# Patient Record
Sex: MALE | Race: WHITE | NOT HISPANIC OR LATINO | Employment: OTHER | URBAN - METROPOLITAN AREA
[De-identification: names, ages, dates, MRNs, and addresses within clinical notes are randomized per-mention and may not be internally consistent; named-entity substitution may affect disease eponyms.]

---

## 2013-10-24 LAB — CRC RECOMMENDATION EXT: NORMAL

## 2017-03-14 ENCOUNTER — LAB VISIT (OUTPATIENT)
Dept: LAB | Facility: HOSPITAL | Age: 67
End: 2017-03-14
Attending: INTERNAL MEDICINE
Payer: MEDICARE

## 2017-03-14 ENCOUNTER — OFFICE VISIT (OUTPATIENT)
Dept: FAMILY MEDICINE | Facility: CLINIC | Age: 67
End: 2017-03-14
Payer: MEDICARE

## 2017-03-14 VITALS
HEART RATE: 78 BPM | DIASTOLIC BLOOD PRESSURE: 68 MMHG | HEIGHT: 70 IN | WEIGHT: 241.5 LBS | BODY MASS INDEX: 34.57 KG/M2 | OXYGEN SATURATION: 97 % | SYSTOLIC BLOOD PRESSURE: 108 MMHG | TEMPERATURE: 98 F

## 2017-03-14 DIAGNOSIS — E03.9 HYPOTHYROIDISM, UNSPECIFIED TYPE: Chronic | ICD-10-CM

## 2017-03-14 DIAGNOSIS — R73.01 IMPAIRED FASTING GLUCOSE: ICD-10-CM

## 2017-03-14 DIAGNOSIS — E78.5 HYPERLIPIDEMIA, UNSPECIFIED HYPERLIPIDEMIA TYPE: ICD-10-CM

## 2017-03-14 DIAGNOSIS — Z12.5 SCREENING FOR PROSTATE CANCER: ICD-10-CM

## 2017-03-14 DIAGNOSIS — I25.118 CORONARY ARTERY DISEASE OF NATIVE ARTERY OF NATIVE HEART WITH STABLE ANGINA PECTORIS: Primary | ICD-10-CM

## 2017-03-14 DIAGNOSIS — F41.9 ANXIETY AND DEPRESSION: Chronic | ICD-10-CM

## 2017-03-14 DIAGNOSIS — R60.0 PERIPHERAL EDEMA: Chronic | ICD-10-CM

## 2017-03-14 DIAGNOSIS — I10 ESSENTIAL HYPERTENSION: ICD-10-CM

## 2017-03-14 DIAGNOSIS — N40.1 BENIGN NON-NODULAR PROSTATIC HYPERPLASIA WITH LOWER URINARY TRACT SYMPTOMS: ICD-10-CM

## 2017-03-14 DIAGNOSIS — E66.9 OBESITY (BMI 30.0-34.9): Chronic | ICD-10-CM

## 2017-03-14 DIAGNOSIS — R73.03 PREDIABETES: Chronic | ICD-10-CM

## 2017-03-14 DIAGNOSIS — I25.2 MYOCARDIAL INFARCT, OLD: ICD-10-CM

## 2017-03-14 DIAGNOSIS — F32.A ANXIETY AND DEPRESSION: Chronic | ICD-10-CM

## 2017-03-14 LAB
ALBUMIN SERPL BCP-MCNC: 4.1 G/DL
ALP SERPL-CCNC: 53 U/L
ALT SERPL W/O P-5'-P-CCNC: 21 U/L
ANION GAP SERPL CALC-SCNC: 9 MMOL/L
AST SERPL-CCNC: 25 U/L
BILIRUB SERPL-MCNC: 0.8 MG/DL
BUN SERPL-MCNC: 12 MG/DL
CALCIUM SERPL-MCNC: 9.8 MG/DL
CHLORIDE SERPL-SCNC: 104 MMOL/L
CHOLEST/HDLC SERPL: 4.5 {RATIO}
CO2 SERPL-SCNC: 29 MMOL/L
COMPLEXED PSA SERPL-MCNC: 0.82 NG/ML
CREAT SERPL-MCNC: 1.1 MG/DL
EST. GFR  (AFRICAN AMERICAN): >60 ML/MIN/1.73 M^2
EST. GFR  (NON AFRICAN AMERICAN): >60 ML/MIN/1.73 M^2
GLUCOSE SERPL-MCNC: 120 MG/DL
HDL/CHOLESTEROL RATIO: 22.4 %
HDLC SERPL-MCNC: 170 MG/DL
HDLC SERPL-MCNC: 38 MG/DL
LDLC SERPL CALC-MCNC: 80 MG/DL
NONHDLC SERPL-MCNC: 132 MG/DL
POTASSIUM SERPL-SCNC: 5.3 MMOL/L
PROT SERPL-MCNC: 7.6 G/DL
SODIUM SERPL-SCNC: 142 MMOL/L
TRIGL SERPL-MCNC: 260 MG/DL
TSH SERPL DL<=0.005 MIU/L-ACNC: 2.76 UIU/ML

## 2017-03-14 PROCEDURE — 83036 HEMOGLOBIN GLYCOSYLATED A1C: CPT

## 2017-03-14 PROCEDURE — 36415 COLL VENOUS BLD VENIPUNCTURE: CPT | Mod: PO

## 2017-03-14 PROCEDURE — 99214 OFFICE O/P EST MOD 30 MIN: CPT | Mod: S$GLB,,, | Performed by: INTERNAL MEDICINE

## 2017-03-14 PROCEDURE — 84443 ASSAY THYROID STIM HORMONE: CPT

## 2017-03-14 PROCEDURE — 99499 UNLISTED E&M SERVICE: CPT | Mod: S$GLB,,, | Performed by: INTERNAL MEDICINE

## 2017-03-14 PROCEDURE — 1160F RVW MEDS BY RX/DR IN RCRD: CPT | Mod: S$GLB,,, | Performed by: INTERNAL MEDICINE

## 2017-03-14 PROCEDURE — 3078F DIAST BP <80 MM HG: CPT | Mod: S$GLB,,, | Performed by: INTERNAL MEDICINE

## 2017-03-14 PROCEDURE — 80061 LIPID PANEL: CPT

## 2017-03-14 PROCEDURE — 84153 ASSAY OF PSA TOTAL: CPT

## 2017-03-14 PROCEDURE — 99499 UNLISTED E&M SERVICE: CPT | Mod: ,,, | Performed by: INTERNAL MEDICINE

## 2017-03-14 PROCEDURE — 80053 COMPREHEN METABOLIC PANEL: CPT

## 2017-03-14 PROCEDURE — 3074F SYST BP LT 130 MM HG: CPT | Mod: S$GLB,,, | Performed by: INTERNAL MEDICINE

## 2017-03-14 PROCEDURE — 99999 PR PBB SHADOW E&M-EST. PATIENT-LVL III: CPT | Mod: PBBFAC,,, | Performed by: INTERNAL MEDICINE

## 2017-03-14 RX ORDER — NITROGLYCERIN 0.4 MG/1
0.4 TABLET SUBLINGUAL EVERY 5 MIN PRN
Qty: 90 TABLET | Refills: 3 | Status: SHIPPED | OUTPATIENT
Start: 2017-03-14 | End: 2020-05-05 | Stop reason: SDUPTHER

## 2017-03-14 RX ORDER — TAMSULOSIN HYDROCHLORIDE 0.4 MG/1
1 CAPSULE ORAL DAILY
Qty: 90 CAPSULE | Refills: 1 | Status: SHIPPED | OUTPATIENT
Start: 2017-03-14 | End: 2017-03-14 | Stop reason: SDUPTHER

## 2017-03-14 RX ORDER — TAMSULOSIN HYDROCHLORIDE 0.4 MG/1
0.8 CAPSULE ORAL DAILY
Qty: 90 CAPSULE | Refills: 3 | Status: SHIPPED | OUTPATIENT
Start: 2017-03-14 | End: 2017-05-12 | Stop reason: SDUPTHER

## 2017-03-14 RX ORDER — LEVOTHYROXINE SODIUM 50 UG/1
50 TABLET ORAL
Qty: 90 TABLET | Refills: 4 | Status: SHIPPED | OUTPATIENT
Start: 2017-03-14 | End: 2018-04-09 | Stop reason: SDUPTHER

## 2017-03-14 RX ORDER — LOSARTAN POTASSIUM 50 MG/1
50 TABLET ORAL DAILY
Qty: 90 TABLET | Refills: 3 | Status: SHIPPED | OUTPATIENT
Start: 2017-03-14 | End: 2018-04-09 | Stop reason: SDUPTHER

## 2017-03-14 RX ORDER — FUROSEMIDE 20 MG/1
20 TABLET ORAL
Qty: 90 TABLET | Refills: 1 | Status: SHIPPED | OUTPATIENT
Start: 2017-03-14 | End: 2017-10-12 | Stop reason: SDUPTHER

## 2017-03-14 RX ORDER — AMITRIPTYLINE HYDROCHLORIDE 25 MG/1
25 TABLET, FILM COATED ORAL NIGHTLY PRN
Qty: 90 TABLET | Refills: 3 | Status: SHIPPED | OUTPATIENT
Start: 2017-03-14 | End: 2018-04-09 | Stop reason: SDUPTHER

## 2017-03-14 RX ORDER — PRAVASTATIN SODIUM 40 MG/1
40 TABLET ORAL DAILY
Qty: 90 TABLET | Refills: 1 | Status: SHIPPED | OUTPATIENT
Start: 2017-03-14 | End: 2017-08-03

## 2017-03-14 RX ORDER — METOPROLOL TARTRATE 25 MG/1
25 TABLET, FILM COATED ORAL 2 TIMES DAILY
Qty: 180 TABLET | Refills: 3 | Status: SHIPPED | OUTPATIENT
Start: 2017-03-14 | End: 2017-08-03 | Stop reason: SDUPTHER

## 2017-03-14 RX ORDER — ISOSORBIDE MONONITRATE 60 MG/1
60 TABLET, EXTENDED RELEASE ORAL DAILY
Qty: 90 TABLET | Refills: 3 | Status: SHIPPED | OUTPATIENT
Start: 2017-03-14 | End: 2018-04-09 | Stop reason: SDUPTHER

## 2017-03-14 NOTE — PROGRESS NOTES
Assessment & Plan  Clinton was seen today for establish care.    Diagnoses and all orders for this visit:    Coronary artery disease of native artery of native heart with stable angina pectoris - stable.  Refilled rx's.    -     isosorbide mononitrate (IMDUR) 60 MG 24 hr tablet; Take 1 tablet (60 mg total) by mouth once daily.  -     losartan (COZAAR) 50 MG tablet; Take 1 tablet (50 mg total) by mouth once daily.  -     metoprolol tartrate (LOPRESSOR) 25 MG tablet; Take 1 tablet (25 mg total) by mouth 2 (two) times daily.  -     pravastatin (PRAVACHOL) 40 MG tablet; Take 1 tablet (40 mg total) by mouth once daily.  -     Comprehensive metabolic panel; Future  -     Lipid panel; Future  -     nitroGLYCERIN (NITROSTAT) 0.4 MG SL tablet; Place 1 tablet (0.4 mg total) under the tongue every 5 (five) minutes as needed for Chest pain.    Myocardial infarct, old    Essential hypertension - good readings, no change, rx's refilled.  -     losartan (COZAAR) 50 MG tablet; Take 1 tablet (50 mg total) by mouth once daily.  -     metoprolol tartrate (LOPRESSOR) 25 MG tablet; Take 1 tablet (25 mg total) by mouth 2 (two) times daily.    Hyperlipidemia, unspecified hyperlipidemia type - checking labs.  On mod dose pravastatin.  Had coffee with cream this AM.  -     pravastatin (PRAVACHOL) 40 MG tablet; Take 1 tablet (40 mg total) by mouth once daily.  -     Comprehensive metabolic panel; Future  -     Lipid panel; Future    Benign non-nodular prostatic hyperplasia with lower urinary tract symptoms - still symptomatic.  Trial of increased dose flomax - take 2 daily.  Stay on amitriptyline.  If still symptomatic - refer back to urology.  -     amitriptyline (ELAVIL) 25 MG tablet; Take 1 tablet (25 mg total) by mouth nightly as needed for Insomnia.  -     Discontinue: tamsulosin (FLOMAX) 0.4 mg Cp24; Take 1 capsule (0.4 mg total) by mouth once daily.  -     tamsulosin (FLOMAX) 0.4 mg Cp24; Take 2 capsules (0.8 mg total) by mouth once  daily.    Anxiety and depression - amitriptyline for this but mainly for nocturia.    Obesity (BMI 30.0-34.9)    Hypothyroidism, unspecified type - check TSH; refilled levothyroxine.  -     levothyroxine (SYNTHROID) 50 MCG tablet; Take 1 tablet (50 mcg total) by mouth before breakfast.  -     TSH; Future    Screening for prostate cancer  -     PSA, Screening; Future    Impaired fasting glucose - not on meds. Last check a few years ago was good; repeat today.  -     Hemoglobin A1c; Future    Peripheral edema - takes furosemide PRN.  -     furosemide (LASIX) 20 MG tablet; Take 1 tablet (20 mg total) by mouth as needed.        There are no discontinued medications.    Follow-up: No Follow-up on file. plan for f/u 6 months fasting      =================================================================      Chief Complaint   Patient presents with    [facility]       [facility]  Brad is a 66 y.o. male, last appointment with this clinic was 10/31/2016.    former pt of Dr. Roe  CAD, hx of MI;   9/27/2013 [facility] prox LAD 30% stenosis; RCA 20% stenosis. patent LAD stent.  ARB, beta blocker, statin, ASA; 3/9/2015 nu med stress test negative.   3/9/2015 TTE normal LVEF 55-60; concentric remodeling.  He notes chronic stable angina with exertion.  Takes NTG PRN.  HTN; losartan; diltizam; furosemide; metoprolol  Peripheral edema; takes lasix PRN.  hyperlipidemia; pravastatin; last level 10/2015  anxiety and depression with possible effects of memory loss.  seen by neuro 2015, plan was neuropsych testing.  MRI brain and carotid US all normal/negative.  12/3/2014 MRI brain: 1. No evidence for acute infarct 2. unremarkable MRI of the brain  12/3/2014 carotid US normal  Pt notes he was having a difficult time since the passing of his brother in 2010.  Feels like mood is OK now.  On amitriptyline for mood but also I think for nocturia.  pre-diabetes; a1c 10/2015 WNL  hypothyroid on 25; TSH 10/2016 WNL  JAYLEN  BPH; flomax; with LUTS.   Notes nocturia.  Hesitancy.  Used to see urology but it's been some time.  Plan at the last visit with urology - stay on flomax, continue routine PSA testing.  Finds the nocturia quite bothersome still.  hx of colon polyps, unclear type; reportedly last colonoscopy 2013 with Abdiaziz  Notes perirectal itching.  Never tried vaseline. Dilt cream not that helpful.   Knee pain with up and down. Not debilitating.  Declines zostavax      Patient Care Team:  Michael Gonzalez MD as PCP - General (Internal Medicine)  WVicki To MD as Consulting Physician (Urology)  Andrea Hurley MD (Neurology)  Desmond Lee MD as Consulting Physician (Colon and Rectal Surgery)    Patient Active Problem List    Diagnosis Date Noted    Hypothyroidism 06/01/2016    Obesity (BMI 30.0-34.9) 06/01/2016    Anxiety and depression 11/24/2014    Other malaise and fatigue 11/24/2014    Retrocalcaneal bursitis 11/24/2014    Prediabetes 11/24/2014    CAD (coronary artery disease) 11/19/2014     Stent LAD 2012 9/27/2013 [facility] prox LAD 30% stenosis; RCA 20% stenosis. patent LAD stent.    3/9/2015 nu med stress test negative. 3/9/2015 TTE normal LVEF 55-60; concentric remodeling.      Hyperlipidemia 07/09/2014    Hypertension 07/09/2014    Acid reflux 07/09/2014    BPH (benign prostatic hyperplasia) 07/09/2014    Vitamin D deficiency 07/09/2014    Hernia of abdominal wall 07/09/2014       PAST MEDICAL HISTORY:  Past Medical History:   Diagnosis Date    Allergy     Angina pectoris     Anxiety     Coronary artery disease     Depression     GERD (gastroesophageal reflux disease)     Hyperlipidemia     Hypertension     Hypothyroidism     Memory loss     12/3/2014 MRI brain: 1. No evidence for acute infarct 2. unremarkable MRI of the brain; 12/3/2014 carotid US normal    Myocardial infarction     Obesity     Sleep apnea        PAST SURGICAL HISTORY:  Past Surgical History:   Procedure Laterality Date    APPENDECTOMY   1986    bone spur Right     COLONOSCOPY      hand trauma Right     heart stent         SOCIAL HISTORY:  Social History     Social History    Marital status:      Spouse name: N/A    Number of children: N/A    Years of education: N/A     Occupational History    Not on file.     Social History Main Topics    Smoking status: Never Smoker    Smokeless tobacco: Never Used    Alcohol use 0.0 oz/week     0 Standard drinks or equivalent per week      Comment: rarely    Drug use: No    Sexual activity: Yes     Other Topics Concern    Not on file     Social History Narrative    No narrative on file       ALLERGIES AND MEDICATIONS: updated and reviewed.  Review of patient's allergies indicates:   Allergen Reactions    Iodine containing multivitamin Anaphylaxis    Naproxen Other (See Comments)     hallucinations     Current Outpatient Prescriptions   Medication Sig Dispense Refill    amitriptyline (ELAVIL) 25 MG tablet Take 1 tablet (25 mg total) by mouth nightly as needed for Insomnia. 90 tablet 3    aspirin (ECOTRIN) 81 MG EC tablet Take 1 tablet (81 mg total) by mouth once daily. 90 tablet 3    clotrimazole-betamethasone 1-0.05% (LOTRISONE) cream Apply to external area TID 45 g 0    DILTIAZEM HCL (DILTIAZEM 2% CREAM) Apply topically 3 (three) times daily. Apply topically to anal area three times daily. 50 g 0    furosemide (LASIX) 20 MG tablet Take 1 tablet (20 mg total) by mouth as needed. 90 tablet 1    isosorbide mononitrate (IMDUR) 60 MG 24 hr tablet Take 1 tablet (60 mg total) by mouth once daily. 90 tablet 3    levothyroxine (SYNTHROID) 50 MCG tablet Take 1 tablet (50 mcg total) by mouth before breakfast. 90 tablet 4    losartan (COZAAR) 50 MG tablet Take 1 tablet (50 mg total) by mouth once daily. 90 tablet 3    meclizine (ANTIVERT) 25 mg tablet 1 TID, prn dizziness 30 tablet 3    metoprolol tartrate (LOPRESSOR) 25 MG tablet Take 1 tablet (25 mg total) by mouth 2 (two) times daily.  "180 tablet 3    nitroGLYCERIN (NITROSTAT) 0.4 MG SL tablet Place 1 tablet (0.4 mg total) under the tongue every 5 (five) minutes as needed for Chest pain. 90 tablet 3    omega-3 acid ethyl esters (LOVAZA) 1 gram capsule Take 2 g by mouth 2 (two) times daily.      omeprazole (PRILOSEC) 20 MG capsule Take 2 a ay in  capsule 3    pravastatin (PRAVACHOL) 40 MG tablet Take 1 tablet (40 mg total) by mouth once daily. 90 tablet 1    tamsulosin (FLOMAX) 0.4 mg Cp24 Take 1 capsule (0.4 mg total) by mouth once daily. 90 capsule 1    vitamin D 1000 units Tab Take 1 tablet (1,000 Units total) by mouth once daily. 90 tablet 3     No current facility-administered medications for this visit.        Review of Systems   Constitutional: Negative for chills, fever and weight loss.   Cardiovascular: Positive for chest pain and leg swelling.   Genitourinary: Positive for frequency.   Musculoskeletal: Positive for joint pain.   Psychiatric/Behavioral: Negative for depression.       Physical Exam   Vitals:    03/14/17 0941   BP: 108/68   Pulse: 78   Temp: 98 °F (36.7 °C)   TempSrc: Oral   SpO2: 97%   Weight: 109.5 kg (241 lb 8.2 oz)   Height: 5' 10" (1.778 m)    There is no height or weight on file to calculate BMI.            Physical Exam   Constitutional: He is oriented to person, place, and time. He appears well-developed and well-nourished. No distress.   Eyes: EOM are normal.   Cardiovascular: Normal rate, regular rhythm and normal heart sounds.    No murmur heard.  Pulmonary/Chest: Effort normal and breath sounds normal.   Musculoskeletal: Normal range of motion. He exhibits edema.   1+ edema to mid tibiae bilaterally   Neurological: He is alert and oriented to person, place, and time. Coordination normal.   Skin: Skin is warm and dry.   Psychiatric: He has a normal mood and affect. His behavior is normal. Thought content normal.     "

## 2017-03-15 LAB
ESTIMATED AVG GLUCOSE: 117 MG/DL
HBA1C MFR BLD HPLC: 5.7 %

## 2017-03-17 NOTE — PROGRESS NOTES
a1c in pre-DM range, seen previously.  FLP OK on prava 40 - TG a little high, nonHDL OK; no change.  PSA WNL  TSH, CMP WNL  Results to pt.  No changes.  OV 6 months.

## 2017-04-27 ENCOUNTER — OFFICE VISIT (OUTPATIENT)
Dept: FAMILY MEDICINE | Facility: CLINIC | Age: 67
End: 2017-04-27
Payer: MEDICARE

## 2017-04-27 VITALS
DIASTOLIC BLOOD PRESSURE: 76 MMHG | BODY MASS INDEX: 34.49 KG/M2 | HEIGHT: 70 IN | OXYGEN SATURATION: 96 % | WEIGHT: 240.94 LBS | HEART RATE: 65 BPM | TEMPERATURE: 98 F | SYSTOLIC BLOOD PRESSURE: 130 MMHG

## 2017-04-27 DIAGNOSIS — G47.33 OSA (OBSTRUCTIVE SLEEP APNEA): ICD-10-CM

## 2017-04-27 DIAGNOSIS — E78.5 HYPERLIPIDEMIA, UNSPECIFIED HYPERLIPIDEMIA TYPE: Chronic | ICD-10-CM

## 2017-04-27 DIAGNOSIS — Z00.00 ENCOUNTER FOR PREVENTIVE HEALTH EXAMINATION: Primary | ICD-10-CM

## 2017-04-27 DIAGNOSIS — F32.A ANXIETY AND DEPRESSION: Chronic | ICD-10-CM

## 2017-04-27 DIAGNOSIS — F41.9 ANXIETY AND DEPRESSION: Chronic | ICD-10-CM

## 2017-04-27 DIAGNOSIS — N40.1 BENIGN NON-NODULAR PROSTATIC HYPERPLASIA WITH LOWER URINARY TRACT SYMPTOMS: ICD-10-CM

## 2017-04-27 DIAGNOSIS — I25.118 CORONARY ARTERY DISEASE OF NATIVE ARTERY OF NATIVE HEART WITH STABLE ANGINA PECTORIS: ICD-10-CM

## 2017-04-27 DIAGNOSIS — I10 ESSENTIAL HYPERTENSION: Chronic | ICD-10-CM

## 2017-04-27 DIAGNOSIS — E03.9 HYPOTHYROIDISM, UNSPECIFIED TYPE: Chronic | ICD-10-CM

## 2017-04-27 DIAGNOSIS — E66.9 OBESITY (BMI 30.0-34.9): Chronic | ICD-10-CM

## 2017-04-27 DIAGNOSIS — I77.819 AORTIC ECTASIA: ICD-10-CM

## 2017-04-27 PROCEDURE — 3078F DIAST BP <80 MM HG: CPT | Mod: S$GLB,,, | Performed by: NURSE PRACTITIONER

## 2017-04-27 PROCEDURE — 3075F SYST BP GE 130 - 139MM HG: CPT | Mod: S$GLB,,, | Performed by: NURSE PRACTITIONER

## 2017-04-27 PROCEDURE — G0439 PPPS, SUBSEQ VISIT: HCPCS | Mod: S$GLB,,, | Performed by: NURSE PRACTITIONER

## 2017-04-27 PROCEDURE — 99999 PR PBB SHADOW E&M-EST. PATIENT-LVL V: CPT | Mod: PBBFAC,,, | Performed by: NURSE PRACTITIONER

## 2017-04-27 PROCEDURE — 99499 UNLISTED E&M SERVICE: CPT | Mod: S$GLB,,, | Performed by: NURSE PRACTITIONER

## 2017-04-28 PROBLEM — G47.33 OSA (OBSTRUCTIVE SLEEP APNEA): Status: ACTIVE | Noted: 2017-04-28

## 2017-04-28 PROBLEM — I77.819 AORTIC ECTASIA: Status: ACTIVE | Noted: 2017-04-28

## 2017-04-28 NOTE — PROGRESS NOTES
"Clinton Rosenberg presented for a  Medicare AWV and comprehensive Health Risk Assessment today. The following components were reviewed and updated:    · Medical history  · Family History  · Social history  · Allergies and Current Medications  · Health Risk Assessment  · Health Maintenance  · Care Team     ** See Completed Assessments for Annual Wellness Visit within the encounter summary.**       The following assessments were completed:  · Living Situation  · CAGE  · Depression Screening  · Timed Get Up and Go  · Whisper Test  · Cognitive Function Screening  · Nutrition Screening  · ADL Screening  · PAQ Screening    Vitals:    04/27/17 1002   BP: 130/76   BP Location: Left arm   Patient Position: Sitting   BP Method: Manual   Pulse: 65   Temp: 98.1 °F (36.7 °C)   TempSrc: Oral   SpO2: 96%   Weight: 109.3 kg (240 lb 15.4 oz)   Height: 5' 10" (1.778 m)     Body mass index is 34.57 kg/(m^2).  Physical Exam   Constitutional: He is oriented to person, place, and time.   Cardiovascular: Normal rate.    Pulmonary/Chest: Effort normal.   Musculoskeletal: Normal range of motion.   Neurological: He is alert and oriented to person, place, and time.   Skin: Skin is warm.   Psychiatric: He has a normal mood and affect. His behavior is normal. Thought content normal.   Vitals reviewed.        Diagnoses and health risks identified today and associated recommendations/orders:    1. Encounter for preventive health examination  Education provided about preventive health examinations and procedures; addressed and discussed patient's health concerns. Additionally, reviewed medical record for risk factors and documented the results during this encounter.    2. Coronary artery disease of native artery of native heart with stable angina pectoris  Denies recent angina, he has nitroglycerin 0.4 SLT for PRN usage.   We initiate another referral for cardiology evaluation.   - Ambulatory referral to Cardiology    3. Aortic ectasia  Noted on chest " "xray 5- "The aorta is elongated."   Stable, asymptomatic, referral to cardiology ordered.   - Ambulatory referral to Cardiology    4. Hyperlipidemia, unspecified hyperlipidemia type  Stable, asymptomatic. Lipid panel (March 2017 reflects elevated triglycerides).  He's taking pravastatin, supplemental krill oil. Reminded patient of [employer] benefits with access to fitness centers and classes.   We discussed diet and exercise for weight loss.Educated about diet, activities, and ways to avoid sedentary lifestyle.   - Ambulatory referral to Cardiology    5. Essential hypertension  Presently at goal. The current medical regimen is effective;  continue present plan and medications.  - Ambulatory referral to Cardiology    6. Obesity (BMI 30.0-34.9)  We discussed diet and exercise for weight loss.  Educated about diet, activities, and ways to avoid sedentary lifestyle.   Reminded patient of [employer] benefits with access to fitness centers and classes.     7. Anxiety and depression  Symptoms controlled. Continuing current management.  He denies homicidal or suicidal ideation. Discussed family and social dynamics.     8. Hypothyroidism, unspecified type  TSH 2.759. Clinically euthyroid. Continue as advised.   - Ambulatory referral to Cardiology    9. Benign non-nodular prostatic hyperplasia with lower urinary tract symptoms  Evaluated by [facility] urology. Continue present plan and medications as advised.    10. JAYLEN (obstructive sleep apnea)  Reviewed results with patient, discussed CPAP usage. Daytime somnolence. Patient stated he hasn't heard about anything regarding the results of his CPAP.  Will research and follow up with patient.  Will consult case management for assistance.     No Follow-up on file.    Trevor Arango Jr, NP  "

## 2017-04-28 NOTE — PATIENT INSTRUCTIONS
Counseling and Referral of Other Preventative  (Italic type indicates deductible and co-insurance are waived)    Patient Name: Clinton Rosenberg  Today's Date: 4/28/2017      SERVICE LIMITATIONS RECOMMENDATION    Vaccines    · Pneumococcal (once after 65)    · Influenza (annually)    · Hepatitis B (if medium/high risk)    · Prevnar 13      Hepatitis B medium/high risk factors:       - End-stage renal disease       - Hemophiliacs who received Factor VII or         IX concentrates       - Clients of institutions for the mentally             retarded       - Persons who live in the same house as          a HepB carrier       - Homosexual men       - Illicit injectable drug abusers     Pneumococcal: N/A     Influenza: Done, repeat in one year     Hepatitis B: N/A     Prevnar 13: N/A    Prostate cancer screening (annually to age 75)     Prostate specific antigen (PSA) Shared decision making with Provider. Sometimes a co-pay may be required if the patient decides to have this test. The USPSTF no longer recommends prostate cancer screening routinely in medicine: every 1 year    Colorectal cancer screening (to age 75)    · Fecal occult blood test (annual)  · Flexible sigmoidoscopy (5y)  · Screening colonoscopy (10y)  · Barium enema   Last done 2/2009, recommend to repeat every 10  years    Diabetes self-management training (no USPSTF recommendations)  Requires referral by treating physician for patient with diabetes or renal disease. 10 hours of initial DSMT sessions of no less than 30 minutes each in a continuous 12-month period. 2 hours of follow-up DSMT in subsequent years.  N/A    Glaucoma screening (no USPSTF recommendation)  Diabetes mellitus, family history   , age 50 or over    American, age 65 or over  Recommended to patient    Medical nutrition therapy for diabetes or renal disease (no recommended schedule)  Requires referral by treating physician for patient with diabetes or renal disease or  kidney transplant within the past 3 years.  Can be provided in same year as diabetes self-management training (DSMT), and CMS recommends medical nutrition therapy take place after DSMT. Up to 3 hours for initial year and 2 hours in subsequent years.  N/A    Cardiovascular screening blood tests (every 5 years)  · Fasting lipid panel  Order as a panel if possible  Done this year, repeat every year    Diabetes screening tests (at least every 3 years, Medicare covers annually or at 6-month intervals for prediabetic patients)  · Fasting blood sugar (FBS) or glucose tolerance test (GTT)  Patient must be diagnosed with one of the following:       - Hypertension       - Dyslipidemia       - Obesity (BMI 30kg/m2)       - Previous elevated impaired FBS or GTT       ... or any two of the following:       - Overweight (BMI 25 but <30)       - Family history of diabetes       - Age 65 or older       - History of gestational diabetes or birth of baby weighing more than 9 pounds  Done this year, repeat every year    Abdominal aortic aneurysm screening (once)  · Sonogram   Limited to patients who meet one of the following criteria:       - Men who are 65-75 years old and have smoked more than 100 cigarette in their lifetime       - Anyone with a family history of abdominal aortic aneurysm       - Anyone recommended for screening by the USPSTF  no clinical risk factors     HIV screening (annually for increased risk patients)  · HIV-1 and HIV-2 by EIA, or SELENE, rapid antibody test or oral mucosa transudate  Patients must be at increased risk for HIV infection per USPSTF guidelines or pregnant. Tests covered annually for patient at increased risk or as requested by the patient. Pregnant patients may receive up to 3 tests during pregnancy.  No clinical risk factors     Smoking cessation counseling (up to 8 sessions per year)  Patients must be asymptomatic of tobacco-related conditions to receive as a preventative service.  Non-smoker     Subsequent annual wellness visit  At least 12 months since last AWV  Return in one year     The following information is provided to all patients.  This information is to help you find resources for any of the problems found today that may be affecting your health:                Living healthy guide: www.[facility]      Understanding Diabetes: www.diabetes.org      Eating healthy: www.cdc.gov/healthyweight      CDC home safety checklist: www.cdc.gov/steadi/patient.html      Agency on Aging: www.goea.[place].[facility]      Alcoholics anonymous (AA): www.aa.org      Physical Activity: www.melquiades.nih.gov/op7cfak      Tobacco use: www.quitwithusla.org

## 2017-05-01 ENCOUNTER — OUTPATIENT CASE MANAGEMENT (OUTPATIENT)
Dept: ADMINISTRATIVE | Facility: OTHER | Age: 67
End: 2017-05-01

## 2017-05-01 NOTE — PROGRESS NOTES
Received message from Trevor Arango NP regarding referral to [facility]. He will place order in EPIC. Pt needs assistance with obtaining CPAP.

## 2017-05-01 NOTE — PROGRESS NOTES
For your Information:    Please note the following patient has been assigned to MOISES Ballesteros with [facility] for screening.    Reason: Low Risk  JAYLEN (obstructive sleep apnea)    Please contact [facility] at ext [phone] with questions.    Thank you,  Edda De La Torre, SSC

## 2017-05-02 ENCOUNTER — OUTPATIENT CASE MANAGEMENT (OUTPATIENT)
Dept: ADMINISTRATIVE | Facility: OTHER | Age: 67
End: 2017-05-02

## 2017-05-02 ENCOUNTER — TELEPHONE (OUTPATIENT)
Dept: FAMILY MEDICINE | Facility: CLINIC | Age: 67
End: 2017-05-02

## 2017-05-02 NOTE — PROGRESS NOTES
Attempt #:  1  This CSW attempted to reach patient/caregiver to provide resource and left a message requesting a return call.

## 2017-05-03 ENCOUNTER — OUTPATIENT CASE MANAGEMENT (OUTPATIENT)
Dept: ADMINISTRATIVE | Facility: OTHER | Age: 67
End: 2017-05-03

## 2017-05-03 NOTE — PROGRESS NOTES
This CSW received a referral on the above patient.   Reason for referral: Low risk, JAYLEN (obstructive sleep apnea)  Name of the community resource that was provided: Advance directive,   [facility], Senior Resource Guide  Resource given to: Patient via US mail.     5/3/17-This CSW spoke with the patient and his wife to complete assessment. Patient reports   Completing sleep studying in 2016, however never receiving CPAP machine or appointment  for equipment set up. CSW contacted patient's PCP office and spoke with office staff regarding CPAP machine.  Office staff sent message to patient's PCP, in order to determine if patient would need  to complete new sleep study.     5/4/17-This CSW spoke with Erika with pt's PCP office and she reported patient doctor  put in referral for CPAP machine this morning. CSWnotified patient CPAP referral was sent  to [facility], and they will contact patient to schedule appointment.

## 2017-05-04 ENCOUNTER — TELEPHONE (OUTPATIENT)
Dept: FAMILY MEDICINE | Facility: CLINIC | Age: 67
End: 2017-05-04

## 2017-05-04 DIAGNOSIS — G47.33 OSA (OBSTRUCTIVE SLEEP APNEA): Primary | ICD-10-CM

## 2017-05-04 NOTE — TELEPHONE ENCOUNTER
----- Message from Hawk Marquez sent at 5/3/2017 10:05 AM CDT -----  Contact: Christelle/[facility] /[phone]  Christelle would like to know if the patient need to schedule another Sleep Study. She states that the patient never received any equipment from the last Sleep Study in 2016. Thank you.

## 2017-05-04 NOTE — TELEPHONE ENCOUNTER
He had sleep study and orders put in under Dr Roe but the orders need to be resent.. Can you do that..

## 2017-05-10 ENCOUNTER — TELEPHONE (OUTPATIENT)
Dept: FAMILY MEDICINE | Facility: CLINIC | Age: 67
End: 2017-05-10

## 2017-05-12 ENCOUNTER — INITIAL CONSULT (OUTPATIENT)
Dept: CARDIOLOGY | Facility: CLINIC | Age: 67
End: 2017-05-12
Payer: MEDICARE

## 2017-05-12 VITALS
SYSTOLIC BLOOD PRESSURE: 122 MMHG | WEIGHT: 240 LBS | DIASTOLIC BLOOD PRESSURE: 78 MMHG | BODY MASS INDEX: 34.36 KG/M2 | HEIGHT: 70 IN | HEART RATE: 59 BPM | RESPIRATION RATE: 12 BRPM

## 2017-05-12 DIAGNOSIS — E78.5 HYPERLIPIDEMIA, UNSPECIFIED HYPERLIPIDEMIA TYPE: Chronic | ICD-10-CM

## 2017-05-12 DIAGNOSIS — I73.9 PAD (PERIPHERAL ARTERY DISEASE): ICD-10-CM

## 2017-05-12 DIAGNOSIS — I10 HYPERTENSION: ICD-10-CM

## 2017-05-12 DIAGNOSIS — I25.118 CORONARY ARTERY DISEASE OF NATIVE ARTERY OF NATIVE HEART WITH STABLE ANGINA PECTORIS: Primary | ICD-10-CM

## 2017-05-12 DIAGNOSIS — N40.1 BENIGN NON-NODULAR PROSTATIC HYPERPLASIA WITH LOWER URINARY TRACT SYMPTOMS: ICD-10-CM

## 2017-05-12 DIAGNOSIS — I10 ESSENTIAL HYPERTENSION: ICD-10-CM

## 2017-05-12 DIAGNOSIS — E66.9 OBESITY (BMI 30.0-34.9): Chronic | ICD-10-CM

## 2017-05-12 DIAGNOSIS — G47.33 OSA (OBSTRUCTIVE SLEEP APNEA): ICD-10-CM

## 2017-05-12 DIAGNOSIS — R00.2 PALPITATIONS: ICD-10-CM

## 2017-05-12 PROCEDURE — 93000 ELECTROCARDIOGRAM COMPLETE: CPT | Mod: S$GLB,,, | Performed by: INTERNAL MEDICINE

## 2017-05-12 PROCEDURE — 99215 OFFICE O/P EST HI 40 MIN: CPT | Mod: S$GLB,,, | Performed by: INTERNAL MEDICINE

## 2017-05-12 PROCEDURE — 3074F SYST BP LT 130 MM HG: CPT | Mod: S$GLB,,, | Performed by: INTERNAL MEDICINE

## 2017-05-12 PROCEDURE — 99499 UNLISTED E&M SERVICE: CPT | Mod: S$GLB,,, | Performed by: INTERNAL MEDICINE

## 2017-05-12 PROCEDURE — 99999 PR PBB SHADOW E&M-EST. PATIENT-LVL III: CPT | Mod: PBBFAC,,, | Performed by: INTERNAL MEDICINE

## 2017-05-12 PROCEDURE — 1160F RVW MEDS BY RX/DR IN RCRD: CPT | Mod: S$GLB,,, | Performed by: INTERNAL MEDICINE

## 2017-05-12 PROCEDURE — 3078F DIAST BP <80 MM HG: CPT | Mod: S$GLB,,, | Performed by: INTERNAL MEDICINE

## 2017-05-12 RX ORDER — TAMSULOSIN HYDROCHLORIDE 0.4 MG/1
0.8 CAPSULE ORAL DAILY
Qty: 90 CAPSULE | Refills: 3 | Status: SHIPPED | OUTPATIENT
Start: 2017-05-12 | End: 2017-10-11 | Stop reason: SDUPTHER

## 2017-05-12 NOTE — MR AVS SNAPSHOT
[facility]  [address] [facility]  [place] [address]  Phone: [phone]                  Clinton Rosenberg   2017 1:00 PM   Initial consult    Description:  Male : 1950   Provider:  Brad Bahena MD   Department:  [facility]           Reason for Visit     Dizziness     Shortness of Breath                To Do List           Goals (5 Years of Data)     None      [facility]     [facility] On Call Nurse Care Line -  Assistance  Unless otherwise directed by your provider, please contact [facility], our nurse care line that is available for  assistance.     Registered nurses in the [facility] provide: appointment scheduling, clinical advisement, health education, and other advisory services.  Call: [phone] (toll free)               Medications           Message regarding Medications     Verify the changes and/or additions to your medication regime listed below are the same as discussed with your clinician today.  If any of these changes or additions are incorrect, please notify your healthcare provider.             Verify that the below list of medications is an accurate representation of the medications you are currently taking.  If none reported, the list may be blank. If incorrect, please contact your healthcare provider. Carry this list with you in case of emergency.           Current Medications     amitriptyline (ELAVIL) 25 MG tablet Take 1 tablet (25 mg total) by mouth nightly as needed for Insomnia.    aspirin (ECOTRIN) 81 MG EC tablet Take 1 tablet (81 mg total) by mouth once daily.    clotrimazole-betamethasone 1-0.05% (LOTRISONE) cream Apply to external area TID    DILTIAZEM HCL (DILTIAZEM 2% CREAM) Apply topically 3 (three) times daily. Apply topically to anal area three times daily.    furosemide (LASIX) 20 MG tablet Take 1 tablet (20 mg total) by mouth as needed.    isosorbide mononitrate (IMDUR) 60 MG 24 hr tablet Take 1 tablet  "(60 mg total) by mouth once daily.    KRILL/OM3/DHA/EPA/OM6/LIP/ASTX (KRILL OIL, OMEGA 3 AND 6, ORAL) Take by mouth.    levothyroxine (SYNTHROID) 50 MCG tablet Take 1 tablet (50 mcg total) by mouth before breakfast.    losartan (COZAAR) 50 MG tablet Take 1 tablet (50 mg total) by mouth once daily.    meclizine (ANTIVERT) 25 mg tablet 1 TID, prn dizziness    metoprolol tartrate (LOPRESSOR) 25 MG tablet Take 1 tablet (25 mg total) by mouth 2 (two) times daily.    nitroGLYCERIN (NITROSTAT) 0.4 MG SL tablet Place 1 tablet (0.4 mg total) under the tongue every 5 (five) minutes as needed for Chest pain.    pravastatin (PRAVACHOL) 40 MG tablet Take 1 tablet (40 mg total) by mouth once daily.    tamsulosin (FLOMAX) 0.4 mg Cp24 Take 2 capsules (0.8 mg total) by mouth once daily.    vitamin D 1000 units Tab Take 1 tablet (1,000 Units total) by mouth once daily.           Clinical Reference Information           Your Vitals Were     BP Pulse Resp Height Weight BMI    122/78 59 12 5' 10" (1.778 m) 108.9 kg (240 lb) 34.44 kg/m2      Blood Pressure          Most Recent Value    BP  122/78      Allergies as of 5/12/2017     Iodine Containing Multivitamin    Naproxen    Hydrocodone-acetaminophen    Oxycodone-acetaminophen      Immunizations Administered on Date of Encounter - 5/12/2017     None      Language Assistance Services     ATTENTION: Language assistance services are available, free of charge. Please call [phone].      ATENCIÓN: Si habla deb, tiene a thakur disposición servicios gratuitos de asistencia lingüística. Llame al [phone].     [facility] Ý: N?u b?n nói Ti?ng Vi?t, có các d?ch v? h? tr? ngôn ng? mi?n phí dành cho b?n. G?i s? [phone].         [facility] complies with applicable Federal civil rights laws and does not discriminate on the basis of race, color, national origin, age, disability, or sex.        "

## 2017-05-12 NOTE — PROGRESS NOTES
CARDIOVASCULAR PROGRESS NOTE    REASON FOR CONSULT:   Clinton Rosenberg is a 66 y.o. male who presents for follow up of CAD, aortic ectasia on CXR.    PCP: Dair  HISTORY OF PRESENT ILLNESS:   Last seen by Dr. Rivera 9/2015    The patient is a very pleasant 66-year-old  man referred at the request of his primary care team for evaluation of aortic ectasia noted on a chest x-ray in 2014.  The report actually says aortic elongation.  On my personal review of this chest x-ray and of a more recent chest x-ray dated February 11, 2015, the aorta does appear somewhat curved, but not enlarged.   the patient's main concern today is chest discomfort.  He describes stable anginal symptoms.  He has no symptoms at rest.  He also describes generalized malaise and fatigue, as well as bilateral calf claudication.  He is also describing palpitations without william syncope, although he says he gets close sometimes.  There's been no PND, orthopnea, or lower extremity edema.  He denies melena, or hematuria, but does describe occasional hemorrhoidal bleeding.  The patient does apparently have a history of sleep apnea and was supposed to get a CPAP apparatus, however he appears to be unable to obtain the apparatus.  I suggested he follow-up with Dr. Mead for further evaluation.    CARDIOVASCULAR HISTORY:   CAD/MI/PCI  JAYLEN, unable to afford CPAP    PAST MEDICAL HISTORY:     Past Medical History:   Diagnosis Date    Allergy     Angina pectoris     Anxiety     Coronary artery disease     Depression     GERD (gastroesophageal reflux disease)     Hyperlipidemia     Hypertension     Hypothyroidism     Memory loss     12/3/2014 MRI brain: 1. No evidence for acute infarct 2. unremarkable MRI of the brain; 12/3/2014 carotid US normal    Myocardial infarction     Obesity     Retrocalcaneal bursitis 11/24/2014    Sleep apnea        PAST SURGICAL HISTORY:     Past Surgical History:   Procedure Laterality Date    APPENDECTOMY   1986    bone spur Right     COLONOSCOPY      hand trauma Right     pencil     heart stent         ALLERGIES AND MEDICATION:     Review of patient's allergies indicates:   Allergen Reactions    Iodine containing multivitamin Anaphylaxis    Naproxen Other (See Comments)     hallucinations    Hydrocodone-acetaminophen      Rash (skin)^    Oxycodone-acetaminophen      Rash (skin)^     Previous Medications    AMITRIPTYLINE (ELAVIL) 25 MG TABLET    Take 1 tablet (25 mg total) by mouth nightly as needed for Insomnia.    ASPIRIN (ECOTRIN) 81 MG EC TABLET    Take 1 tablet (81 mg total) by mouth once daily.    CLOTRIMAZOLE-BETAMETHASONE 1-0.05% (LOTRISONE) CREAM    Apply to external area TID    DILTIAZEM HCL (DILTIAZEM 2% CREAM)    Apply topically 3 (three) times daily. Apply topically to anal area three times daily.    FUROSEMIDE (LASIX) 20 MG TABLET    Take 1 tablet (20 mg total) by mouth as needed.    ISOSORBIDE MONONITRATE (IMDUR) 60 MG 24 HR TABLET    Take 1 tablet (60 mg total) by mouth once daily.    KRILL/OM3/DHA/EPA/OM6/LIP/ASTX (KRILL OIL, OMEGA 3 AND 6, ORAL)    Take by mouth.    LEVOTHYROXINE (SYNTHROID) 50 MCG TABLET    Take 1 tablet (50 mcg total) by mouth before breakfast.    LOSARTAN (COZAAR) 50 MG TABLET    Take 1 tablet (50 mg total) by mouth once daily.    MECLIZINE (ANTIVERT) 25 MG TABLET    1 TID, prn dizziness    METOPROLOL TARTRATE (LOPRESSOR) 25 MG TABLET    Take 1 tablet (25 mg total) by mouth 2 (two) times daily.    NITROGLYCERIN (NITROSTAT) 0.4 MG SL TABLET    Place 1 tablet (0.4 mg total) under the tongue every 5 (five) minutes as needed for Chest pain.    PRAVASTATIN (PRAVACHOL) 40 MG TABLET    Take 1 tablet (40 mg total) by mouth once daily.    TAMSULOSIN (FLOMAX) 0.4 MG CP24    Take 2 capsules (0.8 mg total) by mouth once daily.    VITAMIN D 1000 UNITS TAB    Take 1 tablet (1,000 Units total) by mouth once daily.       SOCIAL HISTORY:     Social History     Social History    Marital  status:      Spouse name: N/A    Number of children: N/A    Years of education: N/A     Occupational History    Not on file.     Social History Main Topics    Smoking status: Never Smoker    Smokeless tobacco: Never Used    Alcohol use 0.0 oz/week     0 Standard drinks or equivalent per week      Comment: rarely    Drug use: No    Sexual activity: Yes     Other Topics Concern    Not on file     Social History Narrative       FAMILY HISTORY:     Family History   Problem Relation Age of Onset    Arthritis Mother     Early death Mother     Heart disease Mother     Hypertension Mother     Migraines Mother     Seizures Mother     Tremor Mother     Diabetes Mother     Cancer Mother     Early death Father     Heart disease Father     Hypertension Father     Stroke Father     Diabetes Father     Alcohol abuse Father     Arthritis Brother     Cancer Brother     Diabetes Brother     Early death Brother     Heart disease Brother     Hypertension Brother     Heart disease Sister     Hypertension Sister     Arthritis Sister     Depression Sister     Heart disease Brother     Arthritis Brother     Heart disease Brother     Pneumonia Brother     Heart disease Brother     Hypertension Brother     Diabetes Brother     Heart disease Brother        REVIEW OF SYSTEMS:   Review of Systems   Constitutional: Negative for chills, diaphoresis and fever.   HENT: Negative for nosebleeds.    Eyes: Negative for blurred vision, double vision and photophobia.   Respiratory: Positive for shortness of breath. Negative for hemoptysis and wheezing.    Cardiovascular: Positive for chest pain and palpitations. Negative for orthopnea, claudication, leg swelling and PND.   Gastrointestinal: Negative for abdominal pain, blood in stool, heartburn, melena, nausea and vomiting.   Genitourinary: Negative for flank pain and hematuria.   Musculoskeletal: Negative for falls, myalgias and neck pain.   Skin:  "Negative for rash.   Neurological: Negative for dizziness, seizures, loss of consciousness, weakness and headaches.   Endo/Heme/Allergies: Negative for polydipsia. Does not bruise/bleed easily.   Psychiatric/Behavioral: Negative for depression and memory loss. The patient is not nervous/anxious.        PHYSICAL EXAM:     Vitals:    05/12/17 1307   BP: 122/78   Pulse: (!) 59   Resp: 12    Body mass index is 34.44 kg/(m^2).  Weight: 108.9 kg (240 lb)   Height: 5' 10" (177.8 cm)     Physical Exam   Constitutional: He is oriented to person, place, and time. He appears well-developed and well-nourished. He is cooperative.  Non-toxic appearance. No distress.   HENT:   Head: Normocephalic and atraumatic.   Eyes: Conjunctivae and EOM are normal. Pupils are equal, round, and reactive to light. No scleral icterus.   Neck: Trachea normal and normal range of motion. Neck supple. Normal carotid pulses and no JVD present. Carotid bruit is not present. No rigidity. No edema present. No thyromegaly present.   Cardiovascular: Normal rate, regular rhythm, S1 normal and S2 normal.  PMI is not displaced.  Exam reveals no gallop and no friction rub.    No murmur heard.  Pulses:       Carotid pulses are 2+ on the right side, and 2+ on the left side.  Pulmonary/Chest: Effort normal and breath sounds normal. No respiratory distress. He has no wheezes. He has no rales. He exhibits no tenderness.   Abdominal: Soft. Bowel sounds are normal. He exhibits no distension and no mass. There is no hepatosplenomegaly. There is no tenderness.   Obese   Musculoskeletal: He exhibits no edema or tenderness.   Feet:   Right Foot:   Skin Integrity: Negative for ulcer.   Left Foot:   Skin Integrity: Negative for ulcer.   Neurological: He is alert and oriented to person, place, and time. No cranial nerve deficit.   Skin: Skin is warm and dry. No rash noted. No erythema.   Psychiatric: He has a normal mood and affect. His speech is normal and behavior is " normal.   Vitals reviewed.      DATA:   EKG: (personally reviewed tracing)  5/12/17 SR 58    Laboratory:  CBC:    Recent Labs  Lab 10/22/15  0808 06/01/16  0947 10/31/16  1535   WHITE BLOOD CELL COUNT 6.13 6.90 8.84   HEMOGLOBIN 16.0 16.3 16.2   HEMATOCRIT 45.5 46.9 45.8   PLATELETS 162 155 176       CHEMISTRIES:    Recent Labs  Lab 10/22/15  0808 10/31/16  1535 03/14/17  1046   GLUCOSE 138 H 103 120 H   SODIUM 141 141 142   POTASSIUM 5.1 4.7 5.3 H   BUN BLD 13 10 12   CREATININE 1.2 1.1 1.1   EGFR IF AFRICAN AMERICAN >60.0 >60.0 >60.0   EGFR IF NON- >60.0 >60.0 >60.0   CALCIUM 9.6 9.2 9.8       CARDIAC BIOMARKERS:        COAGS:        LIPIDS/LFTS:    Recent Labs  Lab 12/02/14  0902  10/22/15  0808 10/31/16  1535 03/14/17  1046   CHOLESTEROL 167  --  159  --  170   TRIGLYCERIDES 290 H  --  288 H  --  260 H   HDL 36 L  --  39 L  --  38 L   LDL CHOLESTEROL 73.0  --  62.4 L  --  80.0   NON-HDL CHOLESTEROL 131  --  120  --  132   AST 18  < > 21 20 25   ALT 19  < > 24 24 21   < > = values in this interval not displayed.    Cardiovascular Testing:  L MPI 3/9/15  Nuclear Quantitative Functional Analysis:   LVEF: >= 70 %  Impression: NORMAL MYOCARDIAL PERFUSION  1. The perfusion scan is free of evidence for myocardial ischemia or injury.   2. Resting wall motion is physiologic.   3. Resting LV function is normal.   4. The ventricular volumes are normal at rest and stress.   5. The extracardiac distribution of radioactivity is normal.     Echo 3/9/15    1 - Normal left ventricular systolic function (EF 55-60%).     2 - Concentric remodeling.     3 - Trivial aortic regurgitation.     4 - Trivial mitral regurgitation.     5 - Trivial tricuspid regurgitation.     6 - Trivial pulmonic regurgitation.     Carotid US 12/3/14  1. Less than 50% stenosis of the right internal carotid artery.  2. Less than 50% stenosis of the left internal carotid artery.    Cath 9/27/13 (Per Dr. Rivera note 11/19/14)  FINDINGS:   Left  main, no luminal irregularities.   LAD: The proximal LAD has a 30% stenosis and widely patent. The remainder of the LAD has luminal  irregularities. First diagonal, luminal irregularities. Second diagonal, luminal irregularities.   Left circumflex artery, luminal irregularities. OM-1, luminal irregularities. OM-2, luminal irregularities.   Right coronary artery has a 20% stenosis and the distal right coronary artery has a 1% stenosis. The  remainder of the artery has luminal irregularities. Posterior lateral branch  has luminal irregularities. Posterior descending artery has luminal  irregularities.  SUMMARY: LVEDP = 10 mmHg. LVEF = 65%. Patent LAD stent.    ASSESSMENT:   # CP on background of known CAD  # HTN, controlled  # HLP, LDL acceptable  # dizziness  # claudication, bilat  # JAYLEN, pt unable to obtain CPAP apparatus  # BMI 34    PLAN:   Cont med rx  Echo  Ashley MPI (pt previously unable to exercise)  Holter  LE art US/TANA  Check CBC  Consult Dr. Mead for assistance with obtaining CPAP  Diet/exercise/weight loss  RTC 1 month      Brad Bahena MD, FACC

## 2017-05-12 NOTE — LETTER
May 12, 2017      Trevor Arango Jr., NP  [address] [place] [address]           [facility]  [address] [facility]  [place] [address]  Phone: [phone]          Patient: Clinton Rosenberg   MR Number: [id number]   YOB: 1950   Date of Visit: 5/12/2017       Dear Trevor Arango Jr.:    Thank you for referring Clinton Rosenberg to me for evaluation. Attached you will find relevant portions of my assessment and plan of care.    If you have questions, please do not hesitate to call me. I look forward to following Clinton Rosenberg along with you.    Sincerely,    Brad Bahena MD    Enclosure  CC:  Michael Gonzalez MD    If you would like to receive this communication electronically, please contact [email or web address] or [phone] to request more information on [email or web address] Link access.    For providers and/or their staff who would like to refer a patient to [facility], please contact us through our one-stop-shop provider referral line, [facility], at [phone].    If you feel you have received this communication in error or would no longer like to receive these types of communications, please e-mail [email or web address]

## 2017-05-22 NOTE — ADDENDUM NOTE
Encounter addended by: Barbra Guillen MA on: 5/22/2017  1:46 PM<BR>    Actions taken:  activity accessed

## 2017-05-23 NOTE — ADDENDUM NOTE
Encounter addended by: Barbra Guillen MA on: 5/23/2017 10:19 AM<BR>    Actions taken:  activity accessed

## 2017-06-08 ENCOUNTER — HOSPITAL ENCOUNTER (OUTPATIENT)
Dept: CARDIOLOGY | Facility: HOSPITAL | Age: 67
Discharge: HOME | End: 2017-06-08
Attending: INTERNAL MEDICINE
Payer: MEDICARE

## 2017-06-08 ENCOUNTER — HOSPITAL ENCOUNTER (OUTPATIENT)
Dept: RADIOLOGY | Facility: HOSPITAL | Age: 67
Discharge: HOME | End: 2017-06-08
Attending: INTERNAL MEDICINE
Payer: MEDICARE

## 2017-06-08 DIAGNOSIS — I73.9 PAD (PERIPHERAL ARTERY DISEASE): ICD-10-CM

## 2017-06-08 DIAGNOSIS — I25.118 CORONARY ARTERY DISEASE OF NATIVE ARTERY OF NATIVE HEART WITH STABLE ANGINA PECTORIS: ICD-10-CM

## 2017-06-08 LAB
DIASTOLIC DYSFUNCTION: NO
DIASTOLIC DYSFUNCTION: NO
ESTIMATED PA SYSTOLIC PRESSURE: 25.85
MITRAL VALVE REGURGITATION: NORMAL
RETIRED EF AND QEF - SEE NOTES: 55 (ref 55–65)
TRICUSPID VALVE REGURGITATION: NORMAL

## 2017-06-08 PROCEDURE — 63600175 PHARM REV CODE 636 W HCPCS

## 2017-06-08 PROCEDURE — 93018 CV STRESS TEST I&R ONLY: CPT | Mod: ,,, | Performed by: INTERNAL MEDICINE

## 2017-06-08 PROCEDURE — 93016 CV STRESS TEST SUPVJ ONLY: CPT | Mod: ,,, | Performed by: INTERNAL MEDICINE

## 2017-06-08 PROCEDURE — 93017 CV STRESS TEST TRACING ONLY: CPT

## 2017-06-08 PROCEDURE — 93306 TTE W/DOPPLER COMPLETE: CPT

## 2017-06-08 PROCEDURE — 78452 HT MUSCLE IMAGE SPECT MULT: CPT | Mod: TC

## 2017-06-08 PROCEDURE — 93925 LOWER EXTREMITY STUDY: CPT | Mod: TC

## 2017-06-08 PROCEDURE — 78452 HT MUSCLE IMAGE SPECT MULT: CPT | Mod: 26,,, | Performed by: INTERNAL MEDICINE

## 2017-06-08 PROCEDURE — 93227 XTRNL ECG REC<48 HR R&I: CPT | Mod: ,,, | Performed by: INTERNAL MEDICINE

## 2017-06-08 PROCEDURE — 93306 TTE W/DOPPLER COMPLETE: CPT | Mod: 26,,, | Performed by: INTERNAL MEDICINE

## 2017-06-08 PROCEDURE — 93925 LOWER EXTREMITY STUDY: CPT | Mod: 26,,, | Performed by: RADIOLOGY

## 2017-06-08 PROCEDURE — 93922 UPR/L XTREMITY ART 2 LEVELS: CPT | Mod: 26,,, | Performed by: RADIOLOGY

## 2017-06-08 PROCEDURE — 93226 XTRNL ECG REC<48 HR SCAN A/R: CPT

## 2017-06-08 RX ORDER — REGADENOSON 0.08 MG/ML
INJECTION, SOLUTION INTRAVENOUS
Status: DISPENSED
Start: 2017-06-08 | End: 2017-06-08

## 2017-06-19 ENCOUNTER — LAB VISIT (OUTPATIENT)
Dept: LAB | Facility: HOSPITAL | Age: 67
End: 2017-06-19
Attending: INTERNAL MEDICINE
Payer: MEDICARE

## 2017-06-19 ENCOUNTER — OFFICE VISIT (OUTPATIENT)
Dept: FAMILY MEDICINE | Facility: CLINIC | Age: 67
End: 2017-06-19
Payer: MEDICARE

## 2017-06-19 VITALS
WEIGHT: 240.31 LBS | BODY MASS INDEX: 34.4 KG/M2 | HEIGHT: 70 IN | TEMPERATURE: 98 F | HEART RATE: 62 BPM | SYSTOLIC BLOOD PRESSURE: 108 MMHG | DIASTOLIC BLOOD PRESSURE: 68 MMHG | OXYGEN SATURATION: 98 %

## 2017-06-19 DIAGNOSIS — S39.012A LOW BACK STRAIN, INITIAL ENCOUNTER: Primary | ICD-10-CM

## 2017-06-19 DIAGNOSIS — R10.9 ACUTE LEFT FLANK PAIN: ICD-10-CM

## 2017-06-19 LAB
BILIRUB UR QL STRIP: NEGATIVE
CLARITY UR REFRACT.AUTO: ABNORMAL
COLOR UR AUTO: YELLOW
GLUCOSE UR QL STRIP: NEGATIVE
HGB UR QL STRIP: NEGATIVE
KETONES UR QL STRIP: NEGATIVE
LEUKOCYTE ESTERASE UR QL STRIP: NEGATIVE
NITRITE UR QL STRIP: NEGATIVE
PH UR STRIP: 5 [PH] (ref 5–8)
PROT UR QL STRIP: NEGATIVE
SP GR UR STRIP: 1.02 (ref 1–1.03)
URN SPEC COLLECT METH UR: ABNORMAL
UROBILINOGEN UR STRIP-ACNC: NEGATIVE EU/DL

## 2017-06-19 PROCEDURE — 99499 UNLISTED E&M SERVICE: CPT | Mod: S$GLB,,, | Performed by: INTERNAL MEDICINE

## 2017-06-19 PROCEDURE — 81003 URINALYSIS AUTO W/O SCOPE: CPT

## 2017-06-19 PROCEDURE — 99999 PR PBB SHADOW E&M-EST. PATIENT-LVL III: CPT | Mod: PBBFAC,,, | Performed by: INTERNAL MEDICINE

## 2017-06-19 PROCEDURE — 99214 OFFICE O/P EST MOD 30 MIN: CPT | Mod: S$GLB,,, | Performed by: INTERNAL MEDICINE

## 2017-06-19 PROCEDURE — 99499 UNLISTED E&M SERVICE: CPT | Mod: ,,, | Performed by: INTERNAL MEDICINE

## 2017-06-19 RX ORDER — METHYLPREDNISOLONE 4 MG/1
TABLET ORAL
Qty: 1 PACKAGE | Refills: 0 | Status: SHIPPED | OUTPATIENT
Start: 2017-06-19 | End: 2018-01-31

## 2017-06-19 RX ORDER — TIZANIDINE 4 MG/1
4 TABLET ORAL EVERY 6 HOURS PRN
Qty: 30 TABLET | Refills: 1 | Status: SHIPPED | OUTPATIENT
Start: 2017-06-19 | End: 2017-06-29

## 2017-06-19 NOTE — PROGRESS NOTES
Assessment & Plan  Low back strain, initial encounter - favor MSK vs. Kidney source.  Medrol dose leonela and tizanidine; if insufficient control would change to flexeril for nighttime use. Counseled to avoid heavy machinery in general until he knows how he reacts to the tizanidine.  Seems to radiate to the front of the abd like dermatomal, no rash present, but if one appears - call; could be early shingles?  -     methylPREDNISolone (MEDROL DOSEPACK) 4 mg tablet; use as directed  Dispense: 1 Package; Refill: 0  -     tizanidine (ZANAFLEX) 4 MG tablet; Take 1 tablet (4 mg total) by mouth every 6 (six) hours as needed. PRN back pain  Dispense: 30 tablet; Refill: 1    Acute left flank pain - check UA for blood, though I suspect this is more MSK than renal.  -     Urinalysis; Future; Expected date: 06/19/2017        There are no discontinued medications.    Follow-up: No Follow-up on file.      =================================================================      Chief Complaint   Patient presents with    severe pain left side       HPI  Brad is a 66 y.o. male, last appointment with this clinic was 4/27/2017.    I previously saw him in March.  CAD, stable angina; hx of MI;   9/27/2013 [facility] prox LAD 30% stenosis; RCA 20% stenosis. patent LAD stent.  ARB, beta blocker, statin, ASA; 3/9/2015 nu med stress test negative.   3/9/2015 TTE normal LVEF 55-60; concentric remodeling.  HTN; losartan; diltizam; furosemide; metoprolol  Peripheral edema; takes lasix PRN.  hyperlipidemia; pravastatin; last level 3/2017 OK.  anxiety and depression with possible effects of memory loss.  seen by neuro 2015, MRI brain and carotid US all normal/negative.  12/3/2014 MRI brain: 1. No evidence for acute infarct 2. unremarkable MRI of the brain  12/3/2014 carotid US normal  Pt notes he was having a difficult time since the passing of his brother in 2010.  Feels like mood is OK now.  On amitriptyline for mood but also I think for  "nocturia.  pre-diabetes; a1c 3/2017 still pre-DM range.  hypothyroid on 25; TSH 3/2017WNL  JAYLEN  BPH; flomax; with LUTS.  Notes nocturia. On amitryptiline  hx of colon polyps, unclear type; reportedly last colonoscopy 2013 with Abdiaziz    2 days of sx - left side back pain.  Wonders if it's back pain or kidney stone.  No fever no chills, no gross blood in the urine.  Never hx of kidney stone.  Has been doing brickwork.  The pain is sharp and if he moves it radiates around to the front.  No radiation into legs.  No pain with BM/urination.  It's been intensifying in the hip area today.  No obvious rash.  OTC meds for this - Advil and ASA with temporary modest relief.  But SE of GI upset.     Planning to leave for vacation to [place] this weekend - plans to drive.  Will go through [place] to see his brothers first.    Patient Care Team:  Michael Gonzalez MD as PCP - General (Internal Medicine)  ADAN To MD as Consulting Physician (Urology)  Andrea Hurley MD (Neurology)  Desmond Lee MD as Consulting Physician (Colon and Rectal Surgery)    Patient Active Problem List    Diagnosis Date Noted    Aortic ectasia 04/28/2017     "The aorta is elongated" - Xray Chest 5-      JAYLEN (obstructive sleep apnea) 04/28/2017    Hypothyroidism 06/01/2016    Obesity (BMI 30.0-34.9) 06/01/2016    Anxiety and depression 11/24/2014    Prediabetes 11/24/2014    Coronary artery disease of native artery of native heart with stable angina pectoris 11/19/2014     Stent LAD 2012 9/27/2013 [facility] prox LAD 30% stenosis; RCA 20% stenosis. patent LAD stent.    3/9/2015 nu med stress test negative. 3/9/2015 TTE normal LVEF 55-60; concentric remodeling.      Hyperlipidemia 07/09/2014    Essential hypertension 07/09/2014    Acid reflux 07/09/2014    Benign non-nodular prostatic hyperplasia with lower urinary tract symptoms 07/09/2014    Vitamin D deficiency 07/09/2014    Hernia of abdominal wall 07/09/2014       PAST MEDICAL " HISTORY:  Past Medical History:   Diagnosis Date    Allergy     Angina pectoris     Anxiety     Coronary artery disease     Depression     GERD (gastroesophageal reflux disease)     Hyperlipidemia     Hypertension     Hypothyroidism     Memory loss     12/3/2014 MRI brain: 1. No evidence for acute infarct 2. unremarkable MRI of the brain; 12/3/2014 carotid US normal    Myocardial infarction     Obesity     Retrocalcaneal bursitis 11/24/2014    Sleep apnea        PAST SURGICAL HISTORY:  Past Surgical History:   Procedure Laterality Date    APPENDECTOMY  1986    bone spur Right     COLONOSCOPY      hand trauma Right     pencil     heart stent         SOCIAL HISTORY:  Social History     Social History    Marital status:      Spouse name: N/A    Number of children: N/A    Years of education: N/A     Occupational History    Not on file.     Social History Main Topics    Smoking status: Never Smoker    Smokeless tobacco: Never Used    Alcohol use 0.0 oz/week      Comment: rarely    Drug use: No    Sexual activity: Yes     Other Topics Concern    Not on file     Social History Narrative    No narrative on file       ALLERGIES AND MEDICATIONS: updated and reviewed.  Review of patient's allergies indicates:   Allergen Reactions    Iodine containing multivitamin Anaphylaxis    Naproxen Other (See Comments)     hallucinations    Hydrocodone-acetaminophen      Rash (skin)^    Oxycodone-acetaminophen      Rash (skin)^     Current Outpatient Prescriptions   Medication Sig Dispense Refill    amitriptyline (ELAVIL) 25 MG tablet Take 1 tablet (25 mg total) by mouth nightly as needed for Insomnia. 90 tablet 3    aspirin (ECOTRIN) 81 MG EC tablet Take 1 tablet (81 mg total) by mouth once daily. 90 tablet 3    clotrimazole-betamethasone 1-0.05% (LOTRISONE) cream Apply to external area TID 45 g 0    DILTIAZEM HCL (DILTIAZEM 2% CREAM) Apply topically 3 (three) times daily. Apply topically to  "anal area three times daily. 50 g 0    furosemide (LASIX) 20 MG tablet Take 1 tablet (20 mg total) by mouth as needed. 90 tablet 1    isosorbide mononitrate (IMDUR) 60 MG 24 hr tablet Take 1 tablet (60 mg total) by mouth once daily. 90 tablet 3    KRILL/OM3/DHA/EPA/OM6/LIP/ASTX (KRILL OIL, OMEGA 3 AND 6, ORAL) Take by mouth.      levothyroxine (SYNTHROID) 50 MCG tablet Take 1 tablet (50 mcg total) by mouth before breakfast. 90 tablet 4    losartan (COZAAR) 50 MG tablet Take 1 tablet (50 mg total) by mouth once daily. 90 tablet 3    meclizine (ANTIVERT) 25 mg tablet 1 TID, prn dizziness 30 tablet 3    metoprolol tartrate (LOPRESSOR) 25 MG tablet Take 1 tablet (25 mg total) by mouth 2 (two) times daily. 180 tablet 3    nitroGLYCERIN (NITROSTAT) 0.4 MG SL tablet Place 1 tablet (0.4 mg total) under the tongue every 5 (five) minutes as needed for Chest pain. 90 tablet 3    pravastatin (PRAVACHOL) 40 MG tablet Take 1 tablet (40 mg total) by mouth once daily. 90 tablet 1    tamsulosin (FLOMAX) 0.4 mg Cp24 Take 2 capsules (0.8 mg total) by mouth once daily. 90 capsule 3    vitamin D 1000 units Tab Take 1 tablet (1,000 Units total) by mouth once daily. 90 tablet 3     No current facility-administered medications for this visit.        Review of Systems   Constitutional: Negative for chills and fever.   Genitourinary: Negative for dysuria and hematuria.   Musculoskeletal: Positive for back pain.   Skin: Negative for rash.       Physical Exam   Vitals:    06/19/17 1001   BP: 108/68   Pulse: 62   Temp: 97.9 °F (36.6 °C)   SpO2: 98%   Weight: 109 kg (240 lb 4.8 oz)   Height: 5' 10" (1.778 m)    Body mass index is 34.48 kg/m².  Weight: 109 kg (240 lb 4.8 oz)   Height: 5' 10" (177.8 cm)     Physical Exam   Constitutional: He appears well-developed and well-nourished.   uncomfortable   Cardiovascular: Normal rate and regular rhythm.    Pulmonary/Chest: Effort normal and breath sounds normal.   Abdominal: Soft.   Some TTP " LLQ area, without mass, guarding, rebound.  The upper flank area is nontender   Musculoskeletal:   Very tender of the left lateral lower lumbar area, superior to the iliac crest, without swelling or asymmetry.  Pain with straight leg raise in that area  Gingerly transferring from chair to table and from table to standing   Neurological: He has normal reflexes.   Patella DTR 2+ and symmetric

## 2017-06-20 ENCOUNTER — OFFICE VISIT (OUTPATIENT)
Dept: CARDIOLOGY | Facility: CLINIC | Age: 67
End: 2017-06-20
Payer: MEDICARE

## 2017-06-20 VITALS
BODY MASS INDEX: 34.07 KG/M2 | WEIGHT: 238 LBS | DIASTOLIC BLOOD PRESSURE: 70 MMHG | HEART RATE: 62 BPM | SYSTOLIC BLOOD PRESSURE: 133 MMHG | OXYGEN SATURATION: 98 % | HEIGHT: 70 IN | RESPIRATION RATE: 15 BRPM

## 2017-06-20 DIAGNOSIS — I77.819 AORTIC ECTASIA: ICD-10-CM

## 2017-06-20 DIAGNOSIS — E66.9 OBESITY (BMI 30.0-34.9): Chronic | ICD-10-CM

## 2017-06-20 DIAGNOSIS — G47.33 OSA (OBSTRUCTIVE SLEEP APNEA): ICD-10-CM

## 2017-06-20 DIAGNOSIS — E78.5 HYPERLIPIDEMIA, UNSPECIFIED HYPERLIPIDEMIA TYPE: Chronic | ICD-10-CM

## 2017-06-20 DIAGNOSIS — I25.118 CORONARY ARTERY DISEASE OF NATIVE ARTERY OF NATIVE HEART WITH STABLE ANGINA PECTORIS: Primary | ICD-10-CM

## 2017-06-20 DIAGNOSIS — I10 ESSENTIAL HYPERTENSION: ICD-10-CM

## 2017-06-20 PROBLEM — I73.9 PERIPHERAL VASCULAR DISEASE: Status: ACTIVE | Noted: 2017-06-20

## 2017-06-20 PROCEDURE — 99215 OFFICE O/P EST HI 40 MIN: CPT | Mod: S$GLB,,, | Performed by: INTERNAL MEDICINE

## 2017-06-20 PROCEDURE — 99499 UNLISTED E&M SERVICE: CPT | Mod: S$GLB,,, | Performed by: INTERNAL MEDICINE

## 2017-06-20 PROCEDURE — 99999 PR PBB SHADOW E&M-EST. PATIENT-LVL III: CPT | Mod: PBBFAC,,, | Performed by: INTERNAL MEDICINE

## 2017-06-20 NOTE — PROGRESS NOTES
CARDIOVASCULAR PROGRESS NOTE    REASON FOR CONSULT:   Clinton Rosenberg is a 66 y.o. male who presents for follow up of CAD.    PCP: Dair  HISTORY OF PRESENT ILLNESS:   The patient returns for follow-up of his testing.  He continues to complain of typical sounding chest discomfort which will occur with exertion and relieved with rest or nitroglycerin.  He does not describe any resting chest pain.  He's had no dyspnea, palpitations, lightheadedness, dizziness, or syncope.  He denies PND, orthopnea, or lower extremity edema.  There's been no melena, or hematuria.    I reviewed the results of his testing noting no evidence of hemodynamically significant PAD, with a normal pharmacologic nuclear stress test, and normal echocardiogram save for mild aortic root dilatation.  His Holter was also negative for arrhythmia.  The patient is currently on 2 antianginals, and his heart rate and blood pressure are well controlled.  At this point, given his apical symptoms, on background of prior known CAD, I suggested catheterization.  The patient wishes to defer this for the time being.  Note is also made of a iodine ALLERGY.    CARDIOVASCULAR HISTORY:   CAD/MI/PCI  JAYLEN, unable to afford CPAP  Aortic root dil, 3.8 cm at Sinuses of Valsalva (echo 6/2017)    PAST MEDICAL HISTORY:     Past Medical History:   Diagnosis Date    Allergy     Angina pectoris     Anxiety     Coronary artery disease     Depression     GERD (gastroesophageal reflux disease)     Hyperlipidemia     Hypertension     Hypothyroidism     Memory loss     12/3/2014 MRI brain: 1. No evidence for acute infarct 2. unremarkable MRI of the brain; 12/3/2014 carotid US normal    Myocardial infarction     Obesity     Retrocalcaneal bursitis 11/24/2014    Sleep apnea        PAST SURGICAL HISTORY:     Past Surgical History:   Procedure Laterality Date    APPENDECTOMY  1986    bone spur Right     COLONOSCOPY      hand trauma Right     pencil     heart stent          ALLERGIES AND MEDICATION:     Review of patient's allergies indicates:   Allergen Reactions    Iodine containing multivitamin Anaphylaxis    Naproxen Other (See Comments)     hallucinations    Hydrocodone-acetaminophen      Rash (skin)^    Oxycodone-acetaminophen      Rash (skin)^     Previous Medications    AMITRIPTYLINE (ELAVIL) 25 MG TABLET    Take 1 tablet (25 mg total) by mouth nightly as needed for Insomnia.    ASPIRIN (ECOTRIN) 81 MG EC TABLET    Take 1 tablet (81 mg total) by mouth once daily.    CLOTRIMAZOLE-BETAMETHASONE 1-0.05% (LOTRISONE) CREAM    Apply to external area TID    DILTIAZEM HCL (DILTIAZEM 2% CREAM)    Apply topically 3 (three) times daily. Apply topically to anal area three times daily.    FUROSEMIDE (LASIX) 20 MG TABLET    Take 1 tablet (20 mg total) by mouth as needed.    ISOSORBIDE MONONITRATE (IMDUR) 60 MG 24 HR TABLET    Take 1 tablet (60 mg total) by mouth once daily.    KRILL/OM3/DHA/EPA/OM6/LIP/ASTX (KRILL OIL, OMEGA 3 AND 6, ORAL)    Take by mouth.    LEVOTHYROXINE (SYNTHROID) 50 MCG TABLET    Take 1 tablet (50 mcg total) by mouth before breakfast.    LOSARTAN (COZAAR) 50 MG TABLET    Take 1 tablet (50 mg total) by mouth once daily.    MECLIZINE (ANTIVERT) 25 MG TABLET    1 TID, prn dizziness    METHYLPREDNISOLONE (MEDROL DOSEPACK) 4 MG TABLET    use as directed    METOPROLOL TARTRATE (LOPRESSOR) 25 MG TABLET    Take 1 tablet (25 mg total) by mouth 2 (two) times daily.    NITROGLYCERIN (NITROSTAT) 0.4 MG SL TABLET    Place 1 tablet (0.4 mg total) under the tongue every 5 (five) minutes as needed for Chest pain.    PRAVASTATIN (PRAVACHOL) 40 MG TABLET    Take 1 tablet (40 mg total) by mouth once daily.    TAMSULOSIN (FLOMAX) 0.4 MG CP24    Take 2 capsules (0.8 mg total) by mouth once daily.    TIZANIDINE (ZANAFLEX) 4 MG TABLET    Take 1 tablet (4 mg total) by mouth every 6 (six) hours as needed. PRN back pain    VITAMIN D 1000 UNITS TAB    Take 1 tablet (1,000 Units total)  by mouth once daily.       SOCIAL HISTORY:     Social History     Social History    Marital status:      Spouse name: N/A    Number of children: N/A    Years of education: N/A     Occupational History    Not on file.     Social History Main Topics    Smoking status: Never Smoker    Smokeless tobacco: Never Used    Alcohol use 0.0 oz/week      Comment: rarely    Drug use: No    Sexual activity: Yes     Other Topics Concern    Not on file     Social History Narrative    No narrative on file       FAMILY HISTORY:     Family History   Problem Relation Age of Onset    Arthritis Mother     Early death Mother     Heart disease Mother     Hypertension Mother     Migraines Mother     Seizures Mother     Tremor Mother     Diabetes Mother     Cancer Mother     Early death Father     Heart disease Father     Hypertension Father     Stroke Father     Diabetes Father     Alcohol abuse Father     Arthritis Brother     Cancer Brother     Diabetes Brother     Early death Brother     Heart disease Brother     Hypertension Brother     Heart disease Sister     Hypertension Sister     Arthritis Sister     Depression Sister     Heart disease Brother     Arthritis Brother     Heart disease Brother     Pneumonia Brother     Heart disease Brother     Hypertension Brother     Diabetes Brother     Heart disease Brother        REVIEW OF SYSTEMS:   Review of Systems   Constitutional: Negative for chills, diaphoresis and fever.   HENT: Negative for nosebleeds.    Eyes: Negative for blurred vision, double vision and photophobia.   Respiratory: Positive for shortness of breath. Negative for hemoptysis and wheezing.    Cardiovascular: Positive for chest pain and palpitations. Negative for orthopnea, claudication, leg swelling and PND.   Gastrointestinal: Negative for abdominal pain, blood in stool, heartburn, melena, nausea and vomiting.   Genitourinary: Negative for flank pain and hematuria.  "  Musculoskeletal: Negative for falls, myalgias and neck pain.   Skin: Negative for rash.   Neurological: Negative for dizziness, seizures, loss of consciousness, weakness and headaches.   Endo/Heme/Allergies: Negative for polydipsia. Does not bruise/bleed easily.   Psychiatric/Behavioral: Negative for depression and memory loss. The patient is not nervous/anxious.        PHYSICAL EXAM:     Vitals:    06/20/17 1255   BP: 133/70   Pulse: 62   Resp: 15    Body mass index is 34.15 kg/m².  Weight: 108 kg (238 lb)   Height: 5' 10" (177.8 cm)     Physical Exam   Constitutional: He is oriented to person, place, and time. He appears well-developed and well-nourished. He is cooperative.  Non-toxic appearance. No distress.   HENT:   Head: Normocephalic and atraumatic.   Eyes: Conjunctivae and EOM are normal. Pupils are equal, round, and reactive to light. No scleral icterus.   Neck: Trachea normal and normal range of motion. Neck supple. Normal carotid pulses and no JVD present. Carotid bruit is not present. No no neck rigidity. No edema present. No thyromegaly present.   Cardiovascular: Normal rate, regular rhythm, S1 normal and S2 normal.  PMI is not displaced.  Exam reveals no gallop and no friction rub.    No murmur heard.  Pulses:       Carotid pulses are 2+ on the right side, and 2+ on the left side.  Pulmonary/Chest: Effort normal and breath sounds normal. No respiratory distress. He has no wheezes. He has no rales. He exhibits no tenderness.   Abdominal: Soft. Bowel sounds are normal. He exhibits no distension and no mass. There is no hepatosplenomegaly. There is no tenderness.   Obese   Musculoskeletal: He exhibits no edema or tenderness.   Feet:   Right Foot:   Skin Integrity: Negative for ulcer.   Left Foot:   Skin Integrity: Negative for ulcer.   Neurological: He is alert and oriented to person, place, and time. No cranial nerve deficit.   Skin: Skin is warm and dry. No rash noted. No erythema.   Psychiatric: He " has a normal mood and affect. His speech is normal and behavior is normal.   Vitals reviewed.      DATA:   EKG: (personally reviewed tracing)  5/12/17 SR 58    Laboratory:  CBC:    Recent Labs  Lab 10/22/15  0808 06/01/16  0947 10/31/16  1535   WHITE BLOOD CELL COUNT 6.13 6.90 8.84   HEMOGLOBIN 16.0 16.3 16.2   HEMATOCRIT 45.5 46.9 45.8   PLATELETS 162 155 176       CHEMISTRIES:    Recent Labs  Lab 10/22/15  0808 10/31/16  1535 03/14/17  1046   GLUCOSE 138 H 103 120 H   SODIUM 141 141 142   POTASSIUM 5.1 4.7 5.3 H   BUN BLD 13 10 12   CREATININE 1.2 1.1 1.1   EGFR IF AFRICAN AMERICAN >60.0 >60.0 >60.0   EGFR IF NON- >60.0 >60.0 >60.0   CALCIUM 9.6 9.2 9.8       CARDIAC BIOMARKERS:        COAGS:        LIPIDS/LFTS:    Recent Labs  Lab 12/02/14  0902  10/22/15  0808 10/31/16  1535 03/14/17  1046   CHOLESTEROL 167  --  159  --  170   TRIGLYCERIDES 290 H  --  288 H  --  260 H   HDL 36 L  --  39 L  --  38 L   LDL CHOLESTEROL 73.0  --  62.4 L  --  80.0   NON-HDL CHOLESTEROL 131  --  120  --  132   AST 18  < > 21 20 25   ALT 19  < > 24 24 21   < > = values in this interval not displayed.    Cardiovascular Testing:  L MPI 6/8/17 (images pers rev)  Nuclear Quantitative Functional Analysis:   LVEF: 68 %  Impression: NORMAL MYOCARDIAL PERFUSION  1. The perfusion scan is free of evidence for myocardial ischemia or injury.   2. There is a mild intensity fixed defect in the inferior wall of the left ventricle, secondary to diaphragm attenuation.   3. Resting wall motion is physiologic.   4. Resting LV function is normal.   5. The ventricular volumes are normal at rest and stress.   6. The extracardiac distribution of radioactivity is normal.   7. When compared to the previous study from 03/09/2015, no change    Echo 6/8/17    1 - Normal left ventricular systolic function (EF 55-60%).     2 - Concentric remodeling.     3 - Trivial mitral regurgitation.     4 - Trivial tricuspid regurgitation.     5 - mild aortic  root enlargement, 3.8 cm at Sinuses of Valsalva    Holter 6/8/17  PREDOMINANT RHYTHM  1. Sinus rhythm with heart rates varying between 43 and 94 bpm with an average of 62 bpm.   VENTRICULAR ARRHYTHMIAS  1. There were very rare PVCs recorded totalling 5 and averaging less than 1 per hour.   2. There were no episodes of ventricular tachycardia.  SUPRA VENTRICULAR ARRHYTHMIAS  1. There were very rare PACs recorded totalling 3 and averaging less than 1 per hour.   2. There were no episodes of sustained supraventricular tachycardia.  SINUS NODE FUNCTION  1. There was no evidence of high grade SA deanne block.   AV CONDUCTION  1. There was no evidence of high grade AV block.   DIARY  1. The diary was not returned  MISCELLANEOUS  1. There were rare hookup related artifacts. Overall, the study was of good quality.   2. This was a tape of adequate length (24 hrs).    LE art US/TANA 6/8/17  1.  Mild/atherosclerotic plaquing.  2.  No stenosis about 30 percent femoral popliteal arteries.  3.  Normal bilateral ABIs.    Carotid US 12/3/14  1. Less than 50% stenosis of the right internal carotid artery.  2. Less than 50% stenosis of the left internal carotid artery.    Cath 9/27/13 (Per Dr. Rivera note 11/19/14)  FINDINGS:   Left main, no luminal irregularities.   LAD: The proximal LAD has a 30% stenosis and widely patent. The remainder of the LAD has luminal  irregularities. First diagonal, luminal irregularities. Second diagonal, luminal irregularities.   Left circumflex artery, luminal irregularities. OM-1, luminal irregularities. OM-2, luminal irregularities.   Right coronary artery has a 20% stenosis and the distal right coronary artery has a 1% stenosis. The  remainder of the artery has luminal irregularities. Posterior lateral branch  has luminal irregularities. Posterior descending artery has luminal  irregularities.  SUMMARY: LVEDP = 10 mmHg. LVEF = 65%. Patent LAD stent.    ASSESSMENT:   # typ CP on background of known  CAD.  Echo/MPI 6/2017 normal.  # HTN, controlled  # HLP, LDL acceptable on prava 40mg  # Ao root dil, 3.8 cm (echo 6/2017)  # dizziness, Holter 6/2017 normal  # claudication, bilat  # JAYLEN, pt unable to obtain CPAP apparatus  # BMI 34, stable vs last OV  # Iodine allergy    PLAN:   Cont med rx  Pt declined cath (Iodine allergy noted as well)  Pt advised to come to ER for unrelenting CP.  His HR and BP seem well controlled at present on current rx (Imdur and metoprolol).  Check CBC, reordered  Diet/exercise/weight loss  Consider more potent statin  RTC 1 month    Brad Bahena MD, FACC

## 2017-07-26 ENCOUNTER — LAB VISIT (OUTPATIENT)
Dept: LAB | Facility: HOSPITAL | Age: 67
End: 2017-07-26
Attending: INTERNAL MEDICINE
Payer: MEDICARE

## 2017-07-26 DIAGNOSIS — I25.118 CORONARY ARTERY DISEASE OF NATIVE ARTERY OF NATIVE HEART WITH STABLE ANGINA PECTORIS: ICD-10-CM

## 2017-07-26 LAB
BASOPHILS # BLD AUTO: 0.06 K/UL
BASOPHILS NFR BLD: 0.8 %
DIFFERENTIAL METHOD: ABNORMAL
EOSINOPHIL # BLD AUTO: 0.2 K/UL
EOSINOPHIL NFR BLD: 2.1 %
ERYTHROCYTE [DISTWIDTH] IN BLOOD BY AUTOMATED COUNT: 13.8 %
HCT VFR BLD AUTO: 43.7 %
HGB BLD-MCNC: 15.2 G/DL
LYMPHOCYTES # BLD AUTO: 3.2 K/UL
LYMPHOCYTES NFR BLD: 40.2 %
MCH RBC QN AUTO: 31.3 PG
MCHC RBC AUTO-ENTMCNC: 34.8 G/DL
MCV RBC AUTO: 90 FL
MONOCYTES # BLD AUTO: 0.6 K/UL
MONOCYTES NFR BLD: 7.8 %
NEUTROPHILS # BLD AUTO: 3.9 K/UL
NEUTROPHILS NFR BLD: 48.5 %
PLATELET # BLD AUTO: 167 K/UL
PMV BLD AUTO: 9.8 FL
RBC # BLD AUTO: 4.85 M/UL
WBC # BLD AUTO: 7.98 K/UL

## 2017-07-26 PROCEDURE — 36415 COLL VENOUS BLD VENIPUNCTURE: CPT | Mod: PO

## 2017-07-26 PROCEDURE — 85025 COMPLETE CBC W/AUTO DIFF WBC: CPT

## 2017-08-03 ENCOUNTER — OFFICE VISIT (OUTPATIENT)
Dept: CARDIOLOGY | Facility: CLINIC | Age: 67
End: 2017-08-03
Payer: MEDICARE

## 2017-08-03 VITALS
OXYGEN SATURATION: 98 % | SYSTOLIC BLOOD PRESSURE: 135 MMHG | BODY MASS INDEX: 34.36 KG/M2 | RESPIRATION RATE: 15 BRPM | WEIGHT: 240 LBS | DIASTOLIC BLOOD PRESSURE: 77 MMHG | HEART RATE: 77 BPM | HEIGHT: 70 IN

## 2017-08-03 DIAGNOSIS — E66.9 OBESITY (BMI 30.0-34.9): Chronic | ICD-10-CM

## 2017-08-03 DIAGNOSIS — E78.5 HYPERLIPIDEMIA, UNSPECIFIED HYPERLIPIDEMIA TYPE: Chronic | ICD-10-CM

## 2017-08-03 DIAGNOSIS — G47.33 OSA (OBSTRUCTIVE SLEEP APNEA): ICD-10-CM

## 2017-08-03 DIAGNOSIS — I10 ESSENTIAL HYPERTENSION: ICD-10-CM

## 2017-08-03 DIAGNOSIS — I25.118 CORONARY ARTERY DISEASE OF NATIVE ARTERY OF NATIVE HEART WITH STABLE ANGINA PECTORIS: Primary | ICD-10-CM

## 2017-08-03 PROCEDURE — 99499 UNLISTED E&M SERVICE: CPT | Mod: S$GLB,,, | Performed by: INTERNAL MEDICINE

## 2017-08-03 PROCEDURE — 3008F BODY MASS INDEX DOCD: CPT | Mod: S$GLB,,, | Performed by: INTERNAL MEDICINE

## 2017-08-03 PROCEDURE — 99999 PR PBB SHADOW E&M-EST. PATIENT-LVL III: CPT | Mod: PBBFAC,,, | Performed by: INTERNAL MEDICINE

## 2017-08-03 PROCEDURE — 99215 OFFICE O/P EST HI 40 MIN: CPT | Mod: S$GLB,,, | Performed by: INTERNAL MEDICINE

## 2017-08-03 RX ORDER — ATORVASTATIN CALCIUM 80 MG/1
80 TABLET, FILM COATED ORAL NIGHTLY
Qty: 90 TABLET | Refills: 3 | Status: SHIPPED | OUTPATIENT
Start: 2017-08-03 | End: 2018-03-24 | Stop reason: SDUPTHER

## 2017-08-03 RX ORDER — METOPROLOL TARTRATE 25 MG/1
25 TABLET, FILM COATED ORAL 2 TIMES DAILY
Qty: 180 TABLET | Refills: 3 | Status: SHIPPED | OUTPATIENT
Start: 2017-08-03 | End: 2018-04-09 | Stop reason: SDUPTHER

## 2017-08-03 NOTE — PROGRESS NOTES
CARDIOVASCULAR PROGRESS NOTE    REASON FOR CONSULT:   Clinton Rosenberg is a 66 y.o. male who presents for follow up of CAD.    PCP: Dair  HISTORY OF PRESENT ILLNESS:   The patient returns for follow-up of his testing.  He continues to complain of typical sounding chest discomfort which will occur with exertion and relieved with rest or nitroglycerin.  He does not describe any resting chest pain.  He's had no dyspnea, palpitations, lightheadedness, dizziness, or syncope.  He denies PND, orthopnea, or lower extremity edema.  There's been no melena, or hematuria.    He again declines cath.    CARDIOVASCULAR HISTORY:   CAD/MI/PCI  JAYLEN, unable to afford CPAP  Aortic root dil, 3.8 cm at Sinuses of Valsalva (echo 6/2017)    PAST MEDICAL HISTORY:     Past Medical History:   Diagnosis Date    Allergy     Angina pectoris     Anxiety     Coronary artery disease     Depression     GERD (gastroesophageal reflux disease)     Hyperlipidemia     Hypertension     Hypothyroidism     Memory loss     12/3/2014 MRI brain: 1. No evidence for acute infarct 2. unremarkable MRI of the brain; 12/3/2014 carotid US normal    Myocardial infarction     Obesity     Peripheral vascular disease 6/20/2017    Retrocalcaneal bursitis 11/24/2014    Sleep apnea        PAST SURGICAL HISTORY:     Past Surgical History:   Procedure Laterality Date    APPENDECTOMY  1986    bone spur Right     COLONOSCOPY      hand trauma Right     pencil     heart stent         ALLERGIES AND MEDICATION:     Review of patient's allergies indicates:   Allergen Reactions    Iodine containing multivitamin Anaphylaxis    Naproxen Other (See Comments)     hallucinations    Hydrocodone-acetaminophen      Rash (skin)^    Oxycodone-acetaminophen      Rash (skin)^     Previous Medications    AMITRIPTYLINE (ELAVIL) 25 MG TABLET    Take 1 tablet (25 mg total) by mouth nightly as needed for Insomnia.    ASPIRIN (ECOTRIN) 81 MG EC TABLET    Take 1 tablet (81 mg  total) by mouth once daily.    CLOTRIMAZOLE-BETAMETHASONE 1-0.05% (LOTRISONE) CREAM    Apply to external area TID    DILTIAZEM HCL (DILTIAZEM 2% CREAM)    Apply topically 3 (three) times daily. Apply topically to anal area three times daily.    FUROSEMIDE (LASIX) 20 MG TABLET    Take 1 tablet (20 mg total) by mouth as needed.    ISOSORBIDE MONONITRATE (IMDUR) 60 MG 24 HR TABLET    Take 1 tablet (60 mg total) by mouth once daily.    KRILL/OM3/DHA/EPA/OM6/LIP/ASTX (KRILL OIL, OMEGA 3 AND 6, ORAL)    Take by mouth.    LEVOTHYROXINE (SYNTHROID) 50 MCG TABLET    Take 1 tablet (50 mcg total) by mouth before breakfast.    LOSARTAN (COZAAR) 50 MG TABLET    Take 1 tablet (50 mg total) by mouth once daily.    MECLIZINE (ANTIVERT) 25 MG TABLET    1 TID, prn dizziness    METHYLPREDNISOLONE (MEDROL DOSEPACK) 4 MG TABLET    use as directed    METOPROLOL TARTRATE (LOPRESSOR) 25 MG TABLET    Take 1 tablet (25 mg total) by mouth 2 (two) times daily.    NITROGLYCERIN (NITROSTAT) 0.4 MG SL TABLET    Place 1 tablet (0.4 mg total) under the tongue every 5 (five) minutes as needed for Chest pain.    PRAVASTATIN (PRAVACHOL) 40 MG TABLET    Take 1 tablet (40 mg total) by mouth once daily.    TAMSULOSIN (FLOMAX) 0.4 MG CP24    Take 2 capsules (0.8 mg total) by mouth once daily.    VITAMIN D 1000 UNITS TAB    Take 1 tablet (1,000 Units total) by mouth once daily.       SOCIAL HISTORY:     Social History     Social History    Marital status:      Spouse name: N/A    Number of children: N/A    Years of education: N/A     Occupational History    Not on file.     Social History Main Topics    Smoking status: Never Smoker    Smokeless tobacco: Never Used    Alcohol use 0.0 oz/week      Comment: rarely    Drug use: No    Sexual activity: Yes     Other Topics Concern    Not on file     Social History Narrative    No narrative on file       FAMILY HISTORY:     Family History   Problem Relation Age of Onset    Arthritis Mother      "Early death Mother     Heart disease Mother     Hypertension Mother     Migraines Mother     Seizures Mother     Tremor Mother     Diabetes Mother     Cancer Mother     Early death Father     Heart disease Father     Hypertension Father     Stroke Father     Diabetes Father     Alcohol abuse Father     Arthritis Brother     Cancer Brother     Diabetes Brother     Early death Brother     Heart disease Brother     Hypertension Brother     Heart disease Sister     Hypertension Sister     Arthritis Sister     Depression Sister     Heart disease Brother     Arthritis Brother     Heart disease Brother     Pneumonia Brother     Heart disease Brother     Hypertension Brother     Diabetes Brother     Heart disease Brother        REVIEW OF SYSTEMS:   Review of Systems   Constitutional: Negative for chills, diaphoresis and fever.   HENT: Negative for nosebleeds.    Eyes: Negative for blurred vision, double vision and photophobia.   Respiratory: Negative for hemoptysis, shortness of breath and wheezing.    Cardiovascular: Positive for chest pain. Negative for palpitations, orthopnea, claudication, leg swelling and PND.   Gastrointestinal: Negative for abdominal pain, blood in stool, heartburn, melena, nausea and vomiting.   Genitourinary: Negative for flank pain and hematuria.   Musculoskeletal: Negative for falls, myalgias and neck pain.   Skin: Negative for rash.   Neurological: Negative for dizziness, seizures, loss of consciousness, weakness and headaches.   Endo/Heme/Allergies: Negative for polydipsia. Does not bruise/bleed easily.   Psychiatric/Behavioral: Negative for depression and memory loss. The patient is not nervous/anxious.        PHYSICAL EXAM:     Vitals:    08/03/17 1302   BP: 135/77   Pulse: 77   Resp: 15    Body mass index is 34.44 kg/m².  Weight: 108.9 kg (240 lb)   Height: 5' 10" (177.8 cm)     Physical Exam   Constitutional: He is oriented to person, place, and time. He " appears well-developed and well-nourished. He is cooperative.  Non-toxic appearance. No distress.   HENT:   Head: Normocephalic and atraumatic.   Eyes: Conjunctivae and EOM are normal. Pupils are equal, round, and reactive to light. No scleral icterus.   Neck: Trachea normal and normal range of motion. Neck supple. Normal carotid pulses and no JVD present. Carotid bruit is not present. No neck rigidity. No edema present. No thyromegaly present.   Cardiovascular: Normal rate, regular rhythm, S1 normal and S2 normal.  PMI is not displaced.  Exam reveals no gallop and no friction rub.    No murmur heard.  Pulses:       Carotid pulses are 2+ on the right side, and 2+ on the left side.  Pulmonary/Chest: Effort normal and breath sounds normal. No respiratory distress. He has no wheezes. He has no rales. He exhibits no tenderness.   Abdominal: Soft. Bowel sounds are normal. He exhibits no distension and no mass. There is no hepatosplenomegaly. There is no tenderness.   Obese   Musculoskeletal: He exhibits no edema or tenderness.   Feet:   Right Foot:   Skin Integrity: Negative for ulcer.   Left Foot:   Skin Integrity: Negative for ulcer.   Neurological: He is alert and oriented to person, place, and time. No cranial nerve deficit.   Skin: Skin is warm and dry. No rash noted. No erythema.   Psychiatric: He has a normal mood and affect. His speech is normal and behavior is normal.   Vitals reviewed.      DATA:   EKG: (personally reviewed tracing)  5/12/17 SR 58    Laboratory:  CBC:    Recent Labs  Lab 06/01/16  0947 10/31/16  1535 07/26/17  0720   WHITE BLOOD CELL COUNT 6.90 8.84 7.98   HEMOGLOBIN 16.3 16.2 15.2   HEMATOCRIT 46.9 45.8 43.7   PLATELETS 155 176 167       CHEMISTRIES:    Recent Labs  Lab 10/22/15  0808 10/31/16  1535 03/14/17  1046   GLUCOSE 138 H 103 120 H   SODIUM 141 141 142   POTASSIUM 5.1 4.7 5.3 H   BUN BLD 13 10 12   CREATININE 1.2 1.1 1.1   EGFR IF AFRICAN AMERICAN >60.0 >60.0 >60.0   EGFR IF  NON- >60.0 >60.0 >60.0   CALCIUM 9.6 9.2 9.8       CARDIAC BIOMARKERS:        COAGS:        LIPIDS/LFTS:    Recent Labs  Lab 12/02/14  0902  10/22/15  0808 10/31/16  1535 03/14/17  1046   CHOLESTEROL 167  --  159  --  170   TRIGLYCERIDES 290 H  --  288 H  --  260 H   HDL 36 L  --  39 L  --  38 L   LDL CHOLESTEROL 73.0  --  62.4 L  --  80.0   NON-HDL CHOLESTEROL 131  --  120  --  132   AST 18  < > 21 20 25   ALT 19  < > 24 24 21   < > = values in this interval not displayed.    Cardiovascular Testing:  L MPI 6/8/17  Nuclear Quantitative Functional Analysis:   LVEF: 68 %  Impression: NORMAL MYOCARDIAL PERFUSION  1. The perfusion scan is free of evidence for myocardial ischemia or injury.   2. There is a mild intensity fixed defect in the inferior wall of the left ventricle, secondary to diaphragm attenuation.   3. Resting wall motion is physiologic.   4. Resting LV function is normal.   5. The ventricular volumes are normal at rest and stress.   6. The extracardiac distribution of radioactivity is normal.   7. When compared to the previous study from 03/09/2015, no change    Echo 6/8/17    1 - Normal left ventricular systolic function (EF 55-60%).     2 - Concentric remodeling.     3 - Trivial mitral regurgitation.     4 - Trivial tricuspid regurgitation.     5 - mild aortic root enlargement, 3.8 cm at Sinuses of Valsalva    Holter 6/8/17  PREDOMINANT RHYTHM  1. Sinus rhythm with heart rates varying between 43 and 94 bpm with an average of 62 bpm.   VENTRICULAR ARRHYTHMIAS  1. There were very rare PVCs recorded totalling 5 and averaging less than 1 per hour.   2. There were no episodes of ventricular tachycardia.  SUPRA VENTRICULAR ARRHYTHMIAS  1. There were very rare PACs recorded totalling 3 and averaging less than 1 per hour.   2. There were no episodes of sustained supraventricular tachycardia.  SINUS NODE FUNCTION  1. There was no evidence of high grade SA deanne block.   AV CONDUCTION  1. There  was no evidence of high grade AV block.   DIARY  1. The diary was not returned  MISCELLANEOUS  1. There were rare hookup related artifacts. Overall, the study was of good quality.   2. This was a tape of adequate length (24 hrs).    LE art US/TANA 6/8/17  1.  Mild/atherosclerotic plaquing.  2.  No stenosis about 30 percent femoral popliteal arteries.  3.  Normal bilateral ABIs.    Carotid US 12/3/14  1. Less than 50% stenosis of the right internal carotid artery.  2. Less than 50% stenosis of the left internal carotid artery.    Cath 9/27/13 (Per Dr. Rivera note 11/19/14)  FINDINGS:   Left main, no luminal irregularities.   LAD: The proximal LAD has a 30% stenosis and widely patent. The remainder of the LAD has luminal  irregularities. First diagonal, luminal irregularities. Second diagonal, luminal irregularities.   Left circumflex artery, luminal irregularities. OM-1, luminal irregularities. OM-2, luminal irregularities.   Right coronary artery has a 20% stenosis and the distal right coronary artery has a 1% stenosis. The  remainder of the artery has luminal irregularities. Posterior lateral branch  has luminal irregularities. Posterior descending artery has luminal  irregularities.  SUMMARY: LVEDP = 10 mmHg. LVEF = 65%. Patent LAD stent.    ASSESSMENT:   # Stable angina/typ CP on background of known CAD.  Echo/MPI 6/2017 normal.  # HTN, controlled  # HLP, LDL acceptable on prava 40mg  # Ao root dil, 3.8 cm (echo 6/2017)  # dizziness, Holter 6/2017 normal  # claudication, bilat  # JAYLEN, pt unable to obtain CPAP apparatus  # BMI 34, stable vs last OV  # Iodine allergy    PLAN:   Cont med rx  Change prava to atorva 80mg qhs  Inc metoprolol to 50mg bid  Check Lipids/LFT 3 months (early Nov 2017)  Pt again declined cath (Iodine allergy noted as well)  Pt advised to come to ER for unrelenting/unstable CP.  Diet/exercise/weight loss  RTC 3 months  Repeat echo Dec 2017 (ao root dil)    Brad Bahena MD, FACC

## 2017-10-11 DIAGNOSIS — R60.0 PERIPHERAL EDEMA: Chronic | ICD-10-CM

## 2017-10-11 DIAGNOSIS — N40.1 BENIGN NON-NODULAR PROSTATIC HYPERPLASIA WITH LOWER URINARY TRACT SYMPTOMS: ICD-10-CM

## 2017-10-12 RX ORDER — FUROSEMIDE 20 MG/1
20 TABLET ORAL
Qty: 90 TABLET | Refills: 1 | Status: SHIPPED | OUTPATIENT
Start: 2017-10-12 | End: 2018-03-24 | Stop reason: SDUPTHER

## 2017-10-12 RX ORDER — TAMSULOSIN HYDROCHLORIDE 0.4 MG/1
CAPSULE ORAL
Qty: 180 CAPSULE | Refills: 3 | Status: SHIPPED | OUTPATIENT
Start: 2017-10-12 | End: 2018-04-09 | Stop reason: SDUPTHER

## 2017-10-19 ENCOUNTER — TELEPHONE (OUTPATIENT)
Dept: FAMILY MEDICINE | Facility: CLINIC | Age: 67
End: 2017-10-19

## 2017-10-19 NOTE — TELEPHONE ENCOUNTER
----- Message from Hawk Marquez sent at 10/18/2017  3:15 PM CDT -----  Contact: Alessandro/[facility]/[phone] ref# [id number]  Alessandro called stating that he needs clarification on patient's prescription:  losartan (COZAAR) 50 MG tablet. Thank you.

## 2017-10-31 ENCOUNTER — LAB VISIT (OUTPATIENT)
Dept: LAB | Facility: HOSPITAL | Age: 67
End: 2017-10-31
Attending: INTERNAL MEDICINE
Payer: MEDICARE

## 2017-10-31 DIAGNOSIS — I25.118 CORONARY ARTERY DISEASE OF NATIVE ARTERY OF NATIVE HEART WITH STABLE ANGINA PECTORIS: ICD-10-CM

## 2017-10-31 DIAGNOSIS — E78.5 HYPERLIPIDEMIA, UNSPECIFIED HYPERLIPIDEMIA TYPE: Chronic | ICD-10-CM

## 2017-10-31 LAB
ALBUMIN SERPL BCP-MCNC: 3.7 G/DL
ALP SERPL-CCNC: 59 U/L
ALT SERPL W/O P-5'-P-CCNC: 19 U/L
AST SERPL-CCNC: 18 U/L
BILIRUB DIRECT SERPL-MCNC: 0.3 MG/DL
BILIRUB SERPL-MCNC: 0.9 MG/DL
CHOLEST SERPL-MCNC: 101 MG/DL
CHOLEST/HDLC SERPL: 3.5 {RATIO}
HDLC SERPL-MCNC: 29 MG/DL
HDLC SERPL: 28.7 %
LDLC SERPL CALC-MCNC: 23 MG/DL
NONHDLC SERPL-MCNC: 72 MG/DL
PROT SERPL-MCNC: 6.7 G/DL
TRIGL SERPL-MCNC: 245 MG/DL

## 2017-10-31 PROCEDURE — 36415 COLL VENOUS BLD VENIPUNCTURE: CPT | Mod: PO

## 2017-10-31 PROCEDURE — 80076 HEPATIC FUNCTION PANEL: CPT

## 2017-10-31 PROCEDURE — 80061 LIPID PANEL: CPT

## 2017-11-14 ENCOUNTER — OFFICE VISIT (OUTPATIENT)
Dept: CARDIOLOGY | Facility: CLINIC | Age: 67
End: 2017-11-14
Payer: MEDICARE

## 2017-11-14 VITALS
BODY MASS INDEX: 34.36 KG/M2 | RESPIRATION RATE: 15 BRPM | OXYGEN SATURATION: 95 % | HEART RATE: 60 BPM | SYSTOLIC BLOOD PRESSURE: 120 MMHG | WEIGHT: 240 LBS | DIASTOLIC BLOOD PRESSURE: 72 MMHG | HEIGHT: 70 IN

## 2017-11-14 DIAGNOSIS — I73.9 PERIPHERAL VASCULAR DISEASE: ICD-10-CM

## 2017-11-14 DIAGNOSIS — E66.9 OBESITY (BMI 30.0-34.9): Chronic | ICD-10-CM

## 2017-11-14 DIAGNOSIS — I77.819 AORTIC ECTASIA: ICD-10-CM

## 2017-11-14 DIAGNOSIS — I25.118 CORONARY ARTERY DISEASE OF NATIVE ARTERY OF NATIVE HEART WITH STABLE ANGINA PECTORIS: Primary | ICD-10-CM

## 2017-11-14 DIAGNOSIS — I10 ESSENTIAL HYPERTENSION: ICD-10-CM

## 2017-11-14 DIAGNOSIS — G47.33 OSA (OBSTRUCTIVE SLEEP APNEA): ICD-10-CM

## 2017-11-14 DIAGNOSIS — I77.810 AORTIC ROOT DILATATION: ICD-10-CM

## 2017-11-14 DIAGNOSIS — E78.5 HYPERLIPIDEMIA, UNSPECIFIED HYPERLIPIDEMIA TYPE: Chronic | ICD-10-CM

## 2017-11-14 PROCEDURE — 99499 UNLISTED E&M SERVICE: CPT | Mod: S$GLB,,, | Performed by: INTERNAL MEDICINE

## 2017-11-14 PROCEDURE — 99214 OFFICE O/P EST MOD 30 MIN: CPT | Mod: S$GLB,,, | Performed by: INTERNAL MEDICINE

## 2017-11-14 PROCEDURE — 93000 ELECTROCARDIOGRAM COMPLETE: CPT | Mod: S$GLB,,, | Performed by: INTERNAL MEDICINE

## 2017-11-14 PROCEDURE — 99999 PR PBB SHADOW E&M-EST. PATIENT-LVL III: CPT | Mod: PBBFAC,,, | Performed by: INTERNAL MEDICINE

## 2017-11-14 NOTE — PROGRESS NOTES
CARDIOVASCULAR PROGRESS NOTE    REASON FOR CONSULT:   Clinton Rosenberg is a 67 y.o. male who presents for follow up of CAD.    PCP: Dair  HISTORY OF PRESENT ILLNESS:   The patient returns for follow-up of his testing.  He continues to complain of typical sounding chest discomfort which will occur with exertion and relieved with rest or nitroglycerin.  He reported an episode of severe right-sided chest pain radiating to the interscapular area.  This apparently lasted for a day.  He's had no dyspnea, palpitations, lightheadedness, dizziness, or syncope.  He denies PND, orthopnea, or lower extremity edema.  There's been no melena, or hematuria.    He again declines cath.    CARDIOVASCULAR HISTORY:   CAD/MI/PCI  JAYLEN, unable to afford CPAP  Aortic root dil, 3.8 cm at Sinuses of Valsalva (echo 6/2017)    PAST MEDICAL HISTORY:     Past Medical History:   Diagnosis Date    Allergy     Angina pectoris     Anxiety     Coronary artery disease     Depression     GERD (gastroesophageal reflux disease)     Hyperlipidemia     Hypertension     Hypothyroidism     Memory loss     12/3/2014 MRI brain: 1. No evidence for acute infarct 2. unremarkable MRI of the brain; 12/3/2014 carotid US normal    Myocardial infarction     Obesity     Peripheral vascular disease 6/20/2017    Retrocalcaneal bursitis 11/24/2014    Sleep apnea        PAST SURGICAL HISTORY:     Past Surgical History:   Procedure Laterality Date    APPENDECTOMY  1986    bone spur Right     COLONOSCOPY      hand trauma Right     pencil     heart stent         ALLERGIES AND MEDICATION:     Review of patient's allergies indicates:   Allergen Reactions    Iodine containing multivitamin Anaphylaxis    Naproxen Other (See Comments)     hallucinations    Hydrocodone-acetaminophen      Rash (skin)^    Oxycodone-acetaminophen      Rash (skin)^     Previous Medications    AMITRIPTYLINE (ELAVIL) 25 MG TABLET    Take 1 tablet (25 mg total) by mouth nightly as  needed for Insomnia.    ASPIRIN (ECOTRIN) 81 MG EC TABLET    Take 1 tablet (81 mg total) by mouth once daily.    ATORVASTATIN (LIPITOR) 80 MG TABLET    Take 1 tablet (80 mg total) by mouth every evening.    FUROSEMIDE (LASIX) 20 MG TABLET    Take 1 tablet (20 mg total) by mouth as needed.    ISOSORBIDE MONONITRATE (IMDUR) 60 MG 24 HR TABLET    Take 1 tablet (60 mg total) by mouth once daily.    KRILL/OM3/DHA/EPA/OM6/LIP/ASTX (KRILL OIL, OMEGA 3 AND 6, ORAL)    Take by mouth.    LEVOTHYROXINE (SYNTHROID) 50 MCG TABLET    Take 1 tablet (50 mcg total) by mouth before breakfast.    LOSARTAN (COZAAR) 50 MG TABLET    Take 1 tablet (50 mg total) by mouth once daily.    MECLIZINE (ANTIVERT) 25 MG TABLET    1 TID, prn dizziness    METHYLPREDNISOLONE (MEDROL DOSEPACK) 4 MG TABLET    use as directed    METOPROLOL TARTRATE (LOPRESSOR) 25 MG TABLET    Take 1 tablet (25 mg total) by mouth 2 (two) times daily.    NITROGLYCERIN (NITROSTAT) 0.4 MG SL TABLET    Place 1 tablet (0.4 mg total) under the tongue every 5 (five) minutes as needed for Chest pain.    TAMSULOSIN (FLOMAX) 0.4 MG CP24    TAKE 2 CAPSULES ONE TIME DAILY    VITAMIN D 1000 UNITS TAB    Take 1 tablet (1,000 Units total) by mouth once daily.       SOCIAL HISTORY:     Social History     Social History    Marital status:      Spouse name: N/A    Number of children: N/A    Years of education: N/A     Occupational History    Not on file.     Social History Main Topics    Smoking status: Never Smoker    Smokeless tobacco: Never Used    Alcohol use 0.0 oz/week      Comment: rarely    Drug use: No    Sexual activity: Yes     Other Topics Concern    Not on file     Social History Narrative    No narrative on file       FAMILY HISTORY:     Family History   Problem Relation Age of Onset    Arthritis Mother     Early death Mother     Heart disease Mother     Hypertension Mother     Migraines Mother     Seizures Mother     Tremor Mother     Diabetes  "Mother     Cancer Mother     Early death Father     Heart disease Father     Hypertension Father     Stroke Father     Diabetes Father     Alcohol abuse Father     Arthritis Brother     Cancer Brother     Diabetes Brother     Early death Brother     Heart disease Brother     Hypertension Brother     Heart disease Sister     Hypertension Sister     Arthritis Sister     Depression Sister     Heart disease Brother     Arthritis Brother     Heart disease Brother     Pneumonia Brother     Heart disease Brother     Hypertension Brother     Diabetes Brother     Heart disease Brother        REVIEW OF SYSTEMS:   Review of Systems   Constitutional: Negative for chills, diaphoresis and fever.   HENT: Negative for nosebleeds.    Eyes: Negative for blurred vision, double vision and photophobia.   Respiratory: Negative for hemoptysis, shortness of breath and wheezing.    Cardiovascular: Positive for chest pain. Negative for palpitations, orthopnea, claudication, leg swelling and PND.   Gastrointestinal: Negative for abdominal pain, blood in stool, heartburn, melena, nausea and vomiting.   Genitourinary: Negative for flank pain and hematuria.   Musculoskeletal: Negative for falls, myalgias and neck pain.   Skin: Negative for rash.   Neurological: Negative for dizziness, seizures, loss of consciousness, weakness and headaches.   Endo/Heme/Allergies: Negative for polydipsia. Does not bruise/bleed easily.   Psychiatric/Behavioral: Negative for depression and memory loss. The patient is not nervous/anxious.        PHYSICAL EXAM:     Vitals:    11/14/17 1246   BP: 120/72   Pulse: 60   Resp: 15    Body mass index is 34.44 kg/m².  Weight: 108.9 kg (240 lb)   Height: 5' 10" (177.8 cm)     Physical Exam   Constitutional: He is oriented to person, place, and time. He appears well-developed and well-nourished. He is cooperative.  Non-toxic appearance. No distress.   HENT:   Head: Normocephalic and atraumatic.   Eyes: " Conjunctivae and EOM are normal. Pupils are equal, round, and reactive to light. No scleral icterus.   Neck: Trachea normal and normal range of motion. Neck supple. Normal carotid pulses and no JVD present. Carotid bruit is not present. No neck rigidity. No edema present. No thyromegaly present.   Cardiovascular: Normal rate, regular rhythm, S1 normal and S2 normal.  PMI is not displaced.  Exam reveals no gallop and no friction rub.    No murmur heard.  Pulses:       Carotid pulses are 2+ on the right side, and 2+ on the left side.  Pulmonary/Chest: Effort normal and breath sounds normal. No respiratory distress. He has no wheezes. He has no rales. He exhibits no tenderness.   Abdominal: Soft. Bowel sounds are normal. He exhibits no distension and no mass. There is no hepatosplenomegaly. There is no tenderness.   Obese   Musculoskeletal: He exhibits no edema or tenderness.   Feet:   Right Foot:   Skin Integrity: Negative for ulcer.   Left Foot:   Skin Integrity: Negative for ulcer.   Neurological: He is alert and oriented to person, place, and time. No cranial nerve deficit.   Skin: Skin is warm and dry. No rash noted. No erythema.   Psychiatric: He has a normal mood and affect. His speech is normal and behavior is normal.   Vitals reviewed.      DATA:   EKG: (personally reviewed tracing)  11/14/17 SR 58, LAD/PRWP    Laboratory:  CBC:    Recent Labs  Lab 06/01/16  0947 10/31/16  1535 07/26/17  0720   WHITE BLOOD CELL COUNT 6.90 8.84 7.98   HEMOGLOBIN 16.3 16.2 15.2   HEMATOCRIT 46.9 45.8 43.7   PLATELETS 155 176 167       CHEMISTRIES:    Recent Labs  Lab 10/22/15  0808 10/31/16  1535 03/14/17  1046   GLUCOSE 138 H 103 120 H   SODIUM 141 141 142   POTASSIUM 5.1 4.7 5.3 H   BUN BLD 13 10 12   CREATININE 1.2 1.1 1.1   EGFR IF AFRICAN AMERICAN >60.0 >60.0 >60.0   EGFR IF NON- >60.0 >60.0 >60.0   CALCIUM 9.6 9.2 9.8       CARDIAC BIOMARKERS:        COAGS:        LIPIDS/LFTS:    Recent Labs  Lab  10/22/15  0808 10/31/16  1535 03/14/17  1046 10/31/17  0837   CHOLESTEROL 159  --  170 101 L   TRIGLYCERIDES 288 H  --  260 H 245 H   HDL 39 L  --  38 L 29 L   LDL CHOLESTEROL 62.4 L  --  80.0 23.0 L   NON-HDL CHOLESTEROL 120  --  132 72   AST 21 20 25 18   ALT 24 24 21 19       Cardiovascular Testing:  L MPI 6/8/17  Nuclear Quantitative Functional Analysis:   LVEF: 68 %  Impression: NORMAL MYOCARDIAL PERFUSION  1. The perfusion scan is free of evidence for myocardial ischemia or injury.   2. There is a mild intensity fixed defect in the inferior wall of the left ventricle, secondary to diaphragm attenuation.   3. Resting wall motion is physiologic.   4. Resting LV function is normal.   5. The ventricular volumes are normal at rest and stress.   6. The extracardiac distribution of radioactivity is normal.   7. When compared to the previous study from 03/09/2015, no change    Echo 6/8/17    1 - Normal left ventricular systolic function (EF 55-60%).     2 - Concentric remodeling.     3 - Trivial mitral regurgitation.     4 - Trivial tricuspid regurgitation.     5 - mild aortic root enlargement, 3.8 cm at Sinuses of Valsalva    Holter 6/8/17  PREDOMINANT RHYTHM  1. Sinus rhythm with heart rates varying between 43 and 94 bpm with an average of 62 bpm.   VENTRICULAR ARRHYTHMIAS  1. There were very rare PVCs recorded totalling 5 and averaging less than 1 per hour.   2. There were no episodes of ventricular tachycardia.  SUPRA VENTRICULAR ARRHYTHMIAS  1. There were very rare PACs recorded totalling 3 and averaging less than 1 per hour.   2. There were no episodes of sustained supraventricular tachycardia.  SINUS NODE FUNCTION  1. There was no evidence of high grade SA deanne block.   AV CONDUCTION  1. There was no evidence of high grade AV block.   DIARY  1. The diary was not returned  MISCELLANEOUS  1. There were rare hookup related artifacts. Overall, the study was of good quality.   2. This was a tape of adequate length  (24 hrs).    LE art US/TANA 6/8/17  1.  Mild/atherosclerotic plaquing.  2.  No stenosis about 30 percent femoral popliteal arteries.  3.  Normal bilateral ABIs.    Carotid US 12/3/14  1. Less than 50% stenosis of the right internal carotid artery.  2. Less than 50% stenosis of the left internal carotid artery.    Cath 9/27/13 (Per Dr. Rivera note 11/19/14)  FINDINGS:   Left main, no luminal irregularities.   LAD: The proximal LAD has a 30% stenosis and widely patent. The remainder of the LAD has luminal  irregularities. First diagonal, luminal irregularities. Second diagonal, luminal irregularities.   Left circumflex artery, luminal irregularities. OM-1, luminal irregularities. OM-2, luminal irregularities.   Right coronary artery has a 20% stenosis and the distal right coronary artery has a 1% stenosis. The  remainder of the artery has luminal irregularities. Posterior lateral branch  has luminal irregularities. Posterior descending artery has luminal  irregularities.  SUMMARY: LVEDP = 10 mmHg. LVEF = 65%. Patent LAD stent.    ASSESSMENT:   # Stable angina/typ CP on background of known CAD.  Echo/MPI 6/2017 normal.  Pt again declines cath.  # HTN, controlled  # HLP, on atorva 80mg  # Ao root dil, 3.8 cm (echo 6/2017)  # PAD/claudication, bilat  # JAYLEN, pt unable to obtain CPAP apparatus  # BMI 34, stable vs last OV  # Iodine allergy    PLAN:   Cont med rx  Pt again declined cath (Iodine allergy noted as well)  Pt advised to come to ER for unrelenting/unstable CP.  Diet/exercise/weight loss  RTC 3 months  Repeat echo prior to next OV (ao root dil)    Brad Bahena MD, FACC

## 2018-01-31 ENCOUNTER — OFFICE VISIT (OUTPATIENT)
Dept: FAMILY MEDICINE | Facility: CLINIC | Age: 68
End: 2018-01-31
Payer: MEDICARE

## 2018-01-31 ENCOUNTER — HOSPITAL ENCOUNTER (OUTPATIENT)
Dept: RADIOLOGY | Facility: HOSPITAL | Age: 68
Discharge: HOME | End: 2018-01-31
Attending: INTERNAL MEDICINE
Payer: MEDICARE

## 2018-01-31 VITALS
HEIGHT: 70 IN | SYSTOLIC BLOOD PRESSURE: 114 MMHG | HEART RATE: 60 BPM | BODY MASS INDEX: 34.82 KG/M2 | TEMPERATURE: 98 F | WEIGHT: 243.19 LBS | DIASTOLIC BLOOD PRESSURE: 74 MMHG | OXYGEN SATURATION: 95 %

## 2018-01-31 DIAGNOSIS — I25.118 CORONARY ARTERY DISEASE OF NATIVE ARTERY OF NATIVE HEART WITH STABLE ANGINA PECTORIS: ICD-10-CM

## 2018-01-31 DIAGNOSIS — G44.311 INTRACTABLE ACUTE POST-TRAUMATIC HEADACHE: ICD-10-CM

## 2018-01-31 DIAGNOSIS — M79.674 GREAT TOE PAIN, RIGHT: ICD-10-CM

## 2018-01-31 DIAGNOSIS — I77.810 AORTIC ROOT DILATATION: ICD-10-CM

## 2018-01-31 DIAGNOSIS — I77.819 AORTIC ECTASIA: ICD-10-CM

## 2018-01-31 DIAGNOSIS — E78.5 HYPERLIPIDEMIA, UNSPECIFIED HYPERLIPIDEMIA TYPE: Chronic | ICD-10-CM
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
## 2018-01-31 DIAGNOSIS — S92.911A UNSPECIFIED FRACTURE OF RIGHT TOE(S), INITIAL ENCOUNTER FOR CLOSED FRACTURE: Primary | ICD-10-CM

## 2018-01-31 DIAGNOSIS — S80.02XA CONTUSION OF LEFT KNEE, INITIAL ENCOUNTER: ICD-10-CM

## 2018-01-31 DIAGNOSIS — G44.311 INTRACTABLE ACUTE POST-TRAUMATIC HEADACHE: Primary | ICD-10-CM

## 2018-01-31 PROBLEM — S06.0X1A CONCUSSION WITH LOSS OF CONSCIOUSNESS OF 30 MINUTES OR LESS: Status: ACTIVE | Noted: 2018-01-31

## 2018-01-31 PROCEDURE — 73660 X-RAY EXAM OF TOE(S): CPT | Mod: TC,PO,FY

## 2018-01-31 PROCEDURE — 99499 UNLISTED E&M SERVICE: CPT | Mod: S$GLB,,, | Performed by: INTERNAL MEDICINE

## 2018-01-31 PROCEDURE — 70450 CT HEAD/BRAIN W/O DYE: CPT | Mod: 26,,, | Performed by: RADIOLOGY

## 2018-01-31 PROCEDURE — 73660 X-RAY EXAM OF TOE(S): CPT | Mod: 26,RT,, | Performed by: RADIOLOGY

## 2018-01-31 PROCEDURE — 99214 OFFICE O/P EST MOD 30 MIN: CPT | Mod: S$GLB,,, | Performed by: INTERNAL MEDICINE

## 2018-01-31 PROCEDURE — 3008F BODY MASS INDEX DOCD: CPT | Mod: S$GLB,,, | Performed by: INTERNAL MEDICINE

## 2018-01-31 PROCEDURE — 99999 PR PBB SHADOW E&M-EST. PATIENT-LVL III: CPT | Mod: PBBFAC,,, | Performed by: INTERNAL MEDICINE

## 2018-01-31 PROCEDURE — 70450 CT HEAD/BRAIN W/O DYE: CPT | Mod: TC

## 2018-02-05 ENCOUNTER — TELEPHONE (OUTPATIENT)
Dept: FAMILY MEDICINE | Facility: CLINIC | Age: 68
End: 2018-02-05

## 2018-02-05 NOTE — TELEPHONE ENCOUNTER
I spoke with the patient regarding a referral to Orthopedic, patient refuse to schedule sating that his toe is doing better.

## 2018-03-15 ENCOUNTER — OFFICE VISIT (OUTPATIENT)
Dept: CARDIOLOGY | Facility: CLINIC | Age: 68
End: 2018-03-15
Payer: MEDICARE

## 2018-03-15 ENCOUNTER — HOSPITAL ENCOUNTER (OUTPATIENT)
Dept: CARDIOLOGY | Facility: HOSPITAL | Age: 68
Discharge: HOME | End: 2018-03-15
Attending: INTERNAL MEDICINE
Payer: MEDICARE

## 2018-03-15 VITALS
HEIGHT: 70 IN | WEIGHT: 240.5 LBS | HEART RATE: 63 BPM | SYSTOLIC BLOOD PRESSURE: 121 MMHG | DIASTOLIC BLOOD PRESSURE: 79 MMHG | BODY MASS INDEX: 34.43 KG/M2 | RESPIRATION RATE: 15 BRPM | OXYGEN SATURATION: 98 %

## 2018-03-15 DIAGNOSIS — I87.2 VENOUS INSUFFICIENCY: Primary | ICD-10-CM

## 2018-03-15 DIAGNOSIS — I25.118 CORONARY ARTERY DISEASE OF NATIVE ARTERY OF NATIVE HEART WITH STABLE ANGINA PECTORIS: ICD-10-CM

## 2018-03-15 DIAGNOSIS — E78.5 HYPERLIPIDEMIA, UNSPECIFIED HYPERLIPIDEMIA TYPE: Chronic | ICD-10-CM

## 2018-03-15 DIAGNOSIS — I25.118 CORONARY ARTERY DISEASE OF NATIVE ARTERY OF NATIVE HEART WITH STABLE ANGINA PECTORIS: Primary | ICD-10-CM

## 2018-03-15 DIAGNOSIS — I77.810 AORTIC ROOT DILATATION: ICD-10-CM

## 2018-03-15 DIAGNOSIS — I10 ESSENTIAL HYPERTENSION: ICD-10-CM

## 2018-03-15 DIAGNOSIS — G47.33 OSA (OBSTRUCTIVE SLEEP APNEA): ICD-10-CM

## 2018-03-15 DIAGNOSIS — I87.2 VENOUS INSUFFICIENCY: ICD-10-CM

## 2018-03-15 LAB
DIASTOLIC DYSFUNCTION: NO
ESTIMATED PA SYSTOLIC PRESSURE: 10.76
GLOBAL PERICARDIAL EFFUSION: NORMAL
MITRAL VALVE MOBILITY: NORMAL
RETIRED EF AND QEF - SEE NOTES: 65 (ref 55–65)

## 2018-03-15 PROCEDURE — 93970 EXTREMITY STUDY: CPT | Mod: 26,,, | Performed by: INTERNAL MEDICINE

## 2018-03-15 PROCEDURE — 93306 TTE W/DOPPLER COMPLETE: CPT | Mod: 26,,, | Performed by: INTERNAL MEDICINE

## 2018-03-15 PROCEDURE — 99499 UNLISTED E&M SERVICE: CPT | Mod: S$GLB,,, | Performed by: INTERNAL MEDICINE

## 2018-03-15 PROCEDURE — 3078F DIAST BP <80 MM HG: CPT | Mod: CPTII,S$GLB,, | Performed by: INTERNAL MEDICINE

## 2018-03-15 PROCEDURE — 99214 OFFICE O/P EST MOD 30 MIN: CPT | Mod: S$GLB,,, | Performed by: INTERNAL MEDICINE

## 2018-03-15 PROCEDURE — 93000 ELECTROCARDIOGRAM COMPLETE: CPT | Mod: S$GLB,,, | Performed by: INTERNAL MEDICINE

## 2018-03-15 PROCEDURE — 93306 TTE W/DOPPLER COMPLETE: CPT

## 2018-03-15 PROCEDURE — 93970 EXTREMITY STUDY: CPT

## 2018-03-15 PROCEDURE — 99999 PR PBB SHADOW E&M-EST. PATIENT-LVL III: CPT | Mod: PBBFAC,,, | Performed by: INTERNAL MEDICINE

## 2018-03-15 PROCEDURE — 3074F SYST BP LT 130 MM HG: CPT | Mod: CPTII,S$GLB,, | Performed by: INTERNAL MEDICINE

## 2018-03-15 NOTE — PROGRESS NOTES
CARDIOVASCULAR PROGRESS NOTE    REASON FOR CONSULT:   Clinton Rosenberg is a 67 y.o. male who presents for follow up of CAD.    PCP: Dair  HISTORY OF PRESENT ILLNESS:   The patient returns for follow-up of his testing.  He continues to complain of typical sounding chest discomfort which will occur with exertion and relieved with rest or nitroglycerin.  He reported an episode of severe right-sided chest pain radiating to the interscapular area.  This apparently lasted for a day.  He's had no dyspnea, palpitations, lightheadedness, dizziness, or syncope.  He denies PND, or orthopnea. There's been no melena, or hematuria.  He does complain of progressive bilateral lower extremity edema and tightness of the calves related to the swelling.    He again declines cath.    CARDIOVASCULAR HISTORY:   CAD/MI/PCI  JAYLEN, unable to afford CPAP  Aortic root dil, 3.8 cm at Sinuses of Valsalva (echo 6/2017)    PAST MEDICAL HISTORY:     Past Medical History:   Diagnosis Date    Allergy     Angina pectoris     Anxiety     Coronary artery disease     Depression     GERD (gastroesophageal reflux disease)     Hyperlipidemia     Hypertension     Hypothyroidism     Memory loss     12/3/2014 MRI brain: 1. No evidence for acute infarct 2. unremarkable MRI of the brain; 12/3/2014 carotid US normal    Myocardial infarction     Obesity     Peripheral vascular disease 6/20/2017    Retrocalcaneal bursitis 11/24/2014    Sleep apnea        PAST SURGICAL HISTORY:     Past Surgical History:   Procedure Laterality Date    APPENDECTOMY  1986    bone spur Right     COLONOSCOPY      hand trauma Right     pencil     heart stent         ALLERGIES AND MEDICATION:     Review of patient's allergies indicates:   Allergen Reactions    Iodine containing multivitamin Anaphylaxis    Naproxen Other (See Comments)     hallucinations    Hydrocodone-acetaminophen      Rash (skin)^    Oxycodone-acetaminophen      Rash (skin)^     Previous  Medications    AMITRIPTYLINE (ELAVIL) 25 MG TABLET    Take 1 tablet (25 mg total) by mouth nightly as needed for Insomnia.    ASPIRIN (ECOTRIN) 81 MG EC TABLET    Take 1 tablet (81 mg total) by mouth once daily.    ATORVASTATIN (LIPITOR) 80 MG TABLET    Take 1 tablet (80 mg total) by mouth every evening.    FUROSEMIDE (LASIX) 20 MG TABLET    Take 1 tablet (20 mg total) by mouth as needed.    ISOSORBIDE MONONITRATE (IMDUR) 60 MG 24 HR TABLET    Take 1 tablet (60 mg total) by mouth once daily.    KRILL/OM3/DHA/EPA/OM6/LIP/ASTX (KRILL OIL, OMEGA 3 AND 6, ORAL)    Take by mouth.    LEVOTHYROXINE (SYNTHROID) 50 MCG TABLET    Take 1 tablet (50 mcg total) by mouth before breakfast.    LOSARTAN (COZAAR) 50 MG TABLET    Take 1 tablet (50 mg total) by mouth once daily.    METOPROLOL TARTRATE (LOPRESSOR) 25 MG TABLET    Take 1 tablet (25 mg total) by mouth 2 (two) times daily.    NITROGLYCERIN (NITROSTAT) 0.4 MG SL TABLET    Place 1 tablet (0.4 mg total) under the tongue every 5 (five) minutes as needed for Chest pain.    TAMSULOSIN (FLOMAX) 0.4 MG CP24    TAKE 2 CAPSULES ONE TIME DAILY    VITAMIN D 1000 UNITS TAB    Take 1 tablet (1,000 Units total) by mouth once daily.       SOCIAL HISTORY:     Social History     Social History    Marital status:      Spouse name: N/A    Number of children: N/A    Years of education: N/A     Occupational History    Not on file.     Social History Main Topics    Smoking status: Never Smoker    Smokeless tobacco: Never Used    Alcohol use 0.0 oz/week      Comment: rarely    Drug use: No    Sexual activity: Yes     Other Topics Concern    Not on file     Social History Narrative    No narrative on file       FAMILY HISTORY:     Family History   Problem Relation Age of Onset    Arthritis Mother     Early death Mother     Heart disease Mother     Hypertension Mother     Migraines Mother     Seizures Mother     Tremor Mother     Diabetes Mother     Cancer Mother      "Early death Father     Heart disease Father     Hypertension Father     Stroke Father     Diabetes Father     Alcohol abuse Father     Arthritis Brother     Cancer Brother     Diabetes Brother     Early death Brother     Heart disease Brother     Hypertension Brother     Heart disease Sister     Hypertension Sister     Arthritis Sister     Depression Sister     Heart disease Brother     Arthritis Brother     Heart disease Brother     Pneumonia Brother     Heart disease Brother     Hypertension Brother     Diabetes Brother     Heart disease Brother        REVIEW OF SYSTEMS:   Review of Systems   Constitutional: Negative for chills, diaphoresis and fever.   HENT: Negative for nosebleeds.    Eyes: Negative for blurred vision, double vision and photophobia.   Respiratory: Negative for hemoptysis, shortness of breath and wheezing.    Cardiovascular: Positive for chest pain and leg swelling. Negative for palpitations, orthopnea, claudication and PND.   Gastrointestinal: Negative for abdominal pain, blood in stool, heartburn, melena, nausea and vomiting.   Genitourinary: Negative for flank pain and hematuria.   Musculoskeletal: Negative for falls, myalgias and neck pain.   Skin: Negative for rash.   Neurological: Negative for dizziness, seizures, loss of consciousness, weakness and headaches.   Endo/Heme/Allergies: Negative for polydipsia. Does not bruise/bleed easily.   Psychiatric/Behavioral: Negative for depression and memory loss. The patient is not nervous/anxious.        PHYSICAL EXAM:     Vitals:    03/15/18 1314   BP: 121/79   Pulse: 63   Resp: 15    Body mass index is 34.51 kg/m².  Weight: 109.1 kg (240 lb 8.4 oz)   Height: 5' 10" (177.8 cm)     Physical Exam   Constitutional: He is oriented to person, place, and time. He appears well-developed and well-nourished. He is cooperative.  Non-toxic appearance. No distress.   HENT:   Head: Normocephalic and atraumatic.   Eyes: Conjunctivae and EOM " are normal. Pupils are equal, round, and reactive to light. No scleral icterus.   Neck: Trachea normal and normal range of motion. Neck supple. Normal carotid pulses and no JVD present. Carotid bruit is not present. No neck rigidity. No edema present. No thyromegaly present.   Cardiovascular: Normal rate, regular rhythm, S1 normal and S2 normal.  PMI is not displaced.  Exam reveals no gallop and no friction rub.    No murmur heard.  Pulses:       Carotid pulses are 2+ on the right side, and 2+ on the left side.  Pulmonary/Chest: Effort normal and breath sounds normal. No respiratory distress. He has no wheezes. He has no rales. He exhibits no tenderness.   Abdominal: Soft. Bowel sounds are normal. He exhibits no distension. There is no hepatosplenomegaly.   Obese   Musculoskeletal: He exhibits edema. He exhibits no tenderness.   Feet:   Right Foot:   Skin Integrity: Negative for ulcer.   Left Foot:   Skin Integrity: Negative for ulcer.   Neurological: He is alert and oriented to person, place, and time. No cranial nerve deficit.   Skin: Skin is warm and dry. No rash noted. No erythema.   Psychiatric: He has a normal mood and affect. His speech is normal and behavior is normal.   Vitals reviewed.      DATA:   EKG: (personally reviewed tracing)  3/15/18 SR 63, PRWP, c/r/o IMI-age indet    Laboratory:  CBC:    Recent Labs  Lab 06/01/16  0947 10/31/16  1535 07/26/17  0720   WHITE BLOOD CELL COUNT 6.90 8.84 7.98   HEMOGLOBIN 16.3 16.2 15.2   HEMATOCRIT 46.9 45.8 43.7   PLATELETS 155 176 167       CHEMISTRIES:    Recent Labs  Lab 10/22/15  0808 10/31/16  1535 03/14/17  1046   GLUCOSE 138 H 103 120 H   SODIUM 141 141 142   POTASSIUM 5.1 4.7 5.3 H   BUN BLD 13 10 12   CREATININE 1.2 1.1 1.1   EGFR IF AFRICAN AMERICAN >60.0 >60.0 >60.0   EGFR IF NON- >60.0 >60.0 >60.0   CALCIUM 9.6 9.2 9.8       CARDIAC BIOMARKERS:        COAGS:        LIPIDS/LFTS:    Recent Labs  Lab 10/22/15  0808 10/31/16  1535  03/14/17  1046 10/31/17  0837   CHOLESTEROL 159  --  170 101 L   TRIGLYCERIDES 288 H  --  260 H 245 H   HDL 39 L  --  38 L 29 L   LDL CHOLESTEROL 62.4 L  --  80.0 23.0 L   NON-HDL CHOLESTEROL 120  --  132 72   AST 21 20 25 18   ALT 24 24 21 19       Cardiovascular Testing:  L MPI 6/8/17  Nuclear Quantitative Functional Analysis:   LVEF: 68 %  Impression: NORMAL MYOCARDIAL PERFUSION  1. The perfusion scan is free of evidence for myocardial ischemia or injury.   2. There is a mild intensity fixed defect in the inferior wall of the left ventricle, secondary to diaphragm attenuation.   3. Resting wall motion is physiologic.   4. Resting LV function is normal.   5. The ventricular volumes are normal at rest and stress.   6. The extracardiac distribution of radioactivity is normal.   7. When compared to the previous study from 03/09/2015, no change    Echo 6/8/17    1 - Normal left ventricular systolic function (EF 55-60%).     2 - Concentric remodeling.     3 - Trivial mitral regurgitation.     4 - Trivial tricuspid regurgitation.     5 - mild aortic root enlargement, 3.8 cm at Sinuses of Valsalva    Holter 6/8/17  PREDOMINANT RHYTHM  1. Sinus rhythm with heart rates varying between 43 and 94 bpm with an average of 62 bpm.   VENTRICULAR ARRHYTHMIAS  1. There were very rare PVCs recorded totalling 5 and averaging less than 1 per hour.   2. There were no episodes of ventricular tachycardia.  SUPRA VENTRICULAR ARRHYTHMIAS  1. There were very rare PACs recorded totalling 3 and averaging less than 1 per hour.   2. There were no episodes of sustained supraventricular tachycardia.  SINUS NODE FUNCTION  1. There was no evidence of high grade SA deanne block.   AV CONDUCTION  1. There was no evidence of high grade AV block.   DIARY  1. The diary was not returned  MISCELLANEOUS  1. There were rare hookup related artifacts. Overall, the study was of good quality.   2. This was a tape of adequate length (24 hrs).    LE art US/TANA  6/8/17  1.  Mild/atherosclerotic plaquing.  2.  No stenosis about 30 percent femoral popliteal arteries.  3.  Normal bilateral ABIs.    Carotid US 12/3/14  1. Less than 50% stenosis of the right internal carotid artery.  2. Less than 50% stenosis of the left internal carotid artery.    Cath 9/27/13 (Per Dr. Rivera note 11/19/14)  FINDINGS:   Left main, no luminal irregularities.   LAD: The proximal LAD has a 30% stenosis and widely patent. The remainder of the LAD has luminal  irregularities. First diagonal, luminal irregularities. Second diagonal, luminal irregularities.   Left circumflex artery, luminal irregularities. OM-1, luminal irregularities. OM-2, luminal irregularities.   Right coronary artery has a 20% stenosis and the distal right coronary artery has a 1% stenosis. The  remainder of the artery has luminal irregularities. Posterior lateral branch  has luminal irregularities. Posterior descending artery has luminal  irregularities.  SUMMARY: LVEDP = 10 mmHg. LVEF = 65%. Patent LAD stent.    ASSESSMENT:   # Stable angina/typ CP on background of known CAD.  Echo/MPI 6/2017 normal.  Pt again declines cath. HR/BP too low for additional meds.  # HTN, controlled  # HLP, on atorva 80mg  # Ao root dil, 3.8 cm (echo 6/2017)  # PAD/claudication, bilat.  ?CVI with also complaints of edema.  # JAYLEN, pt unable to obtain CPAP apparatus  # BMI 34, stable vs last OV  # Iodine allergy    PLAN:   Cont med rx  Pt again declined cath (Iodine allergy noted as well)  Pt advised to come to ER for unrelenting/unstable CP.  Diet/exercise/weight loss  Check echo  Check Venous US/reflux  Venous compression stockings rx given to pt  RTC 1 month    Brad Bahena MD, FACC

## 2018-03-24 DIAGNOSIS — R60.0 PERIPHERAL EDEMA: Chronic | ICD-10-CM

## 2018-03-25 RX ORDER — FUROSEMIDE 20 MG/1
TABLET ORAL
Qty: 90 TABLET | Refills: 1 | Status: SHIPPED | OUTPATIENT
Start: 2018-03-25 | End: 2018-04-09 | Stop reason: SDUPTHER

## 2018-03-26 RX ORDER — ATORVASTATIN CALCIUM 80 MG/1
TABLET, FILM COATED ORAL
Qty: 90 TABLET | Refills: 3 | Status: SHIPPED | OUTPATIENT
Start: 2018-03-26 | End: 2019-02-21 | Stop reason: SDUPTHER

## 2018-03-28 DIAGNOSIS — I25.118 CORONARY ARTERY DISEASE OF NATIVE ARTERY OF NATIVE HEART WITH STABLE ANGINA PECTORIS: ICD-10-CM

## 2018-03-28 RX ORDER — ISOSORBIDE MONONITRATE 60 MG/1
60 TABLET, EXTENDED RELEASE ORAL DAILY
Qty: 90 TABLET | Refills: 3 | Status: CANCELLED | OUTPATIENT
Start: 2018-03-28

## 2018-04-09 DIAGNOSIS — I10 ESSENTIAL HYPERTENSION: ICD-10-CM

## 2018-04-09 DIAGNOSIS — R60.0 PERIPHERAL EDEMA: Chronic | ICD-10-CM

## 2018-04-09 DIAGNOSIS — I25.118 CORONARY ARTERY DISEASE OF NATIVE ARTERY OF NATIVE HEART WITH STABLE ANGINA PECTORIS: ICD-10-CM

## 2018-04-09 DIAGNOSIS — N40.1 BENIGN NON-NODULAR PROSTATIC HYPERPLASIA WITH LOWER URINARY TRACT SYMPTOMS: ICD-10-CM

## 2018-04-09 DIAGNOSIS — E03.9 HYPOTHYROIDISM, UNSPECIFIED TYPE: Chronic | ICD-10-CM

## 2018-04-09 RX ORDER — LEVOTHYROXINE SODIUM 50 UG/1
50 TABLET ORAL
Qty: 90 TABLET | Refills: 3 | Status: SHIPPED | OUTPATIENT
Start: 2018-04-09 | End: 2019-02-21 | Stop reason: SDUPTHER

## 2018-04-09 RX ORDER — AMITRIPTYLINE HYDROCHLORIDE 25 MG/1
25 TABLET, FILM COATED ORAL NIGHTLY PRN
Qty: 90 TABLET | Refills: 3 | Status: SHIPPED | OUTPATIENT
Start: 2018-04-09 | End: 2019-02-21

## 2018-04-09 RX ORDER — LOSARTAN POTASSIUM 50 MG/1
50 TABLET ORAL DAILY
Qty: 90 TABLET | Refills: 3 | Status: SHIPPED | OUTPATIENT
Start: 2018-04-09 | End: 2019-02-21 | Stop reason: SDUPTHER

## 2018-04-09 RX ORDER — TAMSULOSIN HYDROCHLORIDE 0.4 MG/1
2 CAPSULE ORAL DAILY
Qty: 180 CAPSULE | Refills: 3 | Status: SHIPPED | OUTPATIENT
Start: 2018-04-09 | End: 2019-02-21 | Stop reason: SDUPTHER

## 2018-04-09 RX ORDER — ISOSORBIDE MONONITRATE 60 MG/1
60 TABLET, EXTENDED RELEASE ORAL DAILY
Qty: 90 TABLET | Refills: 3 | Status: SHIPPED | OUTPATIENT
Start: 2018-04-09 | End: 2019-02-21 | Stop reason: SDUPTHER

## 2018-04-09 RX ORDER — FUROSEMIDE 20 MG/1
20 TABLET ORAL DAILY
Qty: 90 TABLET | Refills: 3 | Status: SHIPPED | OUTPATIENT
Start: 2018-04-09 | End: 2019-02-21 | Stop reason: SDUPTHER

## 2018-04-09 RX ORDER — METOPROLOL TARTRATE 25 MG/1
25 TABLET, FILM COATED ORAL 2 TIMES DAILY
Qty: 180 TABLET | Refills: 3 | Status: SHIPPED | OUTPATIENT
Start: 2018-04-09 | End: 2018-10-11 | Stop reason: SDUPTHER

## 2018-05-10 ENCOUNTER — OFFICE VISIT (OUTPATIENT)
Dept: FAMILY MEDICINE | Facility: CLINIC | Age: 68
End: 2018-05-10
Payer: MEDICARE

## 2018-05-10 ENCOUNTER — OFFICE VISIT (OUTPATIENT)
Dept: CARDIOLOGY | Facility: CLINIC | Age: 68
End: 2018-05-10
Payer: MEDICARE

## 2018-05-10 VITALS
TEMPERATURE: 98 F | DIASTOLIC BLOOD PRESSURE: 66 MMHG | SYSTOLIC BLOOD PRESSURE: 102 MMHG | HEART RATE: 52 BPM | HEIGHT: 70 IN | WEIGHT: 243.63 LBS | OXYGEN SATURATION: 96 % | BODY MASS INDEX: 34.88 KG/M2

## 2018-05-10 VITALS
HEIGHT: 70 IN | OXYGEN SATURATION: 95 % | RESPIRATION RATE: 15 BRPM | DIASTOLIC BLOOD PRESSURE: 80 MMHG | WEIGHT: 242 LBS | HEART RATE: 65 BPM | SYSTOLIC BLOOD PRESSURE: 130 MMHG | BODY MASS INDEX: 34.65 KG/M2

## 2018-05-10 DIAGNOSIS — I25.118 CORONARY ARTERY DISEASE OF NATIVE ARTERY OF NATIVE HEART WITH STABLE ANGINA PECTORIS: Primary | ICD-10-CM

## 2018-05-10 DIAGNOSIS — Z00.00 ENCOUNTER FOR PREVENTIVE HEALTH EXAMINATION: Primary | ICD-10-CM

## 2018-05-10 DIAGNOSIS — I25.118 CORONARY ARTERY DISEASE OF NATIVE ARTERY OF NATIVE HEART WITH STABLE ANGINA PECTORIS: ICD-10-CM

## 2018-05-10 DIAGNOSIS — I73.9 PERIPHERAL VASCULAR DISEASE: ICD-10-CM

## 2018-05-10 DIAGNOSIS — F41.9 ANXIETY AND DEPRESSION: Chronic | ICD-10-CM

## 2018-05-10 DIAGNOSIS — E66.9 OBESITY (BMI 30.0-34.9): Chronic | ICD-10-CM

## 2018-05-10 DIAGNOSIS — R73.03 PREDIABETES: Chronic | ICD-10-CM

## 2018-05-10 DIAGNOSIS — E03.9 HYPOTHYROIDISM, UNSPECIFIED TYPE: Chronic | ICD-10-CM

## 2018-05-10 DIAGNOSIS — Z23 NEED FOR VACCINATION AGAINST STREPTOCOCCUS PNEUMONIAE: ICD-10-CM

## 2018-05-10 DIAGNOSIS — I77.819 AORTIC ECTASIA: ICD-10-CM

## 2018-05-10 DIAGNOSIS — I10 ESSENTIAL HYPERTENSION: ICD-10-CM

## 2018-05-10 DIAGNOSIS — I87.2 VENOUS INSUFFICIENCY: ICD-10-CM

## 2018-05-10 DIAGNOSIS — I77.810 AORTIC ROOT DILATATION: ICD-10-CM

## 2018-05-10 DIAGNOSIS — G47.33 OSA (OBSTRUCTIVE SLEEP APNEA): ICD-10-CM

## 2018-05-10 DIAGNOSIS — F32.A ANXIETY AND DEPRESSION: Chronic | ICD-10-CM

## 2018-05-10 DIAGNOSIS — E78.5 HYPERLIPIDEMIA, UNSPECIFIED HYPERLIPIDEMIA TYPE: Chronic | ICD-10-CM

## 2018-05-10 PROCEDURE — 3078F DIAST BP <80 MM HG: CPT | Mod: CPTII,S$GLB,, | Performed by: NURSE PRACTITIONER

## 2018-05-10 PROCEDURE — 99499 UNLISTED E&M SERVICE: CPT | Mod: S$GLB,,, | Performed by: INTERNAL MEDICINE

## 2018-05-10 PROCEDURE — 99214 OFFICE O/P EST MOD 30 MIN: CPT | Mod: S$GLB,,, | Performed by: INTERNAL MEDICINE

## 2018-05-10 PROCEDURE — 3079F DIAST BP 80-89 MM HG: CPT | Mod: CPTII,S$GLB,, | Performed by: INTERNAL MEDICINE

## 2018-05-10 PROCEDURE — G0402 INITIAL PREVENTIVE EXAM: HCPCS | Mod: S$GLB,,, | Performed by: NURSE PRACTITIONER

## 2018-05-10 PROCEDURE — 99999 PR PBB SHADOW E&M-EST. PATIENT-LVL V: CPT | Mod: PBBFAC,,, | Performed by: NURSE PRACTITIONER

## 2018-05-10 PROCEDURE — 99999 PR PBB SHADOW E&M-EST. PATIENT-LVL III: CPT | Mod: PBBFAC,,, | Performed by: INTERNAL MEDICINE

## 2018-05-10 PROCEDURE — 3008F BODY MASS INDEX DOCD: CPT | Mod: CPTII,S$GLB,, | Performed by: INTERNAL MEDICINE

## 2018-05-10 PROCEDURE — 3074F SYST BP LT 130 MM HG: CPT | Mod: CPTII,S$GLB,, | Performed by: NURSE PRACTITIONER

## 2018-05-10 PROCEDURE — 3075F SYST BP GE 130 - 139MM HG: CPT | Mod: CPTII,S$GLB,, | Performed by: INTERNAL MEDICINE

## 2018-05-10 NOTE — PROGRESS NOTES
"Clinton Rosenberg presented for an initial Medicare AWV today. The following components were reviewed and updated:    · Medical history  · Family History  · Social history  · Allergies and Current Medications  · Health Risk Assessment  · Health Maintenance  · Care Team    **See Completed Assessments for Annual Wellness visit with in the encounter summary    The following assessments were completed:  · Depression Screening  · Cognitive function Screening  · Timed Get Up Test  · Whisper Test    Vitals:    05/10/18 1009   BP: 102/66   BP Location: Left arm   Patient Position: Sitting   BP Method: Large (Manual)   Pulse: (!) 52   Temp: 98 °F (36.7 °C)   TempSrc: Oral   SpO2: 96%   Weight: 110.5 kg (243 lb 9.7 oz)   Height: 5' 10" (1.778 m)     Body mass index is 34.95 kg/m².   ]            Diagnoses and health risks identified today and associated recommendations/orders:  1. Encounter for preventive health examination  Education provided about preventive health examinations and procedures; discussed patient's health concerns, holistically addressed patient's health plan.      3. Peripheral vascular disease  The current medical regimen is effective;  continue present plan and medications.      4. Aortic ectasia  The current medical regimen is effective;  continue present plan and medications.      5. Aortic root dilatation  The current medical regimen is effective;  continue present plan and medications.      6. Coronary artery disease of native artery of native heart with stable angina pectoris  The current medical regimen is effective;  continue present plan and medications.        7. Essential hypertension  Discussed sodium restriction, maintaining ideal body weight and regular exercise program as physiologic means to achieve blood pressure control. The patient will strive towards this. The current medical regimen is effective; continue present plan and medications. Recommended patient to check home readings to monitor and " see me for followup as scheduled or sooner as needed. Patient was educated that both decongestant and anti-inflammatory medication may raise blood pressure      8. Hyperlipidemia, unspecified hyperlipidemia type  We discussed low fat diet and regular exercise.The current medical regimen is effective; continue present plan and medications.     9. Hypothyroidism, unspecified type  The current medical regimen is effective;  continue present plan and medications.      10. Prediabetes  The current medical regimen is effective;  continue present plan and medications.      11. Anxiety and depression  The current medical regimen is effective;  continue present plan and medications.      12. Obesity (BMI 30.0-34.9)  Discussed diet, continued participation in tolerable fitness activities, health risks associated with obesity.         Provided Clinton with a 5-10 year written screening schedule and personal prevention plan. Recommendations were developed using the USPSTF age appropriate recommendations. Education, counseling, and referrals were provided as needed.  After Visit Summary printed and given to patient which includes a list of additional screenings\tests needed.    No Follow-up on file.      Jenny Fountain NP

## 2018-05-10 NOTE — PATIENT INSTRUCTIONS
Counseling and Referral of Other Preventative  (Italic type indicates deductible and co-insurance are waived)    Patient Name: Clinton Rosenberg  Today's Date: 5/10/2018    Health Maintenance       Date Due Completion Date    Zoster Vaccine 09/30/2012 ---    Pneumococcal (65+) (1 of 2 - PCV13) 09/30/2017 ---    Influenza Vaccine 08/01/2018 11/7/2016 (Done)    Override on 11/7/2016: Done    Colonoscopy 02/03/2019 2/3/2009    High Dose Statin 03/26/2019 3/26/2018    Lipid Panel 10/31/2022 10/31/2017    TETANUS VACCINE 06/01/2026 6/1/2016        Orders Placed This Encounter   Procedures    PNEUMOCOCCAL CONJUGATE VACCINE 13-VALENT LESS THAN 4YO & GREATER THAN 51YO IM     The following information is provided to all patients.  This information is to help you find resources for any of the problems found today that may be affecting your health:                Living healthy guide: www.[email or web address].[place].[facility]      Understanding Diabetes: www.diabetes.org      Eating healthy: www.cdc.gov/healthyweight      CDC home safety checklist: www.cdc.gov/steadi/patient.html      Agency on Aging: www.goea.[place].[facility]      Alcoholics anonymous (AA): www.aa.org      Physical Activity: www.melquiades.nih.gov/vo4uxjx      Tobacco use: www.quitwithusla.org

## 2018-05-10 NOTE — PROGRESS NOTES
CARDIOVASCULAR PROGRESS NOTE    REASON FOR CONSULT:   Clinton Rosenberg is a 67 y.o. male who presents for follow up of CAD.    PCP: Dair  HISTORY OF PRESENT ILLNESS:   The patient returns for follow-up.  He returns today with complaints of stable exertional angina.  The pattern is stable, does not appear to be accelerating.  He denies shortness of breath, palpitations, lightheadedness, dizziness, or syncope.  There's been no PND, or orthopnea.  He does have episodic lower extremity edema.  He denies melena, hematuria, or claudicant symptoms.    I reviewed the patient's echocardiogram noting mild basal inferior ischemia with stable aortic root dilatation.  Overall ejection fraction is preserved.    I again discussed my concerns regarding the possibility of significant CAD.  The patient again has concerns regarding his prior experience of catheterization.  Based on his description, it sounds like he might of had an iodine reaction to the Betadine as opposed to the intravenous contrast.  The patient also tells me that he was dropped off of his gurney during his procedure.  I described my cardiac catheterization procedure, and the patient seems to be somewhat more willing to proceed, but still wishes to contemplate this option.    Lastly, the patient continues to describe episodic lower extremity edema.  His venous reflux study was positive for bilateral GSV and right LSV reflux without DVT.  He has not picked up the venous compression stockings, and apparently does not plan to.    CARDIOVASCULAR HISTORY:   CAD/MI/PCI  JAYLEN, unable to afford CPAP  Aortic root dil, 3.7 cm (echo 3/2018)  CVI (bilat GSV, R LSV, US 3/2018)    PAST MEDICAL HISTORY:     Past Medical History:   Diagnosis Date    Allergy     Angina pectoris     Anxiety     Coronary artery disease     Depression     GERD (gastroesophageal reflux disease)     Hyperlipidemia     Hypertension     Hypothyroidism     Memory loss     12/3/2014 MRI brain: 1.  No evidence for acute infarct 2. unremarkable MRI of the brain; 12/3/2014 carotid US normal    Myocardial infarction     Obesity     Peripheral vascular disease 6/20/2017    Retrocalcaneal bursitis 11/24/2014    Sleep apnea        PAST SURGICAL HISTORY:     Past Surgical History:   Procedure Laterality Date    APPENDECTOMY  1986    bone spur Right     COLONOSCOPY      hand trauma Right     pencil     heart stent         ALLERGIES AND MEDICATION:     Review of patient's allergies indicates:   Allergen Reactions    Iodine containing multivitamin Anaphylaxis    Naproxen Other (See Comments)     hallucinations    Hydrocodone-acetaminophen      Rash (skin)^    Oxycodone-acetaminophen      Rash (skin)^     Previous Medications    AMITRIPTYLINE (ELAVIL) 25 MG TABLET    Take 1 tablet (25 mg total) by mouth nightly as needed for Insomnia.    ASPIRIN (ECOTRIN) 81 MG EC TABLET    Take 1 tablet (81 mg total) by mouth once daily.    ATORVASTATIN (LIPITOR) 80 MG TABLET    TAKE 1 TABLET EVERY EVENING    FUROSEMIDE (LASIX) 20 MG TABLET    Take 1 tablet (20 mg total) by mouth once daily.    ISOSORBIDE MONONITRATE (IMDUR) 60 MG 24 HR TABLET    Take 1 tablet (60 mg total) by mouth once daily.    KRILL/OM3/DHA/EPA/OM6/LIP/ASTX (KRILL OIL, OMEGA 3 AND 6, ORAL)    Take by mouth.    LEVOTHYROXINE (SYNTHROID) 50 MCG TABLET    Take 1 tablet (50 mcg total) by mouth before breakfast.    LOSARTAN (COZAAR) 50 MG TABLET    Take 1 tablet (50 mg total) by mouth once daily.    METOPROLOL TARTRATE (LOPRESSOR) 25 MG TABLET    Take 1 tablet (25 mg total) by mouth 2 (two) times daily.    NITROGLYCERIN (NITROSTAT) 0.4 MG SL TABLET    Place 1 tablet (0.4 mg total) under the tongue every 5 (five) minutes as needed for Chest pain.    TAMSULOSIN (FLOMAX) 0.4 MG CP24    Take 2 capsules (0.8 mg total) by mouth once daily.    VITAMIN D 1000 UNITS TAB    Take 1 tablet (1,000 Units total) by mouth once daily.       SOCIAL HISTORY:     Social  History     Social History    Marital status:      Spouse name: N/A    Number of children: N/A    Years of education: N/A     Occupational History    Not on file.     Social History Main Topics    Smoking status: Never Smoker    Smokeless tobacco: Never Used    Alcohol use 0.0 oz/week      Comment: rarely    Drug use: No    Sexual activity: Yes     Other Topics Concern    Not on file     Social History Narrative    No narrative on file       FAMILY HISTORY:     Family History   Problem Relation Age of Onset    Arthritis Mother     Early death Mother     Heart disease Mother     Hypertension Mother     Migraines Mother     Seizures Mother     Tremor Mother     Diabetes Mother     Cancer Mother     Early death Father     Heart disease Father     Hypertension Father     Stroke Father     Diabetes Father     Alcohol abuse Father     Arthritis Brother     Cancer Brother     Diabetes Brother     Early death Brother     Heart disease Brother     Hypertension Brother     Heart disease Sister     Hypertension Sister     Arthritis Sister     Depression Sister     Heart disease Brother     Arthritis Brother     Heart disease Brother     Pneumonia Brother     Heart disease Brother     Hypertension Brother     Diabetes Brother     Heart disease Brother        REVIEW OF SYSTEMS:   Review of Systems   Constitutional: Negative for chills, diaphoresis and fever.   HENT: Negative for nosebleeds.    Eyes: Negative for blurred vision, double vision and photophobia.   Respiratory: Negative for hemoptysis, shortness of breath and wheezing.    Cardiovascular: Positive for chest pain and leg swelling. Negative for palpitations, orthopnea, claudication and PND.   Gastrointestinal: Negative for abdominal pain, blood in stool, heartburn, melena, nausea and vomiting.   Genitourinary: Negative for flank pain and hematuria.   Musculoskeletal: Negative for falls, myalgias and neck pain.   Skin:  "Negative for rash.   Neurological: Negative for dizziness, seizures, loss of consciousness, weakness and headaches.   Endo/Heme/Allergies: Negative for polydipsia. Does not bruise/bleed easily.   Psychiatric/Behavioral: Negative for depression and memory loss. The patient is not nervous/anxious.        PHYSICAL EXAM:     Vitals:    05/10/18 1401   BP: 130/80   Pulse: 65   Resp: 15    Body mass index is 34.72 kg/m².  Weight: 109.8 kg (242 lb)   Height: 5' 10" (177.8 cm)     Physical Exam   Constitutional: He is oriented to person, place, and time. He appears well-developed and well-nourished. He is cooperative.  Non-toxic appearance. No distress.   HENT:   Head: Normocephalic and atraumatic.   Eyes: Conjunctivae and EOM are normal. Pupils are equal, round, and reactive to light. No scleral icterus.   Neck: Trachea normal and normal range of motion. Neck supple. Normal carotid pulses and no JVD present. Carotid bruit is not present. No neck rigidity. No edema present. No thyromegaly present.   Cardiovascular: Normal rate, regular rhythm, S1 normal and S2 normal.  PMI is not displaced.  Exam reveals no gallop and no friction rub.    No murmur heard.  Pulses:       Carotid pulses are 2+ on the right side, and 2+ on the left side.  Pulmonary/Chest: Effort normal and breath sounds normal. No respiratory distress. He has no wheezes. He has no rales. He exhibits no tenderness.   Abdominal: Soft. Bowel sounds are normal. He exhibits no distension. There is no hepatosplenomegaly.   Obese   Musculoskeletal: He exhibits no edema or tenderness.   Feet:   Right Foot:   Skin Integrity: Negative for ulcer.   Left Foot:   Skin Integrity: Negative for ulcer.   Neurological: He is alert and oriented to person, place, and time. No cranial nerve deficit.   Skin: Skin is warm and dry. No rash noted. No erythema.   Psychiatric: He has a normal mood and affect. His speech is normal and behavior is normal.   Vitals reviewed.      DATA: "   EKG: (personally reviewed tracing)  3/15/18 SR 63, PRWP, c/r/o IMI-age indet    Laboratory:  CBC:    Recent Labs  Lab 06/01/16  0947 10/31/16  1535 07/26/17  0720   WHITE BLOOD CELL COUNT 6.90 8.84 7.98   HEMOGLOBIN 16.3 16.2 15.2   HEMATOCRIT 46.9 45.8 43.7   PLATELETS 155 176 167       CHEMISTRIES:    Recent Labs  Lab 10/22/15  0808 10/31/16  1535 03/14/17  1046   GLUCOSE 138 H 103 120 H   SODIUM 141 141 142   POTASSIUM 5.1 4.7 5.3 H   BUN BLD 13 10 12   CREATININE 1.2 1.1 1.1   EGFR IF AFRICAN AMERICAN >60.0 >60.0 >60.0   EGFR IF NON- >60.0 >60.0 >60.0   CALCIUM 9.6 9.2 9.8       CARDIAC BIOMARKERS:        COAGS:        LIPIDS/LFTS:    Recent Labs  Lab 10/22/15  0808 10/31/16  1535 03/14/17  1046 10/31/17  0837   CHOLESTEROL 159  --  170 101 L   TRIGLYCERIDES 288 H  --  260 H 245 H   HDL 39 L  --  38 L 29 L   LDL CHOLESTEROL 62.4 L  --  80.0 23.0 L   NON-HDL CHOLESTEROL 120  --  132 72   AST 21 20 25 18   ALT 24 24 21 19       Cardiovascular Testing:  Echo 3/15/18    1 - TDS.     2 - Normal left ventricular systolic function (EF 60-65%).  Cannot exclude basal inferior hypokinesis.     3 - Upper limit of normal aortic root, 3.7cm.     Venous US/reflux 3/15/18  RIGHT:  No evidence of Right lower extremity DVT.    There is reflux in the Greater Saphenous and Lesser Saphenous Veins.    R GSV dist thigh 2186 msec  R GSV in calf 2339 msec  R LSV 4006 msec   LEFT:  No evidence of Left lower extremity DVT.    There is reflux in the Greater Saphenous Vein.  R GSV mid thigh 2422 msec  R GSV dist thigh 1667 msec  R GSV calf 5647 msec      L MPI 6/8/17  Nuclear Quantitative Functional Analysis:   LVEF: 68 %  Impression: NORMAL MYOCARDIAL PERFUSION  1. The perfusion scan is free of evidence for myocardial ischemia or injury.   2. There is a mild intensity fixed defect in the inferior wall of the left ventricle, secondary to diaphragm attenuation.   3. Resting wall motion is physiologic.   4. Resting LV  function is normal.   5. The ventricular volumes are normal at rest and stress.   6. The extracardiac distribution of radioactivity is normal.   7. When compared to the previous study from 03/09/2015, no change    Holter 6/8/17  PREDOMINANT RHYTHM  1. Sinus rhythm with heart rates varying between 43 and 94 bpm with an average of 62 bpm.   VENTRICULAR ARRHYTHMIAS  1. There were very rare PVCs recorded totalling 5 and averaging less than 1 per hour.   2. There were no episodes of ventricular tachycardia.  SUPRA VENTRICULAR ARRHYTHMIAS  1. There were very rare PACs recorded totalling 3 and averaging less than 1 per hour.   2. There were no episodes of sustained supraventricular tachycardia.  SINUS NODE FUNCTION  1. There was no evidence of high grade SA deanne block.   AV CONDUCTION  1. There was no evidence of high grade AV block.   DIARY  1. The diary was not returned  MISCELLANEOUS  1. There were rare hookup related artifacts. Overall, the study was of good quality.   2. This was a tape of adequate length (24 hrs).    LE art US/TANA 6/8/17  1.  Mild/atherosclerotic plaquing.  2.  No stenosis about 30 percent femoral popliteal arteries.  3.  Normal bilateral ABIs.    Carotid US 12/3/14  1. Less than 50% stenosis of the right internal carotid artery.  2. Less than 50% stenosis of the left internal carotid artery.    Cath 9/27/13 (Per Dr. Rivera note 11/19/14)  FINDINGS:   Left main, no luminal irregularities.   LAD: The proximal LAD has a 30% stenosis and widely patent. The remainder of the LAD has luminal  irregularities. First diagonal, luminal irregularities. Second diagonal, luminal irregularities.   Left circumflex artery, luminal irregularities. OM-1, luminal irregularities. OM-2, luminal irregularities.   Right coronary artery has a 20% stenosis and the distal right coronary artery has a 1% stenosis. The  remainder of the artery has luminal irregularities. Posterior lateral branch  has luminal irregularities.  Posterior descending artery has luminal  irregularities.  SUMMARY: LVEDP = 10 mmHg. LVEF = 65%. Patent LAD stent.    ASSESSMENT:   # Stable angina/typ CP on background of known CAD.  MPI 6/2017 normal.  Pt again declines cath. HR/BP too low for additional meds.  # HTN, controlled  # HLP, on atorva 80mg  # Ao root dil, 3.7 cm (echo 3/2018)  # PAD/claudication, bilat.    # CVI (bilat GSV, R LSV) on US 3/2018.  Pt not interested in compression rx.  # JAYLEN, pt unable to obtain CPAP apparatus  # BMI 34, stable vs last OV  # Iodine allergy    PLAN:   Cont med rx  Pt again declined cath (Iodine allergy noted as well)  Diet/exercise/weight loss  Venous compression stockings agai suggested  RTC 2 weeks to allow pt to contemplate cath    Brad Bahena MD, FACC

## 2018-05-16 ENCOUNTER — HOSPITAL ENCOUNTER (EMERGENCY)
Facility: HOSPITAL | Age: 68
Discharge: HOME | End: 2018-05-16
Attending: INTERNAL MEDICINE
Payer: MEDICARE

## 2018-05-16 VITALS
RESPIRATION RATE: 16 BRPM | OXYGEN SATURATION: 97 % | SYSTOLIC BLOOD PRESSURE: 101 MMHG | DIASTOLIC BLOOD PRESSURE: 54 MMHG | HEART RATE: 58 BPM | TEMPERATURE: 99 F

## 2018-05-16 DIAGNOSIS — K21.9 GASTROESOPHAGEAL REFLUX DISEASE, ESOPHAGITIS PRESENCE NOT SPECIFIED: ICD-10-CM

## 2018-05-16 DIAGNOSIS — R07.9 CHEST PAIN: Primary | ICD-10-CM

## 2018-05-16 PROBLEM — R09.1 PLEURISY: Status: ACTIVE | Noted: 2018-05-16

## 2018-05-16 LAB
ALBUMIN SERPL-MCNC: 3.9 G/DL (ref 3.3–5.5)
ALP SERPL-CCNC: 64 U/L (ref 42–141)
BILIRUB SERPL-MCNC: 0.9 MG/DL (ref 0.2–1.6)
BUN SERPL-MCNC: 13 MG/DL (ref 7–22)
CALCIUM SERPL-MCNC: 9.5 MG/DL (ref 8–10.3)
CHLORIDE SERPL-SCNC: 102 MMOL/L (ref 98–108)
CREAT SERPL-MCNC: 1.1 MG/DL (ref 0.6–1.2)
GLUCOSE SERPL-MCNC: 161 MG/DL (ref 73–118)
POC ALT (SGPT): 29 U/L (ref 10–47)
POC AST (SGOT): 32 U/L (ref 11–38)
POC CARDIAC TROPONIN I: 0 NG/ML
POC TCO2: 29 MMOL/L (ref 18–33)
POTASSIUM BLD-SCNC: 4.1 MMOL/L (ref 3.6–5.1)
PROTEIN, POC: 7 G/DL (ref 6.4–8.1)
SAMPLE: NORMAL
SODIUM BLD-SCNC: 142 MMOL/L (ref 128–145)

## 2018-05-16 PROCEDURE — 80053 COMPREHEN METABOLIC PANEL: CPT

## 2018-05-16 PROCEDURE — 25000003 PHARM REV CODE 250: Performed by: INTERNAL MEDICINE

## 2018-05-16 PROCEDURE — 93005 ELECTROCARDIOGRAM TRACING: CPT

## 2018-05-16 PROCEDURE — 84484 ASSAY OF TROPONIN QUANT: CPT

## 2018-05-16 PROCEDURE — 93010 ELECTROCARDIOGRAM REPORT: CPT | Mod: ,,, | Performed by: INTERNAL MEDICINE

## 2018-05-16 PROCEDURE — 99284 EMERGENCY DEPT VISIT MOD MDM: CPT

## 2018-05-16 PROCEDURE — 85025 COMPLETE CBC W/AUTO DIFF WBC: CPT

## 2018-05-16 RX ORDER — IBUPROFEN 600 MG/1
600 TABLET ORAL 3 TIMES DAILY
Qty: 15 TABLET | Refills: 0 | Status: SHIPPED | OUTPATIENT
Start: 2018-05-16 | End: 2019-04-17

## 2018-05-16 RX ORDER — PANTOPRAZOLE SODIUM 40 MG/1
40 TABLET, DELAYED RELEASE ORAL
Status: COMPLETED | OUTPATIENT
Start: 2018-05-16 | End: 2018-05-16

## 2018-05-16 RX ORDER — KETOROLAC TROMETHAMINE 30 MG/ML
INJECTION, SOLUTION INTRAMUSCULAR; INTRAVENOUS
Status: DISCONTINUED
Start: 2018-05-16 | End: 2018-05-16

## 2018-05-16 RX ORDER — MORPHINE SULFATE 8 MG/ML
4 INJECTION INTRAMUSCULAR; INTRAVENOUS; SUBCUTANEOUS
Status: DISCONTINUED | OUTPATIENT
Start: 2018-05-16 | End: 2018-05-16

## 2018-05-16 RX ORDER — KETOROLAC TROMETHAMINE 30 MG/ML
15 INJECTION, SOLUTION INTRAMUSCULAR; INTRAVENOUS
Status: DISCONTINUED | OUTPATIENT
Start: 2018-05-16 | End: 2018-05-16

## 2018-05-16 RX ORDER — IBUPROFEN 400 MG/1
800 TABLET ORAL
Status: COMPLETED | OUTPATIENT
Start: 2018-05-16 | End: 2018-05-16

## 2018-05-16 RX ORDER — PANTOPRAZOLE SODIUM 40 MG/1
40 TABLET, DELAYED RELEASE ORAL DAILY
Qty: 30 TABLET | Refills: 11 | Status: SHIPPED | OUTPATIENT
Start: 2018-05-16 | End: 2019-02-21 | Stop reason: SDUPTHER

## 2018-05-16 RX ADMIN — LIDOCAINE HYDROCHLORIDE: 20 SOLUTION ORAL; TOPICAL at 05:05

## 2018-05-16 RX ADMIN — PANTOPRAZOLE SODIUM 40 MG: 40 TABLET, DELAYED RELEASE ORAL at 06:05

## 2018-05-16 RX ADMIN — IBUPROFEN 800 MG: 400 TABLET ORAL at 04:05

## 2018-05-16 NOTE — ED NOTES
Pt reports chest pain starting yesterday taking two nitro tabs this am pta, 1st nitro 1230 midnight with little relieve 2nd 0230am with no relieve

## 2018-05-16 NOTE — ED PROVIDER NOTES
Encounter Date: 5/16/2018       History     Chief Complaint   Patient presents with    Chest Pain     67-year-old male presents to the emergency department complaining of stabbing chest pain to the left side of his chest ×19 hours.  Patient states he has a history of 5 myocardial infarction, stent placement and peripheral vascular disease.  He states pain is different from previous heart attacks.  He denies shortness of breath, nausea/vomiting, fevers/chills.      The history is provided by the patient. No  was used.   Chest Pain   The current episode started yesterday. Duration of episode(s) is 19 hours. Chest pain occurs constantly. The chest pain is unchanged. At its most intense, the chest pain is at 6/10. The chest pain is currently at 6/10. The quality of the pain is described as sharp and pleuritic. The pain does not radiate. Chest pain is worsened by deep breathing. Pertinent negatives for primary symptoms include no fever, no syncope, no wheezing, no palpitations, no abdominal pain, no nausea, no vomiting, no dizziness and no altered mental status.   Pertinent negatives for associated symptoms include no lower extremity edema and no near-syncope. He tried nitroglycerin for the symptoms. Risk factors include male gender and obesity.   His past medical history is significant for CAD, MI and PVD.     Review of patient's allergies indicates:   Allergen Reactions    Iodine containing multivitamin Anaphylaxis    Naproxen Other (See Comments)     hallucinations    Hydrocodone-acetaminophen      Rash (skin)^    Oxycodone-acetaminophen      Rash (skin)^     Past Medical History:   Diagnosis Date    Allergy     Angina pectoris     Anxiety     Coronary artery disease     Depression     GERD (gastroesophageal reflux disease)     Hyperlipidemia     Hypertension     Hypothyroidism     Memory loss     12/3/2014 MRI brain: 1. No evidence for acute infarct 2. unremarkable MRI of the brain;  12/3/2014 carotid US normal    Myocardial infarction     Obesity     Peripheral vascular disease 6/20/2017    Retrocalcaneal bursitis 11/24/2014    Sleep apnea      Past Surgical History:   Procedure Laterality Date    APPENDECTOMY  1986    bone spur Right     COLONOSCOPY      hand trauma Right     pencil     heart stent       Family History   Problem Relation Age of Onset    Arthritis Mother     Early death Mother     Heart disease Mother     Hypertension Mother     Migraines Mother     Seizures Mother     Tremor Mother     Diabetes Mother     Cancer Mother     Early death Father     Heart disease Father     Hypertension Father     Stroke Father     Diabetes Father     Alcohol abuse Father     Arthritis Brother     Cancer Brother     Diabetes Brother     Early death Brother     Heart disease Brother     Hypertension Brother     Heart disease Sister     Hypertension Sister     Arthritis Sister     Depression Sister     Heart disease Brother     Arthritis Brother     Heart disease Brother     Pneumonia Brother     Heart disease Brother     Hypertension Brother     Diabetes Brother     Heart disease Brother      Social History   Substance Use Topics    Smoking status: Never Smoker    Smokeless tobacco: Never Used    Alcohol use No      Comment: rarely     Review of Systems   Constitutional: Negative for fever.   Respiratory: Negative for wheezing.    Cardiovascular: Positive for chest pain. Negative for palpitations, syncope and near-syncope.   Gastrointestinal: Negative for abdominal pain, nausea and vomiting.   Neurological: Negative for dizziness.   All other systems reviewed and are negative.      Physical Exam     Initial Vitals   BP Pulse Resp Temp SpO2   -- -- -- -- --      MAP       --         Physical Exam    Nursing note and vitals reviewed.  Constitutional: He appears well-developed and well-nourished. No distress.   HENT:   Head: Normocephalic and atraumatic.    Right Ear: External ear normal.   Left Ear: External ear normal.   Mouth/Throat: Oropharynx is clear and moist.   Eyes: Conjunctivae and EOM are normal. Pupils are equal, round, and reactive to light.   Neck: Normal range of motion. Neck supple.   Cardiovascular: Normal rate and regular rhythm.   Pulmonary/Chest: Breath sounds normal. No respiratory distress. He has no wheezes. He has no rhonchi. He has no rales. He exhibits no tenderness.   Abdominal: Soft. Bowel sounds are normal.   Musculoskeletal: Normal range of motion. He exhibits no edema or tenderness.   Neurological: He is alert and oriented to person, place, and time. He has normal strength.   Skin: Skin is warm and dry.   Psychiatric: He has a normal mood and affect.         ED Course   Procedures  Labs Reviewed   POCT CBC   POCT CMP   POCT TROPONIN     EKG Readings: (Independently Interpreted)   Initial Reading: No STEMI. Rhythm: Normal Sinus Rhythm. Heart Rate: 67. ST Segments: Normal ST Segments.          Medical Decision Making:   Initial Assessment:   67-year-old male presents to the emergency department complaining of stabbing chest pain to the left side of his chest ×19 hours.  Patient states he has a history of myocardial infarction, stent placement and peripheral vascular disease.  He denies shortness of breath, nausea/vomiting, fevers/chills.    ED Management:  Cardiac workup was performed.  CBC is within normal limits, CMP was grossly normal, EKG revealed no acute changes, troponin was 0 and chest x-ray revealed no acute cardiopulmonary disease.  Patient's pain was made worse by deep breaths and he states his pain began greater than 12 hours ago.  Patient was given a dose of Protonix, GI cocktail (he states pain is resolved after GI cocktail) and ibuprofen (he states he can tolerate ibuprofen although he has an ALLERGY to naproxen) in the emergency department, and advised to follow-up with his primary care physician tomorrow for  reevaluation.                      Clinical Impression:   The primary encounter diagnosis was Chest pain. A diagnosis of Gastroesophageal reflux disease, esophagitis presence not specified was also pertinent to this visit.    Disposition:   Disposition: Discharged  Condition: Stable                        Mak Santana MD  05/16/18 0445       Mak Santana MD  05/16/18 0555

## 2018-05-19 ENCOUNTER — HOSPITAL ENCOUNTER (OUTPATIENT)
Facility: HOSPITAL | Age: 68
Discharge: HOME | End: 2018-05-20
Attending: EMERGENCY MEDICINE | Admitting: HOSPITALIST
Payer: MEDICARE

## 2018-05-19 DIAGNOSIS — B02.23 ACUTE HERPES ZOSTER NEUROPATHY: Primary | ICD-10-CM

## 2018-05-19 DIAGNOSIS — E55.9 VITAMIN D DEFICIENCY: Chronic | ICD-10-CM

## 2018-05-19 DIAGNOSIS — E78.2 MIXED HYPERLIPIDEMIA: Chronic | ICD-10-CM

## 2018-05-19 DIAGNOSIS — I10 ESSENTIAL HYPERTENSION: Chronic | ICD-10-CM

## 2018-05-19 DIAGNOSIS — R07.9 ACUTE CHEST PAIN: ICD-10-CM

## 2018-05-19 DIAGNOSIS — K21.9 GASTROESOPHAGEAL REFLUX DISEASE, ESOPHAGITIS PRESENCE NOT SPECIFIED: Chronic | ICD-10-CM

## 2018-05-19 DIAGNOSIS — R07.9 CHEST PAIN: ICD-10-CM

## 2018-05-19 PROBLEM — G47.33 OSA (OBSTRUCTIVE SLEEP APNEA): Chronic | Status: ACTIVE | Noted: 2017-04-28

## 2018-05-19 PROBLEM — R09.1 PLEURISY: Status: RESOLVED | Noted: 2018-05-16 | Resolved: 2018-05-19

## 2018-05-19 LAB
ALBUMIN SERPL BCP-MCNC: 4.2 G/DL
ALP SERPL-CCNC: 57 U/L
ALT SERPL W/O P-5'-P-CCNC: 22 U/L
ANION GAP SERPL CALC-SCNC: 11 MMOL/L
AST SERPL-CCNC: 18 U/L
BASOPHILS # BLD AUTO: 0.09 K/UL
BASOPHILS NFR BLD: 1.3 %
BILIRUB SERPL-MCNC: 1.1 MG/DL
BILIRUB UR QL STRIP: NEGATIVE
BNP SERPL-MCNC: 60 PG/ML
BUN SERPL-MCNC: 10 MG/DL
CALCIUM SERPL-MCNC: 9.2 MG/DL
CHLORIDE SERPL-SCNC: 104 MMOL/L
CHOLEST SERPL-MCNC: 117 MG/DL
CHOLEST/HDLC SERPL: 4 {RATIO}
CLARITY UR: CLEAR
CO2 SERPL-SCNC: 25 MMOL/L
COLOR UR: YELLOW
CREAT SERPL-MCNC: 1.2 MG/DL
DIFFERENTIAL METHOD: ABNORMAL
EOSINOPHIL # BLD AUTO: 0.2 K/UL
EOSINOPHIL NFR BLD: 3.5 %
ERYTHROCYTE [DISTWIDTH] IN BLOOD BY AUTOMATED COUNT: 14 %
EST. GFR  (AFRICAN AMERICAN): >60 ML/MIN/1.73 M^2
EST. GFR  (NON AFRICAN AMERICAN): >60 ML/MIN/1.73 M^2
GLUCOSE SERPL-MCNC: 180 MG/DL
GLUCOSE UR QL STRIP: NEGATIVE
HCT VFR BLD AUTO: 43.4 %
HDLC SERPL-MCNC: 29 MG/DL
HDLC SERPL: 24.8 %
HGB BLD-MCNC: 15.8 G/DL
HGB UR QL STRIP: NEGATIVE
KETONES UR QL STRIP: NEGATIVE
LDLC SERPL CALC-MCNC: 14.4 MG/DL
LEUKOCYTE ESTERASE UR QL STRIP: NEGATIVE
LIPASE SERPL-CCNC: 46 U/L
LYMPHOCYTES # BLD AUTO: 2.4 K/UL
LYMPHOCYTES NFR BLD: 34.5 %
MCH RBC QN AUTO: 31.9 PG
MCHC RBC AUTO-ENTMCNC: 36.4 G/DL
MCV RBC AUTO: 88 FL
MONOCYTES # BLD AUTO: 0.8 K/UL
MONOCYTES NFR BLD: 11.1 %
NEUTROPHILS # BLD AUTO: 3.4 K/UL
NEUTROPHILS NFR BLD: 49.3 %
NITRITE UR QL STRIP: NEGATIVE
NONHDLC SERPL-MCNC: 88 MG/DL
PH UR STRIP: 5 [PH] (ref 5–8)
PLATELET # BLD AUTO: 178 K/UL
PMV BLD AUTO: 9.5 FL
POTASSIUM SERPL-SCNC: 4.4 MMOL/L
PROT SERPL-MCNC: 7.1 G/DL
PROT UR QL STRIP: NEGATIVE
RBC # BLD AUTO: 4.95 M/UL
SODIUM SERPL-SCNC: 140 MMOL/L
SP GR UR STRIP: 1.01 (ref 1–1.03)
TRIGL SERPL-MCNC: 368 MG/DL
TROPONIN I SERPL DL<=0.01 NG/ML-MCNC: 0.01 NG/ML
TROPONIN I SERPL DL<=0.01 NG/ML-MCNC: 0.01 NG/ML
URN SPEC COLLECT METH UR: NORMAL
UROBILINOGEN UR STRIP-ACNC: NEGATIVE EU/DL
WBC # BLD AUTO: 6.95 K/UL

## 2018-05-19 PROCEDURE — 25000003 PHARM REV CODE 250: Performed by: EMERGENCY MEDICINE

## 2018-05-19 PROCEDURE — 36415 COLL VENOUS BLD VENIPUNCTURE: CPT

## 2018-05-19 PROCEDURE — 99285 EMERGENCY DEPT VISIT HI MDM: CPT | Mod: 25

## 2018-05-19 PROCEDURE — 93010 ELECTROCARDIOGRAM REPORT: CPT | Mod: ,,, | Performed by: INTERNAL MEDICINE

## 2018-05-19 PROCEDURE — 83690 ASSAY OF LIPASE: CPT

## 2018-05-19 PROCEDURE — 93010 ELECTROCARDIOGRAM REPORT: CPT | Mod: 76,,, | Performed by: INTERNAL MEDICINE

## 2018-05-19 PROCEDURE — 63600175 PHARM REV CODE 636 W HCPCS: Performed by: EMERGENCY MEDICINE

## 2018-05-19 PROCEDURE — 63600175 PHARM REV CODE 636 W HCPCS: Performed by: INTERNAL MEDICINE

## 2018-05-19 PROCEDURE — 80053 COMPREHEN METABOLIC PANEL: CPT

## 2018-05-19 PROCEDURE — 96375 TX/PRO/DX INJ NEW DRUG ADDON: CPT

## 2018-05-19 PROCEDURE — 25000003 PHARM REV CODE 250: Performed by: INTERNAL MEDICINE

## 2018-05-19 PROCEDURE — 84484 ASSAY OF TROPONIN QUANT: CPT

## 2018-05-19 PROCEDURE — G0378 HOSPITAL OBSERVATION PER HR: HCPCS

## 2018-05-19 PROCEDURE — 80061 LIPID PANEL: CPT

## 2018-05-19 PROCEDURE — 81003 URINALYSIS AUTO W/O SCOPE: CPT

## 2018-05-19 PROCEDURE — 85025 COMPLETE CBC W/AUTO DIFF WBC: CPT

## 2018-05-19 PROCEDURE — 96374 THER/PROPH/DIAG INJ IV PUSH: CPT

## 2018-05-19 PROCEDURE — 83880 ASSAY OF NATRIURETIC PEPTIDE: CPT

## 2018-05-19 PROCEDURE — 84484 ASSAY OF TROPONIN QUANT: CPT | Mod: 91

## 2018-05-19 PROCEDURE — 93005 ELECTROCARDIOGRAM TRACING: CPT

## 2018-05-19 RX ORDER — METOPROLOL TARTRATE 25 MG/1
25 TABLET, FILM COATED ORAL 2 TIMES DAILY
Status: DISCONTINUED | OUTPATIENT
Start: 2018-05-19 | End: 2018-05-20 | Stop reason: HOSPADM

## 2018-05-19 RX ORDER — ONDANSETRON 8 MG/1
8 TABLET, ORALLY DISINTEGRATING ORAL EVERY 8 HOURS PRN
Status: DISCONTINUED | OUTPATIENT
Start: 2018-05-19 | End: 2018-05-20 | Stop reason: HOSPADM

## 2018-05-19 RX ORDER — LOSARTAN POTASSIUM 25 MG/1
50 TABLET ORAL DAILY
Status: DISCONTINUED | OUTPATIENT
Start: 2018-05-20 | End: 2018-05-20 | Stop reason: HOSPADM

## 2018-05-19 RX ORDER — NITROGLYCERIN 0.4 MG/1
0.4 TABLET SUBLINGUAL EVERY 5 MIN PRN
Status: DISCONTINUED | OUTPATIENT
Start: 2018-05-19 | End: 2018-05-20 | Stop reason: HOSPADM

## 2018-05-19 RX ORDER — CLONIDINE HYDROCHLORIDE 0.1 MG/1
0.1 TABLET ORAL 3 TIMES DAILY PRN
Status: DISCONTINUED | OUTPATIENT
Start: 2018-05-19 | End: 2018-05-20

## 2018-05-19 RX ORDER — ACETAMINOPHEN 500 MG
500 TABLET ORAL EVERY 6 HOURS PRN
Status: DISCONTINUED | OUTPATIENT
Start: 2018-05-19 | End: 2018-05-20 | Stop reason: HOSPADM

## 2018-05-19 RX ORDER — LEVOTHYROXINE SODIUM 50 UG/1
50 TABLET ORAL
Status: DISCONTINUED | OUTPATIENT
Start: 2018-05-20 | End: 2018-05-20 | Stop reason: HOSPADM

## 2018-05-19 RX ORDER — PANTOPRAZOLE SODIUM 40 MG/1
40 TABLET, DELAYED RELEASE ORAL DAILY
Status: DISCONTINUED | OUTPATIENT
Start: 2018-05-20 | End: 2018-05-20 | Stop reason: HOSPADM

## 2018-05-19 RX ORDER — ATORVASTATIN CALCIUM 40 MG/1
80 TABLET, FILM COATED ORAL NIGHTLY
Status: DISCONTINUED | OUTPATIENT
Start: 2018-05-19 | End: 2018-05-20 | Stop reason: HOSPADM

## 2018-05-19 RX ORDER — ACETAMINOPHEN 325 MG/1
650 TABLET ORAL EVERY 8 HOURS PRN
Status: DISCONTINUED | OUTPATIENT
Start: 2018-05-19 | End: 2018-05-19

## 2018-05-19 RX ORDER — TAMSULOSIN HYDROCHLORIDE 0.4 MG/1
2 CAPSULE ORAL DAILY
Status: DISCONTINUED | OUTPATIENT
Start: 2018-05-20 | End: 2018-05-20 | Stop reason: HOSPADM

## 2018-05-19 RX ORDER — MORPHINE SULFATE 10 MG/ML
2 INJECTION INTRAMUSCULAR; INTRAVENOUS; SUBCUTANEOUS EVERY 4 HOURS PRN
Status: DISCONTINUED | OUTPATIENT
Start: 2018-05-19 | End: 2018-05-20 | Stop reason: HOSPADM

## 2018-05-19 RX ORDER — MORPHINE SULFATE 10 MG/ML
4 INJECTION INTRAMUSCULAR; INTRAVENOUS; SUBCUTANEOUS
Status: COMPLETED | OUTPATIENT
Start: 2018-05-19 | End: 2018-05-19

## 2018-05-19 RX ORDER — FUROSEMIDE 20 MG/1
20 TABLET ORAL DAILY
Status: DISCONTINUED | OUTPATIENT
Start: 2018-05-20 | End: 2018-05-20 | Stop reason: HOSPADM

## 2018-05-19 RX ORDER — ASPIRIN 81 MG/1
81 TABLET ORAL DAILY
Status: DISCONTINUED | OUTPATIENT
Start: 2018-05-20 | End: 2018-05-20 | Stop reason: HOSPADM

## 2018-05-19 RX ORDER — AMOXICILLIN 250 MG
1 CAPSULE ORAL 2 TIMES DAILY PRN
Status: DISCONTINUED | OUTPATIENT
Start: 2018-05-19 | End: 2018-05-20 | Stop reason: HOSPADM

## 2018-05-19 RX ORDER — AMITRIPTYLINE HYDROCHLORIDE 25 MG/1
25 TABLET, FILM COATED ORAL NIGHTLY PRN
Status: DISCONTINUED | OUTPATIENT
Start: 2018-05-19 | End: 2018-05-20 | Stop reason: HOSPADM

## 2018-05-19 RX ORDER — RAMELTEON 8 MG/1
8 TABLET ORAL NIGHTLY PRN
Status: DISCONTINUED | OUTPATIENT
Start: 2018-05-19 | End: 2018-05-20 | Stop reason: HOSPADM

## 2018-05-19 RX ORDER — ENOXAPARIN SODIUM 100 MG/ML
40 INJECTION SUBCUTANEOUS EVERY 24 HOURS
Status: DISCONTINUED | OUTPATIENT
Start: 2018-05-19 | End: 2018-05-20 | Stop reason: HOSPADM

## 2018-05-19 RX ORDER — ISOSORBIDE MONONITRATE 30 MG/1
60 TABLET, EXTENDED RELEASE ORAL DAILY
Status: DISCONTINUED | OUTPATIENT
Start: 2018-05-20 | End: 2018-05-20 | Stop reason: HOSPADM

## 2018-05-19 RX ORDER — MORPHINE SULFATE 10 MG/ML
4 INJECTION INTRAMUSCULAR; INTRAVENOUS; SUBCUTANEOUS EVERY 4 HOURS PRN
Status: DISCONTINUED | OUTPATIENT
Start: 2018-05-19 | End: 2018-05-19

## 2018-05-19 RX ORDER — CHOLECALCIFEROL (VITAMIN D3) 25 MCG
1000 TABLET ORAL DAILY
Status: DISCONTINUED | OUTPATIENT
Start: 2018-05-20 | End: 2018-05-19

## 2018-05-19 RX ADMIN — METOPROLOL TARTRATE 25 MG: 25 TABLET ORAL at 08:05

## 2018-05-19 RX ADMIN — ATORVASTATIN CALCIUM 80 MG: 40 TABLET, FILM COATED ORAL at 08:05

## 2018-05-19 RX ADMIN — ENOXAPARIN SODIUM 40 MG: 100 INJECTION SUBCUTANEOUS at 08:05

## 2018-05-19 RX ADMIN — AMITRIPTYLINE HYDROCHLORIDE 25 MG: 25 TABLET, FILM COATED ORAL at 11:05

## 2018-05-19 RX ADMIN — ACETAMINOPHEN 500 MG: 500 TABLET, FILM COATED ORAL at 08:05

## 2018-05-19 RX ADMIN — MORPHINE SULFATE 2 MG: 10 INJECTION INTRAVENOUS at 09:05

## 2018-05-19 RX ADMIN — NITROGLYCERIN 1 INCH: 20 OINTMENT TOPICAL at 05:05

## 2018-05-19 RX ADMIN — MORPHINE SULFATE 4 MG: 10 INJECTION INTRAVENOUS at 02:05

## 2018-05-19 RX ADMIN — LIDOCAINE HYDROCHLORIDE: 20 SOLUTION ORAL; TOPICAL at 08:05

## 2018-05-19 NOTE — ED NOTES
PT TO ROOM 333 VIA STRETCHER WITH ED TECH ON CARDIAC MONITOR. AWAKE, ALERT, ORIENTED X4. SKIN PINK WARM AND DRY. RESP EVEN AND UNLABORED. SINUS ALONSO ON MONITOR.

## 2018-05-19 NOTE — NURSING
Received patient from ER to room via stretcher. Patient accompanied by transport and family. Transferred patient to bed. Evaluated general patient appearance and condition. Admit assessment initiated. Tele monitoring initiated. Saline lock at [facility] Intact. No apparent distress noted at this time.

## 2018-05-19 NOTE — ED PROVIDER NOTES
Encounter Date: 5/19/2018  SORT: This is a 68 y/o male with h/o of angina, MI, CAD, PVD, depression, GERD, HLD, HTN, and hypothyroidism that comes to the ER c/o chest pain, epigastric pain, and SOB.  Symptoms have been ongoing since early May; evaluated at [facility], but Sx are progressively worsening.   SCRIBE #1 NOTE: I, Nita Mi, am scribing for, and in the presence of,  Bernard Saunders MD. I have scribed the following portions of the note - Other sections scribed: HPI, ROS, and PEx.       History     Chief Complaint   Patient presents with    Chest Pain     mid/epigastric/left side chest pain since middle of May with sob; took sublingual nitro 40 min pta with no relief; seen at [facility] on 15th for same complaint     CC: Chest Pain    HPI: This 67 y.o. Male with PMHx of allergy; Angina pectoris; Anxiety; Coronary artery disease; Depression; GERD ; Hyperlipidemia; Hypertension; Hypothyroidism; Memory loss; 5 x Myocardial infarction; Obesity; Peripheral vascular disease; Retrocalcaneal bursitis; and Sleep apnea presents to the ED c/o 2 day hx of progressively worsening midsternal and left chest pain that radiates to the left arm with associated shortness of breath. Pt had stabbing back pain before it moved to the chest. Pt went to [facility] ED 2 days ago for evaluation, and he drank the GI cocktail with improvement, then he was discharged home. Pain then came back after he got home. Pt is followed by his cardiologist, . Pt denies fever, leg swelling, N/V, and any other associated symptoms.         The history is provided by the patient. No  was used.     Review of patient's allergies indicates:   Allergen Reactions    Iodine containing multivitamin Anaphylaxis    Naproxen Other (See Comments)     hallucinations    Hydrocodone-acetaminophen      Rash (skin)^    Oxycodone-acetaminophen      Rash (skin)^     Past Medical History:   Diagnosis Date     Allergy     Angina pectoris     Anxiety     Coronary artery disease     Depression     GERD (gastroesophageal reflux disease)     Hyperlipidemia     Hypertension     Hypothyroidism     Memory loss     12/3/2014 MRI brain: 1. No evidence for acute infarct 2. unremarkable MRI of the brain; 12/3/2014 carotid US normal    Myocardial infarction     Obesity     Peripheral vascular disease 6/20/2017    Retrocalcaneal bursitis 11/24/2014    Sleep apnea      Past Surgical History:   Procedure Laterality Date    APPENDECTOMY  1986    bone spur Right     COLONOSCOPY      hand trauma Right     pencil     heart stent       Family History   Problem Relation Age of Onset    Arthritis Mother     Early death Mother     Heart disease Mother     Hypertension Mother     Migraines Mother     Seizures Mother     Tremor Mother     Diabetes Mother     Cancer Mother     Early death Father     Heart disease Father     Hypertension Father     Stroke Father     Diabetes Father     Alcohol abuse Father     Arthritis Brother     Cancer Brother     Diabetes Brother     Early death Brother     Heart disease Brother     Hypertension Brother     Heart disease Sister     Hypertension Sister     Arthritis Sister     Depression Sister     Heart disease Brother     Arthritis Brother     Heart disease Brother     Pneumonia Brother     Heart disease Brother     Hypertension Brother     Diabetes Brother     Heart disease Brother      Social History   Substance Use Topics    Smoking status: Never Smoker    Smokeless tobacco: Never Used    Alcohol use No      Comment: rarely     Review of Systems   Constitutional: Negative for chills, diaphoresis and fever.   HENT: Negative for ear pain.    Eyes: Negative for pain.   Respiratory: Positive for shortness of breath.    Cardiovascular: Positive for chest pain ( progressively worsening midsternal and left chest pain that radiates to the left arm ). Negative  for leg swelling.   Gastrointestinal: Negative for abdominal pain, diarrhea, nausea and vomiting.   Genitourinary: Negative for dysuria.   Musculoskeletal: Positive for back pain (stabbing, 2 days ago).   Skin: Negative for rash.   Neurological: Negative for headaches.       Physical Exam     Initial Vitals [05/19/18 1351]   BP Pulse Resp Temp SpO2   (!) 143/78 65 20 98.1 °F (36.7 °C) 98 %      MAP       99.67         Physical Exam    Nursing note and vitals reviewed.  Constitutional: He is not diaphoretic. No distress.   HENT:   Mouth/Throat: Oropharynx is clear and moist.   Eyes: EOM are normal. Pupils are equal, round, and reactive to light.   Cardiovascular: Normal rate, regular rhythm, normal heart sounds and intact distal pulses.   See documented HR and BP   Pulmonary/Chest: Breath sounds normal. No respiratory distress. He has no wheezes. He has no rhonchi. He has no rales.   Abdominal: Soft. There is no tenderness. There is no rebound and no guarding.   Musculoskeletal: He exhibits no edema.   (+) Tenderness to left rhomboid region.     Neurological: He is alert.   No obvious focal deficits   Skin: Skin is warm.         ED Course   Procedures  Labs Reviewed   CBC W/ AUTO DIFFERENTIAL - Abnormal; Notable for the following:        Result Value    MCH 31.9 (*)     MCHC 36.4 (*)     All other components within normal limits   COMPREHENSIVE METABOLIC PANEL - Abnormal; Notable for the following:     Glucose 180 (*)     Total Bilirubin 1.1 (*)     All other components within normal limits   TROPONIN I   B-TYPE NATRIURETIC PEPTIDE   URINALYSIS   LIPASE     EKG Readings: (Independently Interpreted)   Initial Reading: No STEMI. Rhythm: Normal Sinus Rhythm. Heart Rate: 63. Ectopy: No Ectopy. Conduction: LAFP. ST Segments: Normal ST Segments. T Waves: Normal. Axis: Left Axis Deviation. Clinical Impression: Normal Sinus Rhythm          Medical Decision Making:   ED Management:  Recent visit to [facility] and repeat pains.  Was told he would get an angiogram in near future. Will obs with recurrent pains for presumed angina, though hx is atypical. Will consult cardiology.             Scribe Attestation:   Scribe #1: I performed the above scribed service and the documentation accurately describes the services I performed. I attest to the accuracy of the note.    Attending Attestation:           Physician Attestation for Scribe:  Physician Attestation Statement for Scribe #1: I, Bernard Saunders MD, reviewed documentation, as scribed by Nita Mi in my presence, and it is both accurate and complete.                    Clinical Impression:   The encounter diagnosis was Chest pain.                           Bernard Saunders MD  05/19/18 0349

## 2018-05-19 NOTE — ED NOTES
"PT REPORTS "SHARP SHOOTING" INTERMITTENT  MID-STERNAL CHEST PAIN THAT RADIATES TO LEFT CHEST X1 WK. REPORTS INCREASED PAIN TODAY. STATES BEING SEEN 2 NIGHTS AGO FOR SAME S/S AND WAS GIVEN GI COCKTAIL WITH SOME RELIEF.  DENIES NAUSEA, SHORTNESS OF BREATH. REPORTS HX OF MI, ONE STENT.   "

## 2018-05-20 VITALS
HEIGHT: 70 IN | WEIGHT: 241.19 LBS | OXYGEN SATURATION: 97 % | RESPIRATION RATE: 18 BRPM | SYSTOLIC BLOOD PRESSURE: 150 MMHG | BODY MASS INDEX: 34.53 KG/M2 | TEMPERATURE: 98 F | DIASTOLIC BLOOD PRESSURE: 85 MMHG | HEART RATE: 57 BPM

## 2018-05-20 PROBLEM — R07.9 CHEST PAIN: Status: RESOLVED | Noted: 2018-05-16 | Resolved: 2018-05-20

## 2018-05-20 PROBLEM — B02.23 ACUTE HERPES ZOSTER NEUROPATHY: Status: ACTIVE | Noted: 2018-05-20

## 2018-05-20 LAB
ALBUMIN SERPL BCP-MCNC: 4 G/DL
ALP SERPL-CCNC: 55 U/L
ALT SERPL W/O P-5'-P-CCNC: 25 U/L
ANION GAP SERPL CALC-SCNC: 9 MMOL/L
AST SERPL-CCNC: 19 U/L
BASOPHILS # BLD AUTO: 0.05 K/UL
BASOPHILS NFR BLD: 0.6 %
BILIRUB SERPL-MCNC: 0.9 MG/DL
BUN SERPL-MCNC: 12 MG/DL
CALCIUM SERPL-MCNC: 9.4 MG/DL
CHLORIDE SERPL-SCNC: 106 MMOL/L
CO2 SERPL-SCNC: 28 MMOL/L
CREAT SERPL-MCNC: 1.3 MG/DL
DIFFERENTIAL METHOD: ABNORMAL
EOSINOPHIL # BLD AUTO: 0.2 K/UL
EOSINOPHIL NFR BLD: 2.5 %
ERYTHROCYTE [DISTWIDTH] IN BLOOD BY AUTOMATED COUNT: 14 %
EST. GFR  (AFRICAN AMERICAN): >60 ML/MIN/1.73 M^2
EST. GFR  (NON AFRICAN AMERICAN): 57 ML/MIN/1.73 M^2
GLUCOSE SERPL-MCNC: 130 MG/DL
HCT VFR BLD AUTO: 43.2 %
HGB BLD-MCNC: 15.5 G/DL
LIPASE SERPL-CCNC: 44 U/L
LYMPHOCYTES # BLD AUTO: 2.8 K/UL
LYMPHOCYTES NFR BLD: 33.4 %
MAGNESIUM SERPL-MCNC: 2.4 MG/DL
MCH RBC QN AUTO: 32.1 PG
MCHC RBC AUTO-ENTMCNC: 35.9 G/DL
MCV RBC AUTO: 89 FL
MONOCYTES # BLD AUTO: 0.9 K/UL
MONOCYTES NFR BLD: 10.5 %
NEUTROPHILS # BLD AUTO: 4.5 K/UL
NEUTROPHILS NFR BLD: 52.8 %
PHOSPHATE SERPL-MCNC: 4.1 MG/DL
PLATELET # BLD AUTO: 164 K/UL
PMV BLD AUTO: 9.7 FL
POTASSIUM SERPL-SCNC: 4.7 MMOL/L
PROT SERPL-MCNC: 6.8 G/DL
RBC # BLD AUTO: 4.83 M/UL
SODIUM SERPL-SCNC: 143 MMOL/L
TROPONIN I SERPL DL<=0.01 NG/ML-MCNC: <0.006 NG/ML
WBC # BLD AUTO: 8.47 K/UL

## 2018-05-20 PROCEDURE — 80053 COMPREHEN METABOLIC PANEL: CPT

## 2018-05-20 PROCEDURE — 83735 ASSAY OF MAGNESIUM: CPT

## 2018-05-20 PROCEDURE — G0378 HOSPITAL OBSERVATION PER HR: HCPCS

## 2018-05-20 PROCEDURE — 84100 ASSAY OF PHOSPHORUS: CPT

## 2018-05-20 PROCEDURE — 63600175 PHARM REV CODE 636 W HCPCS: Performed by: INTERNAL MEDICINE

## 2018-05-20 PROCEDURE — 93005 ELECTROCARDIOGRAM TRACING: CPT

## 2018-05-20 PROCEDURE — 36415 COLL VENOUS BLD VENIPUNCTURE: CPT

## 2018-05-20 PROCEDURE — 25000003 PHARM REV CODE 250: Performed by: NURSE PRACTITIONER

## 2018-05-20 PROCEDURE — 99220 PR INITIAL OBSERVATION CARE,LEVL III: CPT | Mod: ,,, | Performed by: INTERNAL MEDICINE

## 2018-05-20 PROCEDURE — 84484 ASSAY OF TROPONIN QUANT: CPT

## 2018-05-20 PROCEDURE — 93010 ELECTROCARDIOGRAM REPORT: CPT | Mod: ,,, | Performed by: INTERNAL MEDICINE

## 2018-05-20 PROCEDURE — 25000003 PHARM REV CODE 250: Performed by: EMERGENCY MEDICINE

## 2018-05-20 PROCEDURE — 96376 TX/PRO/DX INJ SAME DRUG ADON: CPT

## 2018-05-20 PROCEDURE — 83690 ASSAY OF LIPASE: CPT

## 2018-05-20 PROCEDURE — 86787 VARICELLA-ZOSTER ANTIBODY: CPT | Mod: 91

## 2018-05-20 PROCEDURE — 86787 VARICELLA-ZOSTER ANTIBODY: CPT

## 2018-05-20 PROCEDURE — 85025 COMPLETE CBC W/AUTO DIFF WBC: CPT

## 2018-05-20 RX ORDER — GABAPENTIN 100 MG/1
100 CAPSULE ORAL 2 TIMES DAILY
Status: DISCONTINUED | OUTPATIENT
Start: 2018-05-20 | End: 2018-05-20

## 2018-05-20 RX ORDER — GABAPENTIN 100 MG/1
100 CAPSULE ORAL 3 TIMES DAILY
Status: DISCONTINUED | OUTPATIENT
Start: 2018-05-20 | End: 2018-05-20 | Stop reason: HOSPADM

## 2018-05-20 RX ORDER — VALACYCLOVIR HYDROCHLORIDE 1 G/1
1000 TABLET, FILM COATED ORAL 3 TIMES DAILY
Qty: 30 TABLET | Refills: 0 | Status: SHIPPED | OUTPATIENT
Start: 2018-05-20 | End: 2018-11-19

## 2018-05-20 RX ORDER — GABAPENTIN 100 MG/1
100 CAPSULE ORAL 2 TIMES DAILY
Qty: 17 CAPSULE | Refills: 0 | Status: SHIPPED | OUTPATIENT
Start: 2018-05-20 | End: 2018-10-12

## 2018-05-20 RX ORDER — VALACYCLOVIR HYDROCHLORIDE 500 MG/1
1000 TABLET, FILM COATED ORAL 3 TIMES DAILY
Status: DISCONTINUED | OUTPATIENT
Start: 2018-05-20 | End: 2018-05-20 | Stop reason: HOSPADM

## 2018-05-20 RX ORDER — VALACYCLOVIR HYDROCHLORIDE 1 G/1
1000 TABLET, FILM COATED ORAL 3 TIMES DAILY
Qty: 90 TABLET | Refills: 6 | Status: SHIPPED | OUTPATIENT
Start: 2018-05-20 | End: 2018-05-20

## 2018-05-20 RX ADMIN — LOSARTAN POTASSIUM 50 MG: 25 TABLET, FILM COATED ORAL at 08:05

## 2018-05-20 RX ADMIN — TAMSULOSIN HYDROCHLORIDE 0.8 MG: 0.4 CAPSULE ORAL at 08:05

## 2018-05-20 RX ADMIN — LEVOTHYROXINE SODIUM 50 MCG: 50 TABLET ORAL at 06:05

## 2018-05-20 RX ADMIN — ASPIRIN 81 MG: 81 TABLET, COATED ORAL at 08:05

## 2018-05-20 RX ADMIN — VALACYCLOVIR HYDROCHLORIDE 1000 MG: 500 TABLET, FILM COATED ORAL at 12:05

## 2018-05-20 RX ADMIN — GABAPENTIN 100 MG: 100 CAPSULE ORAL at 12:05

## 2018-05-20 RX ADMIN — ISOSORBIDE MONONITRATE 60 MG: 30 TABLET, EXTENDED RELEASE ORAL at 08:05

## 2018-05-20 RX ADMIN — MORPHINE SULFATE 2 MG: 10 INJECTION INTRAVENOUS at 01:05

## 2018-05-20 RX ADMIN — MORPHINE SULFATE 2 MG: 10 INJECTION INTRAVENOUS at 08:05

## 2018-05-20 RX ADMIN — PANTOPRAZOLE SODIUM 40 MG: 40 TABLET, DELAYED RELEASE ORAL at 08:05

## 2018-05-20 RX ADMIN — FUROSEMIDE 20 MG: 20 TABLET ORAL at 08:05

## 2018-05-20 NOTE — NURSING
"Patient with complaints of left side chest pain at breast bone and epigastric pain rating it a 8/10. Patient with nitropaste patch to right chest wall. Patient offered morphine and oxygen to help relieve pain in which he refused the morphine stating " That morphine makes me feel dizzy and crazy. I can't take any pain medicines it makes me feel funny".  EKG obtained with no change noted and troponin drawn which is was still negative. Dr. Weber notified and new order placed. GI cocktail to be administered and will monitor for effectiveness. Patient is stable at this time with family at the bedside.   "

## 2018-05-20 NOTE — PLAN OF CARE
05/20/18 0829   Discharge Assessment   Assessment Type Discharge Planning Assessment   Confirmed/corrected address and phone number on facesheet? Yes   Assessment information obtained from? Medical Record     Patient is off unit for testing; SW will follow-up for assessment

## 2018-05-20 NOTE — NURSING
Report received from CORNELIA Brannon. Patient care assumed. Patient observed lying in bed with telemetry monitoring in place. Vital signs stable and found in flow sheets with complete patient assessment. No complaints of pain and no signs of respiratory distress noted. Patient stable and will continue to be monitored.

## 2018-05-20 NOTE — DISCHARGE SUMMARY
[facility] Hospital Medicine  Discharge Summary      Patient Name: Clinton Rosenberg  MRN: [id number]  Admission Date: 5/19/2018  Hospital Length of Stay: 0 days  Discharge Date and Time:  05/20/2018 2:47 PM  Attending Physician: Eva Chairez MD   Discharging Provider: CHAGO Manuel  Primary Care Provider: Michael Gonzalez MD      HPI:   Mr. Clinton Rosenberg is a 67 y.o. male with essential hypertension, hyperlipidemia (LDL 23.0 Oct 2017), CAD s/p PCI, hypothyroidism (TSH 2.759 Mar 2017), GERD, JAYLEN, and BPH who presents to [facility] ED with complaints of chest pain for four days.  The pain was present when he awoken from sleep, is mid-sternal to left-sided with radiation to his left-epigastrium, is sharp in quality, and is 9/10 in severity at its worst.  The pain is worse on palpation, deep inspiration, exertion, and on lying flat, and improved with sitting up.  He went to the ED two days ago for the same pain and said that a GI cocktail improved the pain at that time, but says the pain wasn't as bad as it is now.  He presented today to the ED again when his diaphoresis and shortness of breath did not improve.  The sublingual nitroglycerin he took at home and the nitropaste he received in the ED did not help with the pain.  He denies any fevers, chills, nausea, vomiting, palpitations, hemoptysis, nor any lower extremity pain or swelling.  He denies any orthopnea or PND.  He follows with his cardiologist, Dr. Brad Bahena, and is scheduled for an angiogram on Thursday.      * No surgery found *      Hospital Course:   Ruling out ACS with serial negative troponin 1 levels, normal BNP. Patient continued to have pain that was debilitating, limiting movement, refractory to NTG and morphine. Noted shingles rash along dermatone 3 anteriorly and one raised patch only the upward edge of dermatone 3 posteriorly suspicious for shingles. Started on valtrex and gabapentin for the pain. He was able  to take a nap and eat shortly after the medication introduced. Cardiology cleared the patient was discharge from a cardiac standpoint. He will be discharged to home, has been counseled on handling rash. Stable, follow up in Primary care clinic.     Consults:   Consults         Status Ordering Provider     Inpatient consult to Cardiology  Once     Provider:  Brad Bahena MD    Completed SANTIAGO COOPER          No new Assessment & Plan notes have been filed under this hospital service since the last note was generated.  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] 05/20/2018 Yes    Chest pain [R07.9] 05/16/2018 Yes    JAYLEN (obstructive sleep apnea) [G47.33] 04/28/2017 Yes     Chronic    Hypothyroidism [E03.9] 06/01/2016 Yes     Chronic    Coronary artery disease of native artery of native heart with stable angina pectoris [I25.118] 11/19/2014 Yes     Chronic    Essential hypertension [I10] 07/09/2014 Yes     Chronic    Mixed hyperlipidemia [E78.2] 07/09/2014 Yes     Chronic    Gastroesophageal reflux disease [K21.9] 07/09/2014 Yes     Chronic    Benign non-nodular prostatic hyperplasia with lower urinary tract symptoms [N40.1] 07/09/2014 Yes     Chronic      Problems Resolved During this Admission:    Diagnosis Date Noted Date Resolved POA       Discharged Condition: stable    Disposition: Home or Self Care    Follow Up:  Follow-up Information     Michael Gonzalez MD.    Specialty:  Internal Medicine  Contact information:  [address] [facility]  [place] [id number] [phone]                 Patient Instructions:     Diet Cardiac     Activity as tolerated         Significant Diagnostic Studies: Labs:   CMP   Recent Labs  Lab 05/19/18  1400 05/20/18  0151    143   K 4.4 4.7    106   CO2 25 28   * 130*   BUN 10 12   CREATININE 1.2 1.3   CALCIUM 9.2 9.4   PROT 7.1 6.8   ALBUMIN 4.2 4.0    BILITOT 1.1* 0.9   ALKPHOS 57 55   AST 18 19   ALT 22 25   ANIONGAP 11 9   ESTGFRAFRICA >60 >60   EGFRNONAA >60 57*   , CBC   Recent Labs  Lab 05/19/18  1400 05/20/18  0151   WBC 6.95 8.47   HGB 15.8 15.5   HCT 43.4 43.2    164   , INR No results found for: INR, PROTIME, Lipid Panel   Lab Results   Component Value Date    CHOL 117 (L) 05/19/2018    HDL 29 (L) 05/19/2018    LDLCALC 14.4 (L) 05/19/2018    TRIG 368 (H) 05/19/2018    CHOLHDL 24.8 05/19/2018   , Troponin   Recent Labs  Lab 05/20/18  0151   TROPONINI <0.006    and A1C: No results for input(s): HGBA1C in the last 4320 hours.    Pending Diagnostic Studies:     Procedure Component Value Units Date/Time    EKG 12-LEAD [[id number]]     Order Status:  Sent Lab Status:  No result     EKG 12-LEAD [[id number]]     Order Status:  Sent Lab Status:  No result     EKG 12-LEAD [[id number]]     Order Status:  Sent Lab Status:  No result     Varicella zoster antibody, IgG [[id number]] Collected:  05/20/18 1206    Order Status:  Sent Lab Status:  In process Updated:  05/20/18 1207    Specimen:  Blood from Blood     Varicella zoster antibody, IgM [[id number]] Collected:  05/20/18 1206    Order Status:  Sent Lab Status:  In process Updated:  05/20/18 1207    Specimen:  Blood from Blood          Medications:  Reconciled Home Medications:      Medication List      START taking these medications    gabapentin 100 MG capsule  Commonly known as:  NEURONTIN  Take 1 capsule (100 mg total) by mouth 2 (two) times daily.     valACYclovir 1000 MG tablet  Commonly known as:  VALTREX  Take 1 tablet (1,000 mg total) by mouth 3 (three) times daily.        CHANGE how you take these medications    metoprolol tartrate 25 MG tablet  Commonly known as:  LOPRESSOR  Take 1 tablet (25 mg total) by mouth 2 (two) times daily.  What changed:  · when to take this  · additional instructions        CONTINUE taking these medications    amitriptyline 25 MG tablet  Commonly known as:  ELAVIL  Take 1  tablet (25 mg total) by mouth nightly as needed for Insomnia.     aspirin 81 MG EC tablet  Commonly known as:  ECOTRIN  Take 1 tablet (81 mg total) by mouth once daily.     atorvastatin 80 MG tablet  Commonly known as:  LIPITOR  TAKE 1 TABLET EVERY EVENING     furosemide 20 MG tablet  Commonly known as:  LASIX  Take 1 tablet (20 mg total) by mouth once daily.     ibuprofen 600 MG tablet  Commonly known as:  ADVIL,MOTRIN  Take 1 tablet (600 mg total) by mouth 3 (three) times daily.     isosorbide mononitrate 60 MG 24 hr tablet  Commonly known as:  IMDUR  Take 1 tablet (60 mg total) by mouth once daily.     KRILL OIL (OMEGA 3 AND 6) ORAL  Take by mouth.     levothyroxine 50 MCG tablet  Commonly known as:  SYNTHROID  Take 1 tablet (50 mcg total) by mouth before breakfast.     losartan 50 MG tablet  Commonly known as:  COZAAR  Take 1 tablet (50 mg total) by mouth once daily.     nitroGLYCERIN 0.4 MG SL tablet  Commonly known as:  NITROSTAT  Place 1 tablet (0.4 mg total) under the tongue every 5 (five) minutes as needed for Chest pain.     pantoprazole 40 MG tablet  Commonly known as:  PROTONIX  Take 1 tablet (40 mg total) by mouth once daily.     tamsulosin 0.4 mg Cp24  Commonly known as:  FLOMAX  Take 2 capsules (0.8 mg total) by mouth once daily.     vitamin D 1000 units Tab  Take 1 tablet (1,000 Units total) by mouth once daily.            Indwelling Lines/Drains at time of discharge:   Lines/Drains/Airways          No matching active lines, drains, or airways          Time spent on the discharge of patient: 40 minutes  Patient was seen and examined on the date of discharge and determined to be suitable for discharge.       RONI Munguia, FNP-C  Hospitalist - Department of Hospital Medicine  [address], [place] [address]  Office [phone]; Pager [phone]

## 2018-05-20 NOTE — PLAN OF CARE
Problem: Cardiac: ACS (Acute Coronary Syndrome) (Adult)  Intervention: Manage Acute Chest Pain   05/19/18 1725   Chest Pain Assessment   Chest Pain Location midsternal   Character stabbing   Intervention cardiac monitor placed;activity minimized;other (see comments)  (nitro patch applied)     Intervention: Monitor ECG for Changes/Signs of Ischemia   05/19/18 1657 05/19/18 1717   Vitals   Pulse --  (!) 53   ECG   Lead Monitored Lead II --    Rhythm sinus bradycardia --      Intervention: Optimize Myocardial Oxygenation/Perfusion   05/19/18 1800   Activity   Activity Type activity adjusted per tolerance     Intervention: Prevent/Manage Thrombi/Emboli   05/19/18 1725   OTHER   VTE Required Core Measure Pharmacological prophylaxis initiated/maintained     Intervention: Support Psychosocial Response to Life-changing Event/Hospitalization   05/19/18 1811   Coping/Psychosocial Interventions   Supportive Measures active listening utilized       Goal: Signs and Symptoms of Listed Potential Problems Will be Absent, Minimized or Managed (Cardiac: ACS)  Signs and symptoms of listed potential problems will be absent, minimized or managed by discharge/transition of care (reference Cardiac: ACS (Acute Coronary Syndrome) (Adult) CPG).  Outcome: Ongoing (interventions implemented as appropriate)   05/19/18 1911   Cardiac: ACS (Acute Coronary Syndrome)   Problems Assessed (Acute Coronary Syndrome) all   Problems Present (Acute Coronary Syndrome) chest pain (angina)

## 2018-05-20 NOTE — HPI
Mr. Clinton Rosenberg is a 67 y.o. male with essential hypertension, hyperlipidemia (LDL 23.0 Oct 2017), CAD s/p PCI, hypothyroidism (TSH 2.759 Mar 2017), GERD, JAYLEN, and BPH who presents to [facility] ED with complaints of chest pain for four days.  The pain was present when he awoken from sleep, is mid-sternal to left-sided with radiation to his left-epigastrium, is sharp in quality, and is 9/10 in severity at its worst.  The pain is worse on palpation, deep inspiration, exertion, and on lying flat, and improved with sitting up.  He went to the ED two days ago for the same pain and said that a GI cocktail improved the pain at that time, but says the pain wasn't as bad as it is now.  He presented today to the ED again when his diaphoresis and shortness of breath did not improve.  The sublingual nitroglycerin he took at home and the nitropaste he received in the ED did not help with the pain.  He denies any fevers, chills, nausea, vomiting, palpitations, hemoptysis, nor any lower extremity pain or swelling.  He denies any orthopnea or PND.  He follows with his cardiologist, Dr. Brad Bahena, and is scheduled for an angiogram on Thursday.

## 2018-05-20 NOTE — PLAN OF CARE
Problem: Patient Care Overview  Goal: Plan of Care Review   05/20/18 0355   Coping/Psychosocial   Plan Of Care Reviewed With patient       Problem: Pain, Chronic (Adult)  Intervention: Manage Persistent Pain Analgesia   05/19/18 1811 05/20/18 0355   Manage Acute Burn Pain   Bowel Intervention adequate fluid intake promoted --    Safety Interventions   Medication Review/Management --  medications reviewed     Intervention: Mutually Develop/Implement Persistent Pain Management Plan   05/20/18 0355   Pain/Comfort/Sleep Interventions   Pain Management Interventions pain management plan reviewed with patient/caregiver   Cognitive Interventions   Sensory Stimulation Regulation care clustered;lighting decreased;music/television provided for relaxation       Goal: Identify Related Risk Factors and Signs and Symptoms  Related risk factors and signs and symptoms are identified upon initiation of Human Response Clinical Practice Guideline (CPG)    05/20/18 0355   Pain, Chronic   Signs and Symptoms (Chronic Pain) verbalization of pain/discomfort for a prolonged time period     Goal: Acceptable Pain Control/Comfort Level  Patient will demonstrate the desired outcomes by discharge/transition of care.    05/20/18 0355   Pain, Chronic (Adult)   Acceptable Pain Control/Comfort Level making progress toward outcome       Problem: Cardiac: ACS (Acute Coronary Syndrome) (Adult)  Intervention: Optimize Myocardial Oxygenation/Perfusion   05/20/18 0200   Activity   Activity Type activity adjusted per tolerance;activity clustered for rest period     Intervention: Prevent/Manage Thrombi/Emboli   05/19/18 1945 05/20/18 0355   Safety Interventions   Bleeding Precautions --  blood pressure closely monitored   OTHER   VTE Required Core Measure Pharmacological prophylaxis initiated/maintained --      Intervention: Support Psychosocial Response to Life-changing Event/Hospitalization   05/20/18 0355   Psychosocial Support   Family/Support System  Care involvement promoted;self-care encouraged       Goal: Signs and Symptoms of Listed Potential Problems Will be Absent, Minimized or Managed (Cardiac: ACS)  Signs and symptoms of listed potential problems will be absent, minimized or managed by discharge/transition of care (reference Cardiac: ACS (Acute Coronary Syndrome) (Adult) CPG).    05/20/18 0355   Cardiac: ACS (Acute Coronary Syndrome)   Problems Assessed (Acute Coronary Syndrome) all   Problems Present (Acute Coronary Syndrome) chest pain (angina)       Comments: Patient remained free of injury throughout the shift. Complaints of intermittent pain treated but no signs of respiratory distress noted. Vital signs WNL. Cardiology consulted. Plan to trend troponin's and manage pain. Call light and urinal in reach and patient instructed to inform the nurse if anything is needed. Patient stable and will continue to be monitored.

## 2018-05-20 NOTE — HPI
67 y.o. Male with PMHx of allergy; Angina pectoris; Anxiety; Coronary artery disease; Depression; GERD ; Hyperlipidemia; Hypertension; Hypothyroidism; Memory loss; 5 x Myocardial infarction; Obesity; Peripheral vascular disease; Retrocalcaneal bursitis; and Sleep apnea presents to the ED c/o 2 day hx of progressively worsening midsternal and left chest pain that radiates to the left arm with associated shortness of breath. Pt had stabbing back pain before it moved to the chest. Pt went to [facility] ED 2 days ago for evaluation, and he drank the GI cocktail with improvement, then he was discharged home.     Previously seen by Dr. Rivera in 2015 but now follows with Dr. Bahena in clinic.  He was considering cardiac catheterization despite negative stress test with continued ongoing chest pain symptoms and history of LAD stent.  Patient had eruption of zoster there is previous chest pain symptoms were.  He still is mostly having significant pain associated with this.  He denies any other associated issues.  He's expressing no PND, orthopnea or lower edema.  He denies any dizziness, presyncope or syncope.

## 2018-05-20 NOTE — PLAN OF CARE
05/20/18 0947   Discharge Assessment   Assessment Type Discharge Planning Assessment   Confirmed/corrected address and phone number on facesheet? Yes   Assessment information obtained from? Patient   Communicated expected length of stay with patient/caregiver no   Prior to hospitilization cognitive status: Alert/Oriented   Prior to hospitalization functional status: Independent   Current cognitive status: Alert/Oriented   Current Functional Status: Independent   Facility Arrived From: Home   Lives With spouse;child(zhane), adult  (Lives with spouse; adult children are involved and helpful)   Able to Return to Prior Arrangements yes   Is patient able to care for self after discharge? Yes   Who are your caregiver(s) and their phone number(s)? ; adult children are near and involved   Patient's perception of discharge disposition home or selfcare   Readmission Within The Last 30 Days no previous admission in last 30 days   Patient currently being followed by outpatient case management? No   Patient currently receives any other outside agency services? No   Equipment Currently Used at Home none   Do you have any problems affording any of your prescribed medications? No   Is the patient taking medications as prescribed? yes   Does the patient have transportation home? Yes   Transportation Available car;family or friend will provide   Does the patient receive services at the [facility]? No   Discharge Plan A Home with family   Discharge Plan B Other  (TBD)   Patient/Family In Agreement With Plan yes   Does the patient have transportation to healthcare appointments? Yes     [facility] [place], [place]    Independent at home with spouse; spouse assists as needed; adult children are near and assist as needed; no assist usually needed; no current services; no current DME; no needs anticipated at this time.    1)  Warning signs/symptoms information reviewed with and provided to patient in blue folder  2)  Discharge planning begins  upon admission   3)  Discussed and reinforced patient responsibility for managing health care at home including:        a) Acquiring prescribed medications; b) following-through with scheduled follow-up appointments or scheduling those that could not be set; and c) monitoring health at home.

## 2018-05-20 NOTE — DISCHARGE INSTRUCTIONS
Transmission  Language:   Shingles cannot be passed from one person to another. However, the virus that causes shingles, the varicella zoster virus, can spread from a person with active shingles to cause chickenpox in someone who had never had chickenpox or received chickenpox vaccine.    The virus is spread through direct contact with fluid from the rash blisters caused by shingles.  A person with active shingles can spread the virus when the rash is in the blister-phase. A person is not infectious before the blisters appear. Once the rash has developed crusts, the person is no longer infectious.  Shingles is less contagious than chickenpox and the risk of a person with shingles spreading the virus is low if the rash is covered.  If you have shingles, you should:  Cover the rash.   Avoid touching or scratching the rash.   Wash your hands often to prevent the spread of varicella zoster virus.   Avoid contact with the people below until your rash has developed crusts   pregnant women who have never had chickenpox or the chickenpox vaccine;   premature or low birth weight infants; and   people with weakened immune systems, such as people receiving immunosuppressive medications or undergoing chemotherapy, organ transplant recipients, and people with human immunodeficiency virus (HIV) infection.

## 2018-05-20 NOTE — CONSULTS
[facility]  Cardiology  Consult Note    Patient Name: Clinton Rosenberg  MRN: [id number]  Admission Date: 5/19/2018  Hospital Length of Stay: 0 days  Code Status: Full Code   Attending Provider: Eva Chairez MD   Consulting Provider: Hernandez Burks MD  Primary Care Physician: Michael Gonzalez MD  Principal Problem:Acute herpes zoster neuropathy    Patient information was obtained from patient, past medical records and ER records.     Inpatient consult to Cardiology  Consult performed by: HERNANDEZ BURKS  Consult ordered by: JATIN AARON  Reason for consult: Chest pain, history of CAD        Subjective:     Chief Complaint:  Chest pain     HPI:   67 y.o. Male with PMHx of allergy; Angina pectoris; Anxiety; Coronary artery disease; Depression; GERD ; Hyperlipidemia; Hypertension; Hypothyroidism; Memory loss; 5 x Myocardial infarction; Obesity; Peripheral vascular disease; Retrocalcaneal bursitis; and Sleep apnea presents to the ED c/o 2 day hx of progressively worsening midsternal and left chest pain that radiates to the left arm with associated shortness of breath. Pt had stabbing back pain before it moved to the chest. Pt went to [facility] ED 2 days ago for evaluation, and he drank the GI cocktail with improvement, then he was discharged home.     Previously seen by Dr. Rivera in 2015 but now follows with Dr. Bahena in clinic.  He was considering cardiac catheterization despite negative stress test with continued ongoing chest pain symptoms and history of LAD stent.  Patient had eruption of zoster there is previous chest pain symptoms were.  He still is mostly having significant pain associated with this.  He denies any other associated issues.  He's expressing no PND, orthopnea or lower edema.  He denies any dizziness, presyncope or syncope.          Past Medical History:   Diagnosis Date    Allergy     Angina pectoris     Anxiety     Coronary artery disease     Depression      GERD (gastroesophageal reflux disease)     Hyperlipidemia     Hypertension     Hypothyroidism     Memory loss     12/3/2014 MRI brain: 1. No evidence for acute infarct 2. unremarkable MRI of the brain; 12/3/2014 carotid US normal    Myocardial infarction     Obesity     Peripheral vascular disease 6/20/2017    Retrocalcaneal bursitis 11/24/2014    Sleep apnea        Past Surgical History:   Procedure Laterality Date    APPENDECTOMY  1986    bone spur Right     COLONOSCOPY      hand trauma Right     pencil     heart stent         Review of patient's allergies indicates:   Allergen Reactions    Iodine containing multivitamin Anaphylaxis    Naproxen Other (See Comments)     hallucinations    Hydrocodone-acetaminophen      Rash (skin)^    Oxycodone-acetaminophen      Rash (skin)^       No current facility-administered medications on file prior to encounter.      Current Outpatient Prescriptions on File Prior to Encounter   Medication Sig    amitriptyline (ELAVIL) 25 MG tablet Take 1 tablet (25 mg total) by mouth nightly as needed for Insomnia.    aspirin (ECOTRIN) 81 MG EC tablet Take 1 tablet (81 mg total) by mouth once daily.    atorvastatin (LIPITOR) 80 MG tablet TAKE 1 TABLET EVERY EVENING    furosemide (LASIX) 20 MG tablet Take 1 tablet (20 mg total) by mouth once daily.    ibuprofen (ADVIL,MOTRIN) 600 MG tablet Take 1 tablet (600 mg total) by mouth 3 (three) times daily.    isosorbide mononitrate (IMDUR) 60 MG 24 hr tablet Take 1 tablet (60 mg total) by mouth once daily.    KRILL/OM3/DHA/EPA/OM6/LIP/ASTX (KRILL OIL, OMEGA 3 AND 6, ORAL) Take by mouth.    levothyroxine (SYNTHROID) 50 MCG tablet Take 1 tablet (50 mcg total) by mouth before breakfast.    losartan (COZAAR) 50 MG tablet Take 1 tablet (50 mg total) by mouth once daily.    metoprolol tartrate (LOPRESSOR) 25 MG tablet Take 1 tablet (25 mg total) by mouth 2 (two) times daily. (Patient taking differently: Take 25 mg by mouth.  Take 2 tablets twice daily.)    nitroGLYCERIN (NITROSTAT) 0.4 MG SL tablet Place 1 tablet (0.4 mg total) under the tongue every 5 (five) minutes as needed for Chest pain.    pantoprazole (PROTONIX) 40 MG tablet Take 1 tablet (40 mg total) by mouth once daily.    tamsulosin (FLOMAX) 0.4 mg Cp24 Take 2 capsules (0.8 mg total) by mouth once daily.    vitamin D 1000 units Tab Take 1 tablet (1,000 Units total) by mouth once daily.     Family History     Problem Relation (Age of Onset)    Alcohol abuse Father    Arthritis Mother, Brother, Sister, Brother    Cancer Mother, Brother    Depression Sister    Diabetes Mother, Father, Brother, Brother    Early death Mother, Father, Brother    Heart disease Mother, Father, Brother, Sister, Brother, Brother, Brother, Brother    Hypertension Mother, Father, Brother, Sister, Brother    Migraines Mother    Pneumonia Brother    Seizures Mother    Stroke Father    Tremor Mother        Social History Main Topics    Smoking status: Never Smoker    Smokeless tobacco: Never Used    Alcohol use No      Comment: rarely    Drug use: No    Sexual activity: Yes     Review of Systems   Constitution: Negative.   HENT: Negative.    Eyes: Negative.    Cardiovascular: Positive for chest pain. Negative for dyspnea on exertion, irregular heartbeat, leg swelling, near-syncope, orthopnea, palpitations, paroxysmal nocturnal dyspnea and syncope.   Respiratory: Negative for shortness of breath.    Skin: Positive for rash.   Musculoskeletal: Negative.    Gastrointestinal: Negative for abdominal pain, constipation and diarrhea.   Genitourinary: Negative for dysuria.   Neurological: Negative for dizziness.   Psychiatric/Behavioral: Negative.      Objective:     Vital Signs (Most Recent):  Temp: 97.9 °F (36.6 °C) (05/20/18 0737)  Pulse: (!) 57 (05/20/18 1046)  Resp: 18 (05/20/18 0737)  BP: (!) 150/85 (05/20/18 1046)  SpO2: 97 % (05/20/18 1046) Vital Signs (24h Range):  Temp:  [97 °F (36.1 °C)-98.3 °F  (36.8 °C)] 97.9 °F (36.6 °C)  Pulse:  [47-63] 57  Resp:  [11-18] 18  SpO2:  [95 %-100 %] 97 %  BP: (109-155)/(61-85) 150/85     Weight: 109.4 kg (241 lb 2.9 oz)  Body mass index is 34.61 kg/m².    SpO2: 97 %  O2 Device (Oxygen Therapy): nasal cannula      Intake/Output Summary (Last 24 hours) at 05/20/18 1358  Last data filed at 05/20/18 0000   Gross per 24 hour   Intake              440 ml   Output                0 ml   Net              440 ml       Lines/Drains/Airways     Peripheral Intravenous Line                 Peripheral IV - Single Lumen 05/19/18 1400 Right Antecubital less than 1 day                Physical Exam   Constitutional: He is oriented to person, place, and time. He appears well-developed and well-nourished. No distress.   HENT:   Head: Normocephalic and atraumatic.   Eyes: Conjunctivae and EOM are normal. Pupils are equal, round, and reactive to light.   Neck: Normal range of motion. Neck supple. No thyromegaly present.   Cardiovascular: Normal rate, regular rhythm and normal heart sounds.    No murmur heard.  Pulmonary/Chest: Effort normal and breath sounds normal. No respiratory distress. He has no wheezes. He has no rales. He exhibits no tenderness.   Abdominal: Soft. Bowel sounds are normal.   Musculoskeletal: He exhibits no edema.   Neurological: He is alert and oriented to person, place, and time.   Skin: Skin is warm and dry.   Psychiatric: He has a normal mood and affect. His behavior is normal.       Significant Labs:   CMP   Recent Labs  Lab 05/19/18  1400 05/20/18  0151    143   K 4.4 4.7    106   CO2 25 28   * 130*   BUN 10 12   CREATININE 1.2 1.3   CALCIUM 9.2 9.4   PROT 7.1 6.8   ALBUMIN 4.2 4.0   BILITOT 1.1* 0.9   ALKPHOS 57 55   AST 18 19   ALT 22 25   ANIONGAP 11 9   ESTGFRAFRICA >60 >60   EGFRNONAA >60 57*   , CBC   Recent Labs  Lab 05/19/18  1400 05/20/18  0151   WBC 6.95 8.47   HGB 15.8 15.5   HCT 43.4 43.2    164   , INR No results for input(s):  INR, PROTIME in the last 48 hours., Lipid Panel   Recent Labs  Lab 05/19/18 1941   CHOL 117*   HDL 29*   LDLCALC 14.4*   TRIG 368*   CHOLHDL 24.8    and Troponin   Recent Labs  Lab 05/19/18  1400 05/19/18  1941 05/20/18  0151   TROPONINI 0.010 0.010 <0.006       Significant Imaging: Echocardiogram:   2D echo with color flow doppler:   Results for orders placed or performed during the hospital encounter of 03/15/18   2D Echo w/ Color Flow Doppler   Result Value Ref Range    EF 65 55 - 65    Diastolic Dysfunction No     Est. PA Systolic Pressure 10.76     Pericardial Effusion NONE     Mitral Valve Mobility NORMAL      NST:   6-17  Impression: NORMAL MYOCARDIAL PERFUSION  1. The perfusion scan is free of evidence for myocardial ischemia or injury.   2. There is a mild intensity fixed defect in the inferior wall of the left ventricle, secondary to diaphragm attenuation.   3. Resting wall motion is physiologic.   4. Resting LV function is normal.   5. The ventricular volumes are normal at rest and stress.   6. The extracardiac distribution of radioactivity is normal.   7. When compared to the previous study from 03/09/2015, no change    Assessment and Plan:     * Acute herpes zoster neuropathy    Treatment per primary        Chest pain    Likely secondary to below        Coronary artery disease of native artery of native heart with stable angina pectoris    No signs of ACS  Continue medicines for secondary prevention  No need for further intervention at this point        Essential hypertension             Mixed hyperlipidemia    On statin            VTE Risk Mitigation         Ordered     enoxaparin injection 40 mg  Daily      05/19/18 1951          Thank you for your consult. I will follow-up with patient. Please contact us if you have any additional questions.    Hernandez Woods MD  Cardiology   [facility]

## 2018-05-20 NOTE — PLAN OF CARE
Problem: Pain, Chronic (Adult)  Intervention: Manage Persistent Pain Analgesia   05/19/18 1811 05/20/18 0355   Manage Acute Burn Pain   Bowel Intervention adequate fluid intake promoted --    Safety Interventions   Medication Review/Management --  medications reviewed     Intervention: Support Psychosocial Response to Persistent Pain   05/20/18 0810   Coping/Psychosocial Interventions   Supportive Measures active listening utilized     Intervention: Mutually Develop/Implement Persistent Pain Management Plan   05/20/18 0355 05/20/18 0810   Pain/Comfort/Sleep Interventions   Pain Management Interventions pain management plan reviewed with patient/caregiver --    Cognitive Interventions   Sensory Stimulation Regulation --  care clustered       Goal: Identify Related Risk Factors and Signs and Symptoms  Related risk factors and signs and symptoms are identified upon initiation of Human Response Clinical Practice Guideline (CPG)   Outcome: Ongoing (interventions implemented as appropriate)   05/20/18 0355   Pain, Chronic   Signs and Symptoms (Chronic Pain) verbalization of pain/discomfort for a prolonged time period     Goal: Acceptable Pain Control/Comfort Level  Patient will demonstrate the desired outcomes by discharge/transition of care.   Outcome: Ongoing (interventions implemented as appropriate)   05/20/18 0355   Pain, Chronic (Adult)   Acceptable Pain Control/Comfort Level making progress toward outcome       Problem: Cardiac: ACS (Acute Coronary Syndrome) (Adult)  Intervention: Manage Acute Chest Pain   05/19/18 1945 05/20/18 0810   Chest Pain Assessment   Chest Pain Location --  midsternal   Rating (0-10) 8 --    Associated Signs/Symptoms anxiety --    Intervention cardiac enzymes drawn;cardiac monitoring continued;12-lead ECG obtained;morphine given;nitroglycerin SL given --      Intervention: Monitor ECG for Changes/Signs of Ischemia   05/19/18 1945 05/20/18 0610 05/20/18 0737   Vitals   Pulse --  --  (!) 54    ECG   Lead Monitored --  Lead II --    Rhythm --  sinus bradycardia;conduction defect --    Conduction Defect --  first degree AV block --    [place] Interval (Sec) 0.23 --  --    QRS Interval (Sec) 0.07 --  --      Intervention: Optimize Myocardial Oxygenation/Perfusion   05/20/18 0800   Activity   Activity Type activity adjusted per tolerance     Intervention: Prevent/Manage Thrombi/Emboli   05/20/18 0355 05/20/18 0810   Safety Interventions   Bleeding Precautions blood pressure closely monitored --    OTHER   VTE Required Core Measure --  Pharmacological prophylaxis initiated/maintained     Intervention: Support Psychosocial Response to Life-changing Event/Hospitalization   05/20/18 0810   Coping/Psychosocial Interventions   Supportive Measures active listening utilized   Psychosocial Support   Family/Support System Care involvement promoted       Goal: Signs and Symptoms of Listed Potential Problems Will be Absent, Minimized or Managed (Cardiac: ACS)  Signs and symptoms of listed potential problems will be absent, minimized or managed by discharge/transition of care (reference Cardiac: ACS (Acute Coronary Syndrome) (Adult) CPG).   Outcome: Ongoing (interventions implemented as appropriate)   05/20/18 0355   Cardiac: ACS (Acute Coronary Syndrome)   Problems Assessed (Acute Coronary Syndrome) all   Problems Present (Acute Coronary Syndrome) chest pain (angina)

## 2018-05-20 NOTE — ASSESSMENT & PLAN NOTE
HEART score of 4.  Ruling out ACS with serial negative troponin - thus far have been negative.  As noted by Dr. Weber in admit assessment, EKG and it reveals normal sinus rhythm without evidence of acute ischemia.  Chest X-ray is without obvious chest pain mimickers.    -Cardiology consulted and following    -continue cardiac monitoring  -ASA daily, losartan, lopressor, imdur  -abdominal US  -CTa

## 2018-05-20 NOTE — ASSESSMENT & PLAN NOTE
Patient's blood pressure is well-controlled; will continue home regimen of furosemide, isosorbide, losartan, metoprolol, and tamsulosin, and provide as-needed clonidine.

## 2018-05-20 NOTE — H&P
[facility] Medicine  History & Physical    Patient Name: Clinton Rosenberg  MRN: [id number]  Admission Date: 5/19/2018  Attending Physician: Eva Chairez MD   Primary Care Provider: Michael Gonzalez MD         Patient information was obtained from patient.     Subjective:     Principal Problem:Acute chest pain    Chief Complaint: Chest pain for four days.    HPI: Mr. Clinton Rosenberg is a 67 y.o. male with essential hypertension, hyperlipidemia (LDL 23.0 Oct 2017), CAD s/p PCI, hypothyroidism (TSH 2.759 Mar 2017), GERD, JAYLEN, and BPH who presents to [facility] ED with complaints of chest pain for four days.  The pain was present when he awoken from sleep, is mid-sternal to left-sided with radiation to his left-epigastrium, is sharp in quality, and is 9/10 in severity at its worst.  The pain is worse on palpation, deep inspiration, exertion, and on lying flat, and improved with sitting up.  He went to the ED two days ago for the same pain and said that a GI cocktail improved the pain at that time, but says the pain wasn't as bad as it is now.  He presented today to the ED again when his diaphoresis and shortness of breath did not improve.  The sublingual nitroglycerin he took at home and the nitropaste he received in the ED did not help with the pain.  He denies any fevers, chills, nausea, vomiting, palpitations, hemoptysis, nor any lower extremity pain or swelling.  He denies any orthopnea or PND.  He follows with his cardiologist, Dr. Brad Bahena, and is scheduled for an angiogram on Thursday.      Chart Review:  Patient has not had any recent hospitalizations within the system.    Outpatient Follow-Up  Date of Visit Physician Service   May 2018 Brad Bahena MD Cardiology    May 2018 Jenny Fountain NP Urgent Care   Jan 2018 Michael Gonzalez MD Primary Care   Dec 2016 Desmond Lee MD Colorectal Surgery    Dec 2015 Carlos To MD Urology    Jan 2015 Andrea Hurley MD  Neurology    Nov 2014 Darrian Paul MD General Surgery      Past Medical History:   Diagnosis Date    Allergy     Angina pectoris     Anxiety     Coronary artery disease     Depression     GERD (gastroesophageal reflux disease)     Hyperlipidemia     Hypertension     Hypothyroidism     Memory loss     12/3/2014 MRI brain: 1. No evidence for acute infarct 2. unremarkable MRI of the brain; 12/3/2014 carotid US normal    Myocardial infarction     Obesity     Peripheral vascular disease 6/20/2017    Retrocalcaneal bursitis 11/24/2014    Sleep apnea        Past Surgical History:   Procedure Laterality Date    APPENDECTOMY  1986    bone spur Right     COLONOSCOPY      hand trauma Right     pencil     heart stent         Review of patient's allergies indicates:   Allergen Reactions    Iodine containing multivitamin Anaphylaxis    Naproxen Other (See Comments)     hallucinations    Hydrocodone-acetaminophen      Rash (skin)^    Oxycodone-acetaminophen      Rash (skin)^       No current facility-administered medications on file prior to encounter.      Current Outpatient Prescriptions on File Prior to Encounter   Medication Sig    amitriptyline (ELAVIL) 25 MG tablet Take 1 tablet (25 mg total) by mouth nightly as needed for Insomnia.    aspirin (ECOTRIN) 81 MG EC tablet Take 1 tablet (81 mg total) by mouth once daily.    atorvastatin (LIPITOR) 80 MG tablet TAKE 1 TABLET EVERY EVENING    furosemide (LASIX) 20 MG tablet Take 1 tablet (20 mg total) by mouth once daily.    ibuprofen (ADVIL,MOTRIN) 600 MG tablet Take 1 tablet (600 mg total) by mouth 3 (three) times daily.    isosorbide mononitrate (IMDUR) 60 MG 24 hr tablet Take 1 tablet (60 mg total) by mouth once daily.    KRILL/OM3/DHA/EPA/OM6/LIP/ASTX (KRILL OIL, OMEGA 3 AND 6, ORAL) Take by mouth.    levothyroxine (SYNTHROID) 50 MCG tablet Take 1 tablet (50 mcg total) by mouth before breakfast.    losartan (COZAAR) 50 MG tablet Take 1  tablet (50 mg total) by mouth once daily.    metoprolol tartrate (LOPRESSOR) 25 MG tablet Take 1 tablet (25 mg total) by mouth 2 (two) times daily. (Patient taking differently: Take 25 mg by mouth. Take 2 tablets twice daily.)    nitroGLYCERIN (NITROSTAT) 0.4 MG SL tablet Place 1 tablet (0.4 mg total) under the tongue every 5 (five) minutes as needed for Chest pain.    pantoprazole (PROTONIX) 40 MG tablet Take 1 tablet (40 mg total) by mouth once daily.    tamsulosin (FLOMAX) 0.4 mg Cp24 Take 2 capsules (0.8 mg total) by mouth once daily.    vitamin D 1000 units Tab Take 1 tablet (1,000 Units total) by mouth once daily.     Family History     Problem Relation (Age of Onset)    Alcohol abuse Father    Arthritis Mother, Brother, Sister, Brother    Cancer Mother, Brother    Depression Sister    Diabetes Mother, Father, Brother, Brother    Early death Mother, Father, Brother    Heart disease Mother, Father, Brother, Sister, Brother, Brother, Brother, Brother    Hypertension Mother, Father, Brother, Sister, Brother    Migraines Mother    Pneumonia Brother    Seizures Mother    Stroke Father    Tremor Mother        Social History Main Topics    Smoking status: Never Smoker    Smokeless tobacco: Never Used    Alcohol use No      Comment: rarely    Drug use: No    Sexual activity: Yes     Review of Systems   Constitutional: Positive for diaphoresis. Negative for activity change, appetite change, chills, fatigue, fever and unexpected weight change.   HENT: Negative.    Eyes: Negative.    Respiratory: Positive for shortness of breath. Negative for cough, chest tightness and wheezing.    Cardiovascular: Positive for chest pain. Negative for palpitations and leg swelling.   Gastrointestinal: Negative for abdominal distention, abdominal pain, blood in stool, constipation, diarrhea, nausea and vomiting.   Genitourinary: Negative for dysuria and hematuria.   Musculoskeletal: Negative.    Skin: Negative.    Neurological:  Negative for dizziness, seizures, syncope, weakness and light-headedness.   Psychiatric/Behavioral: Negative.      Objective:     Vital Signs (Most Recent):  Temp: 98 °F (36.7 °C) (05/19/18 1915)  Pulse: 63 (05/19/18 2025)  Resp: 18 (05/19/18 1915)  BP: 133/82 (05/19/18 2025)  SpO2: 97 % (05/19/18 1915) Vital Signs (24h Range):  Temp:  [97 °F (36.1 °C)-98.3 °F (36.8 °C)] 98 °F (36.7 °C)  Pulse:  [50-65] 63  Resp:  [11-20] 18  SpO2:  [95 %-98 %] 97 %  BP: (109-155)/(61-83) 133/82     Weight: 110 kg (242 lb 8.1 oz)  Body mass index is 34.8 kg/m².    Physical Exam   Constitutional: He is oriented to person, place, and time. He appears well-developed and well-nourished. No distress.   HENT:   Head: Normocephalic and atraumatic.   Right Ear: External ear normal.   Left Ear: External ear normal.   Nose: Nose normal.   Eyes: Right eye exhibits no discharge. Left eye exhibits no discharge.   Neck: Normal range of motion.   Cardiovascular: Normal rate, regular rhythm, normal heart sounds and intact distal pulses.  Exam reveals no gallop and no friction rub.    No murmur heard.  Pulmonary/Chest: Effort normal and breath sounds normal. No respiratory distress. He has no wheezes. He has no rales. He exhibits no tenderness.   Abdominal: Soft. Bowel sounds are normal. He exhibits no distension. There is no tenderness. There is no rebound and no guarding.   Musculoskeletal: Normal range of motion. He exhibits no edema.   Neurological: He is alert and oriented to person, place, and time.   Skin: Skin is warm and dry. He is not diaphoretic. No erythema.   Psychiatric: He has a normal mood and affect. His behavior is normal. Judgment and thought content normal.   Nursing note and vitals reviewed.          Significant Labs: All pertinent labs within the past 24 hours have been reviewed.    Significant Imaging: I have reviewed and interpreted all pertinent imaging results/findings within the past 24 hours.    Assessment/Plan:     *  Acute chest pain    Patient currently with no chest pain.  HEART score of 4.  Will plan to obtain serial cardiac markers, which thus far have been negative.  Place in observation on telemetry.  I have reviewed the EKG and it reveals normal sinus rhythm without evidence of acute ischemia.  Chest X-ray is without obvious chest pain mimickers.  Will also administer supplemental oxygen and aspirin.  Will have as-needed nitroglycerin and morphine available.  Will consult Cardiology for further recommendations.  It almost sounds pericardial.        Essential hypertension    Patient's blood pressure is well-controlled; will continue home regimen of furosemide, isosorbide, losartan, metoprolol, and tamsulosin, and provide as-needed clonidine.        Mixed hyperlipidemia    Well-controlled; will continue patient's home regimen of atorvastatin.        Coronary artery disease of native artery of native heart with stable angina pectoris    Stable; will continue his home regimen of aspirin, atorvastatin, losartan, and metoprolol.        Hypothyroidism    Fairly-controlled; will continue patient's home regimen of levothyroxine.        Gastroesophageal reflux disease    As addressed above; will continue his home regimen of pantoprazole.        JAYLEN (obstructive sleep apnea)    Stable; there are no acute issues.        Benign non-nodular prostatic hyperplasia with lower urinary tract symptoms    Stable; will continue his home regimen of tamsulosin.          VTE Risk Mitigation         Ordered     enoxaparin injection 40 mg  Daily      05/19/18 1951     IP VTE HIGH RISK PATIENT  Once      05/19/18 1951           Murray Weber M.D.  Staff Nocturnist  Department of Hospital Medicine  [facility]  Pager: [phone]

## 2018-05-20 NOTE — PROGRESS NOTES
Discharge instructions given to patient and spouse at bedside Pt instructed to wear N 95 mask out of hospital to prevent exposure to other patients. Patient verbalized understanding of instructions. Patient states willingness to comply. Saline lock removed. Tele monitoring removed.

## 2018-05-20 NOTE — H&P
[facility] Medicine  History & Physical    Patient Name: Clinton Rosenberg  MRN: [id number]  Admission Date: 5/19/2018  Attending Physician: Eva Chairez MD   Primary Care Provider: Michael Gonzalez MD         Patient information was obtained from patient and ER records.     Subjective:     Principal Problem:Chest pain    Chief Complaint:   Chief Complaint   Patient presents with    Chest Pain     mid/epigastric/left side chest pain since middle of May with sob; took sublingual nitro 40 min pta with no relief; seen at [place] [facility] on 15th for same complaint        HPI: Mr. Clinton Rosenberg is a 67 y.o. male with essential hypertension, hyperlipidemia (LDL 23.0 Oct 2017), CAD s/p PCI, hypothyroidism (TSH 2.759 Mar 2017), GERD, AJYLEN, and BPH who presents to [facility] ED with complaints of chest pain for four days.  The pain was present when he awoken from sleep, is mid-sternal to left-sided with radiation to his left-epigastrium, is sharp in quality, and is 9/10 in severity at its worst.  The pain is worse on palpation, deep inspiration, exertion, and on lying flat, and improved with sitting up.  He went to the ED two days ago for the same pain and said that a GI cocktail improved the pain at that time, but says the pain wasn't as bad as it is now.  He presented today to the ED again when his diaphoresis and shortness of breath did not improve.  The sublingual nitroglycerin he took at home and the nitropaste he received in the ED did not help with the pain.  He denies any fevers, chills, nausea, vomiting, palpitations, hemoptysis, nor any lower extremity pain or swelling.  He denies any orthopnea or PND.  He follows with his cardiologist, Dr. Brad Bahena, and is scheduled for an angiogram on Thursday.      Interval History: Pain control - cardiology following - isolation precautions    Review of Systems   Constitutional: Negative for appetite change, chills, diaphoresis and fever.    HENT: Negative for congestion, hearing loss, sore throat, tinnitus and trouble swallowing.    Eyes: Negative for photophobia, discharge, itching and visual disturbance.   Respiratory: Negative for apnea, cough, wheezing and stridor.    Cardiovascular: Positive for chest pain. Negative for palpitations and leg swelling.   Gastrointestinal: Negative for abdominal distention, abdominal pain, blood in stool, constipation, diarrhea and nausea.   Endocrine: Negative for polydipsia, polyphagia and polyuria.   Genitourinary: Negative for difficulty urinating, dysuria, flank pain and frequency.   Musculoskeletal: Positive for arthralgias and back pain. Negative for joint swelling and neck stiffness.   Skin: Positive for rash. Negative for color change and wound.   Neurological: Negative for dizziness, tremors, seizures, light-headedness, numbness and headaches.   Hematological: Negative for adenopathy.   Psychiatric/Behavioral: Negative for hallucinations and self-injury.     Objective:     Vital Signs (Most Recent):  Temp: 97.9 °F (36.6 °C) (05/20/18 0737)  Pulse: (!) 57 (05/20/18 1046)  Resp: 18 (05/20/18 0737)  BP: (!) 150/85 (05/20/18 1046)  SpO2: 97 % (05/20/18 1046) Vital Signs (24h Range):  Temp:  [97 °F (36.1 °C)-98.3 °F (36.8 °C)] 97.9 °F (36.6 °C)  Pulse:  [47-65] 57  Resp:  [11-20] 18  SpO2:  [95 %-100 %] 97 %  BP: (109-155)/(61-85) 150/85     Weight: 109.4 kg (241 lb 2.9 oz)  Body mass index is 34.61 kg/m².    Intake/Output Summary (Last 24 hours) at 05/20/18 1119  Last data filed at 05/20/18 0000   Gross per 24 hour   Intake              440 ml   Output                0 ml   Net              440 ml      Physical Exam   Constitutional: He is oriented to person, place, and time. He appears well-developed and well-nourished. He is cooperative.   HENT:   Head: Normocephalic and atraumatic.   Eyes: Conjunctivae, EOM and lids are normal. Pupils are equal, round, and reactive to light.   Neck: Normal range of motion  and full passive range of motion without pain. Neck supple. No JVD present. No edema present. No thyroid mass present.   Cardiovascular: Normal rate, S1 normal, S2 normal and intact distal pulses.    No murmur heard.  Pulmonary/Chest: Effort normal.   Abdominal: Soft. Bowel sounds are normal. He exhibits no distension and no abdominal bruit. There is no splenomegaly or hepatomegaly. There is no tenderness. There is no CVA tenderness.   Lymphadenopathy:     He has no cervical adenopathy.     He has no axillary adenopathy.   Neurological: He is alert and oriented to person, place, and time. He has normal reflexes. He displays no tremor. He displays no seizure activity.   Skin: Skin is warm, dry and intact. There is erythema.        Psychiatric: He has a normal mood and affect. His speech is normal. Thought content normal. Cognition and memory are normal.       Significant Labs:   CBC:   Recent Labs  Lab 05/19/18  1400 05/20/18  0151   WBC 6.95 8.47   HGB 15.8 15.5   HCT 43.4 43.2    164     CMP:   Recent Labs  Lab 05/19/18  1400 05/20/18  0151    143   K 4.4 4.7    106   CO2 25 28   * 130*   BUN 10 12   CREATININE 1.2 1.3   CALCIUM 9.2 9.4   PROT 7.1 6.8   ALBUMIN 4.2 4.0   BILITOT 1.1* 0.9   ALKPHOS 57 55   AST 18 19   ALT 22 25   ANIONGAP 11 9   EGFRNONAA >60 57*     Cardiac Markers:   Recent Labs  Lab 05/19/18  1400   BNP 60     Lipid Panel:   Recent Labs  Lab 05/19/18  1941   CHOL 117*   HDL 29*   LDLCALC 14.4*   TRIG 368*   CHOLHDL 24.8     Troponin:   Recent Labs  Lab 05/19/18  1400 05/19/18  1941 05/20/18  0151   TROPONINI 0.010 0.010 <0.006      Recent Labs  Lab 05/19/18  1455   COLORU Yellow   APPEARANCEUA Clear   PHUR 5.0   SPECGRAV 1.010   PROTEINUA Negative   GLUCUA Negative   KETONESU Negative   BILIRUBINUA Negative   OCCULTUA Negative   NITRITE Negative   UROBILINOGEN Negative   LEUKOCYTESUR Negative       Significant Imaging:   Imaging Results          X-Ray Chest PA And  Lateral (Final result)  Result time 05/19/18 15:09:30    Final result by Helder Clements MD (05/19/18 15:09:30)                 Impression:      1. Coarse interstitial attenuation, similar to the previous exam, no large focal consolidation.      Electronically signed by: Helder Clements MD  Date:    05/19/2018  Time:    15:09             Narrative:    EXAMINATION:  XR CHEST PA AND LATERAL    CLINICAL HISTORY:  Chest Pain;    TECHNIQUE:  PA and lateral views of the chest were performed.    COMPARISON:  05/16/2018    FINDINGS:  The cardiomediastinal silhouette is not enlarged noting some tortuosity of the aorta and calcification of the arch..  There is no pleural effusion.  The trachea is midline.  The lungs are symmetrically expanded bilaterally with coarse interstitial attenuation.  No large focal consolidation seen.  There is no pneumothorax.  The osseous structures are remarkable for degenerative changes..                                    Assessment/Plan:     * Chest pain    HEART score of 4.  Ruling out ACS with serial negative troponin - thus far have been negative.  As noted by Dr. Weber in admit assessment, EKG and it reveals normal sinus rhythm without evidence of acute ischemia.  Chest X-ray is without obvious chest pain mimickers.    -Cardiology consulted for further recommendations.  It almost sounds pericardial.  -continue cardiac monitoring  -ASA daily, losartan, lopressor, imdur        Acute herpes zoster neuropathy    Patient does have Hx of CAD therefore, angina remains DDx however, his symptoms likely largely related to raised erythematous vestibular rash on L chest and upper mid-L posterior back. Pain started and worsened in early morning prior to admit - however, wife has been putting medication on the rash which has altered the appearance somewhat. It appears early - and is causing patient a substantial amount of discomfort and pain refractory to NTG and morphine.  -start valtrex  3Xdaiy  -gabapentin 100 mg TID  -supportive care  -VZV IGg,IGm        JAYLEN (obstructive sleep apnea)    Stable; there are no acute issues.        Hypothyroidism    Fairly-controlled; will continue patient's home regimen of levothyroxine.        Coronary artery disease of native artery of native heart with stable angina pectoris    Stable; will continue his home regimen of aspirin, atorvastatin, losartan, and metoprolol.        Benign non-nodular prostatic hyperplasia with lower urinary tract symptoms    Stable; will continue his home regimen of tamsulosin.        Gastroesophageal reflux disease    As addressed above; will continue his home regimen of pantoprazole.        Essential hypertension    Patient's blood pressure is well-controlled; will continue home regimen of furosemide, isosorbide, losartan, metoprolol, and tamsulosin, and provide as-needed clonidine.        Mixed hyperlipidemia    Well-controlled; will continue patient's home regimen of atorvastatin.          VTE Risk Mitigation         Ordered     enoxaparin injection 40 mg  Daily      05/19/18 1951             RONI Munguia, FNP-C  Hospitalist - Department of Hospital Medicine  [address] [place] [address]  Office [phone]; Pager [phone]

## 2018-05-20 NOTE — SUBJECTIVE & OBJECTIVE
Interval History: Pain control - cardiology following - isolation precautions    Review of Systems   Constitutional: Negative for appetite change, chills, diaphoresis and fever.   HENT: Negative for congestion, hearing loss, sore throat, tinnitus and trouble swallowing.    Eyes: Negative for photophobia, discharge, itching and visual disturbance.   Respiratory: Negative for apnea, cough, wheezing and stridor.    Cardiovascular: Positive for chest pain. Negative for palpitations and leg swelling.   Gastrointestinal: Negative for abdominal distention, abdominal pain, blood in stool, constipation, diarrhea and nausea.   Endocrine: Negative for polydipsia, polyphagia and polyuria.   Genitourinary: Negative for difficulty urinating, dysuria, flank pain and frequency.   Musculoskeletal: Positive for arthralgias and back pain. Negative for joint swelling and neck stiffness.   Skin: Positive for rash. Negative for color change and wound.   Neurological: Negative for dizziness, tremors, seizures, light-headedness, numbness and headaches.   Hematological: Negative for adenopathy.   Psychiatric/Behavioral: Negative for hallucinations and self-injury.     Objective:     Vital Signs (Most Recent):  Temp: 97.9 °F (36.6 °C) (05/20/18 0737)  Pulse: (!) 57 (05/20/18 1046)  Resp: 18 (05/20/18 0737)  BP: (!) 150/85 (05/20/18 1046)  SpO2: 97 % (05/20/18 1046) Vital Signs (24h Range):  Temp:  [97 °F (36.1 °C)-98.3 °F (36.8 °C)] 97.9 °F (36.6 °C)  Pulse:  [47-65] 57  Resp:  [11-20] 18  SpO2:  [95 %-100 %] 97 %  BP: (109-155)/(61-85) 150/85     Weight: 109.4 kg (241 lb 2.9 oz)  Body mass index is 34.61 kg/m².    Intake/Output Summary (Last 24 hours) at 05/20/18 1119  Last data filed at 05/20/18 0000   Gross per 24 hour   Intake              440 ml   Output                0 ml   Net              440 ml      Physical Exam   Constitutional: He is oriented to person, place, and time. He appears well-developed and well-nourished. He is  cooperative.   HENT:   Head: Normocephalic and atraumatic.   Eyes: Conjunctivae, EOM and lids are normal. Pupils are equal, round, and reactive to light.   Neck: Normal range of motion and full passive range of motion without pain. Neck supple. No JVD present. No edema present. No thyroid mass present.   Cardiovascular: Normal rate, S1 normal, S2 normal and intact distal pulses.    No murmur heard.  Pulmonary/Chest: Effort normal.   Abdominal: Soft. Bowel sounds are normal. He exhibits no distension and no abdominal bruit. There is no splenomegaly or hepatomegaly. There is no tenderness. There is no CVA tenderness.   Lymphadenopathy:     He has no cervical adenopathy.     He has no axillary adenopathy.   Neurological: He is alert and oriented to person, place, and time. He has normal reflexes. He displays no tremor. He displays no seizure activity.   Skin: Skin is warm, dry and intact. There is erythema.        Psychiatric: He has a normal mood and affect. His speech is normal. Thought content normal. Cognition and memory are normal.       Significant Labs:   CBC:   Recent Labs  Lab 05/19/18  1400 05/20/18  0151   WBC 6.95 8.47   HGB 15.8 15.5   HCT 43.4 43.2    164     CMP:   Recent Labs  Lab 05/19/18  1400 05/20/18  0151    143   K 4.4 4.7    106   CO2 25 28   * 130*   BUN 10 12   CREATININE 1.2 1.3   CALCIUM 9.2 9.4   PROT 7.1 6.8   ALBUMIN 4.2 4.0   BILITOT 1.1* 0.9   ALKPHOS 57 55   AST 18 19   ALT 22 25   ANIONGAP 11 9   EGFRNONAA >60 57*     Cardiac Markers:   Recent Labs  Lab 05/19/18  1400   BNP 60     Lipid Panel:   Recent Labs  Lab 05/19/18 1941   CHOL 117*   HDL 29*   LDLCALC 14.4*   TRIG 368*   CHOLHDL 24.8     Troponin:   Recent Labs  Lab 05/19/18  1400 05/19/18 1941 05/20/18  0151   TROPONINI 0.010 0.010 <0.006      Recent Labs  Lab 05/19/18  1455   COLORU Yellow   APPEARANCEUA Clear   PHUR 5.0   SPECGRAV 1.010   PROTEINUA Negative   GLUCUA Negative   KETONESU Negative    BILIRUBINUA Negative   OCCULTUA Negative   NITRITE Negative   UROBILINOGEN Negative   LEUKOCYTESUR Negative       Significant Imaging:   Imaging Results          X-Ray Chest PA And Lateral (Final result)  Result time 05/19/18 15:09:30    Final result by Helder Clements MD (05/19/18 15:09:30)                 Impression:      1. Coarse interstitial attenuation, similar to the previous exam, no large focal consolidation.      Electronically signed by: Helder Clements MD  Date:    05/19/2018  Time:    15:09             Narrative:    EXAMINATION:  XR CHEST PA AND LATERAL    CLINICAL HISTORY:  Chest Pain;    TECHNIQUE:  PA and lateral views of the chest were performed.    COMPARISON:  05/16/2018    FINDINGS:  The cardiomediastinal silhouette is not enlarged noting some tortuosity of the aorta and calcification of the arch..  There is no pleural effusion.  The trachea is midline.  The lungs are symmetrically expanded bilaterally with coarse interstitial attenuation.  No large focal consolidation seen.  There is no pneumothorax.  The osseous structures are remarkable for degenerative changes..

## 2018-05-20 NOTE — SUBJECTIVE & OBJECTIVE
Past Medical History:   Diagnosis Date    Allergy     Angina pectoris     Anxiety     Coronary artery disease     Depression     GERD (gastroesophageal reflux disease)     Hyperlipidemia     Hypertension     Hypothyroidism     Memory loss     12/3/2014 MRI brain: 1. No evidence for acute infarct 2. unremarkable MRI of the brain; 12/3/2014 carotid US normal    Myocardial infarction     Obesity     Peripheral vascular disease 6/20/2017    Retrocalcaneal bursitis 11/24/2014    Sleep apnea        Past Surgical History:   Procedure Laterality Date    APPENDECTOMY  1986    bone spur Right     COLONOSCOPY      hand trauma Right     pencil     heart stent         Review of patient's allergies indicates:   Allergen Reactions    Iodine containing multivitamin Anaphylaxis    Naproxen Other (See Comments)     hallucinations    Hydrocodone-acetaminophen      Rash (skin)^    Oxycodone-acetaminophen      Rash (skin)^       No current facility-administered medications on file prior to encounter.      Current Outpatient Prescriptions on File Prior to Encounter   Medication Sig    amitriptyline (ELAVIL) 25 MG tablet Take 1 tablet (25 mg total) by mouth nightly as needed for Insomnia.    aspirin (ECOTRIN) 81 MG EC tablet Take 1 tablet (81 mg total) by mouth once daily.    atorvastatin (LIPITOR) 80 MG tablet TAKE 1 TABLET EVERY EVENING    furosemide (LASIX) 20 MG tablet Take 1 tablet (20 mg total) by mouth once daily.    ibuprofen (ADVIL,MOTRIN) 600 MG tablet Take 1 tablet (600 mg total) by mouth 3 (three) times daily.    isosorbide mononitrate (IMDUR) 60 MG 24 hr tablet Take 1 tablet (60 mg total) by mouth once daily.    KRILL/OM3/DHA/EPA/OM6/LIP/ASTX (KRILL OIL, OMEGA 3 AND 6, ORAL) Take by mouth.    levothyroxine (SYNTHROID) 50 MCG tablet Take 1 tablet (50 mcg total) by mouth before breakfast.    losartan (COZAAR) 50 MG tablet Take 1 tablet (50 mg total) by mouth once daily.    metoprolol tartrate  (LOPRESSOR) 25 MG tablet Take 1 tablet (25 mg total) by mouth 2 (two) times daily. (Patient taking differently: Take 25 mg by mouth. Take 2 tablets twice daily.)    nitroGLYCERIN (NITROSTAT) 0.4 MG SL tablet Place 1 tablet (0.4 mg total) under the tongue every 5 (five) minutes as needed for Chest pain.    pantoprazole (PROTONIX) 40 MG tablet Take 1 tablet (40 mg total) by mouth once daily.    tamsulosin (FLOMAX) 0.4 mg Cp24 Take 2 capsules (0.8 mg total) by mouth once daily.    vitamin D 1000 units Tab Take 1 tablet (1,000 Units total) by mouth once daily.     Family History     Problem Relation (Age of Onset)    Alcohol abuse Father    Arthritis Mother, Brother, Sister, Brother    Cancer Mother, Brother    Depression Sister    Diabetes Mother, Father, Brother, Brother    Early death Mother, Father, Brother    Heart disease Mother, Father, Brother, Sister, Brother, Brother, Brother, Brother    Hypertension Mother, Father, Brother, Sister, Brother    Migraines Mother    Pneumonia Brother    Seizures Mother    Stroke Father    Tremor Mother        Social History Main Topics    Smoking status: Never Smoker    Smokeless tobacco: Never Used    Alcohol use No      Comment: rarely    Drug use: No    Sexual activity: Yes     Review of Systems   Constitutional: Positive for diaphoresis. Negative for activity change, appetite change, chills, fatigue, fever and unexpected weight change.   HENT: Negative.    Eyes: Negative.    Respiratory: Positive for shortness of breath. Negative for cough, chest tightness and wheezing.    Cardiovascular: Positive for chest pain. Negative for palpitations and leg swelling.   Gastrointestinal: Negative for abdominal distention, abdominal pain, blood in stool, constipation, diarrhea, nausea and vomiting.   Genitourinary: Negative for dysuria and hematuria.   Musculoskeletal: Negative.    Skin: Negative.    Neurological: Negative for dizziness, seizures, syncope, weakness and  light-headedness.   Psychiatric/Behavioral: Negative.      Objective:     Vital Signs (Most Recent):  Temp: 98 °F (36.7 °C) (05/19/18 1915)  Pulse: 63 (05/19/18 2025)  Resp: 18 (05/19/18 1915)  BP: 133/82 (05/19/18 2025)  SpO2: 97 % (05/19/18 1915) Vital Signs (24h Range):  Temp:  [97 °F (36.1 °C)-98.3 °F (36.8 °C)] 98 °F (36.7 °C)  Pulse:  [50-65] 63  Resp:  [11-20] 18  SpO2:  [95 %-98 %] 97 %  BP: (109-155)/(61-83) 133/82     Weight: 110 kg (242 lb 8.1 oz)  Body mass index is 34.8 kg/m².    Physical Exam   Constitutional: He is oriented to person, place, and time. He appears well-developed and well-nourished. No distress.   HENT:   Head: Normocephalic and atraumatic.   Right Ear: External ear normal.   Left Ear: External ear normal.   Nose: Nose normal.   Eyes: Right eye exhibits no discharge. Left eye exhibits no discharge.   Neck: Normal range of motion.   Cardiovascular: Normal rate, regular rhythm, normal heart sounds and intact distal pulses.  Exam reveals no gallop and no friction rub.    No murmur heard.  Pulmonary/Chest: Effort normal and breath sounds normal. No respiratory distress. He has no wheezes. He has no rales. He exhibits no tenderness.   Abdominal: Soft. Bowel sounds are normal. He exhibits no distension. There is no tenderness. There is no rebound and no guarding.   Musculoskeletal: Normal range of motion. He exhibits no edema.   Neurological: He is alert and oriented to person, place, and time.   Skin: Skin is warm and dry. He is not diaphoretic. No erythema.   Psychiatric: He has a normal mood and affect. His behavior is normal. Judgment and thought content normal.   Nursing note and vitals reviewed.          Significant Labs: All pertinent labs within the past 24 hours have been reviewed.    Significant Imaging: I have reviewed and interpreted all pertinent imaging results/findings within the past 24 hours.

## 2018-05-20 NOTE — ASSESSMENT & PLAN NOTE
Patient does have Hx of CAD therefore, angina remains DDx however, his symptoms largely related to raised erythematous vestibular rash on L chest and upper mid-L posterior back. Pain started and worsened in early morning prior to admit - however, wife has been putting medication on the rash which has altered the appearance somewhat - likely early presentation - and is causing patient a substantial amount of discomfort and pain refractory to NTG and morphine.  -start valtrex [id number]  -gabapentin 100 mg TID  -supportive care  -VZV IGg,IGm

## 2018-05-20 NOTE — HOSPITAL COURSE
Ruling out ACS with serial negative troponin 1 levels, normal BNP. Patient continued to have pain that was debilitating, limiting movement, refractory to NTG and morphine. Noted shingles rash along dermatone 3 anteriorly and one raised patch only the upward edge of dermatone 3 posteriorly suspicious for shingles. Started on valtrex and gabapentin for the pain. He was able to take a nap and eat shortly after the medication introduced. Cardiology cleared the patient was discharge from a cardiac standpoint. He will be discharged to home, has been counseled on handling rash. Stable, follow up in Primary care clinic.

## 2018-05-20 NOTE — ASSESSMENT & PLAN NOTE
Patient currently with no chest pain.  HEART score of 4.  Will plan to obtain serial cardiac markers, which thus far have been negative.  Place in observation on telemetry.  I have reviewed the EKG and it reveals normal sinus rhythm without evidence of acute ischemia.  Chest X-ray is without obvious chest pain mimickers.  Will also administer supplemental oxygen and aspirin.  Will have as-needed nitroglycerin and morphine available.  Will consult Cardiology for further recommendations.  It almost sounds pericardial.

## 2018-05-20 NOTE — SUBJECTIVE & OBJECTIVE
Past Medical History:   Diagnosis Date    Allergy     Angina pectoris     Anxiety     Coronary artery disease     Depression     GERD (gastroesophageal reflux disease)     Hyperlipidemia     Hypertension     Hypothyroidism     Memory loss     12/3/2014 MRI brain: 1. No evidence for acute infarct 2. unremarkable MRI of the brain; 12/3/2014 carotid US normal    Myocardial infarction     Obesity     Peripheral vascular disease 6/20/2017    Retrocalcaneal bursitis 11/24/2014    Sleep apnea        Past Surgical History:   Procedure Laterality Date    APPENDECTOMY  1986    bone spur Right     COLONOSCOPY      hand trauma Right     pencil     heart stent         Review of patient's allergies indicates:   Allergen Reactions    Iodine containing multivitamin Anaphylaxis    Naproxen Other (See Comments)     hallucinations    Hydrocodone-acetaminophen      Rash (skin)^    Oxycodone-acetaminophen      Rash (skin)^       No current facility-administered medications on file prior to encounter.      Current Outpatient Prescriptions on File Prior to Encounter   Medication Sig    amitriptyline (ELAVIL) 25 MG tablet Take 1 tablet (25 mg total) by mouth nightly as needed for Insomnia.    aspirin (ECOTRIN) 81 MG EC tablet Take 1 tablet (81 mg total) by mouth once daily.    atorvastatin (LIPITOR) 80 MG tablet TAKE 1 TABLET EVERY EVENING    furosemide (LASIX) 20 MG tablet Take 1 tablet (20 mg total) by mouth once daily.    ibuprofen (ADVIL,MOTRIN) 600 MG tablet Take 1 tablet (600 mg total) by mouth 3 (three) times daily.    isosorbide mononitrate (IMDUR) 60 MG 24 hr tablet Take 1 tablet (60 mg total) by mouth once daily.    KRILL/OM3/DHA/EPA/OM6/LIP/ASTX (KRILL OIL, OMEGA 3 AND 6, ORAL) Take by mouth.    levothyroxine (SYNTHROID) 50 MCG tablet Take 1 tablet (50 mcg total) by mouth before breakfast.    losartan (COZAAR) 50 MG tablet Take 1 tablet (50 mg total) by mouth once daily.    metoprolol tartrate  (LOPRESSOR) 25 MG tablet Take 1 tablet (25 mg total) by mouth 2 (two) times daily. (Patient taking differently: Take 25 mg by mouth. Take 2 tablets twice daily.)    nitroGLYCERIN (NITROSTAT) 0.4 MG SL tablet Place 1 tablet (0.4 mg total) under the tongue every 5 (five) minutes as needed for Chest pain.    pantoprazole (PROTONIX) 40 MG tablet Take 1 tablet (40 mg total) by mouth once daily.    tamsulosin (FLOMAX) 0.4 mg Cp24 Take 2 capsules (0.8 mg total) by mouth once daily.    vitamin D 1000 units Tab Take 1 tablet (1,000 Units total) by mouth once daily.     Family History     Problem Relation (Age of Onset)    Alcohol abuse Father    Arthritis Mother, Brother, Sister, Brother    Cancer Mother, Brother    Depression Sister    Diabetes Mother, Father, Brother, Brother    Early death Mother, Father, Brother    Heart disease Mother, Father, Brother, Sister, Brother, Brother, Brother, Brother    Hypertension Mother, Father, Brother, Sister, Brother    Migraines Mother    Pneumonia Brother    Seizures Mother    Stroke Father    Tremor Mother        Social History Main Topics    Smoking status: Never Smoker    Smokeless tobacco: Never Used    Alcohol use No      Comment: rarely    Drug use: No    Sexual activity: Yes     Review of Systems   Constitution: Negative.   HENT: Negative.    Eyes: Negative.    Cardiovascular: Positive for chest pain. Negative for dyspnea on exertion, irregular heartbeat, leg swelling, near-syncope, orthopnea, palpitations, paroxysmal nocturnal dyspnea and syncope.   Respiratory: Negative for shortness of breath.    Skin: Positive for rash.   Musculoskeletal: Negative.    Gastrointestinal: Negative for abdominal pain, constipation and diarrhea.   Genitourinary: Negative for dysuria.   Neurological: Negative for dizziness.   Psychiatric/Behavioral: Negative.      Objective:     Vital Signs (Most Recent):  Temp: 97.9 °F (36.6 °C) (05/20/18 0737)  Pulse: (!) 57 (05/20/18 1046)  Resp: 18  (05/20/18 0737)  BP: (!) 150/85 (05/20/18 1046)  SpO2: 97 % (05/20/18 1046) Vital Signs (24h Range):  Temp:  [97 °F (36.1 °C)-98.3 °F (36.8 °C)] 97.9 °F (36.6 °C)  Pulse:  [47-63] 57  Resp:  [11-18] 18  SpO2:  [95 %-100 %] 97 %  BP: (109-155)/(61-85) 150/85     Weight: 109.4 kg (241 lb 2.9 oz)  Body mass index is 34.61 kg/m².    SpO2: 97 %  O2 Device (Oxygen Therapy): nasal cannula      Intake/Output Summary (Last 24 hours) at 05/20/18 1358  Last data filed at 05/20/18 0000   Gross per 24 hour   Intake              440 ml   Output                0 ml   Net              440 ml       Lines/Drains/Airways     Peripheral Intravenous Line                 Peripheral IV - Single Lumen 05/19/18 1400 Right Antecubital less than 1 day                Physical Exam   Constitutional: He is oriented to person, place, and time. He appears well-developed and well-nourished. No distress.   HENT:   Head: Normocephalic and atraumatic.   Eyes: Conjunctivae and EOM are normal. Pupils are equal, round, and reactive to light.   Neck: Normal range of motion. Neck supple. No thyromegaly present.   Cardiovascular: Normal rate, regular rhythm and normal heart sounds.    No murmur heard.  Pulmonary/Chest: Effort normal and breath sounds normal. No respiratory distress. He has no wheezes. He has no rales. He exhibits no tenderness.   Abdominal: Soft. Bowel sounds are normal.   Musculoskeletal: He exhibits no edema.   Neurological: He is alert and oriented to person, place, and time.   Skin: Skin is warm and dry.   Psychiatric: He has a normal mood and affect. His behavior is normal.       Significant Labs:   CMP   Recent Labs  Lab 05/19/18  1400 05/20/18  0151    143   K 4.4 4.7    106   CO2 25 28   * 130*   BUN 10 12   CREATININE 1.2 1.3   CALCIUM 9.2 9.4   PROT 7.1 6.8   ALBUMIN 4.2 4.0   BILITOT 1.1* 0.9   ALKPHOS 57 55   AST 18 19   ALT 22 25   ANIONGAP 11 9   ESTGFRAFRICA >60 >60   EGFRNONAA >60 57*   , CBC   Recent  Labs  Lab 05/19/18  1400 05/20/18  0151   WBC 6.95 8.47   HGB 15.8 15.5   HCT 43.4 43.2    164   , INR No results for input(s): INR, PROTIME in the last 48 hours., Lipid Panel   Recent Labs  Lab 05/19/18 1941   CHOL 117*   HDL 29*   LDLCALC 14.4*   TRIG 368*   CHOLHDL 24.8    and Troponin   Recent Labs  Lab 05/19/18  1400 05/19/18 1941 05/20/18  0151   TROPONINI 0.010 0.010 <0.006       Significant Imaging: Echocardiogram:   2D echo with color flow doppler:   Results for orders placed or performed during the hospital encounter of 03/15/18   2D Echo w/ Color Flow Doppler   Result Value Ref Range    EF 65 55 - 65    Diastolic Dysfunction No     Est. PA Systolic Pressure 10.76     Pericardial Effusion NONE     Mitral Valve Mobility NORMAL      NST:   6-17  Impression: NORMAL MYOCARDIAL PERFUSION  1. The perfusion scan is free of evidence for myocardial ischemia or injury.   2. There is a mild intensity fixed defect in the inferior wall of the left ventricle, secondary to diaphragm attenuation.   3. Resting wall motion is physiologic.   4. Resting LV function is normal.   5. The ventricular volumes are normal at rest and stress.   6. The extracardiac distribution of radioactivity is normal.   7. When compared to the previous study from 03/09/2015, no change

## 2018-05-23 LAB
VARICELLA INTERPRETATION: POSITIVE
VARICELLA ZOSTER IGG: 3.08 ISR

## 2018-05-24 LAB — VZV IGG SER IA-ACNC: <0.91 INDEX

## 2018-10-11 DIAGNOSIS — I10 ESSENTIAL HYPERTENSION: ICD-10-CM

## 2018-10-11 DIAGNOSIS — I25.118 CORONARY ARTERY DISEASE OF NATIVE ARTERY OF NATIVE HEART WITH STABLE ANGINA PECTORIS: ICD-10-CM

## 2018-10-11 PROBLEM — Z87.820 HISTORY OF CONCUSSION: Status: ACTIVE | Noted: 2018-01-31

## 2018-10-11 PROBLEM — D12.6 TUBULAR ADENOMA OF COLON: Status: ACTIVE | Noted: 2018-10-11

## 2018-10-11 NOTE — PROGRESS NOTES
This note was created by combination of typed  and Dragon dictation.  Transcription errors may be present.  If there are any questions, please contact me.    Assessment & Plan:   Hypothyroidism, unspecified type  -will try to add on TSH to labs.    Prediabetes  -check a1c. Diet controlled.  -     Hemoglobin A1c; Future; Expected date: 10/12/2018    Coronary artery disease of native artery of native heart with stable angina pectoris s/p stenting 2012  Mixed hyperlipidemia  -check labs on lipitor 80  -     Lipid panel; Future; Expected date: 10/12/2018    Essential hypertension  -stable on losartan and metoprolol  -     Comprehensive metabolic panel; Future; Expected date: 10/12/2018    Anxiety and depression    Tubular adenoma of colon 2/2009  -asked wife to get copy of colonoscopy from [facility].    Screening for prostate cancer  -     PSA, Screening; Future; Expected date: 10/12/2018    Other orders  -     Cancel: Hemoglobin A1c; Future; Expected date: 10/12/2018        Medications Discontinued During This Encounter   Medication Reason    gabapentin (NEURONTIN) 100 MG capsule          Current Outpatient Medications:     amitriptyline (ELAVIL) 25 MG tablet, Take 1 tablet (25 mg total) by mouth nightly as needed for Insomnia., Disp: 90 tablet, Rfl: 3    aspirin (ECOTRIN) 81 MG EC tablet, Take 1 tablet (81 mg total) by mouth once daily., Disp: 90 tablet, Rfl: 3    atorvastatin (LIPITOR) 80 MG tablet, TAKE 1 TABLET EVERY EVENING, Disp: 90 tablet, Rfl: 3    furosemide (LASIX) 20 MG tablet, Take 1 tablet (20 mg total) by mouth once daily., Disp: 90 tablet, Rfl: 3    ibuprofen (ADVIL,MOTRIN) 600 MG tablet, Take 1 tablet (600 mg total) by mouth 3 (three) times daily., Disp: 15 tablet, Rfl: 0    isosorbide mononitrate (IMDUR) 60 MG 24 hr tablet, Take 1 tablet (60 mg total) by mouth once daily., Disp: 90 tablet, Rfl: 3    KRILL/OM3/DHA/EPA/OM6/LIP/ASTX (KRILL OIL, OMEGA 3 AND 6, ORAL), Take by mouth., Disp: ,  Rfl:     levothyroxine (SYNTHROID) 50 MCG tablet, Take 1 tablet (50 mcg total) by mouth before breakfast., Disp: 90 tablet, Rfl: 3    losartan (COZAAR) 50 MG tablet, Take 1 tablet (50 mg total) by mouth once daily., Disp: 90 tablet, Rfl: 3    metoprolol tartrate (LOPRESSOR) 25 MG tablet, Take 1 tablet (25 mg total) by mouth 2 (two) times daily., Disp: 180 tablet, Rfl: 3    nitroGLYCERIN (NITROSTAT) 0.4 MG SL tablet, Place 1 tablet (0.4 mg total) under the tongue every 5 (five) minutes as needed for Chest pain., Disp: 90 tablet, Rfl: 3    pantoprazole (PROTONIX) 40 MG tablet, Take 1 tablet (40 mg total) by mouth once daily., Disp: 30 tablet, Rfl: 11    tamsulosin (FLOMAX) 0.4 mg Cp24, Take 2 capsules (0.8 mg total) by mouth once daily., Disp: 180 capsule, Rfl: 3    valACYclovir (VALTREX) 1000 MG tablet, Take 1 tablet (1,000 mg total) by mouth 3 (three) times daily., Disp: 30 tablet, Rfl: 0    vitamin D 1000 units Tab, Take 1 tablet (1,000 Units total) by mouth once daily., Disp: 90 tablet, Rfl: 3    Follow Up: No Follow-up on file.    Subjective:     Chief Complaint   Patient presents with    Hypertension    Hyperlipidemia       RODO Alonzo is a 68 y.o. male, last appointment with this clinic was 5/10/2018.    CAD, stable angina; hx of MI;   9/27/2013 [facility] prox LAD 30% stenosis; RCA 20% stenosis. patent LAD stent.  ARB, beta blocker, statin, ASA; 3/9/2015 nu med stress test negative.   3/9/2015 TTE normal LVEF 55-60; concentric remodeling.  Lower arterial ABIs mild atherosclerotic plaquing, no stenosis about 30% of femoral up until arteries.  6/8/2017 TTE LVEF 55-60%.  Concentric remodeling.  Mild aortic root enlargement 3.8 cm  6/8/2017 nuclear medicine stress test normal myocardial perfusion.  HTN; losartan; furosemide; metoprolol; hx of diltiazem  Peripheral edema; takes lasix PRN.  hyperlipidemia; pravastatin changed 8/2017 to atorvastatin  anxiety and depression with possible effects of memory loss.  seen  by neuro 2015, MRI brain and carotid US all normal/negative.  12/3/2014 MRI brain: 1. No evidence for acute infarct 2. unremarkable MRI of the brain  12/3/2014 carotid US normal  Pt notes he was having a difficult time since the passing of his brother in 2010.  Feels like mood is OK now.  On amitriptyline for mood but also I think for nocturia.  pre-diabetes; a1c 3/2017 still pre-DM range.  hypothyroid on 25; TSH 3/2017 WNL  JAYLEN  BPH; flomax; with LUTS.  Notes nocturia. On amitryptiline  2/3/2009 colonoscopy tubular adenoma  Concussion after fall 1/2018    Patient recalls having had colonoscopy 2013.  Wife had previously shown the report to his previous primary but it is not scanned in the system.  He reports that it was normal and plan was to repeat in 10 years.  She thinks she may have the report at home and I have asked her to take a photograph and send it through the my [facility].    No complaints otherwise.  Fasting today.  Requesting labs.    Lab Results   Component Value Date    HGBA1C 5.7 03/14/2017     Answers for HPI/ROS submitted by the patient on 10/11/2018   activity change: No  unexpected weight change: No  rhinorrhea: No  trouble swallowing: No  visual disturbance: No  chest tightness: No  polyuria: No  difficulty urinating: No  joint swelling: Yes  arthralgias: Yes  confusion: No  dysphoric mood: No      Patient Care Team:  Michael Gonzalez MD as PCP - General (Internal Medicine)  Alexandria Leos MD as PCP - [facility]. 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  Mild/atherosclerotic plaquing. 2.  No stenosis about 30 percent femoral popliteal arteries. 3.  Normal bilateral ABIs.  Carotid US 12/3/14 1. Less than 50% stenosis of the right internal carotid artery. 2. Less than 50% stenosis of the left internal carotid artery.      Aortic ectasia 04/28/2017     "The aorta is elongated" - Xray Chest 5-      JAYLEN (obstructive sleep apnea) 04/28/2017    Hypothyroidism 06/01/2016    Obesity (BMI 30.0-34.9) 06/01/2016    Anxiety and depression 11/24/2014    Prediabetes 11/24/2014    Coronary artery disease of native artery of native heart with stable angina pectoris s/p stenting 2012 11/19/2014     Stent LAD 2012 9/27/2013 [facility] prox LAD 30% stenosis; RCA 20% stenosis. patent LAD stent.    3/9/2015 nu med stress test negative. 3/9/2015 TTE normal LVEF 55-60; concentric remodeling.  L MPI 6/8/17 (images pers rev) Nuclear Quantitative Functional Analysis:  LVEF: 68 % Impression: NORMAL MYOCARDIAL PERFUSION      Mixed hyperlipidemia 07/09/2014    Essential hypertension 07/09/2014    Gastroesophageal reflux disease 07/09/2014    Benign non-nodular prostatic hyperplasia with lower urinary tract symptoms 07/09/2014    Vitamin D deficiency 07/09/2014    Hernia of abdominal wall 07/09/2014       PAST MEDICAL HISTORY:  Past Medical History:   Diagnosis Date    Allergy     Angina pectoris     Anxiety     Coronary artery disease     Depression     GERD (gastroesophageal reflux disease)     Hyperlipidemia     Hypertension     Hypothyroidism     Memory loss     12/3/2014 MRI brain: 1. No evidence for acute infarct 2. unremarkable MRI of the brain; 12/3/2014 carotid US normal    Myocardial infarction     Obesity     Peripheral vascular disease 6/20/2017    Retrocalcaneal bursitis 11/24/2014    Sleep apnea        PAST SURGICAL HISTORY:  Past Surgical History:   Procedure Laterality Date    " APPENDECTOMY  1986    bone spur Right     COLONOSCOPY      hand trauma Right     pencil     heart stent         SOCIAL HISTORY:  Social History     Socioeconomic History    Marital status:      Spouse name: Not on file    Number of children: Not on file    Years of education: Not on file    Highest education level: Not on file   Social Needs    Financial resource strain: Not on file    Food insecurity - worry: Not on file    Food insecurity - inability: Not on file    Transportation needs - medical: Not on file    Transportation needs - non-medical: Not on file   Occupational History    Not on file   Tobacco Use    Smoking status: Never Smoker    Smokeless tobacco: Never Used   Substance and Sexual Activity    Alcohol use: No     Alcohol/week: 0.0 oz     Comment: rarely    Drug use: No    Sexual activity: Yes   Other Topics Concern    Not on file   Social History Narrative    Not on file       ALLERGIES AND MEDICATIONS: updated and reviewed.  Review of patient's allergies indicates:   Allergen Reactions    Iodine containing multivitamin Anaphylaxis    Naproxen Other (See Comments)     hallucinations    Hydrocodone-acetaminophen      Rash (skin)^    Oxycodone-acetaminophen      Rash (skin)^     Current Outpatient Medications   Medication Sig Dispense Refill    amitriptyline (ELAVIL) 25 MG tablet Take 1 tablet (25 mg total) by mouth nightly as needed for Insomnia. 90 tablet 3    aspirin (ECOTRIN) 81 MG EC tablet Take 1 tablet (81 mg total) by mouth once daily. 90 tablet 3    atorvastatin (LIPITOR) 80 MG tablet TAKE 1 TABLET EVERY EVENING 90 tablet 3    furosemide (LASIX) 20 MG tablet Take 1 tablet (20 mg total) by mouth once daily. 90 tablet 3    gabapentin (NEURONTIN) 100 MG capsule Take 1 capsule (100 mg total) by mouth 2 (two) times daily. 17 capsule 0    ibuprofen (ADVIL,MOTRIN) 600 MG tablet Take 1 tablet (600 mg total) by mouth 3 (three) times daily. 15 tablet 0     "isosorbide mononitrate (IMDUR) 60 MG 24 hr tablet Take 1 tablet (60 mg total) by mouth once daily. 90 tablet 3    KRILL/OM3/DHA/EPA/OM6/LIP/ASTX (KRILL OIL, OMEGA 3 AND 6, ORAL) Take by mouth.      levothyroxine (SYNTHROID) 50 MCG tablet Take 1 tablet (50 mcg total) by mouth before breakfast. 90 tablet 3    losartan (COZAAR) 50 MG tablet Take 1 tablet (50 mg total) by mouth once daily. 90 tablet 3    metoprolol tartrate (LOPRESSOR) 25 MG tablet Take 1 tablet (25 mg total) by mouth 2 (two) times daily. 180 tablet 3    nitroGLYCERIN (NITROSTAT) 0.4 MG SL tablet Place 1 tablet (0.4 mg total) under the tongue every 5 (five) minutes as needed for Chest pain. 90 tablet 3    pantoprazole (PROTONIX) 40 MG tablet Take 1 tablet (40 mg total) by mouth once daily. 30 tablet 11    tamsulosin (FLOMAX) 0.4 mg Cp24 Take 2 capsules (0.8 mg total) by mouth once daily. 180 capsule 3    valACYclovir (VALTREX) 1000 MG tablet Take 1 tablet (1,000 mg total) by mouth 3 (three) times daily. 30 tablet 0    vitamin D 1000 units Tab Take 1 tablet (1,000 Units total) by mouth once daily. 90 tablet 3     No current facility-administered medications for this visit.        Review of Systems   Constitutional: Negative for chills and fever.   HENT: Negative for hearing loss.    Eyes: Negative for discharge.   Respiratory: Negative for wheezing.    Cardiovascular: Negative for chest pain and palpitations.   Gastrointestinal: Negative for blood in stool, constipation, diarrhea and vomiting.   Genitourinary: Negative for hematuria and urgency.   Musculoskeletal: Negative for neck pain.   Neurological: Negative for weakness and headaches.   Endo/Heme/Allergies: Negative for polydipsia.       Objective:   Physical Exam   Vitals:    10/12/18 0858   BP: 110/70   Pulse: 72   Temp: 98.3 °F (36.8 °C)   SpO2: 98%   Weight: 110.2 kg (242 lb 15.2 oz)   Height: 5' 10" (1.778 m)    Body mass index is 34.86 kg/m².  Weight: 110.2 kg (242 lb 15.2 oz) " "  Height: 5' 10" (177.8 cm)     Physical Exam   Constitutional: He is oriented to person, place, and time. He appears well-developed and well-nourished. No distress.   Eyes: EOM are normal.   Cardiovascular: Normal rate, regular rhythm and normal heart sounds.   No murmur heard.  Pulmonary/Chest: Effort normal and breath sounds normal.   Musculoskeletal: Normal range of motion. He exhibits no edema.   Neurological: He is alert and oriented to person, place, and time. Coordination normal.   Skin: Skin is warm and dry.   Psychiatric: He has a normal mood and affect. His behavior is normal. Thought content normal.     "

## 2018-10-12 ENCOUNTER — OFFICE VISIT (OUTPATIENT)
Dept: FAMILY MEDICINE | Facility: CLINIC | Age: 68
End: 2018-10-12
Payer: MEDICARE

## 2018-10-12 ENCOUNTER — LAB VISIT (OUTPATIENT)
Dept: LAB | Facility: HOSPITAL | Age: 68
End: 2018-10-12
Attending: INTERNAL MEDICINE
Payer: MEDICARE

## 2018-10-12 VITALS
TEMPERATURE: 98 F | OXYGEN SATURATION: 98 % | DIASTOLIC BLOOD PRESSURE: 70 MMHG | WEIGHT: 242.94 LBS | HEART RATE: 72 BPM | SYSTOLIC BLOOD PRESSURE: 110 MMHG | BODY MASS INDEX: 34.78 KG/M2 | HEIGHT: 70 IN

## 2018-10-12 DIAGNOSIS — F32.A ANXIETY AND DEPRESSION: Chronic | ICD-10-CM

## 2018-10-12 DIAGNOSIS — E03.9 HYPOTHYROIDISM, UNSPECIFIED TYPE: Primary | Chronic | ICD-10-CM

## 2018-10-12 DIAGNOSIS — I25.118 CORONARY ARTERY DISEASE OF NATIVE ARTERY OF NATIVE HEART WITH STABLE ANGINA PECTORIS: Chronic | ICD-10-CM

## 2018-10-12 DIAGNOSIS — Z12.5 SCREENING FOR PROSTATE CANCER: ICD-10-CM

## 2018-10-12 DIAGNOSIS — E78.2 MIXED HYPERLIPIDEMIA: Chronic | ICD-10-CM

## 2018-10-12 DIAGNOSIS — I10 ESSENTIAL HYPERTENSION: Chronic | ICD-10-CM

## 2018-10-12 DIAGNOSIS — D12.6 TUBULAR ADENOMA OF COLON: ICD-10-CM

## 2018-10-12 DIAGNOSIS — R73.03 PREDIABETES: Chronic | ICD-10-CM

## 2018-10-12 DIAGNOSIS — F41.9 ANXIETY AND DEPRESSION: Chronic | ICD-10-CM

## 2018-10-12 LAB
ALBUMIN SERPL BCP-MCNC: 4 G/DL
ALP SERPL-CCNC: 61 U/L
ALT SERPL W/O P-5'-P-CCNC: 21 U/L
ANION GAP SERPL CALC-SCNC: 11 MMOL/L
AST SERPL-CCNC: 28 U/L
BILIRUB SERPL-MCNC: 0.9 MG/DL
BUN SERPL-MCNC: 12 MG/DL
CALCIUM SERPL-MCNC: 9.3 MG/DL
CHLORIDE SERPL-SCNC: 103 MMOL/L
CHOLEST SERPL-MCNC: 132 MG/DL
CHOLEST/HDLC SERPL: 3.7 {RATIO}
CO2 SERPL-SCNC: 26 MMOL/L
COMPLEXED PSA SERPL-MCNC: 0.76 NG/ML
CREAT SERPL-MCNC: 1.2 MG/DL
EST. GFR  (AFRICAN AMERICAN): >60 ML/MIN/1.73 M^2
EST. GFR  (NON AFRICAN AMERICAN): >60 ML/MIN/1.73 M^2
ESTIMATED AVG GLUCOSE: 140 MG/DL
GLUCOSE SERPL-MCNC: 184 MG/DL
HBA1C MFR BLD HPLC: 6.5 %
HDLC SERPL-MCNC: 36 MG/DL
HDLC SERPL: 27.3 %
LDLC SERPL CALC-MCNC: 42.4 MG/DL
NONHDLC SERPL-MCNC: 96 MG/DL
POTASSIUM SERPL-SCNC: 4.3 MMOL/L
PROT SERPL-MCNC: 6.9 G/DL
SODIUM SERPL-SCNC: 140 MMOL/L
TRIGL SERPL-MCNC: 268 MG/DL

## 2018-10-12 PROCEDURE — 36415 COLL VENOUS BLD VENIPUNCTURE: CPT | Mod: PO

## 2018-10-12 PROCEDURE — 3078F DIAST BP <80 MM HG: CPT | Mod: CPTII,,, | Performed by: INTERNAL MEDICINE

## 2018-10-12 PROCEDURE — 1101F PT FALLS ASSESS-DOCD LE1/YR: CPT | Mod: CPTII,,, | Performed by: INTERNAL MEDICINE

## 2018-10-12 PROCEDURE — 80053 COMPREHEN METABOLIC PANEL: CPT

## 2018-10-12 PROCEDURE — 84153 ASSAY OF PSA TOTAL: CPT

## 2018-10-12 PROCEDURE — 99999 PR PBB SHADOW E&M-EST. PATIENT-LVL III: CPT | Mod: PBBFAC,,, | Performed by: INTERNAL MEDICINE

## 2018-10-12 PROCEDURE — 99214 OFFICE O/P EST MOD 30 MIN: CPT | Mod: S$PBB,,, | Performed by: INTERNAL MEDICINE

## 2018-10-12 PROCEDURE — 3074F SYST BP LT 130 MM HG: CPT | Mod: CPTII,,, | Performed by: INTERNAL MEDICINE

## 2018-10-12 PROCEDURE — 80061 LIPID PANEL: CPT

## 2018-10-12 PROCEDURE — 99213 OFFICE O/P EST LOW 20 MIN: CPT | Mod: PBBFAC,PO | Performed by: INTERNAL MEDICINE

## 2018-10-12 PROCEDURE — 83036 HEMOGLOBIN GLYCOSYLATED A1C: CPT

## 2018-10-12 RX ORDER — METOPROLOL TARTRATE 25 MG/1
25 TABLET, FILM COATED ORAL 2 TIMES DAILY
Qty: 180 TABLET | Refills: 3 | Status: SHIPPED | OUTPATIENT
Start: 2018-10-12 | End: 2019-02-21 | Stop reason: SDUPTHER

## 2018-10-15 NOTE — PROGRESS NOTES
Hx of pre-DM a1c high at 6.5. Work on TLC  Lipid not ideal on max dose statin but nonHDL < 100 TG high  No changes  PSA WNL  CMP WNL  TSH WNL on dose  Results to pt. F/u 6 months.

## 2018-11-12 ENCOUNTER — PATIENT MESSAGE (OUTPATIENT)
Dept: FAMILY MEDICINE | Facility: CLINIC | Age: 68
End: 2018-11-12

## 2018-11-19 ENCOUNTER — OFFICE VISIT (OUTPATIENT)
Dept: CARDIOLOGY | Facility: CLINIC | Age: 68
End: 2018-11-19
Payer: MEDICARE

## 2018-11-19 VITALS
SYSTOLIC BLOOD PRESSURE: 132 MMHG | RESPIRATION RATE: 15 BRPM | OXYGEN SATURATION: 97 % | BODY MASS INDEX: 34.49 KG/M2 | WEIGHT: 240.94 LBS | DIASTOLIC BLOOD PRESSURE: 80 MMHG | HEART RATE: 76 BPM | HEIGHT: 70 IN

## 2018-11-19 DIAGNOSIS — I77.810 AORTIC ROOT DILATATION: ICD-10-CM

## 2018-11-19 DIAGNOSIS — I25.10 CORONARY ARTERY DISEASE INVOLVING NATIVE CORONARY ARTERY OF NATIVE HEART WITHOUT ANGINA PECTORIS: Primary | ICD-10-CM

## 2018-11-19 DIAGNOSIS — E66.9 OBESITY (BMI 30.0-34.9): Chronic | ICD-10-CM

## 2018-11-19 DIAGNOSIS — E78.2 MIXED HYPERLIPIDEMIA: Chronic | ICD-10-CM

## 2018-11-19 DIAGNOSIS — I10 ESSENTIAL HYPERTENSION: Chronic | ICD-10-CM

## 2018-11-19 DIAGNOSIS — G47.33 OSA (OBSTRUCTIVE SLEEP APNEA): Chronic | ICD-10-CM

## 2018-11-19 DIAGNOSIS — I87.2 VENOUS INSUFFICIENCY: ICD-10-CM

## 2018-11-19 PROCEDURE — 3079F DIAST BP 80-89 MM HG: CPT | Mod: CPTII,S$GLB,, | Performed by: INTERNAL MEDICINE

## 2018-11-19 PROCEDURE — 99214 OFFICE O/P EST MOD 30 MIN: CPT | Mod: S$GLB,,, | Performed by: INTERNAL MEDICINE

## 2018-11-19 PROCEDURE — 99999 PR PBB SHADOW E&M-EST. PATIENT-LVL III: CPT | Mod: PBBFAC,,, | Performed by: INTERNAL MEDICINE

## 2018-11-19 PROCEDURE — 3075F SYST BP GE 130 - 139MM HG: CPT | Mod: CPTII,S$GLB,, | Performed by: INTERNAL MEDICINE

## 2018-11-19 PROCEDURE — 1101F PT FALLS ASSESS-DOCD LE1/YR: CPT | Mod: CPTII,S$GLB,, | Performed by: INTERNAL MEDICINE

## 2018-11-19 NOTE — PROGRESS NOTES
CARDIOVASCULAR PROGRESS NOTE    REASON FOR CONSULT:   Clinton Rosenberg is a 68 y.o. male who presents for follow up of CAD.    PCP: Dair  HISTORY OF PRESENT ILLNESS:   The patient returns for follow-up.  After his last office visit, the patient was hospitalized with recurrent chest discomfort which was thought to be related to zoster.  Is since resolved.  He never got a zoster associated rash.  He does continue to complain of episodic stabled dyspnea on exertion.  He has had no further anginal type symptoms.  He otherwise denies palpitations, lightheadedness, dizziness, or syncope.  There has been no PND, orthopnea, or lower extremity edema.  He denies melena, hematuria, or claudicant symptoms.  He tells me he has not been wearing compression stockings, but has had no further lower extremity swelling.    CARDIOVASCULAR HISTORY:   CAD/MI/PCI, MPI 6/2017 normal.  JAYLEN, unable to afford CPAP  Aortic root dil, 3.7 cm (echo 3/2018)  CVI (bilat GSV, R LSV, US 3/2018)    PAST MEDICAL HISTORY:     Past Medical History:   Diagnosis Date    Allergy     Angina pectoris     Anxiety     Coronary artery disease     Depression     GERD (gastroesophageal reflux disease)     Hyperlipidemia     Hypertension     Hypothyroidism     Memory loss     12/3/2014 MRI brain: 1. No evidence for acute infarct 2. unremarkable MRI of the brain; 12/3/2014 carotid US normal    Myocardial infarction     Obesity     Peripheral vascular disease 6/20/2017    Retrocalcaneal bursitis 11/24/2014    Sleep apnea        PAST SURGICAL HISTORY:     Past Surgical History:   Procedure Laterality Date    APPENDECTOMY  1986    bone spur Right     COLONOSCOPY      hand trauma Right     pencil     heart stent         ALLERGIES AND MEDICATION:     Review of patient's allergies indicates:   Allergen Reactions    Iodine containing multivitamin Anaphylaxis    Naproxen Other (See Comments)     hallucinations    Hydrocodone-acetaminophen      Rash  (skin)^    Oxycodone-acetaminophen      Rash (skin)^        Medication List           Accurate as of 11/19/18  2:37 PM. If you have any questions, ask your nurse or doctor.               CONTINUE taking these medications    amitriptyline 25 MG tablet  Commonly known as:  ELAVIL  Take 1 tablet (25 mg total) by mouth nightly as needed for Insomnia.     aspirin 81 MG EC tablet  Commonly known as:  ECOTRIN  Take 1 tablet (81 mg total) by mouth once daily.     atorvastatin 80 MG tablet  Commonly known as:  LIPITOR  TAKE 1 TABLET EVERY EVENING     furosemide 20 MG tablet  Commonly known as:  LASIX  Take 1 tablet (20 mg total) by mouth once daily.     ibuprofen 600 MG tablet  Commonly known as:  ADVIL,MOTRIN  Take 1 tablet (600 mg total) by mouth 3 (three) times daily.     isosorbide mononitrate 60 MG 24 hr tablet  Commonly known as:  IMDUR  Take 1 tablet (60 mg total) by mouth once daily.     KRILL OIL (OMEGA 3 AND 6) ORAL     levothyroxine 50 MCG tablet  Commonly known as:  SYNTHROID  Take 1 tablet (50 mcg total) by mouth before breakfast.     losartan 50 MG tablet  Commonly known as:  COZAAR  Take 1 tablet (50 mg total) by mouth once daily.     metoprolol tartrate 25 MG tablet  Commonly known as:  LOPRESSOR  Take 1 tablet (25 mg total) by mouth 2 (two) times daily.     nitroGLYCERIN 0.4 MG SL tablet  Commonly known as:  NITROSTAT  Place 1 tablet (0.4 mg total) under the tongue every 5 (five) minutes as needed for Chest pain.     pantoprazole 40 MG tablet  Commonly known as:  PROTONIX  Take 1 tablet (40 mg total) by mouth once daily.     tamsulosin 0.4 mg Cap  Commonly known as:  FLOMAX  Take 2 capsules (0.8 mg total) by mouth once daily.     vitamin D 1000 units Tab  Commonly known as:  VITAMIN D3  Take 1 tablet (1,000 Units total) by mouth once daily.        STOP taking these medications    valACYclovir 1000 MG tablet  Commonly known as:  VALTREX  Stopped by:  Brad Bahena MD              SOCIAL HISTORY:      Social History     Socioeconomic History    Marital status:      Spouse name: Not on file    Number of children: Not on file    Years of education: Not on file    Highest education level: Not on file   Social Needs    Financial resource strain: Not on file    Food insecurity - worry: Not on file    Food insecurity - inability: Not on file    Transportation needs - medical: Not on file    Transportation needs - non-medical: Not on file   Occupational History    Not on file   Tobacco Use    Smoking status: Never Smoker    Smokeless tobacco: Never Used   Substance and Sexual Activity    Alcohol use: No     Alcohol/week: 0.0 oz     Comment: rarely    Drug use: No    Sexual activity: Yes   Other Topics Concern    Not on file   Social History Narrative    Not on file       FAMILY HISTORY:     Family History   Problem Relation Age of Onset    Arthritis Mother     Early death Mother     Heart disease Mother     Hypertension Mother     Migraines Mother     Seizures Mother     Tremor Mother     Diabetes Mother     Cancer Mother     Early death Father     Heart disease Father     Hypertension Father     Stroke Father     Diabetes Father     Alcohol abuse Father     Arthritis Brother     Cancer Brother     Diabetes Brother     Early death Brother     Heart disease Brother     Hypertension Brother     Heart disease Sister     Hypertension Sister     Arthritis Sister     Depression Sister     Heart disease Brother     Arthritis Brother     Heart disease Brother     Pneumonia Brother     Heart disease Brother     Hypertension Brother     Diabetes Brother     Heart disease Brother        REVIEW OF SYSTEMS:   Review of Systems   Constitutional: Negative for chills, diaphoresis and fever.   HENT: Negative for nosebleeds.    Eyes: Negative for blurred vision, double vision and photophobia.   Respiratory: Negative for hemoptysis, shortness of breath and wheezing.   "  Cardiovascular: Negative for chest pain, palpitations, orthopnea, claudication, leg swelling and PND.   Gastrointestinal: Negative for abdominal pain, blood in stool, heartburn, melena, nausea and vomiting.   Genitourinary: Negative for flank pain and hematuria.   Musculoskeletal: Negative for falls, myalgias and neck pain.   Skin: Negative for rash.   Neurological: Negative for dizziness, seizures, loss of consciousness, weakness and headaches.   Endo/Heme/Allergies: Negative for polydipsia. Does not bruise/bleed easily.   Psychiatric/Behavioral: Negative for depression and memory loss. The patient is not nervous/anxious.        PHYSICAL EXAM:     Vitals:    11/19/18 1427   BP: 132/80   Pulse: 76   Resp: 15    Body mass index is 34.57 kg/m².  Weight: 109.3 kg (240 lb 15.4 oz)   Height: 5' 10" (177.8 cm)     Physical Exam   Constitutional: He is oriented to person, place, and time. He appears well-developed and well-nourished. He is cooperative.  Non-toxic appearance. No distress.   HENT:   Head: Normocephalic and atraumatic.   Eyes: Conjunctivae and EOM are normal. Pupils are equal, round, and reactive to light. No scleral icterus.   Neck: Trachea normal and normal range of motion. Neck supple. Normal carotid pulses and no JVD present. Carotid bruit is not present. No neck rigidity. No tracheal deviation and no edema present. No thyromegaly present.   Cardiovascular: Normal rate, regular rhythm, S1 normal and S2 normal. PMI is not displaced. Exam reveals no gallop and no friction rub.   No murmur heard.  Pulses:       Carotid pulses are 2+ on the right side, and 2+ on the left side.  Pulmonary/Chest: Effort normal and breath sounds normal. No stridor. No respiratory distress. He has no wheezes. He has no rales. He exhibits no tenderness.   Abdominal: Soft. He exhibits no distension. There is no hepatosplenomegaly.   Obese   Musculoskeletal: Normal range of motion. He exhibits no edema or tenderness.   Feet: "   Right Foot:   Skin Integrity: Negative for ulcer.   Left Foot:   Skin Integrity: Negative for ulcer.   Neurological: He is alert and oriented to person, place, and time. No cranial nerve deficit.   Skin: Skin is warm and dry. No rash noted. No erythema.   Psychiatric: He has a normal mood and affect. His speech is normal and behavior is normal.   Vitals reviewed.      DATA:   EKG: (personally reviewed tracing)  3/15/18 SR 63, PRWP, c/r/o IMI-age indet    Laboratory:  CBC:  Recent Labs   Lab 07/26/17  0720 05/19/18  1400 05/20/18  0151   WHITE BLOOD CELL COUNT 7.98 6.95 8.47   HEMOGLOBIN 15.2 15.8 15.5   HEMATOCRIT 43.7 43.4 43.2   PLATELETS 167 178 164       CHEMISTRIES:  Recent Labs   Lab 05/19/18  1400 05/20/18  0151 10/12/18  0947   GLUCOSE 180 H 130 H 184 H   SODIUM 140 143 140   POTASSIUM 4.4 4.7 4.3   BUN BLD 10 12 12   CREATININE 1.2 1.3 1.2   EGFR IF AFRICAN AMERICAN >60 >60 >60.0   EGFR IF NON- >60 57 A >60.0   CALCIUM 9.2 9.4 9.3   MAGNESIUM  --  2.4  --        CARDIAC BIOMARKERS:  Recent Labs   Lab 05/19/18  1400 05/19/18  1941 05/20/18  0151   TROPONIN I 0.010 0.010 <0.006       COAGS:        LIPIDS/LFTS:  Recent Labs   Lab 10/31/17  0837 05/19/18  1400 05/19/18  1941 05/20/18  0151 10/12/18  0947   CHOLESTEROL 101 L  --  117 L  --  132   TRIGLYCERIDES 245 H  --  368 H  --  268 H   HDL 29 L  --  29 L  --  36 L   LDL CHOLESTEROL 23.0 L  --  14.4 L  --  42.4 L   NON-HDL CHOLESTEROL 72  --  88  --  96   AST 18 18  --  19 28   ALT 19 22  --  25 21       Cardiovascular Testing:  Echo 3/15/18    1 - TDS.     2 - Normal left ventricular systolic function (EF 60-65%).  Cannot exclude basal inferior hypokinesis.     3 - Upper limit of normal aortic root, 3.7cm.     Venous US/reflux 3/15/18  RIGHT:  No evidence of Right lower extremity DVT.    There is reflux in the Greater Saphenous and Lesser Saphenous Veins.    R GSV dist thigh 2186 msec  R GSV in calf 2339 msec  R LSV 4006 msec   LEFT:  No  evidence of Left lower extremity DVT.    There is reflux in the Greater Saphenous Vein.  R GSV mid thigh 2422 msec  R GSV dist thigh 1667 msec  R GSV calf 5647 msec      L MPI 6/8/17  Nuclear Quantitative Functional Analysis:   LVEF: 68 %  Impression: NORMAL MYOCARDIAL PERFUSION  1. The perfusion scan is free of evidence for myocardial ischemia or injury.   2. There is a mild intensity fixed defect in the inferior wall of the left ventricle, secondary to diaphragm attenuation.   3. Resting wall motion is physiologic.   4. Resting LV function is normal.   5. The ventricular volumes are normal at rest and stress.   6. The extracardiac distribution of radioactivity is normal.   7. When compared to the previous study from 03/09/2015, no change    Holter 6/8/17  PREDOMINANT RHYTHM  1. Sinus rhythm with heart rates varying between 43 and 94 bpm with an average of 62 bpm.   VENTRICULAR ARRHYTHMIAS  1. There were very rare PVCs recorded totalling 5 and averaging less than 1 per hour.   2. There were no episodes of ventricular tachycardia.  SUPRA VENTRICULAR ARRHYTHMIAS  1. There were very rare PACs recorded totalling 3 and averaging less than 1 per hour.   2. There were no episodes of sustained supraventricular tachycardia.  SINUS NODE FUNCTION  1. There was no evidence of high grade SA deanne block.   AV CONDUCTION  1. There was no evidence of high grade AV block.   DIARY  1. The diary was not returned  MISCELLANEOUS  1. There were rare hookup related artifacts. Overall, the study was of good quality.   2. This was a tape of adequate length (24 hrs).    LE art US/TANA 6/8/17  1.  Mild/atherosclerotic plaquing.  2.  No stenosis about 30 percent femoral popliteal arteries.  3.  Normal bilateral ABIs.    Carotid US 12/3/14  1. Less than 50% stenosis of the right internal carotid artery.  2. Less than 50% stenosis of the left internal carotid artery.    Cath 9/27/13 (Per Dr. Rivera note 11/19/14)  FINDINGS:   Left main, no  luminal irregularities.   LAD: The proximal LAD has a 30% stenosis and widely patent. The remainder of the LAD has luminal  irregularities. First diagonal, luminal irregularities. Second diagonal, luminal irregularities.   Left circumflex artery, luminal irregularities. OM-1, luminal irregularities. OM-2, luminal irregularities.   Right coronary artery has a 20% stenosis and the distal right coronary artery has a 1% stenosis. The  remainder of the artery has luminal irregularities. Posterior lateral branch  has luminal irregularities. Posterior descending artery has luminal  irregularities.  SUMMARY: LVEDP = 10 mmHg. LVEF = 65%. Patent LAD stent.    ASSESSMENT:   # CAD s/p PCI.  MPI 6/2017 normal.   # HTN, controlled  # HLP, on atorva 80mg  # Ao root dil, 3.7 cm (echo 3/2018)  # CVI (bilat GSV, R LSV) on US 3/2018.  Pt not interested in compression rx.  No edema at present.  # JAYLEN, pt unable to obtain CPAP apparatus  # BMI 35, up 1 unit vs last OV  # Iodine allergy (?Betadine)    PLAN:   Cont med rx  Diet/exercise/weight loss  RTC 6 months with echo and MPI (May 2019)      Brad Bahena MD, FACC

## 2018-11-19 NOTE — TELEPHONE ENCOUNTER
Can we call pt/wife - I sent them my [facility] message but does not appear they read it.  Was she able to get old records re: colonoscopy done at [facility]?  Can she take a photo and send to me via [facility]?

## 2018-11-20 PROBLEM — R07.9 CHEST PAIN: Status: RESOLVED | Noted: 2018-05-16 | Resolved: 2018-11-20

## 2019-01-24 ENCOUNTER — PES CALL (OUTPATIENT)
Dept: ADMINISTRATIVE | Facility: CLINIC | Age: 69
End: 2019-01-24

## 2019-02-21 ENCOUNTER — OFFICE VISIT (OUTPATIENT)
Dept: FAMILY MEDICINE | Facility: CLINIC | Age: 69
End: 2019-02-21
Payer: MEDICARE

## 2019-02-21 VITALS
DIASTOLIC BLOOD PRESSURE: 72 MMHG | HEART RATE: 68 BPM | TEMPERATURE: 98 F | HEIGHT: 70 IN | BODY MASS INDEX: 34.36 KG/M2 | SYSTOLIC BLOOD PRESSURE: 102 MMHG | OXYGEN SATURATION: 95 % | WEIGHT: 240 LBS

## 2019-02-21 DIAGNOSIS — N40.1 BENIGN NON-NODULAR PROSTATIC HYPERPLASIA WITH LOWER URINARY TRACT SYMPTOMS: Chronic | ICD-10-CM

## 2019-02-21 DIAGNOSIS — K43.9 HERNIA OF ABDOMINAL WALL: ICD-10-CM

## 2019-02-21 DIAGNOSIS — E78.2 MIXED HYPERLIPIDEMIA: Chronic | ICD-10-CM

## 2019-02-21 DIAGNOSIS — I77.819 AORTIC ECTASIA: ICD-10-CM

## 2019-02-21 DIAGNOSIS — I10 ESSENTIAL HYPERTENSION: Chronic | ICD-10-CM

## 2019-02-21 DIAGNOSIS — E66.9 OBESITY (BMI 30.0-34.9): Chronic | ICD-10-CM

## 2019-02-21 DIAGNOSIS — E55.9 VITAMIN D DEFICIENCY: Chronic | ICD-10-CM

## 2019-02-21 DIAGNOSIS — I87.2 VENOUS INSUFFICIENCY: ICD-10-CM

## 2019-02-21 DIAGNOSIS — R60.0 PERIPHERAL EDEMA: Chronic | ICD-10-CM

## 2019-02-21 DIAGNOSIS — R73.03 PREDIABETES: Chronic | ICD-10-CM

## 2019-02-21 DIAGNOSIS — I25.118 CORONARY ARTERY DISEASE OF NATIVE ARTERY OF NATIVE HEART WITH STABLE ANGINA PECTORIS: ICD-10-CM

## 2019-02-21 DIAGNOSIS — F41.9 ANXIETY AND DEPRESSION: Chronic | ICD-10-CM

## 2019-02-21 DIAGNOSIS — G47.33 OSA (OBSTRUCTIVE SLEEP APNEA): Chronic | ICD-10-CM

## 2019-02-21 DIAGNOSIS — K21.9 GASTROESOPHAGEAL REFLUX DISEASE, ESOPHAGITIS PRESENCE NOT SPECIFIED: Chronic | ICD-10-CM

## 2019-02-21 DIAGNOSIS — D12.6 TUBULAR ADENOMA OF COLON: ICD-10-CM

## 2019-02-21 DIAGNOSIS — I73.9 PERIPHERAL VASCULAR DISEASE: ICD-10-CM

## 2019-02-21 DIAGNOSIS — I25.10 CORONARY ARTERY DISEASE INVOLVING NATIVE CORONARY ARTERY OF NATIVE HEART WITHOUT ANGINA PECTORIS: ICD-10-CM

## 2019-02-21 DIAGNOSIS — E03.9 HYPOTHYROIDISM, UNSPECIFIED TYPE: Chronic | ICD-10-CM

## 2019-02-21 DIAGNOSIS — I77.810 AORTIC ROOT DILATATION: ICD-10-CM

## 2019-02-21 DIAGNOSIS — F32.A ANXIETY AND DEPRESSION: Chronic | ICD-10-CM

## 2019-02-21 DIAGNOSIS — Z00.00 ENCOUNTER FOR PREVENTIVE HEALTH EXAMINATION: Primary | ICD-10-CM

## 2019-02-21 PROCEDURE — 99499 UNLISTED E&M SERVICE: CPT | Mod: HCNC,S$GLB,, | Performed by: NURSE PRACTITIONER

## 2019-02-21 PROCEDURE — 99499 RISK ADDL DX/OHS AUDIT: ICD-10-PCS | Mod: HCNC,S$GLB,, | Performed by: NURSE PRACTITIONER

## 2019-02-21 PROCEDURE — 99999 PR PBB SHADOW E&M-EST. PATIENT-LVL V: ICD-10-PCS | Mod: PBBFAC,HCNC,, | Performed by: NURSE PRACTITIONER

## 2019-02-21 PROCEDURE — 3078F DIAST BP <80 MM HG: CPT | Mod: HCNC,CPTII,S$GLB, | Performed by: NURSE PRACTITIONER

## 2019-02-21 PROCEDURE — 99999 PR PBB SHADOW E&M-EST. PATIENT-LVL V: CPT | Mod: PBBFAC,HCNC,, | Performed by: NURSE PRACTITIONER

## 2019-02-21 PROCEDURE — 3074F SYST BP LT 130 MM HG: CPT | Mod: HCNC,CPTII,S$GLB, | Performed by: NURSE PRACTITIONER

## 2019-02-21 PROCEDURE — G0439 PR MEDICARE ANNUAL WELLNESS SUBSEQUENT VISIT: ICD-10-PCS | Mod: HCNC,S$GLB,, | Performed by: NURSE PRACTITIONER

## 2019-02-21 PROCEDURE — 3074F PR MOST RECENT SYSTOLIC BLOOD PRESSURE < 130 MM HG: ICD-10-PCS | Mod: HCNC,CPTII,S$GLB, | Performed by: NURSE PRACTITIONER

## 2019-02-21 PROCEDURE — G0439 PPPS, SUBSEQ VISIT: HCPCS | Mod: HCNC,S$GLB,, | Performed by: NURSE PRACTITIONER

## 2019-02-21 PROCEDURE — 3078F PR MOST RECENT DIASTOLIC BLOOD PRESSURE < 80 MM HG: ICD-10-PCS | Mod: HCNC,CPTII,S$GLB, | Performed by: NURSE PRACTITIONER

## 2019-02-21 RX ORDER — TAMSULOSIN HYDROCHLORIDE 0.4 MG/1
2 CAPSULE ORAL DAILY
Qty: 90 CAPSULE | Refills: 0 | Status: SHIPPED | OUTPATIENT
Start: 2019-02-21 | End: 2019-04-17 | Stop reason: SDUPTHER

## 2019-02-21 RX ORDER — LOSARTAN POTASSIUM 50 MG/1
50 TABLET ORAL DAILY
Qty: 90 TABLET | Refills: 0 | Status: SHIPPED | OUTPATIENT
Start: 2019-02-21 | End: 2019-04-17 | Stop reason: SDUPTHER

## 2019-02-21 RX ORDER — PANTOPRAZOLE SODIUM 40 MG/1
40 TABLET, DELAYED RELEASE ORAL DAILY
Qty: 90 TABLET | Refills: 0 | Status: SHIPPED | OUTPATIENT
Start: 2019-02-21 | End: 2019-08-26 | Stop reason: SDUPTHER

## 2019-02-21 RX ORDER — METOPROLOL TARTRATE 25 MG/1
25 TABLET, FILM COATED ORAL 2 TIMES DAILY
Qty: 180 TABLET | Refills: 0 | Status: SHIPPED | OUTPATIENT
Start: 2019-02-21 | End: 2019-04-17 | Stop reason: SDUPTHER

## 2019-02-21 RX ORDER — ISOSORBIDE MONONITRATE 60 MG/1
60 TABLET, EXTENDED RELEASE ORAL DAILY
Qty: 90 TABLET | Refills: 0 | Status: SHIPPED | OUTPATIENT
Start: 2019-02-21 | End: 2019-04-06 | Stop reason: SDUPTHER

## 2019-02-21 RX ORDER — LEVOTHYROXINE SODIUM 50 UG/1
50 TABLET ORAL
Qty: 90 TABLET | Refills: 0 | Status: SHIPPED | OUTPATIENT
Start: 2019-02-21 | End: 2019-04-17 | Stop reason: SDUPTHER

## 2019-02-21 RX ORDER — FUROSEMIDE 20 MG/1
20 TABLET ORAL DAILY
Qty: 90 TABLET | Refills: 0 | Status: SHIPPED | OUTPATIENT
Start: 2019-02-21 | End: 2019-04-17 | Stop reason: SDUPTHER

## 2019-02-21 RX ORDER — ATORVASTATIN CALCIUM 80 MG/1
80 TABLET, FILM COATED ORAL NIGHTLY
Qty: 90 TABLET | Refills: 0 | Status: SHIPPED | OUTPATIENT
Start: 2019-02-21 | End: 2019-04-06 | Stop reason: SDUPTHER

## 2019-02-21 NOTE — PROGRESS NOTES
"Clinton Rosenberg presented for a  Medicare AWV and comprehensive Health Risk Assessment today. The following components were reviewed and updated:    · Medical history  · Family History  · Social history  · Allergies and Current Medications  · Health Risk Assessment  · Health Maintenance  · Care Team     ** See Completed Assessments for Annual Wellness Visit within the encounter summary.**       The following assessments were completed:  · Living Situation  · CAGE  · Depression Screening  · Timed Get Up and Go  · Whisper Test  · Cognitive Function Screening  · Nutrition Screening  · ADL Screening  · PAQ Screening    Vitals:    02/21/19 1201   BP: 102/72   BP Location: Left arm   Patient Position: Sitting   BP Method: Medium (Manual)   Pulse: 68   Temp: 98.3 °F (36.8 °C)   TempSrc: Oral   SpO2: 95%   Weight: 108.9 kg (240 lb)   Height: 5' 10" (1.778 m)     Body mass index is 34.44 kg/m².  Physical Exam   Constitutional: He is oriented to person, place, and time. He appears well-developed and well-nourished.   Pulmonary/Chest: Effort normal.   Neurological: He is alert and oriented to person, place, and time.   Skin: Skin is warm.   Psychiatric: He has a normal mood and affect.             Diagnoses and health risks identified today and associated recommendations/orders:    1. Encounter for preventive health examination  Patient was seen today for AWV.  Healthcare maintenance and screening recommendations were discussed and updated as indicated.  Return in one year for AWV.    2. Coronary artery disease of native artery of native heart with stable angina pectoris  The current medical regimen is effective;  continue present plan and medications.  - isosorbide mononitrate (IMDUR) 60 MG 24 hr tablet; Take 1 tablet (60 mg total) by mouth once daily.  Dispense: 90 tablet; Refill: 0  - losartan (COZAAR) 50 MG tablet; Take 1 tablet (50 mg total) by mouth once daily.  Dispense: 90 tablet; Refill: 0  - metoprolol tartrate " (LOPRESSOR) 25 MG tablet; Take 1 tablet (25 mg total) by mouth 2 (two) times daily.  Dispense: 180 tablet; Refill: 0    3. Aortic root dilatation  The current medical regimen is effective;  continue present plan and medications.    4. Aortic ectasia  The current medical regimen is effective;  continue present plan and medications.    5. Peripheral vascular disease  The current medical regimen is effective;  continue present plan and medications.    6. Anxiety and depression  The current medical regimen is effective;  continue present plan and medications.    7. Mixed hyperlipidemia  The current medical regimen is effective;  continue present plan and medications.    8. Essential hypertension  The current medical regimen is effective;  continue present plan and medications.  - losartan (COZAAR) 50 MG tablet; Take 1 tablet (50 mg total) by mouth once daily.  Dispense: 90 tablet; Refill: 0  - metoprolol tartrate (LOPRESSOR) 25 MG tablet; Take 1 tablet (25 mg total) by mouth 2 (two) times daily.  Dispense: 180 tablet; Refill: 0    9. Coronary artery disease involving native coronary artery of native heart without angina pectoris s/p stent LAD 2012; 2013 [facility] with CAD and patent stent  The current medical regimen is effective;  continue present plan and medications.    10. Venous insufficiency  The current medical regimen is effective;  continue present plan and medications.    11. Benign non-nodular prostatic hyperplasia with lower urinary tract symptoms  The current medical regimen is effective;  continue present plan and medications.  - tamsulosin (FLOMAX) 0.4 mg Cap; Take 2 capsules (0.8 mg total) by mouth once daily.  Dispense: 90 capsule; Refill: 0    12. Vitamin D deficiency  The current medical regimen is effective;  continue present plan and medications.    13. Prediabetes  The current medical regimen is effective;  continue present plan and medications.    14. Hypothyroidism, unspecified type  The current  medical regimen is effective;  continue present plan and medications.  - levothyroxine (SYNTHROID) 50 MCG tablet; Take 1 tablet (50 mcg total) by mouth before breakfast.  Dispense: 90 tablet; Refill: 0    15. Obesity (BMI 30.0-34.9)  The patient is asked to make an attempt to improve diet and exercise patterns to aid in medical management of this problem.    16. Gastroesophageal reflux disease, esophagitis presence not specified  The current medical regimen is effective;  continue present plan and medications.    17. Hernia of abdominal wall  The current medical regimen is effective;  continue present plan and medications.    18. Tubular adenoma of colon 2/2009  The current medical regimen is effective;  continue present plan and medications.    19. JAYLEN (obstructive sleep apnea)  The current medical regimen is effective;  continue present plan and medications.    20. Peripheral edema  The current medical regimen is effective;  continue present plan and medications.  - furosemide (LASIX) 20 MG tablet; Take 1 tablet (20 mg total) by mouth once daily.  Dispense: 90 tablet; Refill: 0      Provided Clinton with a 5-10 year written screening schedule and personal prevention plan. Recommendations were developed using the USPSTF age appropriate recommendations. Education, counseling, and referrals were provided as needed. After Visit Summary printed and given to patient which includes a list of additional screenings\tests needed.    Follow-up in about 1 year (around 2/21/2020) for annual wellness visit.    Gabrielle Sawant NP

## 2019-02-21 NOTE — PATIENT INSTRUCTIONS
Counseling and Referral of Other Preventative  (Italic type indicates deductible and co-insurance are waived)    Patient Name: Clinton Rosenberg  Today's Date: 2/21/2019    Health Maintenance       Date Due Completion Date    Zoster Vaccine 09/30/2012 ---    Pneumococcal Vaccine (65+ Low/Medium Risk) (1 of 2 - PCV13) 09/30/2017 ---    Colonoscopy 02/03/2019 2/3/2009    High Dose Statin 11/19/2019 11/19/2018    Lipid Panel 10/12/2023 10/12/2018    TETANUS VACCINE 06/01/2026 6/1/2016        No orders of the defined types were placed in this encounter.    The following information is provided to all patients.  This information is to help you find resources for any of the problems found today that may be affecting your health:                Living healthy guide: www.[facility].[place].gov      Understanding Diabetes: www.diabetes.org      Eating healthy: www.cdc.gov/healthyweight      [facility] home safety checklist: www.cdc.gov/steadi/patient.html      Agency on Aging: www.goea.[place].gov      Alcoholics anonymous (AA): www.aa.org      Physical Activity: www.melquiades.nih.gov/uk1wdpp      Tobacco use: www.quitwithusla.org

## 2019-04-06 DIAGNOSIS — I25.118 CORONARY ARTERY DISEASE OF NATIVE ARTERY OF NATIVE HEART WITH STABLE ANGINA PECTORIS: ICD-10-CM

## 2019-04-08 RX ORDER — ISOSORBIDE MONONITRATE 60 MG/1
TABLET, EXTENDED RELEASE ORAL
Qty: 90 TABLET | Refills: 0 | Status: SHIPPED | OUTPATIENT
Start: 2019-04-08 | End: 2019-04-17 | Stop reason: SDUPTHER

## 2019-04-08 RX ORDER — ATORVASTATIN CALCIUM 80 MG/1
TABLET, FILM COATED ORAL
Qty: 90 TABLET | Refills: 0 | Status: SHIPPED | OUTPATIENT
Start: 2019-04-08 | End: 2019-04-17 | Stop reason: SDUPTHER

## 2019-04-16 PROBLEM — I25.118 CORONARY ARTERY DISEASE OF NATIVE ARTERY OF NATIVE HEART WITH STABLE ANGINA PECTORIS: Status: RESOLVED | Noted: 2019-02-21 | Resolved: 2019-04-16

## 2019-04-16 NOTE — PROGRESS NOTES
This note was created by combination of typed  and Dragon dictation.  Transcription errors may be present.  If there are any questions, please contact me.    Assessment & Plan:   Fall from ladder, initial encounter  Upper back pain  Neck pain  Acute midline low back pain with right-sided sciatica  Left arm pain  -fall a few weeks ago with continued pain.  Check XR of C/T/L spines and left shoulder; medrol dose leonela; tizanidine.  Declines formal PT.  If abn XR - to ortho.  Sciatica type symptoms on the right, he notes hx of LBP before with right sided sciatica in the past.  -     X-Ray Thoracic Spine AP Lateral; Future; Expected date: 04/17/2019  -     X-Ray Lumbar Spine Ap And Lateral; Future; Expected date: 04/17/2019  -     X-Ray Shoulder 2 or More Views Left; Future; Expected date: 04/17/2019  -     X-Ray Cervical Spine AP And Lateral; Future; Expected date: 04/17/2019  -     tiZANidine (ZANAFLEX) 2 MG tablet; Take 2 tablets (4 mg total) by mouth every 6 (six) hours as needed.  Dispense: 30 tablet; Refill: 0  -     methylPREDNISolone (MEDROL DOSEPACK) 4 mg tablet; use as directed  Dispense: 1 Package; Refill: 0    Peripheral edema  -refilled lasix  -     furosemide (LASIX) 20 MG tablet; Take 1 tablet (20 mg total) by mouth once daily.  Dispense: 90 tablet; Refill: 3    Coronary artery disease of native artery of native heart with stable angina pectoris  -seen by cards plan is routine TTE and stress test.  Refilled lipitor, losartan, imdur, metoprolol  -     atorvastatin (LIPITOR) 80 MG tablet; Take 1 tablet (80 mg total) by mouth every evening.  Dispense: 90 tablet; Refill: 3  -     isosorbide mononitrate (IMDUR) 60 MG 24 hr tablet; Take 1 tablet (60 mg total) by mouth once daily.  Dispense: 90 tablet; Refill: 3  -     losartan (COZAAR) 50 MG tablet; Take 1 tablet (50 mg total) by mouth once daily.  Dispense: 90 tablet; Refill: 3  -     metoprolol tartrate (LOPRESSOR) 25 MG tablet; Take 1 tablet (25 mg  total) by mouth 2 (two) times daily.  Dispense: 180 tablet; Refill: 3    Essential hypertension  -stable, refilled losartan and metoprolol  -     losartan (COZAAR) 50 MG tablet; Take 1 tablet (50 mg total) by mouth once daily.  Dispense: 90 tablet; Refill: 3  -     metoprolol tartrate (LOPRESSOR) 25 MG tablet; Take 1 tablet (25 mg total) by mouth 2 (two) times daily.  Dispense: 180 tablet; Refill: 3    Hypothyroidism, unspecified type  -refilled levothyroxine last check 10/2018 normal  -     levothyroxine (SYNTHROID) 50 MCG tablet; Take 1 tablet (50 mcg total) by mouth before breakfast.  Dispense: 90 tablet; Refill: 3    Benign non-nodular prostatic hyperplasia with lower urinary tract symptoms  -refilled flomax  -     tamsulosin (FLOMAX) 0.4 mg Cap; Take 2 capsules (0.8 mg total) by mouth once daily.  Dispense: 90 capsule; Refill: 3    Mixed hyperlipidemia  -refilled lipitor last lipid 10/2018 normal  -     atorvastatin (LIPITOR) 80 MG tablet; Take 1 tablet (80 mg total) by mouth every evening.  Dispense: 90 tablet; Refill: 3    Prediabetes  -check a1c if still climbing may need metformin.  Really needs to work on diet primarily.  -     Hemoglobin A1c; Future; Expected date: 04/17/2019      Medications Discontinued During This Encounter   Medication Reason    ibuprofen (ADVIL,MOTRIN) 600 MG tablet Patient no longer taking       meds sent this encounter:       Follow Up: No follow-ups on file.    Subjective:     Chief Complaint   Patient presents with    Back Pain    Arm Pain     having numbness    Fall     3 weeks ago. taking advil       RODO Alonzo is a 68 y.o. male, last appointment with this clinic was 2/21/2019.    I previously saw him back in October.  Labs: Hx of pre-DM a1c high at 6.5. Work on TLC  Lipid not ideal on max dose statin but nonHDL < 100 TG high  No changes  PSA WNL  CMP WNL  TSH WNL on dose  Results to pt. F/u 6 months.    Colonoscopy 2009 with tubular adenoma but he thinks he had a colonoscopy  since then.    Saw cardiology, plan was TTE and stress test.  Needs repeat A1c.    Need to get old records regarding colonoscopy    A few weeks ago - fell off a ladder about 4 feet up - hit the upper back and now having shooting pain in the left arm, hard to get comfortable, hard to move the arm. He fell onto a bunch of plastic containers, a welding machine too. Hit head on the side of the building. Main complaint is the back and the arm. No headache; neck pain midline posterior; hard to axially rotate to look over the left shoulder; midline thoracic back between shoulder blades hurt; lower back pain too; having numbness on the right leg down to the toes. Hx of sciatica in the past affecting the right leg.  No incontinence.  advil 600-800 mg at a time, TID. Some GI upset with this.     Needs meds refilled to mail order.    a1c abn- has not modified diet- sweets; food portions are still an issue.    Answers for HPI/ROS submitted by the patient on 4/16/2019   Back pain  Chronicity: new  Onset: 1 to 4 weeks ago  Frequency: constantly  Progression since onset: gradually worsening  Pain location: thoracic spine  Pain quality: shooting  Radiates to: right foot, right thigh  Pain - numeric: 6/10  Pain is: the same all the time  Aggravated by: bending, coughing, position, lying down, sitting, standing, twisting  Stiffness is present: all day  bladder incontinence: No  bowel incontinence: Yes  leg pain: Yes  numbness: Yes  paresis: No  paresthesias: Yes  pelvic pain: Yes  perianal numbness: No  genital pain: Yes  Risk factors: history of cancer, obesity, recent trauma  Pain severity: moderate  Treatments tried: NSAIDs, bed rest, heat, home exercises, ice  Improvement on treatment: mild      Patient Care Team:  Michael Gonzalez MD as PCP - General (Internal Medicine)  ADAN To MD as Consulting Physician (Urology)  Andrea Hurley MD (Neurology)  Desmond Lee MD as Consulting Physician (Colon and Rectal  "Surgery)    Patient Active Problem List    Diagnosis Date Noted    Tubular adenoma of colon 2/2009 10/11/2018    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  Mild/atherosclerotic plaquing. 2.  No stenosis about 30 percent femoral popliteal arteries. 3.  Normal bilateral ABIs.  Carotid US 12/3/14 1. Less than 50% stenosis of the right internal carotid artery. 2. Less than 50% stenosis of the left internal carotid artery.      Aortic ectasia 04/28/2017     "The aorta is elongated" - Xray Chest 5-      JAYLEN (obstructive sleep apnea) 04/28/2017    Hypothyroidism 06/01/2016    Obesity (BMI 30.0-34.9) 06/01/2016    Anxiety and depression with assoc memory loss; seen by neuro 2015, negative workup 11/24/2014     12/3/2014 MRI brain: 1. No evidence for acute infarct 2. unremarkable MRI of the brain  12/3/2014 carotid US normal      Prediabetes 11/24/2014    Coronary artery disease involving native coronary artery of native heart without angina pectoris s/p stent LAD 2012; 2013 [facility] with CAD and patent stent 11/19/2014     Stent LAD 2012 9/27/2013 [facility] prox LAD 30% stenosis; RCA 20% stenosis. patent LAD stent.    3/9/2015 nu med stress test negative. 3/9/2015 TTE normal LVEF 55-60; concentric remodeling.  L MPI 6/8/17 (images pers rev) Nuclear Quantitative Functional Analysis:  LVEF: 68 % Impression: NORMAL MYOCARDIAL PERFUSION  Echo 3/15/18    1 - TDS.     2 - Normal left ventricular systolic function (EF 60-65%).  Cannot exclude basal inferior hypokinesis.     3 - Upper limit of normal aortic root, 3.7cm.       Mixed hyperlipidemia 07/09/2014    Essential hypertension 07/09/2014    Gastroesophageal reflux disease 07/09/2014    Benign non-nodular prostatic " hyperplasia with lower urinary tract symptoms 07/09/2014    Vitamin D deficiency 07/09/2014    Hernia of abdominal wall 07/09/2014       PAST MEDICAL HISTORY:  Past Medical History:   Diagnosis Date    Allergy     Angina pectoris     Anxiety     Coronary artery disease     Depression     GERD (gastroesophageal reflux disease)     Hyperlipidemia     Hypertension     Hypothyroidism     Memory loss     12/3/2014 MRI brain: 1. No evidence for acute infarct 2. unremarkable MRI of the brain; 12/3/2014 carotid US normal    Myocardial infarction     Obesity     Peripheral vascular disease 6/20/2017    Retrocalcaneal bursitis 11/24/2014    Sleep apnea        PAST SURGICAL HISTORY:  Past Surgical History:   Procedure Laterality Date    APPENDECTOMY  1986    bone spur Right     COLONOSCOPY      hand trauma Right     pencil     heart stent         SOCIAL HISTORY:  Social History     Socioeconomic History    Marital status:      Spouse name: Not on file    Number of children: Not on file    Years of education: Not on file    Highest education level: Not on file   Occupational History    Not on file   Social Needs    Financial resource strain: Not very hard    Food insecurity:     Worry: Never true     Inability: Never true    Transportation needs:     Medical: No     Non-medical: No   Tobacco Use    Smoking status: Never Smoker    Smokeless tobacco: Never Used   Substance and Sexual Activity    Alcohol use: No     Alcohol/week: 0.0 oz     Frequency: Monthly or less     Drinks per session: 1 or 2     Binge frequency: Never     Comment: rarely    Drug use: No    Sexual activity: Yes   Lifestyle    Physical activity:     Days per week: 3 days     Minutes per session: 30 min    Stress: To some extent   Relationships    Social connections:     Talks on phone: More than three times a week     Gets together: More than three times a week     Attends [employer] service: Not on file     Active  member of club or organization: No     Attends meetings of clubs or organizations: Never     Relationship status:    Other Topics Concern    Not on file   Social History Narrative    Not on file       ALLERGIES AND MEDICATIONS: updated and reviewed.  Review of patient's allergies indicates:   Allergen Reactions    Iodine containing multivitamin Anaphylaxis    Naproxen Other (See Comments)     hallucinations  Hallucinations^  Hallucinations^    Hydrocodone-acetaminophen      Rash (skin)^    Iodine      Anaphylaxis^    Oxycodone-acetaminophen      Rash (skin)^     Current Outpatient Medications   Medication Sig Dispense Refill    aspirin (ECOTRIN) 81 MG EC tablet Take 1 tablet (81 mg total) by mouth once daily. 90 tablet 3    atorvastatin (LIPITOR) 80 MG tablet TAKE 1 TABLET EVERY EVENING 90 tablet 0    furosemide (LASIX) 20 MG tablet Take 1 tablet (20 mg total) by mouth once daily. 90 tablet 0    isosorbide mononitrate (IMDUR) 60 MG 24 hr tablet TAKE 1 TABLET EVERY DAY 90 tablet 0    KRILL/OM3/DHA/EPA/OM6/LIP/ASTX (KRILL OIL, OMEGA 3 AND 6, ORAL) Take by mouth.      levothyroxine (SYNTHROID) 50 MCG tablet Take 1 tablet (50 mcg total) by mouth before breakfast. 90 tablet 0    losartan (COZAAR) 50 MG tablet Take 1 tablet (50 mg total) by mouth once daily. 90 tablet 0    metoprolol tartrate (LOPRESSOR) 25 MG tablet Take 1 tablet (25 mg total) by mouth 2 (two) times daily. 180 tablet 0    nitroGLYCERIN (NITROSTAT) 0.4 MG SL tablet Place 1 tablet (0.4 mg total) under the tongue every 5 (five) minutes as needed for Chest pain. 90 tablet 3    pantoprazole (PROTONIX) 40 MG tablet Take 1 tablet (40 mg total) by mouth once daily. 90 tablet 0    tamsulosin (FLOMAX) 0.4 mg Cap Take 2 capsules (0.8 mg total) by mouth once daily. 90 capsule 0    vitamin D 1000 units Tab Take 1 tablet (1,000 Units total) by mouth once daily. 90 tablet 3     No current facility-administered medications for this visit.   "      Review of Systems   Constitutional: Negative for fever and weight loss.   Cardiovascular: Negative for chest pain.   Gastrointestinal: Negative for abdominal pain.   Genitourinary: Negative for dysuria and hematuria.   Musculoskeletal: Positive for back pain.   Neurological: Positive for tingling and weakness. Negative for headaches.       Objective:   Physical Exam   Vitals:    04/17/19 1055   BP: 130/86   BP Location: Right arm   Patient Position: Sitting   BP Method: Medium (Manual)   Pulse: 66   Temp: 98.3 °F (36.8 °C)   TempSrc: Oral   SpO2: 96%   Weight: 108.9 kg (240 lb)   Height: 5' 10" (1.778 m)    Body mass index is 34.44 kg/m².            Physical Exam   Constitutional: He is oriented to person, place, and time. He appears well-developed and well-nourished. No distress.   Eyes: EOM are normal.   Cardiovascular: Normal rate, regular rhythm and normal heart sounds.   No murmur heard.  Pulmonary/Chest: Effort normal and breath sounds normal.   Musculoskeletal:    4/5 bilaterally due to effort/pain; hands no deformity, MCP/PIP/DIP without swellign or deformity; elbows no effusion full passive ROM; external rotation of left shoulder without pain but TTP joint space.   Neck no TTP midline or paraspinal muscles.  Thoracic back TTP paraspinal muscles and T spine without deformity  L spine TTP without deformity  Knees unremarkable no effusion  Able to ambulate on heels; unable to ambulate on toes due to toe pain from previous broken toe  Straight leg raise with low back pain bilaterally.   Neurological: He is alert and oriented to person, place, and time. Coordination normal.   Skin: Skin is warm and dry.   Psychiatric: He has a normal mood and affect. His behavior is normal. Thought content normal.     "

## 2019-04-17 ENCOUNTER — HOSPITAL ENCOUNTER (OUTPATIENT)
Dept: RADIOLOGY | Facility: HOSPITAL | Age: 69
Discharge: HOME | End: 2019-04-17
Attending: INTERNAL MEDICINE
Payer: MEDICARE

## 2019-04-17 ENCOUNTER — OFFICE VISIT (OUTPATIENT)
Dept: FAMILY MEDICINE | Facility: CLINIC | Age: 69
End: 2019-04-17
Payer: MEDICARE

## 2019-04-17 VITALS
OXYGEN SATURATION: 96 % | WEIGHT: 240 LBS | HEIGHT: 70 IN | DIASTOLIC BLOOD PRESSURE: 86 MMHG | TEMPERATURE: 98 F | BODY MASS INDEX: 34.36 KG/M2 | SYSTOLIC BLOOD PRESSURE: 130 MMHG | HEART RATE: 66 BPM

## 2019-04-17 DIAGNOSIS — M54.9 UPPER BACK PAIN: ICD-10-CM

## 2019-04-17 DIAGNOSIS — N40.1 BENIGN NON-NODULAR PROSTATIC HYPERPLASIA WITH LOWER URINARY TRACT SYMPTOMS: Chronic | ICD-10-CM

## 2019-04-17 DIAGNOSIS — I25.118 CORONARY ARTERY DISEASE OF NATIVE ARTERY OF NATIVE HEART WITH STABLE ANGINA PECTORIS: ICD-10-CM
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
## 2019-04-17 DIAGNOSIS — R73.03 PREDIABETES: Chronic | ICD-10-CM

## 2019-04-17 DIAGNOSIS — I10 ESSENTIAL HYPERTENSION: Chronic | ICD-10-CM

## 2019-04-17 DIAGNOSIS — M54.2 NECK PAIN: ICD-10-CM
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
## 2019-04-17 DIAGNOSIS — E03.9 HYPOTHYROIDISM, UNSPECIFIED TYPE: Chronic | ICD-10-CM

## 2019-04-17 DIAGNOSIS — R60.0 PERIPHERAL EDEMA: Chronic | ICD-10-CM

## 2019-04-17 DIAGNOSIS — M79.602 LEFT ARM PAIN: ICD-10-CM

## 2019-04-17 DIAGNOSIS — E78.2 MIXED HYPERLIPIDEMIA: Chronic | ICD-10-CM

## 2019-04-17 DIAGNOSIS — M54.41 ACUTE MIDLINE LOW BACK PAIN WITH RIGHT-SIDED SCIATICA: ICD-10-CM

## 2019-04-17 PROCEDURE — 3075F PR MOST RECENT SYSTOLIC BLOOD PRESS GE 130-139MM HG: ICD-10-PCS | Mod: HCNC,CPTII,S$GLB, | Performed by: INTERNAL MEDICINE

## 2019-04-17 PROCEDURE — 1101F PT FALLS ASSESS-DOCD LE1/YR: CPT | Mod: HCNC,CPTII,S$GLB, | Performed by: INTERNAL MEDICINE

## 2019-04-17 PROCEDURE — 73030 X-RAY EXAM OF SHOULDER: CPT | Mod: 26,HCNC,LT, | Performed by: RADIOLOGY

## 2019-04-17 PROCEDURE — 99999 PR PBB SHADOW E&M-EST. PATIENT-LVL IV: CPT | Mod: PBBFAC,HCNC,, | Performed by: INTERNAL MEDICINE

## 2019-04-17 PROCEDURE — 72070 X-RAY EXAM THORAC SPINE 2VWS: CPT | Mod: 26,HCNC,, | Performed by: RADIOLOGY

## 2019-04-17 PROCEDURE — 72040 X-RAY EXAM NECK SPINE 2-3 VW: CPT | Mod: TC,HCNC,FY,PO

## 2019-04-17 PROCEDURE — 3079F PR MOST RECENT DIASTOLIC BLOOD PRESSURE 80-89 MM HG: ICD-10-PCS | Mod: HCNC,CPTII,S$GLB, | Performed by: INTERNAL MEDICINE

## 2019-04-17 PROCEDURE — 73030 XR SHOULDER COMPLETE 2 OR MORE VIEWS LEFT: ICD-10-PCS | Mod: 26,HCNC,LT, | Performed by: RADIOLOGY

## 2019-04-17 PROCEDURE — 72040 X-RAY EXAM NECK SPINE 2-3 VW: CPT | Mod: 26,HCNC,, | Performed by: RADIOLOGY

## 2019-04-17 PROCEDURE — 72070 XR THORACIC SPINE AP LATERAL: ICD-10-PCS | Mod: 26,HCNC,, | Performed by: RADIOLOGY

## 2019-04-17 PROCEDURE — 3075F SYST BP GE 130 - 139MM HG: CPT | Mod: HCNC,CPTII,S$GLB, | Performed by: INTERNAL MEDICINE

## 2019-04-17 PROCEDURE — 99214 PR OFFICE/OUTPT VISIT, EST, LEVL IV, 30-39 MIN: ICD-10-PCS | Mod: HCNC,S$GLB,, | Performed by: INTERNAL MEDICINE

## 2019-04-17 PROCEDURE — 99999 PR PBB SHADOW E&M-EST. PATIENT-LVL IV: ICD-10-PCS | Mod: PBBFAC,HCNC,, | Performed by: INTERNAL MEDICINE

## 2019-04-17 PROCEDURE — 72100 X-RAY EXAM L-S SPINE 2/3 VWS: CPT | Mod: TC,HCNC,FY,PO

## 2019-04-17 PROCEDURE — 72100 X-RAY EXAM L-S SPINE 2/3 VWS: CPT | Mod: 26,HCNC,, | Performed by: RADIOLOGY

## 2019-04-17 PROCEDURE — 72100 XR LUMBAR SPINE AP AND LATERAL: ICD-10-PCS | Mod: 26,HCNC,, | Performed by: RADIOLOGY

## 2019-04-17 PROCEDURE — 3079F DIAST BP 80-89 MM HG: CPT | Mod: HCNC,CPTII,S$GLB, | Performed by: INTERNAL MEDICINE

## 2019-04-17 PROCEDURE — 72070 X-RAY EXAM THORAC SPINE 2VWS: CPT | Mod: TC,HCNC,FY,PO

## 2019-04-17 PROCEDURE — 72040 XR CERVICAL SPINE AP LATERAL: ICD-10-PCS | Mod: 26,HCNC,, | Performed by: RADIOLOGY

## 2019-04-17 PROCEDURE — 1101F PR PT FALLS ASSESS DOC 0-1 FALLS W/OUT INJ PAST YR: ICD-10-PCS | Mod: HCNC,CPTII,S$GLB, | Performed by: INTERNAL MEDICINE

## 2019-04-17 PROCEDURE — 73030 X-RAY EXAM OF SHOULDER: CPT | Mod: TC,HCNC,FY,PO,LT

## 2019-04-17 PROCEDURE — 99214 OFFICE O/P EST MOD 30 MIN: CPT | Mod: HCNC,S$GLB,, | Performed by: INTERNAL MEDICINE

## 2019-04-17 RX ORDER — TIZANIDINE 2 MG/1
4 TABLET ORAL EVERY 6 HOURS PRN
Qty: 30 TABLET | Refills: 0 | Status: SHIPPED | OUTPATIENT
Start: 2019-04-17 | End: 2019-04-27

## 2019-04-17 RX ORDER — ATORVASTATIN CALCIUM 80 MG/1
80 TABLET, FILM COATED ORAL NIGHTLY
Qty: 90 TABLET | Refills: 3 | Status: SHIPPED | OUTPATIENT
Start: 2019-04-17 | End: 2020-04-27

## 2019-04-17 RX ORDER — LEVOTHYROXINE SODIUM 50 UG/1
50 TABLET ORAL
Qty: 90 TABLET | Refills: 3 | Status: SHIPPED | OUTPATIENT
Start: 2019-04-17 | End: 2020-04-27

## 2019-04-17 RX ORDER — ISOSORBIDE MONONITRATE 60 MG/1
60 TABLET, EXTENDED RELEASE ORAL DAILY
Qty: 90 TABLET | Refills: 3 | Status: SHIPPED | OUTPATIENT
Start: 2019-04-17 | End: 2020-04-27

## 2019-04-17 RX ORDER — METHYLPREDNISOLONE 4 MG/1
TABLET ORAL
Qty: 1 PACKAGE | Refills: 0 | Status: SHIPPED | OUTPATIENT
Start: 2019-04-17 | End: 2019-08-27

## 2019-04-17 RX ORDER — FUROSEMIDE 20 MG/1
20 TABLET ORAL DAILY
Qty: 90 TABLET | Refills: 3 | Status: SHIPPED | OUTPATIENT
Start: 2019-04-17 | End: 2020-04-27

## 2019-04-17 RX ORDER — METOPROLOL TARTRATE 25 MG/1
25 TABLET, FILM COATED ORAL 2 TIMES DAILY
Qty: 180 TABLET | Refills: 3 | Status: SHIPPED | OUTPATIENT
Start: 2019-04-17 | End: 2020-04-27

## 2019-04-17 RX ORDER — TAMSULOSIN HYDROCHLORIDE 0.4 MG/1
2 CAPSULE ORAL DAILY
Qty: 90 CAPSULE | Refills: 3 | Status: SHIPPED | OUTPATIENT
Start: 2019-04-17 | End: 2020-10-02

## 2019-04-17 RX ORDER — LOSARTAN POTASSIUM 50 MG/1
50 TABLET ORAL DAILY
Qty: 90 TABLET | Refills: 3 | Status: SHIPPED | OUTPATIENT
Start: 2019-04-17 | End: 2020-04-27

## 2019-04-17 NOTE — PROGRESS NOTES
Shoulder XR Mild degenerative changes of the acromioclavicular joint and greater tuberosity of the left humerus.  XR L spine No acute bony abnormality.  Multilevel degenerative change of the lumbar spine.  XR T spine negative  XR C spine negative    Results to pt via my [facility]. No changes. Call if no improvement - formal PT.

## 2019-05-14 ENCOUNTER — TELEPHONE (OUTPATIENT)
Dept: CARDIOLOGY | Facility: CLINIC | Age: 69
End: 2019-05-14

## 2019-07-27 ENCOUNTER — PATIENT OUTREACH (OUTPATIENT)
Dept: ADMINISTRATIVE | Facility: OTHER | Age: 69
End: 2019-07-27

## 2019-07-27 DIAGNOSIS — Z12.11 SCREEN FOR COLON CANCER: Primary | ICD-10-CM

## 2019-07-30 ENCOUNTER — OFFICE VISIT (OUTPATIENT)
Dept: CARDIOLOGY | Facility: CLINIC | Age: 69
End: 2019-07-30
Payer: MEDICARE

## 2019-07-30 VITALS
DIASTOLIC BLOOD PRESSURE: 78 MMHG | SYSTOLIC BLOOD PRESSURE: 126 MMHG | WEIGHT: 240 LBS | BODY MASS INDEX: 34.36 KG/M2 | HEIGHT: 70 IN | RESPIRATION RATE: 15 BRPM | OXYGEN SATURATION: 95 % | HEART RATE: 57 BPM

## 2019-07-30 DIAGNOSIS — I87.2 VENOUS INSUFFICIENCY: ICD-10-CM

## 2019-07-30 DIAGNOSIS — I10 ESSENTIAL HYPERTENSION: Chronic | ICD-10-CM

## 2019-07-30 DIAGNOSIS — G47.33 OSA (OBSTRUCTIVE SLEEP APNEA): Chronic | ICD-10-CM

## 2019-07-30 DIAGNOSIS — R94.31 ABNORMAL EKG: ICD-10-CM

## 2019-07-30 DIAGNOSIS — I25.118 CORONARY ARTERY DISEASE OF NATIVE ARTERY OF NATIVE HEART WITH STABLE ANGINA PECTORIS: Primary | ICD-10-CM

## 2019-07-30 DIAGNOSIS — E78.2 MIXED HYPERLIPIDEMIA: Chronic | ICD-10-CM

## 2019-07-30 DIAGNOSIS — I77.810 AORTIC ROOT DILATATION: ICD-10-CM

## 2019-07-30 PROCEDURE — 99999 PR PBB SHADOW E&M-EST. PATIENT-LVL III: CPT | Mod: PBBFAC,HCNC,, | Performed by: INTERNAL MEDICINE

## 2019-07-30 PROCEDURE — 3074F PR MOST RECENT SYSTOLIC BLOOD PRESSURE < 130 MM HG: ICD-10-PCS | Mod: HCNC,CPTII,S$GLB, | Performed by: INTERNAL MEDICINE

## 2019-07-30 PROCEDURE — 93000 ELECTROCARDIOGRAM COMPLETE: CPT | Mod: HCNC,S$GLB,, | Performed by: INTERNAL MEDICINE

## 2019-07-30 PROCEDURE — 3078F PR MOST RECENT DIASTOLIC BLOOD PRESSURE < 80 MM HG: ICD-10-PCS | Mod: HCNC,CPTII,S$GLB, | Performed by: INTERNAL MEDICINE

## 2019-07-30 PROCEDURE — 3074F SYST BP LT 130 MM HG: CPT | Mod: HCNC,CPTII,S$GLB, | Performed by: INTERNAL MEDICINE

## 2019-07-30 PROCEDURE — 99999 PR PBB SHADOW E&M-EST. PATIENT-LVL III: ICD-10-PCS | Mod: PBBFAC,HCNC,, | Performed by: INTERNAL MEDICINE

## 2019-07-30 PROCEDURE — 1101F PR PT FALLS ASSESS DOC 0-1 FALLS W/OUT INJ PAST YR: ICD-10-PCS | Mod: HCNC,CPTII,S$GLB, | Performed by: INTERNAL MEDICINE

## 2019-07-30 PROCEDURE — 1101F PT FALLS ASSESS-DOCD LE1/YR: CPT | Mod: HCNC,CPTII,S$GLB, | Performed by: INTERNAL MEDICINE

## 2019-07-30 PROCEDURE — 93000 EKG 12-LEAD: ICD-10-PCS | Mod: HCNC,S$GLB,, | Performed by: INTERNAL MEDICINE

## 2019-07-30 PROCEDURE — 3078F DIAST BP <80 MM HG: CPT | Mod: HCNC,CPTII,S$GLB, | Performed by: INTERNAL MEDICINE

## 2019-07-30 PROCEDURE — 99214 OFFICE O/P EST MOD 30 MIN: CPT | Mod: HCNC,S$GLB,, | Performed by: INTERNAL MEDICINE

## 2019-07-30 PROCEDURE — 99214 PR OFFICE/OUTPT VISIT, EST, LEVL IV, 30-39 MIN: ICD-10-PCS | Mod: HCNC,S$GLB,, | Performed by: INTERNAL MEDICINE

## 2019-07-30 NOTE — PROGRESS NOTES
CARDIOVASCULAR PROGRESS NOTE    REASON FOR CONSULT:   Clinton Rosenberg is a 68 y.o. male who presents for follow up of CAD.    PCP: Dair  HISTORY OF PRESENT ILLNESS:   Last seen in November 2018.    The patient returns for follow-up, accompanied by his wife.  He has not had his stress test or echocardiogram as previously ordered.  He is complaining of some left inframammary pain which he describes as a punching sensation.  This is exertionally related and associated with dyspnea.  He denies any resting pain.  He has had no palpitations, lightheadedness, dizziness, or syncope.  There has been no PND, orthopnea, melena, hematuria.  He does describe episodic lower extremity edema and is wearing compression stockings intermittently.  I did offer him follow up with vascular surgery to consider venous ablation, but the patient declines this referral at this time.  In regards to his chest pain above, he does describe it as similar to his prior anginal symptoms, but not as bad as far as the degree of pain.    CARDIOVASCULAR HISTORY:   CAD/MI s/p LAD PCI 2013, MPI 6/2017 normal  JAYLEN, unable to afford CPAP  Aortic root dil, 3.7 cm (echo 3/2018)  CVI (bilat GSV, R LSV, US 3/2018)    PAST MEDICAL HISTORY:     Past Medical History:   Diagnosis Date    Allergy     Angina pectoris     Anxiety     Coronary artery disease     Depression     GERD (gastroesophageal reflux disease)     Hyperlipidemia     Hypertension     Hypothyroidism     Memory loss     12/3/2014 MRI brain: 1. No evidence for acute infarct 2. unremarkable MRI of the brain; 12/3/2014 carotid US normal    Myocardial infarction     Obesity     Peripheral vascular disease 6/20/2017    Retrocalcaneal bursitis 11/24/2014    Sleep apnea        PAST SURGICAL HISTORY:     Past Surgical History:   Procedure Laterality Date    APPENDECTOMY  1986    bone spur Right     COLONOSCOPY      hand trauma Right     pencil     heart stent         ALLERGIES AND  MEDICATION:     Review of patient's allergies indicates:   Allergen Reactions    Iodine containing multivitamin Anaphylaxis    Naproxen Other (See Comments)     hallucinations    Hydrocodone-acetaminophen      Rash (skin)^    Oxycodone-acetaminophen      Rash (skin)^        Medication List           Accurate as of 7/30/19  8:42 AM. If you have any questions, ask your nurse or doctor.               CONTINUE taking these medications    aspirin 81 MG EC tablet  Commonly known as:  ECOTRIN  Take 1 tablet (81 mg total) by mouth once daily.     atorvastatin 80 MG tablet  Commonly known as:  LIPITOR  Take 1 tablet (80 mg total) by mouth every evening.     furosemide 20 MG tablet  Commonly known as:  LASIX  Take 1 tablet (20 mg total) by mouth once daily.     isosorbide mononitrate 60 MG 24 hr tablet  Commonly known as:  IMDUR  Take 1 tablet (60 mg total) by mouth once daily.     KRILL OIL (OMEGA 3 AND 6) ORAL     levothyroxine 50 MCG tablet  Commonly known as:  SYNTHROID  Take 1 tablet (50 mcg total) by mouth before breakfast.     losartan 50 MG tablet  Commonly known as:  COZAAR  Take 1 tablet (50 mg total) by mouth once daily.     methylPREDNISolone 4 mg tablet  Commonly known as:  MEDROL DOSEPACK  use as directed     metoprolol tartrate 25 MG tablet  Commonly known as:  LOPRESSOR  Take 1 tablet (25 mg total) by mouth 2 (two) times daily.     nitroGLYCERIN 0.4 MG SL tablet  Commonly known as:  NITROSTAT  Place 1 tablet (0.4 mg total) under the tongue every 5 (five) minutes as needed for Chest pain.     pantoprazole 40 MG tablet  Commonly known as:  PROTONIX  Take 1 tablet (40 mg total) by mouth once daily.     tamsulosin 0.4 mg Cap  Commonly known as:  FLOMAX  Take 2 capsules (0.8 mg total) by mouth once daily.     vitamin D 1000 units Tab  Commonly known as:  VITAMIN D3  Take 1 tablet (1,000 Units total) by mouth once daily.              SOCIAL HISTORY:     Social History     Socioeconomic History    Marital  status:      Spouse name: Not on file    Number of children: Not on file    Years of education: Not on file    Highest education level: Not on file   Occupational History    Not on file   Social Needs    Financial resource strain: Not very hard    Food insecurity:     Worry: Never true     Inability: Never true    Transportation needs:     Medical: No     Non-medical: No   Tobacco Use    Smoking status: Never Smoker    Smokeless tobacco: Never Used   Substance and Sexual Activity    Alcohol use: No     Alcohol/week: 0.0 oz     Frequency: Monthly or less     Drinks per session: 1 or 2     Binge frequency: Never     Comment: rarely    Drug use: No    Sexual activity: Yes   Lifestyle    Physical activity:     Days per week: 3 days     Minutes per session: 30 min    Stress: To some extent   Relationships    Social connections:     Talks on phone: More than three times a week     Gets together: More than three times a week     Attends [employer] service: Not on file     Active member of club or organization: No     Attends meetings of clubs or organizations: Never     Relationship status:    Other Topics Concern    Not on file   Social History Narrative    Not on file       FAMILY HISTORY:     Family History   Problem Relation Age of Onset    Arthritis Mother     Early death Mother     Heart disease Mother     Hypertension Mother     Migraines Mother     Seizures Mother     Tremor Mother     Diabetes Mother     Cancer Mother     Early death Father     Heart disease Father     Hypertension Father     Stroke Father     Diabetes Father     Alcohol abuse Father     Arthritis Brother     Cancer Brother     Diabetes Brother     Early death Brother     Heart disease Brother     Hypertension Brother     Heart disease Sister     Hypertension Sister     Arthritis Sister     Depression Sister     Heart disease Brother     Arthritis Brother     Heart disease Brother      "Pneumonia Brother     Heart disease Brother     Hypertension Brother     Diabetes Brother     Heart disease Brother        REVIEW OF SYSTEMS:   Review of Systems   Constitutional: Negative for chills, diaphoresis and fever.   HENT: Negative for nosebleeds.    Eyes: Negative for blurred vision, double vision and photophobia.   Respiratory: Positive for shortness of breath. Negative for hemoptysis and wheezing.    Cardiovascular: Positive for chest pain and leg swelling. Negative for palpitations, orthopnea, claudication and PND.   Gastrointestinal: Negative for abdominal pain, blood in stool, heartburn, melena, nausea and vomiting.   Genitourinary: Negative for flank pain and hematuria.   Musculoskeletal: Negative for falls, myalgias and neck pain.   Skin: Negative for rash.   Neurological: Negative for dizziness, seizures, loss of consciousness, weakness and headaches.   Endo/Heme/Allergies: Negative for polydipsia. Does not bruise/bleed easily.   Psychiatric/Behavioral: Negative for depression and memory loss. The patient is not nervous/anxious.        PHYSICAL EXAM:     Vitals:    07/30/19 0832   BP: 126/78   Pulse: (!) 57   Resp: 15    Body mass index is 34.44 kg/m².  Weight: 108.9 kg (240 lb)   Height: 5' 10" (177.8 cm)     Physical Exam   Constitutional: He is oriented to person, place, and time. He appears well-developed and well-nourished. He is cooperative.  Non-toxic appearance. No distress.   HENT:   Head: Normocephalic and atraumatic.   Eyes: Pupils are equal, round, and reactive to light. Conjunctivae and EOM are normal. No scleral icterus.   Neck: Trachea normal and normal range of motion. Neck supple. Normal carotid pulses and no JVD present. Carotid bruit is not present. No neck rigidity. No tracheal deviation and no edema present. No thyromegaly present.   Cardiovascular: Regular rhythm, S1 normal and S2 normal. Bradycardia present. PMI is not displaced. Exam reveals no gallop and no friction " rub.   No murmur heard.  Pulses:       Carotid pulses are 2+ on the right side, and 2+ on the left side.  Pulmonary/Chest: Effort normal and breath sounds normal. No stridor. No respiratory distress. He has no wheezes. He has no rales. He exhibits no tenderness.   Abdominal: Soft. He exhibits no distension. There is no hepatosplenomegaly.   Obese   Musculoskeletal: Normal range of motion. He exhibits no edema or tenderness.   Feet:   Right Foot:   Skin Integrity: Negative for ulcer.   Left Foot:   Skin Integrity: Negative for ulcer.   Neurological: He is alert and oriented to person, place, and time. No cranial nerve deficit.   Skin: Skin is warm and dry. No rash noted. No erythema.   Psychiatric: He has a normal mood and affect. His speech is normal and behavior is normal.   Vitals reviewed.      DATA:   EKG: (personally reviewed tracing)  7/30/19 SR 57, PRWP similar to 5/20/18    Laboratory:  CBC:  Recent Labs   Lab 07/26/17  0720 05/19/18  1400 05/20/18  0151   WBC 7.98 6.95 8.47   Hemoglobin 15.2 15.8 15.5   Hematocrit 43.7 43.4 43.2   Platelets 167 178 164       CHEMISTRIES:  Recent Labs   Lab 05/19/18  1400 05/20/18  0151 10/12/18  0947   Glucose 180 H 130 H 184 H   Sodium 140 143 140   Potassium 4.4 4.7 4.3   BUN, Bld 10 12 12   Creatinine 1.2 1.3 1.2   eGFR if African American >60 >60 >60.0   eGFR if non  >60 57 A >60.0   Calcium 9.2 9.4 9.3   Magnesium  --  2.4  --        CARDIAC BIOMARKERS:  Recent Labs   Lab 05/19/18  1400 05/19/18  1941 05/20/18  0151   Troponin I 0.010 0.010 <0.006       COAGS:        LIPIDS/LFTS:  Recent Labs   Lab 10/31/17  0837 05/19/18  1400 05/19/18  1941 05/20/18  0151 10/12/18  0947   Cholesterol 101 L  --  117 L  --  132   Triglycerides 245 H  --  368 H  --  268 H   HDL 29 L  --  29 L  --  36 L   LDL Cholesterol 23.0 L  --  14.4 L  --  42.4 L   Non-HDL Cholesterol 72  --  88  --  96   AST 18 18  --  19 28   ALT 19 22  --  25 21       Cardiovascular Testing:  Echo  3/15/18    1 - TDS.     2 - Normal left ventricular systolic function (EF 60-65%).  Cannot exclude basal inferior hypokinesis.     3 - Upper limit of normal aortic root, 3.7cm.     Venous US/reflux 3/15/18  RIGHT:  No evidence of Right lower extremity DVT.    There is reflux in the Greater Saphenous and Lesser Saphenous Veins.    R GSV dist thigh 2186 msec  R GSV in calf 2339 msec  R LSV 4006 msec   LEFT:  No evidence of Left lower extremity DVT.    There is reflux in the Greater Saphenous Vein.  R GSV mid thigh 2422 msec  R GSV dist thigh 1667 msec  R GSV calf 5647 msec      L MPI 6/8/17  Nuclear Quantitative Functional Analysis:   LVEF: 68 %  Impression: NORMAL MYOCARDIAL PERFUSION  1. The perfusion scan is free of evidence for myocardial ischemia or injury.   2. There is a mild intensity fixed defect in the inferior wall of the left ventricle, secondary to diaphragm attenuation.   3. Resting wall motion is physiologic.   4. Resting LV function is normal.   5. The ventricular volumes are normal at rest and stress.   6. The extracardiac distribution of radioactivity is normal.   7. When compared to the previous study from 03/09/2015, no change    Holter 6/8/17  PREDOMINANT RHYTHM  1. Sinus rhythm with heart rates varying between 43 and 94 bpm with an average of 62 bpm.   VENTRICULAR ARRHYTHMIAS  1. There were very rare PVCs recorded totalling 5 and averaging less than 1 per hour.   2. There were no episodes of ventricular tachycardia.  SUPRA VENTRICULAR ARRHYTHMIAS  1. There were very rare PACs recorded totalling 3 and averaging less than 1 per hour.   2. There were no episodes of sustained supraventricular tachycardia.  SINUS NODE FUNCTION  1. There was no evidence of high grade SA deanne block.   AV CONDUCTION  1. There was no evidence of high grade AV block.   DIARY  1. The diary was not returned  MISCELLANEOUS  1. There were rare hookup related artifacts. Overall, the study was of good quality.   2. This was a  tape of adequate length (24 hrs).    LE art US/TANA 6/8/17  1.  Mild/atherosclerotic plaquing.  2.  No stenosis about 30 percent femoral popliteal arteries.  3.  Normal bilateral ABIs.    Carotid US 12/3/14  1. Less than 50% stenosis of the right internal carotid artery.  2. Less than 50% stenosis of the left internal carotid artery.    Cath 9/27/13 (Per Dr. Rivera note 11/19/14)  FINDINGS:   Left main, no luminal irregularities.   LAD: The proximal LAD has a 30% stenosis and widely patent. The remainder of the LAD has luminal  irregularities. First diagonal, luminal irregularities. Second diagonal, luminal irregularities.   Left circumflex artery, luminal irregularities. OM-1, luminal irregularities. OM-2, luminal irregularities.   Right coronary artery has a 20% stenosis and the distal right coronary artery has a 1% stenosis. The  remainder of the artery has luminal irregularities. Posterior lateral branch  has luminal irregularities. Posterior descending artery has luminal  irregularities.  SUMMARY: LVEDP = 10 mmHg. LVEF = 65%. Patent LAD stent.    ASSESSMENT:   # CAD s/p LAD PCI.  MPI 6/2017 normal.   Now complaining of exertional CP.  MPI and echo prev ordered by not completed.  # HTN, controlled  # HLP, on atorva 80mg  # Ao root dil, 3.7 cm (echo 3/2018)  # CVI (bilat GSV, R LSV) on US 3/2018.  No edema at present (but complains of intermittent swelling), declines referral for venous ablation.  # hx JAYLEN, pt unable to obtain CPAP apparatus  # BMI 34, down 1 unit(s) vs last OV  # Iodine allergy (?Betadine)    PLAN:   Cont med rx  Echo rescheduled  Ex MPI rescheduled  Diet/exercise/weight loss  RTC 1 month for review      Brad Bahena MD, FACC

## 2019-08-08 ENCOUNTER — HOSPITAL ENCOUNTER (OUTPATIENT)
Dept: RADIOLOGY | Facility: HOSPITAL | Age: 69
Discharge: HOME | End: 2019-08-08
Attending: INTERNAL MEDICINE
Payer: MEDICARE

## 2019-08-08 ENCOUNTER — HOSPITAL ENCOUNTER (OUTPATIENT)
Dept: CARDIOLOGY | Facility: HOSPITAL | Age: 69
Discharge: HOME | End: 2019-08-08
Attending: INTERNAL MEDICINE
Payer: MEDICARE

## 2019-08-08 VITALS
BODY MASS INDEX: 34.36 KG/M2 | SYSTOLIC BLOOD PRESSURE: 126 MMHG | DIASTOLIC BLOOD PRESSURE: 78 MMHG | HEIGHT: 70 IN | HEART RATE: 61 BPM | WEIGHT: 240 LBS

## 2019-08-08 DIAGNOSIS — I77.810 AORTIC ROOT DILATATION: ICD-10-CM

## 2019-08-08 DIAGNOSIS — I25.10 CORONARY ARTERY DISEASE INVOLVING NATIVE CORONARY ARTERY OF NATIVE HEART WITHOUT ANGINA PECTORIS: ICD-10-CM

## 2019-08-08 LAB
AORTIC ROOT ANNULUS: 4.04 CM
AORTIC VALVE CUSP SEPERATION: 2.35 CM
ASCENDING AORTA: 3.78 CM
AV INDEX (PROSTH): 0.66
AV MEAN GRADIENT: 4 MMHG
AV PEAK GRADIENT: 8 MMHG
AV VALVE AREA: 2.48 CM2
AV VELOCITY RATIO: 0.65
BSA FOR ECHO PROCEDURE: 2.32 M2
CV ECHO LV RWT: 0.73 CM
CV STRESS BASE HR: 68 BPM
DIASTOLIC BLOOD PRESSURE: 74 MMHG
DOP CALC AO PEAK VEL: 1.4 M/S
DOP CALC AO VTI: 30.63 CM
DOP CALC LVOT AREA: 3.8 CM2
DOP CALC LVOT DIAMETER: 2.19 CM
DOP CALC LVOT PEAK VEL: 0.91 M/S
DOP CALC LVOT STROKE VOLUME: 75.9 CM3
DOP CALCLVOT PEAK VEL VTI: 20.16 CM
E WAVE DECELERATION TIME: 286.52 MSEC
E/A RATIO: 0.64
ECHO LV POSTERIOR WALL: 1.24 CM (ref 0.6–1.1)
FRACTIONAL SHORTENING: 30 % (ref 28–44)
INTERVENTRICULAR SEPTUM: 1.24 CM (ref 0.6–1.1)
IVRT: 0.09 MSEC
LA MAJOR: 4.09 CM
LEFT ATRIUM SIZE: 4.18 CM
LEFT INTERNAL DIMENSION IN SYSTOLE: 2.37 CM (ref 2.1–4)
LEFT VENTRICLE DIASTOLIC VOLUME INDEX: 20.82 ML/M2
LEFT VENTRICLE DIASTOLIC VOLUME: 46.96 ML
LEFT VENTRICLE MASS INDEX: 61 G/M2
LEFT VENTRICLE SYSTOLIC VOLUME INDEX: 8.7 ML/M2
LEFT VENTRICLE SYSTOLIC VOLUME: 19.58 ML
LEFT VENTRICULAR INTERNAL DIMENSION IN DIASTOLE: 3.39 CM (ref 3.5–6)
LEFT VENTRICULAR MASS: 136.47 G
MV PEAK A VEL: 0.92 M/S
MV PEAK E VEL: 0.59 M/S
NUC STRESS DIASTOLIC VOLUME INDEX: 69
NUC STRESS EJECTION FRACTION: 66 %
NUC STRESS SYSTOLIC VOLUME INDEX: 24
OHS CV CPX 85 PERCENT MAX PREDICTED HEART RATE MALE: 131
OHS CV CPX MAX PREDICTED HEART RATE: 154
OHS CV CPX PATIENT IS FEMALE: 0
OHS CV CPX PATIENT IS MALE: 1
OHS CV CPX PEAK DIASTOLIC BLOOD PRESSURE: 64 MMHG
OHS CV CPX PEAK HEAR RATE: 93 BPM
OHS CV CPX PEAK RATE PRESSURE PRODUCT: NORMAL
OHS CV CPX PEAK SYSTOLIC BLOOD PRESSURE: 131 MMHG
OHS CV CPX PERCENT MAX PREDICTED HEART RATE ACHIEVED: 60
OHS CV CPX RATE PRESSURE PRODUCT PRESENTING: 7820
PISA TR MAX VEL: 1.79 M/S
PV PEAK VELOCITY: 0.9 CM/S
RA MAJOR: 5.29 CM
RA PRESSURE: 8 MMHG
RA WIDTH: 3.62 CM
RIGHT VENTRICULAR END-DIASTOLIC DIMENSION: 2.73 CM
RV TISSUE DOPPLER FREE WALL SYSTOLIC VELOCITY 1 (APICAL 4 CHAMBER VIEW): 8.75 CM/S
SINUS: 4 CM
STJ: 2.73 CM
STRESS ECHO TARGET HR: 130.9 BPM
SYSTOLIC BLOOD PRESSURE: 115 MMHG
TR MAX PG: 13 MMHG
TRICUSPID ANNULAR PLANE SYSTOLIC EXCURSION: 2.24 CM
TV REST PULMONARY ARTERY PRESSURE: 21 MMHG

## 2019-08-08 PROCEDURE — 93017 CV STRESS TEST TRACING ONLY: CPT | Mod: HCNC

## 2019-08-08 PROCEDURE — 93018 STRESS TEST WITH MYOCARDIAL PERFUSION (CUPID ONLY): ICD-10-PCS | Mod: HCNC,,, | Performed by: INTERNAL MEDICINE

## 2019-08-08 PROCEDURE — 93018 CV STRESS TEST I&R ONLY: CPT | Mod: HCNC,,, | Performed by: INTERNAL MEDICINE

## 2019-08-08 PROCEDURE — 93016 CV STRESS TEST SUPVJ ONLY: CPT | Mod: HCNC,,, | Performed by: INTERNAL MEDICINE

## 2019-08-08 PROCEDURE — 93306 TRANSTHORACIC ECHO (TTE) COMPLETE (CUPID ONLY): ICD-10-PCS | Mod: 26,HCNC,, | Performed by: INTERNAL MEDICINE

## 2019-08-08 PROCEDURE — A9502 TC99M TETROFOSMIN: HCPCS | Mod: HCNC

## 2019-08-08 PROCEDURE — 93306 TTE W/DOPPLER COMPLETE: CPT | Mod: 26,HCNC,, | Performed by: INTERNAL MEDICINE

## 2019-08-08 PROCEDURE — 78452 HT MUSCLE IMAGE SPECT MULT: CPT | Mod: 26,HCNC,, | Performed by: INTERNAL MEDICINE

## 2019-08-08 PROCEDURE — 78452 STRESS TEST WITH MYOCARDIAL PERFUSION (CUPID ONLY): ICD-10-PCS | Mod: 26,HCNC,, | Performed by: INTERNAL MEDICINE

## 2019-08-08 PROCEDURE — 63600175 PHARM REV CODE 636 W HCPCS: Mod: HCNC | Performed by: INTERNAL MEDICINE

## 2019-08-08 PROCEDURE — 93016 STRESS TEST WITH MYOCARDIAL PERFUSION (CUPID ONLY): ICD-10-PCS | Mod: HCNC,,, | Performed by: INTERNAL MEDICINE

## 2019-08-08 PROCEDURE — 93306 TTE W/DOPPLER COMPLETE: CPT | Mod: HCNC

## 2019-08-08 RX ORDER — REGADENOSON 0.08 MG/ML
0.4 INJECTION, SOLUTION INTRAVENOUS ONCE
Status: COMPLETED | OUTPATIENT
Start: 2019-08-08 | End: 2019-08-08

## 2019-08-08 RX ADMIN — REGADENOSON 0.4 MG: 0.08 INJECTION, SOLUTION INTRAVENOUS at 09:08

## 2019-08-23 ENCOUNTER — PATIENT OUTREACH (OUTPATIENT)
Dept: ADMINISTRATIVE | Facility: OTHER | Age: 69
End: 2019-08-23

## 2019-08-26 RX ORDER — PANTOPRAZOLE SODIUM 40 MG/1
40 TABLET, DELAYED RELEASE ORAL DAILY
Qty: 90 TABLET | Refills: 0 | Status: SHIPPED | OUTPATIENT
Start: 2019-08-26 | End: 2019-09-25 | Stop reason: SDUPTHER

## 2019-08-27 ENCOUNTER — OFFICE VISIT (OUTPATIENT)
Dept: CARDIOLOGY | Facility: CLINIC | Age: 69
End: 2019-08-27
Payer: MEDICARE

## 2019-08-27 VITALS
HEART RATE: 65 BPM | OXYGEN SATURATION: 95 % | HEIGHT: 70 IN | SYSTOLIC BLOOD PRESSURE: 126 MMHG | RESPIRATION RATE: 15 BRPM | WEIGHT: 240 LBS | BODY MASS INDEX: 34.36 KG/M2 | DIASTOLIC BLOOD PRESSURE: 64 MMHG

## 2019-08-27 DIAGNOSIS — I77.810 AORTIC ROOT DILATATION: ICD-10-CM

## 2019-08-27 DIAGNOSIS — E66.9 OBESITY (BMI 30.0-34.9): Chronic | ICD-10-CM

## 2019-08-27 DIAGNOSIS — I87.2 VENOUS INSUFFICIENCY: ICD-10-CM

## 2019-08-27 DIAGNOSIS — I10 ESSENTIAL HYPERTENSION: Chronic | ICD-10-CM

## 2019-08-27 DIAGNOSIS — Z88.8 ALLERGY TO IODINE: ICD-10-CM

## 2019-08-27 DIAGNOSIS — E78.2 MIXED HYPERLIPIDEMIA: Chronic | ICD-10-CM

## 2019-08-27 DIAGNOSIS — I25.118 CORONARY ARTERY DISEASE OF NATIVE ARTERY OF NATIVE HEART WITH STABLE ANGINA PECTORIS: Primary | ICD-10-CM

## 2019-08-27 DIAGNOSIS — G47.33 OSA (OBSTRUCTIVE SLEEP APNEA): Chronic | ICD-10-CM

## 2019-08-27 PROCEDURE — 99215 OFFICE O/P EST HI 40 MIN: CPT | Mod: HCNC,S$GLB,, | Performed by: INTERNAL MEDICINE

## 2019-08-27 PROCEDURE — 3078F PR MOST RECENT DIASTOLIC BLOOD PRESSURE < 80 MM HG: ICD-10-PCS | Mod: HCNC,CPTII,S$GLB, | Performed by: INTERNAL MEDICINE

## 2019-08-27 PROCEDURE — 99999 PR PBB SHADOW E&M-EST. PATIENT-LVL III: ICD-10-PCS | Mod: PBBFAC,HCNC,, | Performed by: INTERNAL MEDICINE

## 2019-08-27 PROCEDURE — 3074F SYST BP LT 130 MM HG: CPT | Mod: HCNC,CPTII,S$GLB, | Performed by: INTERNAL MEDICINE

## 2019-08-27 PROCEDURE — 1101F PR PT FALLS ASSESS DOC 0-1 FALLS W/OUT INJ PAST YR: ICD-10-PCS | Mod: HCNC,CPTII,S$GLB, | Performed by: INTERNAL MEDICINE

## 2019-08-27 PROCEDURE — 99215 PR OFFICE/OUTPT VISIT, EST, LEVL V, 40-54 MIN: ICD-10-PCS | Mod: HCNC,S$GLB,, | Performed by: INTERNAL MEDICINE

## 2019-08-27 PROCEDURE — 99999 PR PBB SHADOW E&M-EST. PATIENT-LVL III: CPT | Mod: PBBFAC,HCNC,, | Performed by: INTERNAL MEDICINE

## 2019-08-27 PROCEDURE — 1101F PT FALLS ASSESS-DOCD LE1/YR: CPT | Mod: HCNC,CPTII,S$GLB, | Performed by: INTERNAL MEDICINE

## 2019-08-27 PROCEDURE — 3078F DIAST BP <80 MM HG: CPT | Mod: HCNC,CPTII,S$GLB, | Performed by: INTERNAL MEDICINE

## 2019-08-27 PROCEDURE — 3074F PR MOST RECENT SYSTOLIC BLOOD PRESSURE < 130 MM HG: ICD-10-PCS | Mod: HCNC,CPTII,S$GLB, | Performed by: INTERNAL MEDICINE

## 2019-08-27 RX ORDER — DIPHENHYDRAMINE HCL 50 MG
50 CAPSULE ORAL 2 TIMES DAILY
Refills: 0 | COMMUNITY
Start: 2019-08-27 | End: 2019-08-28

## 2019-08-27 RX ORDER — PREDNISONE 50 MG/1
50 TABLET ORAL 2 TIMES DAILY
Qty: 3 TABLET | Refills: 0 | Status: SHIPPED | OUTPATIENT
Start: 2019-08-27 | End: 2019-09-13 | Stop reason: SDUPTHER

## 2019-08-27 RX ORDER — CLOPIDOGREL BISULFATE 75 MG/1
75 TABLET ORAL DAILY
Qty: 30 TABLET | Refills: 11 | Status: SHIPPED | OUTPATIENT
Start: 2019-08-27 | End: 2020-07-28

## 2019-08-27 RX ORDER — SODIUM CHLORIDE 9 MG/ML
3 INJECTION, SOLUTION INTRAVENOUS CONTINUOUS
Status: CANCELLED | OUTPATIENT
Start: 2019-09-20 | End: 2019-09-20

## 2019-08-27 NOTE — PROGRESS NOTES
CARDIOVASCULAR PROGRESS NOTE    REASON FOR CONSULT:   Clinton Rosenberg is a 68 y.o. male who presents for follow up of CAD.    PCP: Dair  HISTORY OF PRESENT ILLNESS:   Last seen in November 2018.    Patient returns for follow-up, accompanied by his daughter.  He continues to describe exertional chest discomfort.  He denies any symptoms at rest.  He has had no shortness of breath, palpitations, lightheadedness, dizziness, or syncope.  There has been no PND, orthopnea, lower extremity edema.  He denies melena, hematuria, or claudicant symptoms.    Reviewed the results of his nuclear stress test and echocardiogram.  The echo noted normal LV function with mild enlargement of the aortic root at 4.0 cm.  It was previously measured at 3.7 cm approximately 18 months ago.  His nuclear stress test revealed inferior attenuation artifact which was similar to prior examinations.  The patient tells me was unable to get his heart rate enough for treadmill exercise and underwent Lexiscan study.  Given the persistent symptoms and his known LAD disease, as well as our limitation with further titrating his anti anginal regimen, I suggest we proceed with heart catheterization and the patient is agreeable.  We plan to premedicate him with Benadryl and prednisone prior to catheterization.    CARDIOVASCULAR HISTORY:   CAD/MI s/p LAD PCI 2013  JAYLEN, unable to afford CPAP  Aortic root dil, 3.7->4.0 cm (echo 8/2019)  CVI (bilat GSV, R LSV, US 3/2018)    PAST MEDICAL HISTORY:     Past Medical History:   Diagnosis Date    Allergy     Angina pectoris     Anxiety     Coronary artery disease     Depression     GERD (gastroesophageal reflux disease)     Hyperlipidemia     Hypertension     Hypothyroidism     Memory loss     12/3/2014 MRI brain: 1. No evidence for acute infarct 2. unremarkable MRI of the brain; 12/3/2014 carotid US normal    Myocardial infarction     Obesity     Peripheral vascular disease 6/20/2017    Retrocalcaneal  bursitis 11/24/2014    Sleep apnea        PAST SURGICAL HISTORY:     Past Surgical History:   Procedure Laterality Date    APPENDECTOMY  1986    bone spur Right     COLONOSCOPY      hand trauma Right     pencil     heart stent         ALLERGIES AND MEDICATION:     Review of patient's allergies indicates:   Allergen Reactions    Iodine containing multivitamin Anaphylaxis    Naproxen Other (See Comments)     hallucinations    Hydrocodone-acetaminophen      Rash (skin)^    Oxycodone-acetaminophen      Rash (skin)^        Medication List           Accurate as of 8/27/19 10:19 AM. If you have any questions, ask your nurse or doctor.               CONTINUE taking these medications    aspirin 81 MG EC tablet  Commonly known as:  ECOTRIN  Take 1 tablet (81 mg total) by mouth once daily.     atorvastatin 80 MG tablet  Commonly known as:  LIPITOR  Take 1 tablet (80 mg total) by mouth every evening.     furosemide 20 MG tablet  Commonly known as:  LASIX  Take 1 tablet (20 mg total) by mouth once daily.     isosorbide mononitrate 60 MG 24 hr tablet  Commonly known as:  IMDUR  Take 1 tablet (60 mg total) by mouth once daily.     KRILL OIL (OMEGA 3 AND 6) ORAL     levothyroxine 50 MCG tablet  Commonly known as:  SYNTHROID  Take 1 tablet (50 mcg total) by mouth before breakfast.     losartan 50 MG tablet  Commonly known as:  COZAAR  Take 1 tablet (50 mg total) by mouth once daily.     metoprolol tartrate 25 MG tablet  Commonly known as:  LOPRESSOR  Take 1 tablet (25 mg total) by mouth 2 (two) times daily.     nitroGLYCERIN 0.4 MG SL tablet  Commonly known as:  NITROSTAT  Place 1 tablet (0.4 mg total) under the tongue every 5 (five) minutes as needed for Chest pain.     pantoprazole 40 MG tablet  Commonly known as:  PROTONIX  Take 1 tablet (40 mg total) by mouth once daily.     tamsulosin 0.4 mg Cap  Commonly known as:  FLOMAX  Take 2 capsules (0.8 mg total) by mouth once daily.     vitamin D 1000 units Tab  Commonly  known as:  VITAMIN D3  Take 1 tablet (1,000 Units total) by mouth once daily.        STOP taking these medications    methylPREDNISolone 4 mg tablet  Commonly known as:  MEDROL DOSEPACK  Stopped by:  Brad Bahena MD              SOCIAL HISTORY:     Social History     Socioeconomic History    Marital status:      Spouse name: Not on file    Number of children: Not on file    Years of education: Not on file    Highest education level: Not on file   Occupational History    Not on file   Social Needs    Financial resource strain: Not very hard    Food insecurity:     Worry: Never true     Inability: Never true    Transportation needs:     Medical: No     Non-medical: No   Tobacco Use    Smoking status: Never Smoker    Smokeless tobacco: Never Used   Substance and Sexual Activity    Alcohol use: No     Alcohol/week: 0.0 oz     Frequency: Monthly or less     Drinks per session: 1 or 2     Binge frequency: Never     Comment: rarely    Drug use: No    Sexual activity: Yes   Lifestyle    Physical activity:     Days per week: 3 days     Minutes per session: 30 min    Stress: To some extent   Relationships    Social connections:     Talks on phone: More than three times a week     Gets together: More than three times a week     Attends [employer]ism service: Not on file     Active member of club or organization: No     Attends meetings of clubs or organizations: Never     Relationship status:    Other Topics Concern    Not on file   Social History Narrative    Not on file       FAMILY HISTORY:     Family History   Problem Relation Age of Onset    Arthritis Mother     Early death Mother     Heart disease Mother     Hypertension Mother     Migraines Mother     Seizures Mother     Tremor Mother     Diabetes Mother     Cancer Mother     Early death Father     Heart disease Father     Hypertension Father     Stroke Father     Diabetes Father     Alcohol abuse Father     Arthritis  "Brother     Cancer Brother     Diabetes Brother     Early death Brother     Heart disease Brother     Hypertension Brother     Heart disease Sister     Hypertension Sister     Arthritis Sister     Depression Sister     Heart disease Brother     Arthritis Brother     Heart disease Brother     Pneumonia Brother     Heart disease Brother     Hypertension Brother     Diabetes Brother     Heart disease Brother        REVIEW OF SYSTEMS:   Review of Systems   Constitutional: Negative for chills, diaphoresis and fever.   HENT: Negative for nosebleeds.    Eyes: Negative for blurred vision, double vision and photophobia.   Respiratory: Negative for hemoptysis, shortness of breath and wheezing.    Cardiovascular: Positive for chest pain. Negative for palpitations, orthopnea, claudication, leg swelling and PND.   Gastrointestinal: Negative for abdominal pain, blood in stool, heartburn, melena, nausea and vomiting.   Genitourinary: Negative for flank pain and hematuria.   Musculoskeletal: Negative for falls, myalgias and neck pain.   Skin: Negative for rash.   Neurological: Negative for dizziness, seizures, loss of consciousness, weakness and headaches.   Endo/Heme/Allergies: Negative for polydipsia. Does not bruise/bleed easily.   Psychiatric/Behavioral: Negative for depression and memory loss. The patient is not nervous/anxious.        PHYSICAL EXAM:     Vitals:    08/27/19 0954   BP: 126/64   Pulse: 65   Resp: 15    Body mass index is 34.44 kg/m².  Weight: 108.9 kg (240 lb)   Height: 5' 10" (177.8 cm)     Physical Exam   Constitutional: He is oriented to person, place, and time. He appears well-developed and well-nourished. He is cooperative.  Non-toxic appearance. No distress.   HENT:   Head: Normocephalic and atraumatic.   Eyes: Pupils are equal, round, and reactive to light. Conjunctivae and EOM are normal. No scleral icterus.   Neck: Trachea normal and normal range of motion. Neck supple. Normal carotid " pulses and no JVD present. Carotid bruit is not present. No neck rigidity. No tracheal deviation and no edema present. No thyromegaly present.   Cardiovascular: Regular rhythm, S1 normal and S2 normal. Bradycardia present. PMI is not displaced. Exam reveals no gallop and no friction rub.   No murmur heard.  Pulses:       Carotid pulses are 2+ on the right side, and 2+ on the left side.       Radial pulses are 2+ on the right side.   Pulmonary/Chest: Effort normal and breath sounds normal. No stridor. No respiratory distress. He has no wheezes. He has no rales. He exhibits no tenderness.   Abdominal: Soft. He exhibits no distension. There is no hepatosplenomegaly.   Obese   Musculoskeletal: Normal range of motion. He exhibits no edema or tenderness.   Feet:   Right Foot:   Skin Integrity: Negative for ulcer.   Left Foot:   Skin Integrity: Negative for ulcer.   Neurological: He is alert and oriented to person, place, and time. No cranial nerve deficit.   Skin: Skin is warm and dry. No rash noted. No erythema.   Psychiatric: He has a normal mood and affect. His speech is normal and behavior is normal.   Vitals reviewed.      DATA:   EKG: (personally reviewed tracing)  7/30/19 SR 57, PRWP similar to 5/20/18    Laboratory:  CBC:  Recent Labs   Lab 07/26/17  0720 05/19/18  1400 05/20/18  0151   WBC 7.98 6.95 8.47   Hemoglobin 15.2 15.8 15.5   Hematocrit 43.7 43.4 43.2   Platelets 167 178 164       CHEMISTRIES:  Recent Labs   Lab 05/19/18  1400 05/20/18  0151 10/12/18  0947   Glucose 180 H 130 H 184 H   Sodium 140 143 140   Potassium 4.4 4.7 4.3   BUN, Bld 10 12 12   Creatinine 1.2 1.3 1.2   eGFR if African American >60 >60 >60.0   eGFR if non  >60 57 A >60.0   Calcium 9.2 9.4 9.3   Magnesium  --  2.4  --        CARDIAC BIOMARKERS:  Recent Labs   Lab 05/19/18  1400 05/19/18  1941 05/20/18  0151   Troponin I 0.010 0.010 <0.006       COAGS:        LIPIDS/LFTS:  Recent Labs   Lab 10/31/17  0837 05/19/18  1400  05/19/18  1941 05/20/18  0151 10/12/18  0947   Cholesterol 101 L  --  117 L  --  132   Triglycerides 245 H  --  368 H  --  268 H   HDL 29 L  --  29 L  --  36 L   LDL Cholesterol 23.0 L  --  14.4 L  --  42.4 L   Non-HDL Cholesterol 72  --  88  --  96   AST 18 18  --  19 28   ALT 19 22  --  25 21       Cardiovascular Testing:  L MPI 8/8/19 (similar diaphramatic artifact noted on MPI 6/2017)    Probably normal concepcion myocardial perfusion study.    The perfusion scan is free of evidence from myocardial ischemia or injury.    Post stress wall motion is physiologic.    There is a  mild intensity fixed defect in the inferior wall of the left ventricle secondary to diaphragm attenuation.    Gated perfusion images showed an ejection fraction of  66 % post stress.    There is  normal wall motion post stress.    Echo 8/8/19 (ao root diam mildly increased vs report 3/2018 (was 3.7cm))  · Normal left ventricular systolic function. The estimated ejection fraction is 65%  · Concentric left ventricular remodeling.  · No wall motion abnormalities.  · The sinuses of Valsalva is mildly dilated. 4.0cm.  · Normal right ventricular systolic function.    Venous US/reflux 3/15/18  RIGHT:  No evidence of Right lower extremity DVT.    There is reflux in the Greater Saphenous and Lesser Saphenous Veins.    R GSV dist thigh 2186 msec  R GSV in calf 2339 msec  R LSV 4006 msec   LEFT:  No evidence of Left lower extremity DVT.    There is reflux in the Greater Saphenous Vein.  R GSV mid thigh 2422 msec  R GSV dist thigh 1667 msec  R GSV calf 5647 msec      Holter 6/8/17  PREDOMINANT RHYTHM  1. Sinus rhythm with heart rates varying between 43 and 94 bpm with an average of 62 bpm.   VENTRICULAR ARRHYTHMIAS  1. There were very rare PVCs recorded totalling 5 and averaging less than 1 per hour.   2. There were no episodes of ventricular tachycardia.  SUPRA VENTRICULAR ARRHYTHMIAS  1. There were very rare PACs recorded totalling 3 and averaging  less than 1 per hour.   2. There were no episodes of sustained supraventricular tachycardia.  SINUS NODE FUNCTION  1. There was no evidence of high grade SA deanne block.   AV CONDUCTION  1. There was no evidence of high grade AV block.   DIARY  1. The diary was not returned  MISCELLANEOUS  1. There were rare hookup related artifacts. Overall, the study was of good quality.   2. This was a tape of adequate length (24 hrs).    LE art US/TANA 6/8/17  1.  Mild/atherosclerotic plaquing.  2.  No stenosis about 30 percent femoral popliteal arteries.  3.  Normal bilateral ABIs.    Carotid US 12/3/14  1. Less than 50% stenosis of the right internal carotid artery.  2. Less than 50% stenosis of the left internal carotid artery.    Cath 9/27/13 (Per Dr. Rivera note 11/19/14)  FINDINGS:   Left main, no luminal irregularities.   LAD: The proximal LAD has a 30% stenosis and widely patent. The remainder of the LAD has luminal  irregularities. First diagonal, luminal irregularities. Second diagonal, luminal irregularities.   Left circumflex artery, luminal irregularities. OM-1, luminal irregularities. OM-2, luminal irregularities.   Right coronary artery has a 20% stenosis and the distal right coronary artery has a 1% stenosis. The  remainder of the artery has luminal irregularities. Posterior lateral branch  has luminal irregularities. Posterior descending artery has luminal  irregularities.  SUMMARY: LVEDP = 10 mmHg. LVEF = 65%. Patent LAD stent.    ASSESSMENT:   # CAD s/p LAD PCI.  MPI 6/2017 normal.   Now complaining of exertional CP.  MPI and echo 8/2019 normal.  # HTN, controlled  # HLP, on atorva 80mg  # Ao root dil, 3.7->4.0 cm (echo 8/2019)  # CVI (bilat GSV, R LSV) on US 3/2018.  No edema at present (but complains of intermittent swelling), declines referral for venous ablation.  # hx JAYLEN, pt unable to obtain CPAP apparatus  # BMI 34, stable vs last OV  # Iodine allergy (?Betadine), unclear based on my interview today.   Will premedicate for cath.    PLAN:   Cont med rx  Cont metoprolol/Imdur, limited ability to titrate by HR and BP at present  Cont ASA 81mg qd  Start Plavix 75mg qd  Contrast allergy premeds (prednisone 50mg x3 and benadryl 50mg x2 prior to cath)  Cath 9/20/19 12pm, R rad access  Diet/exercise/weight loss  RTC post cath  Repeat echo 6 months (Feb 2020) for surveillance of aortic root dil    Risks, benefits and alternatives of the catheterization procedure were discussed with the patient which include but are not limited to: bleeding, infection, death, heart attack, arrhythmia, kidney failure, stroke, need for emergency surgery, etc.  Patient understands and and agrees to proceed.  Consent was placed on the chart.        Brad Bahena MD, FACC

## 2019-08-27 NOTE — H&P (VIEW-ONLY)
CARDIOVASCULAR PROGRESS NOTE    REASON FOR CONSULT:   Clinton Rosenberg is a 68 y.o. male who presents for follow up of CAD.    PCP: Dair  HISTORY OF PRESENT ILLNESS:   Last seen in November 2018.    Patient returns for follow-up, accompanied by his daughter.  He continues to describe exertional chest discomfort.  He denies any symptoms at rest.  He has had no shortness of breath, palpitations, lightheadedness, dizziness, or syncope.  There has been no PND, orthopnea, lower extremity edema.  He denies melena, hematuria, or claudicant symptoms.    Reviewed the results of his nuclear stress test and echocardiogram.  The echo noted normal LV function with mild enlargement of the aortic root at 4.0 cm.  It was previously measured at 3.7 cm approximately 18 months ago.  His nuclear stress test revealed inferior attenuation artifact which was similar to prior examinations.  The patient tells me was unable to get his heart rate enough for treadmill exercise and underwent Lexiscan study.  Given the persistent symptoms and his known LAD disease, as well as our limitation with further titrating his anti anginal regimen, I suggest we proceed with heart catheterization and the patient is agreeable.  We plan to premedicate him with Benadryl and prednisone prior to catheterization.    CARDIOVASCULAR HISTORY:   CAD/MI s/p LAD PCI 2013  JAYLEN, unable to afford CPAP  Aortic root dil, 3.7->4.0 cm (echo 8/2019)  CVI (bilat GSV, R LSV, US 3/2018)    PAST MEDICAL HISTORY:     Past Medical History:   Diagnosis Date    Allergy     Angina pectoris     Anxiety     Coronary artery disease     Depression     GERD (gastroesophageal reflux disease)     Hyperlipidemia     Hypertension     Hypothyroidism     Memory loss     12/3/2014 MRI brain: 1. No evidence for acute infarct 2. unremarkable MRI of the brain; 12/3/2014 carotid US normal    Myocardial infarction     Obesity     Peripheral vascular disease 6/20/2017    Retrocalcaneal  bursitis 11/24/2014    Sleep apnea        PAST SURGICAL HISTORY:     Past Surgical History:   Procedure Laterality Date    APPENDECTOMY  1986    bone spur Right     COLONOSCOPY      hand trauma Right     pencil     heart stent         ALLERGIES AND MEDICATION:     Review of patient's allergies indicates:   Allergen Reactions    Iodine containing multivitamin Anaphylaxis    Naproxen Other (See Comments)     hallucinations    Hydrocodone-acetaminophen      Rash (skin)^    Oxycodone-acetaminophen      Rash (skin)^        Medication List           Accurate as of 8/27/19 10:19 AM. If you have any questions, ask your nurse or doctor.               CONTINUE taking these medications    aspirin 81 MG EC tablet  Commonly known as:  ECOTRIN  Take 1 tablet (81 mg total) by mouth once daily.     atorvastatin 80 MG tablet  Commonly known as:  LIPITOR  Take 1 tablet (80 mg total) by mouth every evening.     furosemide 20 MG tablet  Commonly known as:  LASIX  Take 1 tablet (20 mg total) by mouth once daily.     isosorbide mononitrate 60 MG 24 hr tablet  Commonly known as:  IMDUR  Take 1 tablet (60 mg total) by mouth once daily.     KRILL OIL (OMEGA 3 AND 6) ORAL     levothyroxine 50 MCG tablet  Commonly known as:  SYNTHROID  Take 1 tablet (50 mcg total) by mouth before breakfast.     losartan 50 MG tablet  Commonly known as:  COZAAR  Take 1 tablet (50 mg total) by mouth once daily.     metoprolol tartrate 25 MG tablet  Commonly known as:  LOPRESSOR  Take 1 tablet (25 mg total) by mouth 2 (two) times daily.     nitroGLYCERIN 0.4 MG SL tablet  Commonly known as:  NITROSTAT  Place 1 tablet (0.4 mg total) under the tongue every 5 (five) minutes as needed for Chest pain.     pantoprazole 40 MG tablet  Commonly known as:  PROTONIX  Take 1 tablet (40 mg total) by mouth once daily.     tamsulosin 0.4 mg Cap  Commonly known as:  FLOMAX  Take 2 capsules (0.8 mg total) by mouth once daily.     vitamin D 1000 units Tab  Commonly  known as:  VITAMIN D3  Take 1 tablet (1,000 Units total) by mouth once daily.        STOP taking these medications    methylPREDNISolone 4 mg tablet  Commonly known as:  MEDROL DOSEPACK  Stopped by:  Brad Bahena MD              SOCIAL HISTORY:     Social History     Socioeconomic History    Marital status:      Spouse name: Not on file    Number of children: Not on file    Years of education: Not on file    Highest education level: Not on file   Occupational History    Not on file   Social Needs    Financial resource strain: Not very hard    Food insecurity:     Worry: Never true     Inability: Never true    Transportation needs:     Medical: No     Non-medical: No   Tobacco Use    Smoking status: Never Smoker    Smokeless tobacco: Never Used   Substance and Sexual Activity    Alcohol use: No     Alcohol/week: 0.0 oz     Frequency: Monthly or less     Drinks per session: 1 or 2     Binge frequency: Never     Comment: rarely    Drug use: No    Sexual activity: Yes   Lifestyle    Physical activity:     Days per week: 3 days     Minutes per session: 30 min    Stress: To some extent   Relationships    Social connections:     Talks on phone: More than three times a week     Gets together: More than three times a week     Attends Christianity service: Not on file     Active member of club or organization: No     Attends meetings of clubs or organizations: Never     Relationship status:    Other Topics Concern    Not on file   Social History Narrative    Not on file       FAMILY HISTORY:     Family History   Problem Relation Age of Onset    Arthritis Mother     Early death Mother     Heart disease Mother     Hypertension Mother     Migraines Mother     Seizures Mother     Tremor Mother     Diabetes Mother     Cancer Mother     Early death Father     Heart disease Father     Hypertension Father     Stroke Father     Diabetes Father     Alcohol abuse Father     Arthritis  "Brother     Cancer Brother     Diabetes Brother     Early death Brother     Heart disease Brother     Hypertension Brother     Heart disease Sister     Hypertension Sister     Arthritis Sister     Depression Sister     Heart disease Brother     Arthritis Brother     Heart disease Brother     Pneumonia Brother     Heart disease Brother     Hypertension Brother     Diabetes Brother     Heart disease Brother        REVIEW OF SYSTEMS:   Review of Systems   Constitutional: Negative for chills, diaphoresis and fever.   HENT: Negative for nosebleeds.    Eyes: Negative for blurred vision, double vision and photophobia.   Respiratory: Negative for hemoptysis, shortness of breath and wheezing.    Cardiovascular: Positive for chest pain. Negative for palpitations, orthopnea, claudication, leg swelling and PND.   Gastrointestinal: Negative for abdominal pain, blood in stool, heartburn, melena, nausea and vomiting.   Genitourinary: Negative for flank pain and hematuria.   Musculoskeletal: Negative for falls, myalgias and neck pain.   Skin: Negative for rash.   Neurological: Negative for dizziness, seizures, loss of consciousness, weakness and headaches.   Endo/Heme/Allergies: Negative for polydipsia. Does not bruise/bleed easily.   Psychiatric/Behavioral: Negative for depression and memory loss. The patient is not nervous/anxious.        PHYSICAL EXAM:     Vitals:    08/27/19 0954   BP: 126/64   Pulse: 65   Resp: 15    Body mass index is 34.44 kg/m².  Weight: 108.9 kg (240 lb)   Height: 5' 10" (177.8 cm)     Physical Exam   Constitutional: He is oriented to person, place, and time. He appears well-developed and well-nourished. He is cooperative.  Non-toxic appearance. No distress.   HENT:   Head: Normocephalic and atraumatic.   Eyes: Pupils are equal, round, and reactive to light. Conjunctivae and EOM are normal. No scleral icterus.   Neck: Trachea normal and normal range of motion. Neck supple. Normal carotid " pulses and no JVD present. Carotid bruit is not present. No neck rigidity. No tracheal deviation and no edema present. No thyromegaly present.   Cardiovascular: Regular rhythm, S1 normal and S2 normal. Bradycardia present. PMI is not displaced. Exam reveals no gallop and no friction rub.   No murmur heard.  Pulses:       Carotid pulses are 2+ on the right side, and 2+ on the left side.       Radial pulses are 2+ on the right side.   Pulmonary/Chest: Effort normal and breath sounds normal. No stridor. No respiratory distress. He has no wheezes. He has no rales. He exhibits no tenderness.   Abdominal: Soft. He exhibits no distension. There is no hepatosplenomegaly.   Obese   Musculoskeletal: Normal range of motion. He exhibits no edema or tenderness.   Feet:   Right Foot:   Skin Integrity: Negative for ulcer.   Left Foot:   Skin Integrity: Negative for ulcer.   Neurological: He is alert and oriented to person, place, and time. No cranial nerve deficit.   Skin: Skin is warm and dry. No rash noted. No erythema.   Psychiatric: He has a normal mood and affect. His speech is normal and behavior is normal.   Vitals reviewed.      DATA:   EKG: (personally reviewed tracing)  7/30/19 SR 57, PRWP similar to 5/20/18    Laboratory:  CBC:  Recent Labs   Lab 07/26/17  0720 05/19/18  1400 05/20/18  0151   WBC 7.98 6.95 8.47   Hemoglobin 15.2 15.8 15.5   Hematocrit 43.7 43.4 43.2   Platelets 167 178 164       CHEMISTRIES:  Recent Labs   Lab 05/19/18  1400 05/20/18  0151 10/12/18  0947   Glucose 180 H 130 H 184 H   Sodium 140 143 140   Potassium 4.4 4.7 4.3   BUN, Bld 10 12 12   Creatinine 1.2 1.3 1.2   eGFR if African American >60 >60 >60.0   eGFR if non  >60 57 A >60.0   Calcium 9.2 9.4 9.3   Magnesium  --  2.4  --        CARDIAC BIOMARKERS:  Recent Labs   Lab 05/19/18  1400 05/19/18  1941 05/20/18  0151   Troponin I 0.010 0.010 <0.006       COAGS:        LIPIDS/LFTS:  Recent Labs   Lab 10/31/17  0837 05/19/18  1400  05/19/18  1941 05/20/18  0151 10/12/18  0947   Cholesterol 101 L  --  117 L  --  132   Triglycerides 245 H  --  368 H  --  268 H   HDL 29 L  --  29 L  --  36 L   LDL Cholesterol 23.0 L  --  14.4 L  --  42.4 L   Non-HDL Cholesterol 72  --  88  --  96   AST 18 18  --  19 28   ALT 19 22  --  25 21       Cardiovascular Testing:  L MPI 8/8/19 (similar diaphramatic artifact noted on MPI 6/2017)    Probably normal concepcion myocardial perfusion study.    The perfusion scan is free of evidence from myocardial ischemia or injury.    Post stress wall motion is physiologic.    There is a  mild intensity fixed defect in the inferior wall of the left ventricle secondary to diaphragm attenuation.    Gated perfusion images showed an ejection fraction of  66 % post stress.    There is  normal wall motion post stress.    Echo 8/8/19 (ao root diam mildly increased vs report 3/2018 (was 3.7cm))  · Normal left ventricular systolic function. The estimated ejection fraction is 65%  · Concentric left ventricular remodeling.  · No wall motion abnormalities.  · The sinuses of Valsalva is mildly dilated. 4.0cm.  · Normal right ventricular systolic function.    Venous US/reflux 3/15/18  RIGHT:  No evidence of Right lower extremity DVT.    There is reflux in the Greater Saphenous and Lesser Saphenous Veins.    R GSV dist thigh 2186 msec  R GSV in calf 2339 msec  R LSV 4006 msec   LEFT:  No evidence of Left lower extremity DVT.    There is reflux in the Greater Saphenous Vein.  R GSV mid thigh 2422 msec  R GSV dist thigh 1667 msec  R GSV calf 5647 msec      Holter 6/8/17  PREDOMINANT RHYTHM  1. Sinus rhythm with heart rates varying between 43 and 94 bpm with an average of 62 bpm.   VENTRICULAR ARRHYTHMIAS  1. There were very rare PVCs recorded totalling 5 and averaging less than 1 per hour.   2. There were no episodes of ventricular tachycardia.  SUPRA VENTRICULAR ARRHYTHMIAS  1. There were very rare PACs recorded totalling 3 and averaging  less than 1 per hour.   2. There were no episodes of sustained supraventricular tachycardia.  SINUS NODE FUNCTION  1. There was no evidence of high grade SA deanne block.   AV CONDUCTION  1. There was no evidence of high grade AV block.   DIARY  1. The diary was not returned  MISCELLANEOUS  1. There were rare hookup related artifacts. Overall, the study was of good quality.   2. This was a tape of adequate length (24 hrs).    LE art US/TANA 6/8/17  1.  Mild/atherosclerotic plaquing.  2.  No stenosis about 30 percent femoral popliteal arteries.  3.  Normal bilateral ABIs.    Carotid US 12/3/14  1. Less than 50% stenosis of the right internal carotid artery.  2. Less than 50% stenosis of the left internal carotid artery.    Cath 9/27/13 (Per Dr. Rivera note 11/19/14)  FINDINGS:   Left main, no luminal irregularities.   LAD: The proximal LAD has a 30% stenosis and widely patent. The remainder of the LAD has luminal  irregularities. First diagonal, luminal irregularities. Second diagonal, luminal irregularities.   Left circumflex artery, luminal irregularities. OM-1, luminal irregularities. OM-2, luminal irregularities.   Right coronary artery has a 20% stenosis and the distal right coronary artery has a 1% stenosis. The  remainder of the artery has luminal irregularities. Posterior lateral branch  has luminal irregularities. Posterior descending artery has luminal  irregularities.  SUMMARY: LVEDP = 10 mmHg. LVEF = 65%. Patent LAD stent.    ASSESSMENT:   # CAD s/p LAD PCI.  MPI 6/2017 normal.   Now complaining of exertional CP.  MPI and echo 8/2019 normal.  # HTN, controlled  # HLP, on atorva 80mg  # Ao root dil, 3.7->4.0 cm (echo 8/2019)  # CVI (bilat GSV, R LSV) on US 3/2018.  No edema at present (but complains of intermittent swelling), declines referral for venous ablation.  # hx JAYLEN, pt unable to obtain CPAP apparatus  # BMI 34, stable vs last OV  # Iodine allergy (?Betadine), unclear based on my interview today.   Will premedicate for cath.    PLAN:   Cont med rx  Cont metoprolol/Imdur, limited ability to titrate by HR and BP at present  Cont ASA 81mg qd  Start Plavix 75mg qd  Contrast allergy premeds (prednisone 50mg x3 and benadryl 50mg x2 prior to cath)  Cath 9/20/19 12pm, R rad access  Diet/exercise/weight loss  RTC post cath  Repeat echo 6 months (Feb 2020) for surveillance of aortic root dil    Risks, benefits and alternatives of the catheterization procedure were discussed with the patient which include but are not limited to: bleeding, infection, death, heart attack, arrhythmia, kidney failure, stroke, need for emergency surgery, etc.  Patient understands and and agrees to proceed.  Consent was placed on the chart.        Brad Bahena MD, FACC

## 2019-09-06 ENCOUNTER — TELEPHONE (OUTPATIENT)
Dept: FAMILY MEDICINE | Facility: CLINIC | Age: 69
End: 2019-09-06

## 2019-09-06 NOTE — TELEPHONE ENCOUNTER
----- Message from Sienna Ferris sent at 9/6/2019 10:18 AM CDT -----  Contact: [facility]  Type: Patient Call Back    Who called:[facility]     What is the request in detail: Pharmacy Need clairfication on predniSONE (DELTASONE) 50 MG Tab    Can the clinic reply by [facility]?no    Would the patient rather a call back or a response via [facility]? Call back    Best call back number:3

## 2019-09-13 ENCOUNTER — TELEPHONE (OUTPATIENT)
Dept: CARDIOLOGY | Facility: CLINIC | Age: 69
End: 2019-09-13

## 2019-09-13 ENCOUNTER — HOSPITAL ENCOUNTER (OUTPATIENT)
Dept: PREADMISSION TESTING | Facility: HOSPITAL | Age: 69
Discharge: HOME | End: 2019-09-13
Attending: INTERNAL MEDICINE
Payer: MEDICARE

## 2019-09-13 VITALS
OXYGEN SATURATION: 98 % | SYSTOLIC BLOOD PRESSURE: 117 MMHG | TEMPERATURE: 97 F | BODY MASS INDEX: 34.84 KG/M2 | DIASTOLIC BLOOD PRESSURE: 69 MMHG | HEART RATE: 58 BPM | RESPIRATION RATE: 18 BRPM | HEIGHT: 70 IN | WEIGHT: 243.38 LBS

## 2019-09-13 DIAGNOSIS — I25.118 CORONARY ARTERY DISEASE OF NATIVE ARTERY OF NATIVE HEART WITH STABLE ANGINA PECTORIS: ICD-10-CM

## 2019-09-13 LAB
ANION GAP SERPL CALC-SCNC: 6 MMOL/L (ref 8–16)
BASOPHILS # BLD AUTO: 0.04 K/UL (ref 0–0.2)
BASOPHILS NFR BLD: 0.6 % (ref 0–1.9)
BUN SERPL-MCNC: 11 MG/DL (ref 8–23)
CALCIUM SERPL-MCNC: 9.4 MG/DL (ref 8.7–10.5)
CHLORIDE SERPL-SCNC: 103 MMOL/L (ref 95–110)
CO2 SERPL-SCNC: 31 MMOL/L (ref 23–29)
CREAT SERPL-MCNC: 1.2 MG/DL (ref 0.5–1.4)
DIFFERENTIAL METHOD: ABNORMAL
EOSINOPHIL # BLD AUTO: 0.2 K/UL (ref 0–0.5)
EOSINOPHIL NFR BLD: 2.4 % (ref 0–8)
ERYTHROCYTE [DISTWIDTH] IN BLOOD BY AUTOMATED COUNT: 13.5 % (ref 11.5–14.5)
EST. GFR  (AFRICAN AMERICAN): >60 ML/MIN/1.73 M^2
EST. GFR  (NON AFRICAN AMERICAN): >60 ML/MIN/1.73 M^2
GLUCOSE SERPL-MCNC: 232 MG/DL (ref 70–110)
HCT VFR BLD AUTO: 42.7 % (ref 40–54)
HGB BLD-MCNC: 14.9 G/DL (ref 14–18)
INR PPP: 1 (ref 0.8–1.2)
LYMPHOCYTES # BLD AUTO: 2.3 K/UL (ref 1–4.8)
LYMPHOCYTES NFR BLD: 32.7 % (ref 18–48)
MCH RBC QN AUTO: 31.6 PG (ref 27–31)
MCHC RBC AUTO-ENTMCNC: 34.9 G/DL (ref 32–36)
MCV RBC AUTO: 91 FL (ref 82–98)
MONOCYTES # BLD AUTO: 0.5 K/UL (ref 0.3–1)
MONOCYTES NFR BLD: 6.3 % (ref 4–15)
NEUTROPHILS # BLD AUTO: 4.1 K/UL (ref 1.8–7.7)
NEUTROPHILS NFR BLD: 58.3 % (ref 38–73)
PLATELET # BLD AUTO: 174 K/UL (ref 150–350)
PMV BLD AUTO: 9.8 FL (ref 9.2–12.9)
POTASSIUM SERPL-SCNC: 4.6 MMOL/L (ref 3.5–5.1)
PROTHROMBIN TIME: 10.5 SEC (ref 9–12.5)
RBC # BLD AUTO: 4.71 M/UL (ref 4.6–6.2)
SODIUM SERPL-SCNC: 140 MMOL/L (ref 136–145)
WBC # BLD AUTO: 7.1 K/UL (ref 3.9–12.7)

## 2019-09-13 PROCEDURE — 36415 COLL VENOUS BLD VENIPUNCTURE: CPT | Mod: HCNC

## 2019-09-13 PROCEDURE — 80048 BASIC METABOLIC PNL TOTAL CA: CPT | Mod: HCNC

## 2019-09-13 PROCEDURE — 85025 COMPLETE CBC W/AUTO DIFF WBC: CPT | Mod: HCNC

## 2019-09-13 PROCEDURE — 85610 PROTHROMBIN TIME: CPT | Mod: HCNC

## 2019-09-13 RX ORDER — PREDNISONE 50 MG/1
50 TABLET ORAL 2 TIMES DAILY
Qty: 3 TABLET | Refills: 0 | Status: SHIPPED | OUTPATIENT
Start: 2019-09-13 | End: 2019-09-15

## 2019-09-13 NOTE — DISCHARGE INSTRUCTIONS
"Your angiogram is scheduled for__9/20/2019_____________    Call [phone] between 2pm and 5pm on _9/19/2019___________to find out your arrival time for the day of your procedure.    Report to Same Day Surgery Unit at ____ AM on the 2nd floor of the hospital.  Use the front entrance of the hospital.  The front doors of the hospital open promptly at 5:30am.  If you need wheelchair assistance, call [phone] from your cell phone, or call "0" from the courtesy phone in the lobby.    IMPORTANT INSTRUCTIONS:    Make sure to follow any instructions from Dr Bahena for procedure premedications.    Do not eat or drink anything after midnight.  This includes water and coffee.  It is okay to brush your teeth.  Do not chew gum or have hard candy or mints.    Take your morning medications as instructed with small sips of water.     Shower the night before your procedure and the morning of your procedure with Hibiclens as directed.     You may wear deodorant, but do not wear body lotion, powder or perfume/cologne       Do not wear jewelry.     You will be asked to remove any dentures or partials for the procedure.     You do not need money or valuables.  You may bring your cell phone.     If you are going home the same day, you must arrange for someone to drive you home.     Wear comfortable, loose fitting clothes.      Do not take any diabetic medication.  Metformin or Synjardy should be held for 2 days before the angiogram.     Call your doctor if you have sickness or fever before your angiogram.     Our number in [facility] is [phone] if you need to contact us.     If you are going home the same day, you must arrange for a family member or a friend to drive you home.  Public transportation is prohibited.  "

## 2019-09-20 ENCOUNTER — HOSPITAL ENCOUNTER (INPATIENT)
Facility: HOSPITAL | Age: 69
LOS: 1 days | Discharge: HOME | DRG: 247 | End: 2019-09-21
Attending: INTERNAL MEDICINE | Admitting: INTERNAL MEDICINE
Payer: MEDICARE

## 2019-09-20 DIAGNOSIS — I87.2 VENOUS INSUFFICIENCY: ICD-10-CM

## 2019-09-20 DIAGNOSIS — E66.9 OBESITY (BMI 30.0-34.9): Chronic | ICD-10-CM

## 2019-09-20 DIAGNOSIS — Z87.820 HISTORY OF CONCUSSION: ICD-10-CM

## 2019-09-20 DIAGNOSIS — G47.33 OSA (OBSTRUCTIVE SLEEP APNEA): Chronic | ICD-10-CM

## 2019-09-20 DIAGNOSIS — K21.9 GASTROESOPHAGEAL REFLUX DISEASE, ESOPHAGITIS PRESENCE NOT SPECIFIED: Chronic | ICD-10-CM

## 2019-09-20 DIAGNOSIS — D12.6 TUBULAR ADENOMA OF COLON: ICD-10-CM

## 2019-09-20 DIAGNOSIS — I10 ESSENTIAL HYPERTENSION: Chronic | ICD-10-CM

## 2019-09-20 DIAGNOSIS — I25.10 CORONARY ARTERY DISEASE INVOLVING NATIVE CORONARY ARTERY OF NATIVE HEART WITHOUT ANGINA PECTORIS: ICD-10-CM

## 2019-09-20 DIAGNOSIS — F41.9 ANXIETY AND DEPRESSION: Chronic | ICD-10-CM

## 2019-09-20 DIAGNOSIS — Z88.8 ALLERGY TO IODINE: ICD-10-CM

## 2019-09-20 DIAGNOSIS — I25.118 CORONARY ARTERY DISEASE OF NATIVE ARTERY OF NATIVE HEART WITH STABLE ANGINA PECTORIS: ICD-10-CM

## 2019-09-20 DIAGNOSIS — E55.9 VITAMIN D DEFICIENCY: Chronic | ICD-10-CM

## 2019-09-20 DIAGNOSIS — N40.1 BENIGN NON-NODULAR PROSTATIC HYPERPLASIA WITH LOWER URINARY TRACT SYMPTOMS: Chronic | ICD-10-CM

## 2019-09-20 DIAGNOSIS — I77.810 AORTIC ROOT DILATATION: ICD-10-CM

## 2019-09-20 DIAGNOSIS — K43.9 HERNIA OF ABDOMINAL WALL: ICD-10-CM

## 2019-09-20 DIAGNOSIS — B02.23 ACUTE HERPES ZOSTER NEUROPATHY: ICD-10-CM

## 2019-09-20 DIAGNOSIS — I77.819 AORTIC ECTASIA: ICD-10-CM

## 2019-09-20 DIAGNOSIS — I73.9 PERIPHERAL VASCULAR DISEASE: ICD-10-CM

## 2019-09-20 DIAGNOSIS — I25.10 CAD (CORONARY ARTERY DISEASE): ICD-10-CM

## 2019-09-20 DIAGNOSIS — E03.9 HYPOTHYROIDISM, UNSPECIFIED TYPE: Chronic | ICD-10-CM

## 2019-09-20 DIAGNOSIS — E78.2 MIXED HYPERLIPIDEMIA: Primary | Chronic | ICD-10-CM

## 2019-09-20 DIAGNOSIS — R73.03 PREDIABETES: Chronic | ICD-10-CM

## 2019-09-20 DIAGNOSIS — F32.A ANXIETY AND DEPRESSION: Chronic | ICD-10-CM

## 2019-09-20 LAB
AORTIC ROOT ANNULUS: 3.47 CM
AORTIC VALVE CUSP SEPERATION: 2.21 CM
ASCENDING AORTA: 3.57 CM
AV INDEX (PROSTH): 0.95
AV MEAN GRADIENT: 4 MMHG
AV PEAK GRADIENT: 8 MMHG
AV VALVE AREA: 5.41 CM2
AV VELOCITY RATIO: 0.86
BSA FOR ECHO PROCEDURE: 2.33 M2
CV ECHO LV RWT: 0.44 CM
DOP CALC AO PEAK VEL: 1.41 M/S
DOP CALC AO VTI: 32.31 CM
DOP CALC LVOT AREA: 5.7 CM2
DOP CALC LVOT DIAMETER: 2.7 CM
DOP CALC LVOT PEAK VEL: 1.21 M/S
DOP CALC LVOT STROKE VOLUME: 174.83 CM3
DOP CALCLVOT PEAK VEL VTI: 30.55 CM
E WAVE DECELERATION TIME: 312.05 MSEC
E/A RATIO: 0.79
E/E' RATIO: 8.3 M/S
ECHO LV POSTERIOR WALL: 1.05 CM (ref 0.6–1.1)
FRACTIONAL SHORTENING: 51 % (ref 28–44)
INTERVENTRICULAR SEPTUM: 0.92 CM (ref 0.6–1.1)
IVRT: 0.11 MSEC
LA MAJOR: 4.95 CM
LA MINOR: 4.78 CM
LA WIDTH: 3.09 CM
LEFT ATRIUM SIZE: 4.13 CM
LEFT ATRIUM VOLUME INDEX: 23.3 ML/M2
LEFT ATRIUM VOLUME: 52.76 CM3
LEFT INTERNAL DIMENSION IN SYSTOLE: 2.37 CM (ref 2.1–4)
LEFT VENTRICLE DIASTOLIC VOLUME INDEX: 47.35 ML/M2
LEFT VENTRICLE DIASTOLIC VOLUME: 107.4 ML
LEFT VENTRICLE MASS INDEX: 74 G/M2
LEFT VENTRICLE SYSTOLIC VOLUME INDEX: 8.6 ML/M2
LEFT VENTRICLE SYSTOLIC VOLUME: 19.57 ML
LEFT VENTRICULAR INTERNAL DIMENSION IN DIASTOLE: 4.8 CM (ref 3.5–6)
LEFT VENTRICULAR MASS: 166.75 G
LV LATERAL E/E' RATIO: 6.38 M/S
LV SEPTAL E/E' RATIO: 11.86 M/S
MV PEAK A VEL: 1.05 M/S
MV PEAK E VEL: 0.83 M/S
PISA TR MAX VEL: 1.48 M/S
PV PEAK VELOCITY: 1.05 CM/S
RA MAJOR: 5.07 CM
RA PRESSURE: 3 MMHG
RA WIDTH: 3.6 CM
RIGHT VENTRICULAR END-DIASTOLIC DIMENSION: 3.09 CM
SINUS: 3.78 CM
STJ: 3.21 CM
TDI LATERAL: 0.13 M/S
TDI SEPTAL: 0.07 M/S
TDI: 0.1 M/S
TR MAX PG: 9 MMHG
TRICUSPID ANNULAR PLANE SYSTOLIC EXCURSION: 2.08 CM
TROPONIN I SERPL DL<=0.01 NG/ML-MCNC: <0.006 NG/ML (ref 0–0.03)
TV REST PULMONARY ARTERY PRESSURE: 12 MMHG

## 2019-09-20 PROCEDURE — 99153 MOD SED SAME PHYS/QHP EA: CPT | Mod: HCNC | Performed by: INTERNAL MEDICINE

## 2019-09-20 PROCEDURE — 25000003 PHARM REV CODE 250: Mod: HCNC | Performed by: INTERNAL MEDICINE

## 2019-09-20 PROCEDURE — C1729 CATH, DRAINAGE: HCPCS | Mod: HCNC | Performed by: INTERNAL MEDICINE

## 2019-09-20 PROCEDURE — C1725 CATH, TRANSLUMIN NON-LASER: HCPCS | Mod: HCNC | Performed by: INTERNAL MEDICINE

## 2019-09-20 PROCEDURE — 93458 L HRT ARTERY/VENTRICLE ANGIO: CPT | Mod: 59,HCNC | Performed by: INTERNAL MEDICINE

## 2019-09-20 PROCEDURE — 93010 EKG 12-LEAD: ICD-10-PCS | Mod: HCNC,,, | Performed by: INTERNAL MEDICINE

## 2019-09-20 PROCEDURE — 27201423 OPTIME MED/SURG SUP & DEVICES STERILE SUPPLY: Mod: HCNC | Performed by: INTERNAL MEDICINE

## 2019-09-20 PROCEDURE — C1894 INTRO/SHEATH, NON-LASER: HCPCS | Mod: HCNC | Performed by: INTERNAL MEDICINE

## 2019-09-20 PROCEDURE — 92928 PR STENT: ICD-10-PCS | Mod: RC,HCNC,, | Performed by: INTERNAL MEDICINE

## 2019-09-20 PROCEDURE — 92928 PRQ TCAT PLMT NTRAC ST 1 LES: CPT | Mod: RC,HCNC,, | Performed by: INTERNAL MEDICINE

## 2019-09-20 PROCEDURE — 93458 PR CATH PLACE/CORON ANGIO, IMG SUPER/INTERP,W LEFT HEART VENTRICULOGRAPHY: ICD-10-PCS | Mod: 26,59,51,HCNC | Performed by: INTERNAL MEDICINE

## 2019-09-20 PROCEDURE — C9600 PERC DRUG-EL COR STENT SING: HCPCS | Mod: HCNC | Performed by: INTERNAL MEDICINE

## 2019-09-20 PROCEDURE — C1769 GUIDE WIRE: HCPCS | Mod: HCNC | Performed by: INTERNAL MEDICINE

## 2019-09-20 PROCEDURE — 84484 ASSAY OF TROPONIN QUANT: CPT | Mod: HCNC

## 2019-09-20 PROCEDURE — 99152 MOD SED SAME PHYS/QHP 5/>YRS: CPT | Mod: HCNC | Performed by: INTERNAL MEDICINE

## 2019-09-20 PROCEDURE — 85347 COAGULATION TIME ACTIVATED: CPT | Mod: HCNC | Performed by: INTERNAL MEDICINE

## 2019-09-20 PROCEDURE — 20000000 HC ICU ROOM: Mod: HCNC

## 2019-09-20 PROCEDURE — 93458 L HRT ARTERY/VENTRICLE ANGIO: CPT | Mod: 26,59,51,HCNC | Performed by: INTERNAL MEDICINE

## 2019-09-20 PROCEDURE — 63600175 PHARM REV CODE 636 W HCPCS: Mod: HCNC | Performed by: RADIOLOGY

## 2019-09-20 PROCEDURE — 25000003 PHARM REV CODE 250: Mod: HCNC

## 2019-09-20 PROCEDURE — 99152 MOD SED SAME PHYS/QHP 5/>YRS: CPT | Mod: HCNC,,, | Performed by: INTERNAL MEDICINE

## 2019-09-20 PROCEDURE — 99152 PR MOD CONSCIOUS SEDATION, SAME PHYS, 5+ YRS, FIRST 15 MIN: ICD-10-PCS | Mod: HCNC,,, | Performed by: INTERNAL MEDICINE

## 2019-09-20 PROCEDURE — 93010 ELECTROCARDIOGRAM REPORT: CPT | Mod: HCNC,,, | Performed by: INTERNAL MEDICINE

## 2019-09-20 PROCEDURE — C1887 CATHETER, GUIDING: HCPCS | Mod: HCNC | Performed by: INTERNAL MEDICINE

## 2019-09-20 PROCEDURE — 25500020 PHARM REV CODE 255: Mod: HCNC | Performed by: INTERNAL MEDICINE

## 2019-09-20 PROCEDURE — 63600175 PHARM REV CODE 636 W HCPCS: Mod: HCNC | Performed by: INTERNAL MEDICINE

## 2019-09-20 PROCEDURE — C1874 STENT, COATED/COV W/DEL SYS: HCPCS | Mod: HCNC | Performed by: INTERNAL MEDICINE

## 2019-09-20 PROCEDURE — 93005 ELECTROCARDIOGRAM TRACING: CPT | Mod: HCNC

## 2019-09-20 DEVICE — STENT RONYX25018UX RESOLUTE ONYX 2.50X18
Type: IMPLANTABLE DEVICE | Site: CORONARY | Status: FUNCTIONAL
Brand: RESOLUTE ONYX™

## 2019-09-20 RX ORDER — ATROPINE SULFATE 0.1 MG/ML
0.5 INJECTION INTRAVENOUS
Status: DISCONTINUED | OUTPATIENT
Start: 2019-09-20 | End: 2019-09-21 | Stop reason: HOSPADM

## 2019-09-20 RX ORDER — MIDAZOLAM HYDROCHLORIDE 1 MG/ML
INJECTION, SOLUTION INTRAMUSCULAR; INTRAVENOUS
Status: DISCONTINUED | OUTPATIENT
Start: 2019-09-20 | End: 2019-09-20 | Stop reason: HOSPADM

## 2019-09-20 RX ORDER — ASPIRIN 81 MG/1
81 TABLET ORAL DAILY
Status: DISCONTINUED | OUTPATIENT
Start: 2019-09-20 | End: 2019-09-20

## 2019-09-20 RX ORDER — MORPHINE SULFATE 10 MG/ML
2 INJECTION INTRAMUSCULAR; INTRAVENOUS; SUBCUTANEOUS EVERY 10 MIN PRN
Status: DISCONTINUED | OUTPATIENT
Start: 2019-09-20 | End: 2019-09-21 | Stop reason: HOSPADM

## 2019-09-20 RX ORDER — FENTANYL CITRATE 50 UG/ML
INJECTION, SOLUTION INTRAMUSCULAR; INTRAVENOUS
Status: DISCONTINUED | OUTPATIENT
Start: 2019-09-20 | End: 2019-09-20 | Stop reason: HOSPADM

## 2019-09-20 RX ORDER — HEPARIN SODIUM 200 [USP'U]/100ML
INJECTION, SOLUTION INTRAVENOUS
Status: DISCONTINUED | OUTPATIENT
Start: 2019-09-20 | End: 2019-09-20

## 2019-09-20 RX ORDER — VERAPAMIL HYDROCHLORIDE 2.5 MG/ML
INJECTION, SOLUTION INTRAVENOUS
Status: DISCONTINUED | OUTPATIENT
Start: 2019-09-20 | End: 2019-09-20 | Stop reason: HOSPADM

## 2019-09-20 RX ORDER — ONDANSETRON 2 MG/ML
4 INJECTION INTRAMUSCULAR; INTRAVENOUS EVERY 12 HOURS PRN
Status: DISCONTINUED | OUTPATIENT
Start: 2019-09-20 | End: 2019-09-21 | Stop reason: HOSPADM

## 2019-09-20 RX ORDER — ATORVASTATIN CALCIUM 40 MG/1
80 TABLET, FILM COATED ORAL NIGHTLY
Status: DISCONTINUED | OUTPATIENT
Start: 2019-09-20 | End: 2019-09-21 | Stop reason: HOSPADM

## 2019-09-20 RX ORDER — SODIUM CHLORIDE 9 MG/ML
INJECTION, SOLUTION INTRAVENOUS
Status: DISCONTINUED | OUTPATIENT
Start: 2019-09-20 | End: 2019-09-20

## 2019-09-20 RX ORDER — ISOSORBIDE MONONITRATE 30 MG/1
60 TABLET, EXTENDED RELEASE ORAL NIGHTLY
Status: DISCONTINUED | OUTPATIENT
Start: 2019-09-20 | End: 2019-09-21 | Stop reason: HOSPADM

## 2019-09-20 RX ORDER — TAMSULOSIN HYDROCHLORIDE 0.4 MG/1
2 CAPSULE ORAL DAILY
Status: DISCONTINUED | OUTPATIENT
Start: 2019-09-20 | End: 2019-09-21 | Stop reason: HOSPADM

## 2019-09-20 RX ORDER — NITROGLYCERIN 20 MG/100ML
10 INJECTION INTRAVENOUS CONTINUOUS
Status: DISCONTINUED | OUTPATIENT
Start: 2019-09-20 | End: 2019-09-21 | Stop reason: HOSPADM

## 2019-09-20 RX ORDER — HEPARIN SODIUM 1000 [USP'U]/ML
INJECTION, SOLUTION INTRAVENOUS; SUBCUTANEOUS
Status: DISCONTINUED | OUTPATIENT
Start: 2019-09-20 | End: 2019-09-20 | Stop reason: HOSPADM

## 2019-09-20 RX ORDER — LEVOTHYROXINE SODIUM 50 UG/1
50 TABLET ORAL
Status: DISCONTINUED | OUTPATIENT
Start: 2019-09-21 | End: 2019-09-21 | Stop reason: HOSPADM

## 2019-09-20 RX ORDER — METOPROLOL TARTRATE 25 MG/1
25 TABLET, FILM COATED ORAL 2 TIMES DAILY
Status: DISCONTINUED | OUTPATIENT
Start: 2019-09-20 | End: 2019-09-21 | Stop reason: HOSPADM

## 2019-09-20 RX ORDER — PANTOPRAZOLE SODIUM 40 MG/1
40 TABLET, DELAYED RELEASE ORAL DAILY
Status: DISCONTINUED | OUTPATIENT
Start: 2019-09-20 | End: 2019-09-21 | Stop reason: HOSPADM

## 2019-09-20 RX ORDER — CLOPIDOGREL BISULFATE 75 MG/1
75 TABLET ORAL DAILY
Status: DISCONTINUED | OUTPATIENT
Start: 2019-09-20 | End: 2019-09-20

## 2019-09-20 RX ORDER — CLOPIDOGREL BISULFATE 75 MG/1
75 TABLET ORAL DAILY
Status: DISCONTINUED | OUTPATIENT
Start: 2019-09-21 | End: 2019-09-21 | Stop reason: HOSPADM

## 2019-09-20 RX ORDER — LOSARTAN POTASSIUM 25 MG/1
50 TABLET ORAL DAILY
Status: DISCONTINUED | OUTPATIENT
Start: 2019-09-20 | End: 2019-09-21 | Stop reason: HOSPADM

## 2019-09-20 RX ORDER — NITROGLYCERIN 5 MG/ML
INJECTION, SOLUTION INTRAVENOUS
Status: DISCONTINUED | OUTPATIENT
Start: 2019-09-20 | End: 2019-09-20 | Stop reason: HOSPADM

## 2019-09-20 RX ORDER — LIDOCAINE HYDROCHLORIDE 10 MG/ML
INJECTION, SOLUTION EPIDURAL; INFILTRATION; INTRACAUDAL; PERINEURAL
Status: DISCONTINUED | OUTPATIENT
Start: 2019-09-20 | End: 2019-09-20 | Stop reason: HOSPADM

## 2019-09-20 RX ORDER — NITROGLYCERIN 0.4 MG/1
0.4 TABLET SUBLINGUAL EVERY 5 MIN PRN
Status: DISCONTINUED | OUTPATIENT
Start: 2019-09-20 | End: 2019-09-21 | Stop reason: HOSPADM

## 2019-09-20 RX ORDER — ISOSORBIDE MONONITRATE 30 MG/1
60 TABLET, EXTENDED RELEASE ORAL DAILY
Status: DISCONTINUED | OUTPATIENT
Start: 2019-09-20 | End: 2019-09-20

## 2019-09-20 RX ORDER — HYDRALAZINE HYDROCHLORIDE 20 MG/ML
10 INJECTION INTRAMUSCULAR; INTRAVENOUS EVERY 6 HOURS PRN
Status: DISCONTINUED | OUTPATIENT
Start: 2019-09-20 | End: 2019-09-21 | Stop reason: HOSPADM

## 2019-09-20 RX ORDER — ASPIRIN 81 MG/1
81 TABLET ORAL DAILY
Status: DISCONTINUED | OUTPATIENT
Start: 2019-09-20 | End: 2019-09-21 | Stop reason: HOSPADM

## 2019-09-20 RX ORDER — SODIUM CHLORIDE 9 MG/ML
3 INJECTION, SOLUTION INTRAVENOUS CONTINUOUS
Status: DISCONTINUED | OUTPATIENT
Start: 2019-09-20 | End: 2019-09-20

## 2019-09-20 RX ORDER — DIPHENHYDRAMINE HYDROCHLORIDE 50 MG/ML
50 INJECTION INTRAMUSCULAR; INTRAVENOUS ONCE
Status: COMPLETED | OUTPATIENT
Start: 2019-09-20 | End: 2019-09-20

## 2019-09-20 RX ORDER — NITROGLYCERIN 20 MG/100ML
INJECTION INTRAVENOUS
Status: DISCONTINUED | OUTPATIENT
Start: 2019-09-20 | End: 2019-09-21 | Stop reason: HOSPADM

## 2019-09-20 RX ADMIN — ASPIRIN 81 MG: 81 TABLET, COATED ORAL at 03:09

## 2019-09-20 RX ADMIN — SODIUM CHLORIDE 3 ML/KG/HR: 0.9 INJECTION, SOLUTION INTRAVENOUS at 10:09

## 2019-09-20 RX ADMIN — SODIUM CHLORIDE 1000 ML: 0.9 INJECTION, SOLUTION INTRAVENOUS at 04:09

## 2019-09-20 RX ADMIN — IOHEXOL 75 ML: 350 INJECTION, SOLUTION INTRAVENOUS at 02:09

## 2019-09-20 RX ADMIN — PANTOPRAZOLE SODIUM 40 MG: 40 TABLET, DELAYED RELEASE ORAL at 03:09

## 2019-09-20 RX ADMIN — DIPHENHYDRAMINE HYDROCHLORIDE 50 MG: 50 INJECTION INTRAMUSCULAR; INTRAVENOUS at 02:09

## 2019-09-20 RX ADMIN — ISOSORBIDE MONONITRATE 60 MG: 30 TABLET, EXTENDED RELEASE ORAL at 09:09

## 2019-09-20 RX ADMIN — NITROGLYCERIN 10 MCG/MIN: 20 INJECTION INTRAVENOUS at 01:09

## 2019-09-20 RX ADMIN — SODIUM CHLORIDE 1000 ML: 0.9 INJECTION, SOLUTION INTRAVENOUS at 02:09

## 2019-09-20 RX ADMIN — METOPROLOL TARTRATE 25 MG: 25 TABLET ORAL at 09:09

## 2019-09-20 RX ADMIN — ATORVASTATIN CALCIUM 80 MG: 40 TABLET, FILM COATED ORAL at 09:09

## 2019-09-20 RX ADMIN — MORPHINE SULFATE 2 MG: 10 INJECTION INTRAMUSCULAR; INTRAVENOUS; SUBCUTANEOUS at 01:09

## 2019-09-20 NOTE — PROGRESS NOTES
Pt had 105 ml of contrast during cath procedure prior to CTA being performed.    Pt also received 50 mg of benadryl through his IV prior to scan.    This CT procedure was per Dr Freire's protocol.

## 2019-09-20 NOTE — INTERVAL H&P NOTE
The patient has been examined and the H&P has been reviewed:    I concur with the findings and no changes have occurred since H&P was written.    Anesthesia/Surgery risks, benefits and alternative options discussed and understood by patient/family.    Lab Results   Component Value Date    WBC 7.10 09/13/2019    HGB 14.9 09/13/2019    HCT 42.7 09/13/2019    MCV 91 09/13/2019     09/13/2019     Lab Results   Component Value Date    INR 1.0 09/13/2019     BMP  Lab Results   Component Value Date     09/13/2019    K 4.6 09/13/2019     09/13/2019    CO2 31 (H) 09/13/2019    BUN 11 09/13/2019    CREATININE 1.2 09/13/2019    CALCIUM 9.4 09/13/2019    ANIONGAP 6 (L) 09/13/2019    ESTGFRAFRICA >60 09/13/2019    EGFRNONAA >60 09/13/2019           Active Hospital Problems    Diagnosis  POA    Coronary artery disease of native artery of native heart with stable angina pectoris [I25.118]  Yes      Resolved Hospital Problems   No resolved problems to display.

## 2019-09-20 NOTE — Clinical Note
105 ml injected throughout the case. 40 mL total wasted during the case. 145 mL total used in the case.

## 2019-09-20 NOTE — BRIEF OP NOTE
[facility]  Brief Operative Note     SUMMARY     Surgery Date: 9/20/2019     Surgeon(s) and Role:     * Brad Bahena MD - Primary    Assisting Surgeon: None    Pre-op Diagnosis:  Coronary artery disease of native artery of native heart with stable angina pectoris [I25.118]    Post-op Diagnosis:  Post-Op Diagnosis Codes:     * Coronary artery disease of native artery of native heart with stable angina pectoris [I25.118]    Procedure(s) (LRB):  Left heart cath 12 pm start, R rad access (Left)  Percutaneous coronary intervention (N/A)    Anesthesia: RN IV Sedation    Description of the findings of the procedure: see below    Findings/Key Components:  LVEDP: 8mmHg  LVEF: 65% by echo  Wall Motion: normal by echo    Dominance: Right  LM: normal  LAD: mid 50% followed by patent stent, distal/transapical 80% (small caliber)  Ramus: prox 30%  LCx: prox 50%  RCA: prox 30%, dist 40%   RPDA ost 50%   RPLB mid 90%    PCI RPLB:  Preop ASA/Plavix, intraop heparin  Predil 2.5x12  Stent 2.5x18 [place] [place] SILVER 16 abi  Excellent result, T3 flow, 0% residual stenosis    Hemostasis:  R Radial band    Impression:  Progressive angina on 2 meds (metoprolol/imdur), unable to titrate further  2V CAD, normal LV fxn  Patent mid LAD stent, distal LAD stenosis in small caliber vessel  Successful PCI mid RPLB 2.5x18 [place] [place] SILVER  Pt with post-PCI CP, no EKG changes (significant ST deviation with balloon inflations noted)  R rad vasband for hemostasis    Plan:  Observe in ICU  NTG gtt  ASA 81mg qd indefinitely  Plavix 75mg qd for 6 months (thru 3/2020)  BBl/Imdur/Statin  Home in am assuming control of sxs off NTG gtt  Will check CTA to exclude aortic pathology given hx of aortic root dil    Estimated Blood Loss: <50cc         Specimens: None

## 2019-09-20 NOTE — OR NURSING
Took prednisone 50mg last night at midnight with benadryl 50mg.  Took repeat dose at 0700 09/20/19. (benadryl 25 at 0700 and 25mg at 1000)

## 2019-09-20 NOTE — PLAN OF CARE
Problem: Adult Inpatient Plan of Care  Goal: Plan of Care Review  Outcome: Ongoing (interventions implemented as appropriate)  Patient arrived from cath lab today.  Vasc band started deflating at 1700.  Chest pain free at this time.  Weaning of nitro gtt.  Peripheral IVs.  NSR to SB on monitor.  Plan of care initiated with patient and family and addressed questions and concerns.

## 2019-09-21 VITALS
WEIGHT: 243.19 LBS | HEIGHT: 70 IN | SYSTOLIC BLOOD PRESSURE: 120 MMHG | BODY MASS INDEX: 34.82 KG/M2 | HEART RATE: 65 BPM | RESPIRATION RATE: 12 BRPM | TEMPERATURE: 98 F | OXYGEN SATURATION: 96 % | DIASTOLIC BLOOD PRESSURE: 74 MMHG

## 2019-09-21 LAB
ALBUMIN SERPL BCP-MCNC: 3.8 G/DL (ref 3.5–5.2)
ALP SERPL-CCNC: 50 U/L (ref 55–135)
ALT SERPL W/O P-5'-P-CCNC: 18 U/L (ref 10–44)
ANION GAP SERPL CALC-SCNC: 8 MMOL/L (ref 8–16)
AST SERPL-CCNC: 14 U/L (ref 10–40)
BASOPHILS # BLD AUTO: 0.01 K/UL (ref 0–0.2)
BASOPHILS NFR BLD: 0 % (ref 0–1.9)
BILIRUB SERPL-MCNC: 0.7 MG/DL (ref 0.1–1)
BUN SERPL-MCNC: 15 MG/DL (ref 8–23)
CALCIUM SERPL-MCNC: 8.8 MG/DL (ref 8.7–10.5)
CHLORIDE SERPL-SCNC: 107 MMOL/L (ref 95–110)
CO2 SERPL-SCNC: 29 MMOL/L (ref 23–29)
CREAT SERPL-MCNC: 1.1 MG/DL (ref 0.5–1.4)
DIFFERENTIAL METHOD: ABNORMAL
EOSINOPHIL # BLD AUTO: 0 K/UL (ref 0–0.5)
EOSINOPHIL NFR BLD: 0 % (ref 0–8)
ERYTHROCYTE [DISTWIDTH] IN BLOOD BY AUTOMATED COUNT: 13.8 % (ref 11.5–14.5)
EST. GFR  (AFRICAN AMERICAN): >60 ML/MIN/1.73 M^2
EST. GFR  (NON AFRICAN AMERICAN): >60 ML/MIN/1.73 M^2
GLUCOSE SERPL-MCNC: 188 MG/DL (ref 70–110)
HCT VFR BLD AUTO: 40.9 % (ref 40–54)
HGB BLD-MCNC: 14 G/DL (ref 14–18)
LYMPHOCYTES # BLD AUTO: 1.9 K/UL (ref 1–4.8)
LYMPHOCYTES NFR BLD: 8.7 % (ref 18–48)
MCH RBC QN AUTO: 31 PG (ref 27–31)
MCHC RBC AUTO-ENTMCNC: 34.2 G/DL (ref 32–36)
MCV RBC AUTO: 91 FL (ref 82–98)
MONOCYTES # BLD AUTO: 1.6 K/UL (ref 0.3–1)
MONOCYTES NFR BLD: 7.2 % (ref 4–15)
NEUTROPHILS # BLD AUTO: 18.5 K/UL (ref 1.8–7.7)
NEUTROPHILS NFR BLD: 84.1 % (ref 38–73)
PLATELET # BLD AUTO: 200 K/UL (ref 150–350)
PMV BLD AUTO: 9.9 FL (ref 9.2–12.9)
POTASSIUM SERPL-SCNC: 4.2 MMOL/L (ref 3.5–5.1)
PROT SERPL-MCNC: 6.5 G/DL (ref 6–8.4)
RBC # BLD AUTO: 4.52 M/UL (ref 4.6–6.2)
SODIUM SERPL-SCNC: 144 MMOL/L (ref 136–145)
TROPONIN I SERPL DL<=0.01 NG/ML-MCNC: 0.01 NG/ML (ref 0–0.03)
WBC # BLD AUTO: 22.07 K/UL (ref 3.9–12.7)

## 2019-09-21 PROCEDURE — 99233 PR SUBSEQUENT HOSPITAL CARE,LEVL III: ICD-10-PCS | Mod: HCNC,,, | Performed by: INTERNAL MEDICINE

## 2019-09-21 PROCEDURE — 93010 EKG 12-LEAD: ICD-10-PCS | Mod: HCNC,,, | Performed by: INTERNAL MEDICINE

## 2019-09-21 PROCEDURE — 94761 N-INVAS EAR/PLS OXIMETRY MLT: CPT | Mod: HCNC

## 2019-09-21 PROCEDURE — 93005 ELECTROCARDIOGRAM TRACING: CPT | Mod: HCNC

## 2019-09-21 PROCEDURE — 99233 SBSQ HOSP IP/OBS HIGH 50: CPT | Mod: HCNC,,, | Performed by: INTERNAL MEDICINE

## 2019-09-21 PROCEDURE — 93010 ELECTROCARDIOGRAM REPORT: CPT | Mod: HCNC,,, | Performed by: INTERNAL MEDICINE

## 2019-09-21 PROCEDURE — 85025 COMPLETE CBC W/AUTO DIFF WBC: CPT | Mod: HCNC

## 2019-09-21 PROCEDURE — 80053 COMPREHEN METABOLIC PANEL: CPT | Mod: HCNC

## 2019-09-21 PROCEDURE — 25000003 PHARM REV CODE 250: Mod: HCNC | Performed by: INTERNAL MEDICINE

## 2019-09-21 PROCEDURE — 84484 ASSAY OF TROPONIN QUANT: CPT | Mod: HCNC

## 2019-09-21 RX ADMIN — LEVOTHYROXINE SODIUM 50 MCG: 50 TABLET ORAL at 08:09

## 2019-09-21 RX ADMIN — CLOPIDOGREL BISULFATE 75 MG: 75 TABLET ORAL at 08:09

## 2019-09-21 RX ADMIN — TAMSULOSIN HYDROCHLORIDE 0.8 MG: 0.4 CAPSULE ORAL at 08:09

## 2019-09-21 RX ADMIN — ASPIRIN 81 MG: 81 TABLET, COATED ORAL at 08:09

## 2019-09-21 RX ADMIN — METOPROLOL TARTRATE 25 MG: 25 TABLET ORAL at 08:09

## 2019-09-21 RX ADMIN — PANTOPRAZOLE SODIUM 40 MG: 40 TABLET, DELAYED RELEASE ORAL at 08:09

## 2019-09-21 RX ADMIN — LOSARTAN POTASSIUM 50 MG: 25 TABLET ORAL at 08:09

## 2019-09-21 NOTE — PLAN OF CARE
09/21/19 1416   Final Note   Assessment Type Final Discharge Note   Anticipated Discharge Disposition Home   Hospital Follow Up  Appt(s) scheduled? Yes   Discharge plans and expectations educations in teach back method with documentation complete? Yes       SW informed Nurse Silvia pt was cleared from case management.

## 2019-09-21 NOTE — DISCHARGE INSTRUCTIONS
Follow Up:  Patient Instructions:       Diet general      Call MD for:  severe uncontrolled pain      Call MD for:  persistent nausea and vomiting      Call MD for:  temperature >100.4      Call MD for:  difficulty breathing, headache or visual disturbances      Call MD for:  redness, tenderness, or signs of infection (pain, swelling, redness, odor or green/yellow discharge around incision site)      Call MD for:  hives      Call MD for:  persistent dizziness or light-headedness      Call MD for:  extreme fatigue

## 2019-09-21 NOTE — CARE UPDATE
[facility]  ICU Multidisciplinary Bedside Rounds   SUMMARY     Date: 9/21/2019    Prehospitalization: Home  Admit Date / LOS : 9/20/2019/ 1 days    Diagnosis: Coronary artery disease of native artery of native heart with stable angina pectoris    Consults:        Active: N/A       Needed: N/A     Code Status: Prior   Advanced Directive: <no information>    LDA: PIV       Central Lines/Site/Justification:Patient Does Not Have Central Line       Urinary Cath/Order/Justification:Patient Does Not Have Urinary Catheter    Vasopressors/Infusions:    nitroGLYCERIN 10 mcg/min (09/20/19 1253)    nitroGLYCERIN Stopped (09/20/19 1832)          GOALS: Volume/ Hemodynamic: chest pain free                     RASS: N/A    CAM ICU: N/A  Pain Management: none       Pain Controlled: yes     Rhythm: SB    Respiratory Device: Room Air                  Most Recent SBT/ SAT: N/A    VTE Prophylaxis: Pharm   Mobility: Bedrest  Stress Ulcer Prophylaxis: Yes    Dietary: PO  Tolerance: yes  /  Advancement: yes    Isolation: No active isolations    Restraints: No    Significant Dates:  Post Op Date: cardiac cath 9/20/19  Rescue Date: N/A  Imaging/ Diagnostics: N/A    Noteworthy Labs:  none    CBC/Anemia Labs: Coags:    Recent Labs   Lab 09/21/19  0419   WBC 22.07*   HGB 14.0   HCT 40.9      MCV 91   RDW 13.8    No results for input(s): PT, INR, APTT in the last 168 hours.     Chemistries:   Recent Labs   Lab 09/21/19  0419      K 4.2      CO2 29   BUN 15   CREATININE 1.1   CALCIUM 8.8   PROT 6.5   BILITOT 0.7   ALKPHOS 50*   ALT 18   AST 14        Cardiac Enzymes: Ejection Fractions:    Recent Labs     09/20/19  1425 09/21/19  0419   TROPONINI <0.006 0.006    Nuc Stress EF   Date Value Ref Range Status   08/08/2019 66 % Final        POCT Glucose: HbA1c:    No results for input(s): POCTGLUCOSE in the last 168 hours. Hemoglobin A1C   Date Value Ref Range Status   04/17/2019 6.3 (H) 4.0 - 5.6 % Final      Comment:     ADA Screening Guidelines:  5.7-6.4%  Consistent with prediabetes  >or=6.5%  Consistent with diabetes  High levels of fetal hemoglobin interfere with the HbA1C  assay. Heterozygous hemoglobin variants (HbS, HgC, etc)do  not significantly interfere with this assay.   However, presence of multiple variants may affect accuracy.     10/12/2018 6.5 (H) 4.0 - 5.6 % Final     Comment:     ADA Screening Guidelines:  5.7-6.4%  Consistent with prediabetes  >or=6.5%  Consistent with diabetes  High levels of fetal hemoglobin interfere with the HbA1C  assay. Heterozygous hemoglobin variants (HbS, HgC, etc)do  not significantly interfere with this assay.   However, presence of multiple variants may affect accuracy.     03/14/2017 5.7 4.5 - 6.2 % Final     Comment:     According to ADA guidelines, hemoglobin A1C <7.0% represents  optimal control in non-pregnant diabetic patients.  Different  metrics may apply to specific populations.   Standards of Medical Care in Diabetes - 2016.  For the purpose of screening for the presence of diabetes:  <5.7%     Consistent with the absence of diabetes  5.7-6.4%  Consistent with increasing risk for diabetes   (prediabetes)  >or=6.5%  Consistent with diabetes  Currently no consensus exists for use of hemoglobin A1C  for diagnosis of diabetes for children.          Needs from Care Team: none     ICU LOS 15h  Level of Care: Discharge

## 2019-09-21 NOTE — DISCHARGE SUMMARY
[facility]  Cardiology  Discharge Summary      Patient Name: Clinton Rosenberg  MRN: [id number]  Admission Date: 9/20/2019  Hospital Length of Stay: 1 days  Discharge Date and Time:  09/21/2019 11:12 AM  Attending Physician: Brad Bahena MD    Discharging Provider: Michelle Sheehan MD  Primary Care Physician: Michael Gonzalez MD    HPI:   Patient status post PCI yesterday.  Had chest pain post PCI.  CTA was done to rule out aortic dissection.  No aortic dissection was noted.  Echocardiogram was performed post PCI which showed no new wall motion abnormalities.  One set of troponins was negative.  Patient currently chest pain-free.  Had no chest pain since last evening.  No other complications from cardiac catheterization.    Procedure(s) (LRB):  Left heart cath 12 pm start, R rad access (Left)  Percutaneous coronary intervention (N/A)  Stent, Drug Eluting, Single Vessel, Coronary     Indwelling Lines/Drains at time of discharge:  Lines/Drains/Airways          None          Hospital Course:  As above    Consults:     Significant Diagnostic Studies: Labs:   CMP   Recent Labs   Lab 09/21/19 0419      K 4.2      CO2 29   *   BUN 15   CREATININE 1.1   CALCIUM 8.8   PROT 6.5   ALBUMIN 3.8   BILITOT 0.7   ALKPHOS 50*   AST 14   ALT 18   ANIONGAP 8   ESTGFRAFRICA >60   EGFRNONAA >60   , CBC   Recent Labs   Lab 09/21/19 0419   WBC 22.07*   HGB 14.0   HCT 40.9      , INR   Lab Results   Component Value Date    INR 1.0 09/13/2019   , Lipid Panel   Lab Results   Component Value Date    CHOL 132 10/12/2018    HDL 36 (L) 10/12/2018    LDLCALC 42.4 (L) 10/12/2018    TRIG 268 (H) 10/12/2018    CHOLHDL 27.3 10/12/2018   , Troponin   Recent Labs   Lab 09/21/19  0419   TROPONINI 0.006   , A1C:   Recent Labs   Lab 04/17/19  1152   HGBA1C 6.3*    and All labs within the past 24 hours have been reviewed    Pending Diagnostic Studies:     Procedure Component Value Units Date/Time    EKG  12-LEAD on arrival to floor [[id number]] Collected:  09/20/19 1300    Order Status:  Sent Lab Status:  In process Updated:  09/21/19 1056    Narrative:       Test Reason : I25.10,    Vent. Rate : 061 BPM     Atrial Rate : 061 BPM     P-R Int : 216 ms          QRS Dur : 090 ms      QT Int : 438 ms       P-R-T Axes : 032 -04 042 degrees     QTc Int : 440 ms    Sinus rhythm with 1st degree A-V block  Otherwise normal ECG  When compared with ECG of 08-AUG-2019 09:48,  Significant changes have occurred    Referred By: JORGE LUIS AMBROSE           Confirmed By:     EKG 12-LEAD starting tomorrow [[id number]] Collected:  09/21/19 0058    Order Status:  Sent Lab Status:  In process Updated:  09/21/19 1056    Narrative:       Test Reason : I25.10,    Vent. Rate : 061 BPM     Atrial Rate : 061 BPM     P-R Int : 192 ms          QRS Dur : 094 ms      QT Int : 446 ms       P-R-T Axes : 057 -38 056 degrees     QTc Int : 448 ms    Normal sinus rhythm  Left axis deviation  Abnormal ECG  When compared with ECG of 20-SEP-2019 13:00,  Significant changes have occurred    Referred By: JORGE LUIS AMBROSE           Confirmed By:           Final Active Diagnoses:    Diagnosis Date Noted POA    PRINCIPAL PROBLEM:  Coronary artery disease of native artery of native heart with stable angina pectoris [I25.118] 02/21/2019 Yes      Problems Resolved During this Admission:     No new Assessment & Plan notes have been filed under this hospital service since the last note was generated.  Service: Cardiology      Discharged Condition: good    Disposition: Home or Self Care    Follow Up:    Patient Instructions:      Diet general     Call MD for:  severe uncontrolled pain     Call MD for:  persistent nausea and vomiting     Call MD for:  temperature >100.4     Call MD for:  difficulty breathing, headache or visual disturbances     Call MD for:  redness, tenderness, or signs of infection (pain, swelling, redness, odor or green/yellow discharge around  incision site)     Call MD for:  hives     Call MD for:  persistent dizziness or light-headedness     Call MD for:  extreme fatigue     Medications:  Reconciled Home Medications:      Medication List      CONTINUE taking these medications    aspirin 81 MG EC tablet  Commonly known as:  ECOTRIN  Take 1 tablet (81 mg total) by mouth once daily.     atorvastatin 80 MG tablet  Commonly known as:  LIPITOR  Take 1 tablet (80 mg total) by mouth every evening.     clopidogrel 75 mg tablet  Commonly known as:  PLAVIX  Take 1 tablet (75 mg total) by mouth once daily.     furosemide 20 MG tablet  Commonly known as:  LASIX  Take 1 tablet (20 mg total) by mouth once daily.     isosorbide mononitrate 60 MG 24 hr tablet  Commonly known as:  IMDUR  Take 1 tablet (60 mg total) by mouth once daily.     KRILL OIL (OMEGA 3 AND 6) ORAL  Take by mouth.     levothyroxine 50 MCG tablet  Commonly known as:  SYNTHROID  Take 1 tablet (50 mcg total) by mouth before breakfast.     losartan 50 MG tablet  Commonly known as:  COZAAR  Take 1 tablet (50 mg total) by mouth once daily.     metoprolol tartrate 25 MG tablet  Commonly known as:  LOPRESSOR  Take 1 tablet (25 mg total) by mouth 2 (two) times daily.     nitroGLYCERIN 0.4 MG SL tablet  Commonly known as:  NITROSTAT  Place 1 tablet (0.4 mg total) under the tongue every 5 (five) minutes as needed for Chest pain.     pantoprazole 40 MG tablet  Commonly known as:  PROTONIX  Take 1 tablet (40 mg total) by mouth once daily.     tamsulosin 0.4 mg Cap  Commonly known as:  FLOMAX  Take 2 capsules (0.8 mg total) by mouth once daily.     vitamin D 1000 units Tab  Commonly known as:  VITAMIN D3  Take 1 tablet (1,000 Units total) by mouth once daily.            Time spent on the discharge of patient: 35 minutes    Michelle Sheehan MD  Cardiology  [facility]

## 2019-09-21 NOTE — PLAN OF CARE
09/21/19 1414   Discharge Assessment   Assessment Type Discharge Planning Assessment   Confirmed/corrected address and phone number on facesheet? Yes   Assessment information obtained from? Patient   Prior to hospitilization cognitive status: Alert/Oriented   Prior to hospitalization functional status: Independent   Current cognitive status: Alert/Oriented   Current Functional Status: Independent   Facility Arrived From: home    Lives With spouse   Able to Return to Prior Arrangements yes   Is patient able to care for self after discharge? Yes   Patient's perception of discharge disposition home or selfcare   Readmission Within the Last 30 Days no previous admission in last 30 days   Patient currently being followed by outpatient case management? No   Patient currently receives any other outside agency services? No   Equipment Currently Used at Home none   Do you have any problems affording any of your prescribed medications? No   Is the patient taking medications as prescribed? yes   Does the patient have transportation home? Yes   Transportation Anticipated car, drives self;family or friend will provide   Dialysis Name and Scheduled days N/A   Does the patient receive services at the [facility]? No   Discharge Plan A Home with family   DME Needed Upon Discharge  none   Patient/Family in Agreement with Plan yes

## 2019-09-21 NOTE — PROGRESS NOTES
[facility] CASE MANAGEMENT                  WRITTEN DISCHARGE INFORMATION      APPOINTMENTS AND RESOURCES TO HELP YOU MANAGE YOUR CARE AT HOME BASED ON YOUR PREFERENCES:  (If an appointment is not scheduled for you when you leave the hospital, call your doctor to schedule a follow up visit within a week)  Follow-up Information     Schedule an appointment as soon as possible for a visit with Michael Gonzalez MD.    Specialty:  Internal Medicine  Contact information:  [place] [id number]  [phone]             Brad Bahena MD On 9/26/2019.    Specialties:  Cardiology, INTERVENTIONAL CARDIOLOGY  Why:  Outpatient Services@10:20am   Contact information:  [place] [id number]  [phone]                     Healthy Living Instructions to HELP MANAGE YOUR CARE AT HOME:  Things You are responsible for:  1.    Getting your prescriptions filled   2.    Taking your medications as directed, DO NOT MISS ANY DOSES!  3.    Following the diet and exercise recommended by your doctor  4.    Going to your follow-up doctor appointment. This is important because it allows the doctor to monitor your progress and determine if any changes need to made to your treatment plan.  5. If you have any questions about MANAGING YOUR CARE AT HOME Call the Nurse Care Line for 24/7 Assistance [phone]       Please answer any calls you may receive from [facility]. We want to continue to support you as you manage your healthcare needs. [facility] is happy to have the opportunity to serve you.      Thank you for choosing [facility] for your healthcare needs!  Your [facility] Case Management Team,

## 2019-09-21 NOTE — NURSING
Patient discharged  Home per MD orders.  No new Rx or medication changes.  Patient able to verbalize understanding of discharge orders.  Wife at bedside.  Follow up reviewed with patient.  IVs removed with tip intact and dressing applied. Patient left unit with all belongings and in stable condition.

## 2019-09-21 NOTE — PLAN OF CARE
Problem: Adult Inpatient Plan of Care  Goal: Plan of Care Review  Outcome: Ongoing (interventions implemented as appropriate)  Admitted to ICU on yesterday post cardiac cath via right radial approach. Vas-band removed, no bleeding or hematoma noted. Remains chest pain free overnight. Remains free of falls and injuries.

## 2019-09-24 ENCOUNTER — PATIENT OUTREACH (OUTPATIENT)
Dept: ADMINISTRATIVE | Facility: CLINIC | Age: 69
End: 2019-09-24

## 2019-09-25 RX ORDER — PANTOPRAZOLE SODIUM 40 MG/1
40 TABLET, DELAYED RELEASE ORAL DAILY
Qty: 90 TABLET | Refills: 1 | Status: SHIPPED | OUTPATIENT
Start: 2019-09-25 | End: 2019-12-17 | Stop reason: SDUPTHER

## 2019-09-25 NOTE — TELEPHONE ENCOUNTER
Spoken to patient's wife. Patient was scheduled for an hospital f/u appt. Requesting protonix sent to patient's mail order. Never received the Rx that was sent back in august.

## 2019-09-26 ENCOUNTER — OFFICE VISIT (OUTPATIENT)
Dept: CARDIOLOGY | Facility: CLINIC | Age: 69
End: 2019-09-26
Payer: MEDICARE

## 2019-09-26 ENCOUNTER — OFFICE VISIT (OUTPATIENT)
Dept: FAMILY MEDICINE | Facility: CLINIC | Age: 69
End: 2019-09-26
Payer: MEDICARE

## 2019-09-26 VITALS
SYSTOLIC BLOOD PRESSURE: 118 MMHG | SYSTOLIC BLOOD PRESSURE: 102 MMHG | OXYGEN SATURATION: 96 % | HEIGHT: 70 IN | BODY MASS INDEX: 33.83 KG/M2 | BODY MASS INDEX: 33.36 KG/M2 | OXYGEN SATURATION: 95 % | HEART RATE: 70 BPM | DIASTOLIC BLOOD PRESSURE: 72 MMHG | TEMPERATURE: 99 F | RESPIRATION RATE: 15 BRPM | WEIGHT: 236.31 LBS | DIASTOLIC BLOOD PRESSURE: 68 MMHG | WEIGHT: 233 LBS | HEART RATE: 62 BPM | HEIGHT: 70 IN

## 2019-09-26 DIAGNOSIS — I77.810 AORTIC ROOT DILATATION: ICD-10-CM

## 2019-09-26 DIAGNOSIS — R73.03 PREDIABETES: Chronic | ICD-10-CM

## 2019-09-26 DIAGNOSIS — I77.819 AORTIC ECTASIA: ICD-10-CM

## 2019-09-26 DIAGNOSIS — I73.9 PERIPHERAL VASCULAR DISEASE: ICD-10-CM

## 2019-09-26 DIAGNOSIS — I87.2 VENOUS INSUFFICIENCY: ICD-10-CM

## 2019-09-26 DIAGNOSIS — E66.9 OBESITY (BMI 30.0-34.9): Chronic | ICD-10-CM

## 2019-09-26 DIAGNOSIS — G47.33 OSA (OBSTRUCTIVE SLEEP APNEA): Chronic | ICD-10-CM

## 2019-09-26 DIAGNOSIS — I25.10 CORONARY ARTERY DISEASE INVOLVING NATIVE CORONARY ARTERY OF NATIVE HEART WITHOUT ANGINA PECTORIS: Primary | ICD-10-CM

## 2019-09-26 DIAGNOSIS — I25.118 CORONARY ARTERY DISEASE OF NATIVE ARTERY OF NATIVE HEART WITH STABLE ANGINA PECTORIS: ICD-10-CM

## 2019-09-26 DIAGNOSIS — I10 ESSENTIAL HYPERTENSION: Chronic | ICD-10-CM

## 2019-09-26 DIAGNOSIS — E78.2 MIXED HYPERLIPIDEMIA: Chronic | ICD-10-CM

## 2019-09-26 DIAGNOSIS — N40.1 BENIGN NON-NODULAR PROSTATIC HYPERPLASIA WITH LOWER URINARY TRACT SYMPTOMS: Chronic | ICD-10-CM

## 2019-09-26 DIAGNOSIS — I25.10 CORONARY ARTERY DISEASE INVOLVING NATIVE CORONARY ARTERY OF NATIVE HEART WITHOUT ANGINA PECTORIS: ICD-10-CM

## 2019-09-26 DIAGNOSIS — E55.9 VITAMIN D DEFICIENCY: Chronic | ICD-10-CM

## 2019-09-26 DIAGNOSIS — Z09 HOSPITAL DISCHARGE FOLLOW-UP: Primary | ICD-10-CM

## 2019-09-26 DIAGNOSIS — E03.9 HYPOTHYROIDISM, UNSPECIFIED TYPE: Chronic | ICD-10-CM

## 2019-09-26 PROCEDURE — 99499 RISK ADDL DX/OHS AUDIT: ICD-10-PCS | Mod: HCNC,S$GLB,, | Performed by: INTERNAL MEDICINE

## 2019-09-26 PROCEDURE — 3078F DIAST BP <80 MM HG: CPT | Mod: HCNC,CPTII,S$GLB, | Performed by: INTERNAL MEDICINE

## 2019-09-26 PROCEDURE — 1101F PR PT FALLS ASSESS DOC 0-1 FALLS W/OUT INJ PAST YR: ICD-10-PCS | Mod: HCNC,CPTII,S$GLB, | Performed by: INTERNAL MEDICINE

## 2019-09-26 PROCEDURE — 99499 UNLISTED E&M SERVICE: CPT | Mod: HCNC,S$GLB,, | Performed by: INTERNAL MEDICINE

## 2019-09-26 PROCEDURE — 99999 PR PBB SHADOW E&M-EST. PATIENT-LVL IV: CPT | Mod: PBBFAC,HCNC,, | Performed by: NURSE PRACTITIONER

## 2019-09-26 PROCEDURE — 99214 PR OFFICE/OUTPT VISIT, EST, LEVL IV, 30-39 MIN: ICD-10-PCS | Mod: HCNC,S$GLB,, | Performed by: INTERNAL MEDICINE

## 2019-09-26 PROCEDURE — 3074F PR MOST RECENT SYSTOLIC BLOOD PRESSURE < 130 MM HG: ICD-10-PCS | Mod: HCNC,CPTII,S$GLB, | Performed by: INTERNAL MEDICINE

## 2019-09-26 PROCEDURE — 99495 TCM SERVICES (MODERATE COMPLEXITY): ICD-10-PCS | Mod: HCNC,S$GLB,, | Performed by: NURSE PRACTITIONER

## 2019-09-26 PROCEDURE — 1101F PT FALLS ASSESS-DOCD LE1/YR: CPT | Mod: HCNC,CPTII,S$GLB, | Performed by: INTERNAL MEDICINE

## 2019-09-26 PROCEDURE — 99999 PR PBB SHADOW E&M-EST. PATIENT-LVL IV: ICD-10-PCS | Mod: PBBFAC,HCNC,, | Performed by: NURSE PRACTITIONER

## 2019-09-26 PROCEDURE — 99214 OFFICE O/P EST MOD 30 MIN: CPT | Mod: HCNC,S$GLB,, | Performed by: INTERNAL MEDICINE

## 2019-09-26 PROCEDURE — 99999 PR PBB SHADOW E&M-EST. PATIENT-LVL III: CPT | Mod: PBBFAC,HCNC,, | Performed by: INTERNAL MEDICINE

## 2019-09-26 PROCEDURE — 99495 TRANSJ CARE MGMT MOD F2F 14D: CPT | Mod: HCNC,S$GLB,, | Performed by: NURSE PRACTITIONER

## 2019-09-26 PROCEDURE — 3074F SYST BP LT 130 MM HG: CPT | Mod: HCNC,CPTII,S$GLB, | Performed by: INTERNAL MEDICINE

## 2019-09-26 PROCEDURE — 99999 PR PBB SHADOW E&M-EST. PATIENT-LVL III: ICD-10-PCS | Mod: PBBFAC,HCNC,, | Performed by: INTERNAL MEDICINE

## 2019-09-26 PROCEDURE — 3078F PR MOST RECENT DIASTOLIC BLOOD PRESSURE < 80 MM HG: ICD-10-PCS | Mod: HCNC,CPTII,S$GLB, | Performed by: INTERNAL MEDICINE

## 2019-09-26 RX ORDER — TRAZODONE HYDROCHLORIDE 50 MG/1
50 TABLET ORAL NIGHTLY
Qty: 30 TABLET | Refills: 11 | Status: CANCELLED | OUTPATIENT
Start: 2019-09-26 | End: 2020-09-25

## 2019-09-26 NOTE — PROGRESS NOTES
CARDIOVASCULAR PROGRESS NOTE    REASON FOR CONSULT:   Clinton Rosenberg is a 68 y.o. male who presents for follow up of CAD, PCI.    PCP: Dair  HISTORY OF PRESENT ILLNESS:   The patient comes in for follow-up of his recent PCI, accompanied by his wife.  He denies intercurrent angina or dyspnea.  He does describe occasional queasiness, this is not appear to be exertionally related.  There been no complications related to the right radial access.  He otherwise had no palpitations, lightheadedness, dizziness, syncope.  Denies any PND, orthopnea, or lower extremity edema.  He has had no melena, hematuria, or claudicant symptoms.  Appears to be tolerating his dual antiplatelet therapy without complications.    CARDIOVASCULAR HISTORY:   CAD/MI  2013: LAD PCI    9/20/19: mid RPLB 2.5x18 Resolute [place] SILVER    JAYLEN, unable to afford CPAP  Aortic root dil, 3.7->4.0->3.8  cm (echo 9/2019)  CVI (bilat GSV, R LSV, US 3/2018)    PAST MEDICAL HISTORY:     Past Medical History:   Diagnosis Date    Allergy     Angina pectoris     Anxiety     Coronary artery disease     Depression     GERD (gastroesophageal reflux disease)     Hyperlipidemia     Hypertension     Hypothyroidism     Memory loss     12/3/2014 MRI brain: 1. No evidence for acute infarct 2. unremarkable MRI of the brain; 12/3/2014 carotid US normal    Myocardial infarction     Obesity     Peripheral vascular disease 6/20/2017    Retrocalcaneal bursitis 11/24/2014    Sleep apnea        PAST SURGICAL HISTORY:     Past Surgical History:   Procedure Laterality Date    APPENDECTOMY  1986    bone spur Right     COLONOSCOPY      hand trauma Right     pencil     heart stent      LEFT HEART CATHETERIZATION Left 9/20/2019    Procedure: Left heart cath 12 pm start, R rad access;  Surgeon: Brad Bahena MD;  Location: [facility] CATH LAB;  Service: Cardiology;  Laterality: Left;  RN PREOP 9/13/2019  NEEDS IODINE PREMED       ALLERGIES AND MEDICATION:     Review of  patient's allergies indicates:   Allergen Reactions    Iodine containing multivitamin Anaphylaxis    Naproxen Other (See Comments)     hallucinations    Hydrocodone-acetaminophen      Rash (skin)^    Oxycodone-acetaminophen      Rash (skin)^        Medication List           Accurate as of September 26, 2019 10:25 AM. If you have any questions, ask your nurse or doctor.               CONTINUE taking these medications    aspirin 81 MG EC tablet  Commonly known as:  ECOTRIN  Take 1 tablet (81 mg total) by mouth once daily.     atorvastatin 80 MG tablet  Commonly known as:  LIPITOR  Take 1 tablet (80 mg total) by mouth every evening.     clopidogrel 75 mg tablet  Commonly known as:  PLAVIX  Take 1 tablet (75 mg total) by mouth once daily.     furosemide 20 MG tablet  Commonly known as:  LASIX  Take 1 tablet (20 mg total) by mouth once daily.     isosorbide mononitrate 60 MG 24 hr tablet  Commonly known as:  IMDUR  Take 1 tablet (60 mg total) by mouth once daily.     KRILL OIL (OMEGA 3 AND 6) ORAL     levothyroxine 50 MCG tablet  Commonly known as:  SYNTHROID  Take 1 tablet (50 mcg total) by mouth before breakfast.     losartan 50 MG tablet  Commonly known as:  COZAAR  Take 1 tablet (50 mg total) by mouth once daily.     metoprolol tartrate 25 MG tablet  Commonly known as:  LOPRESSOR  Take 1 tablet (25 mg total) by mouth 2 (two) times daily.     nitroGLYCERIN 0.4 MG SL tablet  Commonly known as:  NITROSTAT  Place 1 tablet (0.4 mg total) under the tongue every 5 (five) minutes as needed for Chest pain.     pantoprazole 40 MG tablet  Commonly known as:  PROTONIX  Take 1 tablet (40 mg total) by mouth once daily.     tamsulosin 0.4 mg Cap  Commonly known as:  FLOMAX  Take 2 capsules (0.8 mg total) by mouth once daily.     vitamin D 1000 units Tab  Commonly known as:  VITAMIN D3  Take 1 tablet (1,000 Units total) by mouth once daily.              SOCIAL HISTORY:     Social History     Socioeconomic History    Marital  status:      Spouse name: Not on file    Number of children: Not on file    Years of education: Not on file    Highest education level: Not on file   Occupational History    Not on file   Social Needs    Financial resource strain: Not very hard    Food insecurity:     Worry: Never true     Inability: Never true    Transportation needs:     Medical: No     Non-medical: No   Tobacco Use    Smoking status: Never Smoker    Smokeless tobacco: Never Used   Substance and Sexual Activity    Alcohol use: No     Alcohol/week: 0.0 standard drinks     Frequency: Monthly or less     Drinks per session: 1 or 2     Binge frequency: Never     Comment: rarely    Drug use: No    Sexual activity: Yes   Lifestyle    Physical activity:     Days per week: 3 days     Minutes per session: 30 min    Stress: To some extent   Relationships    Social connections:     Talks on phone: More than three times a week     Gets together: More than three times a week     Attends Congregational service: Not on file     Active member of club or organization: No     Attends meetings of clubs or organizations: Never     Relationship status:    Other Topics Concern    Not on file   Social History Narrative    Not on file       FAMILY HISTORY:     Family History   Problem Relation Age of Onset    Arthritis Mother     Early death Mother     Heart disease Mother     Hypertension Mother     Migraines Mother     Seizures Mother     Tremor Mother     Diabetes Mother     Cancer Mother     Early death Father     Heart disease Father     Hypertension Father     Stroke Father     Diabetes Father     Alcohol abuse Father     Arthritis Brother     Cancer Brother     Diabetes Brother     Early death Brother     Heart disease Brother     Hypertension Brother     Heart disease Sister     Hypertension Sister     Arthritis Sister     Depression Sister     Heart disease Brother     Arthritis Brother     Heart disease  "Brother     Pneumonia Brother     Heart disease Brother     Hypertension Brother     Diabetes Brother     Heart disease Brother        REVIEW OF SYSTEMS:   Review of Systems   Constitutional: Negative for chills, diaphoresis and fever.   HENT: Negative for nosebleeds.    Eyes: Negative for blurred vision, double vision and photophobia.   Respiratory: Negative for hemoptysis, shortness of breath and wheezing.    Cardiovascular: Negative for chest pain, palpitations, orthopnea, claudication, leg swelling and PND.   Gastrointestinal: Negative for abdominal pain, blood in stool, heartburn, melena, nausea and vomiting.   Genitourinary: Negative for flank pain and hematuria.   Musculoskeletal: Negative for falls, myalgias and neck pain.   Skin: Negative for rash.   Neurological: Negative for dizziness, seizures, loss of consciousness, weakness and headaches.   Endo/Heme/Allergies: Negative for polydipsia. Does not bruise/bleed easily.   Psychiatric/Behavioral: Negative for depression and memory loss. The patient is not nervous/anxious.        PHYSICAL EXAM:     Vitals:    09/26/19 1005   BP: 118/68   Pulse: 70   Resp: 15    Body mass index is 33.43 kg/m².  Weight: 105.7 kg (233 lb)   Height: 5' 10" (177.8 cm)     Physical Exam   Constitutional: He is oriented to person, place, and time. He appears well-developed and well-nourished. He is cooperative.  Non-toxic appearance. No distress.   HENT:   Head: Normocephalic and atraumatic.   Eyes: Pupils are equal, round, and reactive to light. Conjunctivae and EOM are normal. No scleral icterus.   Neck: Trachea normal and normal range of motion. Neck supple. Normal carotid pulses and no JVD present. Carotid bruit is not present. No neck rigidity. No tracheal deviation and no edema present. No thyromegaly present.   Cardiovascular: Normal rate, regular rhythm, S1 normal and S2 normal. PMI is not displaced. Exam reveals no gallop and no friction rub.   No murmur " heard.  Pulses:       Carotid pulses are 2+ on the right side, and 2+ on the left side.  R rad access site c/d/i.   Pulmonary/Chest: Effort normal and breath sounds normal. No stridor. No respiratory distress. He has no wheezes. He has no rales. He exhibits no tenderness.   Abdominal: Soft. He exhibits no distension. There is no hepatosplenomegaly.   Obese   Musculoskeletal: Normal range of motion. He exhibits no edema or tenderness.   Feet:   Right Foot:   Skin Integrity: Negative for ulcer.   Left Foot:   Skin Integrity: Negative for ulcer.   Neurological: He is alert and oriented to person, place, and time. No cranial nerve deficit.   Skin: Skin is warm and dry. No rash noted. No erythema.   Psychiatric: He has a normal mood and affect. His speech is normal and behavior is normal.   Vitals reviewed.      DATA:   EKG: (personally reviewed tracing)  7/30/19 SR 57, PRWP similar to 5/20/18    Laboratory:  CBC:  Recent Labs   Lab 05/20/18  0151 09/13/19  1003 09/21/19  0419   WBC 8.47 7.10 22.07 H   Hemoglobin 15.5 14.9 14.0   Hematocrit 43.2 42.7 40.9   Platelets 164 174 200       CHEMISTRIES:  Recent Labs   Lab 05/20/18  0151 10/12/18  0947 09/13/19  1003 09/21/19  0419   Glucose 130 H 184 H 232 H 188 H   Sodium 143 140 140 144   Potassium 4.7 4.3 4.6 4.2   BUN, Bld 12 12 11 15   Creatinine 1.3 1.2 1.2 1.1   eGFR if African American >60 >60.0 >60 >60   eGFR if non African American 57 A >60.0 >60 >60   Calcium 9.4 9.3 9.4 8.8   Magnesium 2.4  --   --   --        CARDIAC BIOMARKERS:  Recent Labs   Lab 05/20/18  0151 09/20/19  1425 09/21/19  0419   Troponin I <0.006 <0.006 0.006       COAGS:  Recent Labs   Lab 09/13/19  1003   INR 1.0       LIPIDS/LFTS:  Recent Labs   Lab 10/31/17  0837  05/19/18  1941 05/20/18  0151 10/12/18  0947 09/21/19  0419   Cholesterol 101 L  --  117 L  --  132  --    Triglycerides 245 H  --  368 H  --  268 H  --    HDL 29 L  --  29 L  --  36 L  --    LDL Cholesterol 23.0 L  --  14.4 L  --   42.4 L  --    Non-HDL Cholesterol 72  --  88  --  96  --    AST 18   < >  --  19 28 14   ALT 19   < >  --  25 21 18    < > = values in this interval not displayed.       Cardiovascular Testing:  CTA Chest 9/20/19 (post-cath)  The main pulmonary artery is enlarged suggesting underlying pulmonary artery hypertension.  Single-vessel coronary disease.  Normal thoracic aorta and visualized abdominal aorta.    Echo 9/20/19 (post-cath; Ao root 3.8cm, was 4.0cm on echo 8/2019)  · Normal left ventricular systolic function. The estimated ejection fraction is 65%  · Normal LV diastolic function.  · Normal right ventricular systolic function.  · Mild left atrial enlargement.  · Mild aortic regurgitation.  · Mild mitral regurgitation.  · Mild tricuspid regurgitation.  · Normal central venous pressure (3 mm Hg).  · The estimated PA systolic pressure is 12 mm Hg    Cath 9/20/19  LVEDP: 8mmHg  LVEF: 65% by echo  Wall Motion: normal by echo  Dominance: Right  LM: normal  LAD: mid 50% followed by patent stent, distal/transapical 80% (small caliber)  Ramus: prox 30%  LCx: prox 50%  RCA: prox 30%, dist 40%              RPDA ost 50%              RPLB mid 90%  PCI RPLB:  Preop ASA/Plavix, intraop heparin  Predil 2.5x12  Stent 2.5x18 Resolute [place] SILVER 16 abi  Excellent result, T3 flow, 0% residual stenosis  Hemostasis:  R Radial band  Impression:  Progressive angina on 2 meds (metoprolol/imdur), unable to titrate further  2V CAD, normal LV fxn  Patent mid LAD stent, distal LAD stenosis in small caliber vessel  Successful PCI mid RPLB 2.5x18 Resolute [place] SILVER  Pt with post-PCI CP, no EKG changes (significant ST deviation with balloon inflations noted)  R rad vasband for hemostasis  Plan:  Observe in ICU  NTG gtt  ASA 81mg qd indefinitely  Plavix 75mg qd for 6 months (thru 3/2020)  BBl/Imdur/Statin  Home in am assuming control of sxs off NTG gtt  Will check CTA to exclude aortic pathology given hx of aortic root dil    L MPI 8/8/19 (similar  diaphramatic artifact noted on MPI 6/2017)    Probably normal concepcion myocardial perfusion study.    The perfusion scan is free of evidence from myocardial ischemia or injury.    Post stress wall motion is physiologic.    There is a  mild intensity fixed defect in the inferior wall of the left ventricle secondary to diaphragm attenuation.    Gated perfusion images showed an ejection fraction of  66 % post stress.    There is  normal wall motion post stress.    Venous US/reflux 3/15/18  RIGHT:  No evidence of Right lower extremity DVT.    There is reflux in the Greater Saphenous and Lesser Saphenous Veins.    R GSV dist thigh 2186 msec  R GSV in calf 2339 msec  R LSV 4006 msec   LEFT:  No evidence of Left lower extremity DVT.    There is reflux in the Greater Saphenous Vein.  R GSV mid thigh 2422 msec  R GSV dist thigh 1667 msec  R GSV calf 5647 msec      Holter 6/8/17  PREDOMINANT RHYTHM  1. Sinus rhythm with heart rates varying between 43 and 94 bpm with an average of 62 bpm.   VENTRICULAR ARRHYTHMIAS  1. There were very rare PVCs recorded totalling 5 and averaging less than 1 per hour.   2. There were no episodes of ventricular tachycardia.  SUPRA VENTRICULAR ARRHYTHMIAS  1. There were very rare PACs recorded totalling 3 and averaging less than 1 per hour.   2. There were no episodes of sustained supraventricular tachycardia.  SINUS NODE FUNCTION  1. There was no evidence of high grade SA deanne block.   AV CONDUCTION  1. There was no evidence of high grade AV block.   DIARY  1. The diary was not returned  MISCELLANEOUS  1. There were rare hookup related artifacts. Overall, the study was of good quality.   2. This was a tape of adequate length (24 hrs).    LE art US/TANA 6/8/17  1.  Mild/atherosclerotic plaquing.  2.  No stenosis about 30 percent femoral popliteal arteries.  3.  Normal bilateral ABIs.    Carotid US 12/3/14  1. Less than 50% stenosis of the right internal carotid artery.  2. Less than 50% stenosis  of the left internal carotid artery.      ASSESSMENT:   # CAD s/p LAD PCI 2013, now RPLB PCI 9/2019.  CP resolved.  # HTN, controlled  # HLP, on atorva 80mg  # Ao root dil, 3.7->4.0->3.8 cm (echo 9/2019), I will ask radiology to give me aortic root measurements from his recent cath (message sent to Dr. Andrade)  # CVI (bilat GSV, R LSV) on US 3/2018.  No edema at present (but complains of intermittent swelling), prev declined referral for venous ablation.  # hx JAYLEN, pt unable to obtain CPAP apparatus  # BMI 33, down 1 unit vs last OV  # Iodine allergy (?Betadine), successfully premedicated for cath 9/2019    PLAN:   Cont med rx  Cont ASA 81mg qd  Cont Plavix 75mg qd x 6 months (thru 3/2020)  Diet/exercise/weight loss  [facility] card rehab  RTC 3 months  Repeat echo 6 months (Mar 2020) for surveillance of aortic root dil (if confirmed on CTA chest)).  I will ask Dr. Andrade (rad) to make aortic root measurements from the pt's recent CTA Chest 9/2019.    Brad Bahena MD, FACC

## 2019-09-26 NOTE — MEDICAL/APP STUDENT
Subjective:       Patient ID: Clinton Rosenberg is a 68 y.o. male.    Chief Complaint: Hospital Follow Up    Patient presents today for a hospital follow up after discharge (9/21/2019) from Procedure(s) (LRB):  Left heart cath 12 pm start, R rad access (Left)  Percutaneous coronary intervention (N/A)  Stent, Drug Eluting, Single Vessel, Coronary     Pt denies headaches, chest pain, palpitations, sob, change in stool color/characteristics, blood in urine, stool, or dark tarry stools, muscle aches, vision/hearing change. No recent injuries/wounds. Pt reports decreased sleep at night and increased sleep during the day, pt sleeps with head elevated and no longer taking elavil for sleep. Denies need for prescribed sleep aids, wants to try melatonin. Pt had follow up with Dr. Bahena today and visit note reviewed and normal. Repeat echo due in 6 mos.  Vaccination for flu, pneumonia, and shingles reviewed and discussed with patient and he will get these at [facility].    Pt is otherwise healthy on today's visit.    Review of Systems   Constitutional: Positive for fatigue. Negative for activity change, chills, diaphoresis and fever.   HENT: Negative for congestion, ear pain, hearing loss and trouble swallowing.    Eyes: Negative for visual disturbance.   Respiratory: Negative for cough, chest tightness, shortness of breath and wheezing.    Cardiovascular: Negative for chest pain, palpitations and leg swelling.   Gastrointestinal: Negative for abdominal pain, anal bleeding, blood in stool, constipation, diarrhea, nausea and vomiting.   Genitourinary: Negative for difficulty urinating and hematuria.   Musculoskeletal: Negative for arthralgias.   Skin: Negative for color change, pallor, rash and wound.   Neurological: Negative for dizziness, weakness, light-headedness and numbness.   Hematological: Does not bruise/bleed easily.   Psychiatric/Behavioral: Positive for sleep disturbance. Negative for behavioral problems. The patient is  not nervous/anxious.        Objective:      Physical Exam   Constitutional: He is oriented to person, place, and time. He appears well-developed and well-nourished. No distress.   HENT:   Head: Normocephalic and atraumatic.   Right Ear: External ear normal.   Left Ear: External ear normal.   Nose: Nose normal.   Mouth/Throat: Oropharynx is clear and moist.   Eyes: Pupils are equal, round, and reactive to light. EOM are normal.   Neck: Normal range of motion. Neck supple. No JVD present. No tracheal deviation present. No thyromegaly present.   Cardiovascular: Normal rate, normal heart sounds and intact distal pulses.   No murmur heard.  Pulmonary/Chest: Effort normal and breath sounds normal. He has no wheezes.   Musculoskeletal: He exhibits no edema or tenderness.   Lymphadenopathy:     He has no cervical adenopathy.   Neurological: He is alert and oriented to person, place, and time.   Skin: Skin is warm and dry. Capillary refill takes less than 2 seconds. He is not diaphoretic. No erythema.   Psychiatric: He has a normal mood and affect. His behavior is normal. Judgment and thought content normal.       Assessment:       1. Hospital discharge follow-up    2. Coronary artery disease involving native coronary artery of native heart s/p stent LAD 2012; 2013 [facility] with CAD and patent stent; 9/2019 [facility] patent stent; new stent to RPLB    3. Coronary artery disease of native artery of native heart with stable angina pectoris    4. Aortic root dilatation on TTE 8/2019    5. Aortic ectasia    6. Essential hypertension    7. Peripheral vascular disease with LE US 6/2017 and carotid US     8. Venous insufficiency    9. Mixed hyperlipidemia    10. Prediabetes    11. Hypothyroidism, unspecified type    12. Benign non-nodular prostatic hyperplasia with lower urinary tract symptoms    13. Vitamin D deficiency    14. JAYLEN (obstructive sleep apnea)        Plan:       Hospital discharge follow-up  Patient is in good health on today's  visit. Keep all appointments with Dr. Bahena, fatigue most likely due to recovery from surgery, continue to rest, avoid overexertion, use melatonin 4 mg OTC prn for sleep. Pt denies prescription like trazodone or hydroxyzine at this time. The current medical regimen is effective;  continue present plan and medications.      Coronary artery disease involving native coronary artery of native heart s/p stent LAD 2012; 2013 [facility] with CAD and patent stent; 9/2019 [facility] patent stent; new stent to RPLB  The current medical regimen is effective;  continue present plan and medications.      Coronary artery disease of native artery of native heart with stable angina pectoris  The current medical regimen is effective;  continue present plan and medications.      Aortic root dilatation on TTE 8/2019  The current medical regimen is effective;  continue present plan and medications.      Aortic ectasia  The current medical regimen is effective;  continue present plan and medications.      Essential hypertension  -     Basic metabolic panel; Future; Expected date: 10/26/2019  Labs ordered. The current medical regimen is effective;  continue present plan and medications.      Peripheral vascular disease with LE US 6/2017 and carotid US   The current medical regimen is effective;  continue present plan and medications.      Venous insufficiency  The current medical regimen is effective;  continue present plan and medications.      Mixed hyperlipidemia  -     Lipid panel; Future; Expected date: 10/26/2019  The current medical regimen is effective;  continue present plan and medications.      Prediabetes  -     Hemoglobin A1c; Future; Expected date: 10/26/2019  -     Basic metabolic panel; Future; Expected date: 10/26/2019  The current medical regimen is effective;  continue present plan and medications.      Hypothyroidism, unspecified type  -     TSH; Future; Expected date: 10/26/2019  The current medical regimen is effective;   continue present plan and medications.      Benign non-nodular prostatic hyperplasia with lower urinary tract symptoms  The current medical regimen is effective;  continue present plan and medications.      Vitamin D deficiency  The current medical regimen is effective;  continue present plan and medications.      JAYLEN (obstructive sleep apnea)  The current medical regimen is effective;  continue present plan and medications.    Discussed health maintenence and preventative care (vaccines, colonoscopy last done in 2013 not due till 2023). And reviewed bleeding risks of blood thinners. Advised pt to keep all follow-up appointments, and repeat bmp, hA1C,TSH, lipid panel ordered for October, results pending. F/u in 1 month for labs.  POC discussed with pt and verbalized understanding.

## 2019-09-26 NOTE — PROGRESS NOTES
History of Present Illness   Clinton Rosenberg is a 68 y.o. man with medical history as listed below who presents today for hospital follow-up. He was admitted overnight for chest pain post PCI. Please see HPI and hospital course below.    HPI:   Patient status post PCI yesterday.  Had chest pain post PCI.  CTA was done to rule out aortic dissection.  No aortic dissection was noted.  Echocardiogram was performed post PCI which showed no new wall motion abnormalities.  One set of troponins was negative.  Patient currently chest pain-free.  Had no chest pain since last evening.  No other complications from cardiac catheterization.     Procedure(s) (LRB):  Left heart cath 12 pm start, R rad access (Left)  Percutaneous coronary intervention (N/A)  Stent, Drug Eluting, Single Vessel, Coronary     Post Discharge:  He reports that overall he is feeling better. He has no chest pain, palpitations, swelling to lower extremities, shortness of breath, or dizziness. He denies BRBPR or black tarry stool. He is tolerating dual platelet therapy well. He had visit with cardiology just prior to this visit. He has no additional complaints and is otherwise healthy on today's visit.    Transitional Care Note    Family and/or Caretaker present at visit?  Yes.  Diagnostic tests reviewed/disposition: No diagnosic tests pending after this hospitalization.  Disease/illness education: Discussed routine   Home health/community services discussion/referrals: Patient does not have home health established from hospital visit.  They do not need home health.  If needed, we will set up home health for the patient.   Establishment or re-establishment of referral orders for community resources: No other necessary community resources.   Discussion with other health care providers: No discussion with other health care providers necessary.       Past Medical History:   Diagnosis Date    Allergy     Angina pectoris     Anxiety     Coronary artery  disease     Depression     GERD (gastroesophageal reflux disease)     Hyperlipidemia     Hypertension     Hypothyroidism     Memory loss     12/3/2014 MRI brain: 1. No evidence for acute infarct 2. unremarkable MRI of the brain; 12/3/2014 carotid US normal    Myocardial infarction     Obesity     Peripheral vascular disease 6/20/2017    Retrocalcaneal bursitis 11/24/2014    Sleep apnea        Past Surgical History:   Procedure Laterality Date    APPENDECTOMY  1986    bone spur Right     COLONOSCOPY      hand trauma Right     pencil     heart stent      LEFT HEART CATHETERIZATION Left 9/20/2019    Procedure: Left heart cath 12 pm start, R rad access;  Surgeon: Brad Bahena MD;  Location: [facility] CATH LAB;  Service: Cardiology;  Laterality: Left;  RN PREOP 9/13/2019  NEEDS IODINE PREMED       Social History     Socioeconomic History    Marital status:      Spouse name: Not on file    Number of children: Not on file    Years of education: Not on file    Highest education level: Not on file   Occupational History    Not on file   Social Needs    Financial resource strain: Not very hard    Food insecurity:     Worry: Never true     Inability: Never true    Transportation needs:     Medical: No     Non-medical: No   Tobacco Use    Smoking status: Never Smoker    Smokeless tobacco: Never Used   Substance and Sexual Activity    Alcohol use: No     Alcohol/week: 0.0 standard drinks     Frequency: Monthly or less     Drinks per session: 1 or 2     Binge frequency: Never     Comment: rarely    Drug use: No    Sexual activity: Yes   Lifestyle    Physical activity:     Days per week: 3 days     Minutes per session: 30 min    Stress: To some extent   Relationships    Social connections:     Talks on phone: More than three times a week     Gets together: More than three times a week     Attends Baptism service: Not on file     Active member of club or organization: No     Attends  "meetings of clubs or organizations: Never     Relationship status:    Other Topics Concern    Not on file   Social History Narrative    Not on file       Family History   Problem Relation Age of Onset    Arthritis Mother     Early death Mother     Heart disease Mother     Hypertension Mother     Migraines Mother     Seizures Mother     Tremor Mother     Diabetes Mother     Cancer Mother     Early death Father     Heart disease Father     Hypertension Father     Stroke Father     Diabetes Father     Alcohol abuse Father     Arthritis Brother     Cancer Brother     Diabetes Brother     Early death Brother     Heart disease Brother     Hypertension Brother     Heart disease Sister     Hypertension Sister     Arthritis Sister     Depression Sister     Heart disease Brother     Arthritis Brother     Heart disease Brother     Pneumonia Brother     Heart disease Brother     Hypertension Brother     Diabetes Brother     Heart disease Brother        Review of Systems  Review of Systems   Constitutional: Positive for malaise/fatigue. Negative for chills and fever.   Eyes: Negative for blurred vision and double vision.   Respiratory: Negative for shortness of breath.    Cardiovascular: Negative for chest pain, palpitations, orthopnea and leg swelling.   Neurological: Negative for dizziness, loss of consciousness and headaches.   Endo/Heme/Allergies: Does not bruise/bleed easily.     A complete review of systems was otherwise negative.    Physical Exam  /72 (BP Location: Left arm, Patient Position: Sitting, BP Method: Large (Manual))   Pulse 62   Temp 98.5 °F (36.9 °C) (Oral)   Ht 5' 10" (1.778 m)   Wt 107.2 kg (236 lb 5.3 oz)   SpO2 95%   BMI 33.91 kg/m²   General appearance: alert, appears stated age, cooperative and no distress  Neck: no carotid bruit, no JVD, supple, symmetrical, trachea midline and thyroid not enlarged, symmetric, no tenderness/mass/nodules  Lungs: " clear to auscultation bilaterally  Heart: regular rate and rhythm, S1, S2 normal, no murmur, click, rub or gallop  Extremities: extremities normal, atraumatic, no cyanosis or edema  Pulses: 2+ and symmetric  Skin: Skin color, texture, turgor normal. No rashes or lesions  Neurologic: Grossly normal    Assessment/Plan  Hospital discharge follow-up  Overall, doing well.  Continue current medication regimen.  Keep follow-up with cardiology as scheduled.  Return in about one month for labs with visit to follow with PCP.    Coronary artery disease involving native coronary artery of native heart s/p stent LAD 2012; 2013 [facility] with CAD and patent stent; 9/2019 [facility] patent stent; new stent to RPLB  Stable on dual platelet therapy, BB, ARB, and statin therapy.  Followed by cardiology.    Coronary artery disease of native artery of native heart with stable angina pectoris  Stable on dual platelet therapy, BB, ARB, and statin therapy.  Followed by cardiology.    Aortic root dilatation on TTE 8/2019  Stable on dual platelet therapy, BB, ARB, and statin therapy.  Followed by cardiology.    Aortic ectasia  Stable on dual platelet therapy, BB, ARB, and statin therapy.  Followed by cardiology.    Essential hypertension  The current medical regimen is effective;  continue present plan and medications.  -     Basic metabolic panel; Future; Expected date: 10/26/2019    Peripheral vascular disease with LE US 6/2017 and carotid US   The current medical regimen is effective;  continue present plan and medications.    Venous insufficiency  The current medical regimen is effective;  continue present plan and medications.    Mixed hyperlipidemia  The current medical regimen is effective;  continue present plan and medications.  -     Lipid panel; Future; Expected date: 10/26/2019    Prediabetes  The current medical regimen is effective;  continue present plan and medications.  The patient is asked to make an attempt to improve diet and  exercise patterns to aid in medical management of this problem.  -     Hemoglobin A1c; Future; Expected date: 10/26/2019  -     Basic metabolic panel; Future; Expected date: 10/26/2019    Hypothyroidism, unspecified type  The current medical regimen is effective;  continue present plan and medications.  -     TSH; Future; Expected date: 10/26/2019    Benign non-nodular prostatic hyperplasia with lower urinary tract symptoms  The current medical regimen is effective;  continue present plan and medications.    Vitamin D deficiency  The current medical regimen is effective;  continue present plan and medications.    JAYLEN (obstructive sleep apnea)  The current medical regimen is effective;  continue present plan and medications.    Patient has verbalized understanding and is in agreement with plan of care.    Addressed HM- he will receive immunizations at his pharmacy.     Follow up in about 1 month (around 10/26/2019) for routine follow-up with labs prior.

## 2019-10-17 ENCOUNTER — LAB VISIT (OUTPATIENT)
Dept: LAB | Facility: HOSPITAL | Age: 69
End: 2019-10-17
Attending: NURSE PRACTITIONER
Payer: MEDICARE

## 2019-10-17 DIAGNOSIS — I10 ESSENTIAL HYPERTENSION: Chronic | ICD-10-CM

## 2019-10-17 DIAGNOSIS — E78.2 MIXED HYPERLIPIDEMIA: Chronic | ICD-10-CM

## 2019-10-17 DIAGNOSIS — R73.03 PREDIABETES: Chronic | ICD-10-CM

## 2019-10-17 DIAGNOSIS — E03.9 HYPOTHYROIDISM, UNSPECIFIED TYPE: Chronic | ICD-10-CM

## 2019-10-17 LAB
ANION GAP SERPL CALC-SCNC: 9 MMOL/L (ref 8–16)
BUN SERPL-MCNC: 12 MG/DL (ref 8–23)
CALCIUM SERPL-MCNC: 9.5 MG/DL (ref 8.7–10.5)
CHLORIDE SERPL-SCNC: 104 MMOL/L (ref 95–110)
CHOLEST SERPL-MCNC: 117 MG/DL (ref 120–199)
CHOLEST/HDLC SERPL: 3.4 {RATIO} (ref 2–5)
CO2 SERPL-SCNC: 28 MMOL/L (ref 23–29)
CREAT SERPL-MCNC: 1.2 MG/DL (ref 0.5–1.4)
EST. GFR  (AFRICAN AMERICAN): >60 ML/MIN/1.73 M^2
EST. GFR  (NON AFRICAN AMERICAN): >60 ML/MIN/1.73 M^2
ESTIMATED AVG GLUCOSE: 140 MG/DL (ref 68–131)
GLUCOSE SERPL-MCNC: 160 MG/DL (ref 70–110)
HBA1C MFR BLD HPLC: 6.5 % (ref 4–5.6)
HDLC SERPL-MCNC: 34 MG/DL (ref 40–75)
HDLC SERPL: 29.1 % (ref 20–50)
LDLC SERPL CALC-MCNC: 38 MG/DL (ref 63–159)
NONHDLC SERPL-MCNC: 83 MG/DL
POTASSIUM SERPL-SCNC: 4.3 MMOL/L (ref 3.5–5.1)
SODIUM SERPL-SCNC: 141 MMOL/L (ref 136–145)
TRIGL SERPL-MCNC: 225 MG/DL (ref 30–150)
TSH SERPL DL<=0.005 MIU/L-ACNC: 3.68 UIU/ML (ref 0.4–4)

## 2019-10-17 PROCEDURE — 83036 HEMOGLOBIN GLYCOSYLATED A1C: CPT | Mod: HCNC

## 2019-10-17 PROCEDURE — 36415 COLL VENOUS BLD VENIPUNCTURE: CPT | Mod: HCNC,PO

## 2019-10-17 PROCEDURE — 84443 ASSAY THYROID STIM HORMONE: CPT | Mod: HCNC

## 2019-10-17 PROCEDURE — 80048 BASIC METABOLIC PNL TOTAL CA: CPT | Mod: HCNC

## 2019-10-17 PROCEDURE — 80061 LIPID PANEL: CPT | Mod: HCNC

## 2019-12-04 LAB — FECAL GLOBIN BY IMMUNOCHEM. (MEDICARE): NEGATIVE

## 2019-12-17 ENCOUNTER — PATIENT OUTREACH (OUTPATIENT)
Dept: ADMINISTRATIVE | Facility: OTHER | Age: 69
End: 2019-12-17

## 2019-12-17 RX ORDER — PANTOPRAZOLE SODIUM 40 MG/1
TABLET, DELAYED RELEASE ORAL
Qty: 90 TABLET | Refills: 0 | Status: SHIPPED | OUTPATIENT
Start: 2019-12-17 | End: 2020-04-27

## 2019-12-17 RX ORDER — TAMSULOSIN HYDROCHLORIDE 0.4 MG/1
CAPSULE ORAL
Qty: 180 CAPSULE | Refills: 0 | Status: ON HOLD | OUTPATIENT
Start: 2019-12-17 | End: 2020-09-21 | Stop reason: HOSPADM

## 2019-12-18 ENCOUNTER — OFFICE VISIT (OUTPATIENT)
Dept: CARDIOLOGY | Facility: CLINIC | Age: 69
End: 2019-12-18
Payer: MEDICARE

## 2019-12-18 VITALS
RESPIRATION RATE: 15 BRPM | DIASTOLIC BLOOD PRESSURE: 70 MMHG | HEIGHT: 70 IN | OXYGEN SATURATION: 96 % | HEART RATE: 58 BPM | BODY MASS INDEX: 34.65 KG/M2 | WEIGHT: 242 LBS | SYSTOLIC BLOOD PRESSURE: 118 MMHG

## 2019-12-18 DIAGNOSIS — I87.2 VENOUS INSUFFICIENCY: ICD-10-CM

## 2019-12-18 DIAGNOSIS — I77.810 AORTIC ROOT DILATATION: ICD-10-CM

## 2019-12-18 DIAGNOSIS — R06.02 SOB (SHORTNESS OF BREATH): ICD-10-CM

## 2019-12-18 DIAGNOSIS — I77.819 AORTIC ECTASIA: ICD-10-CM

## 2019-12-18 DIAGNOSIS — I10 ESSENTIAL HYPERTENSION: Chronic | ICD-10-CM

## 2019-12-18 DIAGNOSIS — E66.9 OBESITY (BMI 30.0-34.9): Chronic | ICD-10-CM

## 2019-12-18 DIAGNOSIS — E78.2 MIXED HYPERLIPIDEMIA: Chronic | ICD-10-CM

## 2019-12-18 DIAGNOSIS — I25.10 CORONARY ARTERY DISEASE INVOLVING NATIVE CORONARY ARTERY OF NATIVE HEART WITHOUT ANGINA PECTORIS: Primary | ICD-10-CM

## 2019-12-18 DIAGNOSIS — I25.10 CORONARY ARTERY DISEASE INVOLVING NATIVE CORONARY ARTERY OF NATIVE HEART WITHOUT ANGINA PECTORIS: ICD-10-CM

## 2019-12-18 PROCEDURE — 1101F PT FALLS ASSESS-DOCD LE1/YR: CPT | Mod: HCNC,CPTII,S$GLB, | Performed by: INTERNAL MEDICINE

## 2019-12-18 PROCEDURE — 3078F DIAST BP <80 MM HG: CPT | Mod: HCNC,CPTII,S$GLB, | Performed by: INTERNAL MEDICINE

## 2019-12-18 PROCEDURE — 99999 PR PBB SHADOW E&M-EST. PATIENT-LVL III: CPT | Mod: PBBFAC,HCNC,, | Performed by: INTERNAL MEDICINE

## 2019-12-18 PROCEDURE — 99999 PR PBB SHADOW E&M-EST. PATIENT-LVL III: ICD-10-PCS | Mod: PBBFAC,HCNC,, | Performed by: INTERNAL MEDICINE

## 2019-12-18 PROCEDURE — 1159F PR MEDICATION LIST DOCUMENTED IN MEDICAL RECORD: ICD-10-PCS | Mod: HCNC,S$GLB,, | Performed by: INTERNAL MEDICINE

## 2019-12-18 PROCEDURE — 99214 OFFICE O/P EST MOD 30 MIN: CPT | Mod: HCNC,S$GLB,, | Performed by: INTERNAL MEDICINE

## 2019-12-18 PROCEDURE — 99499 UNLISTED E&M SERVICE: CPT | Mod: HCNC,S$GLB,, | Performed by: INTERNAL MEDICINE

## 2019-12-18 PROCEDURE — 1126F PR PAIN SEVERITY QUANTIFIED, NO PAIN PRESENT: ICD-10-PCS | Mod: HCNC,S$GLB,, | Performed by: INTERNAL MEDICINE

## 2019-12-18 PROCEDURE — 3078F PR MOST RECENT DIASTOLIC BLOOD PRESSURE < 80 MM HG: ICD-10-PCS | Mod: HCNC,CPTII,S$GLB, | Performed by: INTERNAL MEDICINE

## 2019-12-18 PROCEDURE — 93000 EKG 12-LEAD: ICD-10-PCS | Mod: HCNC,S$GLB,, | Performed by: INTERNAL MEDICINE

## 2019-12-18 PROCEDURE — 1126F AMNT PAIN NOTED NONE PRSNT: CPT | Mod: HCNC,S$GLB,, | Performed by: INTERNAL MEDICINE

## 2019-12-18 PROCEDURE — 3074F PR MOST RECENT SYSTOLIC BLOOD PRESSURE < 130 MM HG: ICD-10-PCS | Mod: HCNC,CPTII,S$GLB, | Performed by: INTERNAL MEDICINE

## 2019-12-18 PROCEDURE — 99214 PR OFFICE/OUTPT VISIT, EST, LEVL IV, 30-39 MIN: ICD-10-PCS | Mod: HCNC,S$GLB,, | Performed by: INTERNAL MEDICINE

## 2019-12-18 PROCEDURE — 93000 ELECTROCARDIOGRAM COMPLETE: CPT | Mod: HCNC,S$GLB,, | Performed by: INTERNAL MEDICINE

## 2019-12-18 PROCEDURE — 99499 RISK ADDL DX/OHS AUDIT: ICD-10-PCS | Mod: HCNC,S$GLB,, | Performed by: INTERNAL MEDICINE

## 2019-12-18 PROCEDURE — 3074F SYST BP LT 130 MM HG: CPT | Mod: HCNC,CPTII,S$GLB, | Performed by: INTERNAL MEDICINE

## 2019-12-18 PROCEDURE — 1159F MED LIST DOCD IN RCRD: CPT | Mod: HCNC,S$GLB,, | Performed by: INTERNAL MEDICINE

## 2019-12-18 PROCEDURE — 1101F PR PT FALLS ASSESS DOC 0-1 FALLS W/OUT INJ PAST YR: ICD-10-PCS | Mod: HCNC,CPTII,S$GLB, | Performed by: INTERNAL MEDICINE

## 2019-12-18 NOTE — PROGRESS NOTES
CARDIOVASCULAR PROGRESS NOTE    REASON FOR CONSULT:   Clinton Rosenberg is a 69 y.o. male who presents for follow up of CAD, ao root dil.    PCP: Dair  HISTORY OF PRESENT ILLNESS:   The patient comes in for follow-up.  He denies intercurrent angina or dyspnea.  He has had no palpitations or syncope.  He does continue to describe occasional symptoms of orthostasis.  He has had no PND, orthopnea, or lower extremity edema.  He denies any melena, hematuria, or claudicant symptoms.    I took the liberty of discussing the patient's prior CTA chest with Radiology today and had them perform measurements to give me and assessment of his thoracic aorta.  The maximum diameter was 3.8 cm at the ascending aorta.    CARDIOVASCULAR HISTORY:   CAD/MI  2013: LAD PCI    9/20/19: mid RPLB 2.5x18 Resolute [place] SILVER    JAYLEN, unable to afford CPAP  Aortic root dil, 3.7->4.0->3.8  cm (echo 9/2019), 3.8cm (asc Ao) on CTA 9/2019  CVI (bilat GSV, R LSV, US 3/2018)    PAST MEDICAL HISTORY:     Past Medical History:   Diagnosis Date    Allergy     Angina pectoris     Anxiety     Coronary artery disease     Depression     GERD (gastroesophageal reflux disease)     Hyperlipidemia     Hypertension     Hypothyroidism     Memory loss     12/3/2014 MRI brain: 1. No evidence for acute infarct 2. unremarkable MRI of the brain; 12/3/2014 carotid US normal    Myocardial infarction     Obesity     Peripheral vascular disease 6/20/2017    Retrocalcaneal bursitis 11/24/2014    Sleep apnea        PAST SURGICAL HISTORY:     Past Surgical History:   Procedure Laterality Date    APPENDECTOMY  1986    bone spur Right     COLONOSCOPY      hand trauma Right     pencil     heart stent      LEFT HEART CATHETERIZATION Left 9/20/2019    Procedure: Left heart cath 12 pm start, R rad access;  Surgeon: Brad Bahena MD;  Location: [facility] CATH LAB;  Service: Cardiology;  Laterality: Left;  RN PREOP 9/13/2019  NEEDS IODINE PREMED       ALLERGIES  AND MEDICATION:     Review of patient's allergies indicates:   Allergen Reactions    Iodine containing multivitamin Anaphylaxis    Naproxen Other (See Comments)     hallucinations    Hydrocodone-acetaminophen      Rash (skin)^    Oxycodone-acetaminophen      Rash (skin)^        Medication List           Accurate as of December 18, 2019 10:03 AM. If you have any questions, ask your nurse or doctor.               CONTINUE taking these medications    aspirin 81 MG EC tablet  Commonly known as:  ECOTRIN  Take 1 tablet (81 mg total) by mouth once daily.     atorvastatin 80 MG tablet  Commonly known as:  LIPITOR  Take 1 tablet (80 mg total) by mouth every evening.     clopidogrel 75 mg tablet  Commonly known as:  PLAVIX  Take 1 tablet (75 mg total) by mouth once daily.     furosemide 20 MG tablet  Commonly known as:  LASIX  Take 1 tablet (20 mg total) by mouth once daily.     isosorbide mononitrate 60 MG 24 hr tablet  Commonly known as:  IMDUR  Take 1 tablet (60 mg total) by mouth once daily.     KRILL OIL (OMEGA 3 AND 6) ORAL     levothyroxine 50 MCG tablet  Commonly known as:  SYNTHROID  Take 1 tablet (50 mcg total) by mouth before breakfast.     losartan 50 MG tablet  Commonly known as:  COZAAR  Take 1 tablet (50 mg total) by mouth once daily.     metoprolol tartrate 25 MG tablet  Commonly known as:  LOPRESSOR  Take 1 tablet (25 mg total) by mouth 2 (two) times daily.     nitroGLYCERIN 0.4 MG SL tablet  Commonly known as:  NITROSTAT  Place 1 tablet (0.4 mg total) under the tongue every 5 (five) minutes as needed for Chest pain.     pantoprazole 40 MG tablet  Commonly known as:  PROTONIX  TAKE 1 TABLET EVERY DAY     * tamsulosin 0.4 mg Cap  Commonly known as:  FLOMAX  Take 2 capsules (0.8 mg total) by mouth once daily.     * tamsulosin 0.4 mg Cap  Commonly known as:  FLOMAX  TAKE 2 CAPSULES ONE TIME DAILY     vitamin D 1000 units Tab  Commonly known as:  VITAMIN D3  Take 1 tablet (1,000 Units total) by mouth once  daily.         * This list has 2 medication(s) that are the same as other medications prescribed for you. Read the directions carefully, and ask your doctor or other care provider to review them with you.                  SOCIAL HISTORY:     Social History     Socioeconomic History    Marital status:      Spouse name: Not on file    Number of children: Not on file    Years of education: Not on file    Highest education level: Not on file   Occupational History    Not on file   Social Needs    Financial resource strain: Not very hard    Food insecurity:     Worry: Never true     Inability: Never true    Transportation needs:     Medical: No     Non-medical: No   Tobacco Use    Smoking status: Never Smoker    Smokeless tobacco: Never Used   Substance and Sexual Activity    Alcohol use: No     Alcohol/week: 0.0 standard drinks     Frequency: Monthly or less     Drinks per session: 1 or 2     Binge frequency: Never     Comment: rarely    Drug use: No    Sexual activity: Yes   Lifestyle    Physical activity:     Days per week: 3 days     Minutes per session: 30 min    Stress: To some extent   Relationships    Social connections:     Talks on phone: More than three times a week     Gets together: More than three times a week     Attends Jewish service: Not on file     Active member of club or organization: No     Attends meetings of clubs or organizations: Never     Relationship status:    Other Topics Concern    Not on file   Social History Narrative    Not on file       FAMILY HISTORY:     Family History   Problem Relation Age of Onset    Arthritis Mother     Early death Mother     Heart disease Mother     Hypertension Mother     Migraines Mother     Seizures Mother     Tremor Mother     Diabetes Mother     Cancer Mother     Early death Father     Heart disease Father     Hypertension Father     Stroke Father     Diabetes Father     Alcohol abuse Father     Arthritis  "Brother     Cancer Brother     Diabetes Brother     Early death Brother     Heart disease Brother     Hypertension Brother     Heart disease Sister     Hypertension Sister     Arthritis Sister     Depression Sister     Heart disease Brother     Arthritis Brother     Heart disease Brother     Pneumonia Brother     Heart disease Brother     Hypertension Brother     Diabetes Brother     Heart disease Brother        REVIEW OF SYSTEMS:   Review of Systems   Constitutional: Negative for chills, diaphoresis and fever.   HENT: Negative for nosebleeds.    Eyes: Negative for blurred vision, double vision and photophobia.   Respiratory: Negative for hemoptysis, shortness of breath and wheezing.    Cardiovascular: Negative for chest pain, palpitations, orthopnea, claudication, leg swelling and PND.   Gastrointestinal: Negative for abdominal pain, blood in stool, heartburn, melena, nausea and vomiting.   Genitourinary: Negative for flank pain and hematuria.   Musculoskeletal: Negative for falls, myalgias and neck pain.   Skin: Negative for rash.   Neurological: Negative for dizziness, seizures, loss of consciousness, weakness and headaches.   Endo/Heme/Allergies: Negative for polydipsia. Does not bruise/bleed easily.   Psychiatric/Behavioral: Negative for depression and memory loss. The patient is not nervous/anxious.        PHYSICAL EXAM:     Vitals:    12/18/19 0917   BP: 118/70   Pulse: (!) 58   Resp: 15    Body mass index is 34.72 kg/m².  Weight: 109.8 kg (242 lb)   Height: 5' 10" (177.8 cm)     Physical Exam   Constitutional: He is oriented to person, place, and time. He appears well-developed and well-nourished. He is cooperative.  Non-toxic appearance. No distress.   HENT:   Head: Normocephalic and atraumatic.   Eyes: Pupils are equal, round, and reactive to light. Conjunctivae and EOM are normal. No scleral icterus.   Neck: Trachea normal and normal range of motion. Neck supple. Normal carotid pulses and " no JVD present. Carotid bruit is not present. No neck rigidity. No tracheal deviation and no edema present. No thyromegaly present.   Cardiovascular: Regular rhythm, S1 normal and S2 normal. Bradycardia present. PMI is not displaced. Exam reveals no gallop and no friction rub.   No murmur heard.  Pulses:       Carotid pulses are 2+ on the right side, and 2+ on the left side.  Pulmonary/Chest: Effort normal and breath sounds normal. No stridor. No respiratory distress. He has no wheezes. He has no rales. He exhibits no tenderness.   Abdominal: Soft. He exhibits no distension. There is no hepatosplenomegaly.   Obese   Musculoskeletal: Normal range of motion. He exhibits no edema or tenderness.   Feet:   Right Foot:   Skin Integrity: Negative for ulcer.   Left Foot:   Skin Integrity: Negative for ulcer.   Neurological: He is alert and oriented to person, place, and time. No cranial nerve deficit.   Skin: Skin is warm and dry. No rash noted. No erythema.   Psychiatric: He has a normal mood and affect. His speech is normal and behavior is normal.   Vitals reviewed.      DATA:   EKG: (personally reviewed tracing(s))  12/18/19 SR 58, PRWP, similar to 9/21/19    Laboratory:  CBC:  Recent Labs   Lab 05/20/18  0151 09/13/19  1003 09/21/19  0419   WBC 8.47 7.10 22.07 H   Hemoglobin 15.5 14.9 14.0   Hematocrit 43.2 42.7 40.9   Platelets 164 174 200       CHEMISTRIES:  Recent Labs   Lab 05/20/18  0151  09/13/19  1003 09/21/19  0419 10/17/19  0732   Glucose 130 H   < > 232 H 188 H 160 H   Sodium 143   < > 140 144 141   Potassium 4.7   < > 4.6 4.2 4.3   BUN, Bld 12   < > 11 15 12   Creatinine 1.3   < > 1.2 1.1 1.2   eGFR if African American >60   < > >60 >60 >60.0   eGFR if non  57 A   < > >60 >60 >60.0   Calcium 9.4   < > 9.4 8.8 9.5   Magnesium 2.4  --   --   --   --     < > = values in this interval not displayed.       CARDIAC BIOMARKERS:  Recent Labs   Lab 05/20/18  0151 09/20/19  1425 09/21/19  0419    Troponin I <0.006 <0.006 0.006       COAGS:  Recent Labs   Lab 09/13/19  1003   INR 1.0       LIPIDS/LFTS:  Recent Labs   Lab 05/19/18  1941 05/20/18  0151 10/12/18  0947 09/21/19  0419 10/17/19  0732   Cholesterol 117 L  --  132  --  117 L   Triglycerides 368 H  --  268 H  --  225 H   HDL 29 L  --  36 L  --  34 L   LDL Cholesterol 14.4 L  --  42.4 L  --  38.0 L   Non-HDL Cholesterol 88  --  96  --  83   AST  --  19 28 14  --    ALT  --  25 21 18  --        Cardiovascular Testing:  CTA Chest 9/20/19 (post-cath)  The main pulmonary artery is enlarged suggesting underlying pulmonary artery hypertension.  Single-vessel coronary disease.  Normal thoracic aorta and visualized abdominal aorta.  Aortic measurements: STJ 3.4cm, Asc 3.8cm, Desc: 2.6.    Echo 9/20/19 (post-cath; Ao root 3.8cm, was 4.0cm on echo 8/2019)  · Normal left ventricular systolic function. The estimated ejection fraction is 65%  · Normal LV diastolic function.  · Normal right ventricular systolic function.  · Mild left atrial enlargement.  · Mild aortic regurgitation.  · Mild mitral regurgitation.  · Mild tricuspid regurgitation.  · Normal central venous pressure (3 mm Hg).  · The estimated PA systolic pressure is 12 mm Hg    Cath 9/20/19  LVEDP: 8mmHg  LVEF: 65% by echo  Wall Motion: normal by echo  Dominance: Right  LM: normal  LAD: mid 50% followed by patent stent, distal/transapical 80% (small caliber)  Ramus: prox 30%  LCx: prox 50%  RCA: prox 30%, dist 40%              RPDA ost 50%              RPLB mid 90%  PCI RPLB:  Preop ASA/Plavix, intraop heparin  Predil 2.5x12  Stent 2.5x18 Resolute [place] SILVER 16 abi  Excellent result, T3 flow, 0% residual stenosis  Hemostasis:  R Radial band  Impression:  Progressive angina on 2 meds (metoprolol/imdur), unable to titrate further  2V CAD, normal LV fxn  Patent mid LAD stent, distal LAD stenosis in small caliber vessel  Successful PCI mid RPLB 2.5x18 Resolute [place] SILVER  Pt with post-PCI CP, no EKG changes  (significant ST deviation with balloon inflations noted)  R rad vasband for hemostasis  Plan:  Observe in ICU  NTG gtt  ASA 81mg qd indefinitely  Plavix 75mg qd for 6 months (thru 3/2020)  BBl/Imdur/Statin  Home in am assuming control of sxs off NTG gtt  Will check CTA to exclude aortic pathology given hx of aortic root dil    L MPI 8/8/19 (similar diaphramatic artifact noted on MPI 6/2017)    Probably normal concepcion myocardial perfusion study.    The perfusion scan is free of evidence from myocardial ischemia or injury.    Post stress wall motion is physiologic.    There is a  mild intensity fixed defect in the inferior wall of the left ventricle secondary to diaphragm attenuation.    Gated perfusion images showed an ejection fraction of  66 % post stress.    There is  normal wall motion post stress.    Venous US/reflux 3/15/18  RIGHT:  No evidence of Right lower extremity DVT.    There is reflux in the Greater Saphenous and Lesser Saphenous Veins.    R GSV dist thigh 2186 msec  R GSV in calf 2339 msec  R LSV 4006 msec   LEFT:  No evidence of Left lower extremity DVT.    There is reflux in the Greater Saphenous Vein.  R GSV mid thigh 2422 msec  R GSV dist thigh 1667 msec  R GSV calf 5647 msec      Holter 6/8/17  PREDOMINANT RHYTHM  1. Sinus rhythm with heart rates varying between 43 and 94 bpm with an average of 62 bpm.   VENTRICULAR ARRHYTHMIAS  1. There were very rare PVCs recorded totalling 5 and averaging less than 1 per hour.   2. There were no episodes of ventricular tachycardia.  SUPRA VENTRICULAR ARRHYTHMIAS  1. There were very rare PACs recorded totalling 3 and averaging less than 1 per hour.   2. There were no episodes of sustained supraventricular tachycardia.  SINUS NODE FUNCTION  1. There was no evidence of high grade SA deanne block.   AV CONDUCTION  1. There was no evidence of high grade AV block.   DIARY  1. The diary was not returned  MISCELLANEOUS  1. There were rare hookup related artifacts.  Overall, the study was of good quality.   2. This was a tape of adequate length (24 hrs).    LE art US/TANA 6/8/17  1.  Mild/atherosclerotic plaquing.  2.  No stenosis about 30 percent femoral popliteal arteries.  3.  Normal bilateral ABIs.    Carotid US 12/3/14  1. Less than 50% stenosis of the right internal carotid artery.  2. Less than 50% stenosis of the left internal carotid artery.      ASSESSMENT:   # CAD s/p LAD PCI 2013, now RPLB PCI 9/2019.  CP resolved.  # HTN, controlled.  Occ dizziness noted.  # HLP, on atorva 80mg  # Ao root dil, 3.7->4.0->3.8 cm (echo 9/2019), 3.8cm (asc Ao CTA 9/2019)  # CVI (bilat GSV, R LSV) on US 3/2018.  No edema at present (but complains of intermittent swelling), prev declined referral for venous ablation.  # hx JAYLEN, pt unable to obtain CPAP apparatus  # BMI 35, up 2 unit(s) vs last OV  # Iodine allergy (?Betadine), successfully premedicated for cath 9/2019    PLAN:   Cont med rx  Dec lasix 10mg qd  Cont ASA 81mg qd indefinitely  Cont Plavix 75mg qd x 6 months (thru 3/2020)  Diet/exercise/weight loss  RTC 3 months with echo (Mar 2020) for surveillance of aortic root dil    Brad Bahena MD, FACC

## 2019-12-30 ENCOUNTER — PATIENT OUTREACH (OUTPATIENT)
Dept: ADMINISTRATIVE | Facility: HOSPITAL | Age: 69
End: 2019-12-30

## 2020-01-10 ENCOUNTER — OFFICE VISIT (OUTPATIENT)
Dept: FAMILY MEDICINE | Facility: CLINIC | Age: 70
End: 2020-01-10
Payer: MEDICARE

## 2020-01-10 VITALS
HEIGHT: 70 IN | SYSTOLIC BLOOD PRESSURE: 136 MMHG | OXYGEN SATURATION: 95 % | BODY MASS INDEX: 33.88 KG/M2 | TEMPERATURE: 98 F | HEART RATE: 71 BPM | WEIGHT: 236.69 LBS | DIASTOLIC BLOOD PRESSURE: 64 MMHG

## 2020-01-10 DIAGNOSIS — J32.9 SINUSITIS, UNSPECIFIED CHRONICITY, UNSPECIFIED LOCATION: Primary | ICD-10-CM

## 2020-01-10 PROCEDURE — 99214 PR OFFICE/OUTPT VISIT, EST, LEVL IV, 30-39 MIN: ICD-10-PCS | Mod: HCNC,S$GLB,, | Performed by: NURSE PRACTITIONER

## 2020-01-10 PROCEDURE — 1125F PR PAIN SEVERITY QUANTIFIED, PAIN PRESENT: ICD-10-PCS | Mod: HCNC,S$GLB,, | Performed by: NURSE PRACTITIONER

## 2020-01-10 PROCEDURE — 1159F PR MEDICATION LIST DOCUMENTED IN MEDICAL RECORD: ICD-10-PCS | Mod: HCNC,S$GLB,, | Performed by: NURSE PRACTITIONER

## 2020-01-10 PROCEDURE — 1159F MED LIST DOCD IN RCRD: CPT | Mod: HCNC,S$GLB,, | Performed by: NURSE PRACTITIONER

## 2020-01-10 PROCEDURE — 3078F DIAST BP <80 MM HG: CPT | Mod: HCNC,CPTII,S$GLB, | Performed by: NURSE PRACTITIONER

## 2020-01-10 PROCEDURE — 99999 PR PBB SHADOW E&M-EST. PATIENT-LVL V: CPT | Mod: PBBFAC,HCNC,, | Performed by: NURSE PRACTITIONER

## 2020-01-10 PROCEDURE — 1101F PT FALLS ASSESS-DOCD LE1/YR: CPT | Mod: HCNC,CPTII,S$GLB, | Performed by: NURSE PRACTITIONER

## 2020-01-10 PROCEDURE — 1101F PR PT FALLS ASSESS DOC 0-1 FALLS W/OUT INJ PAST YR: ICD-10-PCS | Mod: HCNC,CPTII,S$GLB, | Performed by: NURSE PRACTITIONER

## 2020-01-10 PROCEDURE — 99999 PR PBB SHADOW E&M-EST. PATIENT-LVL V: ICD-10-PCS | Mod: PBBFAC,HCNC,, | Performed by: NURSE PRACTITIONER

## 2020-01-10 PROCEDURE — 3075F SYST BP GE 130 - 139MM HG: CPT | Mod: HCNC,CPTII,S$GLB, | Performed by: NURSE PRACTITIONER

## 2020-01-10 PROCEDURE — 99214 OFFICE O/P EST MOD 30 MIN: CPT | Mod: HCNC,S$GLB,, | Performed by: NURSE PRACTITIONER

## 2020-01-10 PROCEDURE — 3078F PR MOST RECENT DIASTOLIC BLOOD PRESSURE < 80 MM HG: ICD-10-PCS | Mod: HCNC,CPTII,S$GLB, | Performed by: NURSE PRACTITIONER

## 2020-01-10 PROCEDURE — 1125F AMNT PAIN NOTED PAIN PRSNT: CPT | Mod: HCNC,S$GLB,, | Performed by: NURSE PRACTITIONER

## 2020-01-10 PROCEDURE — 3075F PR MOST RECENT SYSTOLIC BLOOD PRESS GE 130-139MM HG: ICD-10-PCS | Mod: HCNC,CPTII,S$GLB, | Performed by: NURSE PRACTITIONER

## 2020-01-10 RX ORDER — LEVOCETIRIZINE DIHYDROCHLORIDE 5 MG/1
5 TABLET, FILM COATED ORAL NIGHTLY
Qty: 30 TABLET | Refills: 0 | Status: SHIPPED | OUTPATIENT
Start: 2020-01-10 | End: 2020-02-03

## 2020-01-10 RX ORDER — AMOXICILLIN AND CLAVULANATE POTASSIUM 875; 125 MG/1; MG/1
1 TABLET, FILM COATED ORAL 2 TIMES DAILY
Qty: 14 TABLET | Refills: 0 | Status: SHIPPED | OUTPATIENT
Start: 2020-01-10 | End: 2020-01-17

## 2020-01-10 RX ORDER — PROMETHAZINE HYDROCHLORIDE AND DEXTROMETHORPHAN HYDROBROMIDE 6.25; 15 MG/5ML; MG/5ML
5 SYRUP ORAL EVERY 12 HOURS PRN
Qty: 180 ML | Refills: 0 | Status: SHIPPED | OUTPATIENT
Start: 2020-01-10 | End: 2020-01-20

## 2020-01-11 NOTE — PROGRESS NOTES
Patient Name: Clinton Rosenberg    : 1950  MRN: [id number]    Subjective:  Clinton is a 69 y.o. male who presents today for     1.  Two weeks of cough, nasal congestion. Cough not improving.  Coughing up green mucus, patient is wheezing and complains of chest tightness.  Taking Robitussin DM over-the-counter without improvement    Past Medical History  Past Medical History:   Diagnosis Date    Allergy     Angina pectoris     Anxiety     Coronary artery disease     Depression     GERD (gastroesophageal reflux disease)     Hyperlipidemia     Hypertension     Hypothyroidism     Memory loss     12/3/2014 MRI brain: 1. No evidence for acute infarct 2. unremarkable MRI of the brain; 12/3/2014 carotid US normal    Myocardial infarction     Obesity     Peripheral vascular disease 2017    Retrocalcaneal bursitis 2014    Sleep apnea        Past Surgical History  Past Surgical History:   Procedure Laterality Date    APPENDECTOMY      bone spur Right     COLONOSCOPY      hand trauma Right     pencil     heart stent      LEFT HEART CATHETERIZATION Left 2019    Procedure: Left heart cath 12 pm start, R rad access;  Surgeon: Brad Bahena MD;  Location: [facility] CATH LAB;  Service: Cardiology;  Laterality: Left;  RN PREOP 2019  NEEDS IODINE PREMED       Family History  Family History   Problem Relation Age of Onset    Arthritis Mother     Early death Mother     Heart disease Mother     Hypertension Mother     Migraines Mother     Seizures Mother     Tremor Mother     Diabetes Mother     Cancer Mother     Early death Father     Heart disease Father     Hypertension Father     Stroke Father     Diabetes Father     Alcohol abuse Father     Arthritis Brother     Cancer Brother     Diabetes Brother     Early death Brother     Heart disease Brother     Hypertension Brother     Heart disease Sister     Hypertension Sister     Arthritis Sister     Depression Sister      Heart disease Brother     Arthritis Brother     Heart disease Brother     Pneumonia Brother     Heart disease Brother     Hypertension Brother     Diabetes Brother     Heart disease Brother        Social History  Social History     Socioeconomic History    Marital status:      Spouse name: Not on file    Number of children: Not on file    Years of education: Not on file    Highest education level: Not on file   Occupational History    Not on file   Social Needs    Financial resource strain: Not very hard    Food insecurity:     Worry: Never true     Inability: Never true    Transportation needs:     Medical: No     Non-medical: No   Tobacco Use    Smoking status: Never Smoker    Smokeless tobacco: Never Used   Substance and Sexual Activity    Alcohol use: No     Alcohol/week: 0.0 standard drinks     Frequency: Monthly or less     Drinks per session: 1 or 2     Binge frequency: Never     Comment: rarely    Drug use: No    Sexual activity: Yes   Lifestyle    Physical activity:     Days per week: 3 days     Minutes per session: 30 min    Stress: To some extent   Relationships    Social connections:     Talks on phone: More than three times a week     Gets together: More than three times a week     Attends Jew service: Not on file     Active member of club or organization: No     Attends meetings of clubs or organizations: Never     Relationship status:    Other Topics Concern    Not on file   Social History Narrative    Not on file       Allergies  Review of patient's allergies indicates:   Allergen Reactions    Iodine containing multivitamin Anaphylaxis    Naproxen Other (See Comments)     hallucinations  Hallucinations^  Hallucinations^    Hydrocodone-acetaminophen      Rash (skin)^    Iodine      Anaphylaxis^    Oxycodone-acetaminophen      Rash (skin)^    -reviewed and updated      Medications  Reviewed and updated.   Current Outpatient Medications   Medication  Sig Dispense Refill    aspirin (ECOTRIN) 81 MG EC tablet Take 1 tablet (81 mg total) by mouth once daily. 90 tablet 3    atorvastatin (LIPITOR) 80 MG tablet Take 1 tablet (80 mg total) by mouth every evening. 90 tablet 3    clopidogrel (PLAVIX) 75 mg tablet Take 1 tablet (75 mg total) by mouth once daily. 30 tablet 11    FLUZONE HIGH-DOSE 2019-20, PF, 180 mcg/0.5 mL Syrg TO BE ADMINISTERED BY PHARMACIST FOR IMMUNIZATION  0    furosemide (LASIX) 20 MG tablet Take 1 tablet (20 mg total) by mouth once daily. 90 tablet 3    isosorbide mononitrate (IMDUR) 60 MG 24 hr tablet Take 1 tablet (60 mg total) by mouth once daily. 90 tablet 3    KRILL/OM3/DHA/EPA/OM6/LIP/ASTX (KRILL OIL, OMEGA 3 AND 6, ORAL) Take by mouth.      levothyroxine (SYNTHROID) 50 MCG tablet Take 1 tablet (50 mcg total) by mouth before breakfast. 90 tablet 3    losartan (COZAAR) 50 MG tablet Take 1 tablet (50 mg total) by mouth once daily. 90 tablet 3    metoprolol tartrate (LOPRESSOR) 25 MG tablet Take 1 tablet (25 mg total) by mouth 2 (two) times daily. 180 tablet 3    nitroGLYCERIN (NITROSTAT) 0.4 MG SL tablet Place 1 tablet (0.4 mg total) under the tongue every 5 (five) minutes as needed for Chest pain. 90 tablet 3    pantoprazole (PROTONIX) 40 MG tablet TAKE 1 TABLET EVERY DAY 90 tablet 0    tamsulosin (FLOMAX) 0.4 mg Cap Take 2 capsules (0.8 mg total) by mouth once daily. 90 capsule 3    tamsulosin (FLOMAX) 0.4 mg Cap TAKE 2 CAPSULES ONE TIME DAILY 180 capsule 0    vitamin D 1000 units Tab Take 1 tablet (1,000 Units total) by mouth once daily. 90 tablet 3    amoxicillin-clavulanate 875-125mg (AUGMENTIN) 875-125 mg per tablet Take 1 tablet by mouth 2 (two) times daily. for 7 days 14 tablet 0    levocetirizine (XYZAL) 5 MG tablet Take 1 tablet (5 mg total) by mouth every evening. 30 tablet 0    promethazine-dextromethorphan (PROMETHAZINE-DM) 6.25-15 mg/5 mL Syrp Take 5 mLs by mouth every 12 (twelve) hours as needed. 180 mL 0     No  "current facility-administered medications for this visit.          Review of Systems   Constitutional: Positive for chills and malaise/fatigue. Negative for fever.   HENT: Positive for sore throat. Negative for congestion.    Respiratory: Positive for cough (2 weeks), sputum production (green) and wheezing. Negative for shortness of breath.         Tightness, robitussin DM, decongestant otc   Cardiovascular: Negative for chest pain, palpitations and leg swelling.   Neurological: Positive for dizziness (after coughing) and headaches. Negative for weakness.         Physical Exam  /64   Pulse 71   Temp 98.4 °F (36.9 °C) (Oral)   Ht 5' 10" (1.778 m)   Wt 107.3 kg (236 lb 10.6 oz)   SpO2 95%   BMI 33.96 kg/m²   Physical Exam   Constitutional: He is oriented to person, place, and time. He appears well-developed. No distress.   HENT:   Head: Normocephalic.   Right Ear: Tympanic membrane and ear canal normal.   Left Ear: Tympanic membrane and ear canal normal.   Mouth/Throat: Oropharynx is clear and moist.   Nasal congestion   Eyes: Conjunctivae and EOM are normal.   Neck: Neck supple.   Cardiovascular: Normal rate, regular rhythm and normal heart sounds.   Pulmonary/Chest: Effort normal and breath sounds normal.   Abdominal: Soft. There is no tenderness.   Musculoskeletal: He exhibits no edema.   Neurological: He is alert and oriented to person, place, and time.   Ambulatory, normal gait   Skin: He is not diaphoretic.   Psychiatric: He has a normal mood and affect. His behavior is normal. Judgment and thought content normal.         Assessment/Plan:  Clinton Rosenberg is a 69 y.o. male who presents today for :    Sinusitis, unspecified chronicity, unspecified location  Advise hydration, rest, tylenol  prn fever/pain, symptom management, start antibiotic, good handwashing  -     amoxicillin-clavulanate 875-125mg (AUGMENTIN) 875-125 mg per tablet; Take 1 tablet by mouth 2 (two) times daily. for 7 days  Dispense: " 14 tablet; Refill: 0  -     levocetirizine (XYZAL) 5 MG tablet; Take 1 tablet (5 mg total) by mouth every evening.  Dispense: 30 tablet; Refill: 0  -     promethazine-dextromethorphan (PROMETHAZINE-DM) 6.25-15 mg/5 mL Syrp; Take 5 mLs by mouth every 12 (twelve) hours as needed.  Dispense: 180 mL; Refill: 0        Follow up if symptoms worsen or fail to improve in 3-5 days .

## 2020-01-11 NOTE — PATIENT INSTRUCTIONS
Sinusitis (Antibiotic Treatment)    The sinuses are air-filled spaces within the bones of the face. They connect to the inside of the nose. Sinusitis is an inflammation of the tissue lining the sinus cavity. Sinus inflammation can occur during a cold. It can also be due to allergies to pollens and other particles in the air. Sinusitis can cause symptoms of sinus congestion and fullness. A sinus infection causes fever, headache and facial pain. There is often green or yellow drainage from the nose or into the back of the throat (post-nasal drip). You have been given antibiotics to treat this condition.  Home care:  · Take the full course of antibiotics as instructed. Do not stop taking them, even if you feel better.  · Drink plenty of water, hot tea, and other liquids. This may help thin mucus. It also may promote sinus drainage.  · Heat may help soothe painful areas of the face. Use a towel soaked in hot water. Or,  the shower and direct the hot spray onto your face. Using a vaporizer along with a menthol rub at night may also help.   · An expectorant containing guaifenesin may help thin the mucus and promote drainage from the sinuses.  · Over-the-counter decongestants may be used unless a similar medicine was prescribed. Nasal sprays work the fastest. Use one that contains phenylephrine or oxymetazoline. First blow the nose gently. Then use the spray. Do not use these medicines more often than directed on the label or symptoms may get worse. You may also use tablets containing pseudoephedrine. Avoid products that combine ingredients, because side effects may be increased. Read labels. You can also ask the pharmacist for help. (NOTE: Persons with high blood pressure should not use decongestants. They can raise blood pressure.)  · Over-the-counter antihistamines may help if allergies contributed to your sinusitis.    · Do not use nasal rinses or irrigation during an acute sinus infection, unless told to by  your health care provider. Rinsing may spread the infection to other sinuses.  · Use acetaminophen or ibuprofen to control pain, unless another pain medicine was prescribed. (If you have chronic liver or kidney disease or ever had a stomach ulcer, talk with your doctor before using these medicines. Aspirin should never be used in anyone under 18 years of age who is ill with a fever. It may cause severe liver damage.)  · Don't smoke. This can worsen symptoms.  Follow-up care  Follow up with your healthcare provider or our staff if you are not improving within the next week.  When to seek medical advice  Call your healthcare provider if any of these occur:  · Facial pain or headache becoming more severe  · Stiff neck  · Unusual drowsiness or confusion  · Swelling of the forehead or eyelids  · Vision problems, including blurred or double vision  · Fever of 100.4ºF (38ºC) or higher, or as directed by your healthcare provider  · Seizure  · Breathing problems  · Symptoms not resolving within 10 days  Date Last Reviewed: 4/13/2015  © 8937-1674 [employer] [address] [place] [address], [place] [address]. All rights reserved. This information is not intended as a substitute for professional medical care. Always follow your healthcare professional's instructions.

## 2020-01-14 ENCOUNTER — TELEPHONE (OUTPATIENT)
Dept: CARDIOLOGY | Facility: CLINIC | Age: 70
End: 2020-01-14

## 2020-01-14 NOTE — TELEPHONE ENCOUNTER
Spoke with pts wife in regards to faxing over Cardiac Rehab physicians form, faxed form on 1/14/2020 to [phone]

## 2020-02-01 DIAGNOSIS — J32.9 SINUSITIS, UNSPECIFIED CHRONICITY, UNSPECIFIED LOCATION: ICD-10-CM

## 2020-02-03 RX ORDER — LEVOCETIRIZINE DIHYDROCHLORIDE 5 MG/1
5 TABLET, FILM COATED ORAL NIGHTLY
Qty: 30 TABLET | Refills: 0 | Status: SHIPPED | OUTPATIENT
Start: 2020-02-03 | End: 2020-03-01

## 2020-02-12 ENCOUNTER — PES CALL (OUTPATIENT)
Dept: ADMINISTRATIVE | Facility: CLINIC | Age: 70
End: 2020-02-12

## 2020-02-29 DIAGNOSIS — J32.9 SINUSITIS, UNSPECIFIED CHRONICITY, UNSPECIFIED LOCATION: ICD-10-CM

## 2020-03-01 RX ORDER — LEVOCETIRIZINE DIHYDROCHLORIDE 5 MG/1
5 TABLET, FILM COATED ORAL NIGHTLY
Qty: 30 TABLET | Refills: 0 | Status: SHIPPED | OUTPATIENT
Start: 2020-03-01 | End: 2020-09-14 | Stop reason: CLARIF

## 2020-03-06 RX ORDER — TAMSULOSIN HYDROCHLORIDE 0.4 MG/1
CAPSULE ORAL
Qty: 180 CAPSULE | Refills: 3 | Status: ON HOLD | OUTPATIENT
Start: 2020-03-06 | End: 2020-09-21 | Stop reason: HOSPADM

## 2020-03-24 ENCOUNTER — TELEPHONE (OUTPATIENT)
Dept: FAMILY MEDICINE | Facility: CLINIC | Age: 70
End: 2020-03-24

## 2020-04-27 DIAGNOSIS — I10 ESSENTIAL HYPERTENSION: Chronic | ICD-10-CM

## 2020-04-27 DIAGNOSIS — I25.118 CORONARY ARTERY DISEASE OF NATIVE ARTERY OF NATIVE HEART WITH STABLE ANGINA PECTORIS: ICD-10-CM

## 2020-04-27 DIAGNOSIS — R60.0 PERIPHERAL EDEMA: Chronic | ICD-10-CM

## 2020-04-27 DIAGNOSIS — E03.9 HYPOTHYROIDISM, UNSPECIFIED TYPE: Chronic | ICD-10-CM

## 2020-04-27 DIAGNOSIS — E78.2 MIXED HYPERLIPIDEMIA: Chronic | ICD-10-CM

## 2020-04-27 RX ORDER — PANTOPRAZOLE SODIUM 40 MG/1
TABLET, DELAYED RELEASE ORAL
Qty: 90 TABLET | Refills: 0 | Status: SHIPPED | OUTPATIENT
Start: 2020-04-27 | End: 2020-07-29

## 2020-04-27 RX ORDER — ATORVASTATIN CALCIUM 80 MG/1
TABLET, FILM COATED ORAL
Qty: 90 TABLET | Refills: 3 | Status: SHIPPED | OUTPATIENT
Start: 2020-04-27 | End: 2021-05-07

## 2020-04-27 RX ORDER — METOPROLOL TARTRATE 25 MG/1
TABLET, FILM COATED ORAL
Qty: 180 TABLET | Refills: 3 | Status: SHIPPED | OUTPATIENT
Start: 2020-04-27 | End: 2021-05-07

## 2020-04-27 RX ORDER — LEVOTHYROXINE SODIUM 50 UG/1
TABLET ORAL
Qty: 90 TABLET | Refills: 3 | Status: SHIPPED | OUTPATIENT
Start: 2020-04-27 | End: 2021-05-07

## 2020-04-27 RX ORDER — LOSARTAN POTASSIUM 50 MG/1
TABLET ORAL
Qty: 90 TABLET | Refills: 3 | Status: SHIPPED | OUTPATIENT
Start: 2020-04-27 | End: 2021-05-07

## 2020-04-27 RX ORDER — ISOSORBIDE MONONITRATE 60 MG/1
TABLET, EXTENDED RELEASE ORAL
Qty: 90 TABLET | Refills: 3 | Status: SHIPPED | OUTPATIENT
Start: 2020-04-27 | End: 2021-04-01 | Stop reason: SDUPTHER

## 2020-04-27 RX ORDER — FUROSEMIDE 20 MG/1
TABLET ORAL
Qty: 90 TABLET | Refills: 3 | Status: SHIPPED | OUTPATIENT
Start: 2020-04-27 | End: 2021-05-07

## 2020-05-05 DIAGNOSIS — I25.118 CORONARY ARTERY DISEASE OF NATIVE ARTERY OF NATIVE HEART WITH STABLE ANGINA PECTORIS: ICD-10-CM

## 2020-05-06 RX ORDER — NITROGLYCERIN 0.4 MG/1
0.4 TABLET SUBLINGUAL EVERY 5 MIN PRN
Qty: 50 TABLET | Refills: 3 | Status: SHIPPED | OUTPATIENT
Start: 2020-05-06 | End: 2023-11-13 | Stop reason: SDUPTHER

## 2020-05-14 ENCOUNTER — PATIENT OUTREACH (OUTPATIENT)
Dept: ADMINISTRATIVE | Facility: HOSPITAL | Age: 70
End: 2020-05-14

## 2020-06-10 ENCOUNTER — PES CALL (OUTPATIENT)
Dept: ADMINISTRATIVE | Facility: CLINIC | Age: 70
End: 2020-06-10

## 2020-06-26 ENCOUNTER — OFFICE VISIT (OUTPATIENT)
Dept: FAMILY MEDICINE | Facility: CLINIC | Age: 70
End: 2020-06-26
Payer: MEDICARE

## 2020-06-26 VITALS
WEIGHT: 243.63 LBS | BODY MASS INDEX: 34.88 KG/M2 | HEIGHT: 70 IN | TEMPERATURE: 98 F | DIASTOLIC BLOOD PRESSURE: 70 MMHG | SYSTOLIC BLOOD PRESSURE: 132 MMHG | OXYGEN SATURATION: 96 % | HEART RATE: 60 BPM

## 2020-06-26 DIAGNOSIS — L98.9 SKIN LESION OF SCALP: ICD-10-CM

## 2020-06-26 DIAGNOSIS — I25.118 CORONARY ARTERY DISEASE OF NATIVE ARTERY OF NATIVE HEART WITH STABLE ANGINA PECTORIS: ICD-10-CM

## 2020-06-26 DIAGNOSIS — I25.10 CORONARY ARTERY DISEASE INVOLVING NATIVE CORONARY ARTERY OF NATIVE HEART WITHOUT ANGINA PECTORIS: ICD-10-CM

## 2020-06-26 DIAGNOSIS — I10 ESSENTIAL HYPERTENSION: Chronic | ICD-10-CM

## 2020-06-26 DIAGNOSIS — I77.819 AORTIC ECTASIA: ICD-10-CM

## 2020-06-26 DIAGNOSIS — Z00.00 ENCOUNTER FOR PREVENTIVE HEALTH EXAMINATION: Primary | ICD-10-CM

## 2020-06-26 DIAGNOSIS — I77.810 AORTIC ROOT DILATATION: ICD-10-CM

## 2020-06-26 DIAGNOSIS — I73.9 PERIPHERAL VASCULAR DISEASE: ICD-10-CM

## 2020-06-26 PROCEDURE — 99499 UNLISTED E&M SERVICE: CPT | Mod: HCNC,S$GLB,, | Performed by: NURSE PRACTITIONER

## 2020-06-26 PROCEDURE — 3075F SYST BP GE 130 - 139MM HG: CPT | Mod: HCNC,CPTII,S$GLB, | Performed by: NURSE PRACTITIONER

## 2020-06-26 PROCEDURE — G0439 PR MEDICARE ANNUAL WELLNESS SUBSEQUENT VISIT: ICD-10-PCS | Mod: HCNC,S$GLB,, | Performed by: NURSE PRACTITIONER

## 2020-06-26 PROCEDURE — 3078F DIAST BP <80 MM HG: CPT | Mod: HCNC,CPTII,S$GLB, | Performed by: NURSE PRACTITIONER

## 2020-06-26 PROCEDURE — 99499 RISK ADDL DX/OHS AUDIT: ICD-10-PCS | Mod: HCNC,S$GLB,, | Performed by: NURSE PRACTITIONER

## 2020-06-26 PROCEDURE — 3075F PR MOST RECENT SYSTOLIC BLOOD PRESS GE 130-139MM HG: ICD-10-PCS | Mod: HCNC,CPTII,S$GLB, | Performed by: NURSE PRACTITIONER

## 2020-06-26 PROCEDURE — 99999 PR PBB SHADOW E&M-EST. PATIENT-LVL V: CPT | Mod: PBBFAC,HCNC,, | Performed by: NURSE PRACTITIONER

## 2020-06-26 PROCEDURE — 99999 PR PBB SHADOW E&M-EST. PATIENT-LVL V: ICD-10-PCS | Mod: PBBFAC,HCNC,, | Performed by: NURSE PRACTITIONER

## 2020-06-26 PROCEDURE — 3078F PR MOST RECENT DIASTOLIC BLOOD PRESSURE < 80 MM HG: ICD-10-PCS | Mod: HCNC,CPTII,S$GLB, | Performed by: NURSE PRACTITIONER

## 2020-06-26 PROCEDURE — G0439 PPPS, SUBSEQ VISIT: HCPCS | Mod: HCNC,S$GLB,, | Performed by: NURSE PRACTITIONER

## 2020-06-26 NOTE — PROGRESS NOTES
"Clinton Rosenberg presented for a  Medicare AWV and comprehensive Health Risk Assessment today. The following components were reviewed and updated:    · Medical history  · Family History  · Social history  · Allergies and Current Medications  · Health Risk Assessment  · Health Maintenance  · Care Team     ** See Completed Assessments for Annual Wellness Visit within the encounter summary.**       The following assessments were completed:  · Living Situation  · CAGE  · Depression Screening  · Timed Get Up and Go  · Whisper Test  · Cognitive Function Screening  ·   ·   · Nutrition Screening  · ADL Screening  · PAQ Screening    Vitals:    06/26/20 0839   BP: 132/70   BP Location: Left arm   Patient Position: Sitting   BP Method: Large (Manual)   Pulse: 60   Temp: 97.5 °F (36.4 °C)   TempSrc: Oral   SpO2: 96%   Weight: 110.5 kg (243 lb 9.7 oz)   Height: 5' 10" (1.778 m)     Body mass index is 34.95 kg/m².  Physical Exam  Vitals signs reviewed.   Cardiovascular:      Rate and Rhythm: Normal rate and regular rhythm.      Heart sounds: Normal heart sounds.   Pulmonary:      Effort: Pulmonary effort is normal. No respiratory distress.      Breath sounds: Normal breath sounds.   Skin:     General: Skin is warm.      Capillary Refill: Capillary refill takes less than 2 seconds.          Neurological:      Mental Status: He is alert.   Psychiatric:         Mood and Affect: Mood normal.         Thought Content: Thought content normal.         Judgment: Judgment normal.           Diagnoses and health risks identified today and associated recommendations/orders:    1. Encounter for preventive health examination  Education provided about preventive health examinations and procedures; addressed and discussed patient's health concerns. Additionally, reviewed medical record for risk factors and documented the results during this encounter.    2. Peripheral vascular disease with LE US 6/2017 and carotid US   Stable, patient " evaluated/monitored by [facility]'s cardiology dept; continue as advised.     3. Aortic ectasia  Stable and monitored. Continue current treatment plan as previously prescribed.     4. Aortic root dilatation on TTE 8/2019  Stable, patient evaluated/monitored by [facility] cardiology dept; continue as advised.     5. Coronary artery disease involving native coronary artery of native heart s/p stent LAD 2012; 2013 [facility] with CAD and patent stent; 9/2019 [facility] patent stent; new stent to RPLB  Stable, patient evaluated/monitored by [facility] cardiology dept; continue as advised.     6. Coronary artery disease of native artery of native heart with stable angina pectoris  Stable, patient evaluated/monitored by [facility] cardiology dept; continue as advised.     7. Essential hypertension  Presently at goal. Continue as advised regarding dietary and lifestyle modifications.     Reviewed health maintenance, educated about recommended examinations, procedures (labs & images), and immunizations.     Provided Clinton with a 5-10 year written screening schedule and personal prevention plan. Recommendations were developed using the USPSTF age appropriate recommendations. Education, counseling, and referrals were provided as needed. After Visit Summary printed and given to patient which includes a list of additional screenings\tests needed.    Follow up in about 1 year (around 6/26/2021) for assessment .    Trevor Arango Jr, NP  I offered to discuss end of life issues, including information on how to make advance directives that the patient could use to name someone who would make medical decisions on their behalf if they became too ill to make themselves.    ___Patient declined  _X_Patient is interested, I provided paper work and offered to discuss.

## 2020-06-26 NOTE — PATIENT INSTRUCTIONS
Counseling and Referral of Other Preventative  (Italic type indicates deductible and co-insurance are waived)    Patient Name: Clinton Rosenberg  Today's Date: 6/26/2020    Health Maintenance       Date Due Completion Date    Shingles Vaccine (1 of 2) 09/30/2002 Patient aware of recommendation for shingles vaccine.     Colorectal Cancer Screening 12/04/2020 12/4/2019    High Dose Statin 04/27/2021 4/27/2020    Pneumococcal Vaccine (65+ Low/Medium Risk) (2 of 2 - PPSV23) 03/16/2022 10/16/2019    Lipid Panel 10/17/2024 10/17/2019    TETANUS VACCINE 06/01/2026 6/1/2016        No orders of the defined types were placed in this encounter.    The following information is provided to all patients.  This information is to help you find resources for any of the problems found today that may be affecting your health:                Living healthy guide: www.[facility].[place].gov      Understanding Diabetes: www.diabetes.org      Eating healthy: www.cdc.gov/healthyweight      [facility] home safety checklist: www.cdc.gov/steadi/patient.html      Agency on Aging: www.goea.[place].[facility]      Alcoholics anonymous (AA): www.aa.org      Physical Activity: www.[place].nih.gov/ek4kktp      Tobacco use: www.quitwithusla.org

## 2020-07-30 ENCOUNTER — HOSPITAL ENCOUNTER (OUTPATIENT)
Dept: CARDIOLOGY | Facility: HOSPITAL | Age: 70
Discharge: HOME | End: 2020-07-30
Attending: INTERNAL MEDICINE
Payer: MEDICARE

## 2020-07-30 VITALS
BODY MASS INDEX: 34.79 KG/M2 | HEIGHT: 70 IN | DIASTOLIC BLOOD PRESSURE: 70 MMHG | HEART RATE: 66 BPM | SYSTOLIC BLOOD PRESSURE: 132 MMHG | WEIGHT: 243 LBS

## 2020-07-30 DIAGNOSIS — I77.810 AORTIC ROOT DILATATION: ICD-10-CM

## 2020-07-30 DIAGNOSIS — I25.10 CORONARY ARTERY DISEASE INVOLVING NATIVE CORONARY ARTERY OF NATIVE HEART WITHOUT ANGINA PECTORIS: ICD-10-CM

## 2020-07-30 LAB
AORTIC ROOT ANNULUS: 4.2 CM
AORTIC VALVE CUSP SEPERATION: 2.29 CM
ASCENDING AORTA: 3.76 CM
AV INDEX (PROSTH): 0.83
AV MEAN GRADIENT: 3 MMHG
AV PEAK GRADIENT: 5 MMHG
AV VALVE AREA: 4.8 CM2
AV VELOCITY RATIO: 0.87
BSA FOR ECHO PROCEDURE: 2.33 M2
CV ECHO LV RWT: 0.85 CM
DOP CALC AO PEAK VEL: 1.08 M/S
DOP CALC AO VTI: 26.85 CM
DOP CALC LVOT AREA: 5.8 CM2
DOP CALC LVOT DIAMETER: 2.71 CM
DOP CALC LVOT PEAK VEL: 0.94 M/S
DOP CALC LVOT STROKE VOLUME: 128.85 CM3
DOP CALCLVOT PEAK VEL VTI: 22.35 CM
E WAVE DECELERATION TIME: 242.28 MSEC
E/A RATIO: 1.26
ECHO LV POSTERIOR WALL: 1.31 CM (ref 0.6–1.1)
FRACTIONAL SHORTENING: 32 % (ref 28–44)
INTERVENTRICULAR SEPTUM: 1.43 CM (ref 0.6–1.1)
IVRT: 55.36 MSEC
LA MAJOR: 5.39 CM
LA MINOR: 3.78 CM
LA WIDTH: 3.73 CM
LEFT ATRIUM SIZE: 3.9 CM
LEFT ATRIUM VOLUME INDEX: 24.2 ML/M2
LEFT ATRIUM VOLUME: 54.95 CM3
LEFT INTERNAL DIMENSION IN SYSTOLE: 2.09 CM (ref 2.1–4)
LEFT VENTRICLE DIASTOLIC VOLUME INDEX: 16.64 ML/M2
LEFT VENTRICLE DIASTOLIC VOLUME: 37.72 ML
LEFT VENTRICLE MASS INDEX: 62 G/M2
LEFT VENTRICLE SYSTOLIC VOLUME INDEX: 6.3 ML/M2
LEFT VENTRICLE SYSTOLIC VOLUME: 14.27 ML
LEFT VENTRICULAR INTERNAL DIMENSION IN DIASTOLE: 3.09 CM (ref 3.5–6)
LEFT VENTRICULAR MASS: 140.92 G
MV PEAK A VEL: 0.84 M/S
MV PEAK E VEL: 1.06 M/S
PISA TR MAX VEL: 2.09 M/S
PV PEAK VELOCITY: 1.53 CM/S
RA MAJOR: 3.69 CM
RA PRESSURE: 3 MMHG
RA WIDTH: 3.12 CM
RIGHT VENTRICULAR END-DIASTOLIC DIMENSION: 3.44 CM
RV TISSUE DOPPLER FREE WALL SYSTOLIC VELOCITY 1 (APICAL 4 CHAMBER VIEW): 8.62 CM/S
SINUS: 3.84 CM
STJ: 3.37 CM
TR MAX PG: 17 MMHG
TRICUSPID ANNULAR PLANE SYSTOLIC EXCURSION: 2.91 CM
TV REST PULMONARY ARTERY PRESSURE: 20 MMHG

## 2020-07-30 PROCEDURE — 93306 TTE W/DOPPLER COMPLETE: CPT | Mod: 26,HCNC,, | Performed by: INTERNAL MEDICINE

## 2020-07-30 PROCEDURE — 93306 TTE W/DOPPLER COMPLETE: CPT | Mod: HCNC

## 2020-07-30 PROCEDURE — 93306 ECHO (CUPID ONLY): ICD-10-PCS | Mod: 26,HCNC,, | Performed by: INTERNAL MEDICINE

## 2020-08-27 ENCOUNTER — OFFICE VISIT (OUTPATIENT)
Dept: CARDIOLOGY | Facility: CLINIC | Age: 70
End: 2020-08-27
Payer: MEDICARE

## 2020-08-27 VITALS
WEIGHT: 242.94 LBS | RESPIRATION RATE: 15 BRPM | SYSTOLIC BLOOD PRESSURE: 117 MMHG | HEART RATE: 57 BPM | DIASTOLIC BLOOD PRESSURE: 69 MMHG | HEIGHT: 70 IN | BODY MASS INDEX: 34.78 KG/M2 | OXYGEN SATURATION: 96 %

## 2020-08-27 DIAGNOSIS — I87.2 VENOUS INSUFFICIENCY: ICD-10-CM

## 2020-08-27 DIAGNOSIS — E66.9 OBESITY (BMI 30.0-34.9): Chronic | ICD-10-CM

## 2020-08-27 DIAGNOSIS — I10 ESSENTIAL HYPERTENSION: Chronic | ICD-10-CM

## 2020-08-27 DIAGNOSIS — I25.118 CORONARY ARTERY DISEASE OF NATIVE ARTERY OF NATIVE HEART WITH STABLE ANGINA PECTORIS: Primary | ICD-10-CM

## 2020-08-27 DIAGNOSIS — G47.33 OSA (OBSTRUCTIVE SLEEP APNEA): Chronic | ICD-10-CM

## 2020-08-27 DIAGNOSIS — I77.810 AORTIC ROOT DILATATION: ICD-10-CM

## 2020-08-27 DIAGNOSIS — E78.2 MIXED HYPERLIPIDEMIA: Chronic | ICD-10-CM

## 2020-08-27 DIAGNOSIS — I77.819 AORTIC ECTASIA: ICD-10-CM

## 2020-08-27 DIAGNOSIS — Z91.041 CONTRAST MEDIA ALLERGY: ICD-10-CM

## 2020-08-27 PROCEDURE — 1101F PR PT FALLS ASSESS DOC 0-1 FALLS W/OUT INJ PAST YR: ICD-10-PCS | Mod: HCNC,CPTII,S$GLB, | Performed by: INTERNAL MEDICINE

## 2020-08-27 PROCEDURE — 99215 PR OFFICE/OUTPT VISIT, EST, LEVL V, 40-54 MIN: ICD-10-PCS | Mod: HCNC,S$GLB,, | Performed by: INTERNAL MEDICINE

## 2020-08-27 PROCEDURE — 1126F PR PAIN SEVERITY QUANTIFIED, NO PAIN PRESENT: ICD-10-PCS | Mod: HCNC,S$GLB,, | Performed by: INTERNAL MEDICINE

## 2020-08-27 PROCEDURE — 1101F PT FALLS ASSESS-DOCD LE1/YR: CPT | Mod: HCNC,CPTII,S$GLB, | Performed by: INTERNAL MEDICINE

## 2020-08-27 PROCEDURE — 3074F PR MOST RECENT SYSTOLIC BLOOD PRESSURE < 130 MM HG: ICD-10-PCS | Mod: HCNC,CPTII,S$GLB, | Performed by: INTERNAL MEDICINE

## 2020-08-27 PROCEDURE — 93000 ELECTROCARDIOGRAM COMPLETE: CPT | Mod: HCNC,S$GLB,, | Performed by: INTERNAL MEDICINE

## 2020-08-27 PROCEDURE — 3078F PR MOST RECENT DIASTOLIC BLOOD PRESSURE < 80 MM HG: ICD-10-PCS | Mod: HCNC,CPTII,S$GLB, | Performed by: INTERNAL MEDICINE

## 2020-08-27 PROCEDURE — 93000 EKG 12-LEAD: ICD-10-PCS | Mod: HCNC,S$GLB,, | Performed by: INTERNAL MEDICINE

## 2020-08-27 PROCEDURE — 99999 PR PBB SHADOW E&M-EST. PATIENT-LVL V: ICD-10-PCS | Mod: PBBFAC,HCNC,, | Performed by: INTERNAL MEDICINE

## 2020-08-27 PROCEDURE — 1126F AMNT PAIN NOTED NONE PRSNT: CPT | Mod: HCNC,S$GLB,, | Performed by: INTERNAL MEDICINE

## 2020-08-27 PROCEDURE — 1159F PR MEDICATION LIST DOCUMENTED IN MEDICAL RECORD: ICD-10-PCS | Mod: HCNC,S$GLB,, | Performed by: INTERNAL MEDICINE

## 2020-08-27 PROCEDURE — 99999 PR PBB SHADOW E&M-EST. PATIENT-LVL V: CPT | Mod: PBBFAC,HCNC,, | Performed by: INTERNAL MEDICINE

## 2020-08-27 PROCEDURE — 99215 OFFICE O/P EST HI 40 MIN: CPT | Mod: HCNC,S$GLB,, | Performed by: INTERNAL MEDICINE

## 2020-08-27 PROCEDURE — 1159F MED LIST DOCD IN RCRD: CPT | Mod: HCNC,S$GLB,, | Performed by: INTERNAL MEDICINE

## 2020-08-27 PROCEDURE — 3078F DIAST BP <80 MM HG: CPT | Mod: HCNC,CPTII,S$GLB, | Performed by: INTERNAL MEDICINE

## 2020-08-27 PROCEDURE — 3008F BODY MASS INDEX DOCD: CPT | Mod: HCNC,CPTII,S$GLB, | Performed by: INTERNAL MEDICINE

## 2020-08-27 PROCEDURE — 3008F PR BODY MASS INDEX (BMI) DOCUMENTED: ICD-10-PCS | Mod: HCNC,CPTII,S$GLB, | Performed by: INTERNAL MEDICINE

## 2020-08-27 PROCEDURE — 99499 RISK ADDL DX/OHS AUDIT: ICD-10-PCS | Mod: HCNC,S$GLB,, | Performed by: INTERNAL MEDICINE

## 2020-08-27 PROCEDURE — 3074F SYST BP LT 130 MM HG: CPT | Mod: HCNC,CPTII,S$GLB, | Performed by: INTERNAL MEDICINE

## 2020-08-27 PROCEDURE — 99499 UNLISTED E&M SERVICE: CPT | Mod: HCNC,S$GLB,, | Performed by: INTERNAL MEDICINE

## 2020-08-27 RX ORDER — SODIUM CHLORIDE 9 MG/ML
3 INJECTION, SOLUTION INTRAVENOUS CONTINUOUS
Status: CANCELLED | OUTPATIENT
Start: 2020-09-21 | End: 2020-09-21

## 2020-08-27 RX ORDER — PREDNISONE 50 MG/1
50 TABLET ORAL 2 TIMES DAILY
Qty: 3 TABLET | Refills: 0 | Status: ON HOLD | OUTPATIENT
Start: 2020-09-20 | End: 2020-09-21 | Stop reason: HOSPADM

## 2020-08-27 NOTE — H&P (VIEW-ONLY)
CARDIOVASCULAR PROGRESS NOTE    REASON FOR CONSULT:   Clinton Rosenberg is a 69 y.o. male who presents for follow up of CAD, ao root dil.    PCP: Dair  HISTORY OF PRESENT ILLNESS:   The patient returns for follow-up, accompanied by his wife.  He is describing exertional chest discomfort and shortness of breath of a class 2-3 nature which is similar to his prior angina.  He has had no symptoms at rest.  He denies any palpitations or syncope.  There has been no PND, orthopnea, melena, hematuria, or claudicant symptoms.  He does continue to take his dual antiplatelet therapy and I have instructed him to persist in this.    We discussed the pros and cons of invasive versus noninvasive evaluation.  I offered the patient both, and given his known CAD status post PCI, we have both decided to move forward with catheterization.  The patient does have an IV contrast allergy and he will be premedicated with Benadryl and prednisone.    CARDIOVASCULAR HISTORY:   CAD/MI  2013: LAD PCI    9/20/19: mid RPLB 2.5x18 Resolute Yon SILVER    JAYLEN, unable to afford CPAP  Aortic root dil, 3.7->4.0->3.8->4.2  cm (echo 7/2020), 3.8cm (asc Ao) on CTA 9/2019  CVI (bilat GSV, R LSV, US 3/2018)    PAST MEDICAL HISTORY:     Past Medical History:   Diagnosis Date    Allergy     Angina pectoris     Anxiety     Coronary artery disease     Depression     GERD (gastroesophageal reflux disease)     Hyperlipidemia     Hypertension     Hypothyroidism     Memory loss     12/3/2014 MRI brain: 1. No evidence for acute infarct 2. unremarkable MRI of the brain; 12/3/2014 carotid US normal    Myocardial infarction     Obesity     Peripheral vascular disease 6/20/2017    Retrocalcaneal bursitis 11/24/2014    Sleep apnea        PAST SURGICAL HISTORY:     Past Surgical History:   Procedure Laterality Date    APPENDECTOMY  1986    bone spur Right     COLONOSCOPY      hand trauma Right     pencil     heart stent      LEFT HEART CATHETERIZATION  Left 9/20/2019    Procedure: Left heart cath 12 pm start, R rad access;  Surgeon: Brad Bahena MD;  Location: [facility] CATH LAB;  Service: Cardiology;  Laterality: Left;  RN PREOP 9/13/2019  NEEDS IODINE PREMED       ALLERGIES AND MEDICATION:     Review of patient's allergies indicates:   Allergen Reactions    Iodine containing multivitamin Anaphylaxis    Naproxen Other (See Comments)     hallucinations    Hydrocodone-acetaminophen      Rash (skin)^    Oxycodone-acetaminophen      Rash (skin)^        Medication List          Accurate as of August 27, 2020  9:32 AM. If you have any questions, ask your nurse or doctor.            CONTINUE taking these medications    aspirin 81 MG EC tablet  Commonly known as: ECOTRIN  Take 1 tablet (81 mg total) by mouth once daily.     atorvastatin 80 MG tablet  Commonly known as: LIPITOR  TAKE 1 TABLET EVERY EVENING     clopidogreL 75 mg tablet  Commonly known as: PLAVIX  TAKE 1 TABLET EVERY DAY     FLUZONE HIGH-DOSE 2019-20 (PF) 180 mcg/0.5 mL Syrg  Generic drug: flu vacc [id number](65yr up)PF     furosemide 20 MG tablet  Commonly known as: LASIX  TAKE 1 TABLET EVERY DAY     isosorbide mononitrate 60 MG 24 hr tablet  Commonly known as: IMDUR  TAKE 1 TABLET EVERY DAY     KRILL OIL (OMEGA 3 AND 6) ORAL     levocetirizine 5 MG tablet  Commonly known as: XYZAL  TAKE 1 TABLET (5 MG TOTAL) BY MOUTH EVERY EVENING.     levothyroxine 50 MCG tablet  Commonly known as: SYNTHROID  TAKE 1 TABLET BEFORE BREAKFAST     losartan 50 MG tablet  Commonly known as: COZAAR  TAKE 1 TABLET EVERY DAY     metoprolol tartrate 25 MG tablet  Commonly known as: LOPRESSOR  TAKE 1 TABLET TWICE DAILY     nitroGLYCERIN 0.4 MG SL tablet  Commonly known as: NITROSTAT  Place 1 tablet (0.4 mg total) under the tongue every 5 (five) minutes as needed for Chest pain.     pantoprazole 40 MG tablet  Commonly known as: PROTONIX  TAKE 1 TABLET EVERY DAY     * tamsulosin 0.4 mg Cap  Commonly known as: FLOMAX  Take 2  capsules (0.8 mg total) by mouth once daily.     * tamsulosin 0.4 mg Cap  Commonly known as: FLOMAX  TAKE 2 CAPSULES ONE TIME DAILY     * tamsulosin 0.4 mg Cap  Commonly known as: FLOMAX  TAKE 2 CAPSULES ONE TIME DAILY     vitamin D 1000 units Tab  Commonly known as: VITAMIN D3  Take 1 tablet (1,000 Units total) by mouth once daily.         * This list has 3 medication(s) that are the same as other medications prescribed for you. Read the directions carefully, and ask your doctor or other care provider to review them with you.                  SOCIAL HISTORY:     Social History     Socioeconomic History    Marital status:      Spouse name: Not on file    Number of children: Not on file    Years of education: Not on file    Highest education level: Not on file   Occupational History    Not on file   Social Needs    Financial resource strain: Not very hard    Food insecurity     Worry: Never true     Inability: Never true    Transportation needs     Medical: No     Non-medical: No   Tobacco Use    Smoking status: Never Smoker    Smokeless tobacco: Never Used   Substance and Sexual Activity    Alcohol use: No     Alcohol/week: 0.0 standard drinks     Frequency: Monthly or less     Drinks per session: 1 or 2     Binge frequency: Never     Comment: rarely    Drug use: No    Sexual activity: Yes   Lifestyle    Physical activity     Days per week: 3 days     Minutes per session: 30 min    Stress: To some extent   Relationships    Social connections     Talks on phone: More than three times a week     Gets together: More than three times a week     Attends Yazdanism service: Not on file     Active member of club or organization: No     Attends meetings of clubs or organizations: Never     Relationship status:    Other Topics Concern    Not on file   Social History Narrative    Not on file       FAMILY HISTORY:     Family History   Problem Relation Age of Onset    Arthritis Mother     Early  "death Mother     Heart disease Mother     Hypertension Mother     Migraines Mother     Seizures Mother     Tremor Mother     Diabetes Mother     Cancer Mother     Early death Father     Heart disease Father     Hypertension Father     Stroke Father     Diabetes Father     Alcohol abuse Father     Arthritis Brother     Cancer Brother     Diabetes Brother     Early death Brother     Heart disease Brother     Hypertension Brother     Heart disease Sister     Hypertension Sister     Arthritis Sister     Depression Sister     Heart disease Brother     Arthritis Brother     Heart disease Brother     Pneumonia Brother     Heart disease Brother     Hypertension Brother     Diabetes Brother     Heart disease Brother        REVIEW OF SYSTEMS:   Review of Systems   Constitutional: Negative for chills, diaphoresis and fever.   HENT: Negative for nosebleeds.    Eyes: Negative for blurred vision, double vision and photophobia.   Respiratory: Positive for shortness of breath. Negative for hemoptysis and wheezing.    Cardiovascular: Positive for chest pain. Negative for palpitations, orthopnea, claudication, leg swelling and PND.   Gastrointestinal: Negative for abdominal pain, blood in stool, heartburn, melena, nausea and vomiting.   Genitourinary: Negative for flank pain and hematuria.   Musculoskeletal: Negative for falls, myalgias and neck pain.   Skin: Negative for rash.   Neurological: Negative for dizziness, seizures, loss of consciousness, weakness and headaches.   Endo/Heme/Allergies: Negative for polydipsia. Does not bruise/bleed easily.   Psychiatric/Behavioral: Negative for depression and memory loss. The patient is not nervous/anxious.        PHYSICAL EXAM:     Vitals:    08/27/20 0912   BP: 117/69   Pulse: (!) 57   Resp: 15    Body mass index is 34.86 kg/m².  Weight: 110.2 kg (242 lb 15.2 oz)   Height: 5' 10" (177.8 cm)     Physical Exam  Vitals signs reviewed.   Constitutional:       " General: He is not in acute distress.     Appearance: He is well-developed. He is obese. He is not ill-appearing, toxic-appearing or diaphoretic.   HENT:      Head: Normocephalic and atraumatic.   Eyes:      General: No scleral icterus.     Conjunctiva/sclera: Conjunctivae normal.      Pupils: Pupils are equal, round, and reactive to light.   Neck:      Musculoskeletal: Normal range of motion and neck supple. No edema or neck rigidity.      Thyroid: No thyromegaly.      Vascular: Normal carotid pulses. No carotid bruit or JVD.      Trachea: Trachea normal. No tracheal deviation.   Cardiovascular:      Rate and Rhythm: Regular rhythm. Bradycardia present.      Pulses:           Carotid pulses are 2+ on the right side and 2+ on the left side.       Radial pulses are 2+ on the right side.      Heart sounds: S1 normal and S2 normal. No murmur. No friction rub. No gallop.    Pulmonary:      Effort: Pulmonary effort is normal. No respiratory distress.      Breath sounds: Normal breath sounds. No stridor. No wheezing or rales.   Chest:      Chest wall: No tenderness.   Abdominal:      General: There is no distension.      Palpations: Abdomen is soft.   Musculoskeletal: Normal range of motion.         General: No swelling or tenderness.   Feet:      Right foot:      Skin integrity: No ulcer.      Left foot:      Skin integrity: No ulcer.   Skin:     General: Skin is warm and dry.      Findings: No erythema or rash.   Neurological:      Mental Status: He is alert and oriented to person, place, and time.      Cranial Nerves: No cranial nerve deficit.   Psychiatric:         Speech: Speech normal.         Behavior: Behavior normal. Behavior is cooperative.         DATA:   EKG: (personally reviewed tracing(s))  8/27/20 SR 57, PRWP/LAD, similar to 12/18/19    Laboratory:  CBC:  Recent Labs   Lab 05/20/18  0151 09/13/19  1003 09/21/19  0419   WBC 8.47 7.10 22.07 H   Hemoglobin 15.5 14.9 14.0   Hematocrit 43.2 42.7 40.9   Platelets  164 174 200       CHEMISTRIES:  Recent Labs   Lab 05/20/18  0151  09/13/19  1003 09/21/19  0419 10/17/19  0732   Glucose 130 H   < > 232 H 188 H 160 H   Sodium 143   < > 140 144 141   Potassium 4.7   < > 4.6 4.2 4.3   BUN, Bld 12   < > 11 15 12   Creatinine 1.3   < > 1.2 1.1 1.2   eGFR if African American >60   < > >60 >60 >60.0   eGFR if non  57 A   < > >60 >60 >60.0   Calcium 9.4   < > 9.4 8.8 9.5   Magnesium 2.4  --   --   --   --     < > = values in this interval not displayed.       CARDIAC BIOMARKERS:  Recent Labs   Lab 05/20/18  0151 09/20/19  1425 09/21/19  0419   Troponin I <0.006 <0.006 0.006       COAGS:  Recent Labs   Lab 09/13/19  1003   INR 1.0       LIPIDS/LFTS:  Recent Labs   Lab 05/19/18  1941 05/20/18  0151 10/12/18  0947 09/21/19  0419 10/17/19  0732   Cholesterol 117 L  --  132  --  117 L   Triglycerides 368 H  --  268 H  --  225 H   HDL 29 L  --  36 L  --  34 L   LDL Cholesterol 14.4 L  --  42.4 L  --  38.0 L   Non-HDL Cholesterol 88  --  96  --  83   AST  --  19 28 14  --    ALT  --  25 21 18  --        Cardiovascular Testing:  Echo 7/30/20 (Ao root 4.2cm at annulus, 3.8cm at sinuses)  · Normal left ventricular systolic function. The estimated ejection fraction is 65%.  · Mild concentric left ventricular hypertrophy.  · Grade I (mild) left ventricular diastolic dysfunction consistent with impaired relaxation.  · Normal right ventricular systolic function.  · Mild left atrial enlargement.  · Normal central venous pressure (3 mmHg).  · The estimated PA systolic pressure is 20 mmHg.    CTA Chest 9/20/19 (post-cath)  The main pulmonary artery is enlarged suggesting underlying pulmonary artery hypertension.  Single-vessel coronary disease.  Normal thoracic aorta and visualized abdominal aorta.  Aortic measurements: STJ 3.4cm, Asc 3.8cm, Desc: 2.6.    Cath 9/20/19  LVEDP: 8mmHg  LVEF: 65% by echo  Wall Motion: normal by echo  Dominance: Right  LM: normal  LAD: mid 50% followed by  patent stent, distal/transapical 80% (small caliber)  Ramus: prox 30%  LCx: prox 50%  RCA: prox 30%, dist 40%              RPDA ost 50%              RPLB mid 90%  PCI RPLB:  Preop ASA/Plavix, intraop heparin  Predil 2.5x12  Stent 2.5x18 Resolute Yon SILVER 16 abi  Excellent result, T3 flow, 0% residual stenosis  Hemostasis:  R Radial band  Impression:  Progressive angina on 2 meds (metoprolol/imdur), unable to titrate further  2V CAD, normal LV fxn  Patent mid LAD stent, distal LAD stenosis in small caliber vessel  Successful PCI mid RPLB 2.5x18 Resolute [employer] SILVER  Pt with post-PCI CP, no EKG changes (significant ST deviation with balloon inflations noted)  R rad vasband for hemostasis  Plan:  Observe in ICU  NTG gtt  ASA 81mg qd indefinitely  Plavix 75mg qd for 6 months (thru 3/2020)  BBl/Imdur/Statin  Home in am assuming control of sxs off NTG gtt  Will check CTA to exclude aortic pathology given hx of aortic root dil    L MPI 8/8/19 (similar diaphramatic artifact noted on MPI 6/2017)    Probably normal concepcion myocardial perfusion study.    The perfusion scan is free of evidence from myocardial ischemia or injury.    Post stress wall motion is physiologic.    There is a  mild intensity fixed defect in the inferior wall of the left ventricle secondary to diaphragm attenuation.    Gated perfusion images showed an ejection fraction of  66 % post stress.    There is  normal wall motion post stress.    Venous US/reflux 3/15/18  RIGHT:  No evidence of Right lower extremity DVT.    There is reflux in the Greater Saphenous and Lesser Saphenous Veins.    R GSV dist thigh 2186 msec  R GSV in calf 2339 msec  R LSV 4006 msec   LEFT:  No evidence of Left lower extremity DVT.    There is reflux in the Greater Saphenous Vein.  R GSV mid thigh 2422 msec  R GSV dist thigh 1667 msec  R GSV calf 5647 msec      Holter 6/8/17  PREDOMINANT RHYTHM  1. Sinus rhythm with heart rates varying between 43 and 94 bpm with an average of 62  bpm.   VENTRICULAR ARRHYTHMIAS  1. There were very rare PVCs recorded totalling 5 and averaging less than 1 per hour.   2. There were no episodes of ventricular tachycardia.  SUPRA VENTRICULAR ARRHYTHMIAS  1. There were very rare PACs recorded totalling 3 and averaging less than 1 per hour.   2. There were no episodes of sustained supraventricular tachycardia.  SINUS NODE FUNCTION  1. There was no evidence of high grade SA deanne block.   AV CONDUCTION  1. There was no evidence of high grade AV block.   DIARY  1. The diary was not returned  MISCELLANEOUS  1. There were rare hookup related artifacts. Overall, the study was of good quality.   2. This was a tape of adequate length (24 hrs).    LE art US/TANA 6/8/17  1.  Mild/atherosclerotic plaquing.  2.  No stenosis about 30 percent femoral popliteal arteries.  3.  Normal bilateral ABIs.    Carotid US 12/3/14  1. Less than 50% stenosis of the right internal carotid artery.  2. Less than 50% stenosis of the left internal carotid artery.      ASSESSMENT:   # CAD s/p LAD PCI 2013, RPLB PCI 9/2019.  Now with recurrent class II-III CP on mult meds without room to increased med rx.  We discussed MPI vs cath and pt prefers to proceed with invasive eval.  # HTN, controlled.  # HLP, on atorva 80mg  # Ao root dil, 3.7->4.0->3.8->4.2 cm (echo 7/2020), 3.8cm (asc Ao CTA 9/2019)  # CVI (bilat GSV, R LSV) on US 3/2018.  No edema at present (but complains of intermittent swelling), prev declined referral for venous ablation.  # hx JAYLEN, pt unable to obtain CPAP apparatus  # BMI 35, stable vs last OV  # Iodine allergy (?Betadine), successfully premedicated for cath 9/2019    PLAN:   Cont med rx  Cont ASA 81mg qd indefinitely  Cont Plavix 75mg qd pending cath  Diet/exercise/weight loss  Cath 9/21/20 730am, R rad access.  Contrast premeds/instructions given to pt  RTC post cath  Surveillance echo 12 months (July 2021) for surveillance of aortic root dil    Risks, benefits and alternatives  of the catheterization procedure were discussed with the patient and his wife in attendance, which include but are not limited to: bleeding, infection, death, heart attack, arrhythmia, kidney failure, stroke, need for emergency surgery, etc.  Patient understands and and agrees to proceed.  Consent was placed on the chart.      Brad Bahena MD, FACC

## 2020-08-27 NOTE — PROGRESS NOTES
CARDIOVASCULAR PROGRESS NOTE    REASON FOR CONSULT:   Clinton Rosenberg is a 69 y.o. male who presents for follow up of CAD, ao root dil.    PCP: Dair  HISTORY OF PRESENT ILLNESS:   The patient returns for follow-up, accompanied by his wife.  He is describing exertional chest discomfort and shortness of breath of a class 2-3 nature which is similar to his prior angina.  He has had no symptoms at rest.  He denies any palpitations or syncope.  There has been no PND, orthopnea, melena, hematuria, or claudicant symptoms.  He does continue to take his dual antiplatelet therapy and I have instructed him to persist in this.    We discussed the pros and cons of invasive versus noninvasive evaluation.  I offered the patient both, and given his known CAD status post PCI, we have both decided to move forward with catheterization.  The patient does have an IV contrast allergy and he will be premedicated with Benadryl and prednisone.    CARDIOVASCULAR HISTORY:   CAD/MI  2013: LAD PCI    9/20/19: mid RPLB 2.5x18 Resolute [place]on SILVER    JAYLEN, unable to afford CPAP  Aortic root dil, 3.7->4.0->3.8->4.2  cm (echo 7/2020), 3.8cm (asc Ao) on CTA 9/2019  CVI (bilat GSV, R LSV, US 3/2018)    PAST MEDICAL HISTORY:     Past Medical History:   Diagnosis Date    Allergy     Angina pectoris     Anxiety     Coronary artery disease     Depression     GERD (gastroesophageal reflux disease)     Hyperlipidemia     Hypertension     Hypothyroidism     Memory loss     12/3/2014 MRI brain: 1. No evidence for acute infarct 2. unremarkable MRI of the brain; 12/3/2014 carotid US normal    Myocardial infarction     Obesity     Peripheral vascular disease 6/20/2017    Retrocalcaneal bursitis 11/24/2014    Sleep apnea        PAST SURGICAL HISTORY:     Past Surgical History:   Procedure Laterality Date    APPENDECTOMY  1986    bone spur Right     COLONOSCOPY      hand trauma Right     pencil     heart stent      LEFT HEART CATHETERIZATION  Left 9/20/2019    Procedure: Left heart cath 12 pm start, R rad access;  Surgeon: Brad Bahena MD;  Location: [facility] CATH LAB;  Service: Cardiology;  Laterality: Left;  RN PREOP 9/13/2019  NEEDS IODINE PREMED       ALLERGIES AND MEDICATION:     Review of patient's allergies indicates:   Allergen Reactions    Iodine containing multivitamin Anaphylaxis    Naproxen Other (See Comments)     hallucinations    Hydrocodone-acetaminophen      Rash (skin)^    Oxycodone-acetaminophen      Rash (skin)^        Medication List          Accurate as of August 27, 2020  9:32 AM. If you have any questions, ask your nurse or doctor.            CONTINUE taking these medications    aspirin 81 MG EC tablet  Commonly known as: ECOTRIN  Take 1 tablet (81 mg total) by mouth once daily.     atorvastatin 80 MG tablet  Commonly known as: LIPITOR  TAKE 1 TABLET EVERY EVENING     clopidogreL 75 mg tablet  Commonly known as: PLAVIX  TAKE 1 TABLET EVERY DAY     FLUZONE HIGH-DOSE 2019-20 (PF) 180 mcg/0.5 mL Syrg  Generic drug: flu vacc [id number](65yr up)PF     furosemide 20 MG tablet  Commonly known as: LASIX  TAKE 1 TABLET EVERY DAY     isosorbide mononitrate 60 MG 24 hr tablet  Commonly known as: IMDUR  TAKE 1 TABLET EVERY DAY     KRILL OIL (OMEGA 3 AND 6) ORAL     levocetirizine 5 MG tablet  Commonly known as: XYZAL  TAKE 1 TABLET (5 MG TOTAL) BY MOUTH EVERY EVENING.     levothyroxine 50 MCG tablet  Commonly known as: SYNTHROID  TAKE 1 TABLET BEFORE BREAKFAST     losartan 50 MG tablet  Commonly known as: COZAAR  TAKE 1 TABLET EVERY DAY     metoprolol tartrate 25 MG tablet  Commonly known as: LOPRESSOR  TAKE 1 TABLET TWICE DAILY     nitroGLYCERIN 0.4 MG SL tablet  Commonly known as: NITROSTAT  Place 1 tablet (0.4 mg total) under the tongue every 5 (five) minutes as needed for Chest pain.     pantoprazole 40 MG tablet  Commonly known as: PROTONIX  TAKE 1 TABLET EVERY DAY     * tamsulosin 0.4 mg Cap  Commonly known as: FLOMAX  Take 2  capsules (0.8 mg total) by mouth once daily.     * tamsulosin 0.4 mg Cap  Commonly known as: FLOMAX  TAKE 2 CAPSULES ONE TIME DAILY     * tamsulosin 0.4 mg Cap  Commonly known as: FLOMAX  TAKE 2 CAPSULES ONE TIME DAILY     vitamin D 1000 units Tab  Commonly known as: VITAMIN D3  Take 1 tablet (1,000 Units total) by mouth once daily.         * This list has 3 medication(s) that are the same as other medications prescribed for you. Read the directions carefully, and ask your doctor or other care provider to review them with you.                  SOCIAL HISTORY:     Social History     Socioeconomic History    Marital status:      Spouse name: Not on file    Number of children: Not on file    Years of education: Not on file    Highest education level: Not on file   Occupational History    Not on file   Social Needs    Financial resource strain: Not very hard    Food insecurity     Worry: Never true     Inability: Never true    Transportation needs     Medical: No     Non-medical: No   Tobacco Use    Smoking status: Never Smoker    Smokeless tobacco: Never Used   Substance and Sexual Activity    Alcohol use: No     Alcohol/week: 0.0 standard drinks     Frequency: Monthly or less     Drinks per session: 1 or 2     Binge frequency: Never     Comment: rarely    Drug use: No    Sexual activity: Yes   Lifestyle    Physical activity     Days per week: 3 days     Minutes per session: 30 min    Stress: To some extent   Relationships    Social connections     Talks on phone: More than three times a week     Gets together: More than three times a week     Attends Shinto service: Not on file     Active member of club or organization: No     Attends meetings of clubs or organizations: Never     Relationship status:    Other Topics Concern    Not on file   Social History Narrative    Not on file       FAMILY HISTORY:     Family History   Problem Relation Age of Onset    Arthritis Mother     Early  "death Mother     Heart disease Mother     Hypertension Mother     Migraines Mother     Seizures Mother     Tremor Mother     Diabetes Mother     Cancer Mother     Early death Father     Heart disease Father     Hypertension Father     Stroke Father     Diabetes Father     Alcohol abuse Father     Arthritis Brother     Cancer Brother     Diabetes Brother     Early death Brother     Heart disease Brother     Hypertension Brother     Heart disease Sister     Hypertension Sister     Arthritis Sister     Depression Sister     Heart disease Brother     Arthritis Brother     Heart disease Brother     Pneumonia Brother     Heart disease Brother     Hypertension Brother     Diabetes Brother     Heart disease Brother        REVIEW OF SYSTEMS:   Review of Systems   Constitutional: Negative for chills, diaphoresis and fever.   HENT: Negative for nosebleeds.    Eyes: Negative for blurred vision, double vision and photophobia.   Respiratory: Positive for shortness of breath. Negative for hemoptysis and wheezing.    Cardiovascular: Positive for chest pain. Negative for palpitations, orthopnea, claudication, leg swelling and PND.   Gastrointestinal: Negative for abdominal pain, blood in stool, heartburn, melena, nausea and vomiting.   Genitourinary: Negative for flank pain and hematuria.   Musculoskeletal: Negative for falls, myalgias and neck pain.   Skin: Negative for rash.   Neurological: Negative for dizziness, seizures, loss of consciousness, weakness and headaches.   Endo/Heme/Allergies: Negative for polydipsia. Does not bruise/bleed easily.   Psychiatric/Behavioral: Negative for depression and memory loss. The patient is not nervous/anxious.        PHYSICAL EXAM:     Vitals:    08/27/20 0912   BP: 117/69   Pulse: (!) 57   Resp: 15    Body mass index is 34.86 kg/m².  Weight: 110.2 kg (242 lb 15.2 oz)   Height: 5' 10" (177.8 cm)     Physical Exam  Vitals signs reviewed.   Constitutional:       " General: He is not in acute distress.     Appearance: He is well-developed. He is obese. He is not ill-appearing, toxic-appearing or diaphoretic.   HENT:      Head: Normocephalic and atraumatic.   Eyes:      General: No scleral icterus.     Conjunctiva/sclera: Conjunctivae normal.      Pupils: Pupils are equal, round, and reactive to light.   Neck:      Musculoskeletal: Normal range of motion and neck supple. No edema or neck rigidity.      Thyroid: No thyromegaly.      Vascular: Normal carotid pulses. No carotid bruit or JVD.      Trachea: Trachea normal. No tracheal deviation.   Cardiovascular:      Rate and Rhythm: Regular rhythm. Bradycardia present.      Pulses:           Carotid pulses are 2+ on the right side and 2+ on the left side.       Radial pulses are 2+ on the right side.      Heart sounds: S1 normal and S2 normal. No murmur. No friction rub. No gallop.    Pulmonary:      Effort: Pulmonary effort is normal. No respiratory distress.      Breath sounds: Normal breath sounds. No stridor. No wheezing or rales.   Chest:      Chest wall: No tenderness.   Abdominal:      General: There is no distension.      Palpations: Abdomen is soft.   Musculoskeletal: Normal range of motion.         General: No swelling or tenderness.   Feet:      Right foot:      Skin integrity: No ulcer.      Left foot:      Skin integrity: No ulcer.   Skin:     General: Skin is warm and dry.      Findings: No erythema or rash.   Neurological:      Mental Status: He is alert and oriented to person, place, and time.      Cranial Nerves: No cranial nerve deficit.   Psychiatric:         Speech: Speech normal.         Behavior: Behavior normal. Behavior is cooperative.         DATA:   EKG: (personally reviewed tracing(s))  8/27/20 SR 57, PRWP/LAD, similar to 12/18/19    Laboratory:  CBC:  Recent Labs   Lab 05/20/18  0151 09/13/19  1003 09/21/19  0419   WBC 8.47 7.10 22.07 H   Hemoglobin 15.5 14.9 14.0   Hematocrit 43.2 42.7 40.9   Platelets  164 174 200       CHEMISTRIES:  Recent Labs   Lab 05/20/18  0151  09/13/19  1003 09/21/19  0419 10/17/19  0732   Glucose 130 H   < > 232 H 188 H 160 H   Sodium 143   < > 140 144 141   Potassium 4.7   < > 4.6 4.2 4.3   BUN, Bld 12   < > 11 15 12   Creatinine 1.3   < > 1.2 1.1 1.2   eGFR if African American >60   < > >60 >60 >60.0   eGFR if non  57 A   < > >60 >60 >60.0   Calcium 9.4   < > 9.4 8.8 9.5   Magnesium 2.4  --   --   --   --     < > = values in this interval not displayed.       CARDIAC BIOMARKERS:  Recent Labs   Lab 05/20/18  0151 09/20/19  1425 09/21/19  0419   Troponin I <0.006 <0.006 0.006       COAGS:  Recent Labs   Lab 09/13/19  1003   INR 1.0       LIPIDS/LFTS:  Recent Labs   Lab 05/19/18  1941 05/20/18  0151 10/12/18  0947 09/21/19  0419 10/17/19  0732   Cholesterol 117 L  --  132  --  117 L   Triglycerides 368 H  --  268 H  --  225 H   HDL 29 L  --  36 L  --  34 L   LDL Cholesterol 14.4 L  --  42.4 L  --  38.0 L   Non-HDL Cholesterol 88  --  96  --  83   AST  --  19 28 14  --    ALT  --  25 21 18  --        Cardiovascular Testing:  Echo 7/30/20 (Ao root 4.2cm at annulus, 3.8cm at sinuses)  · Normal left ventricular systolic function. The estimated ejection fraction is 65%.  · Mild concentric left ventricular hypertrophy.  · Grade I (mild) left ventricular diastolic dysfunction consistent with impaired relaxation.  · Normal right ventricular systolic function.  · Mild left atrial enlargement.  · Normal central venous pressure (3 mmHg).  · The estimated PA systolic pressure is 20 mmHg.    CTA Chest 9/20/19 (post-cath)  The main pulmonary artery is enlarged suggesting underlying pulmonary artery hypertension.  Single-vessel coronary disease.  Normal thoracic aorta and visualized abdominal aorta.  Aortic measurements: STJ 3.4cm, Asc 3.8cm, Desc: 2.6.    Cath 9/20/19  LVEDP: 8mmHg  LVEF: 65% by echo  Wall Motion: normal by echo  Dominance: Right  LM: normal  LAD: mid 50% followed by  patent stent, distal/transapical 80% (small caliber)  Ramus: prox 30%  LCx: prox 50%  RCA: prox 30%, dist 40%              RPDA ost 50%              RPLB mid 90%  PCI RPLB:  Preop ASA/Plavix, intraop heparin  Predil 2.5x12  Stent 2.5x18 Resolute Yon SILVER 16 abi  Excellent result, T3 flow, 0% residual stenosis  Hemostasis:  R Radial band  Impression:  Progressive angina on 2 meds (metoprolol/imdur), unable to titrate further  2V CAD, normal LV fxn  Patent mid LAD stent, distal LAD stenosis in small caliber vessel  Successful PCI mid RPLB 2.5x18 Resolute [place] SILVER  Pt with post-PCI CP, no EKG changes (significant ST deviation with balloon inflations noted)  R rad vasband for hemostasis  Plan:  Observe in ICU  NTG gtt  ASA 81mg qd indefinitely  Plavix 75mg qd for 6 months (thru 3/2020)  BBl/Imdur/Statin  Home in am assuming control of sxs off NTG gtt  Will check CTA to exclude aortic pathology given hx of aortic root dil    L MPI 8/8/19 (similar diaphramatic artifact noted on MPI 6/2017)    Probably normal concepcion myocardial perfusion study.    The perfusion scan is free of evidence from myocardial ischemia or injury.    Post stress wall motion is physiologic.    There is a  mild intensity fixed defect in the inferior wall of the left ventricle secondary to diaphragm attenuation.    Gated perfusion images showed an ejection fraction of  66 % post stress.    There is  normal wall motion post stress.    Venous US/reflux 3/15/18  RIGHT:  No evidence of Right lower extremity DVT.    There is reflux in the Greater Saphenous and Lesser Saphenous Veins.    R GSV dist thigh 2186 msec  R GSV in calf 2339 msec  R LSV 4006 msec   LEFT:  No evidence of Left lower extremity DVT.    There is reflux in the Greater Saphenous Vein.  R GSV mid thigh 2422 msec  R GSV dist thigh 1667 msec  R GSV calf 5647 msec      Holter 6/8/17  PREDOMINANT RHYTHM  1. Sinus rhythm with heart rates varying between 43 and 94 bpm with an average of 62  bpm.   VENTRICULAR ARRHYTHMIAS  1. There were very rare PVCs recorded totalling 5 and averaging less than 1 per hour.   2. There were no episodes of ventricular tachycardia.  SUPRA VENTRICULAR ARRHYTHMIAS  1. There were very rare PACs recorded totalling 3 and averaging less than 1 per hour.   2. There were no episodes of sustained supraventricular tachycardia.  SINUS NODE FUNCTION  1. There was no evidence of high grade SA deanne block.   AV CONDUCTION  1. There was no evidence of high grade AV block.   DIARY  1. The diary was not returned  MISCELLANEOUS  1. There were rare hookup related artifacts. Overall, the study was of good quality.   2. This was a tape of adequate length (24 hrs).    LE art US/TANA 6/8/17  1.  Mild/atherosclerotic plaquing.  2.  No stenosis about 30 percent femoral popliteal arteries.  3.  Normal bilateral ABIs.    Carotid US 12/3/14  1. Less than 50% stenosis of the right internal carotid artery.  2. Less than 50% stenosis of the left internal carotid artery.      ASSESSMENT:   # CAD s/p LAD PCI 2013, RPLB PCI 9/2019.  Now with recurrent class II-III CP on mult meds without room to increased med rx.  We discussed MPI vs cath and pt prefers to proceed with invasive eval.  # HTN, controlled.  # HLP, on atorva 80mg  # Ao root dil, 3.7->4.0->3.8->4.2 cm (echo 7/2020), 3.8cm (asc Ao CTA 9/2019)  # CVI (bilat GSV, R LSV) on US 3/2018.  No edema at present (but complains of intermittent swelling), prev declined referral for venous ablation.  # hx JAYLEN, pt unable to obtain CPAP apparatus  # BMI 35, stable vs last OV  # Iodine allergy (?Betadine), successfully premedicated for cath 9/2019    PLAN:   Cont med rx  Cont ASA 81mg qd indefinitely  Cont Plavix 75mg qd pending cath  Diet/exercise/weight loss  Cath 9/21/20 730am, R rad access.  Contrast premeds/instructions given to pt  RTC post cath  Surveillance echo 12 months (July 2021) for surveillance of aortic root dil    Risks, benefits and alternatives  of the catheterization procedure were discussed with the patient and his wife in attendance, which include but are not limited to: bleeding, infection, death, heart attack, arrhythmia, kidney failure, stroke, need for emergency surgery, etc.  Patient understands and and agrees to proceed.  Consent was placed on the chart.      Brad Bahena MD, FACC

## 2020-09-14 ENCOUNTER — HOSPITAL ENCOUNTER (OUTPATIENT)
Dept: PREADMISSION TESTING | Facility: HOSPITAL | Age: 70
Discharge: HOME | End: 2020-09-14
Attending: INTERNAL MEDICINE
Payer: MEDICARE

## 2020-09-14 VITALS
BODY MASS INDEX: 34.72 KG/M2 | HEART RATE: 57 BPM | RESPIRATION RATE: 18 BRPM | DIASTOLIC BLOOD PRESSURE: 78 MMHG | HEIGHT: 70 IN | WEIGHT: 242.5 LBS | OXYGEN SATURATION: 97 % | SYSTOLIC BLOOD PRESSURE: 136 MMHG | TEMPERATURE: 97 F

## 2020-09-14 DIAGNOSIS — Z91.041 CONTRAST MEDIA ALLERGY: ICD-10-CM

## 2020-09-14 DIAGNOSIS — I25.118 CORONARY ARTERY DISEASE OF NATIVE ARTERY OF NATIVE HEART WITH STABLE ANGINA PECTORIS: ICD-10-CM

## 2020-09-14 LAB
ANION GAP SERPL CALC-SCNC: 8 MMOL/L (ref 8–16)
BASOPHILS # BLD AUTO: 0.07 K/UL (ref 0–0.2)
BASOPHILS NFR BLD: 0.9 % (ref 0–1.9)
BUN SERPL-MCNC: 9 MG/DL (ref 8–23)
CALCIUM SERPL-MCNC: 9.9 MG/DL (ref 8.7–10.5)
CHLORIDE SERPL-SCNC: 102 MMOL/L (ref 95–110)
CO2 SERPL-SCNC: 30 MMOL/L (ref 23–29)
CREAT SERPL-MCNC: 1.2 MG/DL (ref 0.5–1.4)
DIFFERENTIAL METHOD: ABNORMAL
EOSINOPHIL # BLD AUTO: 0.2 K/UL (ref 0–0.5)
EOSINOPHIL NFR BLD: 2.4 % (ref 0–8)
ERYTHROCYTE [DISTWIDTH] IN BLOOD BY AUTOMATED COUNT: 12.5 % (ref 11.5–14.5)
EST. GFR  (AFRICAN AMERICAN): >60 ML/MIN/1.73 M^2
EST. GFR  (NON AFRICAN AMERICAN): >60 ML/MIN/1.73 M^2
GLUCOSE SERPL-MCNC: 196 MG/DL (ref 70–110)
HCT VFR BLD AUTO: 46.2 % (ref 40–54)
HGB BLD-MCNC: 16.2 G/DL (ref 14–18)
IMM GRANULOCYTES # BLD AUTO: 0.05 K/UL (ref 0–0.04)
IMM GRANULOCYTES NFR BLD AUTO: 0.6 % (ref 0–0.5)
INR PPP: 1 (ref 0.8–1.2)
LYMPHOCYTES # BLD AUTO: 2.3 K/UL (ref 1–4.8)
LYMPHOCYTES NFR BLD: 29.5 % (ref 18–48)
MCH RBC QN AUTO: 31.1 PG (ref 27–31)
MCHC RBC AUTO-ENTMCNC: 35.1 G/DL (ref 32–36)
MCV RBC AUTO: 89 FL (ref 82–98)
MONOCYTES # BLD AUTO: 0.6 K/UL (ref 0.3–1)
MONOCYTES NFR BLD: 8 % (ref 4–15)
NEUTROPHILS # BLD AUTO: 4.6 K/UL (ref 1.8–7.7)
NEUTROPHILS NFR BLD: 58.6 % (ref 38–73)
NRBC BLD-RTO: 0 /100 WBC
PLATELET # BLD AUTO: 179 K/UL (ref 150–350)
PMV BLD AUTO: 9.5 FL (ref 9.2–12.9)
POTASSIUM SERPL-SCNC: 4.3 MMOL/L (ref 3.5–5.1)
PROTHROMBIN TIME: 10.6 SEC (ref 9–12.5)
RBC # BLD AUTO: 5.21 M/UL (ref 4.6–6.2)
SODIUM SERPL-SCNC: 140 MMOL/L (ref 136–145)
WBC # BLD AUTO: 7.9 K/UL (ref 3.9–12.7)

## 2020-09-14 PROCEDURE — 85610 PROTHROMBIN TIME: CPT | Mod: HCNC

## 2020-09-14 PROCEDURE — 85025 COMPLETE CBC W/AUTO DIFF WBC: CPT | Mod: HCNC

## 2020-09-14 PROCEDURE — 80048 BASIC METABOLIC PNL TOTAL CA: CPT | Mod: HCNC

## 2020-09-14 NOTE — DISCHARGE INSTRUCTIONS
Your angiogram is scheduled for__Monday, 9/21/2020_____________    Call [phone] between 2pm and 5pm on _Friday, 9/18/2020__at 8:20AM_________to find out your arrival time for the day of your procedure.    Report to the Emergency Room to get temp check---then you will be brought to same day surgery..    IMPORTANT INSTRUCTIONS:  Do not eat or drink anything after midnight.  This includes water and coffee.  It is okay to brush your teeth.  Do not chew gum or have hard candy or mints.    Take your morning medications as instructed with small sips of water.     Do not take any diabetic medication.  Metformin or Synjardy should be held for 2 days before the angiogram.    Shower the night before your procedure and the morning of your procedure with Hibiclens as directed.     Do not wear make-up.     You may wear deodorant, but do not wear body lotion, powder or perfume/cologne       Do not wear jewelry.     You will be asked to remove any dentures or partials for the procedure.     You do not need money or valuables.  You may bring your cell phone.     If you are going home the same day, you must arrange for someone to drive you home.     Wear comfortable, loose fitting clothes.      Call your doctor if you have sickness or fever before your angiogram.     Our number in [facility] is [phone] if you need to contact us.     If you are going home the same day, you must arrange for a family member or a friend to drive you home.  Public transportation is prohibited.

## 2020-09-18 ENCOUNTER — HOSPITAL ENCOUNTER (OUTPATIENT)
Dept: PREADMISSION TESTING | Facility: HOSPITAL | Age: 70
Discharge: HOME | End: 2020-09-18
Attending: INTERNAL MEDICINE
Payer: MEDICARE

## 2020-09-18 DIAGNOSIS — Z01.818 PRE-OP TESTING: ICD-10-CM

## 2020-09-18 LAB — SARS-COV-2 RDRP RESP QL NAA+PROBE: NEGATIVE

## 2020-09-18 PROCEDURE — U0002 COVID-19 LAB TEST NON-CDC: HCPCS | Mod: HCNC

## 2020-09-21 ENCOUNTER — HOSPITAL ENCOUNTER (OUTPATIENT)
Facility: HOSPITAL | Age: 70
Discharge: HOME | End: 2020-09-21
Attending: INTERNAL MEDICINE | Admitting: INTERNAL MEDICINE
Payer: MEDICARE

## 2020-09-21 VITALS
RESPIRATION RATE: 17 BRPM | TEMPERATURE: 98 F | HEART RATE: 58 BPM | BODY MASS INDEX: 34.8 KG/M2 | WEIGHT: 242.5 LBS | DIASTOLIC BLOOD PRESSURE: 76 MMHG | SYSTOLIC BLOOD PRESSURE: 136 MMHG | OXYGEN SATURATION: 96 %

## 2020-09-21 DIAGNOSIS — I25.118 CORONARY ARTERY DISEASE OF NATIVE ARTERY OF NATIVE HEART WITH STABLE ANGINA PECTORIS: Primary | ICD-10-CM

## 2020-09-21 DIAGNOSIS — Z01.818 PRE-OP TESTING: ICD-10-CM

## 2020-09-21 DIAGNOSIS — I25.119 CHEST PAIN DUE TO CAD: ICD-10-CM

## 2020-09-21 DIAGNOSIS — Z91.041 CONTRAST MEDIA ALLERGY: ICD-10-CM

## 2020-09-21 PROCEDURE — 93458 L HRT ARTERY/VENTRICLE ANGIO: CPT | Mod: HCNC | Performed by: INTERNAL MEDICINE

## 2020-09-21 PROCEDURE — 99152 PR MOD CONSCIOUS SEDATION, SAME PHYS, 5+ YRS, FIRST 15 MIN: ICD-10-PCS | Mod: HCNC,,, | Performed by: INTERNAL MEDICINE

## 2020-09-21 PROCEDURE — C1894 INTRO/SHEATH, NON-LASER: HCPCS | Mod: HCNC | Performed by: INTERNAL MEDICINE

## 2020-09-21 PROCEDURE — 25000003 PHARM REV CODE 250: Mod: HCNC | Performed by: INTERNAL MEDICINE

## 2020-09-21 PROCEDURE — 93571 IV DOP VEL&/PRESS C FLO 1ST: CPT | Mod: 26,52,RC,HCNC | Performed by: INTERNAL MEDICINE

## 2020-09-21 PROCEDURE — 63600175 PHARM REV CODE 636 W HCPCS: Mod: HCNC | Performed by: INTERNAL MEDICINE

## 2020-09-21 PROCEDURE — 93458 L HRT ARTERY/VENTRICLE ANGIO: CPT | Mod: 26,HCNC,, | Performed by: INTERNAL MEDICINE

## 2020-09-21 PROCEDURE — 27201423 OPTIME MED/SURG SUP & DEVICES STERILE SUPPLY: Mod: HCNC | Performed by: INTERNAL MEDICINE

## 2020-09-21 PROCEDURE — 25500020 PHARM REV CODE 255: Mod: HCNC | Performed by: INTERNAL MEDICINE

## 2020-09-21 PROCEDURE — 99152 MOD SED SAME PHYS/QHP 5/>YRS: CPT | Mod: HCNC | Performed by: INTERNAL MEDICINE

## 2020-09-21 PROCEDURE — 93571 IV DOP VEL&/PRESS C FLO 1ST: CPT | Mod: 52,RC,HCNC | Performed by: INTERNAL MEDICINE

## 2020-09-21 PROCEDURE — C1769 GUIDE WIRE: HCPCS | Mod: HCNC | Performed by: INTERNAL MEDICINE

## 2020-09-21 PROCEDURE — 99153 MOD SED SAME PHYS/QHP EA: CPT | Mod: HCNC | Performed by: INTERNAL MEDICINE

## 2020-09-21 PROCEDURE — 99152 MOD SED SAME PHYS/QHP 5/>YRS: CPT | Mod: HCNC,,, | Performed by: INTERNAL MEDICINE

## 2020-09-21 PROCEDURE — 93571 PR HEART FLOW RESERV MEASURE,INIT VESSL: ICD-10-PCS | Mod: 26,52,RC,HCNC | Performed by: INTERNAL MEDICINE

## 2020-09-21 PROCEDURE — C1887 CATHETER, GUIDING: HCPCS | Mod: HCNC | Performed by: INTERNAL MEDICINE

## 2020-09-21 PROCEDURE — 93458 PR CATH PLACE/CORON ANGIO, IMG SUPER/INTERP,W LEFT HEART VENTRICULOGRAPHY: ICD-10-PCS | Mod: 26,HCNC,, | Performed by: INTERNAL MEDICINE

## 2020-09-21 RX ORDER — MORPHINE SULFATE 10 MG/ML
2 INJECTION INTRAMUSCULAR; INTRAVENOUS; SUBCUTANEOUS EVERY 10 MIN PRN
Status: DISCONTINUED | OUTPATIENT
Start: 2020-09-21 | End: 2020-09-21 | Stop reason: HOSPADM

## 2020-09-21 RX ORDER — ONDANSETRON 2 MG/ML
4 INJECTION INTRAMUSCULAR; INTRAVENOUS EVERY 12 HOURS PRN
Status: DISCONTINUED | OUTPATIENT
Start: 2020-09-21 | End: 2020-09-21 | Stop reason: HOSPADM

## 2020-09-21 RX ORDER — NITROGLYCERIN 20 MG/100ML
INJECTION INTRAVENOUS
Status: DISCONTINUED | OUTPATIENT
Start: 2020-09-21 | End: 2020-09-21 | Stop reason: HOSPADM

## 2020-09-21 RX ORDER — ATROPINE SULFATE 0.1 MG/ML
0.5 INJECTION INTRAVENOUS
Status: DISCONTINUED | OUTPATIENT
Start: 2020-09-21 | End: 2020-09-21 | Stop reason: HOSPADM

## 2020-09-21 RX ORDER — HEPARIN SODIUM 1000 [USP'U]/ML
INJECTION, SOLUTION INTRAVENOUS; SUBCUTANEOUS
Status: DISCONTINUED | OUTPATIENT
Start: 2020-09-21 | End: 2020-09-21 | Stop reason: HOSPADM

## 2020-09-21 RX ORDER — LIDOCAINE HYDROCHLORIDE 10 MG/ML
INJECTION, SOLUTION EPIDURAL; INFILTRATION; INTRACAUDAL; PERINEURAL
Status: DISCONTINUED | OUTPATIENT
Start: 2020-09-21 | End: 2020-09-21 | Stop reason: HOSPADM

## 2020-09-21 RX ORDER — SODIUM CHLORIDE 9 MG/ML
3 INJECTION, SOLUTION INTRAVENOUS CONTINUOUS
Status: ACTIVE | OUTPATIENT
Start: 2020-09-21 | End: 2020-09-21

## 2020-09-21 RX ORDER — DIPHENHYDRAMINE HCL 50 MG
50 CAPSULE ORAL EVERY 6 HOURS
Status: ON HOLD | COMMUNITY
Start: 2020-09-20 | End: 2020-09-21 | Stop reason: HOSPADM

## 2020-09-21 RX ORDER — SODIUM CHLORIDE 9 MG/ML
INJECTION, SOLUTION INTRAVENOUS
Status: DISCONTINUED | OUTPATIENT
Start: 2020-09-21 | End: 2020-09-21 | Stop reason: HOSPADM

## 2020-09-21 RX ORDER — FENTANYL CITRATE 50 UG/ML
INJECTION, SOLUTION INTRAMUSCULAR; INTRAVENOUS
Status: DISCONTINUED | OUTPATIENT
Start: 2020-09-21 | End: 2020-09-21 | Stop reason: HOSPADM

## 2020-09-21 RX ORDER — MIDAZOLAM HYDROCHLORIDE 1 MG/ML
INJECTION, SOLUTION INTRAMUSCULAR; INTRAVENOUS
Status: DISCONTINUED | OUTPATIENT
Start: 2020-09-21 | End: 2020-09-21 | Stop reason: HOSPADM

## 2020-09-21 RX ORDER — VERAPAMIL HYDROCHLORIDE 2.5 MG/ML
INJECTION, SOLUTION INTRAVENOUS
Status: DISCONTINUED | OUTPATIENT
Start: 2020-09-21 | End: 2020-09-21 | Stop reason: HOSPADM

## 2020-09-21 RX ADMIN — SODIUM CHLORIDE 5 ML/KG/HR: 0.9 INJECTION, SOLUTION INTRAVENOUS at 10:09

## 2020-09-21 RX ADMIN — SODIUM CHLORIDE 5 ML/KG/HR: 0.9 INJECTION, SOLUTION INTRAVENOUS at 08:09

## 2020-09-21 RX ADMIN — SODIUM CHLORIDE 5 ML/KG/HR: 0.9 INJECTION, SOLUTION INTRAVENOUS at 09:09

## 2020-09-21 RX ADMIN — SODIUM CHLORIDE 3 ML/KG/HR: 0.9 INJECTION, SOLUTION INTRAVENOUS at 06:09

## 2020-09-21 NOTE — Clinical Note
94 ml injected throughout the case. 56 mL total wasted during the case. 150 mL total used in the case.

## 2020-09-21 NOTE — INTERVAL H&P NOTE
The patient has been examined and the H&P has been reviewed:    I concur with the findings and no changes have occurred since H&P was written.    Anesthesia/Surgery risks, benefits and alternative options discussed and understood by patient/family.      Lab Results   Component Value Date    WBC 7.90 09/14/2020    HGB 16.2 09/14/2020    HCT 46.2 09/14/2020    MCV 89 09/14/2020     09/14/2020       Lab Results   Component Value Date    INR 1.0 09/14/2020    INR 1.0 09/13/2019     BMP  Lab Results   Component Value Date     09/14/2020    K 4.3 09/14/2020     09/14/2020    CO2 30 (H) 09/14/2020    BUN 9 09/14/2020    CREATININE 1.2 09/14/2020    CALCIUM 9.9 09/14/2020    ANIONGAP 8 09/14/2020    ESTGFRAFRICA >60 09/14/2020    EGFRNONAA >60 09/14/2020         Active Hospital Problems    Diagnosis  POA    Coronary artery disease of native artery of native heart with stable angina pectoris [I25.118]  Yes      Resolved Hospital Problems   No resolved problems to display.

## 2020-09-21 NOTE — Clinical Note
Catheter is repositioned to the ostium   left main. Angiography performed of the left coronary arteries in multiple views. Catheter removed.

## 2020-09-21 NOTE — DISCHARGE INSTRUCTIONS
ANGIOGRAM INSTRUCTIONS                                           Drink plenty of fluids for the next 48 hours and follow your doctor's diet orders.    Rest for the next 72 hours.   Try not to keep the injected leg bent for a long period of time.  Remove the dressing in 24 hours, and you may shower. Clean the area with soap and water, and apply a band aid for the next 5 days.                                                                 No  Lifting over 5-10 lbs., that is, not more than 1 gallon of water, or straining for 72 hours.    No driving, no drinking alcohol, and no signing legal documents for the next 24 hours.    Call your doctor for elevated temperature, shortness of breath, chest pain, or cold discolored foot or leg.    If oozing occurs at the injections site, lie down.  Apply pressure with a clean wash cloth for 20 to 30 minutes and call your doctor.    If severe bleeding occurs, lie down, apply pressure.  Call 911 and request an ambulance to take you to the nearest hospital emergency room.    Continue to take your regular medications as instructed.      Follow the instructions in the handout given to you.           Discharge Instructions for Cardiac Catheterization  Cardiac catheterization is a procedure to look for blocked areas in the blood vessels that send blood to the heart. A thin, flexible tube (catheter) is put in a blood vessel in your groin or arm. The healthcare provider injects contrast fluid into your blood, which then flows to your heart. X-rays pictures are taken of your heart. Your provider will review the results with you. Be sure to ask any questions you have before you leave. This sheet will help you take care of yourself at home.  Home care  · Only do light and easy activities for the next 2 to 3 days. Ask for help with chores and errands while you recover. Have someone drive you to your appointments.  · Don't lift anything heavy for a while. Your healthcare team will tell you  when it's safe to lift again.  · Ask your healthcare team when you can expect to return to work. Unless your job involves lifting, you may be able to return to your normal activities within a couple of days.  · Take your medicines as directed. Don't skip doses.  · Drink 6 to 8 glasses of water a day. This is to help flush the contrast dye out of your body. Call your healthcare team if your urine has any change in color.  · Take your temperature each day for 7 days. If you feel cold and clammy or start sweating, take your temperature right away and call your healthcare team.  · Check your incisions every day for signs of infection. These include redness, swelling, and drainage. It is normal to have a small bruise or bump where the catheter was inserted. A bruise that is getting larger is not normal and should be reported to your healthcare team. If you see blood forming in the incision, call your healthcare team. Go to the emergency department if you have uncontrolled bleeding from the artery site. This is especially true if you take medicines that make it difficult for your blood to clot. Examples are aspirin, clopidogrel, and warfarin.  · Eat a healthy diet. Make sure it is low in fat, salt, and cholesterol. Ask your healthcare team for diet information.  · Stop smoking. Enroll in a stop-smoking program or ask your healthcare team for help. Stop-smoking programs can be life saving.  · Exercise as your healthcare team tells you to. Your healthcare team may recommend you start a cardiac rehabilitation program. Cardiac rehab is an exercise program in which trained healthcare staff watch your progress and stress on your heart while you exercise. Ask your team how to enroll.  · Don't swim or take baths until your healthcare team says its OK. You can shower the day after the procedure. Keep the site clean and dry. This keeps the incision from getting wet and infected until the skin and artery can heal.  Follow-up  care  · Make a follow-up appointment as advised by our staff. It's common to have a follow-up appointment 2 to 4 weeks after an angioplasty or coronary stent procedure.  · Make a yearly appointment, too. This is to make sure you are still doing well and not having any new symptoms.  · Don't wait for a follow-up appointment if your medicines aren't working or you are having heart-related symptoms.  When to seek medical care  Call your healthcare provider right away if you have any of the following:  · Chest pain  · Constant or increasing pain or numbness in your leg  · Fever of 100.4°F (38.0°C) or higher, or as directed by your healthcare provider  · Symptoms of infection. These include redness, swelling, drainage, or warmth at the incision site.  · Shortness of breath  · A leg that feels cold or appears blue  · Bleeding, bruising, or a lot of swelling where the catheter was inserted  · Blood in your urine  · Black or tarry stools  · Any unusual bleeding   Date Last Reviewed: 10/1/2016  © 5894-1126 [employer]. [address] [place] [address]. All rights reserved. This information is not intended as a substitute for professional medical care. Always follow your healthcare professional's instructions.        Understanding Transradial Cardiac Catheterization    Cardiac catheterization (cardiac cath) is a common, non-surgical procedure. During the procedure, your doctor will insert a long, thin tube (catheter) into an artery and move it up into your heart. Transradial means the catheter is inserted into an artery in the wrist (the radial artery). This procedure can be used to diagnose and treat certain heart problems.  Why do I need a transradial cardiac cath?  You may need a cardiac cath if signs indicate a problem with your heart. These may include:  · Symptoms of chest pain, tightness, or heaviness (known as angina). This is a common symptom of blocked heart arteries, known as coronary artery  disease.  · Symptoms of weakness, dizziness, trouble breathing, or swollen legs or feet. These may be symptoms of a problem with a heart valve or the heart muscle.  · Other test results show heart problems. Tests may include stress tests, heart scans, and echocardiography.  During a cardiac cath, your doctor can see the condition of the coronary arteries and heart valves. He or she can also check how well the heart pumps and the flow of blood through the heart. Your doctor can also measure pressures and take blood samples. And, if needed, he or she can open blocked arteries. This can help reduce symptoms of angina.  Cardiac cath is often done using a catheter inserted into an artery in the groin. During transradial cardiac cath, the catheter is inserted into an artery in the wrist. This can mean less bleeding and a faster recovery. Some people may have blockages in the groin arteries as well as in the heart arteries, making it difficult to reach the heart. The transradial approach can be used to get around this problem.   What happens during a transradial cardiac cath?  The procedure is done in the hospital or a surgery center. First, an IV line is put in your arm or hand to deliver fluids and medicines. You will likely be given medicine to relax you and make you drowsy. When the procedure begins:  · You lie on an X-ray table.  · The skin over the insertion site in your wrist is numbed.  · The doctor makes a tiny puncture or incision into the artery in the wrist. He or she then inserts a catheter and threads it through the blood vessel into your heart.    · The doctor may inject a contrast fluid through the catheter into the arteries. This fluid makes the arteries show up better on X-rays.  · Tests may be done to check the condition of your heart and arteries. If needed, the doctor can clear blockages in the arteries or do other repairs.  · When the doctor is finished, he or she will remove the catheter and put  direct pressure on the site to prevent bleeding.  · You will stay for a time to recover, and then go home.  What are the risks of transradial cardiac cath?  These include:  · Bleeding, bruising, infection, or blood clots  · Damage to the radial artery that may cause injury to the hand  · Allergic reaction to the contrast fluid  · Abnormal heartbeat (arrhythmia)  · Damage to blood vessels or tissues  · Kidney damage or failure  · The need for emergency heart surgery  · Heart attack, stroke, or death  Date Last Reviewed: 5/1/2016 © 2000-2017 [employer]. [address] [place] [address]. All rights reserved. This information is not intended as a substitute for professional medical care. Always follow your healthcare professional's instructions.        Bleeding or Hematoma After Cardiac Catheterization  You recently had cardiac catheterization. A catheter was put into your body through a puncture site in your groin or arm. You now have bleeding from this site. When bleeding occurs, it may drip or spurt from the site. Or it may collect in a lump (hematoma) under the skin. In the emergency department, pressure will be put on the site to stop bleeding. You will be sent home when the bleeding stops. To prevent repeat bleeding, take some precautions at home. If the bleeding starts again, follow the advice below.    Home care  For the next 48 hours:  · Don't do any strenuous activity.  · Don't climb stairs.  · Don't lift anything heavy.  · If the puncture site is in your arm or wrist, don't lie on that arm.  · If your puncture site is in the groin, don't strain at bowel movements.  · Don't scrub the site when bathing. It is fine to get it wet after your healthcare provider says it is OK, but don't scrub it or massage it.  If bleeding happens again, call 911. Take the following steps to stop the bleeding until help arrives:  · Lie on your back.  · Place a clean cloth or gauze pad over the puncture  "site. Then hold firm pressure right on the site. Or have someone else apply firm pressure using the gauze pad or washcloth.  · Keep your arm straight and raised above the level of your heart if the site is in your arm or wrist. If the site is in your groin, have someone else hold pressure on the site. If you dont have someone to do this, put a 5-pound bag of rice or flour on the site and apply pressure with your hand over the bag.  · Don't press too hard! If you press too hard, your leg and foot (or arm and hand) will not get blood flow and the skin under your toenails or fingernails may turn white. The skin should look pink, like the toenails on your other foot. When you (or someone) presses on the toenail it will turn white, but when you stop pressing on the nail it will turn pink again. This is called "capillary refill." If there is someone with you, he or she can check it.  · If your toes, foot, or leg start feeling numb, tingly, or cold, ease up on the pressure, because you are probably pressing too hard.  · As soon as emergency care arrives, they will take over your care.  Follow-up care  Follow up with your healthcare provider, or as advised. Ask your healthcare provider for a contact number to call.  Call 911  Call 911 if any of these occur:  · Chest pain or pressure  · Any bleeding from the site  · Feeling weak or faint  · Trouble breathing  · Lump (hematoma) is quickly getting larger  · Coolness, numbness, tingling, or skin color changes in the leg or arm with the puncture site  When to seek medical advice  Call your healthcare provider right away if any of these occur:  · Increased pain, redness, swelling, or drainage from the puncture site  · Nausea or vomiting  Date Last Reviewed: 1/11/2016  © 4486-1484 [employer]. [address] [place] [address], [place] [address]. All rights reserved. This information is not intended as a substitute for professional medical care. Always follow your healthcare " professional's instructions.        Fall Prevention  Millions of people fall every year and injure themselves. You may have had anesthesia or sedation which may increase your risk of falling. You may have health issues that put you at an increased risk of falling.     Here are ways to reduce your risk of falling.  ·   · Make your home safe by keeping walkways clear of objects you may trip over.  · Use non-slip pads under rugs. Do not use area rugs or small throw rugs.  · Use non-slip mats in bathtubs and showers.  · Install handrails and lights on staircases.  · Do not walk in poorly lit areas.  · Do not stand on chairs or wobbly ladders.  · Use caution when reaching overhead or looking upward. This position can cause a loss of balance.  · Be sure your shoes fit properly, have non-slip bottoms and are in good condition.   · Wear shoes both inside and out. Avoid going barefoot or wearing slippers.  · Be cautious when going up and down stairs, curbs, and when walking on uneven sidewalks.  · If your balance is poor, consider using a cane or walker.  · If your fall was related to alcohol use, stop or limit alcohol intake.   · If your fall was related to use of sleeping medicines, talk to your doctor about this. You may need to reduce your dosage at bedtime if you awaken during the night to go to the bathroom.    · To reduce the need for nighttime bathroom trips:  ¨ Avoid drinking fluids for several hours before going to bed  ¨ Empty your bladder before going to bed  ¨ Men can keep a urinal at the bedside  · Stay as active as you can. Balance, flexibility, strength, and endurance all come from exercise. They all play a role in preventing falls. Ask your healthcare provider which types of activity are right for you.  · Get your vision checked on a regular basis.  · If you have pets, know where they are before you stand up or walk so you don't trip over them.  · Use night lights.

## 2020-09-21 NOTE — DISCHARGE SUMMARY
[facility]  Discharge Note    SUMMARY     Admit Date: 9/21/2020    Discharge Date and Time:  09/21/2020 1PM    Hospital Course (synopsis of major diagnoses, care, treatment, and services provided during the course of the hospital stay): Uneventful LHC./cor angio/iFR dist RCA/ost RPDA via R radial.    Final Diagnosis: Post-Op Diagnosis Codes:     * Coronary artery disease of native artery of native heart with stable angina pectoris [I25.118]    Disposition: Home or Self Care    Follow Up/Patient Instructions:     Medications:  Reconciled Home Medications:      Medication List      CHANGE how you take these medications    tamsulosin 0.4 mg Cap  Commonly known as: FLOMAX  Take 2 capsules (0.8 mg total) by mouth once daily.  What changed: Another medication with the same name was removed. Continue taking this medication, and follow the directions you see here.        CONTINUE taking these medications    aspirin 81 MG EC tablet  Commonly known as: ECOTRIN  Take 1 tablet (81 mg total) by mouth once daily.     atorvastatin 80 MG tablet  Commonly known as: LIPITOR  TAKE 1 TABLET EVERY EVENING     clopidogreL 75 mg tablet  Commonly known as: PLAVIX  TAKE 1 TABLET EVERY DAY     furosemide 20 MG tablet  Commonly known as: LASIX  TAKE 1 TABLET EVERY DAY     isosorbide mononitrate 60 MG 24 hr tablet  Commonly known as: IMDUR  TAKE 1 TABLET EVERY DAY     KRILL OIL (OMEGA 3 AND 6) ORAL  Take by mouth.     levothyroxine 50 MCG tablet  Commonly known as: SYNTHROID  TAKE 1 TABLET BEFORE BREAKFAST     losartan 50 MG tablet  Commonly known as: COZAAR  TAKE 1 TABLET EVERY DAY     metoprolol tartrate 25 MG tablet  Commonly known as: LOPRESSOR  TAKE 1 TABLET TWICE DAILY     nitroGLYCERIN 0.4 MG SL tablet  Commonly known as: NITROSTAT  Place 1 tablet (0.4 mg total) under the tongue every 5 (five) minutes as needed for Chest pain.     pantoprazole 40 MG tablet  Commonly known as: PROTONIX  TAKE 1 TABLET EVERY DAY      vitamin D 1000 units Tab  Commonly known as: VITAMIN D3  Take 1 tablet (1,000 Units total) by mouth once daily.        STOP taking these medications    diphenhydrAMINE 50 MG capsule  Commonly known as: BENADRYL     FLUZONE HIGH-DOSE 2019-20 (PF) 180 mcg/0.5 mL Syrg  Generic drug: flu vacc [id number](65yr up)PF     predniSONE 50 MG Tab  Commonly known as: DELTASONE          Discharge Procedure Orders   Diet Cardiac     Call MD for:  temperature >100.4     Call MD for:  persistent nausea and vomiting     Call MD for:  severe uncontrolled pain     Call MD for:  difficulty breathing, headache or visual disturbances     Call MD for:  redness, tenderness, or signs of infection (pain, swelling, redness, odor or green/yellow discharge around incision site)     Call MD for:  hives     Call MD for:  persistent dizziness or light-headedness     Call MD for:  extreme fatigue     Remove dressing in 24 hours     Activity as tolerated     Follow-up Information     Brad Bahena MD On 10/7/2020.    Specialties: Cardiology, INTERVENTIONAL CARDIOLOGY  Why: 9am for follow up  Contact information:  [address] [facility]  [place] [address] [phone]                     Diet: Cardiac    Activity: ad elke.

## 2020-09-28 ENCOUNTER — LAB VISIT (OUTPATIENT)
Dept: LAB | Facility: HOSPITAL | Age: 70
End: 2020-09-28
Attending: INTERNAL MEDICINE
Payer: MEDICARE

## 2020-09-28 DIAGNOSIS — I10 ESSENTIAL HYPERTENSION: Chronic | ICD-10-CM

## 2020-09-28 DIAGNOSIS — R73.03 PREDIABETES: Chronic | ICD-10-CM

## 2020-09-28 DIAGNOSIS — E03.9 HYPOTHYROIDISM, UNSPECIFIED TYPE: Chronic | ICD-10-CM

## 2020-09-28 DIAGNOSIS — E78.2 MIXED HYPERLIPIDEMIA: Chronic | ICD-10-CM

## 2020-09-28 LAB
ALBUMIN SERPL BCP-MCNC: 4.2 G/DL (ref 3.5–5.2)
ALP SERPL-CCNC: 54 U/L (ref 55–135)
ALT SERPL W/O P-5'-P-CCNC: 19 U/L (ref 10–44)
ANION GAP SERPL CALC-SCNC: 10 MMOL/L (ref 8–16)
AST SERPL-CCNC: 16 U/L (ref 10–40)
BASOPHILS # BLD AUTO: 0.08 K/UL (ref 0–0.2)
BASOPHILS NFR BLD: 0.9 % (ref 0–1.9)
BILIRUB SERPL-MCNC: 1 MG/DL (ref 0.1–1)
BUN SERPL-MCNC: 14 MG/DL (ref 8–23)
CALCIUM SERPL-MCNC: 9.4 MG/DL (ref 8.7–10.5)
CHLORIDE SERPL-SCNC: 104 MMOL/L (ref 95–110)
CHOLEST SERPL-MCNC: 107 MG/DL (ref 120–199)
CHOLEST/HDLC SERPL: 3.3 {RATIO} (ref 2–5)
CO2 SERPL-SCNC: 26 MMOL/L (ref 23–29)
CREAT SERPL-MCNC: 1.3 MG/DL (ref 0.5–1.4)
DIFFERENTIAL METHOD: ABNORMAL
EOSINOPHIL # BLD AUTO: 0.3 K/UL (ref 0–0.5)
EOSINOPHIL NFR BLD: 3 % (ref 0–8)
ERYTHROCYTE [DISTWIDTH] IN BLOOD BY AUTOMATED COUNT: 13 % (ref 11.5–14.5)
EST. GFR  (AFRICAN AMERICAN): >60 ML/MIN/1.73 M^2
EST. GFR  (NON AFRICAN AMERICAN): 56.5 ML/MIN/1.73 M^2
ESTIMATED AVG GLUCOSE: 148 MG/DL (ref 68–131)
GLUCOSE SERPL-MCNC: 177 MG/DL (ref 70–110)
HBA1C MFR BLD HPLC: 6.8 % (ref 4–5.6)
HCT VFR BLD AUTO: 45.4 % (ref 40–54)
HDLC SERPL-MCNC: 32 MG/DL (ref 40–75)
HDLC SERPL: 29.9 % (ref 20–50)
HGB BLD-MCNC: 15.1 G/DL (ref 14–18)
IMM GRANULOCYTES # BLD AUTO: 0.06 K/UL (ref 0–0.04)
IMM GRANULOCYTES NFR BLD AUTO: 0.7 % (ref 0–0.5)
LDLC SERPL CALC-MCNC: 39 MG/DL (ref 63–159)
LYMPHOCYTES # BLD AUTO: 2.6 K/UL (ref 1–4.8)
LYMPHOCYTES NFR BLD: 30.3 % (ref 18–48)
MCH RBC QN AUTO: 31.1 PG (ref 27–31)
MCHC RBC AUTO-ENTMCNC: 33.3 G/DL (ref 32–36)
MCV RBC AUTO: 94 FL (ref 82–98)
MONOCYTES # BLD AUTO: 0.9 K/UL (ref 0.3–1)
MONOCYTES NFR BLD: 10.8 % (ref 4–15)
NEUTROPHILS # BLD AUTO: 4.6 K/UL (ref 1.8–7.7)
NEUTROPHILS NFR BLD: 54.3 % (ref 38–73)
NONHDLC SERPL-MCNC: 75 MG/DL
NRBC BLD-RTO: 0 /100 WBC
PLATELET # BLD AUTO: 182 K/UL (ref 150–350)
PMV BLD AUTO: 10.1 FL (ref 9.2–12.9)
POTASSIUM SERPL-SCNC: 4.5 MMOL/L (ref 3.5–5.1)
PROT SERPL-MCNC: 7 G/DL (ref 6–8.4)
RBC # BLD AUTO: 4.85 M/UL (ref 4.6–6.2)
SODIUM SERPL-SCNC: 140 MMOL/L (ref 136–145)
TRIGL SERPL-MCNC: 180 MG/DL (ref 30–150)
TSH SERPL DL<=0.005 MIU/L-ACNC: 3.93 UIU/ML (ref 0.4–4)
WBC # BLD AUTO: 8.46 K/UL (ref 3.9–12.7)

## 2020-09-28 PROCEDURE — 36415 COLL VENOUS BLD VENIPUNCTURE: CPT | Mod: HCNC,PO

## 2020-09-28 PROCEDURE — 84443 ASSAY THYROID STIM HORMONE: CPT | Mod: HCNC

## 2020-09-28 PROCEDURE — 85025 COMPLETE CBC W/AUTO DIFF WBC: CPT | Mod: HCNC

## 2020-09-28 PROCEDURE — 80053 COMPREHEN METABOLIC PANEL: CPT | Mod: HCNC

## 2020-09-28 PROCEDURE — 80061 LIPID PANEL: CPT | Mod: HCNC

## 2020-09-28 PROCEDURE — 83036 HEMOGLOBIN GLYCOSYLATED A1C: CPT | Mod: HCNC

## 2020-09-29 ENCOUNTER — PATIENT MESSAGE (OUTPATIENT)
Dept: OTHER | Facility: OTHER | Age: 70
End: 2020-09-29

## 2020-10-02 ENCOUNTER — OFFICE VISIT (OUTPATIENT)
Dept: FAMILY MEDICINE | Facility: CLINIC | Age: 70
End: 2020-10-02
Payer: MEDICARE

## 2020-10-02 VITALS
TEMPERATURE: 98 F | WEIGHT: 240 LBS | OXYGEN SATURATION: 96 % | SYSTOLIC BLOOD PRESSURE: 100 MMHG | BODY MASS INDEX: 34.36 KG/M2 | HEART RATE: 66 BPM | HEIGHT: 70 IN | DIASTOLIC BLOOD PRESSURE: 64 MMHG

## 2020-10-02 DIAGNOSIS — I25.10 CORONARY ARTERY DISEASE INVOLVING NATIVE CORONARY ARTERY OF NATIVE HEART WITHOUT ANGINA PECTORIS: ICD-10-CM

## 2020-10-02 DIAGNOSIS — E03.9 HYPOTHYROIDISM, UNSPECIFIED TYPE: Chronic | ICD-10-CM

## 2020-10-02 DIAGNOSIS — E78.2 MIXED HYPERLIPIDEMIA: Chronic | ICD-10-CM

## 2020-10-02 DIAGNOSIS — R73.03 PREDIABETES: Chronic | ICD-10-CM

## 2020-10-02 DIAGNOSIS — Z23 NEED FOR SHINGLES VACCINE: ICD-10-CM

## 2020-10-02 DIAGNOSIS — J31.0 NONALLERGIC RHINITIS: ICD-10-CM

## 2020-10-02 DIAGNOSIS — R07.9 LEFT-SIDED CHEST PAIN: Primary | ICD-10-CM

## 2020-10-02 DIAGNOSIS — L98.9 SKIN LESION: ICD-10-CM

## 2020-10-02 DIAGNOSIS — E66.9 OBESITY (BMI 30.0-34.9): Chronic | ICD-10-CM

## 2020-10-02 DIAGNOSIS — I10 ESSENTIAL HYPERTENSION: Chronic | ICD-10-CM

## 2020-10-02 PROBLEM — I25.118 CORONARY ARTERY DISEASE OF NATIVE ARTERY OF NATIVE HEART WITH STABLE ANGINA PECTORIS: Status: RESOLVED | Noted: 2019-02-21 | Resolved: 2020-10-02

## 2020-10-02 PROCEDURE — 3074F PR MOST RECENT SYSTOLIC BLOOD PRESSURE < 130 MM HG: ICD-10-PCS | Mod: HCNC,CPTII,S$GLB, | Performed by: INTERNAL MEDICINE

## 2020-10-02 PROCEDURE — 3008F BODY MASS INDEX DOCD: CPT | Mod: HCNC,CPTII,S$GLB, | Performed by: INTERNAL MEDICINE

## 2020-10-02 PROCEDURE — 1159F MED LIST DOCD IN RCRD: CPT | Mod: HCNC,S$GLB,, | Performed by: INTERNAL MEDICINE

## 2020-10-02 PROCEDURE — 3078F PR MOST RECENT DIASTOLIC BLOOD PRESSURE < 80 MM HG: ICD-10-PCS | Mod: HCNC,CPTII,S$GLB, | Performed by: INTERNAL MEDICINE

## 2020-10-02 PROCEDURE — 99214 OFFICE O/P EST MOD 30 MIN: CPT | Mod: HCNC,S$GLB,, | Performed by: INTERNAL MEDICINE

## 2020-10-02 PROCEDURE — 99999 PR PBB SHADOW E&M-EST. PATIENT-LVL V: CPT | Mod: PBBFAC,HCNC,, | Performed by: INTERNAL MEDICINE

## 2020-10-02 PROCEDURE — 3078F DIAST BP <80 MM HG: CPT | Mod: HCNC,CPTII,S$GLB, | Performed by: INTERNAL MEDICINE

## 2020-10-02 PROCEDURE — 1126F AMNT PAIN NOTED NONE PRSNT: CPT | Mod: HCNC,S$GLB,, | Performed by: INTERNAL MEDICINE

## 2020-10-02 PROCEDURE — 99999 PR PBB SHADOW E&M-EST. PATIENT-LVL V: ICD-10-PCS | Mod: PBBFAC,HCNC,, | Performed by: INTERNAL MEDICINE

## 2020-10-02 PROCEDURE — 1101F PT FALLS ASSESS-DOCD LE1/YR: CPT | Mod: HCNC,CPTII,S$GLB, | Performed by: INTERNAL MEDICINE

## 2020-10-02 PROCEDURE — 99499 RISK ADDL DX/OHS AUDIT: ICD-10-PCS | Mod: S$GLB,,, | Performed by: INTERNAL MEDICINE

## 2020-10-02 PROCEDURE — 99499 UNLISTED E&M SERVICE: CPT | Mod: S$GLB,,, | Performed by: INTERNAL MEDICINE

## 2020-10-02 PROCEDURE — 1159F PR MEDICATION LIST DOCUMENTED IN MEDICAL RECORD: ICD-10-PCS | Mod: HCNC,S$GLB,, | Performed by: INTERNAL MEDICINE

## 2020-10-02 PROCEDURE — 1126F PR PAIN SEVERITY QUANTIFIED, NO PAIN PRESENT: ICD-10-PCS | Mod: HCNC,S$GLB,, | Performed by: INTERNAL MEDICINE

## 2020-10-02 PROCEDURE — 3074F SYST BP LT 130 MM HG: CPT | Mod: HCNC,CPTII,S$GLB, | Performed by: INTERNAL MEDICINE

## 2020-10-02 PROCEDURE — 99214 PR OFFICE/OUTPT VISIT, EST, LEVL IV, 30-39 MIN: ICD-10-PCS | Mod: HCNC,S$GLB,, | Performed by: INTERNAL MEDICINE

## 2020-10-02 PROCEDURE — 1101F PR PT FALLS ASSESS DOC 0-1 FALLS W/OUT INJ PAST YR: ICD-10-PCS | Mod: HCNC,CPTII,S$GLB, | Performed by: INTERNAL MEDICINE

## 2020-10-02 PROCEDURE — 3008F PR BODY MASS INDEX (BMI) DOCUMENTED: ICD-10-PCS | Mod: HCNC,CPTII,S$GLB, | Performed by: INTERNAL MEDICINE

## 2020-10-02 RX ORDER — LEVOCETIRIZINE DIHYDROCHLORIDE 5 MG/1
5 TABLET, FILM COATED ORAL NIGHTLY
Qty: 30 TABLET | Refills: 11 | Status: SHIPPED | OUTPATIENT
Start: 2020-10-02 | End: 2021-11-14

## 2020-10-02 RX ORDER — ZOSTER VACCINE RECOMBINANT, ADJUVANTED 50 MCG/0.5
0.5 KIT INTRAMUSCULAR ONCE
Qty: 2 EACH | Refills: 0 | Status: SHIPPED | OUTPATIENT
Start: 2020-10-02 | End: 2020-10-02

## 2020-10-02 RX ORDER — INFLUENZA A VIRUS A/MICHIGAN/45/2015 X-275 (H1N1) ANTIGEN (FORMALDEHYDE INACTIVATED), INFLUENZA A VIRUS A/SINGAPORE/INFIMH-16-0019/2016 IVR-186 (H3N2) ANTIGEN (FORMALDEHYDE INACTIVATED), INFLUENZA B VIRUS B/PHUKET/3073/2013 ANTIGEN (FORMALDEHYDE INACTIVATED), AND INFLUENZA B VIRUS B/MARYLAND/15/2016 BX-69A ANTIGEN (FORMALDEHYDE INACTIVATED) 60; 60; 60; 60 UG/.7ML; UG/.7ML; UG/.7ML; UG/.7ML
INJECTION, SUSPENSION INTRAMUSCULAR
COMMUNITY
Start: 2020-09-29 | End: 2021-10-13 | Stop reason: ALTCHOICE

## 2020-10-02 RX ORDER — METFORMIN HYDROCHLORIDE 500 MG/1
500 TABLET ORAL
Qty: 90 TABLET | Refills: 3 | Status: SHIPPED | OUTPATIENT
Start: 2020-10-02 | End: 2021-09-28

## 2020-10-02 NOTE — PROGRESS NOTES
This note was created by combination of typed  and M-Modal dictation.  Transcription errors may be present.  If there are any questions, please contact me.    Assessment and Plan:   Left-sided chest pain  -negative left heart catheterization.  Was referred to GI and pulmonology.  Has not gotten those arranged yet.  Suspect musculoskeletal or micro angio ischemia.  I would go ahead and finish out the workup with pulmonology.    Obesity (BMI 30.0-34.9)  Prediabetes  -after discussion start metformin.  SE profile discussed.  Future labs ordered.  -     metFORMIN (GLUCOPHAGE) 500 MG tablet; Take 1 tablet (500 mg total) by mouth daily with breakfast.  Dispense: 90 tablet; Refill: 3  -     Comprehensive metabolic panel; Future; Expected date: 03/31/2021  -     Hemoglobin A1C; Future; Expected date: 03/31/2021  -     CBC auto differential; Future; Expected date: 03/31/2021    Hypothyroidism, unspecified type  -pre visit labs good on current dose.  No changes.    Coronary artery disease involving native coronary artery of native heart s/p stent LAD 2012; 9/2019 [facility]; [facility]; 9/2020 [facility] no new stent; indefinite DAPT  Mixed hyperlipidemia  -pre visit labs lipid profile not ideal with low HDL and high triglycerides.  On max dose statin.  Non HDL okay.  No changes.  Work on blood sugar control.  -     Lipid Panel; Future; Expected date: 03/31/2021    Essential hypertension  -stable, no changes.    Skin lesion  -nonhealing lesion on the top of his scalp.  History of skin cancer.  Referral to Dermatology.  -     Ambulatory referral/consult to Dermatology; Future; Expected date: 10/09/2020    Nonallergic rhinitis  -refilled Xyzal  -     levocetirizine (XYZAL) 5 MG tablet; Take 1 tablet (5 mg total) by mouth every evening.  Dispense: 30 tablet; Refill: 11    Need for shingles vaccine  -Shingrix prescription sent to pharmacy  -     varicella-zoster gE-AS01B, PF, (SHINGRIX, PF,) 50 mcg/0.5 mL  injection; Inject 0.5 mLs into the muscle once. Repeat in 2 months for 1 dose  Dispense: 2 each; Refill: 0      Medications Discontinued During This Encounter   Medication Reason    tamsulosin (FLOMAX) 0.4 mg Cap        meds sent this encounter:  Medications Ordered This Encounter   Medications    levocetirizine (XYZAL) 5 MG tablet     Sig: Take 1 tablet (5 mg total) by mouth every evening.     Dispense:  30 tablet     Refill:  11    metFORMIN (GLUCOPHAGE) 500 MG tablet     Sig: Take 1 tablet (500 mg total) by mouth daily with breakfast.     Dispense:  90 tablet     Refill:  3    varicella-zoster gE-AS01B, PF, (SHINGRIX, PF,) 50 mcg/0.5 mL injection     Sig: Inject 0.5 mLs into the muscle once. Repeat in 2 months for 1 dose     Dispense:  2 each     Refill:  0       Follow Up: No follow-ups on file.  Follow-up 6 months with pre visit labs ordered.  Recall entered.    Subjective:     Chief Complaint   Patient presents with    Mass     on top of head. tender to touch    Sinus Problem       RODO Alonzo is a 70 y.o. male, last appointment with this clinic was 6/26/2020.    Chest pain/dyspnea.  Seen by cards. 9/2020 s/p LHC, not the cause of pain; was referred to Gi and pulm  Peripheral edema, lasix  HTN, lipid  Pre-DM  Hypothyroid    previsit labs CBC WNL  CMP Cr 1.3 from 1.2  Lipid good  a1c 6.8  TSH WN    Has not seen a GI x 2012.  Abdiaziz at that time.  Has not been contacted by GI or pulm yet.     Chest pain, left side with exertion only.  Hard exertion mainly.  Not related to food intake. Lifting a generator the other day, caused the pain.  Limited in leg motion and exercise b/c of knee.  But could be MSK.     Reviewed his labs.  A1c was high.  High enough to classify him as diabetic.  Long discussion with the patient.  He feels like his diet is maximize.  He does have risk factors of obesity.  After discussion he is agreeable to start metformin.  His wife takes metformin.    Constant runny nose History of  "Xyzal.  Requesting refill on that to the pharmacy.    Diet is OK.  Physically active.     Nonhealing skin lesion on the top of scalp.  Painful.  Has a known history of skin cancer.  Has not seen Dermatology in a while.    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  LVEF 66%.  Normal wall motion.  08/08/2019 normal LV systolic function LVEF 65%.  Concentric LV remodeling.  No wall motion abnormalities.  Aortic root diameter mildly increased verses report 03/2018.  7/30/20  TTE (Ao root 4.2cm at annulus, 3.8cm at sinuses) normal LV systolic function LVEF 65%.  Mild concentric LVH.  Grade 1 diastolic dysfunction.  Normal RV systolic function.  Normal CVP.  PA pressure 20.      Peripheral vascular disease with LE US 6/2017 and carotid US  06/20/2017     LE art US/TANA 6/8/17 1.  Mild/atherosclerotic plaquing. 2.  No stenosis about 30 percent femoral popliteal arteries. 3.  Normal bilateral ABIs.  Carotid US 12/3/14 1. Less than 50% stenosis of the right internal carotid artery. 2. Less than 50% stenosis of the left internal carotid artery.      Aortic ectasia 04/28/2017     "The aorta is elongated" - Xray Chest 5-      JAYLEN (obstructive sleep apnea) 04/28/2017    Hypothyroidism 06/01/2016    Obesity (BMI 30.0-34.9) 06/01/2016    Anxiety and depression with assoc " memory loss; seen by neuro 2015, negative workup 11/24/2014     12/3/2014 MRI brain: 1. No evidence for acute infarct 2. unremarkable MRI of the brain  12/3/2014 carotid US normal      Prediabetes 11/24/2014    Coronary artery disease involving native coronary artery of native heart s/p stent LAD 2012; 9/2019 [facility] patent stent; new stent to RPLB; 9/2020 [facility] no new stent; indefinite DAPT 11/19/2014     Stent LAD 2012 9/27/2013 [facility] prox LAD 30% stenosis; RCA 20% stenosis. patent LAD stent.    3/9/2015 nu med stress test negative. 3/9/2015 TTE normal LVEF 55-60; concentric remodeling.  L MPI 6/8/17 (images pers rev) Nuclear Quantitative Functional Analysis:  LVEF: 68 % Impression: NORMAL MYOCARDIAL PERFUSION  Echo 3/15/18 LVEF 60-65%; upper limit of normal aortic root 3.7 cm  08/08/2019 Lexiscan nuclear stress test probably normal study.  LVEF 66%.  Normal wall motion.  08/08/2019 normal LV systolic function LVEF 65%.  Concentric LV remodeling.  No wall motion abnormalities.  Aortic root diameter mildly increased verses report 03/2018.  9/21/2019 [facility]: Dominance: Right  LM: normal  LAD: mid 50% followed by patent stent, distal/transapical 80% (small caliber)  Ramus: prox 30%  LCx: prox 50%  RCA: prox 30%, dist 40%              RPDA ost 50%              RPLB mid 90%    Stent 2.5x18 [facility] [place] SILVER 16 abi    9/21/2020 [facility]  Dominance: Right  LM: normal  LAD: MLI, mid stent patent (2013)  Ramus: MLI, prox 30%  LCx: MLI, ost 40%  RCA: MLI, dist eccentric 40-50%              RPDA: ost 50%, iFR 0.97              RPLB mid stent patent (9/20/19 2.5x18 [facility] [place] SILVER)     Impression:  2V CAD, normal LV fxn  Patent mid LAD and mid RPLB stents  iFR dist RCA/ost RPDA neg     Plan:  Cont med rx  Cont ASA/Plavix/Statin/BBL/Imdur  Plan long term DAPT given diffuse CAD and prior MV PCI      Mixed hyperlipidemia 07/09/2014    Essential hypertension 07/09/2014 7/30/20  TTE (Ao root 4.2cm at annulus, 3.8cm at sinuses) normal  LV systolic function LVEF 65%.  Mild concentric LVH.  Grade 1 diastolic dysfunction.  Normal RV systolic function.  Normal CVP.  PA pressure 20.      Gastroesophageal reflux disease 07/09/2014    Benign non-nodular prostatic hyperplasia with lower urinary tract symptoms 07/09/2014    Vitamin D deficiency 07/09/2014    Hernia of abdominal wall 07/09/2014       PAST MEDICAL HISTORY:  Past Medical History:   Diagnosis Date    Allergy     Angina pectoris     Anxiety     Coronary artery disease     Depression     GERD (gastroesophageal reflux disease)     Hyperlipidemia     Hypertension     Hypothyroidism     Memory loss     12/3/2014 MRI brain: 1. No evidence for acute infarct 2. unremarkable MRI of the brain; 12/3/2014 carotid US normal    Myocardial infarction     Obesity     Peripheral vascular disease 6/20/2017    Retrocalcaneal bursitis 11/24/2014    Sleep apnea        PAST SURGICAL HISTORY:  Past Surgical History:   Procedure Laterality Date    APPENDECTOMY  1986    bone spur Right     COLONOSCOPY      hand trauma Right     pencil     heart stent      LEFT HEART CATHETERIZATION Left 9/20/2019    Procedure: Left heart cath 12 pm start, R rad access;  Surgeon: Brad Bahena MD;  Location: [facility] CATH LAB;  Service: Cardiology;  Laterality: Left;  RN PREOP 9/13/2019  NEEDS IODINE PREMED    LEFT HEART CATHETERIZATION Left 9/21/2020    Procedure: Left heart cath 730am start, R rad access;  Surgeon: Brad Bahena MD;  Location: [facility] CATH LAB;  Service: Cardiology;  Laterality: Left;  RN PREOP 9/14/2020, COVID NEGATIVE---9/18       SOCIAL HISTORY:  Social History     Socioeconomic History    Marital status:      Spouse name: Not on file    Number of children: Not on file    Years of education: Not on file    Highest education level: Not on file   Occupational History    Not on file   Social Needs    Financial resource strain: Not very hard    Food insecurity     Worry:  Never true     Inability: Never true    Transportation needs     Medical: No     Non-medical: No   Tobacco Use    Smoking status: Never Smoker    Smokeless tobacco: Never Used   Substance and Sexual Activity    Alcohol use: No     Alcohol/week: 0.0 standard drinks     Frequency: Monthly or less     Drinks per session: 1 or 2     Binge frequency: Never     Comment: rarely    Drug use: No    Sexual activity: Yes   Lifestyle    Physical activity     Days per week: 3 days     Minutes per session: 30 min    Stress: To some extent   Relationships    Social connections     Talks on phone: More than three times a week     Gets together: More than three times a week     Attends Muslim service: Not on file     Active member of club or organization: No     Attends meetings of clubs or organizations: Never     Relationship status:    Other Topics Concern    Not on file   Social History Narrative    Not on file       ALLERGIES AND MEDICATIONS: updated and reviewed.  Review of patient's allergies indicates:   Allergen Reactions    Iodine and iodide containing products Anaphylaxis    Naproxen Other (See Comments)     hallucinations  Hallucinations^  Hallucinations^    Hydrocodone-acetaminophen      Rash (skin)^    Iodine      Anaphylaxis^    Oxycodone-acetaminophen      Rash (skin)^       Medication List with Changes/Refills   Current Medications    ASPIRIN (ECOTRIN) 81 MG EC TABLET    Take 1 tablet (81 mg total) by mouth once daily.    ATORVASTATIN (LIPITOR) 80 MG TABLET    TAKE 1 TABLET EVERY EVENING    CLOPIDOGREL (PLAVIX) 75 MG TABLET    TAKE 1 TABLET EVERY DAY    FLUZONE HIGHDOSE QUAD 20-21  MCG/0.7 ML SYRG        FUROSEMIDE (LASIX) 20 MG TABLET    TAKE 1 TABLET EVERY DAY    ISOSORBIDE MONONITRATE (IMDUR) 60 MG 24 HR TABLET    TAKE 1 TABLET EVERY DAY    KRILL/OM3/DHA/EPA/OM6/LIP/ASTX (KRILL OIL, OMEGA 3 AND 6, ORAL)    Take by mouth.    LEVOTHYROXINE (SYNTHROID) 50 MCG TABLET    TAKE 1 TABLET  "BEFORE BREAKFAST    LOSARTAN (COZAAR) 50 MG TABLET    TAKE 1 TABLET EVERY DAY    METOPROLOL TARTRATE (LOPRESSOR) 25 MG TABLET    TAKE 1 TABLET TWICE DAILY    NITROGLYCERIN (NITROSTAT) 0.4 MG SL TABLET    Place 1 tablet (0.4 mg total) under the tongue every 5 (five) minutes as needed for Chest pain.    PANTOPRAZOLE (PROTONIX) 40 MG TABLET    TAKE 1 TABLET EVERY DAY    VITAMIN D 1000 UNITS TAB    Take 1 tablet (1,000 Units total) by mouth once daily.   Discontinued Medications    TAMSULOSIN (FLOMAX) 0.4 MG CAP    Take 2 capsules (0.8 mg total) by mouth once daily.        Review of Systems   All other systems reviewed and are negative.      Objective:   Physical Exam   Vitals:    10/02/20 1449   BP: 100/64   BP Location: Right arm   Patient Position: Sitting   BP Method: Large (Manual)   Pulse: 66   Temp: 97.7 °F (36.5 °C)   TempSrc: Temporal   SpO2: 96%   Weight: 108.9 kg (240 lb)   Height: 5' 10" (1.778 m)    Body mass index is 34.44 kg/m².  Weight: 108.9 kg (240 lb)   Height: 5' 10" (177.8 cm)     Physical Exam  Constitutional:       General: He is not in acute distress.     Appearance: He is well-developed.   Cardiovascular:      Rate and Rhythm: Normal rate and regular rhythm.      Heart sounds: Normal heart sounds. No murmur.   Pulmonary:      Effort: Pulmonary effort is normal.      Breath sounds: Normal breath sounds.   Musculoskeletal: Normal range of motion.      Right lower leg: No edema.      Left lower leg: No edema.   Skin:     General: Skin is warm and dry.      Findings: Rash present.      Comments: Numerous scaly hyperkeratotic lesions with surrounding erythema on the scalp.  The lesion particular-See photo-on the top of his head, looks to be an ulcerated papule without active drainage   Neurological:      Mental Status: He is alert and oriented to person, place, and time.      Coordination: Coordination normal.   Psychiatric:         Behavior: Behavior normal.         Thought Content: Thought content " normal.         Component      Latest Ref Rng & Units 9/28/2020 10/17/2019   WBC      3.90 - 12.70 K/uL 8.46    RBC      4.60 - 6.20 M/uL 4.85    Hemoglobin      14.0 - 18.0 g/dL 15.1    Hematocrit      40.0 - 54.0 % 45.4    MCV      82 - 98 fL 94    MCH      27.0 - 31.0 pg 31.1 (H)    MCHC      32.0 - 36.0 g/dL 33.3    RDW      11.5 - 14.5 % 13.0    Platelets      150 - 350 K/uL 182    MPV      9.2 - 12.9 fL 10.1    Immature Granulocytes      0.0 - 0.5 % 0.7 (H)    Gran # (ANC)      1.8 - 7.7 K/uL 4.6    Immature Grans (Abs)      0.00 - 0.04 K/uL 0.06 (H)    Lymph #      1.0 - 4.8 K/uL 2.6    Mono #      0.3 - 1.0 K/uL 0.9    Eos #      0.0 - 0.5 K/uL 0.3    Baso #      0.00 - 0.20 K/uL 0.08    nRBC      0 /100 WBC 0    Gran%      38.0 - 73.0 % 54.3    Lymph%      18.0 - 48.0 % 30.3    Mono%      4.0 - 15.0 % 10.8    Eosinophil%      0.0 - 8.0 % 3.0    Basophil%      0.0 - 1.9 % 0.9    Differential Method       Automated    Sodium      136 - 145 mmol/L 140    Potassium      3.5 - 5.1 mmol/L 4.5    Chloride      95 - 110 mmol/L 104    CO2      23 - 29 mmol/L 26    Glucose      70 - 110 mg/dL 177 (H)    BUN, Bld      8 - 23 mg/dL 14    Creatinine      0.5 - 1.4 mg/dL 1.3    Calcium      8.7 - 10.5 mg/dL 9.4    PROTEIN TOTAL      6.0 - 8.4 g/dL 7.0    Albumin      3.5 - 5.2 g/dL 4.2    BILIRUBIN TOTAL      0.1 - 1.0 mg/dL 1.0    Alkaline Phosphatase      55 - 135 U/L 54 (L)    AST      10 - 40 U/L 16    ALT      10 - 44 U/L 19    Anion Gap      8 - 16 mmol/L 10    eGFR if African American      >60 mL/min/1.73 m:2 >60.0    eGFR if non African American      >60 mL/min/1.73 m:2 56.5 (A)    Cholesterol      120 - 199 mg/dL 107 (L)    Triglycerides      30 - 150 mg/dL 180 (H)    HDL      40 - 75 mg/dL 32 (L)    LDL Cholesterol External      63.0 - 159.0 mg/dL 39.0 (L)    Hdl/Cholesterol Ratio      20.0 - 50.0 % 29.9    Total Cholesterol/HDL Ratio      2.0 - 5.0 3.3    Non-HDL Cholesterol      mg/dL 75    Hemoglobin A1C  External      4.0 - 5.6 % 6.8 (H) 6.5 (H)   Estimated Avg Glucose      68 - 131 mg/dL 148 (H) 140 (H)   TSH      0.400 - 4.000 uIU/mL 3.930

## 2020-10-07 ENCOUNTER — OFFICE VISIT (OUTPATIENT)
Dept: CARDIOLOGY | Facility: CLINIC | Age: 70
End: 2020-10-07
Payer: MEDICARE

## 2020-10-07 VITALS
WEIGHT: 242 LBS | SYSTOLIC BLOOD PRESSURE: 122 MMHG | HEIGHT: 70 IN | RESPIRATION RATE: 15 BRPM | OXYGEN SATURATION: 97 % | HEART RATE: 54 BPM | BODY MASS INDEX: 34.65 KG/M2 | DIASTOLIC BLOOD PRESSURE: 68 MMHG

## 2020-10-07 DIAGNOSIS — I10 ESSENTIAL HYPERTENSION: Chronic | ICD-10-CM

## 2020-10-07 DIAGNOSIS — G47.33 OSA (OBSTRUCTIVE SLEEP APNEA): Chronic | ICD-10-CM

## 2020-10-07 DIAGNOSIS — I25.10 CORONARY ARTERY DISEASE INVOLVING NATIVE CORONARY ARTERY OF NATIVE HEART WITHOUT ANGINA PECTORIS: Primary | ICD-10-CM

## 2020-10-07 DIAGNOSIS — E66.9 OBESITY (BMI 30.0-34.9): Chronic | ICD-10-CM

## 2020-10-07 DIAGNOSIS — I77.810 AORTIC ROOT DILATATION: ICD-10-CM

## 2020-10-07 DIAGNOSIS — Z88.8 ALLERGY TO IODINE: ICD-10-CM

## 2020-10-07 DIAGNOSIS — I87.2 VENOUS INSUFFICIENCY: ICD-10-CM

## 2020-10-07 DIAGNOSIS — E78.2 MIXED HYPERLIPIDEMIA: Chronic | ICD-10-CM

## 2020-10-07 PROCEDURE — 3008F BODY MASS INDEX DOCD: CPT | Mod: HCNC,CPTII,S$GLB, | Performed by: INTERNAL MEDICINE

## 2020-10-07 PROCEDURE — 1159F PR MEDICATION LIST DOCUMENTED IN MEDICAL RECORD: ICD-10-PCS | Mod: HCNC,S$GLB,, | Performed by: INTERNAL MEDICINE

## 2020-10-07 PROCEDURE — 3074F SYST BP LT 130 MM HG: CPT | Mod: HCNC,CPTII,S$GLB, | Performed by: INTERNAL MEDICINE

## 2020-10-07 PROCEDURE — 1101F PT FALLS ASSESS-DOCD LE1/YR: CPT | Mod: HCNC,CPTII,S$GLB, | Performed by: INTERNAL MEDICINE

## 2020-10-07 PROCEDURE — 99999 PR PBB SHADOW E&M-EST. PATIENT-LVL IV: CPT | Mod: PBBFAC,HCNC,, | Performed by: INTERNAL MEDICINE

## 2020-10-07 PROCEDURE — 1101F PR PT FALLS ASSESS DOC 0-1 FALLS W/OUT INJ PAST YR: ICD-10-PCS | Mod: HCNC,CPTII,S$GLB, | Performed by: INTERNAL MEDICINE

## 2020-10-07 PROCEDURE — 99999 PR PBB SHADOW E&M-EST. PATIENT-LVL IV: ICD-10-PCS | Mod: PBBFAC,HCNC,, | Performed by: INTERNAL MEDICINE

## 2020-10-07 PROCEDURE — 99214 OFFICE O/P EST MOD 30 MIN: CPT | Mod: HCNC,S$GLB,, | Performed by: INTERNAL MEDICINE

## 2020-10-07 PROCEDURE — 1126F PR PAIN SEVERITY QUANTIFIED, NO PAIN PRESENT: ICD-10-PCS | Mod: HCNC,S$GLB,, | Performed by: INTERNAL MEDICINE

## 2020-10-07 PROCEDURE — 3008F PR BODY MASS INDEX (BMI) DOCUMENTED: ICD-10-PCS | Mod: HCNC,CPTII,S$GLB, | Performed by: INTERNAL MEDICINE

## 2020-10-07 PROCEDURE — 3078F PR MOST RECENT DIASTOLIC BLOOD PRESSURE < 80 MM HG: ICD-10-PCS | Mod: HCNC,CPTII,S$GLB, | Performed by: INTERNAL MEDICINE

## 2020-10-07 PROCEDURE — 1126F AMNT PAIN NOTED NONE PRSNT: CPT | Mod: HCNC,S$GLB,, | Performed by: INTERNAL MEDICINE

## 2020-10-07 PROCEDURE — 3078F DIAST BP <80 MM HG: CPT | Mod: HCNC,CPTII,S$GLB, | Performed by: INTERNAL MEDICINE

## 2020-10-07 PROCEDURE — 3074F PR MOST RECENT SYSTOLIC BLOOD PRESSURE < 130 MM HG: ICD-10-PCS | Mod: HCNC,CPTII,S$GLB, | Performed by: INTERNAL MEDICINE

## 2020-10-07 PROCEDURE — 99214 PR OFFICE/OUTPT VISIT, EST, LEVL IV, 30-39 MIN: ICD-10-PCS | Mod: HCNC,S$GLB,, | Performed by: INTERNAL MEDICINE

## 2020-10-07 PROCEDURE — 1159F MED LIST DOCD IN RCRD: CPT | Mod: HCNC,S$GLB,, | Performed by: INTERNAL MEDICINE

## 2020-10-07 NOTE — PROGRESS NOTES
CARDIOVASCULAR PROGRESS NOTE    REASON FOR CONSULT:   Clinton Rosenberg is a 70 y.o. male who presents for follow up of CAD, ao root dil, cath.    PCP: Dair  HISTORY OF PRESENT ILLNESS:   The patient returns for follow-up of his heart catheterization.  He tells me that his dyspnea and angina have abated.  He has had no complications related to the right radial access.  He appears to be tolerating his dual antiplatelet therapy without complications.  He otherwise denies palpitations or syncope.  There has been no PND, orthopnea, melena, hematuria, or claudicant symptoms.    At this point, I suggest we continue medical therapy, including long-term dual antiplatelet therapy given his history of multivessel CAD/PCI.  The patient is agreeable to this and understands that the Plavix can be withheld 5 days prior to any procedures if necessary in the future so long as he does not have any intercurrent cardiac event.    CARDIOVASCULAR HISTORY:   CAD/MI  2013: LAD PCI    9/20/19: mid RPLB 2.5x18 [facility] [place] SILVER    JAYLEN, unable to afford CPAP  Aortic root dil, 3.7->4.0->3.8->4.2  cm (echo 7/2020), 3.8cm (asc Ao) on CTA 9/2019  CVI (bilat GSV, R LSV, US 3/2018)    PAST MEDICAL HISTORY:     Past Medical History:   Diagnosis Date    Allergy     Angina pectoris     Anxiety     Coronary artery disease     Depression     GERD (gastroesophageal reflux disease)     Hyperlipidemia     Hypertension     Hypothyroidism     Memory loss     12/3/2014 MRI brain: 1. No evidence for acute infarct 2. unremarkable MRI of the brain; 12/3/2014 carotid US normal    Myocardial infarction     Obesity     Peripheral vascular disease 6/20/2017    Retrocalcaneal bursitis 11/24/2014    Sleep apnea        PAST SURGICAL HISTORY:     Past Surgical History:   Procedure Laterality Date    APPENDECTOMY  1986    bone spur Right     COLONOSCOPY      hand trauma Right     pencil     heart stent      LEFT HEART CATHETERIZATION Left 9/20/2019     Procedure: Left heart cath 12 pm start, R rad access;  Surgeon: Brad Bahena MD;  Location: [facility] CATH LAB;  Service: Cardiology;  Laterality: Left;  RN PREOP 9/13/2019  NEEDS IODINE PREMED    LEFT HEART CATHETERIZATION Left 9/21/2020    Procedure: Left heart cath 730am start, R rad access;  Surgeon: Brad Bahena MD;  Location: [facility] CATH LAB;  Service: Cardiology;  Laterality: Left;  RN PREOP 9/14/2020, COVID NEGATIVE---9/18       ALLERGIES AND MEDICATION:     Review of patient's allergies indicates:   Allergen Reactions    Iodine containing multivitamin Anaphylaxis    Naproxen Other (See Comments)     hallucinations    Hydrocodone-acetaminophen      Rash (skin)^    Oxycodone-acetaminophen      Rash (skin)^        Medication List          Accurate as of October 7, 2020  8:57 AM. If you have any questions, ask your nurse or doctor.            CONTINUE taking these medications    aspirin 81 MG EC tablet  Commonly known as: ECOTRIN  Take 1 tablet (81 mg total) by mouth once daily.     atorvastatin 80 MG tablet  Commonly known as: LIPITOR  TAKE 1 TABLET EVERY EVENING     clopidogreL 75 mg tablet  Commonly known as: PLAVIX  TAKE 1 TABLET EVERY DAY     FLUZONE HIGHDOSE QUAD 20-21  mcg/0.7 mL Syrg  Generic drug: flu vacc [id number](65yr up)-PF     furosemide 20 MG tablet  Commonly known as: LASIX  TAKE 1 TABLET EVERY DAY     isosorbide mononitrate 60 MG 24 hr tablet  Commonly known as: IMDUR  TAKE 1 TABLET EVERY DAY     KRILL OIL (OMEGA 3 AND 6) ORAL     levocetirizine 5 MG tablet  Commonly known as: XYZAL  Take 1 tablet (5 mg total) by mouth every evening.     levothyroxine 50 MCG tablet  Commonly known as: SYNTHROID  TAKE 1 TABLET BEFORE BREAKFAST     losartan 50 MG tablet  Commonly known as: COZAAR  TAKE 1 TABLET EVERY DAY     metFORMIN 500 MG tablet  Commonly known as: GLUCOPHAGE  Take 1 tablet (500 mg total) by mouth daily with breakfast.     metoprolol tartrate 25 MG tablet  Commonly known  as: LOPRESSOR  TAKE 1 TABLET TWICE DAILY     nitroGLYCERIN 0.4 MG SL tablet  Commonly known as: NITROSTAT  Place 1 tablet (0.4 mg total) under the tongue every 5 (five) minutes as needed for Chest pain.     pantoprazole 40 MG tablet  Commonly known as: PROTONIX  TAKE 1 TABLET EVERY DAY     vitamin D 1000 units Tab  Commonly known as: VITAMIN D3  Take 1 tablet (1,000 Units total) by mouth once daily.              SOCIAL HISTORY:     Social History     Socioeconomic History    Marital status:      Spouse name: Not on file    Number of children: Not on file    Years of education: Not on file    Highest education level: Not on file   Occupational History    Not on file   Social Needs    Financial resource strain: Not very hard    Food insecurity     Worry: Never true     Inability: Never true    Transportation needs     Medical: No     Non-medical: No   Tobacco Use    Smoking status: Never Smoker    Smokeless tobacco: Never Used   Substance and Sexual Activity    Alcohol use: No     Alcohol/week: 0.0 standard drinks     Frequency: Monthly or less     Drinks per session: 1 or 2     Binge frequency: Never     Comment: rarely    Drug use: No    Sexual activity: Yes   Lifestyle    Physical activity     Days per week: 3 days     Minutes per session: 30 min    Stress: To some extent   Relationships    Social connections     Talks on phone: More than three times a week     Gets together: More than three times a week     Attends Faith service: Not on file     Active member of club or organization: No     Attends meetings of clubs or organizations: Never     Relationship status:    Other Topics Concern    Not on file   Social History Narrative    Not on file       FAMILY HISTORY:     Family History   Problem Relation Age of Onset    Arthritis Mother     Early death Mother     Heart disease Mother     Hypertension Mother     Migraines Mother     Seizures Mother     Tremor Mother      "Diabetes Mother     Cancer Mother     Early death Father     Heart disease Father     Hypertension Father     Stroke Father     Diabetes Father     Alcohol abuse Father     Arthritis Brother     Cancer Brother     Diabetes Brother     Early death Brother     Heart disease Brother     Hypertension Brother     Heart disease Sister     Hypertension Sister     Arthritis Sister     Depression Sister     Heart disease Brother     Arthritis Brother     Heart disease Brother     Pneumonia Brother     Heart disease Brother     Hypertension Brother     Diabetes Brother     Heart disease Brother        REVIEW OF SYSTEMS:   Review of Systems   Constitutional: Negative for chills, diaphoresis and fever.   HENT: Negative for nosebleeds.    Eyes: Negative for blurred vision, double vision and photophobia.   Respiratory: Negative for hemoptysis, shortness of breath and wheezing.    Cardiovascular: Negative for chest pain, palpitations, orthopnea, claudication, leg swelling and PND.   Gastrointestinal: Negative for abdominal pain, blood in stool, heartburn, melena, nausea and vomiting.   Genitourinary: Negative for flank pain and hematuria.   Musculoskeletal: Negative for falls, myalgias and neck pain.   Skin: Negative for rash.   Neurological: Negative for dizziness, seizures, loss of consciousness, weakness and headaches.   Endo/Heme/Allergies: Negative for polydipsia. Does not bruise/bleed easily.   Psychiatric/Behavioral: Negative for depression and memory loss. The patient is not nervous/anxious.        PHYSICAL EXAM:     Vitals:    10/07/20 0850   BP: 122/68   Pulse: (!) 54   Resp: 15    Body mass index is 34.72 kg/m².  Weight: 109.8 kg (242 lb)   Height: 5' 10" (177.8 cm)     Physical Exam  Vitals signs reviewed.   Constitutional:       General: He is not in acute distress.     Appearance: He is well-developed. He is obese. He is not ill-appearing, toxic-appearing or diaphoretic.   HENT:      Head: " Normocephalic and atraumatic.   Eyes:      General: No scleral icterus.     Conjunctiva/sclera: Conjunctivae normal.      Pupils: Pupils are equal, round, and reactive to light.   Neck:      Musculoskeletal: Normal range of motion and neck supple. No edema or neck rigidity.      Thyroid: No thyromegaly.      Vascular: Normal carotid pulses. No carotid bruit or JVD.      Trachea: Trachea normal. No tracheal deviation.   Cardiovascular:      Rate and Rhythm: Regular rhythm. Bradycardia present.      Pulses:           Carotid pulses are 2+ on the right side and 2+ on the left side.     Heart sounds: S1 normal and S2 normal. No murmur. No friction rub. No gallop.       Comments: R rad access site c/d/i  Pulmonary:      Effort: Pulmonary effort is normal. No respiratory distress.      Breath sounds: Normal breath sounds. No stridor. No wheezing, rhonchi or rales.   Chest:      Chest wall: No tenderness.   Abdominal:      General: There is no distension.      Palpations: Abdomen is soft.   Musculoskeletal: Normal range of motion.         General: No swelling or tenderness.   Feet:      Right foot:      Skin integrity: No ulcer.      Left foot:      Skin integrity: No ulcer.   Skin:     General: Skin is warm and dry.      Findings: No erythema or rash.   Neurological:      Mental Status: He is alert and oriented to person, place, and time.      Cranial Nerves: No cranial nerve deficit.   Psychiatric:         Speech: Speech normal.         Behavior: Behavior normal. Behavior is cooperative.         DATA:   EKG: (personally reviewed tracing(s))  8/27/20 SR 57, PRWP/LAD, similar to 12/18/19    Laboratory:  CBC:  Recent Labs   Lab 09/21/19  0419 09/14/20  1352 09/28/20  0828   WBC 22.07 H 7.90 8.46   Hemoglobin 14.0 16.2 15.1   Hematocrit 40.9 46.2 45.4   Platelets 200 179 182       CHEMISTRIES:  Recent Labs   Lab 05/20/18  0151  10/17/19  0732 09/14/20  1352 09/28/20  0828   Glucose 130 H   < > 160 H 196 H 177 H   Sodium 143    < > 141 140 140   Potassium 4.7   < > 4.3 4.3 4.5   BUN, Bld 12   < > 12 9 14   Creatinine 1.3   < > 1.2 1.2 1.3   eGFR if African American >60   < > >60.0 >60 >60.0   eGFR if non  57 A   < > >60.0 >60 56.5 A   Calcium 9.4   < > 9.5 9.9 9.4   Magnesium 2.4  --   --   --   --     < > = values in this interval not displayed.       CARDIAC BIOMARKERS:  Recent Labs   Lab 05/20/18  0151 09/20/19  1425 09/21/19  0419   Troponin I <0.006 <0.006 0.006       COAGS:  Recent Labs   Lab 09/13/19  1003 09/14/20  1352   INR 1.0 1.0       LIPIDS/LFTS:  Recent Labs   Lab 10/12/18  0947 09/21/19  0419 10/17/19  0732 09/28/20  0828   Cholesterol 132  --  117 L 107 L   Triglycerides 268 H  --  225 H 180 H   HDL 36 L  --  34 L 32 L   LDL Cholesterol 42.4 L  --  38.0 L 39.0 L   Non-HDL Cholesterol 96  --  83 75   AST 28 14  --  16   ALT 21 18  --  19       Cardiovascular Testing:  Cath 9/21/20  LVEDP: 7mmHg  LVEF: 65% by echo  Dominance: Right  LM: normal  LAD: MLI, mid stent patent (2013)  Ramus: MLI, prox 30%  LCx: MLI, ost 40%  RCA: MLI, dist eccentric 40-50%              RPDA: ost 50%, iFR 0.97              RPLB mid stent patent (9/20/19 2.5x18 [facility] [place] SILVER)  Hemostasis:  R Radial band  Impression:  Recurrent CP on mult meds  2V CAD, normal LV fxn  Patent mid LAD and mid RPLB stents  iFR dist RCA/ost RPDA neg  R rad vasband for hemostasis  Successful pre-treatment of iodine allergy  Plan:  Cont med rx  Cont ASA/Plavix/Statin/BBL/Imdur  Plan long term DAPT given diffuse CAD and prior MV PCI  Home today  Follow up with Dr. Bahena as planned  Outpatient GI/Pulm evals    Echo 7/30/20 (Ao root 4.2cm at annulus, 3.8cm at sinuses)  · Normal left ventricular systolic function. The estimated ejection fraction is 65%.  · Mild concentric left ventricular hypertrophy.  · Grade I (mild) left ventricular diastolic dysfunction consistent with impaired relaxation.  · Normal right ventricular systolic function.  · Mild left  atrial enlargement.  · Normal central venous pressure (3 mmHg).  · The estimated PA systolic pressure is 20 mmHg.    CTA Chest 9/20/19 (post-cath)  The main pulmonary artery is enlarged suggesting underlying pulmonary artery hypertension.  Single-vessel coronary disease.  Normal thoracic aorta and visualized abdominal aorta.  Aortic measurements: STJ 3.4cm, Asc 3.8cm, Desc: 2.6.    L MPI 8/8/19 (similar diaphramatic artifact noted on MPI 6/2017)    Probably normal concepcion myocardial perfusion study.    The perfusion scan is free of evidence from myocardial ischemia or injury.    Post stress wall motion is physiologic.    There is a  mild intensity fixed defect in the inferior wall of the left ventricle secondary to diaphragm attenuation.    Gated perfusion images showed an ejection fraction of  66 % post stress.    There is  normal wall motion post stress.    Venous US/reflux 3/15/18  RIGHT:  No evidence of Right lower extremity DVT.    There is reflux in the Greater Saphenous and Lesser Saphenous Veins.    R GSV dist thigh 2186 msec  R GSV in calf 2339 msec  R LSV 4006 msec   LEFT:  No evidence of Left lower extremity DVT.    There is reflux in the Greater Saphenous Vein.  R GSV mid thigh 2422 msec  R GSV dist thigh 1667 msec  R GSV calf 5647 msec      Holter 6/8/17  PREDOMINANT RHYTHM  1. Sinus rhythm with heart rates varying between 43 and 94 bpm with an average of 62 bpm.   VENTRICULAR ARRHYTHMIAS  1. There were very rare PVCs recorded totalling 5 and averaging less than 1 per hour.   2. There were no episodes of ventricular tachycardia.  SUPRA VENTRICULAR ARRHYTHMIAS  1. There were very rare PACs recorded totalling 3 and averaging less than 1 per hour.   2. There were no episodes of sustained supraventricular tachycardia.  SINUS NODE FUNCTION  1. There was no evidence of high grade SA deanne block.   AV CONDUCTION  1. There was no evidence of high grade AV block.   DIARY  1. The diary was not  returned  MISCELLANEOUS  1. There were rare hookup related artifacts. Overall, the study was of good quality.   2. This was a tape of adequate length (24 hrs).    LE art US/TANA 6/8/17  1.  Mild/atherosclerotic plaquing.  2.  No stenosis about 30 percent femoral popliteal arteries.  3.  Normal bilateral ABIs.    Carotid US 12/3/14  1. Less than 50% stenosis of the right internal carotid artery.  2. Less than 50% stenosis of the left internal carotid artery.      ASSESSMENT:   # CAD s/p LAD PCI 2013, RPLB PCI 9/2019, cath 9/2020 with patent LAD/RPLB stents.  Currently asymptomatic.  # HTN, controlled.  # HLP, on atorva 80mg  # Ao root dil, 3.7->4.0->3.8->4.2 cm (echo 7/2020), 3.8cm (asc Ao CTA 9/2019)  # CVI (bilat GSV, R LSV) on US 3/2018.  No edema at present (but complains of intermittent swelling), prev declined referral for venous ablation.  # hx JAYLEN, pt unable to obtain CPAP apparatus  # BMI 34, down 1 unit(s) vs last OV  # Iodine allergy (?Betadine), successfully premedicated for cath 9/2020    PLAN:   Cont med rx  Cont ASA 81mg qd indefinitely  Cont Plavix 75mg qd, long term rx planned given hx MV PCI/CAD.  Diet/exercise/weight loss  RTC 6 months  Consider pulm/GI eval if recurrent sxs  Surveillance echo July 2021 for surveillance of aortic root dil      Brad Bahena MD, FACC

## 2020-12-11 ENCOUNTER — PATIENT MESSAGE (OUTPATIENT)
Dept: OTHER | Facility: OTHER | Age: 70
End: 2020-12-11

## 2021-03-17 DIAGNOSIS — E11.9 TYPE 2 DIABETES MELLITUS WITHOUT COMPLICATION, UNSPECIFIED WHETHER LONG TERM INSULIN USE: ICD-10-CM

## 2021-03-27 ENCOUNTER — PATIENT OUTREACH (OUTPATIENT)
Dept: ADMINISTRATIVE | Facility: OTHER | Age: 71
End: 2021-03-27

## 2021-03-31 ENCOUNTER — PATIENT MESSAGE (OUTPATIENT)
Dept: ADMINISTRATIVE | Facility: HOSPITAL | Age: 71
End: 2021-03-31

## 2021-04-01 ENCOUNTER — OFFICE VISIT (OUTPATIENT)
Dept: CARDIOLOGY | Facility: CLINIC | Age: 71
End: 2021-04-01
Payer: MEDICARE

## 2021-04-01 ENCOUNTER — PATIENT OUTREACH (OUTPATIENT)
Dept: ADMINISTRATIVE | Facility: HOSPITAL | Age: 71
End: 2021-04-01

## 2021-04-01 VITALS
RESPIRATION RATE: 15 BRPM | DIASTOLIC BLOOD PRESSURE: 76 MMHG | SYSTOLIC BLOOD PRESSURE: 132 MMHG | OXYGEN SATURATION: 96 % | HEIGHT: 70 IN | BODY MASS INDEX: 33.96 KG/M2 | WEIGHT: 237.19 LBS | HEART RATE: 59 BPM

## 2021-04-01 DIAGNOSIS — I77.810 AORTIC ROOT DILATATION: ICD-10-CM

## 2021-04-01 DIAGNOSIS — E66.9 OBESITY (BMI 30.0-34.9): Chronic | ICD-10-CM

## 2021-04-01 DIAGNOSIS — E78.2 MIXED HYPERLIPIDEMIA: Chronic | ICD-10-CM

## 2021-04-01 DIAGNOSIS — I77.819 AORTIC ECTASIA: ICD-10-CM

## 2021-04-01 DIAGNOSIS — G47.33 OSA (OBSTRUCTIVE SLEEP APNEA): Chronic | ICD-10-CM

## 2021-04-01 DIAGNOSIS — I87.2 VENOUS INSUFFICIENCY: ICD-10-CM

## 2021-04-01 DIAGNOSIS — I10 ESSENTIAL HYPERTENSION: Chronic | ICD-10-CM

## 2021-04-01 DIAGNOSIS — I25.118 CORONARY ARTERY DISEASE OF NATIVE ARTERY OF NATIVE HEART WITH STABLE ANGINA PECTORIS: Primary | ICD-10-CM

## 2021-04-01 PROCEDURE — 3288F FALL RISK ASSESSMENT DOCD: CPT | Mod: CPTII,S$GLB,, | Performed by: INTERNAL MEDICINE

## 2021-04-01 PROCEDURE — 1101F PT FALLS ASSESS-DOCD LE1/YR: CPT | Mod: CPTII,S$GLB,, | Performed by: INTERNAL MEDICINE

## 2021-04-01 PROCEDURE — 99999 PR PBB SHADOW E&M-EST. PATIENT-LVL IV: ICD-10-PCS | Mod: PBBFAC,,, | Performed by: INTERNAL MEDICINE

## 2021-04-01 PROCEDURE — 99214 PR OFFICE/OUTPT VISIT, EST, LEVL IV, 30-39 MIN: ICD-10-PCS | Mod: S$GLB,,, | Performed by: INTERNAL MEDICINE

## 2021-04-01 PROCEDURE — 1126F PR PAIN SEVERITY QUANTIFIED, NO PAIN PRESENT: ICD-10-PCS | Mod: S$GLB,,, | Performed by: INTERNAL MEDICINE

## 2021-04-01 PROCEDURE — 3075F PR MOST RECENT SYSTOLIC BLOOD PRESS GE 130-139MM HG: ICD-10-PCS | Mod: CPTII,S$GLB,, | Performed by: INTERNAL MEDICINE

## 2021-04-01 PROCEDURE — 99214 OFFICE O/P EST MOD 30 MIN: CPT | Mod: S$GLB,,, | Performed by: INTERNAL MEDICINE

## 2021-04-01 PROCEDURE — 99499 RISK ADDL DX/OHS AUDIT: ICD-10-PCS | Mod: S$GLB,,, | Performed by: INTERNAL MEDICINE

## 2021-04-01 PROCEDURE — 1101F PR PT FALLS ASSESS DOC 0-1 FALLS W/OUT INJ PAST YR: ICD-10-PCS | Mod: CPTII,S$GLB,, | Performed by: INTERNAL MEDICINE

## 2021-04-01 PROCEDURE — 99499 UNLISTED E&M SERVICE: CPT | Mod: S$GLB,,, | Performed by: INTERNAL MEDICINE

## 2021-04-01 PROCEDURE — 1159F PR MEDICATION LIST DOCUMENTED IN MEDICAL RECORD: ICD-10-PCS | Mod: S$GLB,,, | Performed by: INTERNAL MEDICINE

## 2021-04-01 PROCEDURE — 3078F DIAST BP <80 MM HG: CPT | Mod: CPTII,S$GLB,, | Performed by: INTERNAL MEDICINE

## 2021-04-01 PROCEDURE — 1126F AMNT PAIN NOTED NONE PRSNT: CPT | Mod: S$GLB,,, | Performed by: INTERNAL MEDICINE

## 2021-04-01 PROCEDURE — 3075F SYST BP GE 130 - 139MM HG: CPT | Mod: CPTII,S$GLB,, | Performed by: INTERNAL MEDICINE

## 2021-04-01 PROCEDURE — 3288F PR FALLS RISK ASSESSMENT DOCUMENTED: ICD-10-PCS | Mod: CPTII,S$GLB,, | Performed by: INTERNAL MEDICINE

## 2021-04-01 PROCEDURE — 3078F PR MOST RECENT DIASTOLIC BLOOD PRESSURE < 80 MM HG: ICD-10-PCS | Mod: CPTII,S$GLB,, | Performed by: INTERNAL MEDICINE

## 2021-04-01 PROCEDURE — 99999 PR PBB SHADOW E&M-EST. PATIENT-LVL IV: CPT | Mod: PBBFAC,,, | Performed by: INTERNAL MEDICINE

## 2021-04-01 PROCEDURE — 1159F MED LIST DOCD IN RCRD: CPT | Mod: S$GLB,,, | Performed by: INTERNAL MEDICINE

## 2021-04-01 PROCEDURE — 93000 ELECTROCARDIOGRAM COMPLETE: CPT | Mod: S$GLB,,, | Performed by: INTERNAL MEDICINE

## 2021-04-01 PROCEDURE — 3008F PR BODY MASS INDEX (BMI) DOCUMENTED: ICD-10-PCS | Mod: CPTII,S$GLB,, | Performed by: INTERNAL MEDICINE

## 2021-04-01 PROCEDURE — 3008F BODY MASS INDEX DOCD: CPT | Mod: CPTII,S$GLB,, | Performed by: INTERNAL MEDICINE

## 2021-04-01 PROCEDURE — 93000 EKG 12-LEAD: ICD-10-PCS | Mod: S$GLB,,, | Performed by: INTERNAL MEDICINE

## 2021-04-01 RX ORDER — ISOSORBIDE MONONITRATE 120 MG/1
120 TABLET, EXTENDED RELEASE ORAL DAILY
Qty: 90 TABLET | Refills: 3 | Status: SHIPPED | OUTPATIENT
Start: 2021-04-01 | End: 2021-04-15 | Stop reason: SDUPTHER

## 2021-04-06 ENCOUNTER — PATIENT MESSAGE (OUTPATIENT)
Dept: ADMINISTRATIVE | Facility: HOSPITAL | Age: 71
End: 2021-04-06

## 2021-04-12 ENCOUNTER — PATIENT OUTREACH (OUTPATIENT)
Dept: ADMINISTRATIVE | Facility: HOSPITAL | Age: 71
End: 2021-04-12

## 2021-04-15 ENCOUNTER — LAB VISIT (OUTPATIENT)
Dept: LAB | Facility: HOSPITAL | Age: 71
End: 2021-04-15
Attending: INTERNAL MEDICINE
Payer: MEDICARE

## 2021-04-15 ENCOUNTER — OFFICE VISIT (OUTPATIENT)
Dept: FAMILY MEDICINE | Facility: CLINIC | Age: 71
End: 2021-04-15
Payer: MEDICARE

## 2021-04-15 VITALS
WEIGHT: 238.56 LBS | RESPIRATION RATE: 20 BRPM | HEART RATE: 60 BPM | TEMPERATURE: 98 F | HEIGHT: 70 IN | SYSTOLIC BLOOD PRESSURE: 126 MMHG | BODY MASS INDEX: 34.15 KG/M2 | DIASTOLIC BLOOD PRESSURE: 76 MMHG | OXYGEN SATURATION: 97 %

## 2021-04-15 DIAGNOSIS — E78.2 MIXED HYPERLIPIDEMIA: Chronic | ICD-10-CM

## 2021-04-15 DIAGNOSIS — E66.9 OBESITY (BMI 30.0-34.9): Chronic | ICD-10-CM

## 2021-04-15 DIAGNOSIS — I10 ESSENTIAL HYPERTENSION: Chronic | ICD-10-CM

## 2021-04-15 DIAGNOSIS — E03.9 HYPOTHYROIDISM, UNSPECIFIED TYPE: Chronic | ICD-10-CM

## 2021-04-15 DIAGNOSIS — I25.118 CORONARY ARTERY DISEASE OF NATIVE ARTERY OF NATIVE HEART WITH STABLE ANGINA PECTORIS: ICD-10-CM

## 2021-04-15 DIAGNOSIS — R73.03 PREDIABETES: Chronic | ICD-10-CM

## 2021-04-15 DIAGNOSIS — I77.819 AORTIC ECTASIA: ICD-10-CM

## 2021-04-15 DIAGNOSIS — R73.03 PREDIABETES: Primary | Chronic | ICD-10-CM

## 2021-04-15 LAB
ALBUMIN SERPL BCP-MCNC: 4.1 G/DL (ref 3.5–5.2)
ALP SERPL-CCNC: 50 U/L (ref 55–135)
ALT SERPL W/O P-5'-P-CCNC: 21 U/L (ref 10–44)
ANION GAP SERPL CALC-SCNC: 6 MMOL/L (ref 8–16)
AST SERPL-CCNC: 24 U/L (ref 10–40)
BASOPHILS # BLD AUTO: 0.07 K/UL (ref 0–0.2)
BASOPHILS NFR BLD: 0.8 % (ref 0–1.9)
BILIRUB SERPL-MCNC: 1 MG/DL (ref 0.1–1)
BUN SERPL-MCNC: 9 MG/DL (ref 8–23)
CALCIUM SERPL-MCNC: 9 MG/DL (ref 8.7–10.5)
CHLORIDE SERPL-SCNC: 100 MMOL/L (ref 95–110)
CHOLEST SERPL-MCNC: 118 MG/DL (ref 120–199)
CHOLEST/HDLC SERPL: 3.8 {RATIO} (ref 2–5)
CO2 SERPL-SCNC: 32 MMOL/L (ref 23–29)
CREAT SERPL-MCNC: 1 MG/DL (ref 0.5–1.4)
DIFFERENTIAL METHOD: ABNORMAL
EOSINOPHIL # BLD AUTO: 0.3 K/UL (ref 0–0.5)
EOSINOPHIL NFR BLD: 3 % (ref 0–8)
ERYTHROCYTE [DISTWIDTH] IN BLOOD BY AUTOMATED COUNT: 13.4 % (ref 11.5–14.5)
EST. GFR  (AFRICAN AMERICAN): >60 ML/MIN/1.73 M^2
EST. GFR  (NON AFRICAN AMERICAN): >60 ML/MIN/1.73 M^2
ESTIMATED AVG GLUCOSE: 131 MG/DL (ref 68–131)
GLUCOSE SERPL-MCNC: 180 MG/DL (ref 70–110)
HBA1C MFR BLD: 6.2 % (ref 4–5.6)
HCT VFR BLD AUTO: 43.8 % (ref 40–54)
HDLC SERPL-MCNC: 31 MG/DL (ref 40–75)
HDLC SERPL: 26.3 % (ref 20–50)
HGB BLD-MCNC: 15.1 G/DL (ref 14–18)
IMM GRANULOCYTES # BLD AUTO: 0.05 K/UL (ref 0–0.04)
IMM GRANULOCYTES NFR BLD AUTO: 0.6 % (ref 0–0.5)
LDLC SERPL CALC-MCNC: 29.4 MG/DL (ref 63–159)
LYMPHOCYTES # BLD AUTO: 2.6 K/UL (ref 1–4.8)
LYMPHOCYTES NFR BLD: 31.9 % (ref 18–48)
MCH RBC QN AUTO: 31.6 PG (ref 27–31)
MCHC RBC AUTO-ENTMCNC: 34.5 G/DL (ref 32–36)
MCV RBC AUTO: 92 FL (ref 82–98)
MONOCYTES # BLD AUTO: 0.7 K/UL (ref 0.3–1)
MONOCYTES NFR BLD: 8.7 % (ref 4–15)
NEUTROPHILS # BLD AUTO: 4.5 K/UL (ref 1.8–7.7)
NEUTROPHILS NFR BLD: 55 % (ref 38–73)
NONHDLC SERPL-MCNC: 87 MG/DL
NRBC BLD-RTO: 0 /100 WBC
PLATELET # BLD AUTO: 194 K/UL (ref 150–450)
PMV BLD AUTO: 10 FL (ref 9.2–12.9)
POTASSIUM SERPL-SCNC: 4.2 MMOL/L (ref 3.5–5.1)
PROT SERPL-MCNC: 6.9 G/DL (ref 6–8.4)
RBC # BLD AUTO: 4.78 M/UL (ref 4.6–6.2)
SODIUM SERPL-SCNC: 138 MMOL/L (ref 136–145)
TRIGL SERPL-MCNC: 288 MG/DL (ref 30–150)
WBC # BLD AUTO: 8.25 K/UL (ref 3.9–12.7)

## 2021-04-15 PROCEDURE — 99999 PR PBB SHADOW E&M-EST. PATIENT-LVL V: ICD-10-PCS | Mod: PBBFAC,,, | Performed by: INTERNAL MEDICINE

## 2021-04-15 PROCEDURE — 1159F MED LIST DOCD IN RCRD: CPT | Mod: S$GLB,,, | Performed by: INTERNAL MEDICINE

## 2021-04-15 PROCEDURE — 99214 OFFICE O/P EST MOD 30 MIN: CPT | Mod: S$GLB,,, | Performed by: INTERNAL MEDICINE

## 2021-04-15 PROCEDURE — 1126F PR PAIN SEVERITY QUANTIFIED, NO PAIN PRESENT: ICD-10-PCS | Mod: S$GLB,,, | Performed by: INTERNAL MEDICINE

## 2021-04-15 PROCEDURE — 99999 PR PBB SHADOW E&M-EST. PATIENT-LVL V: CPT | Mod: PBBFAC,,, | Performed by: INTERNAL MEDICINE

## 2021-04-15 PROCEDURE — 3008F PR BODY MASS INDEX (BMI) DOCUMENTED: ICD-10-PCS | Mod: CPTII,S$GLB,, | Performed by: INTERNAL MEDICINE

## 2021-04-15 PROCEDURE — 1126F AMNT PAIN NOTED NONE PRSNT: CPT | Mod: S$GLB,,, | Performed by: INTERNAL MEDICINE

## 2021-04-15 PROCEDURE — 1159F PR MEDICATION LIST DOCUMENTED IN MEDICAL RECORD: ICD-10-PCS | Mod: S$GLB,,, | Performed by: INTERNAL MEDICINE

## 2021-04-15 PROCEDURE — 3008F BODY MASS INDEX DOCD: CPT | Mod: CPTII,S$GLB,, | Performed by: INTERNAL MEDICINE

## 2021-04-15 PROCEDURE — 36415 COLL VENOUS BLD VENIPUNCTURE: CPT | Mod: PO | Performed by: INTERNAL MEDICINE

## 2021-04-15 PROCEDURE — 1101F PR PT FALLS ASSESS DOC 0-1 FALLS W/OUT INJ PAST YR: ICD-10-PCS | Mod: CPTII,S$GLB,, | Performed by: INTERNAL MEDICINE

## 2021-04-15 PROCEDURE — 85025 COMPLETE CBC W/AUTO DIFF WBC: CPT | Performed by: INTERNAL MEDICINE

## 2021-04-15 PROCEDURE — 1101F PT FALLS ASSESS-DOCD LE1/YR: CPT | Mod: CPTII,S$GLB,, | Performed by: INTERNAL MEDICINE

## 2021-04-15 PROCEDURE — 80061 LIPID PANEL: CPT | Performed by: INTERNAL MEDICINE

## 2021-04-15 PROCEDURE — 3288F PR FALLS RISK ASSESSMENT DOCUMENTED: ICD-10-PCS | Mod: CPTII,S$GLB,, | Performed by: INTERNAL MEDICINE

## 2021-04-15 PROCEDURE — 83036 HEMOGLOBIN GLYCOSYLATED A1C: CPT | Performed by: INTERNAL MEDICINE

## 2021-04-15 PROCEDURE — 99214 PR OFFICE/OUTPT VISIT, EST, LEVL IV, 30-39 MIN: ICD-10-PCS | Mod: S$GLB,,, | Performed by: INTERNAL MEDICINE

## 2021-04-15 PROCEDURE — 3288F FALL RISK ASSESSMENT DOCD: CPT | Mod: CPTII,S$GLB,, | Performed by: INTERNAL MEDICINE

## 2021-04-15 PROCEDURE — 80053 COMPREHEN METABOLIC PANEL: CPT | Performed by: INTERNAL MEDICINE

## 2021-04-15 RX ORDER — ISOSORBIDE MONONITRATE 120 MG/1
120 TABLET, EXTENDED RELEASE ORAL DAILY
Qty: 90 TABLET | Refills: 3 | Status: SHIPPED | OUTPATIENT
Start: 2021-04-15 | End: 2021-12-16 | Stop reason: SDUPTHER

## 2021-04-16 DIAGNOSIS — E78.2 MIXED HYPERLIPIDEMIA: ICD-10-CM

## 2021-04-16 DIAGNOSIS — I10 ESSENTIAL HYPERTENSION: ICD-10-CM

## 2021-04-16 DIAGNOSIS — R73.03 PREDIABETES: Primary | ICD-10-CM

## 2021-05-07 DIAGNOSIS — I10 ESSENTIAL HYPERTENSION: Chronic | ICD-10-CM

## 2021-05-07 DIAGNOSIS — E03.9 HYPOTHYROIDISM, UNSPECIFIED TYPE: Chronic | ICD-10-CM

## 2021-05-07 DIAGNOSIS — R60.0 PERIPHERAL EDEMA: Chronic | ICD-10-CM

## 2021-05-07 DIAGNOSIS — E78.2 MIXED HYPERLIPIDEMIA: Chronic | ICD-10-CM

## 2021-05-07 DIAGNOSIS — I25.118 CORONARY ARTERY DISEASE OF NATIVE ARTERY OF NATIVE HEART WITH STABLE ANGINA PECTORIS: ICD-10-CM

## 2021-05-07 RX ORDER — LOSARTAN POTASSIUM 50 MG/1
TABLET ORAL
Qty: 90 TABLET | Refills: 3 | Status: SHIPPED | OUTPATIENT
Start: 2021-05-07 | End: 2022-06-07

## 2021-05-07 RX ORDER — FUROSEMIDE 20 MG/1
TABLET ORAL
Qty: 90 TABLET | Refills: 3 | Status: SHIPPED | OUTPATIENT
Start: 2021-05-07 | End: 2022-06-07

## 2021-05-07 RX ORDER — PANTOPRAZOLE SODIUM 40 MG/1
TABLET, DELAYED RELEASE ORAL
Qty: 90 TABLET | Refills: 3 | Status: SHIPPED | OUTPATIENT
Start: 2021-05-07 | End: 2022-06-07

## 2021-05-07 RX ORDER — ATORVASTATIN CALCIUM 80 MG/1
TABLET, FILM COATED ORAL
Qty: 90 TABLET | Refills: 3 | Status: SHIPPED | OUTPATIENT
Start: 2021-05-07 | End: 2022-06-07

## 2021-05-07 RX ORDER — LEVOTHYROXINE SODIUM 50 UG/1
TABLET ORAL
Qty: 90 TABLET | Refills: 3 | Status: SHIPPED | OUTPATIENT
Start: 2021-05-07 | End: 2022-04-13 | Stop reason: DRUGHIGH

## 2021-05-07 RX ORDER — METOPROLOL TARTRATE 25 MG/1
TABLET, FILM COATED ORAL
Qty: 180 TABLET | Refills: 3 | Status: SHIPPED | OUTPATIENT
Start: 2021-05-07 | End: 2022-06-07

## 2021-05-20 ENCOUNTER — OFFICE VISIT (OUTPATIENT)
Dept: OPTOMETRY | Facility: CLINIC | Age: 71
End: 2021-05-20
Payer: MEDICARE

## 2021-05-20 DIAGNOSIS — R73.03 PREDIABETES: Primary | ICD-10-CM

## 2021-05-20 DIAGNOSIS — H25.13 NUCLEAR SCLEROSIS OF BOTH EYES: ICD-10-CM

## 2021-05-20 PROCEDURE — 1126F AMNT PAIN NOTED NONE PRSNT: CPT | Mod: S$GLB,,, | Performed by: OPTOMETRIST

## 2021-05-20 PROCEDURE — 3288F PR FALLS RISK ASSESSMENT DOCUMENTED: ICD-10-PCS | Mod: CPTII,S$GLB,, | Performed by: OPTOMETRIST

## 2021-05-20 PROCEDURE — 1101F PR PT FALLS ASSESS DOC 0-1 FALLS W/OUT INJ PAST YR: ICD-10-PCS | Mod: CPTII,S$GLB,, | Performed by: OPTOMETRIST

## 2021-05-20 PROCEDURE — 99499 UNLISTED E&M SERVICE: CPT | Mod: S$GLB,,, | Performed by: OPTOMETRIST

## 2021-05-20 PROCEDURE — 1126F PR PAIN SEVERITY QUANTIFIED, NO PAIN PRESENT: ICD-10-PCS | Mod: S$GLB,,, | Performed by: OPTOMETRIST

## 2021-05-20 PROCEDURE — 99999 PR PBB SHADOW E&M-EST. PATIENT-LVL III: ICD-10-PCS | Mod: PBBFAC,,, | Performed by: OPTOMETRIST

## 2021-05-20 PROCEDURE — 92004 PR EYE EXAM, NEW PATIENT,COMPREHESV: ICD-10-PCS | Mod: S$GLB,,, | Performed by: OPTOMETRIST

## 2021-05-20 PROCEDURE — 99499 RISK ADDL DX/OHS AUDIT: ICD-10-PCS | Mod: S$GLB,,, | Performed by: OPTOMETRIST

## 2021-05-20 PROCEDURE — 3288F FALL RISK ASSESSMENT DOCD: CPT | Mod: CPTII,S$GLB,, | Performed by: OPTOMETRIST

## 2021-05-20 PROCEDURE — 1101F PT FALLS ASSESS-DOCD LE1/YR: CPT | Mod: CPTII,S$GLB,, | Performed by: OPTOMETRIST

## 2021-05-20 PROCEDURE — 92004 COMPRE OPH EXAM NEW PT 1/>: CPT | Mod: S$GLB,,, | Performed by: OPTOMETRIST

## 2021-05-20 PROCEDURE — 99999 PR PBB SHADOW E&M-EST. PATIENT-LVL III: CPT | Mod: PBBFAC,,, | Performed by: OPTOMETRIST

## 2021-05-28 ENCOUNTER — PES CALL (OUTPATIENT)
Dept: ADMINISTRATIVE | Facility: CLINIC | Age: 71
End: 2021-05-28

## 2021-08-25 ENCOUNTER — PES CALL (OUTPATIENT)
Dept: ADMINISTRATIVE | Facility: CLINIC | Age: 71
End: 2021-08-25

## 2021-09-13 ENCOUNTER — PES CALL (OUTPATIENT)
Dept: ADMINISTRATIVE | Facility: CLINIC | Age: 71
End: 2021-09-13

## 2021-09-16 ENCOUNTER — OFFICE VISIT (OUTPATIENT)
Dept: FAMILY MEDICINE | Facility: CLINIC | Age: 71
End: 2021-09-16
Payer: MEDICARE

## 2021-09-16 VITALS
RESPIRATION RATE: 19 BRPM | WEIGHT: 238.13 LBS | HEIGHT: 70 IN | SYSTOLIC BLOOD PRESSURE: 132 MMHG | TEMPERATURE: 98 F | BODY MASS INDEX: 34.09 KG/M2 | HEART RATE: 62 BPM | OXYGEN SATURATION: 95 % | DIASTOLIC BLOOD PRESSURE: 82 MMHG

## 2021-09-16 DIAGNOSIS — I77.819 AORTIC ECTASIA: ICD-10-CM

## 2021-09-16 DIAGNOSIS — Z28.21 COVID-19 VIRUS VACCINATION REFUSED: ICD-10-CM

## 2021-09-16 DIAGNOSIS — I73.9 PERIPHERAL VASCULAR DISEASE: ICD-10-CM

## 2021-09-16 DIAGNOSIS — I10 ESSENTIAL HYPERTENSION: Chronic | ICD-10-CM

## 2021-09-16 DIAGNOSIS — H91.90 PERCEIVED HEARING LOSS: ICD-10-CM

## 2021-09-16 DIAGNOSIS — Z23 NEED FOR SHINGLES VACCINE: ICD-10-CM

## 2021-09-16 DIAGNOSIS — Z00.00 ENCOUNTER FOR PREVENTIVE HEALTH EXAMINATION: Primary | ICD-10-CM

## 2021-09-16 DIAGNOSIS — I25.118 CORONARY ARTERY DISEASE OF NATIVE ARTERY OF NATIVE HEART WITH STABLE ANGINA PECTORIS: ICD-10-CM

## 2021-09-16 DIAGNOSIS — E03.9 HYPOTHYROIDISM, UNSPECIFIED TYPE: Chronic | ICD-10-CM

## 2021-09-16 DIAGNOSIS — E78.2 MIXED HYPERLIPIDEMIA: Chronic | ICD-10-CM

## 2021-09-16 DIAGNOSIS — I77.810 AORTIC ROOT DILATATION: ICD-10-CM

## 2021-09-16 PROCEDURE — 99999 PR PBB SHADOW E&M-EST. PATIENT-LVL V: CPT | Mod: PBBFAC,,, | Performed by: NURSE PRACTITIONER

## 2021-09-16 PROCEDURE — 3008F BODY MASS INDEX DOCD: CPT | Mod: CPTII,S$GLB,, | Performed by: NURSE PRACTITIONER

## 2021-09-16 PROCEDURE — 1159F PR MEDICATION LIST DOCUMENTED IN MEDICAL RECORD: ICD-10-PCS | Mod: CPTII,S$GLB,, | Performed by: NURSE PRACTITIONER

## 2021-09-16 PROCEDURE — 3075F PR MOST RECENT SYSTOLIC BLOOD PRESS GE 130-139MM HG: ICD-10-PCS | Mod: CPTII,S$GLB,, | Performed by: NURSE PRACTITIONER

## 2021-09-16 PROCEDURE — 3044F PR MOST RECENT HEMOGLOBIN A1C LEVEL <7.0%: ICD-10-PCS | Mod: CPTII,S$GLB,, | Performed by: NURSE PRACTITIONER

## 2021-09-16 PROCEDURE — 1126F PR PAIN SEVERITY QUANTIFIED, NO PAIN PRESENT: ICD-10-PCS | Mod: CPTII,S$GLB,, | Performed by: NURSE PRACTITIONER

## 2021-09-16 PROCEDURE — 99999 PR PBB SHADOW E&M-EST. PATIENT-LVL V: ICD-10-PCS | Mod: PBBFAC,,, | Performed by: NURSE PRACTITIONER

## 2021-09-16 PROCEDURE — G0439 PPPS, SUBSEQ VISIT: HCPCS | Mod: S$GLB,,, | Performed by: NURSE PRACTITIONER

## 2021-09-16 PROCEDURE — 3079F PR MOST RECENT DIASTOLIC BLOOD PRESSURE 80-89 MM HG: ICD-10-PCS | Mod: CPTII,S$GLB,, | Performed by: NURSE PRACTITIONER

## 2021-09-16 PROCEDURE — 1126F AMNT PAIN NOTED NONE PRSNT: CPT | Mod: CPTII,S$GLB,, | Performed by: NURSE PRACTITIONER

## 2021-09-16 PROCEDURE — 3079F DIAST BP 80-89 MM HG: CPT | Mod: CPTII,S$GLB,, | Performed by: NURSE PRACTITIONER

## 2021-09-16 PROCEDURE — 3075F SYST BP GE 130 - 139MM HG: CPT | Mod: CPTII,S$GLB,, | Performed by: NURSE PRACTITIONER

## 2021-09-16 PROCEDURE — 4010F PR ACE/ARB THEARPY RXD/TAKEN: ICD-10-PCS | Mod: CPTII,S$GLB,, | Performed by: NURSE PRACTITIONER

## 2021-09-16 PROCEDURE — 99499 UNLISTED E&M SERVICE: CPT | Mod: HCNC,S$GLB,, | Performed by: NURSE PRACTITIONER

## 2021-09-16 PROCEDURE — 1160F RVW MEDS BY RX/DR IN RCRD: CPT | Mod: CPTII,S$GLB,, | Performed by: NURSE PRACTITIONER

## 2021-09-16 PROCEDURE — 99499 RISK ADDL DX/OHS AUDIT: ICD-10-PCS | Mod: HCNC,S$GLB,, | Performed by: NURSE PRACTITIONER

## 2021-09-16 PROCEDURE — 3288F FALL RISK ASSESSMENT DOCD: CPT | Mod: CPTII,S$GLB,, | Performed by: NURSE PRACTITIONER

## 2021-09-16 PROCEDURE — 1101F PT FALLS ASSESS-DOCD LE1/YR: CPT | Mod: CPTII,S$GLB,, | Performed by: NURSE PRACTITIONER

## 2021-09-16 PROCEDURE — 3044F HG A1C LEVEL LT 7.0%: CPT | Mod: CPTII,S$GLB,, | Performed by: NURSE PRACTITIONER

## 2021-09-16 PROCEDURE — 1159F MED LIST DOCD IN RCRD: CPT | Mod: CPTII,S$GLB,, | Performed by: NURSE PRACTITIONER

## 2021-09-16 PROCEDURE — 1101F PR PT FALLS ASSESS DOC 0-1 FALLS W/OUT INJ PAST YR: ICD-10-PCS | Mod: CPTII,S$GLB,, | Performed by: NURSE PRACTITIONER

## 2021-09-16 PROCEDURE — 3008F PR BODY MASS INDEX (BMI) DOCUMENTED: ICD-10-PCS | Mod: CPTII,S$GLB,, | Performed by: NURSE PRACTITIONER

## 2021-09-16 PROCEDURE — 3288F PR FALLS RISK ASSESSMENT DOCUMENTED: ICD-10-PCS | Mod: CPTII,S$GLB,, | Performed by: NURSE PRACTITIONER

## 2021-09-16 PROCEDURE — G0439 PR MEDICARE ANNUAL WELLNESS SUBSEQUENT VISIT: ICD-10-PCS | Mod: S$GLB,,, | Performed by: NURSE PRACTITIONER

## 2021-09-16 PROCEDURE — 4010F ACE/ARB THERAPY RXD/TAKEN: CPT | Mod: CPTII,S$GLB,, | Performed by: NURSE PRACTITIONER

## 2021-09-16 PROCEDURE — 1160F PR REVIEW ALL MEDS BY PRESCRIBER/CLIN PHARMACIST DOCUMENTED: ICD-10-PCS | Mod: CPTII,S$GLB,, | Performed by: NURSE PRACTITIONER

## 2021-09-28 DIAGNOSIS — R73.03 PREDIABETES: Chronic | ICD-10-CM

## 2021-09-28 RX ORDER — METFORMIN HYDROCHLORIDE 500 MG/1
TABLET ORAL
Qty: 90 TABLET | Refills: 0 | Status: SHIPPED | OUTPATIENT
Start: 2021-09-28 | End: 2021-12-17

## 2021-10-08 ENCOUNTER — PATIENT MESSAGE (OUTPATIENT)
Dept: UROLOGY | Facility: CLINIC | Age: 71
End: 2021-10-08

## 2021-10-08 ENCOUNTER — CLINICAL SUPPORT (OUTPATIENT)
Dept: AUDIOLOGY | Facility: CLINIC | Age: 71
End: 2021-10-08
Payer: MEDICARE

## 2021-10-08 ENCOUNTER — OFFICE VISIT (OUTPATIENT)
Dept: UROLOGY | Facility: CLINIC | Age: 71
End: 2021-10-08
Payer: MEDICARE

## 2021-10-08 ENCOUNTER — OFFICE VISIT (OUTPATIENT)
Dept: OTOLARYNGOLOGY | Facility: CLINIC | Age: 71
End: 2021-10-08
Payer: MEDICARE

## 2021-10-08 ENCOUNTER — LAB VISIT (OUTPATIENT)
Dept: LAB | Facility: HOSPITAL | Age: 71
End: 2021-10-08
Attending: INTERNAL MEDICINE
Payer: MEDICARE

## 2021-10-08 ENCOUNTER — TELEPHONE (OUTPATIENT)
Dept: FAMILY MEDICINE | Facility: CLINIC | Age: 71
End: 2021-10-08

## 2021-10-08 VITALS
BODY MASS INDEX: 33.82 KG/M2 | WEIGHT: 235.69 LBS | DIASTOLIC BLOOD PRESSURE: 65 MMHG | TEMPERATURE: 98 F | SYSTOLIC BLOOD PRESSURE: 138 MMHG

## 2021-10-08 VITALS — WEIGHT: 235.69 LBS | BODY MASS INDEX: 33.74 KG/M2 | HEIGHT: 70 IN

## 2021-10-08 DIAGNOSIS — N52.9 ERECTILE DYSFUNCTION, UNSPECIFIED ERECTILE DYSFUNCTION TYPE: ICD-10-CM

## 2021-10-08 DIAGNOSIS — E78.2 MIXED HYPERLIPIDEMIA: ICD-10-CM

## 2021-10-08 DIAGNOSIS — H90.3 SENSORINEURAL HEARING LOSS (SNHL) OF BOTH EARS: ICD-10-CM

## 2021-10-08 DIAGNOSIS — R39.9 LOWER URINARY TRACT SYMPTOMS (LUTS): Primary | ICD-10-CM

## 2021-10-08 DIAGNOSIS — H93.13 TINNITUS OF BOTH EARS: Primary | ICD-10-CM

## 2021-10-08 DIAGNOSIS — R73.03 PREDIABETES: ICD-10-CM

## 2021-10-08 DIAGNOSIS — H93.13 TINNITUS OF BOTH EARS: ICD-10-CM

## 2021-10-08 DIAGNOSIS — R35.1 BPH ASSOCIATED WITH NOCTURIA: ICD-10-CM

## 2021-10-08 DIAGNOSIS — H90.3 SENSORINEURAL HEARING LOSS (SNHL) OF BOTH EARS: Primary | ICD-10-CM

## 2021-10-08 DIAGNOSIS — N40.1 BPH ASSOCIATED WITH NOCTURIA: ICD-10-CM

## 2021-10-08 LAB
ALBUMIN SERPL BCP-MCNC: 4.2 G/DL (ref 3.5–5.2)
ALP SERPL-CCNC: 55 U/L (ref 55–135)
ALT SERPL W/O P-5'-P-CCNC: 22 U/L (ref 10–44)
ANION GAP SERPL CALC-SCNC: 10 MMOL/L (ref 8–16)
AST SERPL-CCNC: 17 U/L (ref 10–40)
BASOPHILS # BLD AUTO: 0.09 K/UL (ref 0–0.2)
BASOPHILS NFR BLD: 0.8 % (ref 0–1.9)
BILIRUB SERPL-MCNC: 1.2 MG/DL (ref 0.1–1)
BILIRUB SERPL-MCNC: NORMAL MG/DL
BLOOD URINE, POC: NORMAL
BUN SERPL-MCNC: 11 MG/DL (ref 8–23)
CALCIUM SERPL-MCNC: 10 MG/DL (ref 8.7–10.5)
CHLORIDE SERPL-SCNC: 100 MMOL/L (ref 95–110)
CHOLEST SERPL-MCNC: 112 MG/DL (ref 120–199)
CHOLEST/HDLC SERPL: 3 {RATIO} (ref 2–5)
CO2 SERPL-SCNC: 31 MMOL/L (ref 23–29)
COLOR, POC UA: YELLOW
CREAT SERPL-MCNC: 1.1 MG/DL (ref 0.5–1.4)
DIFFERENTIAL METHOD: ABNORMAL
EOSINOPHIL # BLD AUTO: 0.3 K/UL (ref 0–0.5)
EOSINOPHIL NFR BLD: 2.4 % (ref 0–8)
ERYTHROCYTE [DISTWIDTH] IN BLOOD BY AUTOMATED COUNT: 13.3 % (ref 11.5–14.5)
EST. GFR  (AFRICAN AMERICAN): >60 ML/MIN/1.73 M^2
EST. GFR  (NON AFRICAN AMERICAN): >60 ML/MIN/1.73 M^2
ESTIMATED AVG GLUCOSE: 134 MG/DL (ref 68–131)
GLUCOSE SERPL-MCNC: 156 MG/DL (ref 70–110)
GLUCOSE UR QL STRIP: NORMAL
HBA1C MFR BLD: 6.3 % (ref 4–5.6)
HCT VFR BLD AUTO: 46.1 % (ref 40–54)
HDLC SERPL-MCNC: 37 MG/DL (ref 40–75)
HDLC SERPL: 33 % (ref 20–50)
HGB BLD-MCNC: 16 G/DL (ref 14–18)
IMM GRANULOCYTES # BLD AUTO: 0.06 K/UL (ref 0–0.04)
IMM GRANULOCYTES NFR BLD AUTO: 0.6 % (ref 0–0.5)
KETONES UR QL STRIP: NORMAL
LDLC SERPL CALC-MCNC: 32.8 MG/DL (ref 63–159)
LEUKOCYTE ESTERASE URINE, POC: NORMAL
LYMPHOCYTES # BLD AUTO: 3.4 K/UL (ref 1–4.8)
LYMPHOCYTES NFR BLD: 30.8 % (ref 18–48)
MCH RBC QN AUTO: 31.7 PG (ref 27–31)
MCHC RBC AUTO-ENTMCNC: 34.7 G/DL (ref 32–36)
MCV RBC AUTO: 92 FL (ref 82–98)
MONOCYTES # BLD AUTO: 0.9 K/UL (ref 0.3–1)
MONOCYTES NFR BLD: 8.3 % (ref 4–15)
NEUTROPHILS # BLD AUTO: 6.2 K/UL (ref 1.8–7.7)
NEUTROPHILS NFR BLD: 57.1 % (ref 38–73)
NITRITE, POC UA: NORMAL
NONHDLC SERPL-MCNC: 75 MG/DL
NRBC BLD-RTO: 0 /100 WBC
PH, POC UA: 5
PLATELET # BLD AUTO: 212 K/UL (ref 150–450)
PMV BLD AUTO: 10 FL (ref 9.2–12.9)
POTASSIUM SERPL-SCNC: 4.6 MMOL/L (ref 3.5–5.1)
PROT SERPL-MCNC: 7.2 G/DL (ref 6–8.4)
PROTEIN, POC: NORMAL
RBC # BLD AUTO: 5.04 M/UL (ref 4.6–6.2)
SODIUM SERPL-SCNC: 141 MMOL/L (ref 136–145)
SPECIFIC GRAVITY, POC UA: 1015
T4 FREE SERPL-MCNC: 1.02 NG/DL (ref 0.71–1.51)
TRIGL SERPL-MCNC: 211 MG/DL (ref 30–150)
TSH SERPL DL<=0.005 MIU/L-ACNC: 4.12 UIU/ML (ref 0.4–4)
UROBILINOGEN, POC UA: NORMAL
WBC # BLD AUTO: 10.9 K/UL (ref 3.9–12.7)

## 2021-10-08 PROCEDURE — 1159F PR MEDICATION LIST DOCUMENTED IN MEDICAL RECORD: ICD-10-PCS | Mod: CPTII,S$GLB,, | Performed by: NURSE PRACTITIONER

## 2021-10-08 PROCEDURE — 3078F PR MOST RECENT DIASTOLIC BLOOD PRESSURE < 80 MM HG: ICD-10-PCS | Mod: CPTII,S$GLB,, | Performed by: NURSE PRACTITIONER

## 2021-10-08 PROCEDURE — 4010F ACE/ARB THERAPY RXD/TAKEN: CPT | Mod: HCNC,CPTII,S$GLB, | Performed by: STUDENT IN AN ORGANIZED HEALTH CARE EDUCATION/TRAINING PROGRAM

## 2021-10-08 PROCEDURE — 92557 PR COMPREHENSIVE HEARING TEST: ICD-10-PCS | Mod: S$GLB,,, | Performed by: AUDIOLOGIST

## 2021-10-08 PROCEDURE — 3044F PR MOST RECENT HEMOGLOBIN A1C LEVEL <7.0%: ICD-10-PCS | Mod: CPTII,S$GLB,, | Performed by: NURSE PRACTITIONER

## 2021-10-08 PROCEDURE — 81001 POCT URINALYSIS, DIPSTICK OR TABLET REAGENT, AUTOMATED, WITH MICROSCOP: ICD-10-PCS | Mod: HCNC,S$GLB,, | Performed by: STUDENT IN AN ORGANIZED HEALTH CARE EDUCATION/TRAINING PROGRAM

## 2021-10-08 PROCEDURE — 99203 OFFICE O/P NEW LOW 30 MIN: CPT | Mod: S$GLB,,, | Performed by: NURSE PRACTITIONER

## 2021-10-08 PROCEDURE — 1159F MED LIST DOCD IN RCRD: CPT | Mod: CPTII,S$GLB,, | Performed by: NURSE PRACTITIONER

## 2021-10-08 PROCEDURE — 3075F SYST BP GE 130 - 139MM HG: CPT | Mod: CPTII,S$GLB,, | Performed by: NURSE PRACTITIONER

## 2021-10-08 PROCEDURE — 1101F PT FALLS ASSESS-DOCD LE1/YR: CPT | Mod: CPTII,S$GLB,, | Performed by: NURSE PRACTITIONER

## 2021-10-08 PROCEDURE — 1160F PR REVIEW ALL MEDS BY PRESCRIBER/CLIN PHARMACIST DOCUMENTED: ICD-10-PCS | Mod: HCNC,CPTII,S$GLB, | Performed by: STUDENT IN AN ORGANIZED HEALTH CARE EDUCATION/TRAINING PROGRAM

## 2021-10-08 PROCEDURE — 3075F PR MOST RECENT SYSTOLIC BLOOD PRESS GE 130-139MM HG: ICD-10-PCS | Mod: CPTII,S$GLB,, | Performed by: NURSE PRACTITIONER

## 2021-10-08 PROCEDURE — 1126F AMNT PAIN NOTED NONE PRSNT: CPT | Mod: HCNC,CPTII,S$GLB, | Performed by: STUDENT IN AN ORGANIZED HEALTH CARE EDUCATION/TRAINING PROGRAM

## 2021-10-08 PROCEDURE — 3288F FALL RISK ASSESSMENT DOCD: CPT | Mod: HCNC,CPTII,S$GLB, | Performed by: STUDENT IN AN ORGANIZED HEALTH CARE EDUCATION/TRAINING PROGRAM

## 2021-10-08 PROCEDURE — 1159F MED LIST DOCD IN RCRD: CPT | Mod: HCNC,CPTII,S$GLB, | Performed by: STUDENT IN AN ORGANIZED HEALTH CARE EDUCATION/TRAINING PROGRAM

## 2021-10-08 PROCEDURE — 3044F HG A1C LEVEL LT 7.0%: CPT | Mod: CPTII,S$GLB,, | Performed by: NURSE PRACTITIONER

## 2021-10-08 PROCEDURE — 3288F PR FALLS RISK ASSESSMENT DOCUMENTED: ICD-10-PCS | Mod: CPTII,S$GLB,, | Performed by: NURSE PRACTITIONER

## 2021-10-08 PROCEDURE — 99999 PR PBB SHADOW E&M-EST. PATIENT-LVL IV: CPT | Mod: PBBFAC,HCNC,, | Performed by: STUDENT IN AN ORGANIZED HEALTH CARE EDUCATION/TRAINING PROGRAM

## 2021-10-08 PROCEDURE — 36415 COLL VENOUS BLD VENIPUNCTURE: CPT | Mod: HCNC,PO | Performed by: INTERNAL MEDICINE

## 2021-10-08 PROCEDURE — 3044F HG A1C LEVEL LT 7.0%: CPT | Mod: HCNC,CPTII,S$GLB, | Performed by: STUDENT IN AN ORGANIZED HEALTH CARE EDUCATION/TRAINING PROGRAM

## 2021-10-08 PROCEDURE — 80053 COMPREHEN METABOLIC PANEL: CPT | Mod: HCNC | Performed by: INTERNAL MEDICINE

## 2021-10-08 PROCEDURE — 1160F RVW MEDS BY RX/DR IN RCRD: CPT | Mod: HCNC,CPTII,S$GLB, | Performed by: STUDENT IN AN ORGANIZED HEALTH CARE EDUCATION/TRAINING PROGRAM

## 2021-10-08 PROCEDURE — 99204 OFFICE O/P NEW MOD 45 MIN: CPT | Mod: HCNC,S$GLB,, | Performed by: STUDENT IN AN ORGANIZED HEALTH CARE EDUCATION/TRAINING PROGRAM

## 2021-10-08 PROCEDURE — 92557 COMPREHENSIVE HEARING TEST: CPT | Mod: S$GLB,,, | Performed by: AUDIOLOGIST

## 2021-10-08 PROCEDURE — 3008F PR BODY MASS INDEX (BMI) DOCUMENTED: ICD-10-PCS | Mod: CPTII,S$GLB,, | Performed by: NURSE PRACTITIONER

## 2021-10-08 PROCEDURE — 84439 ASSAY OF FREE THYROXINE: CPT | Mod: HCNC | Performed by: INTERNAL MEDICINE

## 2021-10-08 PROCEDURE — 4010F PR ACE/ARB THEARPY RXD/TAKEN: ICD-10-PCS | Mod: CPTII,S$GLB,, | Performed by: NURSE PRACTITIONER

## 2021-10-08 PROCEDURE — 85025 COMPLETE CBC W/AUTO DIFF WBC: CPT | Mod: HCNC | Performed by: INTERNAL MEDICINE

## 2021-10-08 PROCEDURE — 3044F PR MOST RECENT HEMOGLOBIN A1C LEVEL <7.0%: ICD-10-PCS | Mod: HCNC,CPTII,S$GLB, | Performed by: STUDENT IN AN ORGANIZED HEALTH CARE EDUCATION/TRAINING PROGRAM

## 2021-10-08 PROCEDURE — 1160F PR REVIEW ALL MEDS BY PRESCRIBER/CLIN PHARMACIST DOCUMENTED: ICD-10-PCS | Mod: CPTII,S$GLB,, | Performed by: NURSE PRACTITIONER

## 2021-10-08 PROCEDURE — 99203 PR OFFICE/OUTPT VISIT, NEW, LEVL III, 30-44 MIN: ICD-10-PCS | Mod: S$GLB,,, | Performed by: NURSE PRACTITIONER

## 2021-10-08 PROCEDURE — 3008F PR BODY MASS INDEX (BMI) DOCUMENTED: ICD-10-PCS | Mod: HCNC,CPTII,S$GLB, | Performed by: STUDENT IN AN ORGANIZED HEALTH CARE EDUCATION/TRAINING PROGRAM

## 2021-10-08 PROCEDURE — 3061F NEG MICROALBUMINURIA REV: CPT | Mod: CPTII,S$GLB,, | Performed by: NURSE PRACTITIONER

## 2021-10-08 PROCEDURE — 3288F FALL RISK ASSESSMENT DOCD: CPT | Mod: CPTII,S$GLB,, | Performed by: NURSE PRACTITIONER

## 2021-10-08 PROCEDURE — 3078F DIAST BP <80 MM HG: CPT | Mod: CPTII,S$GLB,, | Performed by: NURSE PRACTITIONER

## 2021-10-08 PROCEDURE — 99999 PR PBB SHADOW E&M-EST. PATIENT-LVL IV: ICD-10-PCS | Mod: PBBFAC,HCNC,, | Performed by: STUDENT IN AN ORGANIZED HEALTH CARE EDUCATION/TRAINING PROGRAM

## 2021-10-08 PROCEDURE — 1101F PT FALLS ASSESS-DOCD LE1/YR: CPT | Mod: HCNC,CPTII,S$GLB, | Performed by: STUDENT IN AN ORGANIZED HEALTH CARE EDUCATION/TRAINING PROGRAM

## 2021-10-08 PROCEDURE — 83036 HEMOGLOBIN GLYCOSYLATED A1C: CPT | Mod: HCNC | Performed by: INTERNAL MEDICINE

## 2021-10-08 PROCEDURE — 4010F PR ACE/ARB THEARPY RXD/TAKEN: ICD-10-PCS | Mod: HCNC,CPTII,S$GLB, | Performed by: STUDENT IN AN ORGANIZED HEALTH CARE EDUCATION/TRAINING PROGRAM

## 2021-10-08 PROCEDURE — 1159F PR MEDICATION LIST DOCUMENTED IN MEDICAL RECORD: ICD-10-PCS | Mod: HCNC,CPTII,S$GLB, | Performed by: STUDENT IN AN ORGANIZED HEALTH CARE EDUCATION/TRAINING PROGRAM

## 2021-10-08 PROCEDURE — 1126F PR PAIN SEVERITY QUANTIFIED, NO PAIN PRESENT: ICD-10-PCS | Mod: HCNC,CPTII,S$GLB, | Performed by: STUDENT IN AN ORGANIZED HEALTH CARE EDUCATION/TRAINING PROGRAM

## 2021-10-08 PROCEDURE — 1101F PR PT FALLS ASSESS DOC 0-1 FALLS W/OUT INJ PAST YR: ICD-10-PCS | Mod: HCNC,CPTII,S$GLB, | Performed by: STUDENT IN AN ORGANIZED HEALTH CARE EDUCATION/TRAINING PROGRAM

## 2021-10-08 PROCEDURE — 1101F PR PT FALLS ASSESS DOC 0-1 FALLS W/OUT INJ PAST YR: ICD-10-PCS | Mod: CPTII,S$GLB,, | Performed by: NURSE PRACTITIONER

## 2021-10-08 PROCEDURE — 3061F PR NEG MICROALBUMINURIA RESULT DOCUMENTED/REVIEW: ICD-10-PCS | Mod: CPTII,S$GLB,, | Performed by: NURSE PRACTITIONER

## 2021-10-08 PROCEDURE — 3066F NEPHROPATHY DOC TX: CPT | Mod: CPTII,S$GLB,, | Performed by: NURSE PRACTITIONER

## 2021-10-08 PROCEDURE — 99204 PR OFFICE/OUTPT VISIT, NEW, LEVL IV, 45-59 MIN: ICD-10-PCS | Mod: HCNC,S$GLB,, | Performed by: STUDENT IN AN ORGANIZED HEALTH CARE EDUCATION/TRAINING PROGRAM

## 2021-10-08 PROCEDURE — 92550 PR TYMPANOMETRY AND REFLEX THRESHOLD MEASUREMENTS: ICD-10-PCS | Mod: S$GLB,,, | Performed by: AUDIOLOGIST

## 2021-10-08 PROCEDURE — 3008F BODY MASS INDEX DOCD: CPT | Mod: CPTII,S$GLB,, | Performed by: NURSE PRACTITIONER

## 2021-10-08 PROCEDURE — 84443 ASSAY THYROID STIM HORMONE: CPT | Mod: HCNC | Performed by: INTERNAL MEDICINE

## 2021-10-08 PROCEDURE — 1160F RVW MEDS BY RX/DR IN RCRD: CPT | Mod: CPTII,S$GLB,, | Performed by: NURSE PRACTITIONER

## 2021-10-08 PROCEDURE — 3066F PR DOCUMENTATION OF TREATMENT FOR NEPHROPATHY: ICD-10-PCS | Mod: CPTII,S$GLB,, | Performed by: NURSE PRACTITIONER

## 2021-10-08 PROCEDURE — 81001 URINALYSIS AUTO W/SCOPE: CPT | Mod: HCNC,S$GLB,, | Performed by: STUDENT IN AN ORGANIZED HEALTH CARE EDUCATION/TRAINING PROGRAM

## 2021-10-08 PROCEDURE — 3288F PR FALLS RISK ASSESSMENT DOCUMENTED: ICD-10-PCS | Mod: HCNC,CPTII,S$GLB, | Performed by: STUDENT IN AN ORGANIZED HEALTH CARE EDUCATION/TRAINING PROGRAM

## 2021-10-08 PROCEDURE — 3008F BODY MASS INDEX DOCD: CPT | Mod: HCNC,CPTII,S$GLB, | Performed by: STUDENT IN AN ORGANIZED HEALTH CARE EDUCATION/TRAINING PROGRAM

## 2021-10-08 PROCEDURE — 4010F ACE/ARB THERAPY RXD/TAKEN: CPT | Mod: CPTII,S$GLB,, | Performed by: NURSE PRACTITIONER

## 2021-10-08 PROCEDURE — 80061 LIPID PANEL: CPT | Mod: HCNC | Performed by: INTERNAL MEDICINE

## 2021-10-08 PROCEDURE — 92550 TYMPANOMETRY & REFLEX THRESH: CPT | Mod: S$GLB,,, | Performed by: AUDIOLOGIST

## 2021-10-08 RX ORDER — TAMSULOSIN HYDROCHLORIDE 0.4 MG/1
0.4 CAPSULE ORAL DAILY
Qty: 90 CAPSULE | Refills: 3 | Status: SHIPPED | OUTPATIENT
Start: 2021-10-08 | End: 2022-08-22 | Stop reason: SDUPTHER

## 2021-10-11 ENCOUNTER — LAB VISIT (OUTPATIENT)
Dept: LAB | Facility: HOSPITAL | Age: 71
End: 2021-10-11
Attending: STUDENT IN AN ORGANIZED HEALTH CARE EDUCATION/TRAINING PROGRAM
Payer: MEDICARE

## 2021-10-11 DIAGNOSIS — R35.1 BPH ASSOCIATED WITH NOCTURIA: ICD-10-CM

## 2021-10-11 DIAGNOSIS — R39.9 LOWER URINARY TRACT SYMPTOMS (LUTS): ICD-10-CM

## 2021-10-11 DIAGNOSIS — N40.1 BPH ASSOCIATED WITH NOCTURIA: ICD-10-CM

## 2021-10-11 LAB — COMPLEXED PSA SERPL-MCNC: 1.1 NG/ML (ref 0–4)

## 2021-10-11 PROCEDURE — 36415 COLL VENOUS BLD VENIPUNCTURE: CPT | Mod: HCNC,PO | Performed by: STUDENT IN AN ORGANIZED HEALTH CARE EDUCATION/TRAINING PROGRAM

## 2021-10-11 PROCEDURE — 84153 ASSAY OF PSA TOTAL: CPT | Mod: HCNC | Performed by: STUDENT IN AN ORGANIZED HEALTH CARE EDUCATION/TRAINING PROGRAM

## 2021-10-13 ENCOUNTER — OFFICE VISIT (OUTPATIENT)
Dept: FAMILY MEDICINE | Facility: CLINIC | Age: 71
End: 2021-10-13
Payer: MEDICARE

## 2021-10-13 VITALS
SYSTOLIC BLOOD PRESSURE: 124 MMHG | HEART RATE: 63 BPM | BODY MASS INDEX: 33.85 KG/M2 | OXYGEN SATURATION: 97 % | HEIGHT: 70 IN | DIASTOLIC BLOOD PRESSURE: 86 MMHG | TEMPERATURE: 98 F | WEIGHT: 236.44 LBS

## 2021-10-13 DIAGNOSIS — I25.118 CORONARY ARTERY DISEASE OF NATIVE ARTERY OF NATIVE HEART WITH STABLE ANGINA PECTORIS: ICD-10-CM

## 2021-10-13 DIAGNOSIS — E78.2 MIXED HYPERLIPIDEMIA: ICD-10-CM

## 2021-10-13 DIAGNOSIS — E03.9 HYPOTHYROIDISM, UNSPECIFIED TYPE: ICD-10-CM

## 2021-10-13 DIAGNOSIS — R73.03 PREDIABETES: Primary | ICD-10-CM

## 2021-10-13 PROCEDURE — 99214 PR OFFICE/OUTPT VISIT, EST, LEVL IV, 30-39 MIN: ICD-10-PCS | Mod: HCNC,S$GLB,, | Performed by: INTERNAL MEDICINE

## 2021-10-13 PROCEDURE — 3288F PR FALLS RISK ASSESSMENT DOCUMENTED: ICD-10-PCS | Mod: HCNC,CPTII,S$GLB, | Performed by: INTERNAL MEDICINE

## 2021-10-13 PROCEDURE — 3061F PR NEG MICROALBUMINURIA RESULT DOCUMENTED/REVIEW: ICD-10-PCS | Mod: HCNC,CPTII,S$GLB, | Performed by: INTERNAL MEDICINE

## 2021-10-13 PROCEDURE — 1159F PR MEDICATION LIST DOCUMENTED IN MEDICAL RECORD: ICD-10-PCS | Mod: HCNC,CPTII,S$GLB, | Performed by: INTERNAL MEDICINE

## 2021-10-13 PROCEDURE — 3288F FALL RISK ASSESSMENT DOCD: CPT | Mod: HCNC,CPTII,S$GLB, | Performed by: INTERNAL MEDICINE

## 2021-10-13 PROCEDURE — 3044F PR MOST RECENT HEMOGLOBIN A1C LEVEL <7.0%: ICD-10-PCS | Mod: HCNC,CPTII,S$GLB, | Performed by: INTERNAL MEDICINE

## 2021-10-13 PROCEDURE — 3066F NEPHROPATHY DOC TX: CPT | Mod: HCNC,CPTII,S$GLB, | Performed by: INTERNAL MEDICINE

## 2021-10-13 PROCEDURE — 99499 UNLISTED E&M SERVICE: CPT | Mod: HCNC,S$GLB,, | Performed by: INTERNAL MEDICINE

## 2021-10-13 PROCEDURE — 3079F PR MOST RECENT DIASTOLIC BLOOD PRESSURE 80-89 MM HG: ICD-10-PCS | Mod: HCNC,CPTII,S$GLB, | Performed by: INTERNAL MEDICINE

## 2021-10-13 PROCEDURE — 1159F MED LIST DOCD IN RCRD: CPT | Mod: HCNC,CPTII,S$GLB, | Performed by: INTERNAL MEDICINE

## 2021-10-13 PROCEDURE — 3079F DIAST BP 80-89 MM HG: CPT | Mod: HCNC,CPTII,S$GLB, | Performed by: INTERNAL MEDICINE

## 2021-10-13 PROCEDURE — 99999 PR PBB SHADOW E&M-EST. PATIENT-LVL V: ICD-10-PCS | Mod: PBBFAC,HCNC,, | Performed by: INTERNAL MEDICINE

## 2021-10-13 PROCEDURE — 1160F PR REVIEW ALL MEDS BY PRESCRIBER/CLIN PHARMACIST DOCUMENTED: ICD-10-PCS | Mod: HCNC,CPTII,S$GLB, | Performed by: INTERNAL MEDICINE

## 2021-10-13 PROCEDURE — 3008F BODY MASS INDEX DOCD: CPT | Mod: HCNC,CPTII,S$GLB, | Performed by: INTERNAL MEDICINE

## 2021-10-13 PROCEDURE — 99499 RISK ADDL DX/OHS AUDIT: ICD-10-PCS | Mod: HCNC,S$GLB,, | Performed by: INTERNAL MEDICINE

## 2021-10-13 PROCEDURE — 4010F ACE/ARB THERAPY RXD/TAKEN: CPT | Mod: HCNC,CPTII,S$GLB, | Performed by: INTERNAL MEDICINE

## 2021-10-13 PROCEDURE — 1101F PR PT FALLS ASSESS DOC 0-1 FALLS W/OUT INJ PAST YR: ICD-10-PCS | Mod: HCNC,CPTII,S$GLB, | Performed by: INTERNAL MEDICINE

## 2021-10-13 PROCEDURE — 3066F PR DOCUMENTATION OF TREATMENT FOR NEPHROPATHY: ICD-10-PCS | Mod: HCNC,CPTII,S$GLB, | Performed by: INTERNAL MEDICINE

## 2021-10-13 PROCEDURE — 99214 OFFICE O/P EST MOD 30 MIN: CPT | Mod: HCNC,S$GLB,, | Performed by: INTERNAL MEDICINE

## 2021-10-13 PROCEDURE — 1101F PT FALLS ASSESS-DOCD LE1/YR: CPT | Mod: HCNC,CPTII,S$GLB, | Performed by: INTERNAL MEDICINE

## 2021-10-13 PROCEDURE — 3074F SYST BP LT 130 MM HG: CPT | Mod: HCNC,CPTII,S$GLB, | Performed by: INTERNAL MEDICINE

## 2021-10-13 PROCEDURE — 4010F PR ACE/ARB THEARPY RXD/TAKEN: ICD-10-PCS | Mod: HCNC,CPTII,S$GLB, | Performed by: INTERNAL MEDICINE

## 2021-10-13 PROCEDURE — 1126F PR PAIN SEVERITY QUANTIFIED, NO PAIN PRESENT: ICD-10-PCS | Mod: HCNC,CPTII,S$GLB, | Performed by: INTERNAL MEDICINE

## 2021-10-13 PROCEDURE — 3008F PR BODY MASS INDEX (BMI) DOCUMENTED: ICD-10-PCS | Mod: HCNC,CPTII,S$GLB, | Performed by: INTERNAL MEDICINE

## 2021-10-13 PROCEDURE — 1126F AMNT PAIN NOTED NONE PRSNT: CPT | Mod: HCNC,CPTII,S$GLB, | Performed by: INTERNAL MEDICINE

## 2021-10-13 PROCEDURE — 3074F PR MOST RECENT SYSTOLIC BLOOD PRESSURE < 130 MM HG: ICD-10-PCS | Mod: HCNC,CPTII,S$GLB, | Performed by: INTERNAL MEDICINE

## 2021-10-13 PROCEDURE — 99999 PR PBB SHADOW E&M-EST. PATIENT-LVL V: CPT | Mod: PBBFAC,HCNC,, | Performed by: INTERNAL MEDICINE

## 2021-10-13 PROCEDURE — 3044F HG A1C LEVEL LT 7.0%: CPT | Mod: HCNC,CPTII,S$GLB, | Performed by: INTERNAL MEDICINE

## 2021-10-13 PROCEDURE — 3061F NEG MICROALBUMINURIA REV: CPT | Mod: HCNC,CPTII,S$GLB, | Performed by: INTERNAL MEDICINE

## 2021-10-13 PROCEDURE — 1160F RVW MEDS BY RX/DR IN RCRD: CPT | Mod: HCNC,CPTII,S$GLB, | Performed by: INTERNAL MEDICINE

## 2021-10-13 RX ORDER — DOXYCYCLINE HYCLATE 100 MG
TABLET ORAL
COMMUNITY
Start: 2021-09-22 | End: 2021-10-13 | Stop reason: ALTCHOICE

## 2021-11-14 DIAGNOSIS — J31.0 NONALLERGIC RHINITIS: ICD-10-CM

## 2021-11-14 RX ORDER — LEVOCETIRIZINE DIHYDROCHLORIDE 5 MG/1
TABLET, FILM COATED ORAL
Qty: 90 TABLET | Refills: 3 | Status: SHIPPED | OUTPATIENT
Start: 2021-11-14 | End: 2022-04-13

## 2021-11-24 ENCOUNTER — HOSPITAL ENCOUNTER (OUTPATIENT)
Dept: CARDIOLOGY | Facility: HOSPITAL | Age: 71
Discharge: HOME | End: 2021-11-24
Attending: INTERNAL MEDICINE
Payer: MEDICARE

## 2021-11-24 VITALS — BODY MASS INDEX: 33.79 KG/M2 | WEIGHT: 236 LBS | HEIGHT: 70 IN

## 2021-11-24 DIAGNOSIS — I77.810 AORTIC ROOT DILATATION: ICD-10-CM

## 2021-11-24 LAB
AORTIC ROOT ANNULUS: 4.08 CM
AORTIC VALVE CUSP SEPERATION: 2.43 CM
ASCENDING AORTA: 3.53 CM
AV INDEX (PROSTH): 0.88
AV MEAN GRADIENT: 5 MMHG
AV PEAK GRADIENT: 8 MMHG
AV VALVE AREA: 3.99 CM2
AV VELOCITY RATIO: 0.8
BSA FOR ECHO PROCEDURE: 2.3 M2
CV ECHO LV RWT: 0.62 CM
DOP CALC AO PEAK VEL: 1.38 M/S
DOP CALC AO VTI: 34.19 CM
DOP CALC LVOT AREA: 4.6 CM2
DOP CALC LVOT DIAMETER: 2.41 CM
DOP CALC LVOT PEAK VEL: 1.11 M/S
DOP CALC LVOT STROKE VOLUME: 136.51 CM3
DOP CALCLVOT PEAK VEL VTI: 29.94 CM
E WAVE DECELERATION TIME: 284.52 MSEC
E/A RATIO: 0.83
E/E' RATIO: 7.45 M/S
ECHO LV POSTERIOR WALL: 1.26 CM (ref 0.6–1.1)
EJECTION FRACTION: 60 %
FRACTIONAL SHORTENING: 31 % (ref 28–44)
INTERVENTRICULAR SEPTUM: 1.54 CM (ref 0.6–1.1)
IVRT: 133.79 MSEC
LA MAJOR: 5.95 CM
LA MINOR: 5.35 CM
LA WIDTH: 3.02 CM
LEFT ATRIUM SIZE: 3.49 CM
LEFT ATRIUM VOLUME INDEX: 22.5 ML/M2
LEFT ATRIUM VOLUME: 50.47 CM3
LEFT INTERNAL DIMENSION IN SYSTOLE: 2.82 CM (ref 2.1–4)
LEFT VENTRICLE DIASTOLIC VOLUME INDEX: 32.41 ML/M2
LEFT VENTRICLE DIASTOLIC VOLUME: 72.6 ML
LEFT VENTRICLE MASS INDEX: 95 G/M2
LEFT VENTRICLE SYSTOLIC VOLUME INDEX: 13.4 ML/M2
LEFT VENTRICLE SYSTOLIC VOLUME: 30.04 ML
LEFT VENTRICULAR INTERNAL DIMENSION IN DIASTOLE: 4.06 CM (ref 3.5–6)
LEFT VENTRICULAR MASS: 213.51 G
LV LATERAL E/E' RATIO: 6.31 M/S
LV SEPTAL E/E' RATIO: 9.11 M/S
MV PEAK A VEL: 0.99 M/S
MV PEAK E VEL: 0.82 M/S
MV STENOSIS PRESSURE HALF TIME: 82.51 MS
MV VALVE AREA P 1/2 METHOD: 2.67 CM2
PISA TR MAX VEL: 2.05 M/S
PULM VEIN S/D RATIO: 1.45
PV PEAK D VEL: 0.31 M/S
PV PEAK S VEL: 0.45 M/S
PV PEAK VELOCITY: 1.1 CM/S
RA MAJOR: 5.31 CM
RA PRESSURE: 3 MMHG
RA WIDTH: 3.51 CM
RIGHT VENTRICULAR END-DIASTOLIC DIMENSION: 3.7 CM
RV TISSUE DOPPLER FREE WALL SYSTOLIC VELOCITY 1 (APICAL 4 CHAMBER VIEW): 12.96 CM/S
SINUS: 3.82 CM
STJ: 3.14 CM
TDI LATERAL: 0.13 M/S
TDI SEPTAL: 0.09 M/S
TDI: 0.11 M/S
TR MAX PG: 17 MMHG
TRICUSPID ANNULAR PLANE SYSTOLIC EXCURSION: 2.13 CM
TV REST PULMONARY ARTERY PRESSURE: 20 MMHG

## 2021-11-24 PROCEDURE — 93306 TTE W/DOPPLER COMPLETE: CPT | Mod: HCNC

## 2021-11-24 PROCEDURE — 93306 TTE W/DOPPLER COMPLETE: CPT | Mod: 26,HCNC,, | Performed by: INTERNAL MEDICINE

## 2021-11-24 PROCEDURE — 93306 ECHO (CUPID ONLY): ICD-10-PCS | Mod: 26,HCNC,, | Performed by: INTERNAL MEDICINE

## 2021-11-25 ENCOUNTER — PATIENT MESSAGE (OUTPATIENT)
Dept: CARDIOLOGY | Facility: CLINIC | Age: 71
End: 2021-11-25
Payer: MEDICARE

## 2021-12-15 DIAGNOSIS — R73.03 PREDIABETES: Chronic | ICD-10-CM

## 2021-12-16 ENCOUNTER — OFFICE VISIT (OUTPATIENT)
Dept: CARDIOLOGY | Facility: CLINIC | Age: 71
End: 2021-12-16
Payer: MEDICARE

## 2021-12-16 VITALS
SYSTOLIC BLOOD PRESSURE: 138 MMHG | WEIGHT: 236 LBS | RESPIRATION RATE: 15 BRPM | HEART RATE: 61 BPM | HEIGHT: 70 IN | BODY MASS INDEX: 33.79 KG/M2 | DIASTOLIC BLOOD PRESSURE: 70 MMHG

## 2021-12-16 DIAGNOSIS — I10 ESSENTIAL HYPERTENSION: ICD-10-CM

## 2021-12-16 DIAGNOSIS — G47.33 OSA (OBSTRUCTIVE SLEEP APNEA): ICD-10-CM

## 2021-12-16 DIAGNOSIS — I77.810 AORTIC ROOT DILATATION: ICD-10-CM

## 2021-12-16 DIAGNOSIS — I25.118 CORONARY ARTERY DISEASE OF NATIVE ARTERY OF NATIVE HEART WITH STABLE ANGINA PECTORIS: Primary | ICD-10-CM

## 2021-12-16 DIAGNOSIS — I87.2 VENOUS INSUFFICIENCY: ICD-10-CM

## 2021-12-16 DIAGNOSIS — E78.2 MIXED HYPERLIPIDEMIA: ICD-10-CM

## 2021-12-16 PROCEDURE — 99214 PR OFFICE/OUTPT VISIT, EST, LEVL IV, 30-39 MIN: ICD-10-PCS | Mod: HCNC,S$GLB,, | Performed by: INTERNAL MEDICINE

## 2021-12-16 PROCEDURE — 4010F ACE/ARB THERAPY RXD/TAKEN: CPT | Mod: HCNC,CPTII,S$GLB, | Performed by: INTERNAL MEDICINE

## 2021-12-16 PROCEDURE — 99999 PR PBB SHADOW E&M-EST. PATIENT-LVL IV: CPT | Mod: PBBFAC,HCNC,, | Performed by: INTERNAL MEDICINE

## 2021-12-16 PROCEDURE — 3066F PR DOCUMENTATION OF TREATMENT FOR NEPHROPATHY: ICD-10-PCS | Mod: HCNC,CPTII,S$GLB, | Performed by: INTERNAL MEDICINE

## 2021-12-16 PROCEDURE — 99999 PR PBB SHADOW E&M-EST. PATIENT-LVL IV: ICD-10-PCS | Mod: PBBFAC,HCNC,, | Performed by: INTERNAL MEDICINE

## 2021-12-16 PROCEDURE — 93000 EKG 12-LEAD: ICD-10-PCS | Mod: HCNC,S$GLB,, | Performed by: INTERNAL MEDICINE

## 2021-12-16 PROCEDURE — 4010F PR ACE/ARB THEARPY RXD/TAKEN: ICD-10-PCS | Mod: HCNC,CPTII,S$GLB, | Performed by: INTERNAL MEDICINE

## 2021-12-16 PROCEDURE — 99214 OFFICE O/P EST MOD 30 MIN: CPT | Mod: HCNC,S$GLB,, | Performed by: INTERNAL MEDICINE

## 2021-12-16 PROCEDURE — 3066F NEPHROPATHY DOC TX: CPT | Mod: HCNC,CPTII,S$GLB, | Performed by: INTERNAL MEDICINE

## 2021-12-16 PROCEDURE — 3061F NEG MICROALBUMINURIA REV: CPT | Mod: HCNC,CPTII,S$GLB, | Performed by: INTERNAL MEDICINE

## 2021-12-16 PROCEDURE — 93000 ELECTROCARDIOGRAM COMPLETE: CPT | Mod: HCNC,S$GLB,, | Performed by: INTERNAL MEDICINE

## 2021-12-16 PROCEDURE — 3061F PR NEG MICROALBUMINURIA RESULT DOCUMENTED/REVIEW: ICD-10-PCS | Mod: HCNC,CPTII,S$GLB, | Performed by: INTERNAL MEDICINE

## 2021-12-16 RX ORDER — ISOSORBIDE MONONITRATE 120 MG/1
240 TABLET, EXTENDED RELEASE ORAL DAILY
Qty: 180 TABLET | Refills: 3 | Status: SHIPPED | OUTPATIENT
Start: 2021-12-16 | End: 2023-02-09

## 2021-12-17 RX ORDER — METFORMIN HYDROCHLORIDE 500 MG/1
TABLET ORAL
Qty: 90 TABLET | Refills: 1 | Status: SHIPPED | OUTPATIENT
Start: 2021-12-17 | End: 2022-06-16

## 2022-01-05 ENCOUNTER — TELEPHONE (OUTPATIENT)
Dept: FAMILY MEDICINE | Facility: CLINIC | Age: 72
End: 2022-01-05
Payer: MEDICARE

## 2022-01-05 NOTE — TELEPHONE ENCOUNTER
Patient wanted provider to know  He tested positive to COVID yesterday, asking if there is anything   He should be taking

## 2022-01-05 NOTE — TELEPHONE ENCOUNTER
----- Message from Radha Valencia sent at 1/5/2022 12:55 PM CST -----  Patient tested positive  for COVID-19  Patient needs care urgently and is showing the following symptoms:    Patient is requesting advice    Type:  Needs Medical Advice/Symptom-based Call    Who Called: Self    Symptoms (please be specific):  cough, dizzy, body ache, no appetite, and wanting to sleep     How long has patient had these symptoms:since Jan 1     Would the patient rather a call back or a response via [facility]? Call     Best Call Back Number: .[phone] (home)

## 2022-01-05 NOTE — TELEPHONE ENCOUNTER
Spoken to patient's wife. Wife stated that her and patient has tested positive for covid on 1/4/22. Stated that both wife and patient took a home covid test from [facility].   Wife stated that patient has been experiencing mild sob/dizziness and has been monitoring his O2 stat which has been ranging between 92-94 since yesterday. Also having fatigue/ no appetite/ body aches/ chills. Denies any other symptoms at the present time while speaking to wife. Has been taking OTC mucinex, Robtussin CF and another cough syrup wife was unable to pronoun.    Dicussed symptoms/call with Dr. Gonzalez. Per Dr. Gonzalez, recommends for patient to do a 2 minute walk test with pulse ox on finger. Stated if the patient's O2 gets below 92 while during this, it would be best for patient to be seen in the ED. Wife was informed of physician's recommendations. Verbalized understanding. Informed wife to call back office for any other problems/concerns.

## 2022-01-18 ENCOUNTER — PATIENT MESSAGE (OUTPATIENT)
Dept: UROLOGY | Facility: CLINIC | Age: 72
End: 2022-01-18
Payer: MEDICARE

## 2022-01-20 ENCOUNTER — TELEPHONE (OUTPATIENT)
Dept: FAMILY MEDICINE | Facility: CLINIC | Age: 72
End: 2022-01-20
Payer: MEDICARE

## 2022-01-20 DIAGNOSIS — Z86.16 HISTORY OF COVID-19: ICD-10-CM

## 2022-01-20 DIAGNOSIS — R06.00 DYSPNEA, UNSPECIFIED TYPE: Primary | ICD-10-CM

## 2022-01-20 NOTE — TELEPHONE ENCOUNTER
----- Message from Radha Valencia sent at 1/20/2022 12:09 PM CST -----  Type:  Patient Requesting Referral    Who Called: Self     Referral to What Specialty: Pulmonary     Reason for Referral:breathing issues since having covid 19       Does the patient want the referral with a specific physician?: Dr. Stefano Mead     Is the specialist an [facility] or [facility]    Would the patient rather a call back or a response via [facility]? Call     Best Call Back Number:.[phone] (home)

## 2022-01-21 ENCOUNTER — HOSPITAL ENCOUNTER (OUTPATIENT)
Dept: RADIOLOGY | Facility: HOSPITAL | Age: 72
Discharge: HOME | End: 2022-01-21
Attending: INTERNAL MEDICINE
Payer: MEDICARE

## 2022-01-21 ENCOUNTER — TELEPHONE (OUTPATIENT)
Dept: FAMILY MEDICINE | Facility: CLINIC | Age: 72
End: 2022-01-21
Payer: MEDICARE

## 2022-01-21 DIAGNOSIS — Z86.16 HISTORY OF COVID-19: ICD-10-CM

## 2022-01-21 DIAGNOSIS — U07.1 COVID-19 VIRUS INFECTION: Primary | ICD-10-CM

## 2022-01-21 DIAGNOSIS — R06.00 DYSPNEA, UNSPECIFIED TYPE: ICD-10-CM

## 2022-01-21 PROCEDURE — 71046 X-RAY EXAM CHEST 2 VIEWS: CPT | Mod: TC,HCNC,FY

## 2022-01-21 PROCEDURE — 71046 X-RAY EXAM CHEST 2 VIEWS: CPT | Mod: 26,HCNC,, | Performed by: RADIOLOGY

## 2022-01-21 PROCEDURE — 71046 XR CHEST PA AND LATERAL: ICD-10-PCS | Mod: 26,HCNC,, | Performed by: RADIOLOGY

## 2022-01-21 RX ORDER — ALBUTEROL SULFATE 90 UG/1
2 AEROSOL, METERED RESPIRATORY (INHALATION) EVERY 6 HOURS PRN
Qty: 18 G | Refills: 0 | Status: SHIPPED | OUTPATIENT
Start: 2022-01-21 | End: 2022-02-08

## 2022-01-21 RX ORDER — FLUTICASONE PROPIONATE AND SALMETEROL 250; 50 UG/1; UG/1
1 POWDER RESPIRATORY (INHALATION) 2 TIMES DAILY
Qty: 60 EACH | Refills: 0 | Status: SHIPPED | OUTPATIENT
Start: 2022-01-21 | End: 2022-01-31 | Stop reason: ALTCHOICE

## 2022-01-21 NOTE — PROGRESS NOTES
Spoke with wife, XR c/w covid infection, dx'd early January.  Will send in LABA/ICS and rescue albuterol to local [facility]  Offered rehab, declined

## 2022-01-21 NOTE — TELEPHONE ENCOUNTER
----- Message from Katerine Bobo sent at 1/21/2022  4:32 PM CST -----  Type: Patient Call Back    Who called:pt    What is the request in detail:pt requesting to discuss having pneumonia and if there is anything he can do for it. Call pt    Can the clinic reply by [facility]?    Would the patient rather a call back or a response via [facility]? call    Best call back number:[phone] (home)       Additional Information:

## 2022-01-21 NOTE — TELEPHONE ENCOUNTER
Spoke to patient's wife regarding a referral to pulmonary she states her  is to weak and sick to drive to main campus and she does not drive and would like to stay on the [facility] to see dr garcia, wife states she has an appointment with dr Garcia and will call his office to see if she can fit her  in

## 2022-01-21 NOTE — TELEPHONE ENCOUNTER
Spoke with wife, XR c/w covid infection, dx'd early January.  Will send in LABA/ICS and rescue albuterol to local [facility]  Offered rehab, declined

## 2022-01-29 ENCOUNTER — PATIENT MESSAGE (OUTPATIENT)
Dept: UROLOGY | Facility: CLINIC | Age: 72
End: 2022-01-29
Payer: MEDICARE

## 2022-01-31 ENCOUNTER — PATIENT OUTREACH (OUTPATIENT)
Dept: ADMINISTRATIVE | Facility: HOSPITAL | Age: 72
End: 2022-01-31
Payer: MEDICARE

## 2022-01-31 ENCOUNTER — OFFICE VISIT (OUTPATIENT)
Dept: PULMONOLOGY | Facility: CLINIC | Age: 72
End: 2022-01-31
Payer: MEDICARE

## 2022-01-31 VITALS
WEIGHT: 227 LBS | OXYGEN SATURATION: 97 % | HEART RATE: 75 BPM | BODY MASS INDEX: 32.5 KG/M2 | SYSTOLIC BLOOD PRESSURE: 102 MMHG | DIASTOLIC BLOOD PRESSURE: 67 MMHG | HEIGHT: 70 IN

## 2022-01-31 DIAGNOSIS — J12.82 PNEUMONIA DUE TO COVID-19 VIRUS: Primary | ICD-10-CM

## 2022-01-31 DIAGNOSIS — R06.00 DYSPNEA, UNSPECIFIED TYPE: ICD-10-CM

## 2022-01-31 DIAGNOSIS — Z86.16 HISTORY OF COVID-19: ICD-10-CM

## 2022-01-31 DIAGNOSIS — R26.81 UNSTEADY GAIT: ICD-10-CM

## 2022-01-31 DIAGNOSIS — U07.1 PNEUMONIA DUE TO COVID-19 VIRUS: Primary | ICD-10-CM

## 2022-01-31 DIAGNOSIS — Z01.818 PREPROCEDURAL EXAMINATION: ICD-10-CM

## 2022-01-31 DIAGNOSIS — R53.1 WEAKNESS: ICD-10-CM

## 2022-01-31 PROCEDURE — 3078F PR MOST RECENT DIASTOLIC BLOOD PRESSURE < 80 MM HG: ICD-10-PCS | Mod: HCNC,CPTII,S$GLB, | Performed by: NURSE PRACTITIONER

## 2022-01-31 PROCEDURE — 3288F FALL RISK ASSESSMENT DOCD: CPT | Mod: HCNC,CPTII,S$GLB, | Performed by: NURSE PRACTITIONER

## 2022-01-31 PROCEDURE — 3074F SYST BP LT 130 MM HG: CPT | Mod: HCNC,CPTII,S$GLB, | Performed by: NURSE PRACTITIONER

## 2022-01-31 PROCEDURE — 99214 PR OFFICE/OUTPT VISIT, EST, LEVL IV, 30-39 MIN: ICD-10-PCS | Mod: HCNC,S$GLB,, | Performed by: NURSE PRACTITIONER

## 2022-01-31 PROCEDURE — 1126F AMNT PAIN NOTED NONE PRSNT: CPT | Mod: HCNC,CPTII,S$GLB, | Performed by: NURSE PRACTITIONER

## 2022-01-31 PROCEDURE — 3078F DIAST BP <80 MM HG: CPT | Mod: HCNC,CPTII,S$GLB, | Performed by: NURSE PRACTITIONER

## 2022-01-31 PROCEDURE — 1126F PR PAIN SEVERITY QUANTIFIED, NO PAIN PRESENT: ICD-10-PCS | Mod: HCNC,CPTII,S$GLB, | Performed by: NURSE PRACTITIONER

## 2022-01-31 PROCEDURE — 99999 PR PBB SHADOW E&M-EST. PATIENT-LVL V: CPT | Mod: PBBFAC,HCNC,, | Performed by: NURSE PRACTITIONER

## 2022-01-31 PROCEDURE — 3074F PR MOST RECENT SYSTOLIC BLOOD PRESSURE < 130 MM HG: ICD-10-PCS | Mod: HCNC,CPTII,S$GLB, | Performed by: NURSE PRACTITIONER

## 2022-01-31 PROCEDURE — 1101F PT FALLS ASSESS-DOCD LE1/YR: CPT | Mod: HCNC,CPTII,S$GLB, | Performed by: NURSE PRACTITIONER

## 2022-01-31 PROCEDURE — 3008F PR BODY MASS INDEX (BMI) DOCUMENTED: ICD-10-PCS | Mod: HCNC,CPTII,S$GLB, | Performed by: NURSE PRACTITIONER

## 2022-01-31 PROCEDURE — 99999 PR PBB SHADOW E&M-EST. PATIENT-LVL V: ICD-10-PCS | Mod: PBBFAC,HCNC,, | Performed by: NURSE PRACTITIONER

## 2022-01-31 PROCEDURE — 99214 OFFICE O/P EST MOD 30 MIN: CPT | Mod: HCNC,S$GLB,, | Performed by: NURSE PRACTITIONER

## 2022-01-31 PROCEDURE — 3288F PR FALLS RISK ASSESSMENT DOCUMENTED: ICD-10-PCS | Mod: HCNC,CPTII,S$GLB, | Performed by: NURSE PRACTITIONER

## 2022-01-31 PROCEDURE — 1101F PR PT FALLS ASSESS DOC 0-1 FALLS W/OUT INJ PAST YR: ICD-10-PCS | Mod: HCNC,CPTII,S$GLB, | Performed by: NURSE PRACTITIONER

## 2022-01-31 PROCEDURE — 3008F BODY MASS INDEX DOCD: CPT | Mod: HCNC,CPTII,S$GLB, | Performed by: NURSE PRACTITIONER

## 2022-01-31 RX ORDER — INFLUENZA VACCINE, ADJUVANTED 15; 15; 15; 15 UG/.5ML; UG/.5ML; UG/.5ML; UG/.5ML
INJECTION, SUSPENSION INTRAMUSCULAR
COMMUNITY
Start: 2021-11-27 | End: 2022-02-25

## 2022-01-31 RX ORDER — BUDESONIDE 0.5 MG/2ML
0.5 INHALANT ORAL 2 TIMES DAILY
Qty: 360 ML | Refills: 1 | Status: SHIPPED | OUTPATIENT
Start: 2022-01-31 | End: 2022-02-25 | Stop reason: HOSPADM

## 2022-01-31 RX ORDER — PREDNISONE 20 MG/1
TABLET ORAL
Qty: 21 TABLET | Refills: 0 | Status: SHIPPED | OUTPATIENT
Start: 2022-01-31 | End: 2022-02-25 | Stop reason: ALTCHOICE

## 2022-01-31 RX ORDER — LEVALBUTEROL INHALATION SOLUTION 0.63 MG/3ML
1 SOLUTION RESPIRATORY (INHALATION) EVERY 4 HOURS PRN
Qty: 300 ML | Refills: 3 | Status: SHIPPED | OUTPATIENT
Start: 2022-01-31 | End: 2022-02-25

## 2022-01-31 RX ORDER — DOXYCYCLINE HYCLATE 100 MG
TABLET ORAL
COMMUNITY
Start: 2021-10-19 | End: 2022-02-25 | Stop reason: ALTCHOICE

## 2022-01-31 RX ORDER — DOXYCYCLINE 100 MG/1
100 CAPSULE ORAL EVERY 12 HOURS
Qty: 20 CAPSULE | Refills: 0 | Status: SHIPPED | OUTPATIENT
Start: 2022-01-31 | End: 2022-02-10

## 2022-01-31 NOTE — PROGRESS NOTES
Clinton Rosenberg  was seen as a new patient at the request of Dr Gonzalez, Michael BERRY MD for the evaluation of  covid pneumonia    CHIEF COMPLAINT:    Chief Complaint   Patient presents with    Follow-up       HISTORY OF PRESENT ILLNESS: Clinton Rosenberg is a 71 y.o. male nonsmoker with  has a past medical history of Allergy, Angina pectoris, Anxiety, Cancer, Coronary artery disease, Depression, Eye injury (as a teen), GERD (gastroesophageal reflux disease), Hyperlipidemia, Hypertension, Hypothyroidism, Memory loss, Myocardial infarction, Nuclear sclerosis of both eyes (5/20/2021), Obesity, Peripheral vascular disease (6/20/2017), Retrocalcaneal bursitis (11/24/2014), and Sleep apnea. is here for pulmonary evaluation after anjali covid in December. Patient presents with daughter. Patient with symptoms of SIMS, chest pain, weakness, lightheadedness with oxygen desaturation mostly when patient is laying down per daughter. Reports breathing improved with pulmicort nebs twice daily. Advair was not as helpful.       REVIEW OF SYSTEMS:   Review of Systems   Constitutional: Positive for fatigue. Negative for fever, chills, weight loss, weight gain, activity change, appetite change and night sweats.   HENT: Negative for congestion.    Eyes: Negative.    Respiratory: Positive for dyspnea on extertion. Negative for cough, sputum production, chest tightness, wheezing, orthopnea, previous hospitialization due to pulmonary problems and use of rescue inhaler.    Cardiovascular: Positive for chest pain. Negative for palpitations.   Genitourinary: Negative.    Endocrine: endocrine negative   Musculoskeletal: Negative for gait problem.   Skin: Negative.    Gastrointestinal: Negative for acid reflux.   Neurological: Positive for weakness and light-headedness.   Psychiatric/Behavioral: Negative for sleep disturbance.       DATA  PERSONALLY REVIEWED:    PFT:Spirometry is normal. Lung volumes show mild restriction is present. DLCO is  normal  Walk test: 96/95/96  CXR 2/9/2022:Since January 21, 2022, nearly resolved bilateral interstitial opacities.  No new abnormality.   cxr 1/21/2022:     CT chest:1. Peripheral and basilar predominant ground-glass and consolidative airspace opacities concerning for atypical pneumonia such as COVID-19.    Sleep study 2/13/2016:    The overall AHI was 15.0 with an oxygen ivory of 86.0%. The supine AHI was 29.5 and the REM AHI was 54.5     ECHO 11/24/2021:  · The left ventricle is normal in size with concentric hypertrophy and normal systolic function.  · The estimated ejection fraction is 60%.  · Normal left ventricular diastolic function.  · Normal right ventricular size with normal right ventricular systolic function.  · Normal central venous pressure (3 mmHg).  · The estimated PA systolic pressure is 20 mmHg.        PAST MEDICAL HISTORY:    Active Ambulatory Problems     Diagnosis Date Noted    Mixed hyperlipidemia long term DAPT 07/09/2014    Essential hypertension 07/09/2014    Gastroesophageal reflux disease 07/09/2014    Benign non-nodular prostatic hyperplasia with lower urinary tract symptoms 07/09/2014    Vitamin D deficiency 07/09/2014    Hernia of abdominal wall 07/09/2014    Coronary artery disease of native artery of native heart with stable angina pectoris; 2021 plan is long term DAPT 11/19/2014    Anxiety and depression with assoc memory loss; seen by neuro 2015, negative workup 11/24/2014    Prediabetes 11/24/2014    Hypothyroidism 06/01/2016    Obesity (BMI 30.0-34.9) 06/01/2016    Aortic ectasia 04/28/2017    JAYLEN (obstructive sleep apnea) 04/28/2017    Peripheral vascular disease with LE US 6/2017 and carotid US  06/20/2017    Aortic root dilatation on TTE 8/2019 11/14/2017    History of concussion 1/2018 head CT negative 01/31/2018    Venous insufficiency 05/10/2018    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 05/20/2018    Tubular adenoma of colon 2/2009 10/11/2018    Allergy  to iodine 08/27/2019    Nuclear sclerosis of both eyes 05/20/2021    Pneumonia due to COVID-19 virus 01/31/2022     Resolved Ambulatory Problems     Diagnosis Date Noted    Myocardial infarct, old 07/09/2014    Angina effort 07/09/2014    Other malaise and fatigue 11/24/2014    Chronic cough 11/24/2014    Dysuria 11/24/2014    Dysarthria 11/24/2014    Aphasia 11/24/2014    Mild cognitive impairment, so stated 11/24/2014    Retrocalcaneal bursitis 11/24/2014    Other abnormal glucose 11/24/2014    Knee pain 11/24/2014    Memory difficulties 11/24/2014    Attention and concentration deficit 01/27/2015    Headache 01/27/2015    Chest pain 05/16/2018    Pleurisy 05/16/2018    Coronary artery disease of native artery of native heart with stable angina pectoris 02/21/2019     Past Medical History:   Diagnosis Date    Allergy     Angina pectoris     Anxiety     Cancer     Coronary artery disease     Depression     Eye injury as a teen    GERD (gastroesophageal reflux disease)     Hyperlipidemia     Hypertension     Memory loss     Myocardial infarction     Obesity     Sleep apnea                 PAST SURGICAL HISTORY:    Past Surgical History:   Procedure Laterality Date    APPENDECTOMY  1986    bone spur Right     COLONOSCOPY      hand trauma Right     pencil     heart stent      LEFT HEART CATHETERIZATION Left 9/20/2019    Procedure: Left heart cath 12 pm start, R rad access;  Surgeon: Brad Bahena MD;  Location: [facility] CATH LAB;  Service: Cardiology;  Laterality: Left;  RN PREOP 9/13/2019  NEEDS IODINE PREMED    LEFT HEART CATHETERIZATION Left 9/21/2020    Procedure: Left heart cath 730am start, R rad access;  Surgeon: Brad Bahena MD;  Location: [facility] CATH LAB;  Service: Cardiology;  Laterality: Left;  RN PREOP 9/14/2020, COVID NEGATIVE---9/18         FAMILY HISTORY:                Family History   Problem Relation Age of Onset    Arthritis Mother     Early death  Mother     Heart disease Mother     Hypertension Mother     Migraines Mother     Seizures Mother     Tremor Mother     Diabetes Mother     Cancer Mother     Early death Father     Heart disease Father     Hypertension Father     Stroke Father     Diabetes Father     Alcohol abuse Father     Arthritis Brother     Cancer Brother     Diabetes Brother     Early death Brother     Heart disease Brother     Hypertension Brother     Heart disease Sister     Hypertension Sister     Arthritis Sister     Depression Sister     Heart disease Brother     Arthritis Brother     Heart disease Brother     Pneumonia Brother     Heart disease Brother     Hypertension Brother     Diabetes Brother     Heart disease Brother     No Known Problems Maternal Aunt     No Known Problems Maternal Uncle     No Known Problems Paternal Aunt     No Known Problems Paternal Uncle     No Known Problems Maternal Grandmother     No Known Problems Maternal Grandfather     No Known Problems Paternal Grandmother     No Known Problems Paternal Grandfather     Amblyopia Neg Hx     Blindness Neg Hx     Cataracts Neg Hx     Glaucoma Neg Hx     Macular degeneration Neg Hx     Retinal detachment Neg Hx     Strabismus Neg Hx     Thyroid disease Neg Hx        SOCIAL HISTORY:          Tobacco:   Social History     Tobacco Use   Smoking Status Never Smoker   Smokeless Tobacco Never Used       alcohol use:    Social History     Substance and Sexual Activity   Alcohol Use No    Alcohol/week: 0.0 standard drinks    Comment: rarely                   ALLERGIES:    Review of patient's allergies indicates:   Allergen Reactions    Iodine and iodide containing products Anaphylaxis    Naproxen Other (See Comments)     hallucinations  Hallucinations^  Hallucinations^    Hydrocodone-acetaminophen      Rash (skin)^    Iodine      Anaphylaxis^    Oxycodone-acetaminophen      Rash (skin)^       CURRENT MEDICATIONS:    Current  Outpatient Medications   Medication Sig Dispense Refill    albuterol (PROVENTIL/VENTOLIN HFA) 90 mcg/actuation inhaler INHALE 2 PUFFS INTO THE LUNGS EVERY 6 (SIX) HOURS AS NEEDED FOR WHEEZING. RESCUE 18 g 0    aspirin (ECOTRIN) 81 MG EC tablet Take 1 tablet (81 mg total) by mouth once daily. 90 tablet 3    atorvastatin (LIPITOR) 80 MG tablet TAKE 1 TABLET EVERY EVENING 90 tablet 3    budesonide (PULMICORT) 0.5 mg/2 mL nebulizer solution Take 2 mLs (0.5 mg total) by nebulization 2 (two) times daily. Controller 360 mL 1    clopidogreL (PLAVIX) 75 mg tablet TAKE 1 TABLET EVERY DAY 90 tablet 3    doxycycline (VIBRA-TABS) 100 MG tablet       doxycycline (VIBRAMYCIN) 100 MG Cap Take 1 capsule (100 mg total) by mouth every 12 (twelve) hours. for 10 days 20 capsule 0    FLUAD QUAD 2021-22,65Y UP,,PF, 60 mcg (15 mcg x 4)/0.5 mL Syrg       furosemide (LASIX) 20 MG tablet TAKE 1 TABLET EVERY DAY 90 tablet 3    isosorbide mononitrate (IMDUR) 120 MG 24 hr tablet Take 2 tablets (240 mg total) by mouth once daily. 180 tablet 3    KRILL/OM3/DHA/EPA/OM6/LIP/ASTX (KRILL OIL, OMEGA 3 AND 6, ORAL) Take by mouth.      levalbuterol (XOPENEX) 0.63 mg/3 mL nebulizer solution Take 3 mLs (0.63 mg total) by nebulization every 4 (four) hours as needed for Wheezing or Shortness of Breath. 300 mL 3    levocetirizine (XYZAL) 5 MG tablet TAKE 1 TABLET EVERY EVENING 90 tablet 3    levothyroxine (SYNTHROID) 50 MCG tablet TAKE 1 TABLET BEFORE BREAKFAST 90 tablet 3    losartan (COZAAR) 50 MG tablet TAKE 1 TABLET EVERY DAY 90 tablet 3    metFORMIN (GLUCOPHAGE) 500 MG tablet TAKE 1 TABLET BY MOUTH EVERY DAY WITH BREAKFAST 90 tablet 1    metoprolol tartrate (LOPRESSOR) 25 MG tablet TAKE 1 TABLET TWICE DAILY 180 tablet 3    nitroGLYCERIN (NITROSTAT) 0.4 MG SL tablet Place 1 tablet (0.4 mg total) under the tongue every 5 (five) minutes as needed for Chest pain. 50 tablet 3    pantoprazole (PROTONIX) 40 MG tablet TAKE 1 TABLET EVERY DAY  "90 tablet 3    predniSONE (DELTASONE) 20 MG tablet Take 60 mg x 3 days follow by 40 mg x 3 days follow by 20 mg x 3 days follow by 10 mg x 3 days 21 tablet 0    tamsulosin (FLOMAX) 0.4 mg Cap Take 1 capsule (0.4 mg total) by mouth once daily. 90 capsule 3    vitamin D 1000 units Tab Take 1 tablet (1,000 Units total) by mouth once daily. 90 tablet 3     No current facility-administered medications for this visit.                       PHYSICAL EXAM:  Vitals:    01/31/22 0823   BP: 102/67   Pulse: 75   SpO2: 97%   Weight: 103 kg (227 lb)   Height: 5' 10" (1.778 m)   PainSc: 0-No pain     Body mass index is 32.57 kg/m².   Physical Exam   Constitutional: He appears well-developed. He is obese.   HENT:   Head: Normocephalic.   Cardiovascular: Normal rate, regular rhythm and normal heart sounds.   Pulmonary/Chest:   Breathing labored but CTA, attempted to walk pt who was unsteady         Lab Results   Component Value Date    TSH 4.119 (H) 10/08/2021      Lab Results   Component Value Date    WBC 10.90 10/08/2021    HGB 16.0 10/08/2021    HCT 46.1 10/08/2021    MCV 92 10/08/2021     10/08/2021     BMP  Lab Results   Component Value Date     10/08/2021    K 4.6 10/08/2021     10/08/2021    CO2 31 (H) 10/08/2021    BUN 11 10/08/2021    CREATININE 1.1 10/08/2021    CALCIUM 10.0 10/08/2021    ANIONGAP 10 10/08/2021    ESTGFRAFRICA >60.0 10/08/2021    EGFRNONAA >60.0 10/08/2021     Lab Results   Component Value Date    HGBA1C 6.3 (H) 10/08/2021        ASSESSMENT    ICD-10-CM ICD-9-CM    1. Pneumonia due to COVID-19 virus  U07.1 480.8 predniSONE (DELTASONE) 20 MG tablet    J12.82 079.89 doxycycline (VIBRAMYCIN) 100 MG Cap      levalbuterol (XOPENEX) 0.63 mg/3 mL nebulizer solution      budesonide (PULMICORT) 0.5 mg/2 mL nebulizer solution      NEBULIZER FOR HOME USE      NEBULIZER KIT (SUPPLIES) FOR HOME USE      Ambulatory referral/consult to Physical/Occupational Therapy      Six Minute Walk Test to " qualify for Home Oxygen      Complete PFT with bronchodilator      X-Ray Chest PA And Lateral   2. Dyspnea, unspecified type  R06.00 786.09 Ambulatory referral/consult to Pulmonology   3. History of COVID-19  Z86.16 V12.09 Ambulatory referral/consult to Pulmonology   4. Weakness  R53.1 780.79 Ambulatory referral/consult to Physical/Occupational Therapy      WALKER FOR HOME USE   5. Unsteady gait  R26.81 781.2 WALKER FOR HOME USE   6. Preprocedural examination  Z01.818 V72.84 COVID-19 Routine Screening       PLAN:    Problem List Items Addressed This Visit        Unprioritized    Pneumonia due to COVID-19 virus - Primary    Overview     Recommend continuation symptom management with nebs, empiric tx prednisone + doxy  Recommend PT due to weakness, unsteadiness at visit  F/u with his cardiologist for eval CP, lightheadedness         Relevant Medications    predniSONE (DELTASONE) 20 MG tablet    doxycycline (VIBRAMYCIN) 100 MG Cap    levalbuterol (XOPENEX) 0.63 mg/3 mL nebulizer solution    budesonide (PULMICORT) 0.5 mg/2 mL nebulizer solution    Other Relevant Orders    NEBULIZER FOR HOME USE    NEBULIZER KIT (SUPPLIES) FOR HOME USE    Ambulatory referral/consult to Physical/Occupational Therapy    Six Minute Walk Test to qualify for Home Oxygen (Completed)    Complete PFT with bronchodilator (Completed)    X-Ray Chest PA And Lateral (Completed)      Other Visit Diagnoses     Dyspnea, unspecified type        History of COVID-19        Weakness        Relevant Orders    Ambulatory referral/consult to Physical/Occupational Therapy    WALKER FOR HOME USE    Unsteady gait        Relevant Orders    WALKER FOR HOME USE    Preprocedural examination        Relevant Orders    COVID-19 Routine Screening          Thank you for allowing me the opportunity to participate in the care of your patient.    Patient will Follow up in about 2 weeks (around 2/14/2022).     Please cc note to  Michael Lynn MD.

## 2022-02-01 ENCOUNTER — PATIENT MESSAGE (OUTPATIENT)
Dept: UROLOGY | Facility: CLINIC | Age: 72
End: 2022-02-01
Payer: MEDICARE

## 2022-02-03 ENCOUNTER — OFFICE VISIT (OUTPATIENT)
Dept: UROLOGY | Facility: CLINIC | Age: 72
End: 2022-02-03
Payer: MEDICARE

## 2022-02-03 VITALS — WEIGHT: 227 LBS | HEIGHT: 70 IN | BODY MASS INDEX: 32.5 KG/M2

## 2022-02-03 DIAGNOSIS — R39.9 LOWER URINARY TRACT SYMPTOMS (LUTS): Primary | ICD-10-CM

## 2022-02-03 PROCEDURE — 1126F PR PAIN SEVERITY QUANTIFIED, NO PAIN PRESENT: ICD-10-PCS | Mod: HCNC,CPTII,S$GLB, | Performed by: STUDENT IN AN ORGANIZED HEALTH CARE EDUCATION/TRAINING PROGRAM

## 2022-02-03 PROCEDURE — 3288F FALL RISK ASSESSMENT DOCD: CPT | Mod: HCNC,CPTII,S$GLB, | Performed by: STUDENT IN AN ORGANIZED HEALTH CARE EDUCATION/TRAINING PROGRAM

## 2022-02-03 PROCEDURE — 3288F PR FALLS RISK ASSESSMENT DOCUMENTED: ICD-10-PCS | Mod: HCNC,CPTII,S$GLB, | Performed by: STUDENT IN AN ORGANIZED HEALTH CARE EDUCATION/TRAINING PROGRAM

## 2022-02-03 PROCEDURE — 3008F BODY MASS INDEX DOCD: CPT | Mod: HCNC,CPTII,S$GLB, | Performed by: STUDENT IN AN ORGANIZED HEALTH CARE EDUCATION/TRAINING PROGRAM

## 2022-02-03 PROCEDURE — 1101F PT FALLS ASSESS-DOCD LE1/YR: CPT | Mod: HCNC,CPTII,S$GLB, | Performed by: STUDENT IN AN ORGANIZED HEALTH CARE EDUCATION/TRAINING PROGRAM

## 2022-02-03 PROCEDURE — 99999 PR PBB SHADOW E&M-EST. PATIENT-LVL II: CPT | Mod: PBBFAC,HCNC,, | Performed by: STUDENT IN AN ORGANIZED HEALTH CARE EDUCATION/TRAINING PROGRAM

## 2022-02-03 PROCEDURE — 99499 NO LOS: ICD-10-PCS | Mod: HCNC,S$GLB,, | Performed by: STUDENT IN AN ORGANIZED HEALTH CARE EDUCATION/TRAINING PROGRAM

## 2022-02-03 PROCEDURE — 1126F AMNT PAIN NOTED NONE PRSNT: CPT | Mod: HCNC,CPTII,S$GLB, | Performed by: STUDENT IN AN ORGANIZED HEALTH CARE EDUCATION/TRAINING PROGRAM

## 2022-02-03 PROCEDURE — 99499 UNLISTED E&M SERVICE: CPT | Mod: HCNC,S$GLB,, | Performed by: STUDENT IN AN ORGANIZED HEALTH CARE EDUCATION/TRAINING PROGRAM

## 2022-02-03 PROCEDURE — 99999 PR PBB SHADOW E&M-EST. PATIENT-LVL II: ICD-10-PCS | Mod: PBBFAC,HCNC,, | Performed by: STUDENT IN AN ORGANIZED HEALTH CARE EDUCATION/TRAINING PROGRAM

## 2022-02-03 PROCEDURE — 3008F PR BODY MASS INDEX (BMI) DOCUMENTED: ICD-10-PCS | Mod: HCNC,CPTII,S$GLB, | Performed by: STUDENT IN AN ORGANIZED HEALTH CARE EDUCATION/TRAINING PROGRAM

## 2022-02-03 PROCEDURE — 1101F PR PT FALLS ASSESS DOC 0-1 FALLS W/OUT INJ PAST YR: ICD-10-PCS | Mod: HCNC,CPTII,S$GLB, | Performed by: STUDENT IN AN ORGANIZED HEALTH CARE EDUCATION/TRAINING PROGRAM

## 2022-02-09 ENCOUNTER — HOSPITAL ENCOUNTER (OUTPATIENT)
Dept: RADIOLOGY | Facility: HOSPITAL | Age: 72
Discharge: HOME | End: 2022-02-09
Attending: NURSE PRACTITIONER
Payer: MEDICARE

## 2022-02-09 ENCOUNTER — HOSPITAL ENCOUNTER (OUTPATIENT)
Dept: RESPIRATORY THERAPY | Facility: HOSPITAL | Age: 72
Discharge: HOME | End: 2022-02-09
Attending: NURSE PRACTITIONER
Payer: MEDICARE

## 2022-02-09 DIAGNOSIS — U07.1 PNEUMONIA DUE TO COVID-19 VIRUS: ICD-10-CM

## 2022-02-09 DIAGNOSIS — J12.82 PNEUMONIA DUE TO COVID-19 VIRUS: ICD-10-CM

## 2022-02-09 LAB
6MWT: NORMAL
BRPFT: NORMAL
DLCO ADJ PRE: 22.02 ML/(MIN*MMHG)
DLCO SINGLE BREATH LLN: 20.19
DLCO SINGLE BREATH PRE REF: 81.2 %
DLCO SINGLE BREATH REF: 27.11
DLCOC SBVA LLN: 2.67
DLCOC SBVA PRE REF: 113.4 %
DLCOC SBVA REF: 3.84
DLCOC SINGLE BREATH LLN: 20.19
DLCOC SINGLE BREATH PRE REF: 81.2 %
DLCOC SINGLE BREATH REF: 27.11
DLCOVA LLN: 2.67
DLCOVA PRE REF: 113.4 %
DLCOVA PRE: 4.35 ML/(MIN*MMHG*L)
DLCOVA REF: 3.84
DLVAADJ PRE: 4.35 ML/(MIN*MMHG*L)
ERVN2 LLN: -16448.93
ERVN2 PRE REF: 46.9 %
ERVN2 PRE: 0.5 L
ERVN2 REF: 1.07
FEF 25 75 LLN: 1.08
FEF 25 75 PRE REF: 101.8 %
FEF 25 75 REF: 2.46
FEV1 FVC LLN: 63
FEV1 FVC PRE REF: 101.7 %
FEV1 FVC REF: 76
FEV1 LLN: 2.32
FEV1 PRE REF: 85 %
FEV1 REF: 3.21
FRCN2 LLN: 2.69
FRCN2 PRE REF: 64.3 %
FRCN2 REF: 3.67
FVC LLN: 3.14
FVC PRE REF: 83.2 %
FVC REF: 4.23
IVC PRE: 3.29 L
IVC SINGLE BREATH LLN: 3.14
IVC SINGLE BREATH PRE REF: 77.7 %
IVC SINGLE BREATH REF: 4.23
PEF LLN: 6.09
PEF PRE REF: 112.9 %
PEF REF: 8.39
PRE DLCO: 22.02 ML/(MIN*MMHG)
PRE FEF 25 75: 2.5 L/S
PRE FET 100: 9.42 SEC
PRE FEV1 FVC: 77.46 %
PRE FEV1: 2.73 L
PRE FRC N2: 2.36 L
PRE FVC: 3.52 L
PRE PEF: 9.47 L/S
RVN2 LLN: 1.93
RVN2 PRE REF: 71.4 %
RVN2 PRE: 1.86 L
RVN2 REF: 2.61
RVN2TLCN2 LLN: 31.89
RVN2TLCN2 PRE REF: 83.7 %
RVN2TLCN2 PRE: 34.19 %
RVN2TLCN2 REF: 40.87
TLCN2 LLN: 5.91
TLCN2 PRE REF: 77 %
TLCN2 PRE: 5.44 L
TLCN2 REF: 7.06
VA PRE: 5.06 L
VA SINGLE BREATH LLN: 6.91
VA SINGLE BREATH PRE REF: 73.2 %
VA SINGLE BREATH REF: 6.91
VCMAXN2 LLN: 3.14
VCMAXN2 PRE REF: 84.7 %
VCMAXN2 PRE: 3.58 L
VCMAXN2 REF: 4.23

## 2022-02-09 PROCEDURE — 94010 BREATHING CAPACITY TEST: CPT | Mod: 26,59,HCNC, | Performed by: INTERNAL MEDICINE

## 2022-02-09 PROCEDURE — 94618 PULMONARY STRESS TESTING: CPT | Mod: HCNC

## 2022-02-09 PROCEDURE — 71046 X-RAY EXAM CHEST 2 VIEWS: CPT | Mod: TC,HCNC,FY

## 2022-02-09 PROCEDURE — 94727 PR PULM FUNCTION TEST BY GAS: ICD-10-PCS | Mod: 26,HCNC,, | Performed by: INTERNAL MEDICINE

## 2022-02-09 PROCEDURE — 94729 PR C02/MEMBANE DIFFUSE CAPACITY: ICD-10-PCS | Mod: 26,HCNC,, | Performed by: INTERNAL MEDICINE

## 2022-02-09 PROCEDURE — 71046 X-RAY EXAM CHEST 2 VIEWS: CPT | Mod: 26,HCNC,, | Performed by: RADIOLOGY

## 2022-02-09 PROCEDURE — 94727 GAS DIL/WSHOT DETER LNG VOL: CPT | Mod: 26,HCNC,, | Performed by: INTERNAL MEDICINE

## 2022-02-09 PROCEDURE — 94729 DIFFUSING CAPACITY: CPT | Mod: HCNC

## 2022-02-09 PROCEDURE — 94010 BREATHING CAPACITY TEST: ICD-10-PCS | Mod: 26,59,HCNC, | Performed by: INTERNAL MEDICINE

## 2022-02-09 PROCEDURE — 94010 BREATHING CAPACITY TEST: CPT | Mod: HCNC

## 2022-02-09 PROCEDURE — 71046 XR CHEST PA AND LATERAL: ICD-10-PCS | Mod: 26,HCNC,, | Performed by: RADIOLOGY

## 2022-02-09 PROCEDURE — 94727 GAS DIL/WSHOT DETER LNG VOL: CPT | Mod: HCNC

## 2022-02-09 PROCEDURE — 94618 PULMONARY STRESS TESTING: ICD-10-PCS | Mod: 26,HCNC,, | Performed by: INTERNAL MEDICINE

## 2022-02-09 PROCEDURE — 94618 PULMONARY STRESS TESTING: CPT | Mod: 26,HCNC,, | Performed by: INTERNAL MEDICINE

## 2022-02-09 PROCEDURE — 94729 DIFFUSING CAPACITY: CPT | Mod: 26,HCNC,, | Performed by: INTERNAL MEDICINE

## 2022-02-10 ENCOUNTER — OFFICE VISIT (OUTPATIENT)
Dept: CARDIOLOGY | Facility: CLINIC | Age: 72
End: 2022-02-10
Payer: MEDICARE

## 2022-02-10 VITALS
WEIGHT: 227 LBS | RESPIRATION RATE: 15 BRPM | OXYGEN SATURATION: 97 % | DIASTOLIC BLOOD PRESSURE: 78 MMHG | HEIGHT: 70 IN | BODY MASS INDEX: 32.5 KG/M2 | SYSTOLIC BLOOD PRESSURE: 124 MMHG | HEART RATE: 78 BPM

## 2022-02-10 DIAGNOSIS — Z86.16 HISTORY OF COVID-19: ICD-10-CM

## 2022-02-10 DIAGNOSIS — E66.9 OBESITY (BMI 30.0-34.9): ICD-10-CM

## 2022-02-10 DIAGNOSIS — I25.118 CORONARY ARTERY DISEASE OF NATIVE ARTERY OF NATIVE HEART WITH STABLE ANGINA PECTORIS: Primary | ICD-10-CM

## 2022-02-10 DIAGNOSIS — R07.81 PLEURODYNIA: ICD-10-CM

## 2022-02-10 DIAGNOSIS — G47.33 OSA (OBSTRUCTIVE SLEEP APNEA): ICD-10-CM

## 2022-02-10 DIAGNOSIS — I10 ESSENTIAL HYPERTENSION: ICD-10-CM

## 2022-02-10 DIAGNOSIS — E78.2 MIXED HYPERLIPIDEMIA: ICD-10-CM

## 2022-02-10 DIAGNOSIS — I77.810 AORTIC ROOT DILATATION: ICD-10-CM

## 2022-02-10 PROCEDURE — 99214 OFFICE O/P EST MOD 30 MIN: CPT | Mod: HCNC,S$GLB,, | Performed by: INTERNAL MEDICINE

## 2022-02-10 PROCEDURE — 3288F FALL RISK ASSESSMENT DOCD: CPT | Mod: HCNC,CPTII,S$GLB, | Performed by: INTERNAL MEDICINE

## 2022-02-10 PROCEDURE — 99214 PR OFFICE/OUTPT VISIT, EST, LEVL IV, 30-39 MIN: ICD-10-PCS | Mod: HCNC,S$GLB,, | Performed by: INTERNAL MEDICINE

## 2022-02-10 PROCEDURE — 3288F PR FALLS RISK ASSESSMENT DOCUMENTED: ICD-10-PCS | Mod: HCNC,CPTII,S$GLB, | Performed by: INTERNAL MEDICINE

## 2022-02-10 PROCEDURE — 1159F MED LIST DOCD IN RCRD: CPT | Mod: HCNC,CPTII,S$GLB, | Performed by: INTERNAL MEDICINE

## 2022-02-10 PROCEDURE — 1160F PR REVIEW ALL MEDS BY PRESCRIBER/CLIN PHARMACIST DOCUMENTED: ICD-10-PCS | Mod: HCNC,CPTII,S$GLB, | Performed by: INTERNAL MEDICINE

## 2022-02-10 PROCEDURE — 1159F PR MEDICATION LIST DOCUMENTED IN MEDICAL RECORD: ICD-10-PCS | Mod: HCNC,CPTII,S$GLB, | Performed by: INTERNAL MEDICINE

## 2022-02-10 PROCEDURE — 1126F PR PAIN SEVERITY QUANTIFIED, NO PAIN PRESENT: ICD-10-PCS | Mod: HCNC,CPTII,S$GLB, | Performed by: INTERNAL MEDICINE

## 2022-02-10 PROCEDURE — 99499 RISK ADDL DX/OHS AUDIT: ICD-10-PCS | Mod: HCNC,S$GLB,, | Performed by: INTERNAL MEDICINE

## 2022-02-10 PROCEDURE — 1101F PT FALLS ASSESS-DOCD LE1/YR: CPT | Mod: HCNC,CPTII,S$GLB, | Performed by: INTERNAL MEDICINE

## 2022-02-10 PROCEDURE — 99999 PR PBB SHADOW E&M-EST. PATIENT-LVL V: CPT | Mod: PBBFAC,HCNC,, | Performed by: INTERNAL MEDICINE

## 2022-02-10 PROCEDURE — 3078F PR MOST RECENT DIASTOLIC BLOOD PRESSURE < 80 MM HG: ICD-10-PCS | Mod: HCNC,CPTII,S$GLB, | Performed by: INTERNAL MEDICINE

## 2022-02-10 PROCEDURE — 1101F PR PT FALLS ASSESS DOC 0-1 FALLS W/OUT INJ PAST YR: ICD-10-PCS | Mod: HCNC,CPTII,S$GLB, | Performed by: INTERNAL MEDICINE

## 2022-02-10 PROCEDURE — 99499 UNLISTED E&M SERVICE: CPT | Mod: HCNC,S$GLB,, | Performed by: INTERNAL MEDICINE

## 2022-02-10 PROCEDURE — 1126F AMNT PAIN NOTED NONE PRSNT: CPT | Mod: HCNC,CPTII,S$GLB, | Performed by: INTERNAL MEDICINE

## 2022-02-10 PROCEDURE — 3008F BODY MASS INDEX DOCD: CPT | Mod: HCNC,CPTII,S$GLB, | Performed by: INTERNAL MEDICINE

## 2022-02-10 PROCEDURE — 3008F PR BODY MASS INDEX (BMI) DOCUMENTED: ICD-10-PCS | Mod: HCNC,CPTII,S$GLB, | Performed by: INTERNAL MEDICINE

## 2022-02-10 PROCEDURE — 3074F SYST BP LT 130 MM HG: CPT | Mod: HCNC,CPTII,S$GLB, | Performed by: INTERNAL MEDICINE

## 2022-02-10 PROCEDURE — 3074F PR MOST RECENT SYSTOLIC BLOOD PRESSURE < 130 MM HG: ICD-10-PCS | Mod: HCNC,CPTII,S$GLB, | Performed by: INTERNAL MEDICINE

## 2022-02-10 PROCEDURE — 1160F RVW MEDS BY RX/DR IN RCRD: CPT | Mod: HCNC,CPTII,S$GLB, | Performed by: INTERNAL MEDICINE

## 2022-02-10 PROCEDURE — 3078F DIAST BP <80 MM HG: CPT | Mod: HCNC,CPTII,S$GLB, | Performed by: INTERNAL MEDICINE

## 2022-02-10 PROCEDURE — 99999 PR PBB SHADOW E&M-EST. PATIENT-LVL V: ICD-10-PCS | Mod: PBBFAC,HCNC,, | Performed by: INTERNAL MEDICINE

## 2022-02-10 NOTE — PROGRESS NOTES
CARDIOVASCULAR PROGRESS NOTE    REASON FOR CONSULT:   Clinton Rosenberg is a 71 y.o. male who presents for follow up of CAD, ao root dil.    PCP: Carlos  HISTORY OF PRESENT ILLNESS:   I saw the patient in mid December 2021. In the interim, he apparently developed COVID-19 pneumonia.  Since then, he has been having respiratory phasic chest pain and productive cough.  He has been seen by Pulmonary who asked him to come and see me.  He denies any angina, dyspnea, palpitations, syncope, PND, orthopnea, or lower extremity edema.  There has been no melena, hematuria, or claudicant symptoms.    At present, I think his above symptoms are residual from his viral pneumonia.  I have asked him to come back and see me again sooner if his respiratory symptoms do not fazal after 1 month.  He otherwise will follow-up with me as planned in December of 2022.     CARDIOVASCULAR HISTORY:   CAD/MI  2013: LAD PCI    9/20/19: mid RPLB 2.5x18 [facility]olute [place] SILVER    JAYLEN, unable to afford CPAP  Aortic root dil, 3.7->4.0->3.8->4.2  cm (echo 7/2020), 3.8cm (asc Ao) on CTA 9/2019  CVI (bilat GSV, R LSV, US 3/2018)    PAST MEDICAL HISTORY:     Past Medical History:   Diagnosis Date    Allergy     Angina pectoris     Anxiety     Cancer     skin rwemoved from head    Coronary artery disease     Depression     Eye injury as a teen    stuck object in os    GERD (gastroesophageal reflux disease)     Hyperlipidemia     Hypertension     Hypothyroidism     Memory loss     12/3/2014 MRI brain: 1. No evidence for acute infarct 2. unremarkable MRI of the brain; 12/3/2014 carotid US normal    Myocardial infarction     Nuclear sclerosis of both eyes 5/20/2021    Obesity     Peripheral vascular disease 6/20/2017    Retrocalcaneal bursitis 11/24/2014    Sleep apnea        PAST SURGICAL HISTORY:     Past Surgical History:   Procedure Laterality Date    APPENDECTOMY  1986    bone spur Right     COLONOSCOPY      hand trauma Right     pencil      heart stent      LEFT HEART CATHETERIZATION Left 9/20/2019    Procedure: Left heart cath 12 pm start, R rad access;  Surgeon: Brad Bahena MD;  Location: [facility] CATH LAB;  Service: Cardiology;  Laterality: Left;  RN PREOP 9/13/2019  NEEDS IODINE PREMED    LEFT HEART CATHETERIZATION Left 9/21/2020    Procedure: Left heart cath 730am start, R rad access;  Surgeon: Brad Bahena MD;  Location: [facility] CATH LAB;  Service: Cardiology;  Laterality: Left;  RN PREOP 9/14/2020, COVID NEGATIVE---9/18       ALLERGIES AND MEDICATION:     Review of patient's allergies indicates:   Allergen Reactions    Iodine containing multivitamin Anaphylaxis    Naproxen Other (See Comments)     hallucinations    Hydrocodone-acetaminophen      Rash (skin)^    Oxycodone-acetaminophen      Rash (skin)^        Medication List          Accurate as of February 10, 2022  3:21 PM. If you have any questions, ask your nurse or doctor.            CONTINUE taking these medications    albuterol 90 mcg/actuation inhaler  Commonly known as: PROVENTIL/VENTOLIN HFA  INHALE 2 PUFFS INTO THE LUNGS EVERY 6 (SIX) HOURS AS NEEDED FOR WHEEZING. RESCUE     aspirin 81 MG EC tablet  Commonly known as: ECOTRIN  Take 1 tablet (81 mg total) by mouth once daily.     atorvastatin 80 MG tablet  Commonly known as: LIPITOR  TAKE 1 TABLET EVERY EVENING     budesonide 0.5 mg/2 mL nebulizer solution  Commonly known as: PULMICORT  Take 2 mLs (0.5 mg total) by nebulization 2 (two) times daily. Controller     clopidogreL 75 mg tablet  Commonly known as: PLAVIX  TAKE 1 TABLET EVERY DAY     * doxycycline 100 MG tablet  Commonly known as: VIBRA-TABS     * doxycycline 100 MG Cap  Commonly known as: VIBRAMYCIN  Take 1 capsule (100 mg total) by mouth every 12 (twelve) hours. for 10 days     FLUAD QUAD 2021-22(65Y UP)(PF) 60 mcg (15 mcg x 4)/0.5 mL Syrg  Generic drug: flu vac 2021 [id number](PF)     furosemide 20 MG tablet  Commonly known as: LASIX  TAKE 1 TABLET  EVERY DAY     isosorbide mononitrate 120 MG 24 hr tablet  Commonly known as: IMDUR  Take 2 tablets (240 mg total) by mouth once daily.     KRILL OIL (OMEGA 3 AND 6) ORAL     levalbuterol 0.63 mg/3 mL nebulizer solution  Commonly known as: XOPENEX  Take 3 mLs (0.63 mg total) by nebulization every 4 (four) hours as needed for Wheezing or Shortness of Breath.     levocetirizine 5 MG tablet  Commonly known as: XYZAL  TAKE 1 TABLET EVERY EVENING     levothyroxine 50 MCG tablet  Commonly known as: SYNTHROID  TAKE 1 TABLET BEFORE BREAKFAST     losartan 50 MG tablet  Commonly known as: COZAAR  TAKE 1 TABLET EVERY DAY     metFORMIN 500 MG tablet  Commonly known as: GLUCOPHAGE  TAKE 1 TABLET BY MOUTH EVERY DAY WITH BREAKFAST     metoprolol tartrate 25 MG tablet  Commonly known as: LOPRESSOR  TAKE 1 TABLET TWICE DAILY     nitroGLYCERIN 0.4 MG SL tablet  Commonly known as: NITROSTAT  Place 1 tablet (0.4 mg total) under the tongue every 5 (five) minutes as needed for Chest pain.     pantoprazole 40 MG tablet  Commonly known as: PROTONIX  TAKE 1 TABLET EVERY DAY     predniSONE 20 MG tablet  Commonly known as: DELTASONE  Take 60 mg x 3 days follow by 40 mg x 3 days follow by 20 mg x 3 days follow by 10 mg x 3 days     tamsulosin 0.4 mg Cap  Commonly known as: FLOMAX  Take 1 capsule (0.4 mg total) by mouth once daily.     vitamin D 1000 units Tab  Commonly known as: VITAMIN D3  Take 1 tablet (1,000 Units total) by mouth once daily.         * This list has 2 medication(s) that are the same as other medications prescribed for you. Read the directions carefully, and ask your doctor or other care provider to review them with you.                  SOCIAL HISTORY:     Social History     Socioeconomic History    Marital status:    Tobacco Use    Smoking status: Never Smoker    Smokeless tobacco: Never Used   Substance and Sexual Activity    Alcohol use: No     Alcohol/week: 0.0 standard drinks     Comment: rarely    Drug use:  No    Sexual activity: Yes     Social Determinants of Health     Financial Resource Strain: Low Risk     Difficulty of Paying Living Expenses: Not very hard   Food Insecurity: Food Insecurity Present    Worried About Running Out of Food in the Last Year: Sometimes true    Ran Out of Food in the Last Year: Sometimes true   Transportation Needs: No Transportation Needs    Lack of Transportation (Medical): No    Lack of Transportation (Non-Medical): No   Physical Activity: Unknown    Days of Exercise per Week: Patient refused    Minutes of Exercise per Session: Patient refused   Stress: Stress Concern Present    Feeling of Stress : Rather much   Social Connections: Unknown    Frequency of Communication with Friends and Family: More than three times a week    Frequency of Social Gatherings with Friends and Family: Twice a week    Active Member of Clubs or Organizations: No    Attends Club or Organization Meetings: Never    Marital Status:    Housing Stability: Low Risk     Unable to Pay for Housing in the Last Year: No    Number of Places Lived in the Last Year: 1    Unstable Housing in the Last Year: No       FAMILY HISTORY:     Family History   Problem Relation Age of Onset    Arthritis Mother     Early death Mother     Heart disease Mother     Hypertension Mother     Migraines Mother     Seizures Mother     Tremor Mother     Diabetes Mother     Cancer Mother     Early death Father     Heart disease Father     Hypertension Father     Stroke Father     Diabetes Father     Alcohol abuse Father     Arthritis Brother     Cancer Brother     Diabetes Brother     Early death Brother     Heart disease Brother     Hypertension Brother     Heart disease Sister     Hypertension Sister     Arthritis Sister     Depression Sister     Heart disease Brother     Arthritis Brother     Heart disease Brother     Pneumonia Brother     Heart disease Brother     Hypertension Brother   "   Diabetes Brother     Heart disease Brother     No Known Problems Maternal Aunt     No Known Problems Maternal Uncle     No Known Problems Paternal Aunt     No Known Problems Paternal Uncle     No Known Problems Maternal Grandmother     No Known Problems Maternal Grandfather     No Known Problems Paternal Grandmother     No Known Problems Paternal Grandfather     Amblyopia Neg Hx     Blindness Neg Hx     Cataracts Neg Hx     Glaucoma Neg Hx     Macular degeneration Neg Hx     Retinal detachment Neg Hx     Strabismus Neg Hx     Thyroid disease Neg Hx        REVIEW OF SYSTEMS:   Review of Systems   Constitutional: Negative for chills, diaphoresis and fever.   HENT: Negative for nosebleeds.    Eyes: Negative for blurred vision, double vision and photophobia.   Respiratory: Positive for cough and sputum production. Negative for hemoptysis, shortness of breath and wheezing.    Cardiovascular: Positive for chest pain (respirophasic). Negative for palpitations, orthopnea, claudication, leg swelling and PND.   Gastrointestinal: Negative for abdominal pain, blood in stool, heartburn, melena, nausea and vomiting.   Genitourinary: Negative for flank pain and hematuria.   Musculoskeletal: Negative for falls, myalgias and neck pain.   Skin: Negative for rash.   Neurological: Negative for dizziness, seizures, loss of consciousness, weakness and headaches.   Endo/Heme/Allergies: Negative for polydipsia. Does not bruise/bleed easily.   Psychiatric/Behavioral: Negative for depression and memory loss. The patient is not nervous/anxious.        PHYSICAL EXAM:     Vitals:    02/10/22 1505   BP: 124/78   Pulse: 78   Resp: 15    Body mass index is 32.57 kg/m².  Weight: 103 kg (227 lb)   Height: 5' 10" (177.8 cm)     Physical Exam  Vitals reviewed.   Constitutional:       General: He is not in acute distress.     Appearance: He is well-developed. He is obese. He is not ill-appearing, toxic-appearing or diaphoretic. "   HENT:      Head: Normocephalic and atraumatic.   Eyes:      General: No scleral icterus.     Extraocular Movements: Extraocular movements intact.      Conjunctiva/sclera: Conjunctivae normal.      Pupils: Pupils are equal, round, and reactive to light.   Neck:      Thyroid: No thyromegaly.      Vascular: Normal carotid pulses. No carotid bruit or JVD.      Trachea: Trachea normal. No tracheal deviation.   Cardiovascular:      Rate and Rhythm: Regular rhythm. Bradycardia present.      Pulses:           Carotid pulses are 2+ on the right side and 2+ on the left side.     Heart sounds: S1 normal and S2 normal. No murmur heard.  No friction rub. No gallop.    Pulmonary:      Effort: Pulmonary effort is normal. No respiratory distress.      Breath sounds: Normal breath sounds. No stridor. No wheezing, rhonchi or rales.   Chest:      Chest wall: No tenderness.   Abdominal:      General: There is no distension.      Palpations: Abdomen is soft.   Musculoskeletal:         General: No swelling or tenderness. Normal range of motion.      Cervical back: Normal range of motion and neck supple. No edema or rigidity.   Feet:      Right foot:      Skin integrity: No ulcer.      Left foot:      Skin integrity: No ulcer.   Skin:     General: Skin is warm and dry.      Findings: No erythema or rash.   Neurological:      General: No focal deficit present.      Mental Status: He is alert and oriented to person, place, and time.      Cranial Nerves: No cranial nerve deficit.   Psychiatric:         Mood and Affect: Mood normal.         Speech: Speech normal.         Behavior: Behavior normal. Behavior is cooperative.         DATA:   EKG: (personally reviewed tracing(s))  12/16/21 SR 61, PRWP/LAD, similar to 4/1/21    Laboratory:  CBC:  Recent Labs   Lab 09/28/20  0828 04/15/21  0938 10/08/21  0724   WBC 8.46 8.25 10.90   Hemoglobin 15.1 15.1 16.0   Hematocrit 45.4 43.8 46.1   Platelets 182 194 212       CHEMISTRIES:  Recent Labs   Lab  09/28/20  0828 04/15/21  0938 10/08/21  0724   Glucose 177 H 180 H 156 H   Sodium 140 138 141   Potassium 4.5 4.2 4.6   BUN 14 9 11   Creatinine 1.3 1.0 1.1   eGFR if African American >60.0 >60.0 >60.0   eGFR if non African American 56.5 A >60.0 >60.0   Calcium 9.4 9.0 10.0       CARDIAC BIOMARKERS:  Recent Labs   Lab 09/20/19  1425 09/21/19  0419   Troponin I <0.006 0.006       COAGS:  Recent Labs   Lab 09/13/19  1003 09/14/20  1352   INR 1.0 1.0       LIPIDS/LFTS:  Recent Labs   Lab 09/28/20  0828 04/15/21  0938 10/08/21  0724   Cholesterol 107 L 118 L 112 L   Triglycerides 180 H 288 H 211 H   HDL 32 L 31 L 37 L   LDL Cholesterol 39.0 L 29.4 L 32.8 L   Non-HDL Cholesterol 75 87 75   AST 16 24 17   ALT 19 21 22       Cardiovascular Testing:  Echo 11/24/21 (Ao root 4.1cm, stable vs report 7/2020)  · The left ventricle is normal in size with concentric hypertrophy and normal systolic function.  · The estimated ejection fraction is 60%.  · Normal left ventricular diastolic function.  · Normal right ventricular size with normal right ventricular systolic function.  · Normal central venous pressure (3 mmHg).  · The estimated PA systolic pressure is 20 mmHg.    Cath 9/21/20  LVEDP: 7mmHg  LVEF: 65% by echo  Dominance: Right  LM: normal  LAD: MLI, mid stent patent (2013)  Ramus: MLI, prox 30%  LCx: MLI, ost 40%  RCA: MLI, dist eccentric 40-50%              RPDA: ost 50%, iFR 0.97              RPLB mid stent patent (9/20/19 2.5x18 [employer] [place] SILVER)  Hemostasis:  R Radial band  Impression:  Recurrent CP on mult meds  2V CAD, normal LV fxn  Patent mid LAD and mid RPLB stents  iFR dist RCA/ost RPDA neg  R rad vasband for hemostasis  Successful pre-treatment of iodine allergy  Plan:  Cont med rx  Cont ASA/Plavix/Statin/BBL/Imdur  Plan long term DAPT given diffuse CAD and prior MV PCI  Home today  Follow up with Dr. Bahena as planned  Outpatient GI/Pulm evals    CTA Chest 9/20/19 (post-cath)  The main pulmonary artery is  enlarged suggesting underlying pulmonary artery hypertension.  Single-vessel coronary disease.  Normal thoracic aorta and visualized abdominal aorta.  Aortic measurements: STJ 3.4cm, Asc 3.8cm, Desc: 2.6.    L MPI 8/8/19 (similar diaphramatic artifact noted on MPI 6/2017)    Probably normal concepcion myocardial perfusion study.    The perfusion scan is free of evidence from myocardial ischemia or injury.    Post stress wall motion is physiologic.    There is a  mild intensity fixed defect in the inferior wall of the left ventricle secondary to diaphragm attenuation.    Gated perfusion images showed an ejection fraction of  66 % post stress.    There is  normal wall motion post stress.    Venous US/reflux 3/15/18  RIGHT:  No evidence of Right lower extremity DVT.    There is reflux in the Greater Saphenous and Lesser Saphenous Veins.    R GSV dist thigh 2186 msec  R GSV in calf 2339 msec  R LSV 4006 msec   LEFT:  No evidence of Left lower extremity DVT.    There is reflux in the Greater Saphenous Vein.  R GSV mid thigh 2422 msec  R GSV dist thigh 1667 msec  R GSV calf 5647 msec      Holter 6/8/17  PREDOMINANT RHYTHM  1. Sinus rhythm with heart rates varying between 43 and 94 bpm with an average of 62 bpm.   VENTRICULAR ARRHYTHMIAS  1. There were very rare PVCs recorded totalling 5 and averaging less than 1 per hour.   2. There were no episodes of ventricular tachycardia.  SUPRA VENTRICULAR ARRHYTHMIAS  1. There were very rare PACs recorded totalling 3 and averaging less than 1 per hour.   2. There were no episodes of sustained supraventricular tachycardia.  SINUS NODE FUNCTION  1. There was no evidence of high grade SA deanne block.   AV CONDUCTION  1. There was no evidence of high grade AV block.   DIARY  1. The diary was not returned  MISCELLANEOUS  1. There were rare hookup related artifacts. Overall, the study was of good quality.   2. This was a tape of adequate length (24 hrs).    LE art US/TANA 6/8/17  1.   Mild/atherosclerotic plaquing.  2.  No stenosis about 30 percent femoral popliteal arteries.  3.  Normal bilateral ABIs.    Carotid US 12/3/14  1. Less than 50% stenosis of the right internal carotid artery.  2. Less than 50% stenosis of the left internal carotid artery.      ASSESSMENT:   # Recent COVID19 PNA with residual respirophasic CP.  No anginal sxs.  # CAD s/p LAD PCI 2013, RPLB PCI 9/2019, cath 9/2020 with patent LAD/RPLB stents.  # HTN, controlled.  # HLP, on atorva 80mg  # Ao root dil, 3.7->4.0->3.8->4.2->4.1 cm (echo 11/2021), 3.8cm (asc Ao CTA 9/2019)  # CVI (bilat GSV, R LSV) on US 3/2018.  No edema at present (but complains of intermittent swelling), prev declined referral for venous ablation.  # hx JAYLEN, pt unable to obtain CPAP apparatus  # BMI 33, down 1 unit(s) vs last OV  # Iodine allergy (?Betadine), successfully premedicated for cath 9/2020    PLAN:   Cont med rx  Cont ASA 81mg qd indefinitely  Cont Plavix 75mg qd, long term rx planned given hx MV PCI/CAD.  Diet/exercise/weight loss  RTC 1 year with surveillance echo (Dec 2022, aortic root dil) as prev planned.  I asked him to come in sooner if his respiratory sxs persist beyond 1 month form now.      Brad Bahena MD, FACC

## 2022-02-25 ENCOUNTER — OFFICE VISIT (OUTPATIENT)
Dept: PULMONOLOGY | Facility: CLINIC | Age: 72
End: 2022-02-25
Payer: MEDICARE

## 2022-02-25 ENCOUNTER — OFFICE VISIT (OUTPATIENT)
Dept: UROLOGY | Facility: CLINIC | Age: 72
End: 2022-02-25
Payer: MEDICARE

## 2022-02-25 VITALS
OXYGEN SATURATION: 96 % | BODY MASS INDEX: 34.1 KG/M2 | BODY MASS INDEX: 33.68 KG/M2 | DIASTOLIC BLOOD PRESSURE: 78 MMHG | SYSTOLIC BLOOD PRESSURE: 125 MMHG | WEIGHT: 235.25 LBS | HEIGHT: 70 IN | WEIGHT: 238.19 LBS | HEIGHT: 70 IN | HEART RATE: 68 BPM

## 2022-02-25 DIAGNOSIS — R93.89 ABNORMAL CHEST X-RAY: ICD-10-CM

## 2022-02-25 DIAGNOSIS — N40.1 BPH ASSOCIATED WITH NOCTURIA: ICD-10-CM

## 2022-02-25 DIAGNOSIS — N50.82 SCROTAL PAIN: Primary | ICD-10-CM

## 2022-02-25 DIAGNOSIS — R35.1 BPH ASSOCIATED WITH NOCTURIA: ICD-10-CM

## 2022-02-25 DIAGNOSIS — Z86.16 HISTORY OF COVID-19: Primary | ICD-10-CM

## 2022-02-25 DIAGNOSIS — R39.9 LOWER URINARY TRACT SYMPTOMS (LUTS): ICD-10-CM

## 2022-02-25 PROCEDURE — 3074F SYST BP LT 130 MM HG: CPT | Mod: CPTII,S$GLB,, | Performed by: NURSE PRACTITIONER

## 2022-02-25 PROCEDURE — 3008F PR BODY MASS INDEX (BMI) DOCUMENTED: ICD-10-PCS | Mod: HCNC,CPTII,S$GLB, | Performed by: STUDENT IN AN ORGANIZED HEALTH CARE EDUCATION/TRAINING PROGRAM

## 2022-02-25 PROCEDURE — 1101F PR PT FALLS ASSESS DOC 0-1 FALLS W/OUT INJ PAST YR: ICD-10-PCS | Mod: HCNC,CPTII,S$GLB, | Performed by: STUDENT IN AN ORGANIZED HEALTH CARE EDUCATION/TRAINING PROGRAM

## 2022-02-25 PROCEDURE — 3074F PR MOST RECENT SYSTOLIC BLOOD PRESSURE < 130 MM HG: ICD-10-PCS | Mod: CPTII,S$GLB,, | Performed by: NURSE PRACTITIONER

## 2022-02-25 PROCEDURE — 99213 OFFICE O/P EST LOW 20 MIN: CPT | Mod: S$GLB,,, | Performed by: NURSE PRACTITIONER

## 2022-02-25 PROCEDURE — 1101F PT FALLS ASSESS-DOCD LE1/YR: CPT | Mod: HCNC,CPTII,S$GLB, | Performed by: STUDENT IN AN ORGANIZED HEALTH CARE EDUCATION/TRAINING PROGRAM

## 2022-02-25 PROCEDURE — 1101F PT FALLS ASSESS-DOCD LE1/YR: CPT | Mod: CPTII,S$GLB,, | Performed by: NURSE PRACTITIONER

## 2022-02-25 PROCEDURE — 1160F RVW MEDS BY RX/DR IN RCRD: CPT | Mod: HCNC,CPTII,S$GLB, | Performed by: STUDENT IN AN ORGANIZED HEALTH CARE EDUCATION/TRAINING PROGRAM

## 2022-02-25 PROCEDURE — 1160F PR REVIEW ALL MEDS BY PRESCRIBER/CLIN PHARMACIST DOCUMENTED: ICD-10-PCS | Mod: HCNC,CPTII,S$GLB, | Performed by: STUDENT IN AN ORGANIZED HEALTH CARE EDUCATION/TRAINING PROGRAM

## 2022-02-25 PROCEDURE — 99999 PR PBB SHADOW E&M-EST. PATIENT-LVL III: CPT | Mod: PBBFAC,HCNC,, | Performed by: STUDENT IN AN ORGANIZED HEALTH CARE EDUCATION/TRAINING PROGRAM

## 2022-02-25 PROCEDURE — 1159F PR MEDICATION LIST DOCUMENTED IN MEDICAL RECORD: ICD-10-PCS | Mod: HCNC,CPTII,S$GLB, | Performed by: STUDENT IN AN ORGANIZED HEALTH CARE EDUCATION/TRAINING PROGRAM

## 2022-02-25 PROCEDURE — 1126F PR PAIN SEVERITY QUANTIFIED, NO PAIN PRESENT: ICD-10-PCS | Mod: HCNC,CPTII,S$GLB, | Performed by: STUDENT IN AN ORGANIZED HEALTH CARE EDUCATION/TRAINING PROGRAM

## 2022-02-25 PROCEDURE — 1159F PR MEDICATION LIST DOCUMENTED IN MEDICAL RECORD: ICD-10-PCS | Mod: CPTII,S$GLB,, | Performed by: NURSE PRACTITIONER

## 2022-02-25 PROCEDURE — 99213 PR OFFICE/OUTPT VISIT, EST, LEVL III, 20-29 MIN: ICD-10-PCS | Mod: S$GLB,,, | Performed by: NURSE PRACTITIONER

## 2022-02-25 PROCEDURE — 1159F MED LIST DOCD IN RCRD: CPT | Mod: HCNC,CPTII,S$GLB, | Performed by: STUDENT IN AN ORGANIZED HEALTH CARE EDUCATION/TRAINING PROGRAM

## 2022-02-25 PROCEDURE — 3008F BODY MASS INDEX DOCD: CPT | Mod: HCNC,CPTII,S$GLB, | Performed by: STUDENT IN AN ORGANIZED HEALTH CARE EDUCATION/TRAINING PROGRAM

## 2022-02-25 PROCEDURE — 99999 PR PBB SHADOW E&M-EST. PATIENT-LVL III: ICD-10-PCS | Mod: PBBFAC,HCNC,, | Performed by: STUDENT IN AN ORGANIZED HEALTH CARE EDUCATION/TRAINING PROGRAM

## 2022-02-25 PROCEDURE — 3288F PR FALLS RISK ASSESSMENT DOCUMENTED: ICD-10-PCS | Mod: HCNC,CPTII,S$GLB, | Performed by: STUDENT IN AN ORGANIZED HEALTH CARE EDUCATION/TRAINING PROGRAM

## 2022-02-25 PROCEDURE — 3288F FALL RISK ASSESSMENT DOCD: CPT | Mod: HCNC,CPTII,S$GLB, | Performed by: STUDENT IN AN ORGANIZED HEALTH CARE EDUCATION/TRAINING PROGRAM

## 2022-02-25 PROCEDURE — 3288F PR FALLS RISK ASSESSMENT DOCUMENTED: ICD-10-PCS | Mod: CPTII,S$GLB,, | Performed by: NURSE PRACTITIONER

## 2022-02-25 PROCEDURE — 4010F PR ACE/ARB THEARPY RXD/TAKEN: ICD-10-PCS | Mod: CPTII,S$GLB,, | Performed by: NURSE PRACTITIONER

## 2022-02-25 PROCEDURE — 1126F PR PAIN SEVERITY QUANTIFIED, NO PAIN PRESENT: ICD-10-PCS | Mod: CPTII,S$GLB,, | Performed by: NURSE PRACTITIONER

## 2022-02-25 PROCEDURE — 4010F ACE/ARB THERAPY RXD/TAKEN: CPT | Mod: CPTII,S$GLB,, | Performed by: NURSE PRACTITIONER

## 2022-02-25 PROCEDURE — 3008F PR BODY MASS INDEX (BMI) DOCUMENTED: ICD-10-PCS | Mod: CPTII,S$GLB,, | Performed by: NURSE PRACTITIONER

## 2022-02-25 PROCEDURE — 99999 PR PBB SHADOW E&M-EST. PATIENT-LVL IV: ICD-10-PCS | Mod: PBBFAC,,, | Performed by: NURSE PRACTITIONER

## 2022-02-25 PROCEDURE — 3288F FALL RISK ASSESSMENT DOCD: CPT | Mod: CPTII,S$GLB,, | Performed by: NURSE PRACTITIONER

## 2022-02-25 PROCEDURE — 99214 OFFICE O/P EST MOD 30 MIN: CPT | Mod: HCNC,S$GLB,, | Performed by: STUDENT IN AN ORGANIZED HEALTH CARE EDUCATION/TRAINING PROGRAM

## 2022-02-25 PROCEDURE — 1159F MED LIST DOCD IN RCRD: CPT | Mod: CPTII,S$GLB,, | Performed by: NURSE PRACTITIONER

## 2022-02-25 PROCEDURE — 99214 PR OFFICE/OUTPT VISIT, EST, LEVL IV, 30-39 MIN: ICD-10-PCS | Mod: HCNC,S$GLB,, | Performed by: STUDENT IN AN ORGANIZED HEALTH CARE EDUCATION/TRAINING PROGRAM

## 2022-02-25 PROCEDURE — 3008F BODY MASS INDEX DOCD: CPT | Mod: CPTII,S$GLB,, | Performed by: NURSE PRACTITIONER

## 2022-02-25 PROCEDURE — 3078F DIAST BP <80 MM HG: CPT | Mod: CPTII,S$GLB,, | Performed by: NURSE PRACTITIONER

## 2022-02-25 PROCEDURE — 1126F AMNT PAIN NOTED NONE PRSNT: CPT | Mod: CPTII,S$GLB,, | Performed by: NURSE PRACTITIONER

## 2022-02-25 PROCEDURE — 3078F PR MOST RECENT DIASTOLIC BLOOD PRESSURE < 80 MM HG: ICD-10-PCS | Mod: CPTII,S$GLB,, | Performed by: NURSE PRACTITIONER

## 2022-02-25 PROCEDURE — 1126F AMNT PAIN NOTED NONE PRSNT: CPT | Mod: HCNC,CPTII,S$GLB, | Performed by: STUDENT IN AN ORGANIZED HEALTH CARE EDUCATION/TRAINING PROGRAM

## 2022-02-25 PROCEDURE — 99999 PR PBB SHADOW E&M-EST. PATIENT-LVL IV: CPT | Mod: PBBFAC,,, | Performed by: NURSE PRACTITIONER

## 2022-02-25 PROCEDURE — 1101F PR PT FALLS ASSESS DOC 0-1 FALLS W/OUT INJ PAST YR: ICD-10-PCS | Mod: CPTII,S$GLB,, | Performed by: NURSE PRACTITIONER

## 2022-02-25 NOTE — PROGRESS NOTES
Patient ID: Clinton Rosenberg is a 71 y.o. male.    Chief Complaint: Follow-up    HPI  71 y.o. who presents to the Urology clinic for evaluation of LUTS. Patient with ED, not sexually active, not able to take PO ED meds due to cardiac hx/nitroglycerin Rx. Patient on AC. Patient started on flomax at last appt, notes improvement of LUTS, see IPSS score below, also notes dizziness when he lays down for the night- not noted upon standing. Patient without hx of falls while on flomax. Patient on other HTN medications. Patient not interested in bladder outlet procedure at this time.     Last PSA 1.1    Medically Necessary ROS documented in HPI    Patient Questionnaire Submission  --------------------------------     Questionnaire: International Prostate Symptom Score (I-PSS)     ~~~~~~~~~~~~~~~~~~~~~~~~~~~~~~~~  In the past month:      Question: How often have you had the sensation of not emptying your bladder?      Answer:   Less than 1 in 5 times         Question: How often have you had to urinate less than every two hours?      Answer:   Less than half the time         Question: How often have you found you stopped and started again several times when you urinated?      Answer:   Less than half the time         Question: How often have you found it difficult to postpone urination?      Answer:   About half the time         Question: How often have you had a weak urinary stream?      Answer:   Less than half the time         Question: How often have you had to strain to start urination?      Answer:   Less than half the time           ~~~~~~~~~~~~~~~~~~~~~~~~~~~~~~~~  In the past month:      Question: How many times did you typically get up at night to urinate?      Answer:   2 Times           ~~~~~~~~~~~~~~~~~~~~~~~~~~~~~~~~  Quality of Life Due to Urinary Symptoms      Question: If you were to spend the rest of your life with your urinary condition just the way it is now, how would you feel about that?      Answer:    Mixed        2/1/22 5:51 PM  Patient Questionnaire Submission  --------------------------------     Questionnaire: Sexual Health Inventory for Men     ~~~~~~~~~~~~~~~~~~~~~~~~~~~~~~~~  [place] the response that best describes your own situation; please select only one response for each question. Over the past six months:       Question: How do you rate your confidence that you could get and keep an erection?       Answer:   1-Very low         Question: When you had erections with sexual stimulation, how often were your erections hard enough for penetration (entering your partner)?       Answer:   1-Almost never or never         Question: During sexual intercourse how often were you able to maintain your erection after you had penetrated (entered) your partner?       Answer:   1-Almost never or never         Question: During sexual intercourse, how difficult was it to maintain your erection to completion of intercourse?       Answer:   1-Extremely difficult         Question: When you attempted sexual intercourse, how often was is satisfactory for you?       Answer:   1-Almost never or never      Past Medical History  Active Ambulatory Problems     Diagnosis Date Noted    Mixed hyperlipidemia long term DAPT 07/09/2014    Essential hypertension 07/09/2014    Gastroesophageal reflux disease 07/09/2014    Benign non-nodular prostatic hyperplasia with lower urinary tract symptoms 07/09/2014    Vitamin D deficiency 07/09/2014    Hernia of abdominal wall 07/09/2014    Coronary artery disease of native artery of native heart with stable angina pectoris; 2021 plan is long term DAPT 11/19/2014    Anxiety and depression with assoc memory loss; seen by neuro 2015, negative workup 11/24/2014    Prediabetes 11/24/2014    Hypothyroidism 06/01/2016    Obesity (BMI 30.0-34.9) 06/01/2016    Aortic ectasia 04/28/2017    JAYLEN (obstructive sleep apnea) 04/28/2017    Peripheral vascular disease with LE US 6/2017 and carotid US   06/20/2017    Aortic root dilatation on TTE 8/2019 11/14/2017    History of concussion 1/2018 head CT negative 01/31/2018    Venous insufficiency 05/10/2018    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 05/20/2018    Tubular adenoma of colon 2/2009 10/11/2018    Allergy to iodine 08/27/2019    Nuclear sclerosis of both eyes 05/20/2021    Pneumonia due to COVID-19 virus 01/31/2022     Resolved Ambulatory Problems     Diagnosis Date Noted    Myocardial infarct, old 07/09/2014    Angina effort 07/09/2014    Other malaise and fatigue 11/24/2014    Chronic cough 11/24/2014    Dysuria 11/24/2014    Dysarthria 11/24/2014    Aphasia 11/24/2014    Mild cognitive impairment, so stated 11/24/2014    Retrocalcaneal bursitis 11/24/2014    Other abnormal glucose 11/24/2014    Knee pain 11/24/2014    Memory difficulties 11/24/2014    Attention and concentration deficit 01/27/2015    Headache 01/27/2015    Chest pain 05/16/2018    Pleurisy 05/16/2018    Coronary artery disease of native artery of native heart with stable angina pectoris 02/21/2019     Past Medical History:   Diagnosis Date    Allergy     Angina pectoris     Anxiety     Cancer     Coronary artery disease     Depression     Eye injury as a teen    GERD (gastroesophageal reflux disease)     Hyperlipidemia     Hypertension     Memory loss     Myocardial infarction     Obesity     Sleep apnea          Past Surgical History  Past Surgical History:   Procedure Laterality Date    APPENDECTOMY  1986    bone spur Right     COLONOSCOPY      hand trauma Right     pencil     heart stent      LEFT HEART CATHETERIZATION Left 9/20/2019    Procedure: Left heart cath 12 pm start, R rad access;  Surgeon: Brad Bahena MD;  Location: [facility] CATH LAB;  Service: Cardiology;  Laterality: Left;  RN PREOP 9/13/2019  NEEDS IODINE PREMED    LEFT HEART CATHETERIZATION Left 9/21/2020    Procedure: Left heart cath 730am start, R rad access;   Surgeon: Brad Bahena MD;  Location: [facility] CATH LAB;  Service: Cardiology;  Laterality: Left;  RN PREOP 9/14/2020, COVID NEGATIVE---9/18       Social History  Social Connections: Unknown    Frequency of Communication with Friends and Family: More than three times a week    Frequency of Social Gatherings with Friends and Family: Twice a week    Attends [employer] Services: Not on file    Active Member of Clubs or Organizations: No    Attends Club or Organization Meetings: Never    Marital Status:        Medications    Current Outpatient Medications:     albuterol (PROVENTIL/VENTOLIN HFA) 90 mcg/actuation inhaler, INHALE 2 PUFFS INTO THE LUNGS EVERY 6 (SIX) HOURS AS NEEDED FOR WHEEZING. RESCUE, Disp: 18 g, Rfl: 0    aspirin (ECOTRIN) 81 MG EC tablet, Take 1 tablet (81 mg total) by mouth once daily., Disp: 90 tablet, Rfl: 3    atorvastatin (LIPITOR) 80 MG tablet, TAKE 1 TABLET EVERY EVENING, Disp: 90 tablet, Rfl: 3    budesonide (PULMICORT) 0.5 mg/2 mL nebulizer solution, Take 2 mLs (0.5 mg total) by nebulization 2 (two) times daily. Controller, Disp: 360 mL, Rfl: 1    clopidogreL (PLAVIX) 75 mg tablet, TAKE 1 TABLET EVERY DAY, Disp: 90 tablet, Rfl: 3    doxycycline (VIBRA-TABS) 100 MG tablet, , Disp: , Rfl:     FLUAD QUAD 2021-22,65Y UP,,PF, 60 mcg (15 mcg x 4)/0.5 mL Syrg, , Disp: , Rfl:     furosemide (LASIX) 20 MG tablet, TAKE 1 TABLET EVERY DAY, Disp: 90 tablet, Rfl: 3    isosorbide mononitrate (IMDUR) 120 MG 24 hr tablet, Take 2 tablets (240 mg total) by mouth once daily., Disp: 180 tablet, Rfl: 3    KRILL/OM3/DHA/EPA/OM6/LIP/ASTX (KRILL OIL, OMEGA 3 AND 6, ORAL), Take by mouth., Disp: , Rfl:     levalbuterol (XOPENEX) 0.63 mg/3 mL nebulizer solution, Take 3 mLs (0.63 mg total) by nebulization every 4 (four) hours as needed for Wheezing or Shortness of Breath., Disp: 300 mL, Rfl: 3    levocetirizine (XYZAL) 5 MG tablet, TAKE 1 TABLET EVERY EVENING, Disp: 90 tablet, Rfl: 3     levothyroxine (SYNTHROID) 50 MCG tablet, TAKE 1 TABLET BEFORE BREAKFAST, Disp: 90 tablet, Rfl: 3    losartan (COZAAR) 50 MG tablet, TAKE 1 TABLET EVERY DAY, Disp: 90 tablet, Rfl: 3    metFORMIN (GLUCOPHAGE) 500 MG tablet, TAKE 1 TABLET BY MOUTH EVERY DAY WITH BREAKFAST, Disp: 90 tablet, Rfl: 1    metoprolol tartrate (LOPRESSOR) 25 MG tablet, TAKE 1 TABLET TWICE DAILY, Disp: 180 tablet, Rfl: 3    nitroGLYCERIN (NITROSTAT) 0.4 MG SL tablet, Place 1 tablet (0.4 mg total) under the tongue every 5 (five) minutes as needed for Chest pain., Disp: 50 tablet, Rfl: 3    pantoprazole (PROTONIX) 40 MG tablet, TAKE 1 TABLET EVERY DAY, Disp: 90 tablet, Rfl: 3    predniSONE (DELTASONE) 20 MG tablet, Take 60 mg x 3 days follow by 40 mg x 3 days follow by 20 mg x 3 days follow by 10 mg x 3 days, Disp: 21 tablet, Rfl: 0    tamsulosin (FLOMAX) 0.4 mg Cap, Take 1 capsule (0.4 mg total) by mouth once daily., Disp: 90 capsule, Rfl: 3    vitamin D 1000 units Tab, Take 1 tablet (1,000 Units total) by mouth once daily., Disp: 90 tablet, Rfl: 3    Allergies  Review of patient's allergies indicates:   Allergen Reactions    Iodine and iodide containing products Anaphylaxis    Naproxen Other (See Comments)     hallucinations  Hallucinations^  Hallucinations^    Hydrocodone-acetaminophen      Rash (skin)^    Iodine      Anaphylaxis^    Oxycodone-acetaminophen      Rash (skin)^       Patient's PMH, FH, Social hx, Medications, allergies reviewed and updated as pertinent to today's visit    Objective:      Physical Exam  Constitutional:       General: He is not in acute distress.     Appearance: He is well-developed. He is not ill-appearing, toxic-appearing or diaphoretic.   HENT:      Head: Normocephalic and atraumatic.      Mouth/Throat:      Mouth: Mucous membranes are moist.   Eyes:      Conjunctiva/sclera: Conjunctivae normal.   Cardiovascular:      Rate and Rhythm: Normal rate and regular rhythm.   Pulmonary:      Effort:  Pulmonary effort is normal. No respiratory distress.   Abdominal:      General: There is no distension.      Palpations: Abdomen is soft. There is no mass.      Tenderness: There is no abdominal tenderness. There is no guarding.   Genitourinary:     Penis: Normal.       Testes:         Right: Mass (sub centimeter tenderness to epididymis) and tenderness present. Swelling not present.         Left: Mass, tenderness or swelling not present.   Musculoskeletal:         General: No swelling or deformity.      Cervical back: Neck supple.   Skin:     General: Skin is warm.      Capillary Refill: Capillary refill takes less than 2 seconds.      Findings: No rash.   Neurological:      Mental Status: He is alert and oriented to person, place, and time.      Gait: Gait normal.   Psychiatric:         Mood and Affect: Mood normal.         Thought Content: Thought content normal.         Judgment: Judgment normal.             Lab Results   Component Value Date    PSADIAG 1.1 10/11/2021        Assessment:       1. Scrotal pain    2. Lower urinary tract symptoms (LUTS)    3. BPH associated with nocturia        Plan:       BPH/LUTS   Discussed QOD adminstration of flomax vs changing to alternate BPH medication such as prazosin/silodosin. Patient would like to try QOD flomax. Patient not interested in bladder outlet procedure at this time ( which would avoid the use of medication that has side effect of dizziness).    PSA 1.1    Scrotal Pain    Suggestive of epididymal cyst     Scrotal US    ED   No PO medications   Patient not interested in ICI    Discussed if patient interested in management at a later date, penile prosthesis is an option with cardiac clearance for procedure.       RTC 1 year

## 2022-02-25 NOTE — PROGRESS NOTES
CHIEF COMPLAINT:    Chief Complaint   Patient presents with    Results       HISTORY OF PRESENT ILLNESS: Clinton Rosenberg is a 71 y.o. male nonsmoker with  has a past medical history of Allergy, Angina pectoris, Anxiety, Cancer, Coronary artery disease, Depression, Eye injury (as a teen), GERD (gastroesophageal reflux disease), Hyperlipidemia, Hypertension, Hypothyroidism, Memory loss, Myocardial infarction, Nuclear sclerosis of both eyes (5/20/2021), Obesity, Peripheral vascular disease (6/20/2017), Retrocalcaneal bursitis (11/24/2014), and Sleep apnea. is here for pulmonary re-evaluation after anjali covid in December. Patient presents with daughter.     LOV 1/31/2022: Patient with symptoms of SIMS, chest pain, weakness, lightheadedness with oxygen desaturation mostly when patient is laying down per daughter. Reports breathing improved with pulmicort nebs twice daily. Advair was not as helpful.     Interval history: Symptoms resolved after treatment with steroids and antibiotics LOV. Not using inhaler,resolution of dizziness.  Pt reports back to baseline health    REVIEW OF SYSTEMS:   Review of Systems   Constitutional: Negative for fever, chills, weight loss, weight gain, activity change, appetite change, fatigue and night sweats.   HENT: Negative for congestion.    Eyes: Negative.    Respiratory: Negative for cough, sputum production, chest tightness, wheezing, orthopnea, previous hospitialization due to pulmonary problems, dyspnea on extertion and use of rescue inhaler.    Cardiovascular: Negative for chest pain and palpitations.   Genitourinary: Negative.    Endocrine: endocrine negative   Musculoskeletal: Negative for gait problem.   Skin: Negative.    Gastrointestinal: Negative for acid reflux.   Neurological: Negative for weakness and light-headedness.   Psychiatric/Behavioral: Negative for sleep disturbance.       DATA  PERSONALLY REVIEWED:    PFT:Spirometry is normal. Lung volumes show mild  restriction is present. DLCO is normal  Walk test: 96/95/96  CXR 2/9/2022:Since January 21, 2022, nearly resolved bilateral interstitial opacities.  No new abnormality.    CT chest:1. Peripheral and basilar predominant ground-glass and consolidative airspace opacities concerning for atypical pneumonia such as COVID-19.    Sleep study 2/13/2016:    The overall AHI was 15.0 with an oxygen ivory of 86.0%. The supine AHI was 29.5 and the REM AHI was 54.5     ECHO 11/24/2021:  · The left ventricle is normal in size with concentric hypertrophy and normal systolic function.  · The estimated ejection fraction is 60%.  · Normal left ventricular diastolic function.  · Normal right ventricular size with normal right ventricular systolic function.  · Normal central venous pressure (3 mmHg).  · The estimated PA systolic pressure is 20 mmHg.        PAST MEDICAL HISTORY:    Active Ambulatory Problems     Diagnosis Date Noted    Mixed hyperlipidemia long term DAPT 07/09/2014    Essential hypertension 07/09/2014    Gastroesophageal reflux disease 07/09/2014    Benign non-nodular prostatic hyperplasia with lower urinary tract symptoms 07/09/2014    Vitamin D deficiency 07/09/2014    Hernia of abdominal wall 07/09/2014    Coronary artery disease of native artery of native heart with stable angina pectoris; 2021 plan is long term DAPT 11/19/2014    Anxiety and depression with assoc memory loss; seen by neuro 2015, negative workup 11/24/2014    Prediabetes 11/24/2014    Hypothyroidism 06/01/2016    Obesity (BMI 30.0-34.9) 06/01/2016    Aortic ectasia 04/28/2017    JAYLEN (obstructive sleep apnea) 04/28/2017    Peripheral vascular disease with LE US 6/2017 and carotid US  06/20/2017    Aortic root dilatation on TTE 8/2019 11/14/2017    History of concussion 1/2018 head CT negative 01/31/2018    Venous insufficiency 05/10/2018    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 05/20/2018    Tubular adenoma of colon 2/2009 10/11/2018     Allergy to iodine 08/27/2019    Nuclear sclerosis of both eyes 05/20/2021    History of COVID-19 02/25/2022     Resolved Ambulatory Problems     Diagnosis Date Noted    Myocardial infarct, old 07/09/2014    Angina effort 07/09/2014    Other malaise and fatigue 11/24/2014    Chronic cough 11/24/2014    Dysuria 11/24/2014    Dysarthria 11/24/2014    Aphasia 11/24/2014    Mild cognitive impairment, so stated 11/24/2014    Retrocalcaneal bursitis 11/24/2014    Other abnormal glucose 11/24/2014    Knee pain 11/24/2014    Memory difficulties 11/24/2014    Attention and concentration deficit 01/27/2015    Headache 01/27/2015    Chest pain 05/16/2018    Pleurisy 05/16/2018    Coronary artery disease of native artery of native heart with stable angina pectoris 02/21/2019    Pneumonia due to COVID-19 virus 01/31/2022     Past Medical History:   Diagnosis Date    Allergy     Angina pectoris     Anxiety     Cancer     Coronary artery disease     Depression     Eye injury as a teen    GERD (gastroesophageal reflux disease)     Hyperlipidemia     Hypertension     Memory loss     Myocardial infarction     Obesity     Sleep apnea                 PAST SURGICAL HISTORY:    Past Surgical History:   Procedure Laterality Date    APPENDECTOMY  1986    bone spur Right     COLONOSCOPY      hand trauma Right     pencil     heart stent      LEFT HEART CATHETERIZATION Left 9/20/2019    Procedure: Left heart cath 12 pm start, R rad access;  Surgeon: Brad Bahena MD;  Location: [facility] CATH LAB;  Service: Cardiology;  Laterality: Left;  RN PREOP 9/13/2019  NEEDS IODINE PREMED    LEFT HEART CATHETERIZATION Left 9/21/2020    Procedure: Left heart cath 730am start, R rad access;  Surgeon: Brad Bahena MD;  Location: [facility] CATH LAB;  Service: Cardiology;  Laterality: Left;  RN PREOP 9/14/2020, COVID NEGATIVE---9/18         FAMILY HISTORY:                Family History   Problem Relation Age of  Onset    Arthritis Mother     Early death Mother     Heart disease Mother     Hypertension Mother     Migraines Mother     Seizures Mother     Tremor Mother     Diabetes Mother     Cancer Mother     Early death Father     Heart disease Father     Hypertension Father     Stroke Father     Diabetes Father     Alcohol abuse Father     Arthritis Brother     Cancer Brother     Diabetes Brother     Early death Brother     Heart disease Brother     Hypertension Brother     Heart disease Sister     Hypertension Sister     Arthritis Sister     Depression Sister     Heart disease Brother     Arthritis Brother     Heart disease Brother     Pneumonia Brother     Heart disease Brother     Hypertension Brother     Diabetes Brother     Heart disease Brother     No Known Problems Maternal Aunt     No Known Problems Maternal Uncle     No Known Problems Paternal Aunt     No Known Problems Paternal Uncle     No Known Problems Maternal Grandmother     No Known Problems Maternal Grandfather     No Known Problems Paternal Grandmother     No Known Problems Paternal Grandfather     Amblyopia Neg Hx     Blindness Neg Hx     Cataracts Neg Hx     Glaucoma Neg Hx     Macular degeneration Neg Hx     Retinal detachment Neg Hx     Strabismus Neg Hx     Thyroid disease Neg Hx        SOCIAL HISTORY:          Tobacco:   Social History     Tobacco Use   Smoking Status Never Smoker   Smokeless Tobacco Never Used       alcohol use:    Social History     Substance and Sexual Activity   Alcohol Use No    Alcohol/week: 0.0 standard drinks    Comment: rarely                   ALLERGIES:    Review of patient's allergies indicates:   Allergen Reactions    Iodine and iodide containing products Anaphylaxis    Naproxen Other (See Comments)     hallucinations  Hallucinations^  Hallucinations^    Hydrocodone-acetaminophen      Rash (skin)^    Iodine      Anaphylaxis^    Oxycodone-acetaminophen      Rash  "(skin)^       CURRENT MEDICATIONS:    Current Outpatient Medications   Medication Sig Dispense Refill    albuterol (PROVENTIL/VENTOLIN HFA) 90 mcg/actuation inhaler INHALE 2 PUFFS INTO THE LUNGS EVERY 6 (SIX) HOURS AS NEEDED FOR WHEEZING. RESCUE 18 g 0    aspirin (ECOTRIN) 81 MG EC tablet Take 1 tablet (81 mg total) by mouth once daily. 90 tablet 3    atorvastatin (LIPITOR) 80 MG tablet TAKE 1 TABLET EVERY EVENING 90 tablet 3    clopidogreL (PLAVIX) 75 mg tablet TAKE 1 TABLET EVERY DAY 90 tablet 3    furosemide (LASIX) 20 MG tablet TAKE 1 TABLET EVERY DAY 90 tablet 3    isosorbide mononitrate (IMDUR) 120 MG 24 hr tablet Take 2 tablets (240 mg total) by mouth once daily. 180 tablet 3    KRILL/OM3/DHA/EPA/OM6/LIP/ASTX (KRILL OIL, OMEGA 3 AND 6, ORAL) Take by mouth.      levocetirizine (XYZAL) 5 MG tablet TAKE 1 TABLET EVERY EVENING 90 tablet 3    levothyroxine (SYNTHROID) 50 MCG tablet TAKE 1 TABLET BEFORE BREAKFAST 90 tablet 3    losartan (COZAAR) 50 MG tablet TAKE 1 TABLET EVERY DAY 90 tablet 3    metFORMIN (GLUCOPHAGE) 500 MG tablet TAKE 1 TABLET BY MOUTH EVERY DAY WITH BREAKFAST 90 tablet 1    metoprolol tartrate (LOPRESSOR) 25 MG tablet TAKE 1 TABLET TWICE DAILY 180 tablet 3    nitroGLYCERIN (NITROSTAT) 0.4 MG SL tablet Place 1 tablet (0.4 mg total) under the tongue every 5 (five) minutes as needed for Chest pain. 50 tablet 3    pantoprazole (PROTONIX) 40 MG tablet TAKE 1 TABLET EVERY DAY 90 tablet 3    vitamin D 1000 units Tab Take 1 tablet (1,000 Units total) by mouth once daily. 90 tablet 3    tamsulosin (FLOMAX) 0.4 mg Cap Take 1 capsule (0.4 mg total) by mouth once daily. 90 capsule 3     No current facility-administered medications for this visit.                       PHYSICAL EXAM:  Vitals:    02/25/22 0923   BP: 125/78   Pulse: 68   SpO2: 96%   Weight: 108 kg (238 lb 3.3 oz)   Height: 5' 10" (1.778 m)   PainSc: 0-No pain     Body mass index is 34.18 kg/m².   Physical Exam "   Constitutional: He is oriented to person, place, and time. He appears well-developed. He is obese.   HENT:   Head: Normocephalic.   Cardiovascular: Normal rate, regular rhythm and normal heart sounds.   Pulmonary/Chest: Normal expansion, effort normal and breath sounds normal.   Musculoskeletal:         General: Edema present.      Cervical back: Neck supple.   Neurological: He is alert and oriented to person, place, and time. Gait normal.   Skin: Skin is warm.   Psychiatric: He has a normal mood and affect. His behavior is normal. Judgment and thought content normal.         Lab Results   Component Value Date    TSH 4.119 (H) 10/08/2021      Lab Results   Component Value Date    WBC 10.90 10/08/2021    HGB 16.0 10/08/2021    HCT 46.1 10/08/2021    MCV 92 10/08/2021     10/08/2021     BMP  Lab Results   Component Value Date     10/08/2021    K 4.6 10/08/2021     10/08/2021    CO2 31 (H) 10/08/2021    BUN 11 10/08/2021    CREATININE 1.1 10/08/2021    CALCIUM 10.0 10/08/2021    ANIONGAP 10 10/08/2021    ESTGFRAFRICA >60.0 10/08/2021    EGFRNONAA >60.0 10/08/2021     Lab Results   Component Value Date    HGBA1C 6.3 (H) 10/08/2021        ASSESSMENT    ICD-10-CM ICD-9-CM    1. History of COVID-19  Z86.16 V12.09    2. Abnormal chest x-ray  R93.89 793.2 X-Ray Chest PA And Lateral       PLAN:    Problem List Items Addressed This Visit        Unprioritized    History of COVID-19 - Primary    Overview     Symptoms resolved. Persistent residual finding on cxr, recommend repeating test in 1 month to ensure resolution.              Other Visit Diagnoses     Abnormal chest x-ray        Relevant Orders    X-Ray Chest PA And Lateral (Completed)            Patient will Follow up if symptoms worsen or fail to improve, for contact for return if test abnormal.

## 2022-02-28 ENCOUNTER — PATIENT MESSAGE (OUTPATIENT)
Dept: FAMILY MEDICINE | Facility: CLINIC | Age: 72
End: 2022-02-28
Payer: MEDICARE

## 2022-03-08 ENCOUNTER — HOSPITAL ENCOUNTER (OUTPATIENT)
Dept: RADIOLOGY | Facility: HOSPITAL | Age: 72
Discharge: HOME | End: 2022-03-08
Attending: STUDENT IN AN ORGANIZED HEALTH CARE EDUCATION/TRAINING PROGRAM
Payer: MEDICARE

## 2022-03-08 DIAGNOSIS — N50.82 SCROTAL PAIN: ICD-10-CM

## 2022-03-08 PROCEDURE — 76870 US EXAM SCROTUM: CPT | Mod: 26,,, | Performed by: RADIOLOGY

## 2022-03-08 PROCEDURE — 76870 US EXAM SCROTUM: CPT | Mod: TC

## 2022-03-08 PROCEDURE — 76870 US SCROTUM AND TESTICLES: ICD-10-PCS | Mod: 26,,, | Performed by: RADIOLOGY

## 2022-03-25 ENCOUNTER — HOSPITAL ENCOUNTER (OUTPATIENT)
Dept: RADIOLOGY | Facility: HOSPITAL | Age: 72
Discharge: HOME | End: 2022-03-25
Attending: NURSE PRACTITIONER
Payer: MEDICARE

## 2022-03-25 DIAGNOSIS — R93.89 ABNORMAL CHEST X-RAY: ICD-10-CM

## 2022-03-25 PROBLEM — J12.82 PNEUMONIA DUE TO COVID-19 VIRUS: Status: RESOLVED | Noted: 2022-01-31 | Resolved: 2022-03-25

## 2022-03-25 PROBLEM — U07.1 PNEUMONIA DUE TO COVID-19 VIRUS: Status: RESOLVED | Noted: 2022-01-31 | Resolved: 2022-03-25

## 2022-03-25 PROCEDURE — 71046 X-RAY EXAM CHEST 2 VIEWS: CPT | Mod: 26,,, | Performed by: RADIOLOGY

## 2022-03-25 PROCEDURE — 71046 X-RAY EXAM CHEST 2 VIEWS: CPT | Mod: TC,FY

## 2022-03-25 PROCEDURE — 71046 XR CHEST PA AND LATERAL: ICD-10-PCS | Mod: 26,,, | Performed by: RADIOLOGY

## 2022-04-07 ENCOUNTER — LAB VISIT (OUTPATIENT)
Dept: LAB | Facility: HOSPITAL | Age: 72
End: 2022-04-07
Attending: INTERNAL MEDICINE
Payer: MEDICARE

## 2022-04-07 DIAGNOSIS — E03.9 HYPOTHYROIDISM, UNSPECIFIED TYPE: ICD-10-CM

## 2022-04-07 DIAGNOSIS — E78.2 MIXED HYPERLIPIDEMIA: ICD-10-CM

## 2022-04-07 DIAGNOSIS — R73.03 PREDIABETES: ICD-10-CM

## 2022-04-07 LAB
ALBUMIN SERPL BCP-MCNC: 4 G/DL (ref 3.5–5.2)
ALP SERPL-CCNC: 46 U/L (ref 55–135)
ALT SERPL W/O P-5'-P-CCNC: 22 U/L (ref 10–44)
ANION GAP SERPL CALC-SCNC: 9 MMOL/L (ref 8–16)
AST SERPL-CCNC: 17 U/L (ref 10–40)
BASOPHILS # BLD AUTO: 0.06 K/UL (ref 0–0.2)
BASOPHILS NFR BLD: 0.8 % (ref 0–1.9)
BILIRUB SERPL-MCNC: 1.1 MG/DL (ref 0.1–1)
BUN SERPL-MCNC: 12 MG/DL (ref 8–23)
CALCIUM SERPL-MCNC: 9.5 MG/DL (ref 8.7–10.5)
CHLORIDE SERPL-SCNC: 104 MMOL/L (ref 95–110)
CHOLEST SERPL-MCNC: 108 MG/DL (ref 120–199)
CHOLEST/HDLC SERPL: 3.3 {RATIO} (ref 2–5)
CO2 SERPL-SCNC: 28 MMOL/L (ref 23–29)
CREAT SERPL-MCNC: 1 MG/DL (ref 0.5–1.4)
DIFFERENTIAL METHOD: ABNORMAL
EOSINOPHIL # BLD AUTO: 0.3 K/UL (ref 0–0.5)
EOSINOPHIL NFR BLD: 3.2 % (ref 0–8)
ERYTHROCYTE [DISTWIDTH] IN BLOOD BY AUTOMATED COUNT: 14.1 % (ref 11.5–14.5)
EST. GFR  (AFRICAN AMERICAN): >60 ML/MIN/1.73 M^2
EST. GFR  (NON AFRICAN AMERICAN): >60 ML/MIN/1.73 M^2
ESTIMATED AVG GLUCOSE: 140 MG/DL (ref 68–131)
GLUCOSE SERPL-MCNC: 157 MG/DL (ref 70–110)
HBA1C MFR BLD: 6.5 % (ref 4–5.6)
HCT VFR BLD AUTO: 42.9 % (ref 40–54)
HDLC SERPL-MCNC: 33 MG/DL (ref 40–75)
HDLC SERPL: 30.6 % (ref 20–50)
HGB BLD-MCNC: 14.7 G/DL (ref 14–18)
IMM GRANULOCYTES # BLD AUTO: 0.02 K/UL (ref 0–0.04)
IMM GRANULOCYTES NFR BLD AUTO: 0.3 % (ref 0–0.5)
LDLC SERPL CALC-MCNC: 34.2 MG/DL (ref 63–159)
LYMPHOCYTES # BLD AUTO: 3.1 K/UL (ref 1–4.8)
LYMPHOCYTES NFR BLD: 39.9 % (ref 18–48)
MCH RBC QN AUTO: 31.3 PG (ref 27–31)
MCHC RBC AUTO-ENTMCNC: 34.3 G/DL (ref 32–36)
MCV RBC AUTO: 91 FL (ref 82–98)
MONOCYTES # BLD AUTO: 0.6 K/UL (ref 0.3–1)
MONOCYTES NFR BLD: 8 % (ref 4–15)
NEUTROPHILS # BLD AUTO: 3.7 K/UL (ref 1.8–7.7)
NEUTROPHILS NFR BLD: 47.8 % (ref 38–73)
NONHDLC SERPL-MCNC: 75 MG/DL
NRBC BLD-RTO: 0 /100 WBC
PLATELET # BLD AUTO: 190 K/UL (ref 150–450)
PMV BLD AUTO: 10.1 FL (ref 9.2–12.9)
POTASSIUM SERPL-SCNC: 4.6 MMOL/L (ref 3.5–5.1)
PROT SERPL-MCNC: 6.8 G/DL (ref 6–8.4)
RBC # BLD AUTO: 4.7 M/UL (ref 4.6–6.2)
SODIUM SERPL-SCNC: 141 MMOL/L (ref 136–145)
T4 FREE SERPL-MCNC: 0.92 NG/DL (ref 0.71–1.51)
TRIGL SERPL-MCNC: 204 MG/DL (ref 30–150)
TSH SERPL DL<=0.005 MIU/L-ACNC: 4.51 UIU/ML (ref 0.4–4)
WBC # BLD AUTO: 7.72 K/UL (ref 3.9–12.7)

## 2022-04-07 PROCEDURE — 80053 COMPREHEN METABOLIC PANEL: CPT | Performed by: INTERNAL MEDICINE

## 2022-04-07 PROCEDURE — 84439 ASSAY OF FREE THYROXINE: CPT | Performed by: INTERNAL MEDICINE

## 2022-04-07 PROCEDURE — 80061 LIPID PANEL: CPT | Performed by: INTERNAL MEDICINE

## 2022-04-07 PROCEDURE — 85025 COMPLETE CBC W/AUTO DIFF WBC: CPT | Performed by: INTERNAL MEDICINE

## 2022-04-07 PROCEDURE — 83036 HEMOGLOBIN GLYCOSYLATED A1C: CPT | Performed by: INTERNAL MEDICINE

## 2022-04-07 PROCEDURE — 84443 ASSAY THYROID STIM HORMONE: CPT | Performed by: INTERNAL MEDICINE

## 2022-04-07 PROCEDURE — 36415 COLL VENOUS BLD VENIPUNCTURE: CPT | Mod: PO | Performed by: INTERNAL MEDICINE

## 2022-04-12 NOTE — PROGRESS NOTES
This note was created by combination of typed  and M-Modal dictation.  Transcription errors may be present.  If there are any questions, please contact me.    Assessment and Plan:   Prediabetes  Obesity (BMI 30.0-34.9)  -pre visit labs reviewed.  On metformin.  Work on TLC and weight loss.  Portion control.  -     Comprehensive Metabolic Panel; Future; Expected date: 10/12/2022  -     CBC Auto Differential; Future; Expected date: 10/12/2022  -     Hemoglobin A1C; Future; Expected date: 10/12/2022    Hypothyroidism, unspecified type  -TSH elevated x2 on levothyroxine 50.  Increased to 75. Repeat labs in 2 months.  -     TSH; Future; Expected date: 06/12/2022  -     TSH; Future; Expected date: 10/12/2022  -     levothyroxine (SYNTHROID) 75 MCG tablet; Take 1 tablet (75 mcg total) by mouth before breakfast.  Dispense: 90 tablet; Refill: 3    Tendonitis of left rotator cuff  -trial of topical Voltaren gel.  Home physical therapy handout provided.  -     diclofenac sodium (VOLTAREN) 1 % Gel; Apply the gel (2 g) to the affected area 4 times daily. Do not apply more than 8 g daily to any one affected joint  Dispense: 100 g; Refill: 1    Essential hypertension  -stable    Coronary artery disease of native artery of native heart with stable angina pectoris; 2021 plan is long term DAPT  Mixed hyperlipidemia long term DAPT  -pre visit labs triglycerides and HDL not ideal.  Needs more physical activity.  On statin.  No changes.  Followed by Cardiology.    Pt declines covid vaccine    Medications Discontinued During This Encounter   Medication Reason    levothyroxine (SYNTHROID) 50 MCG tablet Dose adjustment       meds sent this encounter:  Medications Ordered This Encounter   Medications    diclofenac sodium (VOLTAREN) 1 % Gel     Sig: Apply the gel (2 g) to the affected area 4 times daily. Do not apply more than 8 g daily to any one affected joint     Dispense:  100 g     Refill:  1    levothyroxine (SYNTHROID) 75  MCG tablet     Sig: Take 1 tablet (75 mcg total) by mouth before breakfast.     Dispense:  90 tablet     Refill:  3       Follow Up: No follow-ups on file. TSH 2 months on LT 75  OV 6 months with labs.    Subjective:     Chief Complaint   Patient presents with    Results    Shoulder Pain     LEFT       HPI  Clinton is a 71 y.o. male, last appointment with this clinic was Visit date not found.  Social History     Tobacco Use    Smoking status: Never Smoker    Smokeless tobacco: Never Used   Substance Use Topics    Alcohol use: No     Alcohol/week: 0.0 standard drinks     Comment: rarely          Social History     Social History Narrative    Not on file       Last visit with me in October  Pre diabetes/metformin.  Coronary artery disease/lipid/statin.  Dual antiplatelet therapy.  Hypothyroid TSH mildly elevated.  Has been on current dose longstanding.  LUTS followed by Urology.  Flomax.    Saw cardiology in December.  Stable angina.  Increased Imdur.  Plan is follow-up 1 year with echo    COVID infection late December  Saw pulmonology for this.  Late January.  Was given prednisone and doxycycline.  Pulmicort.    Saw cardiology again in follow-up after COVID.  No changes.    Saw Urology in follow-up in late February.  Testicular pain.  Plan is ultrasound  ED, unable to take medications because of long-acting nitrate use  Ultrasound showed bilateral cysts.  Otherwise unremarkable  Lightheadedness as side effect of Flomax.  Plan was every other day dosing    Saw pulmonology in follow-up.  Symptoms improved.    Pre visit labs  CBC normal  CMP normal  Lipid triglycerides high HDL low  A1c 6.5  TSH mildly elevated free T4 normal  Urinalysis normal    Increase dose of levothyroxine    Brother  of covid right before parisa  Another  years ago; he was younger.  Age 55.   2 brothers remaining in [place]    Not a lot of candy or sugar.  Sometimes sweet tea  Adequate fruits and vegetables.  Needs more physical  activity.    Shoulder pain.  X 1 week. No trigger. Pain with abduction    Patient Care Team:  Michael Gonzalez MD as PCP - General (Internal Medicine)  Marilyn Arteaga MA as Care Coordinator  [facility]  Andreas Henriquez MD as Consulting Physician (Dermatology)  Brad Bahena MD as Consulting Physician (Cardiology)    Patient Active Problem List    Diagnosis Date Noted    History of COVID-19 02/25/2022     Symptoms resolved. Persistent residual finding on cxr, recommend repeating test in 1 month to ensure resolution.       Nuclear sclerosis of both eyes 05/20/2021    Allergy to iodine 08/27/2019    Tubular adenoma of colon 2/2009 10/11/2018     2/28/2009 colonoscopy tubular adenoma  3/15/2013 colonoscopy thickened haustral folds indicative of prediverticular state. internal hemorrhoids. repeat 10 years.  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  LVEF 66%.  Normal wall motion.  08/08/2019 normal LV systolic function LVEF 65%.  Concentric LV remodeling.  No wall motion abnormalities.  Aortic root diameter mildly increased verses report 03/2018.  7/30/20  TTE (Ao root 4.2cm at annulus, 3.8cm at sinuses) normal LV systolic function LVEF 65%.  Mild concentric LVH.  Grade 1 diastolic dysfunction.  Normal RV systolic function.  Normal CVP.  PA pressure 20.      Peripheral vascular disease with LE US 6/2017 and carotid US  06/20/2017     LE art US/TANA 6/8/17 1.  Mild/atherosclerotic plaquing. 2.  No stenosis about 30 percent femoral popliteal arteries. 3.  Normal bilateral ABIs.  Carotid US 12/3/14 1. Less than 50% stenosis of the right internal carotid artery. 2. Less than 50% stenosis of the left internal carotid artery.    "   Aortic ectasia 04/28/2017     "The aorta is elongated" - Xray Chest 5-      JAYLEN (obstructive sleep apnea) 04/28/2017    Hypothyroidism 06/01/2016    Obesity (BMI 30.0-34.9) 06/01/2016    Anxiety and depression with assoc memory loss; seen by neuro 2015, negative workup 11/24/2014     12/3/2014 MRI brain: 1. No evidence for acute infarct 2. unremarkable MRI of the brain  12/3/2014 carotid US normal      Prediabetes 11/24/2014    Coronary artery disease of native artery of native heart with stable angina pectoris; 2021 plan is long term DAPT 11/19/2014     Stent LAD 2012 9/27/2013 [facility] prox LAD 30% stenosis; RCA 20% stenosis. patent LAD stent.    3/9/2015 nu med stress test negative. 3/9/2015 TTE normal LVEF 55-60; concentric remodeling.  L MPI 6/8/17 (images pers rev) Nuclear Quantitative Functional Analysis:  LVEF: 68 % Impression: NORMAL MYOCARDIAL PERFUSION  Echo 3/15/18 LVEF 60-65%; upper limit of normal aortic root 3.7 cm  08/08/2019 Lexiscan nuclear stress test probably normal study.  LVEF 66%.  Normal wall motion.  08/08/2019 normal LV systolic function LVEF 65%.  Concentric LV remodeling.  No wall motion abnormalities.  Aortic root diameter mildly increased verses report 03/2018.  9/21/2019 [facility]: Dominance: Right  LM: normal  LAD: mid 50% followed by patent stent, distal/transapical 80% (small caliber)  Ramus: prox 30%  LCx: prox 50%  RCA: prox 30%, dist 40%              RPDA ost 50%              RPLB mid 90%    Stent 2.5x18 Resolute Gray SILVER 16 abi    9/21/2020 [facility]  Dominance: Right  LM: normal  LAD: MLI, mid stent patent (2013)  Ramus: MLI, prox 30%  LCx: MLI, ost 40%  RCA: MLI, dist eccentric 40-50%              RPDA: ost 50%, iFR 0.97              RPLB mid stent patent (9/20/19 2.5x18 Resolute Gray SILVER)     Impression:  2V CAD, normal LV fxn  Patent mid LAD and mid RPLB stents  iFR dist RCA/ost RPDA neg     Plan:  Cont med rx  Cont ASA/Plavix/Statin/BBL/Imdur  Plan long term DAPT given " diffuse CAD and prior MV PCI      Mixed hyperlipidemia long term DAPT 07/09/2014    Essential hypertension 07/09/2014 7/30/20  TTE (Ao root 4.2cm at annulus, 3.8cm at sinuses) normal LV systolic function LVEF 65%.  Mild concentric LVH.  Grade 1 diastolic dysfunction.  Normal RV systolic function.  Normal CVP.  PA pressure 20.      Gastroesophageal reflux disease 07/09/2014    Benign non-nodular prostatic hyperplasia with lower urinary tract symptoms 07/09/2014    Vitamin D deficiency 07/09/2014    Hernia of abdominal wall 07/09/2014       PAST MEDICAL PROBLEMS, PAST SURGICAL HISTORY: please see relevant portions of the electronic medical record    ALLERGIES AND MEDICATIONS: updated and reviewed.  Review of patient's allergies indicates:   Allergen Reactions    Iodine and iodide containing products Anaphylaxis    Naproxen Other (See Comments)     hallucinations  Hallucinations^  Hallucinations^    Hydrocodone-acetaminophen      Rash (skin)^    Iodine      Anaphylaxis^    Oxycodone-acetaminophen      Rash (skin)^       Medication List with Changes/Refills   Current Medications    ALBUTEROL (PROVENTIL/VENTOLIN HFA) 90 MCG/ACTUATION INHALER    INHALE 2 PUFFS INTO THE LUNGS EVERY 6 (SIX) HOURS AS NEEDED FOR WHEEZING. RESCUE    ASPIRIN (ECOTRIN) 81 MG EC TABLET    Take 1 tablet (81 mg total) by mouth once daily.    ATORVASTATIN (LIPITOR) 80 MG TABLET    TAKE 1 TABLET EVERY EVENING    CLOPIDOGREL (PLAVIX) 75 MG TABLET    TAKE 1 TABLET EVERY DAY    FUROSEMIDE (LASIX) 20 MG TABLET    TAKE 1 TABLET EVERY DAY    ISOSORBIDE MONONITRATE (IMDUR) 120 MG 24 HR TABLET    Take 2 tablets (240 mg total) by mouth once daily.    KRILL/OM3/DHA/EPA/OM6/LIP/ASTX (KRILL OIL, OMEGA 3 AND 6, ORAL)    Take by mouth.    LEVOCETIRIZINE (XYZAL) 5 MG TABLET    TAKE 1 TABLET EVERY EVENING    LEVOTHYROXINE (SYNTHROID) 50 MCG TABLET    TAKE 1 TABLET BEFORE BREAKFAST    LOSARTAN (COZAAR) 50 MG TABLET    TAKE 1 TABLET EVERY DAY     "METFORMIN (GLUCOPHAGE) 500 MG TABLET    TAKE 1 TABLET BY MOUTH EVERY DAY WITH BREAKFAST    METOPROLOL TARTRATE (LOPRESSOR) 25 MG TABLET    TAKE 1 TABLET TWICE DAILY    NITROGLYCERIN (NITROSTAT) 0.4 MG SL TABLET    Place 1 tablet (0.4 mg total) under the tongue every 5 (five) minutes as needed for Chest pain.    PANTOPRAZOLE (PROTONIX) 40 MG TABLET    TAKE 1 TABLET EVERY DAY    TAMSULOSIN (FLOMAX) 0.4 MG CAP    Take 1 capsule (0.4 mg total) by mouth once daily.    VITAMIN D 1000 UNITS TAB    Take 1 tablet (1,000 Units total) by mouth once daily.        Objective:   Physical Exam   Vitals:    04/13/22 1310   BP: 138/70   BP Location: Right arm   Patient Position: Sitting   BP Method: Large (Manual)   Pulse: 67   Temp: 98.2 °F (36.8 °C)   TempSrc: Oral   SpO2: 96%   Weight: 109.3 kg (241 lb)   Height: 5' 10" (1.778 m)    Body mass index is 34.58 kg/m².  Weight: 109.3 kg (241 lb)   Height: 5' 10" (177.8 cm)     Physical Exam  Constitutional:       General: He is not in acute distress.     Appearance: He is well-developed.   Cardiovascular:      Rate and Rhythm: Normal rate and regular rhythm.      Heart sounds: Normal heart sounds. No murmur heard.  Pulmonary:      Effort: Pulmonary effort is normal.      Breath sounds: Normal breath sounds.   Musculoskeletal:         General: Normal range of motion.      Comments: Left shoulder, joint space mild tenderness, AC joint mild tender.  External rotation with pain.  Internal rotation no pain, lift-off negative.  Full abduction full range of motion, drop arm negative.  Biceps tendon nontender   Skin:     General: Skin is warm and dry.   Neurological:      Mental Status: He is alert and oriented to person, place, and time.      Coordination: Coordination normal.   Psychiatric:         Behavior: Behavior normal.         Thought Content: Thought content normal.         Component      Latest Ref Rng & Units 4/7/2022 10/8/2021   WBC      3.90 - 12.70 K/uL 7.72 10.90   RBC      4.60 " - 6.20 M/uL 4.70 5.04   Hemoglobin      14.0 - 18.0 g/dL 14.7 16.0   Hematocrit      40.0 - 54.0 % 42.9 46.1   MCV      82 - 98 fL 91 92   MCH      27.0 - 31.0 pg 31.3 (H) 31.7 (H)   MCHC      32.0 - 36.0 g/dL 34.3 34.7   RDW      11.5 - 14.5 % 14.1 13.3   Platelets      150 - 450 K/uL 190 212   MPV      9.2 - 12.9 fL 10.1 10.0   Immature Granulocytes      0.0 - 0.5 % 0.3 0.6 (H)   Gran # (ANC)      1.8 - 7.7 K/uL 3.7 6.2   Immature Grans (Abs)      0.00 - 0.04 K/uL 0.02 0.06 (H)   Lymph #      1.0 - 4.8 K/uL 3.1 3.4   Mono #      0.3 - 1.0 K/uL 0.6 0.9   Eos #      0.0 - 0.5 K/uL 0.3 0.3   Baso #      0.00 - 0.20 K/uL 0.06 0.09   nRBC      0 /100 WBC 0 0   Gran %      38.0 - 73.0 % 47.8 57.1   Lymph %      18.0 - 48.0 % 39.9 30.8   Mono %      4.0 - 15.0 % 8.0 8.3   Eosinophil %      0.0 - 8.0 % 3.2 2.4   Basophil %      0.0 - 1.9 % 0.8 0.8   Differential Method       Automated Automated   Sodium      136 - 145 mmol/L 141 141   Potassium      3.5 - 5.1 mmol/L 4.6 4.6   Chloride      95 - 110 mmol/L 104 100   CO2      23 - 29 mmol/L 28 31 (H)   Glucose      70 - 110 mg/dL 157 (H) 156 (H)   BUN      8 - 23 mg/dL 12 11   Creatinine      0.5 - 1.4 mg/dL 1.0 1.1   Calcium      8.7 - 10.5 mg/dL 9.5 10.0   PROTEIN TOTAL      6.0 - 8.4 g/dL 6.8 7.2   Albumin      3.5 - 5.2 g/dL 4.0 4.2   BILIRUBIN TOTAL      0.1 - 1.0 mg/dL 1.1 (H) 1.2 (H)   Alkaline Phosphatase      55 - 135 U/L 46 (L) 55   AST      10 - 40 U/L 17 17   ALT      10 - 44 U/L 22 22   Anion Gap      8 - 16 mmol/L 9 10   eGFR if African American      >60 mL/min/1.73 m:2 >60.0 >60.0   eGFR if non African American      >60 mL/min/1.73 m:2 >60.0 >60.0   Specimen UA       Urine, Clean Catch    Color, UA      Yellow, Straw, Mari Yellow    Appearance, UA      Clear Clear    pH, UA      5.0 - 8.0 5.0    Specific Gravity, UA      1.005 - 1.030 1.020    Protein, UA      Negative Negative    Glucose, UA      Negative Negative    Ketones, UA      Negative Negative     Bilirubin (UA)      Negative Negative    Occult Blood UA      Negative Negative    NITRITE UA      Negative Negative    Leukocytes, UA      Negative Negative    Cholesterol      120 - 199 mg/dL 108 (L) 112 (L)   Triglycerides      30 - 150 mg/dL 204 (H) 211 (H)   HDL      40 - 75 mg/dL 33 (L) 37 (L)   LDL Cholesterol External      63.0 - 159.0 mg/dL 34.2 (L) 32.8 (L)   HDL/Cholesterol Ratio      20.0 - 50.0 % 30.6 33.0   Total Cholesterol/HDL Ratio      2.0 - 5.0 3.3 3.0   Non-HDL Cholesterol      mg/dL 75 75   Microalbumin, Urine      ug/mL  21.0   Creatinine, Urine      23.0 - 375.0 mg/dL  222.0   MICROALB/CREAT RATIO      0.0 - 30.0 ug/mg  9.5   Hemoglobin A1C External      4.0 - 5.6 % 6.5 (H) 6.3 (H)   Estimated Avg Glucose      68 - 131 mg/dL 140 (H) 134 (H)   TSH      0.400 - 4.000 uIU/mL 4.512 (H) 4.119 (H)   Free T4      0.71 - 1.51 ng/dL 0.92 1.02

## 2022-04-13 ENCOUNTER — OFFICE VISIT (OUTPATIENT)
Dept: FAMILY MEDICINE | Facility: CLINIC | Age: 72
End: 2022-04-13
Payer: MEDICARE

## 2022-04-13 VITALS
WEIGHT: 241 LBS | BODY MASS INDEX: 34.5 KG/M2 | TEMPERATURE: 98 F | OXYGEN SATURATION: 96 % | SYSTOLIC BLOOD PRESSURE: 138 MMHG | HEART RATE: 67 BPM | DIASTOLIC BLOOD PRESSURE: 70 MMHG | HEIGHT: 70 IN

## 2022-04-13 DIAGNOSIS — E03.9 HYPOTHYROIDISM, UNSPECIFIED TYPE: ICD-10-CM

## 2022-04-13 DIAGNOSIS — M75.82 TENDONITIS OF LEFT ROTATOR CUFF: ICD-10-CM

## 2022-04-13 DIAGNOSIS — I10 ESSENTIAL HYPERTENSION: ICD-10-CM

## 2022-04-13 DIAGNOSIS — E78.2 MIXED HYPERLIPIDEMIA: ICD-10-CM

## 2022-04-13 DIAGNOSIS — E66.9 OBESITY (BMI 30.0-34.9): ICD-10-CM

## 2022-04-13 DIAGNOSIS — I25.118 CORONARY ARTERY DISEASE OF NATIVE ARTERY OF NATIVE HEART WITH STABLE ANGINA PECTORIS: ICD-10-CM

## 2022-04-13 DIAGNOSIS — R73.03 PREDIABETES: Primary | ICD-10-CM

## 2022-04-13 PROCEDURE — 3075F SYST BP GE 130 - 139MM HG: CPT | Mod: CPTII,S$GLB,, | Performed by: INTERNAL MEDICINE

## 2022-04-13 PROCEDURE — 4010F PR ACE/ARB THEARPY RXD/TAKEN: ICD-10-PCS | Mod: CPTII,S$GLB,, | Performed by: INTERNAL MEDICINE

## 2022-04-13 PROCEDURE — 3044F HG A1C LEVEL LT 7.0%: CPT | Mod: CPTII,S$GLB,, | Performed by: INTERNAL MEDICINE

## 2022-04-13 PROCEDURE — 1101F PR PT FALLS ASSESS DOC 0-1 FALLS W/OUT INJ PAST YR: ICD-10-PCS | Mod: CPTII,S$GLB,, | Performed by: INTERNAL MEDICINE

## 2022-04-13 PROCEDURE — 99999 PR PBB SHADOW E&M-EST. PATIENT-LVL V: ICD-10-PCS | Mod: PBBFAC,,, | Performed by: INTERNAL MEDICINE

## 2022-04-13 PROCEDURE — 1125F AMNT PAIN NOTED PAIN PRSNT: CPT | Mod: CPTII,S$GLB,, | Performed by: INTERNAL MEDICINE

## 2022-04-13 PROCEDURE — 3288F FALL RISK ASSESSMENT DOCD: CPT | Mod: CPTII,S$GLB,, | Performed by: INTERNAL MEDICINE

## 2022-04-13 PROCEDURE — 99214 OFFICE O/P EST MOD 30 MIN: CPT | Mod: S$GLB,,, | Performed by: INTERNAL MEDICINE

## 2022-04-13 PROCEDURE — 3075F PR MOST RECENT SYSTOLIC BLOOD PRESS GE 130-139MM HG: ICD-10-PCS | Mod: CPTII,S$GLB,, | Performed by: INTERNAL MEDICINE

## 2022-04-13 PROCEDURE — 1160F RVW MEDS BY RX/DR IN RCRD: CPT | Mod: CPTII,S$GLB,, | Performed by: INTERNAL MEDICINE

## 2022-04-13 PROCEDURE — 3078F PR MOST RECENT DIASTOLIC BLOOD PRESSURE < 80 MM HG: ICD-10-PCS | Mod: CPTII,S$GLB,, | Performed by: INTERNAL MEDICINE

## 2022-04-13 PROCEDURE — 1160F PR REVIEW ALL MEDS BY PRESCRIBER/CLIN PHARMACIST DOCUMENTED: ICD-10-PCS | Mod: CPTII,S$GLB,, | Performed by: INTERNAL MEDICINE

## 2022-04-13 PROCEDURE — 1159F PR MEDICATION LIST DOCUMENTED IN MEDICAL RECORD: ICD-10-PCS | Mod: CPTII,S$GLB,, | Performed by: INTERNAL MEDICINE

## 2022-04-13 PROCEDURE — 3044F PR MOST RECENT HEMOGLOBIN A1C LEVEL <7.0%: ICD-10-PCS | Mod: CPTII,S$GLB,, | Performed by: INTERNAL MEDICINE

## 2022-04-13 PROCEDURE — 1159F MED LIST DOCD IN RCRD: CPT | Mod: CPTII,S$GLB,, | Performed by: INTERNAL MEDICINE

## 2022-04-13 PROCEDURE — 99499 UNLISTED E&M SERVICE: CPT | Mod: HCNC,S$GLB,, | Performed by: INTERNAL MEDICINE

## 2022-04-13 PROCEDURE — 3008F PR BODY MASS INDEX (BMI) DOCUMENTED: ICD-10-PCS | Mod: CPTII,S$GLB,, | Performed by: INTERNAL MEDICINE

## 2022-04-13 PROCEDURE — 1125F PR PAIN SEVERITY QUANTIFIED, PAIN PRESENT: ICD-10-PCS | Mod: CPTII,S$GLB,, | Performed by: INTERNAL MEDICINE

## 2022-04-13 PROCEDURE — 3008F BODY MASS INDEX DOCD: CPT | Mod: CPTII,S$GLB,, | Performed by: INTERNAL MEDICINE

## 2022-04-13 PROCEDURE — 99499 RISK ADDL DX/OHS AUDIT: ICD-10-PCS | Mod: HCNC,S$GLB,, | Performed by: INTERNAL MEDICINE

## 2022-04-13 PROCEDURE — 3078F DIAST BP <80 MM HG: CPT | Mod: CPTII,S$GLB,, | Performed by: INTERNAL MEDICINE

## 2022-04-13 PROCEDURE — 1101F PT FALLS ASSESS-DOCD LE1/YR: CPT | Mod: CPTII,S$GLB,, | Performed by: INTERNAL MEDICINE

## 2022-04-13 PROCEDURE — 4010F ACE/ARB THERAPY RXD/TAKEN: CPT | Mod: CPTII,S$GLB,, | Performed by: INTERNAL MEDICINE

## 2022-04-13 PROCEDURE — 3288F PR FALLS RISK ASSESSMENT DOCUMENTED: ICD-10-PCS | Mod: CPTII,S$GLB,, | Performed by: INTERNAL MEDICINE

## 2022-04-13 PROCEDURE — 99214 PR OFFICE/OUTPT VISIT, EST, LEVL IV, 30-39 MIN: ICD-10-PCS | Mod: S$GLB,,, | Performed by: INTERNAL MEDICINE

## 2022-04-13 PROCEDURE — 99999 PR PBB SHADOW E&M-EST. PATIENT-LVL V: CPT | Mod: PBBFAC,,, | Performed by: INTERNAL MEDICINE

## 2022-04-13 RX ORDER — LEVOTHYROXINE SODIUM 75 UG/1
75 TABLET ORAL
Qty: 90 TABLET | Refills: 3 | Status: SHIPPED | OUTPATIENT
Start: 2022-04-13 | End: 2023-05-10 | Stop reason: SDUPTHER

## 2022-04-13 RX ORDER — DICLOFENAC SODIUM 10 MG/G
GEL TOPICAL
Qty: 100 G | Refills: 1 | Status: SHIPPED | OUTPATIENT
Start: 2022-04-13 | End: 2022-05-30

## 2022-06-07 DIAGNOSIS — I25.118 CORONARY ARTERY DISEASE OF NATIVE ARTERY OF NATIVE HEART WITH STABLE ANGINA PECTORIS: ICD-10-CM

## 2022-06-07 DIAGNOSIS — R60.0 PERIPHERAL EDEMA: Chronic | ICD-10-CM

## 2022-06-07 DIAGNOSIS — I10 ESSENTIAL HYPERTENSION: Chronic | ICD-10-CM

## 2022-06-07 DIAGNOSIS — E78.2 MIXED HYPERLIPIDEMIA: Chronic | ICD-10-CM

## 2022-06-07 RX ORDER — PANTOPRAZOLE SODIUM 40 MG/1
TABLET, DELAYED RELEASE ORAL
Qty: 90 TABLET | Refills: 3 | Status: SHIPPED | OUTPATIENT
Start: 2022-06-07 | End: 2023-05-10 | Stop reason: SDUPTHER

## 2022-06-07 RX ORDER — METOPROLOL TARTRATE 25 MG/1
TABLET, FILM COATED ORAL
Qty: 180 TABLET | Refills: 3 | Status: SHIPPED | OUTPATIENT
Start: 2022-06-07 | End: 2023-05-10 | Stop reason: SDUPTHER

## 2022-06-07 RX ORDER — LOSARTAN POTASSIUM 50 MG/1
TABLET ORAL
Qty: 90 TABLET | Refills: 3 | Status: SHIPPED | OUTPATIENT
Start: 2022-06-07 | End: 2023-05-10 | Stop reason: SDUPTHER

## 2022-06-07 RX ORDER — ATORVASTATIN CALCIUM 80 MG/1
TABLET, FILM COATED ORAL
Qty: 90 TABLET | Refills: 3 | Status: SHIPPED | OUTPATIENT
Start: 2022-06-07 | End: 2023-05-10 | Stop reason: SDUPTHER

## 2022-06-07 RX ORDER — FUROSEMIDE 20 MG/1
TABLET ORAL
Qty: 90 TABLET | Refills: 3 | Status: SHIPPED | OUTPATIENT
Start: 2022-06-07 | End: 2023-05-10 | Stop reason: SDUPTHER

## 2022-06-07 NOTE — TELEPHONE ENCOUNTER
Refill Routing Note   Medication(s) are not appropriate for processing by [facility] for the following reason(s):      - Indication is outside of scope for [facility]    [facility] action(s):  Defer  Approve       Medication Therapy Plan: Edema outside of protocol. All others approved  Medication reconciliation completed: No     Appointments  past 12m or future 3m with PCP    Date Provider   Last Visit   4/13/2022 Michael Gonzalez MD   Next Visit   10/13/2022 Michael Gonzalez MD   ED visits in past 90 days: 0        Note composed:4:55 PM 06/07/2022

## 2022-06-07 NOTE — TELEPHONE ENCOUNTER
No new care gaps identified.  [facility] Embedded Care Gaps. Reference number: [id number]. 6/07/2022   9:58:26 AM [email or web address]

## 2022-06-14 ENCOUNTER — LAB VISIT (OUTPATIENT)
Dept: LAB | Facility: HOSPITAL | Age: 72
End: 2022-06-14
Attending: INTERNAL MEDICINE
Payer: MEDICARE

## 2022-06-14 DIAGNOSIS — E03.9 HYPOTHYROIDISM, UNSPECIFIED TYPE: ICD-10-CM

## 2022-06-14 LAB — TSH SERPL DL<=0.005 MIU/L-ACNC: 3.77 UIU/ML (ref 0.4–4)

## 2022-06-14 PROCEDURE — 84443 ASSAY THYROID STIM HORMONE: CPT | Performed by: INTERNAL MEDICINE

## 2022-06-14 PROCEDURE — 36415 COLL VENOUS BLD VENIPUNCTURE: CPT | Mod: PO | Performed by: INTERNAL MEDICINE

## 2022-06-15 ENCOUNTER — OFFICE VISIT (OUTPATIENT)
Dept: FAMILY MEDICINE | Facility: CLINIC | Age: 72
End: 2022-06-15
Payer: MEDICARE

## 2022-06-15 VITALS
OXYGEN SATURATION: 96 % | HEIGHT: 70 IN | BODY MASS INDEX: 34.4 KG/M2 | DIASTOLIC BLOOD PRESSURE: 68 MMHG | SYSTOLIC BLOOD PRESSURE: 126 MMHG | HEART RATE: 62 BPM | WEIGHT: 240.31 LBS | TEMPERATURE: 98 F

## 2022-06-15 DIAGNOSIS — S39.012A LOW BACK STRAIN, INITIAL ENCOUNTER: Primary | ICD-10-CM

## 2022-06-15 DIAGNOSIS — E03.9 HYPOTHYROIDISM, UNSPECIFIED TYPE: ICD-10-CM

## 2022-06-15 PROCEDURE — 3074F PR MOST RECENT SYSTOLIC BLOOD PRESSURE < 130 MM HG: ICD-10-PCS | Mod: CPTII,S$GLB,, | Performed by: INTERNAL MEDICINE

## 2022-06-15 PROCEDURE — 3008F BODY MASS INDEX DOCD: CPT | Mod: CPTII,S$GLB,, | Performed by: INTERNAL MEDICINE

## 2022-06-15 PROCEDURE — 3074F SYST BP LT 130 MM HG: CPT | Mod: CPTII,S$GLB,, | Performed by: INTERNAL MEDICINE

## 2022-06-15 PROCEDURE — 3008F PR BODY MASS INDEX (BMI) DOCUMENTED: ICD-10-PCS | Mod: CPTII,S$GLB,, | Performed by: INTERNAL MEDICINE

## 2022-06-15 PROCEDURE — 3078F DIAST BP <80 MM HG: CPT | Mod: CPTII,S$GLB,, | Performed by: INTERNAL MEDICINE

## 2022-06-15 PROCEDURE — 1159F MED LIST DOCD IN RCRD: CPT | Mod: CPTII,S$GLB,, | Performed by: INTERNAL MEDICINE

## 2022-06-15 PROCEDURE — 3044F HG A1C LEVEL LT 7.0%: CPT | Mod: CPTII,S$GLB,, | Performed by: INTERNAL MEDICINE

## 2022-06-15 PROCEDURE — 3044F PR MOST RECENT HEMOGLOBIN A1C LEVEL <7.0%: ICD-10-PCS | Mod: CPTII,S$GLB,, | Performed by: INTERNAL MEDICINE

## 2022-06-15 PROCEDURE — 1125F AMNT PAIN NOTED PAIN PRSNT: CPT | Mod: CPTII,S$GLB,, | Performed by: INTERNAL MEDICINE

## 2022-06-15 PROCEDURE — 1101F PT FALLS ASSESS-DOCD LE1/YR: CPT | Mod: CPTII,S$GLB,, | Performed by: INTERNAL MEDICINE

## 2022-06-15 PROCEDURE — 99999 PR PBB SHADOW E&M-EST. PATIENT-LVL V: CPT | Mod: PBBFAC,,, | Performed by: INTERNAL MEDICINE

## 2022-06-15 PROCEDURE — 1125F PR PAIN SEVERITY QUANTIFIED, PAIN PRESENT: ICD-10-PCS | Mod: CPTII,S$GLB,, | Performed by: INTERNAL MEDICINE

## 2022-06-15 PROCEDURE — 1159F PR MEDICATION LIST DOCUMENTED IN MEDICAL RECORD: ICD-10-PCS | Mod: CPTII,S$GLB,, | Performed by: INTERNAL MEDICINE

## 2022-06-15 PROCEDURE — 99213 OFFICE O/P EST LOW 20 MIN: CPT | Mod: S$GLB,,, | Performed by: INTERNAL MEDICINE

## 2022-06-15 PROCEDURE — 1160F PR REVIEW ALL MEDS BY PRESCRIBER/CLIN PHARMACIST DOCUMENTED: ICD-10-PCS | Mod: CPTII,S$GLB,, | Performed by: INTERNAL MEDICINE

## 2022-06-15 PROCEDURE — 1160F RVW MEDS BY RX/DR IN RCRD: CPT | Mod: CPTII,S$GLB,, | Performed by: INTERNAL MEDICINE

## 2022-06-15 PROCEDURE — 99213 PR OFFICE/OUTPT VISIT, EST, LEVL III, 20-29 MIN: ICD-10-PCS | Mod: S$GLB,,, | Performed by: INTERNAL MEDICINE

## 2022-06-15 PROCEDURE — 3288F PR FALLS RISK ASSESSMENT DOCUMENTED: ICD-10-PCS | Mod: CPTII,S$GLB,, | Performed by: INTERNAL MEDICINE

## 2022-06-15 PROCEDURE — 4010F PR ACE/ARB THEARPY RXD/TAKEN: ICD-10-PCS | Mod: CPTII,S$GLB,, | Performed by: INTERNAL MEDICINE

## 2022-06-15 PROCEDURE — 99999 PR PBB SHADOW E&M-EST. PATIENT-LVL V: ICD-10-PCS | Mod: PBBFAC,,, | Performed by: INTERNAL MEDICINE

## 2022-06-15 PROCEDURE — 4010F ACE/ARB THERAPY RXD/TAKEN: CPT | Mod: CPTII,S$GLB,, | Performed by: INTERNAL MEDICINE

## 2022-06-15 PROCEDURE — 3288F FALL RISK ASSESSMENT DOCD: CPT | Mod: CPTII,S$GLB,, | Performed by: INTERNAL MEDICINE

## 2022-06-15 PROCEDURE — 3078F PR MOST RECENT DIASTOLIC BLOOD PRESSURE < 80 MM HG: ICD-10-PCS | Mod: CPTII,S$GLB,, | Performed by: INTERNAL MEDICINE

## 2022-06-15 PROCEDURE — 1101F PR PT FALLS ASSESS DOC 0-1 FALLS W/OUT INJ PAST YR: ICD-10-PCS | Mod: CPTII,S$GLB,, | Performed by: INTERNAL MEDICINE

## 2022-06-15 NOTE — PROGRESS NOTES
This note was created by combination of typed  and M-Modal dictation.  Transcription errors may be present.  If there are any questions, please contact me.    Assessment and Plan:   Low back strain, initial encounter  -musculoskeletal strain.  No red flag signs.  No saddle dysesthesia no weakness no incontinence no fevers.  Has had some modest improvement since initial event.  Expect slow but steady improvement.  He is already aware of stretching exercises having gone through chiropractic years ago.  NSAIDs.  We talked about muscle relaxers and steroids, he declines at this time.    Hypothyroidism, unspecified type  -pre visit TSH good on higher dose 75 mcg no changes    Blurred vision on the right eye, subacute for months now, strongly encouraged to follow-up ASAP with Optometry      There are no discontinued medications.    meds sent this encounter:       Follow Up: No follow-ups on file.    Subjective:     Chief Complaint   Patient presents with    Back Pain     1 week now, sharp pain. Both side of the lower back. Did  on something heavy a week ago    Blurred Vision     On right eye    Headache     Shooting pain above right eye       RODO Alonzo is a 71 y.o. male, last appointment with this clinic was 4/13/2022.  Social History     Tobacco Use    Smoking status: Never Smoker    Smokeless tobacco: Never Used   Substance Use Topics    Alcohol use: No     Alcohol/week: 0.0 standard drinks     Comment: rarely          Social History     Social History Narrative    Not on file       Last visit with me mid April  PreDM, obesity, metformin, work on TLC. a1c 6.5 4/2022  Hypothyroid increased to 75; labs 6/14 good on higher dose.  Left rotator tendonitis, voltaren topical  HTN stable  CAD, lipid, TG/HDL not ideal.  Work on TLC. On statin no changes. Followed by cardiology  LUTS, urology    About a week ago - push nmower, mowing the lawn, moved his compost bin (heavy), did ok, about an hour.  Went in  and sat down. Sat for about 20 min.  And could not get up because felt like all the muscles seized, both sides.  Felt like someone grabbed him around the waist and squeezed.  Mid day. Was hydrated he states.  Did not radiate into the legs. No issues with the bathroom.    This morning playing with granddaughter and went to sit down and felt it again.  He'll push his 100 pound granddaughter, not lift her, and push her into the bed.    Taking azo OTC with modest relief.  But no dysuria.  Denies chronic back pain. Has had exacerbations in the past.   of chiropractic  Has been doing self directed PT on himself recently.    Once he gets moving he does ok.  It's just standing up from sitting or lying down.    Right eye gets blurry.  And gets a headache sometimes.  It's been going on for about 3-4 months.  Needs to see eye doctor.  Constant blurriness.      Answers for HPI/ROS submitted by the patient on 6/14/2022  Chronicity: new  Onset: in the past 7 days  Frequency: daily  Progression since onset: rapidly worsening  Pain location: lumbar spine  Pain quality: aching, cramping, shooting, stabbing  Radiates to: does not radiate  Pain - numeric: 8/10  Pain is: the same all the time  Aggravated by: bending, position, sitting, standing, twisting  Stiffness is present: all day  abdominal pain: No  bladder incontinence: Yes  bowel incontinence: No  chest pain: Yes  dysuria: No  fever: No  headaches: Yes  leg pain: Yes  numbness: Yes  paresis: No  paresthesias: No  pelvic pain: No  perianal numbness: No  tingling: No  weakness: Yes  weight loss: No  genital pain: Yes  hematuria: No  Pain severity: severe        Patient Care Team:  Michael Gonzalez MD as PCP - General (Internal Medicine)  Marilyn Arteaga MA as Care Coordinator  [facility]  Andreas Henriquez MD as Consulting Physician (Dermatology)  Brad Bahena MD as Consulting Physician (Cardiology)    Patient Active Problem List     "Diagnosis Date Noted    History of COVID-19 02/25/2022     Symptoms resolved. Persistent residual finding on cxr, recommend repeating test in 1 month to ensure resolution.       Nuclear sclerosis of both eyes 05/20/2021    Allergy to iodine 08/27/2019    Tubular adenoma of colon 2/2009 10/11/2018     2/28/2009 colonoscopy tubular adenoma  3/15/2013 colonoscopy thickened haustral folds indicative of prediverticular state. internal hemorrhoids. repeat 10 years.  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  LVEF 66%.  Normal wall motion.  08/08/2019 normal LV systolic function LVEF 65%.  Concentric LV remodeling.  No wall motion abnormalities.  Aortic root diameter mildly increased verses report 03/2018.  7/30/20  TTE (Ao root 4.2cm at annulus, 3.8cm at sinuses) normal LV systolic function LVEF 65%.  Mild concentric LVH.  Grade 1 diastolic dysfunction.  Normal RV systolic function.  Normal CVP.  PA pressure 20.      Peripheral vascular disease with LE US 6/2017 and carotid US  06/20/2017     LE art US/TANA 6/8/17 1.  Mild/atherosclerotic plaquing. 2.  No stenosis about 30 percent femoral popliteal arteries. 3.  Normal bilateral ABIs.  Carotid US 12/3/14 1. Less than 50% stenosis of the right internal carotid artery. 2. Less than 50% stenosis of the left internal carotid artery.      Aortic ectasia 04/28/2017     "The aorta is elongated" - Xray Chest 5-      JAYLEN (obstructive sleep apnea) 04/28/2017    Hypothyroidism 06/01/2016    Obesity (BMI 30.0-34.9) 06/01/2016    Anxiety and depression with assoc memory loss; seen by neuro 2015, negative workup 11/24/2014     12/3/2014 MRI brain: 1. No evidence for acute infarct 2. unremarkable MRI of the " brain  12/3/2014 carotid US normal      Prediabetes 11/24/2014    Coronary artery disease of native artery of native heart with stable angina pectoris; 2021 plan is long term DAPT 11/19/2014     Stent LAD 2012 9/27/2013 [facility] prox LAD 30% stenosis; RCA 20% stenosis. patent LAD stent.    3/9/2015 nu med stress test negative. 3/9/2015 TTE normal LVEF 55-60; concentric remodeling.  L MPI 6/8/17 (images pers rev) Nuclear Quantitative Functional Analysis:  LVEF: 68 % Impression: NORMAL MYOCARDIAL PERFUSION  Echo 3/15/18 LVEF 60-65%; upper limit of normal aortic root 3.7 cm  08/08/2019 Lexiscan nuclear stress test probably normal study.  LVEF 66%.  Normal wall motion.  08/08/2019 normal LV systolic function LVEF 65%.  Concentric LV remodeling.  No wall motion abnormalities.  Aortic root diameter mildly increased verses report 03/2018.  9/21/2019 [facility]: Dominance: Right  LM: normal  LAD: mid 50% followed by patent stent, distal/transapical 80% (small caliber)  Ramus: prox 30%  LCx: prox 50%  RCA: prox 30%, dist 40%              RPDA ost 50%              RPLB mid 90%    Stent 2.5x18 [facility] [place] SILVER 16 abi    9/21/2020 [facility]  Dominance: Right  LM: normal  LAD: MLI, mid stent patent (2013)  Ramus: MLI, prox 30%  LCx: MLI, ost 40%  RCA: MLI, dist eccentric 40-50%              RPDA: ost 50%, iFR 0.97              RPLB mid stent patent (9/20/19 2.5x18 [facility] [place] SILVER)     Impression:  2V CAD, normal LV fxn  Patent mid LAD and mid RPLB stents  iFR dist RCA/ost RPDA neg     Plan:  Cont med rx  Cont ASA/Plavix/Statin/BBL/Imdur  Plan long term DAPT given diffuse CAD and prior MV PCI      Mixed hyperlipidemia long term DAPT 07/09/2014    Essential hypertension 07/09/2014 7/30/20  TTE (Ao root 4.2cm at annulus, 3.8cm at sinuses) normal LV systolic function LVEF 65%.  Mild concentric LVH.  Grade 1 diastolic dysfunction.  Normal RV systolic function.  Normal CVP.  PA pressure 20.      Gastroesophageal reflux disease 07/09/2014     Benign non-nodular prostatic hyperplasia with lower urinary tract symptoms 07/09/2014    Vitamin D deficiency 07/09/2014    Hernia of abdominal wall 07/09/2014       PAST MEDICAL PROBLEMS, PAST SURGICAL HISTORY: please see relevant portions of the electronic medical record    ALLERGIES AND MEDICATIONS: updated and reviewed.  Review of patient's allergies indicates:   Allergen Reactions    Iodine and iodide containing products Anaphylaxis    Naproxen Other (See Comments)     hallucinations  Hallucinations^  Hallucinations^    Hydrocodone-acetaminophen      Rash (skin)^    Iodine      Anaphylaxis^    Oxycodone-acetaminophen      Rash (skin)^       Medication List with Changes/Refills   Current Medications    ALBUTEROL (PROVENTIL/VENTOLIN HFA) 90 MCG/ACTUATION INHALER    INHALE 2 PUFFS INTO THE LUNGS EVERY 6 (SIX) HOURS AS NEEDED FOR WHEEZING. RESCUE    ASPIRIN (ECOTRIN) 81 MG EC TABLET    Take 1 tablet (81 mg total) by mouth once daily.    ATORVASTATIN (LIPITOR) 80 MG TABLET    TAKE 1 TABLET EVERY EVENING    CLOPIDOGREL (PLAVIX) 75 MG TABLET    TAKE 1 TABLET EVERY DAY    DICLOFENAC SODIUM (VOLTAREN) 1 % GEL    APPLY THE GEL (2 G) TO THE AFFECTED AREA 4 TIMES DAILY. DO NOT APPLY MORE THAN 8 G DAILY TO ANY ONE AFFECTED JOINT    FUROSEMIDE (LASIX) 20 MG TABLET    TAKE 1 TABLET EVERY DAY    ISOSORBIDE MONONITRATE (IMDUR) 120 MG 24 HR TABLET    Take 2 tablets (240 mg total) by mouth once daily.    KRILL/OM3/DHA/EPA/OM6/LIP/ASTX (KRILL OIL, OMEGA 3 AND 6, ORAL)    Take by mouth.    LEVOTHYROXINE (SYNTHROID) 75 MCG TABLET    Take 1 tablet (75 mcg total) by mouth before breakfast.    LOSARTAN (COZAAR) 50 MG TABLET    TAKE 1 TABLET EVERY DAY    METFORMIN (GLUCOPHAGE) 500 MG TABLET    TAKE 1 TABLET BY MOUTH EVERY DAY WITH BREAKFAST    METOPROLOL TARTRATE (LOPRESSOR) 25 MG TABLET    TAKE 1 TABLET TWICE DAILY    NITROGLYCERIN (NITROSTAT) 0.4 MG SL TABLET    Place 1 tablet (0.4 mg total) under the tongue every 5 (five)  "minutes as needed for Chest pain.    PANTOPRAZOLE (PROTONIX) 40 MG TABLET    TAKE 1 TABLET EVERY DAY    TAMSULOSIN (FLOMAX) 0.4 MG CAP    Take 1 capsule (0.4 mg total) by mouth once daily.    VITAMIN D 1000 UNITS TAB    Take 1 tablet (1,000 Units total) by mouth once daily.      Review of Systems   Constitutional: Negative for fever and weight loss.   Cardiovascular: Positive for chest pain.   Gastrointestinal: Negative for abdominal pain.   Genitourinary: Negative for dysuria and hematuria.   Musculoskeletal: Positive for back pain.   Neurological: Positive for weakness and headaches. Negative for tingling.       Objective:   Physical Exam   Vitals:    06/15/22 1317   BP: 126/68   BP Location: Left arm   Patient Position: Sitting   BP Method: Large (Manual)   Pulse: 62   Temp: 98.3 °F (36.8 °C)   TempSrc: Oral   SpO2: 96%   Weight: 109 kg (240 lb 4.8 oz)   Height: 5' 10" (1.778 m)    Body mass index is 34.48 kg/m².  Weight: 109 kg (240 lb 4.8 oz)   Height: 5' 10" (177.8 cm)     Physical Exam  Constitutional:       General: He is not in acute distress.     Appearance: He is well-developed.   Cardiovascular:      Rate and Rhythm: Normal rate and regular rhythm.      Heart sounds: Normal heart sounds. No murmur heard.  Pulmonary:      Effort: Pulmonary effort is normal.      Breath sounds: Normal breath sounds.   Musculoskeletal:         General: Normal range of motion.      Right lower leg: No edema.      Left lower leg: No edema.      Comments: Pain from position changes from sitting to standing.  Once he stands he ambulates without pain.  Plantar flexion 5/5, toe dorsiflexion 5/5 symmetric   patellar DTR symmetric  L-spine unremarkable without deformity  Paraspinal muscles unremarkable  No flank area tenderness   Skin:     General: Skin is warm and dry.   Neurological:      Mental Status: He is alert and oriented to person, place, and time.      Coordination: Coordination normal.   Psychiatric:         Behavior: " Behavior normal.         Thought Content: Thought content normal.

## 2022-08-26 ENCOUNTER — PES CALL (OUTPATIENT)
Dept: ADMINISTRATIVE | Facility: CLINIC | Age: 72
End: 2022-08-26
Payer: MEDICARE

## 2022-08-30 ENCOUNTER — TELEPHONE (OUTPATIENT)
Dept: ADMINISTRATIVE | Facility: CLINIC | Age: 72
End: 2022-08-30
Payer: MEDICARE

## 2022-08-30 NOTE — TELEPHONE ENCOUNTER
Called pt, informed pt's wife I was calling to remind pt of his in office EAWV on 9/1/22; clinic location provided; pt's wife confirmed appointment

## 2022-09-01 ENCOUNTER — OFFICE VISIT (OUTPATIENT)
Dept: FAMILY MEDICINE | Facility: CLINIC | Age: 72
End: 2022-09-01
Payer: MEDICARE

## 2022-09-01 VITALS
TEMPERATURE: 98 F | DIASTOLIC BLOOD PRESSURE: 60 MMHG | WEIGHT: 240.5 LBS | RESPIRATION RATE: 18 BRPM | BODY MASS INDEX: 34.43 KG/M2 | SYSTOLIC BLOOD PRESSURE: 130 MMHG | HEIGHT: 70 IN | HEART RATE: 67 BPM | OXYGEN SATURATION: 96 %

## 2022-09-01 DIAGNOSIS — I25.118 CORONARY ARTERY DISEASE OF NATIVE ARTERY OF NATIVE HEART WITH STABLE ANGINA PECTORIS: ICD-10-CM

## 2022-09-01 DIAGNOSIS — K43.9 HERNIA OF ABDOMINAL WALL: ICD-10-CM

## 2022-09-01 DIAGNOSIS — G47.33 OSA (OBSTRUCTIVE SLEEP APNEA): Chronic | ICD-10-CM

## 2022-09-01 DIAGNOSIS — F32.A ANXIETY AND DEPRESSION: Chronic | ICD-10-CM

## 2022-09-01 DIAGNOSIS — I77.819 AORTIC ECTASIA: ICD-10-CM

## 2022-09-01 DIAGNOSIS — F41.9 ANXIETY AND DEPRESSION: Chronic | ICD-10-CM

## 2022-09-01 DIAGNOSIS — R73.03 PREDIABETES: Chronic | ICD-10-CM

## 2022-09-01 DIAGNOSIS — E78.2 MIXED HYPERLIPIDEMIA: Chronic | ICD-10-CM

## 2022-09-01 DIAGNOSIS — E55.9 VITAMIN D DEFICIENCY: Chronic | ICD-10-CM

## 2022-09-01 DIAGNOSIS — I73.9 PERIPHERAL VASCULAR DISEASE: ICD-10-CM

## 2022-09-01 DIAGNOSIS — E66.9 OBESITY (BMI 30.0-34.9): Chronic | ICD-10-CM

## 2022-09-01 DIAGNOSIS — I10 ESSENTIAL HYPERTENSION: Chronic | ICD-10-CM

## 2022-09-01 DIAGNOSIS — D12.6 TUBULAR ADENOMA OF COLON: ICD-10-CM

## 2022-09-01 DIAGNOSIS — K21.9 GASTROESOPHAGEAL REFLUX DISEASE, UNSPECIFIED WHETHER ESOPHAGITIS PRESENT: Chronic | ICD-10-CM

## 2022-09-01 DIAGNOSIS — E03.9 HYPOTHYROIDISM, UNSPECIFIED TYPE: Chronic | ICD-10-CM

## 2022-09-01 DIAGNOSIS — Z00.00 ENCOUNTER FOR PREVENTIVE HEALTH EXAMINATION: Primary | ICD-10-CM

## 2022-09-01 DIAGNOSIS — N40.1 BENIGN NON-NODULAR PROSTATIC HYPERPLASIA WITH LOWER URINARY TRACT SYMPTOMS: Chronic | ICD-10-CM

## 2022-09-01 DIAGNOSIS — I77.810 AORTIC ROOT DILATATION: ICD-10-CM

## 2022-09-01 DIAGNOSIS — R26.9 ABNORMALITY OF GAIT AND MOBILITY: ICD-10-CM

## 2022-09-01 DIAGNOSIS — Z74.09 OTHER REDUCED MOBILITY: ICD-10-CM

## 2022-09-01 DIAGNOSIS — I87.2 VENOUS INSUFFICIENCY: ICD-10-CM

## 2022-09-01 PROCEDURE — 3075F PR MOST RECENT SYSTOLIC BLOOD PRESS GE 130-139MM HG: ICD-10-PCS | Mod: CPTII,S$GLB,, | Performed by: NURSE PRACTITIONER

## 2022-09-01 PROCEDURE — 1170F FXNL STATUS ASSESSED: CPT | Mod: CPTII,S$GLB,, | Performed by: NURSE PRACTITIONER

## 2022-09-01 PROCEDURE — 1101F PT FALLS ASSESS-DOCD LE1/YR: CPT | Mod: CPTII,S$GLB,, | Performed by: NURSE PRACTITIONER

## 2022-09-01 PROCEDURE — 3008F PR BODY MASS INDEX (BMI) DOCUMENTED: ICD-10-PCS | Mod: CPTII,S$GLB,, | Performed by: NURSE PRACTITIONER

## 2022-09-01 PROCEDURE — 4010F ACE/ARB THERAPY RXD/TAKEN: CPT | Mod: CPTII,S$GLB,, | Performed by: NURSE PRACTITIONER

## 2022-09-01 PROCEDURE — 1160F RVW MEDS BY RX/DR IN RCRD: CPT | Mod: CPTII,S$GLB,, | Performed by: NURSE PRACTITIONER

## 2022-09-01 PROCEDURE — 3288F PR FALLS RISK ASSESSMENT DOCUMENTED: ICD-10-PCS | Mod: CPTII,S$GLB,, | Performed by: NURSE PRACTITIONER

## 2022-09-01 PROCEDURE — 3288F FALL RISK ASSESSMENT DOCD: CPT | Mod: CPTII,S$GLB,, | Performed by: NURSE PRACTITIONER

## 2022-09-01 PROCEDURE — 99999 PR PBB SHADOW E&M-EST. PATIENT-LVL V: CPT | Mod: PBBFAC,,, | Performed by: NURSE PRACTITIONER

## 2022-09-01 PROCEDURE — 3044F HG A1C LEVEL LT 7.0%: CPT | Mod: CPTII,S$GLB,, | Performed by: NURSE PRACTITIONER

## 2022-09-01 PROCEDURE — 3078F DIAST BP <80 MM HG: CPT | Mod: CPTII,S$GLB,, | Performed by: NURSE PRACTITIONER

## 2022-09-01 PROCEDURE — 1101F PR PT FALLS ASSESS DOC 0-1 FALLS W/OUT INJ PAST YR: ICD-10-PCS | Mod: CPTII,S$GLB,, | Performed by: NURSE PRACTITIONER

## 2022-09-01 PROCEDURE — G0439 PPPS, SUBSEQ VISIT: HCPCS | Mod: S$GLB,,, | Performed by: NURSE PRACTITIONER

## 2022-09-01 PROCEDURE — 1170F PR FUNCTIONAL STATUS ASSESSED: ICD-10-PCS | Mod: CPTII,S$GLB,, | Performed by: NURSE PRACTITIONER

## 2022-09-01 PROCEDURE — G0439 PR MEDICARE ANNUAL WELLNESS SUBSEQUENT VISIT: ICD-10-PCS | Mod: S$GLB,,, | Performed by: NURSE PRACTITIONER

## 2022-09-01 PROCEDURE — 1160F PR REVIEW ALL MEDS BY PRESCRIBER/CLIN PHARMACIST DOCUMENTED: ICD-10-PCS | Mod: CPTII,S$GLB,, | Performed by: NURSE PRACTITIONER

## 2022-09-01 PROCEDURE — 1126F AMNT PAIN NOTED NONE PRSNT: CPT | Mod: CPTII,S$GLB,, | Performed by: NURSE PRACTITIONER

## 2022-09-01 PROCEDURE — 1126F PR PAIN SEVERITY QUANTIFIED, NO PAIN PRESENT: ICD-10-PCS | Mod: CPTII,S$GLB,, | Performed by: NURSE PRACTITIONER

## 2022-09-01 PROCEDURE — 1159F MED LIST DOCD IN RCRD: CPT | Mod: CPTII,S$GLB,, | Performed by: NURSE PRACTITIONER

## 2022-09-01 PROCEDURE — 3044F PR MOST RECENT HEMOGLOBIN A1C LEVEL <7.0%: ICD-10-PCS | Mod: CPTII,S$GLB,, | Performed by: NURSE PRACTITIONER

## 2022-09-01 PROCEDURE — 3075F SYST BP GE 130 - 139MM HG: CPT | Mod: CPTII,S$GLB,, | Performed by: NURSE PRACTITIONER

## 2022-09-01 PROCEDURE — 3008F BODY MASS INDEX DOCD: CPT | Mod: CPTII,S$GLB,, | Performed by: NURSE PRACTITIONER

## 2022-09-01 PROCEDURE — 3078F PR MOST RECENT DIASTOLIC BLOOD PRESSURE < 80 MM HG: ICD-10-PCS | Mod: CPTII,S$GLB,, | Performed by: NURSE PRACTITIONER

## 2022-09-01 PROCEDURE — 1159F PR MEDICATION LIST DOCUMENTED IN MEDICAL RECORD: ICD-10-PCS | Mod: CPTII,S$GLB,, | Performed by: NURSE PRACTITIONER

## 2022-09-01 PROCEDURE — 4010F PR ACE/ARB THEARPY RXD/TAKEN: ICD-10-PCS | Mod: CPTII,S$GLB,, | Performed by: NURSE PRACTITIONER

## 2022-09-01 PROCEDURE — 99999 PR PBB SHADOW E&M-EST. PATIENT-LVL V: ICD-10-PCS | Mod: PBBFAC,,, | Performed by: NURSE PRACTITIONER

## 2022-09-01 NOTE — PROGRESS NOTES
"  Clinton Rosenberg presented for a  Medicare AWV and comprehensive Health Risk Assessment today. The following components were reviewed and updated:    Medical history  Family History  Social history  Allergies and Current Medications  Health Risk Assessment  Health Maintenance  Care Team         ** See Completed Assessments for Annual Wellness Visit within the encounter summary.**         The following assessments were completed:  Living Situation  CAGE  Depression Screening  Timed Get Up and Go  Whisper Test  Cognitive Function Screening  Nutrition Screening  ADL Screening  PAQ Screening        Vitals:    09/01/22 0755   BP: 130/60   Pulse: 67   Resp: 18   Temp: 98.2 °F (36.8 °C)   TempSrc: Oral   SpO2: 96%   Weight: 109.1 kg (240 lb 8.4 oz)   Height: 5' 10" (1.778 m)     Body mass index is 34.51 kg/m².  Physical Exam  Vitals reviewed.   Constitutional:       Appearance: Normal appearance.   Pulmonary:      Effort: Pulmonary effort is normal.   Skin:     General: Skin is warm and dry.   Neurological:      General: No focal deficit present.      Mental Status: He is alert and oriented to person, place, and time.   Psychiatric:         Mood and Affect: Mood normal.         Behavior: Behavior normal.               Diagnoses and health risks identified today and associated recommendations/orders:    1. Encounter for preventive health examination  Patient was seen today for AWV.  Healthcare maintenance and screening recommendations were discussed and updated as indicated.  Return in one year for AWV.    Review current opioid prescriptions: yes.  Screen for Substance Use Disorders: no.    2. Coronary artery disease of native artery of native heart with stable angina pectoris; 2021 plan is long term DAPT  The current medical regimen is effective;  continue present plan and medications.    3. Aortic ectasia  Stable. Monitor.    4. Aortic root dilatation on TTE 8/2019  Stable. Monitor.    5. Peripheral vascular disease with LE " US 6/2017 and carotid US   The current medical regimen is effective;  continue present plan and medications.    6. Anxiety and depression with assoc memory loss; seen by neuro 2015, negative workup  The current medical regimen is effective;  continue present plan and medications.    7. Mixed hyperlipidemia long term DAPT  The current medical regimen is effective;  continue present plan and medications.    8. Essential hypertension  The current medical regimen is effective;  continue present plan and medications.    9. Venous insufficiency  The current medical regimen is effective;  continue present plan and medications.    10. Benign non-nodular prostatic hyperplasia with lower urinary tract symptoms  The current medical regimen is effective;  continue present plan and medications.    11. Vitamin D deficiency  The current medical regimen is effective;  continue present plan and medications.    12. Prediabetes  The current medical regimen is effective;  continue present plan and medications.    13. Hypothyroidism, unspecified type  The current medical regimen is effective;  continue present plan and medications.    14. Obesity (BMI 30.0-34.9)  The patient is asked to make an attempt to improve diet and exercise patterns to aid in medical management of this problem.    15. Gastroesophageal reflux disease, unspecified whether esophagitis present  The current medical regimen is effective;  continue present plan and medications.    16. Hernia of abdominal wall  The current medical regimen is effective;  continue present plan and medications.    17. Tubular adenoma of colon 2/2009  The current medical regimen is effective;  continue present plan and medications.    18. JAYLEN (obstructive sleep apnea)  The current medical regimen is effective;  continue present plan and medications.    19. Abnormality of gait and mobility  The current medical regimen is effective;  continue present plan and medications.    20. Other reduced  mobility  The current medical regimen is effective;  continue present plan and medications.      Provided Clinton with a 5-10 year written screening schedule and personal prevention plan. Recommendations were developed using the USPSTF age appropriate recommendations. Education, counseling, and referrals were provided as needed. After Visit Summary printed and given to patient which includes a list of additional screenings\tests needed.    Follow up in about 1 year (around 9/1/2023).    Gabrielle Raza NP  I offered to discuss advanced care planning, including how to pick a person who would make decisions for you if you were unable to make them for yourself, called a health care power of , and what kind of decisions you might make such as use of life sustaining treatments such as ventilators and tube feeding when faced with a life limiting illness recorded on a living will that they will need to know. (How you want to be cared for as you near the end of your natural life)     X Patient is interested in learning more about how to make advanced directives.  I provided them paperwork and offered to discuss this with them.

## 2022-09-01 NOTE — PATIENT INSTRUCTIONS
Counseling and Referral of Other Preventative  (Italic type indicates deductible and co-insurance are waived)    Patient Name: Clinton Rosenberg  Today's Date: 9/1/2022    Health Maintenance       Date Due Completion Date    Shingles Vaccine (1 of 2) Never done ---    Pneumococcal Vaccines (Age 65+) (3 - PPSV23 or PCV20) 03/16/2022 10/16/2019    Influenza Vaccine (1) 09/01/2022 11/27/2021    COVID-19 Vaccine (1) 06/15/2023 (Originally 3/30/1951) ---    Colorectal Cancer Screening 03/15/2023 12/4/2019    High Dose Statin 06/15/2023 6/15/2022    Aspirin/Antiplatelet Therapy 06/15/2023 6/15/2022    TETANUS VACCINE 06/01/2026 6/1/2016    Lipid Panel 04/07/2027 4/7/2022        No orders of the defined types were placed in this encounter.      The following information is provided to all patients.  This information is to help you find resources for any of the problems found today that may be affecting your health:                Living healthy guide: www.[facility].[place].gov      Understanding Diabetes: www.diabetes.org      Eating healthy: www.cdc.gov/healthyweight      CDC home safety checklist: www.cdc.gov/steadi/patient.html      Agency on Aging: www.goea.[place].[facility]      Alcoholics anonymous (AA): www.aa.org      Physical Activity: www.[place].nih.gov/cq0msvt      Tobacco use: www.quitwithusla.org

## 2022-09-13 ENCOUNTER — OFFICE VISIT (OUTPATIENT)
Dept: FAMILY MEDICINE | Facility: CLINIC | Age: 72
End: 2022-09-13
Payer: MEDICARE

## 2022-09-13 ENCOUNTER — HOSPITAL ENCOUNTER (OUTPATIENT)
Dept: RADIOLOGY | Facility: HOSPITAL | Age: 72
Discharge: HOME | End: 2022-09-13
Attending: INTERNAL MEDICINE
Payer: MEDICARE

## 2022-09-13 VITALS
HEART RATE: 57 BPM | DIASTOLIC BLOOD PRESSURE: 68 MMHG | BODY MASS INDEX: 34.45 KG/M2 | WEIGHT: 240.63 LBS | SYSTOLIC BLOOD PRESSURE: 130 MMHG | TEMPERATURE: 98 F | OXYGEN SATURATION: 97 % | HEIGHT: 70 IN

## 2022-09-13 DIAGNOSIS — M54.41 ACUTE RIGHT-SIDED LOW BACK PAIN WITH RIGHT-SIDED SCIATICA: ICD-10-CM
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
## 2022-09-13 DIAGNOSIS — R73.03 PREDIABETES: Chronic | ICD-10-CM
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
## 2022-09-13 PROCEDURE — 4010F PR ACE/ARB THEARPY RXD/TAKEN: ICD-10-PCS | Mod: CPTII,S$GLB,, | Performed by: INTERNAL MEDICINE

## 2022-09-13 PROCEDURE — 3044F PR MOST RECENT HEMOGLOBIN A1C LEVEL <7.0%: ICD-10-PCS | Mod: CPTII,S$GLB,, | Performed by: INTERNAL MEDICINE

## 2022-09-13 PROCEDURE — 3008F BODY MASS INDEX DOCD: CPT | Mod: CPTII,S$GLB,, | Performed by: INTERNAL MEDICINE

## 2022-09-13 PROCEDURE — 3288F PR FALLS RISK ASSESSMENT DOCUMENTED: ICD-10-PCS | Mod: CPTII,S$GLB,, | Performed by: INTERNAL MEDICINE

## 2022-09-13 PROCEDURE — 1100F PTFALLS ASSESS-DOCD GE2>/YR: CPT | Mod: CPTII,S$GLB,, | Performed by: INTERNAL MEDICINE

## 2022-09-13 PROCEDURE — 1125F PR PAIN SEVERITY QUANTIFIED, PAIN PRESENT: ICD-10-PCS | Mod: CPTII,S$GLB,, | Performed by: INTERNAL MEDICINE

## 2022-09-13 PROCEDURE — 1159F MED LIST DOCD IN RCRD: CPT | Mod: CPTII,S$GLB,, | Performed by: INTERNAL MEDICINE

## 2022-09-13 PROCEDURE — 1160F PR REVIEW ALL MEDS BY PRESCRIBER/CLIN PHARMACIST DOCUMENTED: ICD-10-PCS | Mod: CPTII,S$GLB,, | Performed by: INTERNAL MEDICINE

## 2022-09-13 PROCEDURE — 99214 PR OFFICE/OUTPT VISIT, EST, LEVL IV, 30-39 MIN: ICD-10-PCS | Mod: S$GLB,,, | Performed by: INTERNAL MEDICINE

## 2022-09-13 PROCEDURE — 3008F PR BODY MASS INDEX (BMI) DOCUMENTED: ICD-10-PCS | Mod: CPTII,S$GLB,, | Performed by: INTERNAL MEDICINE

## 2022-09-13 PROCEDURE — 3078F PR MOST RECENT DIASTOLIC BLOOD PRESSURE < 80 MM HG: ICD-10-PCS | Mod: CPTII,S$GLB,, | Performed by: INTERNAL MEDICINE

## 2022-09-13 PROCEDURE — 72100 X-RAY EXAM L-S SPINE 2/3 VWS: CPT | Mod: TC,FY,PO

## 2022-09-13 PROCEDURE — 3075F SYST BP GE 130 - 139MM HG: CPT | Mod: CPTII,S$GLB,, | Performed by: INTERNAL MEDICINE

## 2022-09-13 PROCEDURE — 72100 XR LUMBAR SPINE AP AND LATERAL: ICD-10-PCS | Mod: 26,,, | Performed by: RADIOLOGY

## 2022-09-13 PROCEDURE — 99214 OFFICE O/P EST MOD 30 MIN: CPT | Mod: S$GLB,,, | Performed by: INTERNAL MEDICINE

## 2022-09-13 PROCEDURE — 3078F DIAST BP <80 MM HG: CPT | Mod: CPTII,S$GLB,, | Performed by: INTERNAL MEDICINE

## 2022-09-13 PROCEDURE — 1125F AMNT PAIN NOTED PAIN PRSNT: CPT | Mod: CPTII,S$GLB,, | Performed by: INTERNAL MEDICINE

## 2022-09-13 PROCEDURE — 72100 X-RAY EXAM L-S SPINE 2/3 VWS: CPT | Mod: 26,,, | Performed by: RADIOLOGY

## 2022-09-13 PROCEDURE — 3044F HG A1C LEVEL LT 7.0%: CPT | Mod: CPTII,S$GLB,, | Performed by: INTERNAL MEDICINE

## 2022-09-13 PROCEDURE — 1100F PR PT FALLS ASSESS DOC 2+ FALLS/FALL W/INJURY/YR: ICD-10-PCS | Mod: CPTII,S$GLB,, | Performed by: INTERNAL MEDICINE

## 2022-09-13 PROCEDURE — 1160F RVW MEDS BY RX/DR IN RCRD: CPT | Mod: CPTII,S$GLB,, | Performed by: INTERNAL MEDICINE

## 2022-09-13 PROCEDURE — 99999 PR PBB SHADOW E&M-EST. PATIENT-LVL V: ICD-10-PCS | Mod: PBBFAC,,, | Performed by: INTERNAL MEDICINE

## 2022-09-13 PROCEDURE — 3288F FALL RISK ASSESSMENT DOCD: CPT | Mod: CPTII,S$GLB,, | Performed by: INTERNAL MEDICINE

## 2022-09-13 PROCEDURE — 3075F PR MOST RECENT SYSTOLIC BLOOD PRESS GE 130-139MM HG: ICD-10-PCS | Mod: CPTII,S$GLB,, | Performed by: INTERNAL MEDICINE

## 2022-09-13 PROCEDURE — 99999 PR PBB SHADOW E&M-EST. PATIENT-LVL V: CPT | Mod: PBBFAC,,, | Performed by: INTERNAL MEDICINE

## 2022-09-13 PROCEDURE — 4010F ACE/ARB THERAPY RXD/TAKEN: CPT | Mod: CPTII,S$GLB,, | Performed by: INTERNAL MEDICINE

## 2022-09-13 PROCEDURE — 1159F PR MEDICATION LIST DOCUMENTED IN MEDICAL RECORD: ICD-10-PCS | Mod: CPTII,S$GLB,, | Performed by: INTERNAL MEDICINE

## 2022-09-13 RX ORDER — TRAMADOL HYDROCHLORIDE 50 MG/1
50 TABLET ORAL EVERY 6 HOURS PRN
Qty: 15 TABLET | Refills: 0 | Status: SHIPPED | OUTPATIENT
Start: 2022-09-13 | End: 2023-04-27

## 2022-09-13 RX ORDER — METHYLPREDNISOLONE 4 MG/1
TABLET ORAL
Qty: 1 EACH | Refills: 0 | Status: SHIPPED | OUTPATIENT
Start: 2022-09-13 | End: 2022-10-13 | Stop reason: ALTCHOICE

## 2022-09-13 NOTE — PROGRESS NOTES
This note was created by combination of typed  and M-Modal dictation.  Transcription errors may be present.  If there are any questions, please contact me.    Assessment and Plan:   Fall from ground level  Acute right-sided low back pain with right-sided sciatica  -hit the right side of his back against a concrete wall.  Now with sciatica type pain.    check x-ray rule out fracture  history of Aleve with side effect of loose lesions.  Able to tolerate ibuprofen.    Will hold off on Toradol  Medrol Dosepak   Tramadol for breakthrough pain.  History of rash with hydrocodone and oxycodone, no history of tramadol  If no significant improvement by later this week, check MRI lumbar spine  -     X-Ray Lumbar Spine AP And Lateral; Future; Expected date: 09/13/2022  -     traMADoL (ULTRAM) 50 mg tablet; Take 1 tablet (50 mg total) by mouth every 6 (six) hours as needed for Pain.  Dispense: 15 tablet; Refill: 0  -     methylPREDNISolone (MEDROL DOSEPACK) 4 mg tablet; use as directed  Dispense: 1 each; Refill: 0    Prediabetes  -last a1c 6.5. on metformin  Has f/u October.    There are no discontinued medications.    meds sent this encounter:  Medications Ordered This Encounter   Medications    methylPREDNISolone (MEDROL DOSEPACK) 4 mg tablet     Sig: use as directed     Dispense:  1 each     Refill:  0    traMADoL (ULTRAM) 50 mg tablet     Sig: Take 1 tablet (50 mg total) by mouth every 6 (six) hours as needed for Pain.     Dispense:  15 tablet     Refill:  0     Quantity prescribed more than 7 day supply? No     Order Specific Question:   I have reviewed the Prescription Drug Monitoring Program (PDMP) database for this patient prior to prescribing the above opioid medication     Answer:   Yes       Follow Up: No follow-ups on file.    Subjective:     Chief Complaint   Patient presents with    Leg Pain    Back Pain       HPI  Clinton is a 71 y.o. male, last appointment with this clinic was 9/1/2022.  Social History      Tobacco Use    Smoking status: Never    Smokeless tobacco: Never   Substance Use Topics    Alcohol use: No     Alcohol/week: 0.0 standard drinks     Comment: rarely          Social History     Social History Narrative    Not on file       Last visit with me in mid June   Low back strain  Hypothyroid, labs at that time good on higher dose 75  Pre diabetes, metformin, last A1c in April, 6.5  Hypertension stable   Coronary artery disease followed by Cardiology.  On statin.    LUTS, Urology  Chronic blurred vision of the right eye, instructed to follow up ASAP with Optometry    Has follow-up with labs in October    Last Friday  Pulling his  backwards, tripped  And hit the right back on a concrete wall (concrete garden wall)  Was able to finish mowing the lawn.  But later that night the pain really kicked in.  Having troubles sleeping because of the pain. pain is radiating down his leg, down the side of his calf, any notes that he feels some numbness in his lateral toes.    No saddle dysesthesia.  No incontinence.  No systemic fevers or chills.  Pulling his knees to his chest seems to help some.    It hurts to plantar flex and dorsiflex his foot.  But able to bear weight.    This is different pain than the pain he came in for in June    Taking advil; hx of hydrocodone and oxycodone with SE rash  History of Aleve with side effect of hallucinations.  The    Answers submitted by the patient for this visit:  Review of Systems Questionnaire (Submitted on 9/13/2022)  activity change: Yes  unexpected weight change: No  neck pain: Yes  hearing loss: No  rhinorrhea: No  trouble swallowing: No  eye discharge: No  visual disturbance: No  chest tightness: No  wheezing: No  chest pain: No  palpitations: No  blood in stool: No  constipation: No  vomiting: No  diarrhea: No  polydipsia: No  polyuria: No  difficulty urinating: No  urgency: No  hematuria: No  joint swelling: No  arthralgias: Yes  headaches: Yes  weakness:  "Yes  confusion: No  dysphoric mood: Yes    Patient Care Team:  Michael Gonzalez MD as PCP - General (Internal Medicine)  Marilyn Arteaga MA as Care Coordinator  [facility]  Andreas Henriquez MD as Consulting Physician (Dermatology)  Brad Bahena MD as Consulting Physician (Cardiology)    Patient Active Problem List    Diagnosis Date Noted    History of COVID-19 02/25/2022     Symptoms resolved. Persistent residual finding on cxr, recommend repeating test in 1 month to ensure resolution.       Nuclear sclerosis of both eyes 05/20/2021    Allergy to iodine 08/27/2019    Tubular adenoma of colon 2/2009 10/11/2018     2/28/2009 colonoscopy tubular adenoma  3/15/2013 colonoscopy thickened haustral folds indicative of prediverticular state. internal hemorrhoids. repeat 10 years.  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  LVEF 66%.  Normal wall motion.  08/08/2019 normal LV systolic function LVEF 65%.  Concentric LV remodeling.  No wall motion abnormalities.  Aortic root diameter mildly increased verses report 03/2018.  7/30/20  TTE (Ao root 4.2cm at annulus, 3.8cm at sinuses) normal LV systolic function LVEF 65%.  Mild concentric LVH.  Grade 1 diastolic dysfunction.  Normal RV systolic function.  Normal CVP.  PA pressure 20.      Peripheral vascular disease with LE US 6/2017 and carotid US  06/20/2017     LE art US/TANA 6/8/17 1.  Mild/atherosclerotic plaquing. 2.  No stenosis about 30 percent femoral popliteal arteries. 3.  Normal bilateral ABIs.  Carotid US 12/3/14 1. Less than 50% stenosis of the right internal carotid artery. 2. Less than 50% stenosis of the left internal carotid artery.      Aortic ectasia 04/28/2017     "The " "aorta is elongated" - Xray Chest 5-      JAYLEN (obstructive sleep apnea) 04/28/2017    Hypothyroidism 06/01/2016    Obesity (BMI 30.0-34.9) 06/01/2016    Anxiety and depression with assoc memory loss; seen by neuro 2015, negative workup 11/24/2014     12/3/2014 MRI brain: 1. No evidence for acute infarct 2. unremarkable MRI of the brain  12/3/2014 carotid US normal      Prediabetes 11/24/2014    Coronary artery disease of native artery of native heart with stable angina pectoris; 2021 plan is long term DAPT 11/19/2014     Stent LAD 2012 9/27/2013 [facility] prox LAD 30% stenosis; RCA 20% stenosis. patent LAD stent.    3/9/2015 nu med stress test negative. 3/9/2015 TTE normal LVEF 55-60; concentric remodeling.  L MPI 6/8/17 (images pers rev) Nuclear Quantitative Functional Analysis:  LVEF: 68 % Impression: NORMAL MYOCARDIAL PERFUSION  Echo 3/15/18 LVEF 60-65%; upper limit of normal aortic root 3.7 cm  08/08/2019 Lexiscan nuclear stress test probably normal study.  LVEF 66%.  Normal wall motion.  08/08/2019 normal LV systolic function LVEF 65%.  Concentric LV remodeling.  No wall motion abnormalities.  Aortic root diameter mildly increased verses report 03/2018.  9/21/2019 [facility]: Dominance: Right  LM: normal  LAD: mid 50% followed by patent stent, distal/transapical 80% (small caliber)  Ramus: prox 30%  LCx: prox 50%  RCA: prox 30%, dist 40%              RPDA ost 50%              RPLB mid 90%    Stent 2.5x18 Resolute [place] SILVER 16 abi    9/21/2020 [facility]  Dominance: Right  LM: normal  LAD: MLI, mid stent patent (2013)  Ramus: MLI, prox 30%  LCx: MLI, ost 40%  RCA: MLI, dist eccentric 40-50%              RPDA: ost 50%, iFR 0.97              RPLB mid stent patent (9/20/19 2.5x18 Resolute [place] SILVER)     Impression:  2V CAD, normal LV fxn  Patent mid LAD and mid RPLB stents  iFR dist RCA/ost RPDA neg     Plan:  Cont med rx  Cont ASA/Plavix/Statin/BBL/Imdur  Plan long term DAPT given diffuse CAD and prior MV PCI      Mixed " hyperlipidemia long term DAPT 07/09/2014    Essential hypertension 07/09/2014 7/30/20  TTE (Ao root 4.2cm at annulus, 3.8cm at sinuses) normal LV systolic function LVEF 65%.  Mild concentric LVH.  Grade 1 diastolic dysfunction.  Normal RV systolic function.  Normal CVP.  PA pressure 20.      Gastroesophageal reflux disease 07/09/2014    Benign non-nodular prostatic hyperplasia with lower urinary tract symptoms 07/09/2014    Vitamin D deficiency 07/09/2014    Hernia of abdominal wall 07/09/2014       PAST MEDICAL PROBLEMS, PAST SURGICAL HISTORY: please see relevant portions of the electronic medical record    ALLERGIES AND MEDICATIONS: updated and reviewed.  Review of patient's allergies indicates:   Allergen Reactions    Iodine and iodide containing products Anaphylaxis    Naproxen Other (See Comments)     hallucinations  Hallucinations^  Hallucinations^    Hydrocodone-acetaminophen      Rash (skin)^    Iodine      Anaphylaxis^    Oxycodone-acetaminophen      Rash (skin)^       Medication List with Changes/Refills   Current Medications    ALBUTEROL (PROVENTIL/VENTOLIN HFA) 90 MCG/ACTUATION INHALER    INHALE 2 PUFFS INTO THE LUNGS EVERY 6 (SIX) HOURS AS NEEDED FOR WHEEZING. RESCUE    ASPIRIN (ECOTRIN) 81 MG EC TABLET    Take 1 tablet (81 mg total) by mouth once daily.    ATORVASTATIN (LIPITOR) 80 MG TABLET    TAKE 1 TABLET EVERY EVENING    CLOPIDOGREL (PLAVIX) 75 MG TABLET    TAKE 1 TABLET EVERY DAY    DICLOFENAC SODIUM (VOLTAREN) 1 % GEL    APPLY THE GEL (2 G) TO THE AFFECTED AREA 4 TIMES DAILY. DO NOT APPLY MORE THAN 8 G DAILY TO ANY ONE AFFECTED JOINT    FUROSEMIDE (LASIX) 20 MG TABLET    TAKE 1 TABLET EVERY DAY    ISOSORBIDE MONONITRATE (IMDUR) 120 MG 24 HR TABLET    Take 2 tablets (240 mg total) by mouth once daily.    KRILL/OM3/DHA/EPA/OM6/LIP/ASTX (KRILL OIL, OMEGA 3 AND 6, ORAL)    Take by mouth.    LEVOTHYROXINE (SYNTHROID) 75 MCG TABLET    Take 1 tablet (75 mcg total) by mouth before breakfast.     "LOSARTAN (COZAAR) 50 MG TABLET    TAKE 1 TABLET EVERY DAY    METFORMIN (GLUCOPHAGE) 500 MG TABLET    TAKE 1 TABLET BY MOUTH EVERY DAY WITH BREAKFAST    METOPROLOL TARTRATE (LOPRESSOR) 25 MG TABLET    TAKE 1 TABLET TWICE DAILY    NITROGLYCERIN (NITROSTAT) 0.4 MG SL TABLET    Place 1 tablet (0.4 mg total) under the tongue every 5 (five) minutes as needed for Chest pain.    PANTOPRAZOLE (PROTONIX) 40 MG TABLET    TAKE 1 TABLET EVERY DAY    TAMSULOSIN (FLOMAX) 0.4 MG CAP    TAKE 1 CAPSULE (0.4 MG TOTAL) BY MOUTH ONCE DAILY.    VITAMIN D 1000 UNITS TAB    Take 1 tablet (1,000 Units total) by mouth once daily.          Objective:   Physical Exam   Vitals:    09/13/22 0959   BP: 130/68   BP Location: Right arm   Patient Position: Sitting   BP Method: Large (Manual)   Pulse: (!) 57   Temp: 97.9 °F (36.6 °C)   TempSrc: Oral   SpO2: 97%   Weight: 109.1 kg (240 lb 10.1 oz)   Height: 5' 10" (1.778 m)    Body mass index is 34.53 kg/m².  Weight: 109.1 kg (240 lb 10.1 oz)   Height: 5' 10" (177.8 cm)     Physical Exam  Constitutional:       Appearance: He is well-developed.      Comments: Uncomfortable   Eyes:      Extraocular Movements: Extraocular movements intact.   Cardiovascular:      Rate and Rhythm: Normal rate and regular rhythm.      Heart sounds: Normal heart sounds. No murmur heard.  Pulmonary:      Effort: Pulmonary effort is normal.      Breath sounds: Normal breath sounds.   Musculoskeletal:         General: Tenderness present.      Right lower leg: No edema.      Left lower leg: No edema.      Comments: Very tender on palpation of the right SI joint area without deformity or swelling or asymmetry.  No overlying bruise.    Plantar flexion 4+/5 due to pain  toe dorsiflexion 5/5   knee extension 4+/5 secondary to pain no patellar DTR 2+ and symmetric  hip inversion eversion without pain  no fasciculations   muscle tone and muscle bulk symmetric   Skin:     General: Skin is warm and dry.   Neurological:      Mental " Status: He is alert and oriented to person, place, and time.      Coordination: Coordination normal.   Psychiatric:         Behavior: Behavior normal.         Thought Content: Thought content normal.

## 2022-09-13 NOTE — PROGRESS NOTES
1. Multilevel degenerative disc disease of the lumbar spine, most significant from L3-4 to L5-S1 as described above, not significantly changed.    Results to pt via MyChart.  No changes - medrol dose leonela and tramadol. Had noted some numbness of the lateral toes.  If sx no improving, MRI L spine

## 2022-10-07 ENCOUNTER — LAB VISIT (OUTPATIENT)
Dept: LAB | Facility: HOSPITAL | Age: 72
End: 2022-10-07
Attending: INTERNAL MEDICINE
Payer: MEDICARE

## 2022-10-07 DIAGNOSIS — E03.9 HYPOTHYROIDISM, UNSPECIFIED TYPE: ICD-10-CM

## 2022-10-07 DIAGNOSIS — R73.03 PREDIABETES: ICD-10-CM

## 2022-10-07 LAB
ALBUMIN SERPL BCP-MCNC: 3.9 G/DL (ref 3.5–5.2)
ALP SERPL-CCNC: 49 U/L (ref 55–135)
ALT SERPL W/O P-5'-P-CCNC: 20 U/L (ref 10–44)
ANION GAP SERPL CALC-SCNC: 11 MMOL/L (ref 8–16)
AST SERPL-CCNC: 16 U/L (ref 10–40)
BASOPHILS # BLD AUTO: 0.07 K/UL (ref 0–0.2)
BASOPHILS NFR BLD: 0.9 % (ref 0–1.9)
BILIRUB SERPL-MCNC: 0.9 MG/DL (ref 0.1–1)
BUN SERPL-MCNC: 12 MG/DL (ref 8–23)
CALCIUM SERPL-MCNC: 9.3 MG/DL (ref 8.7–10.5)
CHLORIDE SERPL-SCNC: 101 MMOL/L (ref 95–110)
CO2 SERPL-SCNC: 26 MMOL/L (ref 23–29)
CREAT SERPL-MCNC: 0.9 MG/DL (ref 0.5–1.4)
DIFFERENTIAL METHOD: ABNORMAL
EOSINOPHIL # BLD AUTO: 0.5 K/UL (ref 0–0.5)
EOSINOPHIL NFR BLD: 5.5 % (ref 0–8)
ERYTHROCYTE [DISTWIDTH] IN BLOOD BY AUTOMATED COUNT: 13.3 % (ref 11.5–14.5)
EST. GFR  (NO RACE VARIABLE): >60 ML/MIN/1.73 M^2
ESTIMATED AVG GLUCOSE: 154 MG/DL (ref 68–131)
GLUCOSE SERPL-MCNC: 179 MG/DL (ref 70–110)
HBA1C MFR BLD: 7 % (ref 4–5.6)
HCT VFR BLD AUTO: 41.7 % (ref 40–54)
HGB BLD-MCNC: 14.3 G/DL (ref 14–18)
IMM GRANULOCYTES # BLD AUTO: 0.02 K/UL (ref 0–0.04)
IMM GRANULOCYTES NFR BLD AUTO: 0.2 % (ref 0–0.5)
LYMPHOCYTES # BLD AUTO: 2.5 K/UL (ref 1–4.8)
LYMPHOCYTES NFR BLD: 31 % (ref 18–48)
MCH RBC QN AUTO: 31.8 PG (ref 27–31)
MCHC RBC AUTO-ENTMCNC: 34.3 G/DL (ref 32–36)
MCV RBC AUTO: 93 FL (ref 82–98)
MONOCYTES # BLD AUTO: 0.7 K/UL (ref 0.3–1)
MONOCYTES NFR BLD: 8.8 % (ref 4–15)
NEUTROPHILS # BLD AUTO: 4.4 K/UL (ref 1.8–7.7)
NEUTROPHILS NFR BLD: 53.6 % (ref 38–73)
NRBC BLD-RTO: 0 /100 WBC
PLATELET # BLD AUTO: 184 K/UL (ref 150–450)
PMV BLD AUTO: 10.3 FL (ref 9.2–12.9)
POTASSIUM SERPL-SCNC: 4.1 MMOL/L (ref 3.5–5.1)
PROT SERPL-MCNC: 6.4 G/DL (ref 6–8.4)
RBC # BLD AUTO: 4.49 M/UL (ref 4.6–6.2)
SODIUM SERPL-SCNC: 138 MMOL/L (ref 136–145)
TSH SERPL DL<=0.005 MIU/L-ACNC: 3.39 UIU/ML (ref 0.4–4)
WBC # BLD AUTO: 8.17 K/UL (ref 3.9–12.7)

## 2022-10-07 PROCEDURE — 84443 ASSAY THYROID STIM HORMONE: CPT | Performed by: INTERNAL MEDICINE

## 2022-10-07 PROCEDURE — 80053 COMPREHEN METABOLIC PANEL: CPT | Performed by: INTERNAL MEDICINE

## 2022-10-07 PROCEDURE — 36415 COLL VENOUS BLD VENIPUNCTURE: CPT | Mod: PO | Performed by: INTERNAL MEDICINE

## 2022-10-07 PROCEDURE — 85025 COMPLETE CBC W/AUTO DIFF WBC: CPT | Performed by: INTERNAL MEDICINE

## 2022-10-07 PROCEDURE — 83036 HEMOGLOBIN GLYCOSYLATED A1C: CPT | Performed by: INTERNAL MEDICINE

## 2022-10-12 NOTE — PROGRESS NOTES
This note was created by combination of typed  and M-Modal dictation.  Transcription errors may be present.  If there are any questions, please contact me.    Assessment and Plan:   Neurogenic claudication  Lumbar radiculopathy, chronic  -denies history of chronic recurrent back pain but ever since his fall he has been having symptoms suspicious for neurogenic claudication.  Good pulses , do not think this is vascular claudication.    X-ray no fracture  Has some numbness or tingling of the toes.  Not sure if this is related to his back injury or if this could be from longstanding blood sugar issues.  Previously pre diabetes although most recent readings are consistently in the diabetic range.  Trial of gabapentin for symptoms of numbness in the toes.    MRI lumbar spine.    Discussed depending on the findings may need to have him see Neurosurgery or pain management depending on whether there is evidence of severe central stenosis.  Was previously prescribed tramadol he does not recall using it.  -     MRI Lumbar Spine Without Contrast; Future; Expected date: 10/13/2022  -     gabapentin (NEURONTIN) 300 MG capsule; Take 1 capsule (300 mg total) by mouth every evening.  Dispense: 90 capsule; Refill: 0    Prediabetes  -last few readings are consistent with diabetes range.  Check future labs.  Needs to update eye exam and I have asked him to stop off with Optometry Department.  Stay on metformin.  Work on TLC.  -     Comprehensive Metabolic Panel; Future; Expected date: 04/11/2023  -     Hemoglobin A1C; Future; Expected date: 04/11/2023  -     Microalbumin/Creatinine Ratio, Urine; Future; Expected date: 04/11/2023  -     CBC Auto Differential; Future; Expected date: 04/11/2023    Essential hypertension  -stable    Coronary artery disease of native artery of native heart with stable angina pectoris; 2021 plan is long term DAPT  Mixed hyperlipidemia long term DAPT  -stable check future labs on statin  -      Comprehensive Metabolic Panel; Future; Expected date: 04/11/2023  -     Lipid Panel; Future; Expected date: 04/11/2023  -     CBC Auto Differential; Future; Expected date: 04/11/2023    Hypothyroidism, unspecified type  -pre visit labs stable on levothyroxine no changes        Medications Discontinued During This Encounter   Medication Reason    methylPREDNISolone (MEDROL DOSEPACK) 4 mg tablet Therapy completed       meds sent this encounter:  Medications Ordered This Encounter   Medications    gabapentin (NEURONTIN) 300 MG capsule     Sig: Take 1 capsule (300 mg total) by mouth every evening.     Dispense:  90 capsule     Refill:  0       Follow Up: No follow-ups on file. OV 6 months with labs.     Subjective:     Chief Complaint   Patient presents with    Back Pain    Diabetes         RODO Alonzo is a 72 y.o. male, last appointment with this clinic was 9/13/2022.  Social History     Tobacco Use    Smoking status: Never    Smokeless tobacco: Never   Substance Use Topics    Alcohol use: No     Alcohol/week: 0.0 standard drinks     Comment: rarely          Social History     Social History Narrative    Not on file        last visit with me in mid September   Ground level fall with some right-sided back pain and sciatica.  X-ray with degenerative disease but no fracture.  Medrol Dosepak.  Tramadol for p.r.n. use.  Pre diabetes last A1c was 6.5 on metformin.  Hypothyroid, labs good on higher dose 75  Hypertension stable   Coronary artery disease followed by Cardiology.  On statin.    LUTS, Urology     Pre visit labs  CBC normal   CMP normal  A1c 7.0 from 6.5  TSH normal    04/2022 lipid profile good    Continued back pain.  Now seems to be worsening, before he was complaining of pain going into his right leg and now is going to his right leg as well as his left leg.  The back pain spreading down the buttocks and into the back of the legs and onto the sides.    Having some numbness of his toes.  It is unclear if the  numbness of his toes came on with this or it preceded it.  He has got abnormal glucose longstanding with the last few readings consistent in the diabetes range.  His brother also had similar claudication type symptoms.  It sounds like he ultimately had vascular claudication and ended up having stents.  doesn't recall using the tramadol.    No incontinent  No saddle dysesthesia  Had an episode years ago where he was doing some marya work and turned awkwardly and had a pop sensation in his back with excruciating pain.  Underwent chiropractic and therapy and after period of weeks or months it got better.  And has not really had recurring back pain since.    He is scheduled to get vaccinations tomorrow at [facility].      Needs to update eye    Patient Care Team:  Michael Gonzalez MD as PCP - General (Internal Medicine)  Marilyn Arteaga MA as Care Coordinator  [facility]  Andreas Henriquez MD as Consulting Physician (Dermatology)  Brad Bahena MD as Consulting Physician (Cardiology)    Patient Active Problem List    Diagnosis Date Noted    History of COVID-19 02/25/2022     Symptoms resolved. Persistent residual finding on cxr, recommend repeating test in 1 month to ensure resolution.       Nuclear sclerosis of both eyes 05/20/2021    Allergy to iodine 08/27/2019    Tubular adenoma of colon 2/2009 10/11/2018     2/28/2009 colonoscopy tubular adenoma  3/15/2013 colonoscopy thickened haustral folds indicative of prediverticular state. internal hemorrhoids. repeat 10 years.  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  LVEF 66%.  Normal wall motion.  08/08/2019 normal LV systolic function LVEF 65%.  Concentric LV remodeling.  No wall motion  "abnormalities.  Aortic root diameter mildly increased verses report 03/2018.  7/30/20  TTE (Ao root 4.2cm at annulus, 3.8cm at sinuses) normal LV systolic function LVEF 65%.  Mild concentric LVH.  Grade 1 diastolic dysfunction.  Normal RV systolic function.  Normal CVP.  PA pressure 20.      Peripheral vascular disease with LE US 6/2017 and carotid US  06/20/2017     LE art US/TANA 6/8/17 1.  Mild/atherosclerotic plaquing. 2.  No stenosis about 30 percent femoral popliteal arteries. 3.  Normal bilateral ABIs.  Carotid US 12/3/14 1. Less than 50% stenosis of the right internal carotid artery. 2. Less than 50% stenosis of the left internal carotid artery.      Aortic ectasia 04/28/2017     "The aorta is elongated" - Xray Chest 5-      JAYLEN (obstructive sleep apnea) 04/28/2017    Hypothyroidism 06/01/2016    Obesity (BMI 30.0-34.9) 06/01/2016    Anxiety and depression with assoc memory loss; seen by neuro 2015, negative workup 11/24/2014     12/3/2014 MRI brain: 1. No evidence for acute infarct 2. unremarkable MRI of the brain  12/3/2014 carotid US normal      Prediabetes 11/24/2014    Coronary artery disease of native artery of native heart with stable angina pectoris; 2021 plan is long term DAPT 11/19/2014     Stent LAD 2012 9/27/2013 LHC prox LAD 30% stenosis; RCA 20% stenosis. patent LAD stent.    3/9/2015 nu med stress test negative. 3/9/2015 TTE normal LVEF 55-60; concentric remodeling.  L MPI 6/8/17 (images pers rev) Nuclear Quantitative Functional Analysis:  LVEF: 68 % Impression: NORMAL MYOCARDIAL PERFUSION  Echo 3/15/18 LVEF 60-65%; upper limit of normal aortic root 3.7 cm  08/08/2019 Lexiscan nuclear stress test probably normal study.  LVEF 66%.  Normal wall motion.  08/08/2019 normal LV systolic function LVEF 65%.  Concentric LV remodeling.  No wall motion abnormalities.  Aortic root diameter mildly increased verses report 03/2018.  9/21/2019 LHC: Dominance: Right  LM: normal  LAD: mid 50% followed by " patent stent, distal/transapical 80% (small caliber)  Ramus: prox 30%  LCx: prox 50%  RCA: prox 30%, dist 40%              RPDA ost 50%              RPLB mid 90%    Stent 2.5x18 Resolute [place] SILVER 16 abi    9/21/2020 [facility]  Dominance: Right  LM: normal  LAD: MLI, mid stent patent (2013)  Ramus: MLI, prox 30%  LCx: MLI, ost 40%  RCA: MLI, dist eccentric 40-50%              RPDA: ost 50%, iFR 0.97              RPLB mid stent patent (9/20/19 2.5x18 Resolute [place] SILEVR)     Impression:  2V CAD, normal LV fxn  Patent mid LAD and mid RPLB stents  iFR dist RCA/ost RPDA neg     Plan:  Cont med rx  Cont ASA/Plavix/Statin/BBL/Imdur  Plan long term DAPT given diffuse CAD and prior MV PCI      Mixed hyperlipidemia long term DAPT 07/09/2014    Essential hypertension 07/09/2014 7/30/20  TTE (Ao root 4.2cm at annulus, 3.8cm at sinuses) normal LV systolic function LVEF 65%.  Mild concentric LVH.  Grade 1 diastolic dysfunction.  Normal RV systolic function.  Normal CVP.  PA pressure 20.      Gastroesophageal reflux disease 07/09/2014    Benign non-nodular prostatic hyperplasia with lower urinary tract symptoms 07/09/2014    Vitamin D deficiency 07/09/2014    Hernia of abdominal wall 07/09/2014       PAST MEDICAL PROBLEMS, PAST SURGICAL HISTORY: please see relevant portions of the electronic medical record    ALLERGIES AND MEDICATIONS: updated and reviewed.  Review of patient's allergies indicates:   Allergen Reactions    Iodine and iodide containing products Anaphylaxis    Naproxen Other (See Comments)     hallucinations  Hallucinations^  Hallucinations^    Hydrocodone-acetaminophen      Rash (skin)^    Iodine      Anaphylaxis^    Oxycodone-acetaminophen      Rash (skin)^       Medication List with Changes/Refills   Current Medications    ALBUTEROL (PROVENTIL/VENTOLIN HFA) 90 MCG/ACTUATION INHALER    INHALE 2 PUFFS INTO THE LUNGS EVERY 6 (SIX) HOURS AS NEEDED FOR WHEEZING. RESCUE    ASPIRIN (ECOTRIN) 81 MG EC TABLET    Take 1 tablet  "(81 mg total) by mouth once daily.    ATORVASTATIN (LIPITOR) 80 MG TABLET    TAKE 1 TABLET EVERY EVENING    CLOPIDOGREL (PLAVIX) 75 MG TABLET    TAKE 1 TABLET EVERY DAY    DICLOFENAC SODIUM (VOLTAREN) 1 % GEL    APPLY THE GEL (2 G) TO THE AFFECTED AREA 4 TIMES DAILY. DO NOT APPLY MORE THAN 8 G DAILY TO ANY ONE AFFECTED JOINT    FUROSEMIDE (LASIX) 20 MG TABLET    TAKE 1 TABLET EVERY DAY    ISOSORBIDE MONONITRATE (IMDUR) 120 MG 24 HR TABLET    Take 2 tablets (240 mg total) by mouth once daily.    KRILL/OM3/DHA/EPA/OM6/LIP/ASTX (KRILL OIL, OMEGA 3 AND 6, ORAL)    Take by mouth.    LEVOTHYROXINE (SYNTHROID) 75 MCG TABLET    Take 1 tablet (75 mcg total) by mouth before breakfast.    LOSARTAN (COZAAR) 50 MG TABLET    TAKE 1 TABLET EVERY DAY    METFORMIN (GLUCOPHAGE) 500 MG TABLET    TAKE 1 TABLET BY MOUTH EVERY DAY WITH BREAKFAST    METHYLPREDNISOLONE (MEDROL DOSEPACK) 4 MG TABLET    use as directed    METOPROLOL TARTRATE (LOPRESSOR) 25 MG TABLET    TAKE 1 TABLET TWICE DAILY    NITROGLYCERIN (NITROSTAT) 0.4 MG SL TABLET    Place 1 tablet (0.4 mg total) under the tongue every 5 (five) minutes as needed for Chest pain.    PANTOPRAZOLE (PROTONIX) 40 MG TABLET    TAKE 1 TABLET EVERY DAY    TAMSULOSIN (FLOMAX) 0.4 MG CAP    TAKE 1 CAPSULE (0.4 MG TOTAL) BY MOUTH ONCE DAILY.    TRAMADOL (ULTRAM) 50 MG TABLET    Take 1 tablet (50 mg total) by mouth every 6 (six) hours as needed for Pain.    VITAMIN D 1000 UNITS TAB    Take 1 tablet (1,000 Units total) by mouth once daily.        Objective:   Physical Exam   Vitals:    10/13/22 1430   BP: 130/78   BP Location: Left arm   Patient Position: Sitting   BP Method: Medium (Manual)   Pulse: 60   Temp: 98.8 °F (37.1 °C)   TempSrc: Oral   SpO2: 96%   Weight: 109.8 kg (242 lb 1 oz)   Height: 5' 10" (1.778 m)    Body mass index is 34.73 kg/m².            Physical Exam  Constitutional:       General: He is not in acute distress.     Appearance: He is well-developed.   HENT:      Head: " Normocephalic and atraumatic.   Eyes:      General: No scleral icterus.  Cardiovascular:      Pulses:           Dorsalis pedis pulses are 3+ on the right side and 3+ on the left side.        Posterior tibial pulses are 3+ on the right side and 3+ on the left side.   Pulmonary:      Effort: Pulmonary effort is normal.   Musculoskeletal:      Right lower leg: No edema.      Left lower leg: No edema.      Right foot: No deformity.   Feet:      Right foot:      Protective Sensation: 5 sites tested.   1 site sensed.     Skin integrity: No ulcer, blister, skin breakdown, erythema or warmth.   Skin:     General: Skin is warm and dry.   Neurological:      Mental Status: He is alert and oriented to person, place, and time.      Comments: Patellar DTR 2+ symmetric   Psychiatric:         Behavior: Behavior normal.         Thought Content: Thought content normal.       Component      Latest Ref Rng & Units 10/7/2022 6/14/2022 4/7/2022   WBC      3.90 - 12.70 K/uL 8.17  7.72   RBC      4.60 - 6.20 M/uL 4.49 (L)  4.70   Hemoglobin      14.0 - 18.0 g/dL 14.3  14.7   Hematocrit      40.0 - 54.0 % 41.7  42.9   MCV      82 - 98 fL 93  91   MCH      27.0 - 31.0 pg 31.8 (H)  31.3 (H)   MCHC      32.0 - 36.0 g/dL 34.3  34.3   RDW      11.5 - 14.5 % 13.3  14.1   Platelets      150 - 450 K/uL 184  190   MPV      9.2 - 12.9 fL 10.3  10.1   Immature Granulocytes      0.0 - 0.5 % 0.2  0.3   Gran # (ANC)      1.8 - 7.7 K/uL 4.4  3.7   Immature Grans (Abs)      0.00 - 0.04 K/uL 0.02  0.02   Lymph #      1.0 - 4.8 K/uL 2.5  3.1   Mono #      0.3 - 1.0 K/uL 0.7  0.6   Eos #      0.0 - 0.5 K/uL 0.5  0.3   Baso #      0.00 - 0.20 K/uL 0.07  0.06   nRBC      0 /100 WBC 0  0   Gran %      38.0 - 73.0 % 53.6  47.8   Lymph %      18.0 - 48.0 % 31.0  39.9   Mono %      4.0 - 15.0 % 8.8  8.0   Eosinophil %      0.0 - 8.0 % 5.5  3.2   Basophil %      0.0 - 1.9 % 0.9  0.8   Differential Method       Automated  Automated   Sodium      136 - 145 mmol/L 138   141   Potassium      3.5 - 5.1 mmol/L 4.1  4.6   Chloride      95 - 110 mmol/L 101  104   CO2      23 - 29 mmol/L 26  28   Glucose      70 - 110 mg/dL 179 (H)  157 (H)   BUN      8 - 23 mg/dL 12  12   Creatinine      0.5 - 1.4 mg/dL 0.9  1.0   Calcium      8.7 - 10.5 mg/dL 9.3  9.5   PROTEIN TOTAL      6.0 - 8.4 g/dL 6.4  6.8   Albumin      3.5 - 5.2 g/dL 3.9  4.0   BILIRUBIN TOTAL      0.1 - 1.0 mg/dL 0.9  1.1 (H)   Alkaline Phosphatase      55 - 135 U/L 49 (L)  46 (L)   AST      10 - 40 U/L 16  17   ALT      10 - 44 U/L 20  22   Anion Gap      8 - 16 mmol/L 11  9   eGFR if African American      >60 mL/min/1.73 m:2   >60.0   eGFR if non African American      >60 mL/min/1.73 m:2   >60.0   eGFR      >60 mL/min/1.73 m:2 >60.0     Cholesterol      120 - 199 mg/dL   108 (L)   Triglycerides      30 - 150 mg/dL   204 (H)   HDL      40 - 75 mg/dL   33 (L)   LDL Cholesterol External      63.0 - 159.0 mg/dL   34.2 (L)   HDL/Cholesterol Ratio      20.0 - 50.0 %   30.6   Total Cholesterol/HDL Ratio      2.0 - 5.0   3.3   Non-HDL Cholesterol      mg/dL   75   Hemoglobin A1C External      4.0 - 5.6 % 7.0 (H)  6.5 (H)   Estimated Avg Glucose      68 - 131 mg/dL 154 (H)  140 (H)   TSH      0.400 - 4.000 uIU/mL 3.387 3.768 4.512 (H)

## 2022-10-13 ENCOUNTER — OFFICE VISIT (OUTPATIENT)
Dept: FAMILY MEDICINE | Facility: CLINIC | Age: 72
End: 2022-10-13
Payer: MEDICARE

## 2022-10-13 VITALS
DIASTOLIC BLOOD PRESSURE: 78 MMHG | SYSTOLIC BLOOD PRESSURE: 130 MMHG | HEIGHT: 70 IN | BODY MASS INDEX: 34.65 KG/M2 | HEART RATE: 60 BPM | OXYGEN SATURATION: 96 % | WEIGHT: 242.06 LBS | TEMPERATURE: 99 F

## 2022-10-13 DIAGNOSIS — E03.9 HYPOTHYROIDISM, UNSPECIFIED TYPE: Chronic | ICD-10-CM

## 2022-10-13 DIAGNOSIS — I10 ESSENTIAL HYPERTENSION: Chronic | ICD-10-CM

## 2022-10-13 DIAGNOSIS — R73.03 PREDIABETES: Chronic | ICD-10-CM

## 2022-10-13 DIAGNOSIS — E78.2 MIXED HYPERLIPIDEMIA: Chronic | ICD-10-CM

## 2022-10-13 DIAGNOSIS — M54.16 LUMBAR RADICULOPATHY, CHRONIC: ICD-10-CM

## 2022-10-13 DIAGNOSIS — R29.818 NEUROGENIC CLAUDICATION: Primary | ICD-10-CM

## 2022-10-13 DIAGNOSIS — I25.118 CORONARY ARTERY DISEASE OF NATIVE ARTERY OF NATIVE HEART WITH STABLE ANGINA PECTORIS: ICD-10-CM

## 2022-10-13 PROCEDURE — 1160F RVW MEDS BY RX/DR IN RCRD: CPT | Mod: CPTII,S$GLB,, | Performed by: INTERNAL MEDICINE

## 2022-10-13 PROCEDURE — 99999 PR PBB SHADOW E&M-EST. PATIENT-LVL V: ICD-10-PCS | Mod: PBBFAC,,, | Performed by: INTERNAL MEDICINE

## 2022-10-13 PROCEDURE — 3288F PR FALLS RISK ASSESSMENT DOCUMENTED: ICD-10-PCS | Mod: CPTII,S$GLB,, | Performed by: INTERNAL MEDICINE

## 2022-10-13 PROCEDURE — 4010F ACE/ARB THERAPY RXD/TAKEN: CPT | Mod: CPTII,S$GLB,, | Performed by: INTERNAL MEDICINE

## 2022-10-13 PROCEDURE — 99499 RISK ADDL DX/OHS AUDIT: ICD-10-PCS | Mod: HCNC,S$GLB,, | Performed by: INTERNAL MEDICINE

## 2022-10-13 PROCEDURE — 1125F AMNT PAIN NOTED PAIN PRSNT: CPT | Mod: CPTII,S$GLB,, | Performed by: INTERNAL MEDICINE

## 2022-10-13 PROCEDURE — 3051F HG A1C>EQUAL 7.0%<8.0%: CPT | Mod: CPTII,S$GLB,, | Performed by: INTERNAL MEDICINE

## 2022-10-13 PROCEDURE — 99214 OFFICE O/P EST MOD 30 MIN: CPT | Mod: S$GLB,,, | Performed by: INTERNAL MEDICINE

## 2022-10-13 PROCEDURE — 3075F SYST BP GE 130 - 139MM HG: CPT | Mod: CPTII,S$GLB,, | Performed by: INTERNAL MEDICINE

## 2022-10-13 PROCEDURE — 3075F PR MOST RECENT SYSTOLIC BLOOD PRESS GE 130-139MM HG: ICD-10-PCS | Mod: CPTII,S$GLB,, | Performed by: INTERNAL MEDICINE

## 2022-10-13 PROCEDURE — 1125F PR PAIN SEVERITY QUANTIFIED, PAIN PRESENT: ICD-10-PCS | Mod: CPTII,S$GLB,, | Performed by: INTERNAL MEDICINE

## 2022-10-13 PROCEDURE — 3051F PR MOST RECENT HEMOGLOBIN A1C LEVEL 7.0 - < 8.0%: ICD-10-PCS | Mod: CPTII,S$GLB,, | Performed by: INTERNAL MEDICINE

## 2022-10-13 PROCEDURE — 1159F PR MEDICATION LIST DOCUMENTED IN MEDICAL RECORD: ICD-10-PCS | Mod: CPTII,S$GLB,, | Performed by: INTERNAL MEDICINE

## 2022-10-13 PROCEDURE — 1100F PR PT FALLS ASSESS DOC 2+ FALLS/FALL W/INJURY/YR: ICD-10-PCS | Mod: CPTII,S$GLB,, | Performed by: INTERNAL MEDICINE

## 2022-10-13 PROCEDURE — 3078F DIAST BP <80 MM HG: CPT | Mod: CPTII,S$GLB,, | Performed by: INTERNAL MEDICINE

## 2022-10-13 PROCEDURE — 1100F PTFALLS ASSESS-DOCD GE2>/YR: CPT | Mod: CPTII,S$GLB,, | Performed by: INTERNAL MEDICINE

## 2022-10-13 PROCEDURE — 99499 UNLISTED E&M SERVICE: CPT | Mod: HCNC,S$GLB,, | Performed by: INTERNAL MEDICINE

## 2022-10-13 PROCEDURE — 1159F MED LIST DOCD IN RCRD: CPT | Mod: CPTII,S$GLB,, | Performed by: INTERNAL MEDICINE

## 2022-10-13 PROCEDURE — 1160F PR REVIEW ALL MEDS BY PRESCRIBER/CLIN PHARMACIST DOCUMENTED: ICD-10-PCS | Mod: CPTII,S$GLB,, | Performed by: INTERNAL MEDICINE

## 2022-10-13 PROCEDURE — 99999 PR PBB SHADOW E&M-EST. PATIENT-LVL V: CPT | Mod: PBBFAC,,, | Performed by: INTERNAL MEDICINE

## 2022-10-13 PROCEDURE — 3078F PR MOST RECENT DIASTOLIC BLOOD PRESSURE < 80 MM HG: ICD-10-PCS | Mod: CPTII,S$GLB,, | Performed by: INTERNAL MEDICINE

## 2022-10-13 PROCEDURE — 99214 PR OFFICE/OUTPT VISIT, EST, LEVL IV, 30-39 MIN: ICD-10-PCS | Mod: S$GLB,,, | Performed by: INTERNAL MEDICINE

## 2022-10-13 PROCEDURE — 4010F PR ACE/ARB THEARPY RXD/TAKEN: ICD-10-PCS | Mod: CPTII,S$GLB,, | Performed by: INTERNAL MEDICINE

## 2022-10-13 PROCEDURE — 3288F FALL RISK ASSESSMENT DOCD: CPT | Mod: CPTII,S$GLB,, | Performed by: INTERNAL MEDICINE

## 2022-10-13 RX ORDER — GABAPENTIN 300 MG/1
300 CAPSULE ORAL NIGHTLY
Qty: 90 CAPSULE | Refills: 0 | Status: SHIPPED | OUTPATIENT
Start: 2022-10-13 | End: 2023-05-10 | Stop reason: SDUPTHER

## 2022-10-25 ENCOUNTER — HOSPITAL ENCOUNTER (OUTPATIENT)
Dept: RADIOLOGY | Facility: HOSPITAL | Age: 72
Discharge: HOME | End: 2022-10-25
Attending: INTERNAL MEDICINE
Payer: MEDICARE

## 2022-10-25 DIAGNOSIS — R29.818 NEUROGENIC CLAUDICATION: ICD-10-CM

## 2022-10-25 DIAGNOSIS — M54.16 LUMBAR RADICULOPATHY, CHRONIC: ICD-10-CM

## 2022-10-25 PROCEDURE — 72148 MRI LUMBAR SPINE W/O DYE: CPT | Mod: 26,,, | Performed by: RADIOLOGY

## 2022-10-25 PROCEDURE — 72148 MRI LUMBAR SPINE W/O DYE: CPT | Mod: TC

## 2022-10-25 PROCEDURE — 72148 MRI LUMBAR SPINE WITHOUT CONTRAST: ICD-10-PCS | Mod: 26,,, | Performed by: RADIOLOGY

## 2022-10-26 DIAGNOSIS — G89.29 CHRONIC MIDLINE LOW BACK PAIN WITHOUT SCIATICA: Primary | ICD-10-CM

## 2022-10-26 DIAGNOSIS — M54.50 CHRONIC MIDLINE LOW BACK PAIN WITHOUT SCIATICA: Primary | ICD-10-CM

## 2022-10-26 NOTE — PROGRESS NOTES
Degenerative disc, facet joint arthropathy, spondylosis most prominent at L3-L4 and L4-5 levels discussed above.    Results to pt.  moderate spinal stenosis L3-4 and L4-5  Will refer to pain clinic

## 2022-12-06 ENCOUNTER — NURSE TRIAGE (OUTPATIENT)
Dept: ADMINISTRATIVE | Facility: CLINIC | Age: 72
End: 2022-12-06
Payer: MEDICARE

## 2022-12-06 DIAGNOSIS — U07.1 COVID-19 VIRUS INFECTION: Primary | ICD-10-CM

## 2022-12-07 ENCOUNTER — TELEPHONE (OUTPATIENT)
Dept: PRIMARY CARE CLINIC | Facility: CLINIC | Age: 72
End: 2022-12-07

## 2022-12-07 NOTE — TELEPHONE ENCOUNTER
On-call note:  Received a call for COVID positive status in a patient with a COVID risk score of 7 and previous history of COVID pneumonia.  I spoke to patient's wife last night his oxygen saturation was 95%, had 1 episode of vomiting was doing okay otherwise, discuss that Paxlovid was contraindicated because of his clopidogrel.  I discussed another antiviral Molnupiravir.  They would be willing to take any oral medication recommended.  She reports overnight he did well just some mild coughing, no more vomiting and his oxygen saturation is steady at 95 on room air.      Communicated with patient's PCP this morning who will take over treatment, plan was to start Paxlovid and hold any contraindicated medications.

## 2022-12-07 NOTE — TELEPHONE ENCOUNTER
Covid risk score 7  Currently the infusion offered is no longer recommended by FDA  The main one that is best studied is paxlovid    He will have to stop the clopidogrel (plavix) the atorvastatin (lipitor), and tamsulosin (Flomax) while taking the paxlovid    [facility] at [facility].  The pharmacists can review his meds to make sure no other interactions.

## 2022-12-07 NOTE — TELEPHONE ENCOUNTER
Patient wife calling on behalf of patient. Wife states patient went fishing yesterday in the rain and came home with a cold like symptoms. Patient not feeling well today with c/o vomiting x 1, cough and congestion. Covid home test done with positive result. Wife was told about a medication he could get to help with symptoms and wanted the medication. Advised patient of dispo to call PCP on call. Verbalized understanding. Advised to call back if symptoms become worse or with further questions.      Spoke to Karie Reese MD, on call provider. States she will follow up with them in the morning. She would recommend the EUA infusion if applicable and if not the paxlovid. Would like to reiterate to the patient to encourage lots of fluids and stay hydrated.      Information given to wife and patient. While on phone with patient, Dr. Reese called patient to follow up.         Reason for Disposition   [1] HIGH RISK for severe COVID complications (e.g., weak immune system, age > 64 years, obesity with BMI 30 or higher, pregnant, chronic lung disease or other chronic medical condition) AND [2] COVID symptoms (e.g., cough, fever)  (Exceptions: Already seen by PCP and no new or worsening symptoms.)    Additional Information   Negative: SEVERE difficulty breathing (e.g., struggling for each breath, speaks in single words)   Negative: Difficult to awaken or acting confused (e.g., disoriented, slurred speech)   Negative: Bluish (or gray) lips or face now   Negative: Shock suspected (e.g., cold/pale/clammy skin, too weak to stand, low BP, rapid pulse)   Negative: Sounds like a life-threatening emergency to the triager   Negative: SEVERE or constant chest pain or pressure  (Exception: Mild central chest pain, present only when coughing.)   Negative: MODERATE difficulty breathing (e.g., speaks in phrases, SOB even at rest, pulse 100-120)   Negative: [1] Headache AND [2] stiff neck (can't touch chin to chest)   Negative: Oxygen level  (e.g., pulse oximetry) 90 percent or lower   Negative: Chest pain or pressure  (Exception: MILD central chest pain, present only when coughing)   Negative: Patient sounds very sick or weak to the triager   Negative: MILD difficulty breathing (e.g., minimal/no SOB at rest, SOB with walking, pulse <100)   Negative: Fever > 103 F (39.4 C)   Negative: [1] Fever > 101 F (38.3 C) AND [2] age > 60 years   Negative: [1] Fever > 100.0 F (37.8 C) AND [2] bedridden (e.g., CVA, chronic illness, recovering from surgery)   Negative: Oxygen level (e.g., pulse oximetry) 91 to 94 percent    Protocols used: Coronavirus (COVID-19) Diagnosed or [id number]

## 2023-02-07 DIAGNOSIS — Z00.00 ENCOUNTER FOR MEDICARE ANNUAL WELLNESS EXAM: ICD-10-CM

## 2023-02-09 ENCOUNTER — OFFICE VISIT (OUTPATIENT)
Dept: CARDIOLOGY | Facility: CLINIC | Age: 73
End: 2023-02-09
Payer: MEDICARE

## 2023-02-09 VITALS
OXYGEN SATURATION: 95 % | HEIGHT: 70 IN | RESPIRATION RATE: 18 BRPM | DIASTOLIC BLOOD PRESSURE: 72 MMHG | WEIGHT: 234.81 LBS | HEART RATE: 52 BPM | SYSTOLIC BLOOD PRESSURE: 136 MMHG | BODY MASS INDEX: 33.62 KG/M2

## 2023-02-09 DIAGNOSIS — R94.31 ABNORMAL EKG: ICD-10-CM

## 2023-02-09 DIAGNOSIS — R42 DIZZINESS: ICD-10-CM

## 2023-02-09 DIAGNOSIS — I10 ESSENTIAL HYPERTENSION: ICD-10-CM

## 2023-02-09 DIAGNOSIS — Z86.16 HISTORY OF COVID-19: ICD-10-CM

## 2023-02-09 DIAGNOSIS — E78.2 MIXED HYPERLIPIDEMIA: ICD-10-CM

## 2023-02-09 DIAGNOSIS — G47.33 OSA (OBSTRUCTIVE SLEEP APNEA): ICD-10-CM

## 2023-02-09 DIAGNOSIS — R60.0 BILATERAL LOWER EXTREMITY EDEMA: ICD-10-CM

## 2023-02-09 DIAGNOSIS — Z00.00 ENCOUNTER FOR MEDICARE ANNUAL WELLNESS EXAM: ICD-10-CM

## 2023-02-09 DIAGNOSIS — Z13.6 SCREENING FOR AAA (ABDOMINAL AORTIC ANEURYSM): ICD-10-CM

## 2023-02-09 DIAGNOSIS — E66.9 OBESITY (BMI 30.0-34.9): ICD-10-CM

## 2023-02-09 DIAGNOSIS — I25.118 CORONARY ARTERY DISEASE OF NATIVE ARTERY OF NATIVE HEART WITH STABLE ANGINA PECTORIS: Primary | ICD-10-CM

## 2023-02-09 DIAGNOSIS — I73.9 PERIPHERAL VASCULAR DISEASE: ICD-10-CM

## 2023-02-09 DIAGNOSIS — Z98.61 HISTORY OF PERCUTANEOUS CORONARY INTERVENTION: ICD-10-CM

## 2023-02-09 DIAGNOSIS — I77.810 AORTIC ROOT DILATATION: ICD-10-CM

## 2023-02-09 PROCEDURE — 1126F AMNT PAIN NOTED NONE PRSNT: CPT | Mod: CPTII,S$GLB,, | Performed by: INTERNAL MEDICINE

## 2023-02-09 PROCEDURE — 1159F PR MEDICATION LIST DOCUMENTED IN MEDICAL RECORD: ICD-10-PCS | Mod: CPTII,S$GLB,, | Performed by: INTERNAL MEDICINE

## 2023-02-09 PROCEDURE — 3078F DIAST BP <80 MM HG: CPT | Mod: CPTII,S$GLB,, | Performed by: INTERNAL MEDICINE

## 2023-02-09 PROCEDURE — 3008F PR BODY MASS INDEX (BMI) DOCUMENTED: ICD-10-PCS | Mod: CPTII,S$GLB,, | Performed by: INTERNAL MEDICINE

## 2023-02-09 PROCEDURE — 99999 PR PBB SHADOW E&M-EST. PATIENT-LVL V: CPT | Mod: PBBFAC,HCNC,, | Performed by: INTERNAL MEDICINE

## 2023-02-09 PROCEDURE — 3288F FALL RISK ASSESSMENT DOCD: CPT | Mod: CPTII,S$GLB,, | Performed by: INTERNAL MEDICINE

## 2023-02-09 PROCEDURE — 1160F PR REVIEW ALL MEDS BY PRESCRIBER/CLIN PHARMACIST DOCUMENTED: ICD-10-PCS | Mod: CPTII,S$GLB,, | Performed by: INTERNAL MEDICINE

## 2023-02-09 PROCEDURE — 1126F PR PAIN SEVERITY QUANTIFIED, NO PAIN PRESENT: ICD-10-PCS | Mod: CPTII,S$GLB,, | Performed by: INTERNAL MEDICINE

## 2023-02-09 PROCEDURE — 99215 OFFICE O/P EST HI 40 MIN: CPT | Mod: S$GLB,,, | Performed by: INTERNAL MEDICINE

## 2023-02-09 PROCEDURE — 3008F BODY MASS INDEX DOCD: CPT | Mod: CPTII,S$GLB,, | Performed by: INTERNAL MEDICINE

## 2023-02-09 PROCEDURE — 3075F PR MOST RECENT SYSTOLIC BLOOD PRESS GE 130-139MM HG: ICD-10-PCS | Mod: CPTII,S$GLB,, | Performed by: INTERNAL MEDICINE

## 2023-02-09 PROCEDURE — 3288F PR FALLS RISK ASSESSMENT DOCUMENTED: ICD-10-PCS | Mod: CPTII,S$GLB,, | Performed by: INTERNAL MEDICINE

## 2023-02-09 PROCEDURE — 93000 EKG 12-LEAD: ICD-10-PCS | Mod: S$GLB,,, | Performed by: INTERNAL MEDICINE

## 2023-02-09 PROCEDURE — 93000 ELECTROCARDIOGRAM COMPLETE: CPT | Mod: S$GLB,,, | Performed by: INTERNAL MEDICINE

## 2023-02-09 PROCEDURE — 1101F PR PT FALLS ASSESS DOC 0-1 FALLS W/OUT INJ PAST YR: ICD-10-PCS | Mod: CPTII,S$GLB,, | Performed by: INTERNAL MEDICINE

## 2023-02-09 PROCEDURE — 1160F RVW MEDS BY RX/DR IN RCRD: CPT | Mod: CPTII,S$GLB,, | Performed by: INTERNAL MEDICINE

## 2023-02-09 PROCEDURE — 3078F PR MOST RECENT DIASTOLIC BLOOD PRESSURE < 80 MM HG: ICD-10-PCS | Mod: CPTII,S$GLB,, | Performed by: INTERNAL MEDICINE

## 2023-02-09 PROCEDURE — 1159F MED LIST DOCD IN RCRD: CPT | Mod: CPTII,S$GLB,, | Performed by: INTERNAL MEDICINE

## 2023-02-09 PROCEDURE — 3075F SYST BP GE 130 - 139MM HG: CPT | Mod: CPTII,S$GLB,, | Performed by: INTERNAL MEDICINE

## 2023-02-09 PROCEDURE — 99999 PR PBB SHADOW E&M-EST. PATIENT-LVL V: ICD-10-PCS | Mod: PBBFAC,HCNC,, | Performed by: INTERNAL MEDICINE

## 2023-02-09 PROCEDURE — 99215 PR OFFICE/OUTPT VISIT, EST, LEVL V, 40-54 MIN: ICD-10-PCS | Mod: S$GLB,,, | Performed by: INTERNAL MEDICINE

## 2023-02-09 PROCEDURE — 1101F PT FALLS ASSESS-DOCD LE1/YR: CPT | Mod: CPTII,S$GLB,, | Performed by: INTERNAL MEDICINE

## 2023-02-09 NOTE — PROGRESS NOTES
CARDIOVASCULAR PROGRESS NOTE    REASON FOR CONSULT:   Clinton Rosenberg is a 72 y.o. male who presents for follow up of CAD, ao root dil.    PCP: Dair  HISTORY OF PRESENT ILLNESS:   The patient returns for follow-up, accompanied by his wife.  He reports an episode of nitroglycerin responsive chest pain several days ago.  He continues describe exertional chest discomfort of a class 2 nature.  He is currently on metoprolol and Imdur.  He otherwise has had no palpitations or syncope.  Denies PND, orthopnea, melena, hematuria, or claudicant symptoms.  He does describe dependent lower extremity edema appears to have a varix on his right lower extremity.  He continues take dual antiplatelet therapy without any issues.    CARDIOVASCULAR HISTORY:   CAD/MI  2013: LAD PCI    9/20/19: mid RPLB 2.5x18 [facility]olute [place] SILVER    JAYLEN, unable to afford CPAP  Aortic root dil, 3.7->4.0->3.8->4.2  cm (echo 7/2020), 3.8cm (asc Ao) on CTA 9/2019  CVI (bilat GSV, R LSV, US 3/2018)    PAST MEDICAL HISTORY:     Past Medical History:   Diagnosis Date    Allergy     Angina pectoris     Anxiety     Cancer     skin rwemoved from head    Chronic midline low back pain without sciatica 10/26/2022    Coronary artery disease     Depression     Eye injury as a teen    stuck object in os    GERD (gastroesophageal reflux disease)     Hyperlipidemia     Hypertension     Hypothyroidism     Memory loss     12/3/2014 MRI brain: 1. No evidence for acute infarct 2. unremarkable MRI of the brain; 12/3/2014 carotid US normal    Myocardial infarction     Nuclear sclerosis of both eyes 5/20/2021    Obesity     Peripheral vascular disease 6/20/2017    Retrocalcaneal bursitis 11/24/2014    Sleep apnea        PAST SURGICAL HISTORY:     Past Surgical History:   Procedure Laterality Date    APPENDECTOMY  1986    bone spur Right     COLONOSCOPY      hand trauma Right     pencil     heart stent      LEFT HEART CATHETERIZATION Left 9/20/2019    Procedure: Left heart cath  12 pm start, R rad access;  Surgeon: Brad Bahena MD;  Location: [facility] CATH LAB;  Service: Cardiology;  Laterality: Left;  RN PREOP 9/13/2019  NEEDS IODINE PREMED    LEFT HEART CATHETERIZATION Left 9/21/2020    Procedure: Left heart cath 730am start, R rad access;  Surgeon: Brad Bahena MD;  Location: [facility] CATH LAB;  Service: Cardiology;  Laterality: Left;  RN PREOP 9/14/2020, COVID NEGATIVE---9/18       ALLERGIES AND MEDICATION:     Review of patient's allergies indicates:   Allergen Reactions    Iodine containing multivitamin Anaphylaxis    Naproxen Other (See Comments)     hallucinations    Hydrocodone-acetaminophen      Rash (skin)^    Oxycodone-acetaminophen      Rash (skin)^        Medication List            Accurate as of February 9, 2023  9:17 AM. If you have any questions, ask your nurse or doctor.                CONTINUE taking these medications      albuterol 90 mcg/actuation inhaler  Commonly known as: PROVENTIL/VENTOLIN HFA  INHALE 2 PUFFS INTO THE LUNGS EVERY 6 (SIX) HOURS AS NEEDED FOR WHEEZING. RESCUE     aspirin 81 MG EC tablet  Commonly known as: ECOTRIN  Take 1 tablet (81 mg total) by mouth once daily.     atorvastatin 80 MG tablet  Commonly known as: LIPITOR  TAKE 1 TABLET EVERY EVENING     clopidogreL 75 mg tablet  Commonly known as: PLAVIX  TAKE 1 TABLET EVERY DAY     diclofenac sodium 1 % Gel  Commonly known as: VOLTAREN  APPLY THE GEL (2 G) TO THE AFFECTED AREA 4 TIMES DAILY. DO NOT APPLY MORE THAN 8 G DAILY TO ANY ONE AFFECTED JOINT     furosemide 20 MG tablet  Commonly known as: LASIX  TAKE 1 TABLET EVERY DAY     gabapentin 300 MG capsule  Commonly known as: NEURONTIN  Take 1 capsule (300 mg total) by mouth every evening.     isosorbide mononitrate 120 MG 24 hr tablet  Commonly known as: IMDUR  Take 2 tablets (240 mg total) by mouth once daily.     KRILL OIL (OMEGA 3 AND 6) ORAL     levothyroxine 75 MCG tablet  Commonly known as: SYNTHROID  Take 1 tablet (75 mcg total) by mouth  before breakfast.     losartan 50 MG tablet  Commonly known as: COZAAR  TAKE 1 TABLET EVERY DAY     metFORMIN 500 MG tablet  Commonly known as: GLUCOPHAGE  TAKE 1 TABLET BY MOUTH EVERY DAY WITH BREAKFAST     metoprolol tartrate 25 MG tablet  Commonly known as: LOPRESSOR  TAKE 1 TABLET TWICE DAILY     nitroGLYCERIN 0.4 MG SL tablet  Commonly known as: NITROSTAT  Place 1 tablet (0.4 mg total) under the tongue every 5 (five) minutes as needed for Chest pain.     pantoprazole 40 MG tablet  Commonly known as: PROTONIX  TAKE 1 TABLET EVERY DAY     tamsulosin 0.4 mg Cap  Commonly known as: FLOMAX  TAKE 1 CAPSULE (0.4 MG TOTAL) BY MOUTH ONCE DAILY.     traMADoL 50 mg tablet  Commonly known as: ULTRAM  Take 1 tablet (50 mg total) by mouth every 6 (six) hours as needed for Pain.     vitamin D 1000 units Tab  Commonly known as: VITAMIN D3  Take 1 tablet (1,000 Units total) by mouth once daily.                SOCIAL HISTORY:     Social History     Socioeconomic History    Marital status:    Tobacco Use    Smoking status: Never    Smokeless tobacco: Never   Substance and Sexual Activity    Alcohol use: No     Alcohol/week: 0.0 standard drinks     Comment: rarely    Drug use: No    Sexual activity: Yes     Social Determinants of Health     Financial Resource Strain: Low Risk     Difficulty of Paying Living Expenses: Not hard at all   Food Insecurity: No Food Insecurity    Worried About Running Out of Food in the Last Year: Never true    Ran Out of Food in the Last Year: Never true   Transportation Needs: No Transportation Needs    Lack of Transportation (Medical): No    Lack of Transportation (Non-Medical): No   Physical Activity: Insufficiently Active    Days of Exercise per Week: 4 days    Minutes of Exercise per Session: 30 min   Stress: No Stress Concern Present    Feeling of Stress : Only a little   Social Connections: Moderately Isolated    Frequency of Communication with Friends and Family: More than three times a  week    Frequency of Social Gatherings with Friends and Family: More than three times a week    Attends Taoism Services: Never    Active Member of Clubs or Organizations: No    Attends Club or Organization Meetings: Never    Marital Status:    Housing Stability: Low Risk     Unable to Pay for Housing in the Last Year: No    Number of Places Lived in the Last Year: 1    Unstable Housing in the Last Year: No       FAMILY HISTORY:     Family History   Problem Relation Age of Onset    Arthritis Mother     Early death Mother     Heart disease Mother     Hypertension Mother     Migraines Mother     Seizures Mother     Tremor Mother     Diabetes Mother     Cancer Mother     Early death Father     Heart disease Father     Hypertension Father     Stroke Father     Diabetes Father     Alcohol abuse Father     Arthritis Brother     Cancer Brother     Diabetes Brother     Early death Brother     Heart disease Brother     Hypertension Brother     Heart disease Sister     Hypertension Sister     Arthritis Sister     Depression Sister     Heart disease Brother     Arthritis Brother     Heart disease Brother     Pneumonia Brother     Heart disease Brother     Hypertension Brother     Diabetes Brother     Heart disease Brother     No Known Problems Maternal Aunt     No Known Problems Maternal Uncle     No Known Problems Paternal Aunt     No Known Problems Paternal Uncle     No Known Problems Maternal Grandmother     No Known Problems Maternal Grandfather     No Known Problems Paternal Grandmother     No Known Problems Paternal Grandfather     Amblyopia Neg Hx     Blindness Neg Hx     Cataracts Neg Hx     Glaucoma Neg Hx     Macular degeneration Neg Hx     Retinal detachment Neg Hx     Strabismus Neg Hx     Thyroid disease Neg Hx        REVIEW OF SYSTEMS:   Review of Systems   Constitutional:  Negative for chills, diaphoresis and fever.   HENT:  Negative for nosebleeds.    Eyes:  Negative for blurred vision, double vision  "and photophobia.   Respiratory:  Negative for cough, hemoptysis, sputum production, shortness of breath and wheezing.    Cardiovascular:  Positive for chest pain and leg swelling. Negative for palpitations, orthopnea, claudication and PND.   Gastrointestinal:  Negative for abdominal pain, blood in stool, heartburn, melena, nausea and vomiting.   Genitourinary:  Negative for flank pain and hematuria.   Musculoskeletal:  Negative for falls, myalgias and neck pain.   Skin:  Negative for rash.   Neurological:  Negative for dizziness, seizures, loss of consciousness, weakness and headaches.   Endo/Heme/Allergies:  Negative for polydipsia. Does not bruise/bleed easily.   Psychiatric/Behavioral:  Negative for depression and memory loss. The patient is not nervous/anxious.      PHYSICAL EXAM:     Vitals:    02/09/23 0853   BP: 136/72   Pulse: (!) 52   Resp: 18    Body mass index is 33.69 kg/m².  Weight: 106.5 kg (234 lb 12.6 oz)   Height: 5' 10" (177.8 cm)     Physical Exam  Vitals reviewed.   Constitutional:       General: He is not in acute distress.     Appearance: He is well-developed. He is obese. He is not ill-appearing, toxic-appearing or diaphoretic.   HENT:      Head: Normocephalic and atraumatic.   Eyes:      General: No scleral icterus.     Extraocular Movements: Extraocular movements intact.      Conjunctiva/sclera: Conjunctivae normal.      Pupils: Pupils are equal, round, and reactive to light.   Neck:      Thyroid: No thyromegaly.      Vascular: Normal carotid pulses. No carotid bruit or JVD.      Trachea: Trachea normal. No tracheal deviation.   Cardiovascular:      Rate and Rhythm: Regular rhythm. Bradycardia present.      Pulses:           Carotid pulses are 2+ on the right side and 2+ on the left side.     Heart sounds: S1 normal and S2 normal. No murmur heard.    No friction rub. No gallop.   Pulmonary:      Effort: Pulmonary effort is normal. No respiratory distress.      Breath sounds: Normal breath " sounds. No stridor. No wheezing, rhonchi or rales.   Chest:      Chest wall: No tenderness.   Abdominal:      General: There is no distension.      Palpations: Abdomen is soft.   Musculoskeletal:         General: No swelling or tenderness. Normal range of motion.      Cervical back: Normal range of motion and neck supple. No edema or rigidity.      Right lower leg: No edema.      Left lower leg: No edema.      Comments: ?RLE CVI   Feet:      Right foot:      Skin integrity: No ulcer.      Left foot:      Skin integrity: No ulcer.   Skin:     General: Skin is warm and dry.      Coloration: Skin is not jaundiced.   Neurological:      General: No focal deficit present.      Mental Status: He is alert and oriented to person, place, and time.      Cranial Nerves: No cranial nerve deficit.   Psychiatric:         Mood and Affect: Mood normal.         Speech: Speech normal.         Behavior: Behavior normal. Behavior is cooperative.       DATA:   EKG: (personally reviewed tracing(s))  2/9/23 SR 53, IMI-age indet, PRWP    Laboratory:  CBC:  Recent Labs   Lab 10/08/21  0724 04/07/22  0716 10/07/22  0734   WBC 10.90 7.72 8.17   Hemoglobin 16.0 14.7 14.3   Hematocrit 46.1 42.9 41.7   Platelets 212 190 184       CHEMISTRIES:  Recent Labs   Lab 04/15/21  0938 10/08/21  0724 04/07/22  0716 10/07/22  0734   Glucose 180 H 156 H 157 H 179 H   Sodium 138 141 141 138   Potassium 4.2 4.6 4.6 4.1   BUN 9 11 12 12   Creatinine 1.0 1.1 1.0 0.9   eGFR if African American >60.0 >60.0 >60.0  --    eGFR if non African American >60.0 >60.0 >60.0  --    Calcium 9.0 10.0 9.5 9.3       CARDIAC BIOMARKERS:          COAGS:  Recent Labs   Lab 09/14/20  1352   INR 1.0       LIPIDS/LFTS:  Recent Labs   Lab 04/15/21  0938 10/08/21  0724 04/07/22  0716 10/07/22  0734   Cholesterol 118 L 112 L 108 L  --    Triglycerides 288 H 211 H 204 H  --    HDL 31 L 37 L 33 L  --    LDL Cholesterol 29.4 L 32.8 L 34.2 L  --    Non-HDL Cholesterol 87 75 75  --    AST 24  17 17 16   ALT 21 22 22 20       Cardiovascular Testing:  Echo 11/24/21 (Ao root 4.1cm, stable vs report 7/2020)  The left ventricle is normal in size with concentric hypertrophy and normal systolic function.  The estimated ejection fraction is 60%.  Normal left ventricular diastolic function.  Normal right ventricular size with normal right ventricular systolic function.  Normal central venous pressure (3 mmHg).  The estimated PA systolic pressure is 20 mmHg.    Cath 9/21/20  LVEDP: 7mmHg  LVEF: 65% by echo  Dominance: Right  LM: normal  LAD: MLI, mid stent patent (2013)  Ramus: MLI, prox 30%  LCx: MLI, ost 40%  RCA: MLI, dist eccentric 40-50%              RPDA: ost 50%, iFR 0.97              RPLB mid stent patent (9/20/19 2.5x18 [employer] [place] SILVER)  Hemostasis:  R Radial band  Impression:  Recurrent CP on mult meds  2V CAD, normal LV fxn  Patent mid LAD and mid RPLB stents  iFR dist RCA/ost RPDA neg  R rad vasband for hemostasis  Successful pre-treatment of iodine allergy  Plan:  Cont med rx  Cont ASA/Plavix/Statin/BBL/Imdur  Plan long term DAPT given diffuse CAD and prior MV PCI  Home today  Follow up with Dr. Bahena as planned  Outpatient GI/Pulm evals    CTA Chest 9/20/19 (post-cath)  The main pulmonary artery is enlarged suggesting underlying pulmonary artery hypertension.  Single-vessel coronary disease.  Normal thoracic aorta and visualized abdominal aorta.  Aortic measurements: STJ 3.4cm, Asc 3.8cm, Desc: 2.6.    L MPI 8/8/19 (similar diaphramatic artifact noted on MPI 6/2017)    Probably normal concepcion myocardial perfusion study.    The perfusion scan is free of evidence from myocardial ischemia or injury.    Post stress wall motion is physiologic.    There is a  mild intensity fixed defect in the inferior wall of the left ventricle secondary to diaphragm attenuation.    Gated perfusion images showed an ejection fraction of  66 % post stress.    There is  normal wall motion post stress.    Venous US/reflux  3/15/18  RIGHT:  No evidence of Right lower extremity DVT.    There is reflux in the Greater Saphenous and Lesser Saphenous Veins.    R GSV dist thigh 2186 msec  R GSV in calf 2339 msec  R LSV 4006 msec   LEFT:  No evidence of Left lower extremity DVT.    There is reflux in the Greater Saphenous Vein.  R GSV mid thigh 2422 msec  R GSV dist thigh 1667 msec  R GSV calf 5647 msec      Holter 6/8/17  PREDOMINANT RHYTHM  1. Sinus rhythm with heart rates varying between 43 and 94 bpm with an average of 62 bpm.   VENTRICULAR ARRHYTHMIAS  1. There were very rare PVCs recorded totalling 5 and averaging less than 1 per hour.   2. There were no episodes of ventricular tachycardia.  SUPRA VENTRICULAR ARRHYTHMIAS  1. There were very rare PACs recorded totalling 3 and averaging less than 1 per hour.   2. There were no episodes of sustained supraventricular tachycardia.  SINUS NODE FUNCTION  1. There was no evidence of high grade SA deanne block.   AV CONDUCTION  1. There was no evidence of high grade AV block.   DIARY  1. The diary was not returned  MISCELLANEOUS  1. There were rare hookup related artifacts. Overall, the study was of good quality.   2. This was a tape of adequate length (24 hrs).    LE art US/TANA 6/8/17  1.  Mild/atherosclerotic plaquing.  2.  No stenosis about 30 percent femoral popliteal arteries.  3.  Normal bilateral ABIs.    Carotid US 12/3/14  1. Less than 50% stenosis of the right internal carotid artery.  2. Less than 50% stenosis of the left internal carotid artery.      ASSESSMENT:   # CAD s/p LAD PCI 2013, RPLB PCI 9/2019, cath 9/2020 with patent LAD/RPLB stents.  Recurrent NTG responsive CP noted.  ?new inf Q waves on EKG.  # HTN, controlled.  # HLP, on atorva 80mg  # Ao root dil, 3.7->4.0->3.8->4.2->4.1 cm (echo 11/2021), 3.8cm (asc Ao CTA 9/2019)  # CVI (bilat GSV, R LSV) on US 3/2018.  No edema at present (but complains of intermittent swelling), prev declined referral for venous ablation.  # hx  JAYLEN, pt unable to obtain CPAP apparatus  # BMI 34, up 1 unit(s) vs last OV  # Iodine allergy (?Betadine), successfully premedicated for cath 9/2020    PLAN:   Cont med rx  Cont ASA 81mg qd indefinitely  Cont Plavix 75mg qd, long term rx planned given hx MV PCI/CAD.  Add amlod  Ex MPI (hope to hold off cath for now given neg cath 2020 and I- allergy)  Echo  Aortic US (screen)  LE venous US/reflux  Diet/exercise/weight loss  RTC 1 month (March 2023)      Brad Bahena MD, FACC

## 2023-03-09 ENCOUNTER — HOSPITAL ENCOUNTER (OUTPATIENT)
Dept: RADIOLOGY | Facility: HOSPITAL | Age: 73
Discharge: HOME | End: 2023-03-09
Attending: INTERNAL MEDICINE
Payer: MEDICARE

## 2023-03-09 ENCOUNTER — HOSPITAL ENCOUNTER (OUTPATIENT)
Dept: CARDIOLOGY | Facility: HOSPITAL | Age: 73
Discharge: HOME | End: 2023-03-09
Attending: INTERNAL MEDICINE
Payer: MEDICARE

## 2023-03-09 DIAGNOSIS — R94.31 ABNORMAL EKG: ICD-10-CM

## 2023-03-09 DIAGNOSIS — I25.118 CORONARY ARTERY DISEASE OF NATIVE ARTERY OF NATIVE HEART WITH STABLE ANGINA PECTORIS: ICD-10-CM

## 2023-03-09 DIAGNOSIS — Z13.6 SCREENING FOR AAA (ABDOMINAL AORTIC ANEURYSM): ICD-10-CM

## 2023-03-09 DIAGNOSIS — Z98.61 HISTORY OF PERCUTANEOUS CORONARY INTERVENTION: ICD-10-CM

## 2023-03-09 DIAGNOSIS — R60.0 BILATERAL LOWER EXTREMITY EDEMA: ICD-10-CM

## 2023-03-09 LAB
ASCENDING AORTA: 3.9 CM
AV INDEX (PROSTH): 0.9
AV MEAN GRADIENT: 4 MMHG
AV PEAK GRADIENT: 7 MMHG
AV VALVE AREA: 4.05 CM2
AV VELOCITY RATIO: 0.77
CV ECHO LV RWT: 0.49 CM
CV STRESS BASE HR: 68 BPM
DIASTOLIC BLOOD PRESSURE: 75 MMHG
DOP CALC AO PEAK VEL: 1.28 M/S
DOP CALC AO VTI: 27.8 CM
DOP CALC LVOT AREA: 4.5 CM2
DOP CALC LVOT DIAMETER: 2.39 CM
DOP CALC LVOT PEAK VEL: 0.99 M/S
DOP CALC LVOT STROKE VOLUME: 112.55 CM3
DOP CALCLVOT PEAK VEL VTI: 25.1 CM
E WAVE DECELERATION TIME: 242.91 MSEC
E/A RATIO: 0.77
E/E' RATIO: 9.57 M/S
ECHO LV POSTERIOR WALL: 1.09 CM (ref 0.6–1.1)
EJECTION FRACTION: 65 %
FRACTIONAL SHORTENING: 31 % (ref 28–44)
INTERVENTRICULAR SEPTUM: 1.09 CM (ref 0.6–1.1)
IVC DIAMETER: 20 CM
IVRT: 171.27 MSEC
LA MAJOR: 5.4 CM
LA MINOR: 3.55 CM
LA WIDTH: 3.5 CM
LEFT ATRIUM SIZE: 3.6 CM
LEFT ATRIUM VOLUME: 45.88 CM3
LEFT INTERNAL DIMENSION IN SYSTOLE: 3.08 CM (ref 2.1–4)
LEFT VENTRICLE DIASTOLIC VOLUME: 89.47 ML
LEFT VENTRICLE SYSTOLIC VOLUME: 37.38 ML
LEFT VENTRICULAR INTERNAL DIMENSION IN DIASTOLE: 4.44 CM (ref 3.5–6)
LEFT VENTRICULAR MASS: 169.15 G
LV LATERAL E/E' RATIO: 7.44 M/S
LV SEPTAL E/E' RATIO: 13.4 M/S
LVOT MG: 2.55 MMHG
LVOT MV: 0.76 CM/S
MV PEAK A VEL: 0.87 M/S
MV PEAK E VEL: 0.67 M/S
MV STENOSIS PRESSURE HALF TIME: 70.44 MS
MV VALVE AREA P 1/2 METHOD: 3.12 CM2
NUC REST EJECTION FRACTION: 67
OHS CV CPX 85 PERCENT MAX PREDICTED HEART RATE MALE: 128
OHS CV CPX MAX PREDICTED HEART RATE: 150
OHS CV CPX PATIENT IS FEMALE: 0
OHS CV CPX PATIENT IS MALE: 1
OHS CV CPX PEAK DIASTOLIC BLOOD PRESSURE: 77 MMHG
OHS CV CPX PEAK HEAR RATE: 88 BPM
OHS CV CPX PEAK RATE PRESSURE PRODUCT: NORMAL
OHS CV CPX PEAK SYSTOLIC BLOOD PRESSURE: 147 MMHG
OHS CV CPX PERCENT MAX PREDICTED HEART RATE ACHIEVED: 59
OHS CV CPX RATE PRESSURE PRODUCT PRESENTING: 8432
PISA TR MAX VEL: 1.89 M/S
PULM VEIN S/D RATIO: 1.86
PV PEAK D VEL: 0.28 M/S
PV PEAK S VEL: 0.52 M/S
PV PEAK VELOCITY: 0.73 CM/S
RA MAJOR: 4.61 CM
RA PRESSURE: 8 MMHG
RA WIDTH: 3.49 CM
RIGHT VENTRICULAR END-DIASTOLIC DIMENSION: 3.88 CM
SINUS: 3.88 CM
STJ: 3.21 CM
SYSTOLIC BLOOD PRESSURE: 124 MMHG
TDI LATERAL: 0.09 M/S
TDI SEPTAL: 0.05 M/S
TDI: 0.07 M/S
TR MAX PG: 14 MMHG
TRICUSPID ANNULAR PLANE SYSTOLIC EXCURSION: 2.06 CM
TV PEAK GRADIENT: 1.34 MMHG
TV REST PULMONARY ARTERY PRESSURE: 22 MMHG

## 2023-03-09 PROCEDURE — 78452 HT MUSCLE IMAGE SPECT MULT: CPT | Mod: HCNC

## 2023-03-09 PROCEDURE — 93018 CV STRESS TEST I&R ONLY: CPT | Mod: HCNC,,, | Performed by: INTERNAL MEDICINE

## 2023-03-09 PROCEDURE — 93978 CV US ABDOMINAL AORTA EVALUATION (CUPID ONLY): ICD-10-PCS | Mod: 26,HCNC,, | Performed by: INTERNAL MEDICINE

## 2023-03-09 PROCEDURE — 93306 ECHO (CUPID ONLY): ICD-10-PCS | Mod: 26,HCNC,, | Performed by: INTERNAL MEDICINE

## 2023-03-09 PROCEDURE — 63600175 PHARM REV CODE 636 W HCPCS: Mod: HCNC | Performed by: INTERNAL MEDICINE

## 2023-03-09 PROCEDURE — 78452 NUCLEAR STRESS - CARDIOLOGY INTERPRETED (CUPID ONLY): ICD-10-PCS | Mod: 26,HCNC,, | Performed by: INTERNAL MEDICINE

## 2023-03-09 PROCEDURE — 93016 NUCLEAR STRESS - CARDIOLOGY INTERPRETED (CUPID ONLY): ICD-10-PCS | Mod: HCNC,,, | Performed by: INTERNAL MEDICINE

## 2023-03-09 PROCEDURE — 93306 TTE W/DOPPLER COMPLETE: CPT | Mod: 26,HCNC,, | Performed by: INTERNAL MEDICINE

## 2023-03-09 PROCEDURE — 78452 HT MUSCLE IMAGE SPECT MULT: CPT | Mod: 26,HCNC,, | Performed by: INTERNAL MEDICINE

## 2023-03-09 PROCEDURE — 93978 VASCULAR STUDY: CPT | Mod: 26,HCNC,, | Performed by: INTERNAL MEDICINE

## 2023-03-09 PROCEDURE — 93018 NUCLEAR STRESS - CARDIOLOGY INTERPRETED (CUPID ONLY): ICD-10-PCS | Mod: HCNC,,, | Performed by: INTERNAL MEDICINE

## 2023-03-09 PROCEDURE — 93970 CV US LOWER VENOUS INSUFFICIENCY BILATERAL (CUPID ONLY): ICD-10-PCS | Mod: 26,HCNC,, | Performed by: INTERNAL MEDICINE

## 2023-03-09 PROCEDURE — 93978 VASCULAR STUDY: CPT | Mod: HCNC

## 2023-03-09 PROCEDURE — 93970 EXTREMITY STUDY: CPT | Mod: 26,HCNC,, | Performed by: INTERNAL MEDICINE

## 2023-03-09 PROCEDURE — 93970 EXTREMITY STUDY: CPT | Mod: TC,HCNC

## 2023-03-09 PROCEDURE — 93306 TTE W/DOPPLER COMPLETE: CPT | Mod: HCNC

## 2023-03-09 PROCEDURE — 93016 CV STRESS TEST SUPVJ ONLY: CPT | Mod: HCNC,,, | Performed by: INTERNAL MEDICINE

## 2023-03-09 PROCEDURE — A9502 TC99M TETROFOSMIN: HCPCS | Mod: HCNC

## 2023-03-09 PROCEDURE — 93017 CV STRESS TEST TRACING ONLY: CPT | Mod: HCNC

## 2023-03-09 RX ORDER — REGADENOSON 0.08 MG/ML
0.4 INJECTION, SOLUTION INTRAVENOUS ONCE
Status: COMPLETED | OUTPATIENT
Start: 2023-03-09 | End: 2023-03-09

## 2023-03-09 RX ADMIN — REGADENOSON 0.4 MG: 0.08 INJECTION, SOLUTION INTRAVENOUS at 08:03

## 2023-03-09 NOTE — NURSING
Pt walked on treadmill for 7:29min, hr only at 99 and unable to continue rt sob ,cp at 5 and fatigue. Pt changed to concepcion per order

## 2023-03-10 LAB
ABDOMINAL IMA AP: 1.6 CM
ABDOMINAL IMA ED VEL: 0 CM/S
ABDOMINAL IMA PS VEL: 119 CM/S
ABDOMINAL IMA TRANS: 1.8 CM
ABDOMINAL INFRARENAL AORTA AP: 1.9 CM
ABDOMINAL INFRARENAL AORTA ED VEL: 0 CM/S
ABDOMINAL INFRARENAL AORTA PS VEL: 85 CM/S
ABDOMINAL INFRARENAL AORTA TRANS: 1.9 CM
ABDOMINAL JUXTARENAL AORTA AP: 1.9 CM
ABDOMINAL JUXTARENAL AORTA ED VEL: 0 CM/S
ABDOMINAL JUXTARENAL AORTA PS VEL: 96 CM/S
ABDOMINAL JUXTARENAL AORTA TRANS: 2 CM
ABDOMINAL LT COM ILIAC AP: 1.1 CM
ABDOMINAL LT COM ILIAC TRANS: 1.2 CM
ABDOMINAL LT COM ILIAC VEL: 134 CM/S
ABDOMINAL LT COM ILLIAC ED VEL: 0 CM/S
ABDOMINAL RT COM ILIAC AP: 1.2 CM
ABDOMINAL RT COM ILIAC TRANS: 1.1 CM
ABDOMINAL RT COM ILIAC VEL: 89 CM/S
ABDOMINAL RT COM ILLIAC ED VEL: 0 CM/S
ABDOMINAL SUPRARENAL AORTA AP: 2.4 CM
ABDOMINAL SUPRARENAL AORTA ED VEL: 0 CM/S
ABDOMINAL SUPRARENAL AORTA PS VEL: 93 CM/S
ABDOMINAL SUPRARENAL AORTA TRANS: 2.3 CM
LEFT GREAT SAPHENOUS DISTAL THIGH DIA: 0.5 CM
LEFT GREAT SAPHENOUS DISTAL THIGH REFLUX: 8746 MS
LEFT GREAT SAPHENOUS JUNCTION DIA: 0.6 CM
LEFT GREAT SAPHENOUS KNEE DIA: 0.4 CM
LEFT GREAT SAPHENOUS MIDDLE THIGH DIA: 0.5 CM
LEFT GREAT SAPHENOUS MIDDLE THIGH REFLUX: 5359 MS
LEFT GREAT SAPHENOUS PROXIMAL CALF DIA: 0.4 CM
LEFT SMALL SAPHENOUS KNEE DIA: 0.3 CM
LEFT SMALL SAPHENOUS SPJ DIA: 0.3 CM
RIGHT GREAT SAPHENOUS DISTAL THIGH DIA: 0.3 CM
RIGHT GREAT SAPHENOUS JUNCTION DIA: 0.6 CM
RIGHT GREAT SAPHENOUS KNEE DIA: 0.3 CM
RIGHT GREAT SAPHENOUS MIDDLE THIGH DIA: 0.4 CM
RIGHT GREAT SAPHENOUS PROXIMAL CALF DIA: 0.4 CM
RIGHT GREAT SAPHENOUS PROXIMAL CALF REFLUX: 8408 MS
RIGHT SMALL SAPHENOUS KNEE DIA: 0.4 CM
RIGHT SMALL SAPHENOUS KNEE REFLUX: 750 MS
RIGHT SMALL SAPHENOUS SPJ DIA: 0.3 CM

## 2023-03-17 ENCOUNTER — OFFICE VISIT (OUTPATIENT)
Dept: CARDIOLOGY | Facility: CLINIC | Age: 73
End: 2023-03-17
Payer: MEDICARE

## 2023-03-17 VITALS
BODY MASS INDEX: 33.23 KG/M2 | HEIGHT: 70 IN | WEIGHT: 232.13 LBS | OXYGEN SATURATION: 98 % | SYSTOLIC BLOOD PRESSURE: 120 MMHG | HEART RATE: 62 BPM | RESPIRATION RATE: 18 BRPM | DIASTOLIC BLOOD PRESSURE: 68 MMHG

## 2023-03-17 DIAGNOSIS — E78.2 MIXED HYPERLIPIDEMIA: ICD-10-CM

## 2023-03-17 DIAGNOSIS — Z98.61 HISTORY OF PERCUTANEOUS CORONARY INTERVENTION: ICD-10-CM

## 2023-03-17 DIAGNOSIS — I10 ESSENTIAL HYPERTENSION: ICD-10-CM

## 2023-03-17 DIAGNOSIS — I87.2 VENOUS INSUFFICIENCY: ICD-10-CM

## 2023-03-17 DIAGNOSIS — E66.9 NON MORBID OBESITY, UNSPECIFIED OBESITY TYPE: ICD-10-CM

## 2023-03-17 DIAGNOSIS — G47.33 OSA (OBSTRUCTIVE SLEEP APNEA): ICD-10-CM

## 2023-03-17 DIAGNOSIS — R94.31 ABNORMAL EKG: ICD-10-CM

## 2023-03-17 DIAGNOSIS — I77.810 AORTIC ROOT DILATATION: ICD-10-CM

## 2023-03-17 DIAGNOSIS — I25.10 CORONARY ARTERY DISEASE INVOLVING NATIVE CORONARY ARTERY OF NATIVE HEART WITHOUT ANGINA PECTORIS: Primary | ICD-10-CM

## 2023-03-17 PROCEDURE — 99999 PR PBB SHADOW E&M-EST. PATIENT-LVL V: CPT | Mod: PBBFAC,HCNC,, | Performed by: INTERNAL MEDICINE

## 2023-03-17 PROCEDURE — 3008F BODY MASS INDEX DOCD: CPT | Mod: HCNC,CPTII,S$GLB, | Performed by: INTERNAL MEDICINE

## 2023-03-17 PROCEDURE — 1126F AMNT PAIN NOTED NONE PRSNT: CPT | Mod: HCNC,CPTII,S$GLB, | Performed by: INTERNAL MEDICINE

## 2023-03-17 PROCEDURE — 1160F PR REVIEW ALL MEDS BY PRESCRIBER/CLIN PHARMACIST DOCUMENTED: ICD-10-PCS | Mod: HCNC,CPTII,S$GLB, | Performed by: INTERNAL MEDICINE

## 2023-03-17 PROCEDURE — 1160F RVW MEDS BY RX/DR IN RCRD: CPT | Mod: HCNC,CPTII,S$GLB, | Performed by: INTERNAL MEDICINE

## 2023-03-17 PROCEDURE — 3288F FALL RISK ASSESSMENT DOCD: CPT | Mod: HCNC,CPTII,S$GLB, | Performed by: INTERNAL MEDICINE

## 2023-03-17 PROCEDURE — 1101F PT FALLS ASSESS-DOCD LE1/YR: CPT | Mod: HCNC,CPTII,S$GLB, | Performed by: INTERNAL MEDICINE

## 2023-03-17 PROCEDURE — 99214 PR OFFICE/OUTPT VISIT, EST, LEVL IV, 30-39 MIN: ICD-10-PCS | Mod: HCNC,S$GLB,, | Performed by: INTERNAL MEDICINE

## 2023-03-17 PROCEDURE — 3008F PR BODY MASS INDEX (BMI) DOCUMENTED: ICD-10-PCS | Mod: HCNC,CPTII,S$GLB, | Performed by: INTERNAL MEDICINE

## 2023-03-17 PROCEDURE — 1126F PR PAIN SEVERITY QUANTIFIED, NO PAIN PRESENT: ICD-10-PCS | Mod: HCNC,CPTII,S$GLB, | Performed by: INTERNAL MEDICINE

## 2023-03-17 PROCEDURE — 3074F PR MOST RECENT SYSTOLIC BLOOD PRESSURE < 130 MM HG: ICD-10-PCS | Mod: HCNC,CPTII,S$GLB, | Performed by: INTERNAL MEDICINE

## 2023-03-17 PROCEDURE — 3078F PR MOST RECENT DIASTOLIC BLOOD PRESSURE < 80 MM HG: ICD-10-PCS | Mod: HCNC,CPTII,S$GLB, | Performed by: INTERNAL MEDICINE

## 2023-03-17 PROCEDURE — 1159F PR MEDICATION LIST DOCUMENTED IN MEDICAL RECORD: ICD-10-PCS | Mod: HCNC,CPTII,S$GLB, | Performed by: INTERNAL MEDICINE

## 2023-03-17 PROCEDURE — 3288F PR FALLS RISK ASSESSMENT DOCUMENTED: ICD-10-PCS | Mod: HCNC,CPTII,S$GLB, | Performed by: INTERNAL MEDICINE

## 2023-03-17 PROCEDURE — 3074F SYST BP LT 130 MM HG: CPT | Mod: HCNC,CPTII,S$GLB, | Performed by: INTERNAL MEDICINE

## 2023-03-17 PROCEDURE — 4010F PR ACE/ARB THEARPY RXD/TAKEN: ICD-10-PCS | Mod: HCNC,CPTII,S$GLB, | Performed by: INTERNAL MEDICINE

## 2023-03-17 PROCEDURE — 3078F DIAST BP <80 MM HG: CPT | Mod: HCNC,CPTII,S$GLB, | Performed by: INTERNAL MEDICINE

## 2023-03-17 PROCEDURE — 1101F PR PT FALLS ASSESS DOC 0-1 FALLS W/OUT INJ PAST YR: ICD-10-PCS | Mod: HCNC,CPTII,S$GLB, | Performed by: INTERNAL MEDICINE

## 2023-03-17 PROCEDURE — 99214 OFFICE O/P EST MOD 30 MIN: CPT | Mod: HCNC,S$GLB,, | Performed by: INTERNAL MEDICINE

## 2023-03-17 PROCEDURE — 1159F MED LIST DOCD IN RCRD: CPT | Mod: HCNC,CPTII,S$GLB, | Performed by: INTERNAL MEDICINE

## 2023-03-17 PROCEDURE — 99999 PR PBB SHADOW E&M-EST. PATIENT-LVL V: ICD-10-PCS | Mod: PBBFAC,HCNC,, | Performed by: INTERNAL MEDICINE

## 2023-03-17 PROCEDURE — 4010F ACE/ARB THERAPY RXD/TAKEN: CPT | Mod: HCNC,CPTII,S$GLB, | Performed by: INTERNAL MEDICINE

## 2023-03-17 NOTE — PROGRESS NOTES
CARDIOVASCULAR PROGRESS NOTE    REASON FOR CONSULT:   Clinton Rosenberg is a 72 y.o. male who presents for follow up of CAD, ao root dil.    PCP: Dair  HISTORY OF PRESENT ILLNESS:   The patient returns for follow-up of his testing.  He tells me he is had no further chest discomfort.  Attempted treadmill stress testing for his nuclear scan, and was able to exercise for several minutes but was unable to achieve target heart rate.  He was limited by knee pain and did not have any chest discomfort.  He otherwise denies shortness breath, palpitations, or syncope.  There has been no PND, orthopnea, melena, hematuria, or claudicant symptoms.  He does report intermittent lower extremity swelling which improves with compression stockings.      His nuclear stress test was normal.  The echo revealed stable aortic root dilatation.  Aortic ultrasound was negative for AAA.  The lower extremity venous ultrasound with reflux noted bilateral GSV reflux.    CARDIOVASCULAR HISTORY:   CAD/MI  2013: LAD PCI    9/20/19: mid RPLB 2.5x18 Resolute Yon SILVER    JAYLEN, unable to afford CPAP  Aortic root dil, 3.7->4.0->3.8->4.2  cm (echo 7/2020), 3.8cm (asc Ao) on CTA 9/2019  CVI (bilat GSV, R LSV, US 3/2018)    PAST MEDICAL HISTORY:     Past Medical History:   Diagnosis Date    Allergy     Angina pectoris     Anxiety     Cancer     skin rwemoved from head    Chronic midline low back pain without sciatica 10/26/2022    Coronary artery disease     Depression     Eye injury as a teen    stuck object in os    GERD (gastroesophageal reflux disease)     Hyperlipidemia     Hypertension     Hypothyroidism     Memory loss     12/3/2014 MRI brain: 1. No evidence for acute infarct 2. unremarkable MRI of the brain; 12/3/2014 carotid US normal    Myocardial infarction     Nuclear sclerosis of both eyes 5/20/2021    Obesity     Peripheral vascular disease 6/20/2017    Retrocalcaneal bursitis 11/24/2014    Sleep apnea        PAST SURGICAL HISTORY:     Past  Surgical History:   Procedure Laterality Date    APPENDECTOMY  1986    bone spur Right     COLONOSCOPY      hand trauma Right     pencil     heart stent      LEFT HEART CATHETERIZATION Left 9/20/2019    Procedure: Left heart cath 12 pm start, R rad access;  Surgeon: Brad Bahena MD;  Location: [facility] CATH LAB;  Service: Cardiology;  Laterality: Left;  RN PREOP 9/13/2019  NEEDS IODINE PREMED    LEFT HEART CATHETERIZATION Left 9/21/2020    Procedure: Left heart cath 730am start, R rad access;  Surgeon: Brad Bahena MD;  Location: [facility] CATH LAB;  Service: Cardiology;  Laterality: Left;  RN PREOP 9/14/2020, COVID NEGATIVE---9/18       ALLERGIES AND MEDICATION:     Review of patient's allergies indicates:   Allergen Reactions    Iodine containing multivitamin Anaphylaxis    Naproxen Other (See Comments)     hallucinations    Hydrocodone-acetaminophen      Rash (skin)^    Oxycodone-acetaminophen      Rash (skin)^        Medication List            Accurate as of March 17, 2023  9:41 AM. If you have any questions, ask your nurse or doctor.                CONTINUE taking these medications      albuterol 90 mcg/actuation inhaler  Commonly known as: PROVENTIL/VENTOLIN HFA  INHALE 2 PUFFS INTO THE LUNGS EVERY 6 (SIX) HOURS AS NEEDED FOR WHEEZING. RESCUE     aspirin 81 MG EC tablet  Commonly known as: ECOTRIN  Take 1 tablet (81 mg total) by mouth once daily.     atorvastatin 80 MG tablet  Commonly known as: LIPITOR  TAKE 1 TABLET EVERY EVENING     clopidogreL 75 mg tablet  Commonly known as: PLAVIX  TAKE 1 TABLET EVERY DAY     diclofenac sodium 1 % Gel  Commonly known as: VOLTAREN  APPLY THE GEL (2 G) TO THE AFFECTED AREA 4 TIMES DAILY. DO NOT APPLY MORE THAN 8 G DAILY TO ANY ONE AFFECTED JOINT     furosemide 20 MG tablet  Commonly known as: LASIX  TAKE 1 TABLET EVERY DAY     gabapentin 300 MG capsule  Commonly known as: NEURONTIN  Take 1 capsule (300 mg total) by mouth every evening.     isosorbide mononitrate 120  MG 24 hr tablet  Commonly known as: IMDUR  TAKE 2 TABLETS (240 MG TOTAL) BY MOUTH ONCE DAILY.     KRILL OIL (OMEGA 3 AND 6) ORAL     levothyroxine 75 MCG tablet  Commonly known as: SYNTHROID  Take 1 tablet (75 mcg total) by mouth before breakfast.     losartan 50 MG tablet  Commonly known as: COZAAR  TAKE 1 TABLET EVERY DAY     metFORMIN 500 MG tablet  Commonly known as: GLUCOPHAGE  TAKE 1 TABLET BY MOUTH EVERY DAY WITH BREAKFAST     metoprolol tartrate 25 MG tablet  Commonly known as: LOPRESSOR  TAKE 1 TABLET TWICE DAILY     nitroGLYCERIN 0.4 MG SL tablet  Commonly known as: NITROSTAT  Place 1 tablet (0.4 mg total) under the tongue every 5 (five) minutes as needed for Chest pain.     pantoprazole 40 MG tablet  Commonly known as: PROTONIX  TAKE 1 TABLET EVERY DAY     tamsulosin 0.4 mg Cap  Commonly known as: FLOMAX  TAKE 1 CAPSULE (0.4 MG TOTAL) BY MOUTH ONCE DAILY.     traMADoL 50 mg tablet  Commonly known as: ULTRAM  Take 1 tablet (50 mg total) by mouth every 6 (six) hours as needed for Pain.     vitamin D 1000 units Tab  Commonly known as: VITAMIN D3  Take 1 tablet (1,000 Units total) by mouth once daily.                SOCIAL HISTORY:     Social History     Socioeconomic History    Marital status:    Tobacco Use    Smoking status: Never    Smokeless tobacco: Never   Substance and Sexual Activity    Alcohol use: No     Alcohol/week: 0.0 standard drinks     Comment: rarely    Drug use: No    Sexual activity: Yes     Social Determinants of Health     Financial Resource Strain: Low Risk     Difficulty of Paying Living Expenses: Not very hard   Food Insecurity: No Food Insecurity    Worried About Running Out of Food in the Last Year: Never true    Ran Out of Food in the Last Year: Never true   Transportation Needs: No Transportation Needs    Lack of Transportation (Medical): No    Lack of Transportation (Non-Medical): No   Physical Activity: Insufficiently Active    Days of Exercise per Week: 4 days    Minutes  of Exercise per Session: 30 min   Stress: No Stress Concern Present    Feeling of Stress : Only a little   Social Connections: Moderately Isolated    Frequency of Communication with Friends and Family: Three times a week    Frequency of Social Gatherings with Friends and Family: Twice a week    Attends Jew Services: Never    Active Member of Clubs or Organizations: No    Attends Club or Organization Meetings: Never    Marital Status:    Housing Stability: Unknown    Unable to Pay for Housing in the Last Year: Patient refused    Number of Places Lived in the Last Year: 1    Unstable Housing in the Last Year: No       FAMILY HISTORY:     Family History   Problem Relation Age of Onset    Arthritis Mother     Early death Mother     Heart disease Mother     Hypertension Mother     Migraines Mother     Seizures Mother     Tremor Mother     Diabetes Mother     Cancer Mother     Early death Father     Heart disease Father     Hypertension Father     Stroke Father     Diabetes Father     Alcohol abuse Father     Arthritis Brother     Cancer Brother     Diabetes Brother     Early death Brother     Heart disease Brother     Hypertension Brother     Heart disease Sister     Hypertension Sister     Arthritis Sister     Depression Sister     Heart disease Brother     Arthritis Brother     Heart disease Brother     Pneumonia Brother     Heart disease Brother     Hypertension Brother     Diabetes Brother     Heart disease Brother     No Known Problems Maternal Aunt     No Known Problems Maternal Uncle     No Known Problems Paternal Aunt     No Known Problems Paternal Uncle     No Known Problems Maternal Grandmother     No Known Problems Maternal Grandfather     No Known Problems Paternal Grandmother     No Known Problems Paternal Grandfather     Amblyopia Neg Hx     Blindness Neg Hx     Cataracts Neg Hx     Glaucoma Neg Hx     Macular degeneration Neg Hx     Retinal detachment Neg Hx     Strabismus Neg Hx     Thyroid  "disease Neg Hx        REVIEW OF SYSTEMS:   Review of Systems   Constitutional:  Negative for chills, diaphoresis and fever.   HENT:  Negative for nosebleeds.    Eyes:  Negative for blurred vision, double vision and photophobia.   Respiratory:  Negative for cough, hemoptysis, sputum production, shortness of breath and wheezing.    Cardiovascular:  Positive for leg swelling. Negative for chest pain, palpitations, orthopnea, claudication and PND.   Gastrointestinal:  Negative for abdominal pain, blood in stool, heartburn, melena, nausea and vomiting.   Genitourinary:  Negative for flank pain and hematuria.   Musculoskeletal:  Negative for falls, myalgias and neck pain.   Skin:  Negative for rash.   Neurological:  Negative for dizziness, seizures, loss of consciousness, weakness and headaches.   Endo/Heme/Allergies:  Negative for polydipsia. Does not bruise/bleed easily.   Psychiatric/Behavioral:  Negative for depression and memory loss. The patient is not nervous/anxious.      PHYSICAL EXAM:     Vitals:    03/17/23 0931   BP: 120/68   Pulse: 62   Resp: 18    Body mass index is 33.31 kg/m².  Weight: 105.3 kg (232 lb 2.3 oz)   Height: 5' 10" (177.8 cm)     Physical Exam  Vitals reviewed.   Constitutional:       General: He is not in acute distress.     Appearance: He is well-developed. He is obese. He is not ill-appearing, toxic-appearing or diaphoretic.   HENT:      Head: Normocephalic and atraumatic.   Eyes:      General: No scleral icterus.     Extraocular Movements: Extraocular movements intact.      Conjunctiva/sclera: Conjunctivae normal.      Pupils: Pupils are equal, round, and reactive to light.   Neck:      Thyroid: No thyromegaly.      Vascular: Normal carotid pulses. No carotid bruit or JVD.      Trachea: Trachea normal. No tracheal deviation.   Cardiovascular:      Rate and Rhythm: Regular rhythm. Bradycardia present.      Pulses:           Carotid pulses are 2+ on the right side and 2+ on the left side.     " Heart sounds: S1 normal and S2 normal. No murmur heard.    No friction rub. No gallop.   Pulmonary:      Effort: Pulmonary effort is normal. No respiratory distress.      Breath sounds: Normal breath sounds. No stridor. No wheezing, rhonchi or rales.   Chest:      Chest wall: No tenderness.   Abdominal:      General: There is no distension.      Palpations: Abdomen is soft.   Musculoskeletal:         General: No swelling or tenderness. Normal range of motion.      Cervical back: Normal range of motion and neck supple. No edema or rigidity.      Right lower leg: No edema.      Left lower leg: No edema.      Comments: ?RLE CVI   Feet:      Right foot:      Skin integrity: No ulcer.      Left foot:      Skin integrity: No ulcer.   Skin:     General: Skin is warm and dry.      Coloration: Skin is not jaundiced.   Neurological:      General: No focal deficit present.      Mental Status: He is alert and oriented to person, place, and time.      Cranial Nerves: No cranial nerve deficit.   Psychiatric:         Mood and Affect: Mood normal.         Speech: Speech normal.         Behavior: Behavior normal. Behavior is cooperative.       DATA:   EKG: (personally reviewed tracing(s))  2/9/23 SR 53, IMI-age indet, PRWP    Laboratory:  CBC:  Recent Labs   Lab 10/08/21  0724 04/07/22  0716 10/07/22  0734   WBC 10.90 7.72 8.17   Hemoglobin 16.0 14.7 14.3   Hematocrit 46.1 42.9 41.7   Platelets 212 190 184         CHEMISTRIES:  Recent Labs   Lab 04/15/21  0938 10/08/21  0724 04/07/22  0716 10/07/22  0734   Glucose 180 H 156 H 157 H 179 H   Sodium 138 141 141 138   Potassium 4.2 4.6 4.6 4.1   BUN 9 11 12 12   Creatinine 1.0 1.1 1.0 0.9   eGFR if African American >60.0 >60.0 >60.0  --    eGFR if non African American >60.0 >60.0 >60.0  --    Calcium 9.0 10.0 9.5 9.3         CARDIAC BIOMARKERS:          COAGS:  Recent Labs   Lab 09/14/20  1352   INR 1.0         LIPIDS/LFTS:  Recent Labs   Lab 04/15/21  0938 10/08/21  0724 04/07/22  0716  10/07/22  0734   Cholesterol 118 L 112 L 108 L  --    Triglycerides 288 H 211 H 204 H  --    HDL 31 L 37 L 33 L  --    LDL Cholesterol 29.4 L 32.8 L 34.2 L  --    Non-HDL Cholesterol 87 75 75  --    AST 24 17 17 16   ALT 21 22 22 20         Cardiovascular Testing:  L MPI 3/9/23    Normal myocardial perfusion scan. There is no evidence of myocardial ischemia or infarction.    There is a  mild intensity fixed perfusion abnormality in the inferior wall of the left ventricle secondary to diaphragm attenuation.    The gated perfusion images showed an ejection fraction of 67% at rest.    There is normal wall motion at rest and post stress.    The ECG portion of the study is negative for ischemia.    The patient reported chest pain during the stress test.    There were no arrhythmias during stress.    Echo 3/9/23 (Ao root 3.9cm, stable vs report 11/2021)  The left ventricle is normal in size with normal systolic function.  The estimated ejection fraction is 65%.  Grade I left ventricular diastolic dysfunction.  Normal right ventricular size with normal right ventricular systolic function.  Intermediate central venous pressure (8 mmHg).  The estimated PA systolic pressure is 22 mmHg.  Mild left atrial enlargement.    Aortic US 3/9/23  No evidence of AAA.    LE venous US/reflux 3/9/23  Right leg:  No DVT.    GSV reflux (below knee segment).    SSV reflux.    Left leg:  No DVT.    GSV reflux (mid and distal thigh segments).    Cath 9/21/20  LVEDP: 7mmHg  LVEF: 65% by echo  Dominance: Right  LM: normal  LAD: MLI, mid stent patent (2013)  Ramus: MLI, prox 30%  LCx: MLI, ost 40%  RCA: MLI, dist eccentric 40-50%              RPDA: ost 50%, iFR 0.97              RPLB mid stent patent (9/20/19 2.5x18 Resolute Ray SILVER)  Hemostasis:  R Radial band  Impression:  Recurrent CP on mult meds  2V CAD, normal LV fxn  Patent mid LAD and mid RPLB stents  iFR dist RCA/ost RPDA neg  R rad vasband for hemostasis  Successful pre-treatment of  iodine allergy  Plan:  Cont med rx  Cont ASA/Plavix/Statin/BBL/Imdur  Plan long term DAPT given diffuse CAD and prior MV PCI  Home today  Follow up with Dr. Bahena as planned  Outpatient GI/Pulm evals    CTA Chest 9/20/19 (post-cath)  The main pulmonary artery is enlarged suggesting underlying pulmonary artery hypertension.  Single-vessel coronary disease.  Normal thoracic aorta and visualized abdominal aorta.  Aortic measurements: STJ 3.4cm, Asc 3.8cm, Desc: 2.6.    Holter 6/8/17  PREDOMINANT RHYTHM  1. Sinus rhythm with heart rates varying between 43 and 94 bpm with an average of 62 bpm.   VENTRICULAR ARRHYTHMIAS  1. There were very rare PVCs recorded totalling 5 and averaging less than 1 per hour.   2. There were no episodes of ventricular tachycardia.  SUPRA VENTRICULAR ARRHYTHMIAS  1. There were very rare PACs recorded totalling 3 and averaging less than 1 per hour.   2. There were no episodes of sustained supraventricular tachycardia.  SINUS NODE FUNCTION  1. There was no evidence of high grade SA deanne block.   AV CONDUCTION  1. There was no evidence of high grade AV block.   DIARY  1. The diary was not returned  MISCELLANEOUS  1. There were rare hookup related artifacts. Overall, the study was of good quality.   2. This was a tape of adequate length (24 hrs).    LE art US/TANA 6/8/17  1.  Mild/atherosclerotic plaquing.  2.  No stenosis about 30 percent femoral popliteal arteries.  3.  Normal bilateral ABIs.    Carotid US 12/3/14  1. Less than 50% stenosis of the right internal carotid artery.  2. Less than 50% stenosis of the left internal carotid artery.      ASSESSMENT:   # CAD s/p LAD PCI 2013, RPLB PCI 9/2019, cath 9/2020 with patent LAD/RPLB stents.  Echo/MPI 3/2023 neg.  No further CP noted.  # HTN, controlled.  # HLP, on atorva 80mg  # Ao root dil, 3.7->4.0->3.8->4.2->4.1->3.9 cm (echo 3/2023), 3.8cm (asc Ao CTA 9/2019)  # CVI (bilat GSV, R LSV) on US 3/2018.  No edema at present (but complains of  intermittent swelling), prev declined referral for venous ablation.  Sxs improved with compression rx.  # hx JAYLEN, pt unable to obtain CPAP apparatus  # BMI 33, down 1 unit(s) vs last OV  # Iodine allergy (?Betadine), successfully premedicated for cath 9/2020    PLAN:   Cont med rx  Cont ASA 81mg qd indefinitely  Cont Plavix 75mg qd, long term rx planned given hx MV PCI/CAD.  Cont compression rx  Diet/exercise/weight loss  RTC 6 months (Sept 2023)  Surveillance echo 1 year (Mar 2024)    Brad Bahena MD, FACC

## 2023-03-24 ENCOUNTER — PES CALL (OUTPATIENT)
Dept: ADMINISTRATIVE | Facility: CLINIC | Age: 73
End: 2023-03-24
Payer: MEDICARE

## 2023-03-31 ENCOUNTER — OFFICE VISIT (OUTPATIENT)
Dept: UROLOGY | Facility: CLINIC | Age: 73
End: 2023-03-31
Payer: MEDICARE

## 2023-03-31 ENCOUNTER — OFFICE VISIT (OUTPATIENT)
Dept: OPTOMETRY | Facility: CLINIC | Age: 73
End: 2023-03-31
Payer: MEDICARE

## 2023-03-31 VITALS — WEIGHT: 234.69 LBS | BODY MASS INDEX: 33.67 KG/M2

## 2023-03-31 DIAGNOSIS — H25.13 NUCLEAR SCLEROSIS OF BOTH EYES: ICD-10-CM

## 2023-03-31 DIAGNOSIS — H52.7 REFRACTIVE ERROR: ICD-10-CM

## 2023-03-31 DIAGNOSIS — R35.1 BPH ASSOCIATED WITH NOCTURIA: Primary | ICD-10-CM

## 2023-03-31 DIAGNOSIS — R73.03 PREDIABETES: Primary | ICD-10-CM

## 2023-03-31 DIAGNOSIS — N48.89 PENILE ADHESIONS W/SKIN BRIDGING: ICD-10-CM

## 2023-03-31 DIAGNOSIS — E11.69 TYPE 2 DIABETES MELLITUS WITH OTHER SPECIFIED COMPLICATION, UNSPECIFIED WHETHER LONG TERM INSULIN USE: ICD-10-CM

## 2023-03-31 DIAGNOSIS — N40.1 BPH ASSOCIATED WITH NOCTURIA: Primary | ICD-10-CM

## 2023-03-31 LAB
BILIRUB SERPL-MCNC: NEGATIVE MG/DL
BLOOD URINE, POC: NEGATIVE
COLOR, POC UA: YELLOW
GLUCOSE UR QL STRIP: NORMAL
KETONES UR QL STRIP: NEGATIVE
LEUKOCYTE ESTERASE URINE, POC: NEGATIVE
NITRITE, POC UA: NEGATIVE
PH, POC UA: 5
PROTEIN, POC: NEGATIVE
SPECIFIC GRAVITY, POC UA: 1010
UROBILINOGEN, POC UA: NORMAL

## 2023-03-31 PROCEDURE — 1126F PR PAIN SEVERITY QUANTIFIED, NO PAIN PRESENT: ICD-10-PCS | Mod: HCNC,CPTII,S$GLB, | Performed by: OPTOMETRIST

## 2023-03-31 PROCEDURE — 99999 PR PBB SHADOW E&M-EST. PATIENT-LVL III: CPT | Mod: PBBFAC,HCNC,, | Performed by: OPTOMETRIST

## 2023-03-31 PROCEDURE — 3288F FALL RISK ASSESSMENT DOCD: CPT | Mod: HCNC,CPTII,S$GLB, | Performed by: OPTOMETRIST

## 2023-03-31 PROCEDURE — 92014 COMPRE OPH EXAM EST PT 1/>: CPT | Mod: HCNC,S$GLB,, | Performed by: OPTOMETRIST

## 2023-03-31 PROCEDURE — 99214 OFFICE O/P EST MOD 30 MIN: CPT | Mod: HCNC,S$GLB,, | Performed by: STUDENT IN AN ORGANIZED HEALTH CARE EDUCATION/TRAINING PROGRAM

## 2023-03-31 PROCEDURE — 1159F MED LIST DOCD IN RCRD: CPT | Mod: HCNC,CPTII,S$GLB, | Performed by: OPTOMETRIST

## 2023-03-31 PROCEDURE — 1101F PT FALLS ASSESS-DOCD LE1/YR: CPT | Mod: HCNC,CPTII,S$GLB, | Performed by: OPTOMETRIST

## 2023-03-31 PROCEDURE — 3008F PR BODY MASS INDEX (BMI) DOCUMENTED: ICD-10-PCS | Mod: HCNC,CPTII,S$GLB, | Performed by: STUDENT IN AN ORGANIZED HEALTH CARE EDUCATION/TRAINING PROGRAM

## 2023-03-31 PROCEDURE — 4010F ACE/ARB THERAPY RXD/TAKEN: CPT | Mod: HCNC,CPTII,S$GLB, | Performed by: STUDENT IN AN ORGANIZED HEALTH CARE EDUCATION/TRAINING PROGRAM

## 2023-03-31 PROCEDURE — 3288F PR FALLS RISK ASSESSMENT DOCUMENTED: ICD-10-PCS | Mod: HCNC,CPTII,S$GLB, | Performed by: STUDENT IN AN ORGANIZED HEALTH CARE EDUCATION/TRAINING PROGRAM

## 2023-03-31 PROCEDURE — 92015 PR REFRACTION: ICD-10-PCS | Mod: HCNC,S$GLB,, | Performed by: OPTOMETRIST

## 2023-03-31 PROCEDURE — 4010F PR ACE/ARB THEARPY RXD/TAKEN: ICD-10-PCS | Mod: HCNC,CPTII,S$GLB, | Performed by: STUDENT IN AN ORGANIZED HEALTH CARE EDUCATION/TRAINING PROGRAM

## 2023-03-31 PROCEDURE — 99999 PR PBB SHADOW E&M-EST. PATIENT-LVL III: CPT | Mod: PBBFAC,HCNC,, | Performed by: STUDENT IN AN ORGANIZED HEALTH CARE EDUCATION/TRAINING PROGRAM

## 2023-03-31 PROCEDURE — 1126F AMNT PAIN NOTED NONE PRSNT: CPT | Mod: HCNC,CPTII,S$GLB, | Performed by: STUDENT IN AN ORGANIZED HEALTH CARE EDUCATION/TRAINING PROGRAM

## 2023-03-31 PROCEDURE — 1159F PR MEDICATION LIST DOCUMENTED IN MEDICAL RECORD: ICD-10-PCS | Mod: HCNC,CPTII,S$GLB, | Performed by: OPTOMETRIST

## 2023-03-31 PROCEDURE — 1126F AMNT PAIN NOTED NONE PRSNT: CPT | Mod: HCNC,CPTII,S$GLB, | Performed by: OPTOMETRIST

## 2023-03-31 PROCEDURE — 81001 URINALYSIS AUTO W/SCOPE: CPT | Mod: HCNC,S$GLB,, | Performed by: STUDENT IN AN ORGANIZED HEALTH CARE EDUCATION/TRAINING PROGRAM

## 2023-03-31 PROCEDURE — 99999 PR PBB SHADOW E&M-EST. PATIENT-LVL III: ICD-10-PCS | Mod: PBBFAC,HCNC,, | Performed by: OPTOMETRIST

## 2023-03-31 PROCEDURE — 1101F PR PT FALLS ASSESS DOC 0-1 FALLS W/OUT INJ PAST YR: ICD-10-PCS | Mod: HCNC,CPTII,S$GLB, | Performed by: OPTOMETRIST

## 2023-03-31 PROCEDURE — 3288F FALL RISK ASSESSMENT DOCD: CPT | Mod: HCNC,CPTII,S$GLB, | Performed by: STUDENT IN AN ORGANIZED HEALTH CARE EDUCATION/TRAINING PROGRAM

## 2023-03-31 PROCEDURE — 81001 POCT URINALYSIS, DIPSTICK OR TABLET REAGENT, AUTOMATED, WITH MICROSCOP: ICD-10-PCS | Mod: HCNC,S$GLB,, | Performed by: STUDENT IN AN ORGANIZED HEALTH CARE EDUCATION/TRAINING PROGRAM

## 2023-03-31 PROCEDURE — 1159F MED LIST DOCD IN RCRD: CPT | Mod: HCNC,CPTII,S$GLB, | Performed by: STUDENT IN AN ORGANIZED HEALTH CARE EDUCATION/TRAINING PROGRAM

## 2023-03-31 PROCEDURE — 1101F PR PT FALLS ASSESS DOC 0-1 FALLS W/OUT INJ PAST YR: ICD-10-PCS | Mod: HCNC,CPTII,S$GLB, | Performed by: STUDENT IN AN ORGANIZED HEALTH CARE EDUCATION/TRAINING PROGRAM

## 2023-03-31 PROCEDURE — 1160F RVW MEDS BY RX/DR IN RCRD: CPT | Mod: HCNC,CPTII,S$GLB, | Performed by: OPTOMETRIST

## 2023-03-31 PROCEDURE — 1126F PR PAIN SEVERITY QUANTIFIED, NO PAIN PRESENT: ICD-10-PCS | Mod: HCNC,CPTII,S$GLB, | Performed by: STUDENT IN AN ORGANIZED HEALTH CARE EDUCATION/TRAINING PROGRAM

## 2023-03-31 PROCEDURE — 92015 DETERMINE REFRACTIVE STATE: CPT | Mod: HCNC,S$GLB,, | Performed by: OPTOMETRIST

## 2023-03-31 PROCEDURE — 4010F PR ACE/ARB THEARPY RXD/TAKEN: ICD-10-PCS | Mod: HCNC,CPTII,S$GLB, | Performed by: OPTOMETRIST

## 2023-03-31 PROCEDURE — 99214 PR OFFICE/OUTPT VISIT, EST, LEVL IV, 30-39 MIN: ICD-10-PCS | Mod: HCNC,S$GLB,, | Performed by: STUDENT IN AN ORGANIZED HEALTH CARE EDUCATION/TRAINING PROGRAM

## 2023-03-31 PROCEDURE — 4010F ACE/ARB THERAPY RXD/TAKEN: CPT | Mod: HCNC,CPTII,S$GLB, | Performed by: OPTOMETRIST

## 2023-03-31 PROCEDURE — 1159F PR MEDICATION LIST DOCUMENTED IN MEDICAL RECORD: ICD-10-PCS | Mod: HCNC,CPTII,S$GLB, | Performed by: STUDENT IN AN ORGANIZED HEALTH CARE EDUCATION/TRAINING PROGRAM

## 2023-03-31 PROCEDURE — 3288F PR FALLS RISK ASSESSMENT DOCUMENTED: ICD-10-PCS | Mod: HCNC,CPTII,S$GLB, | Performed by: OPTOMETRIST

## 2023-03-31 PROCEDURE — 3008F BODY MASS INDEX DOCD: CPT | Mod: HCNC,CPTII,S$GLB, | Performed by: STUDENT IN AN ORGANIZED HEALTH CARE EDUCATION/TRAINING PROGRAM

## 2023-03-31 PROCEDURE — 1101F PT FALLS ASSESS-DOCD LE1/YR: CPT | Mod: HCNC,CPTII,S$GLB, | Performed by: STUDENT IN AN ORGANIZED HEALTH CARE EDUCATION/TRAINING PROGRAM

## 2023-03-31 PROCEDURE — 1160F PR REVIEW ALL MEDS BY PRESCRIBER/CLIN PHARMACIST DOCUMENTED: ICD-10-PCS | Mod: HCNC,CPTII,S$GLB, | Performed by: OPTOMETRIST

## 2023-03-31 PROCEDURE — 92014 PR EYE EXAM, EST PATIENT,COMPREHESV: ICD-10-PCS | Mod: HCNC,S$GLB,, | Performed by: OPTOMETRIST

## 2023-03-31 PROCEDURE — 99999 PR PBB SHADOW E&M-EST. PATIENT-LVL III: ICD-10-PCS | Mod: PBBFAC,HCNC,, | Performed by: STUDENT IN AN ORGANIZED HEALTH CARE EDUCATION/TRAINING PROGRAM

## 2023-03-31 RX ORDER — NYSTATIN 100000 [USP'U]/G
POWDER TOPICAL 4 TIMES DAILY PRN
Qty: 15 G | Refills: 1 | Status: SHIPPED | OUTPATIENT
Start: 2023-03-31 | End: 2024-02-09

## 2023-03-31 RX ORDER — CLOTRIMAZOLE AND BETAMETHASONE DIPROPIONATE 10; .64 MG/G; MG/G
CREAM TOPICAL 2 TIMES DAILY
Qty: 45 G | Refills: 1 | Status: SHIPPED | OUTPATIENT
Start: 2023-03-31 | End: 2023-04-30

## 2023-03-31 NOTE — PROGRESS NOTES
Subjective:       Patient ID: Clinton Rosenberg is a 72 y.o. male      Chief Complaint   Patient presents with    Concerns About Ocular Health    Diabetic Eye Exam     History of Present Illness  Dls 5/20/21 Dr. Alcantara     71 Y/o male here today with c/o blurry vision is OD when looking at a distance and reading pt also states tears a lot when reading. Pt does not wear glasses.     Pt denies pain and discomfort   Pt has also been seeing flashes in OD     Eye med: no gtt     Hemoglobin A1C       Date                     Value               Ref Range             Status                10/07/2022               7.0 (H)             4.0 - 5.6 %           Final                  04/07/2022               6.5 (H)             4.0 - 5.6 %           Final                  10/08/2021               6.3 (H)             4.0 - 5.6 %           Final                    Assessment/Plan:     1. Prediabetes  No diabetic retinopathy. Discussed with pt the effects of diabetes on vision, importance of good blood sugar control, compliance with meds, and follow up care with PCP. Return in 1 year for dilated eye exam, sooner PRN.    2. Nuclear sclerosis of both eyes  Educated pt on presence of cataracts and effects on vision. No surgery at this time. Recheck in one year, sooner PRN.    3. Refractive error  Educated patient on refractive error and discussed lens options. Dispensed updated spectacle Rx. Educated about adaptation period to new specs.    Eyeglass Final Rx       Eyeglass Final Rx         Sphere Cylinder Axis Add    Right -1.25 +1.25 180 +2.50    Left -0.50 +0.50 155 +2.50      Expiration Date: 3/31/2024                      Follow up in about 1 year (around 3/31/2024).

## 2023-03-31 NOTE — PROGRESS NOTES
Patient ID: Clinton Rosenberg is a 72 y.o. male.    Chief Complaint: Follow-up      HPI  72 y.o. who presents to the Urology clinic for evaluation of BPH/LUTS. Symptoms have worsened since last visit, IPSS below. Patient now taking flomax daily. Patient denies UTI, prostatitis, urinary retention since last appt.     [facility] Ipss    Question 3/30/2023 11:52 AM CDT - Filed by Patient   Over the past months, have you had:     Incomplete emptying: How often have you had the sensation of not emptying your bladder completely after you finished urinating?  4-Four Times   Frquency: How often have you had to urinate again less than two hours after you finished urinating? 4-More than half the time   Intermittency: How often have you found you stopped and started again several times when you urinated? 4-More than half the time   Urgency: How often do you find it difficult to postpone urination? 2-Less than half the time   Weak stream: How often have you had a weak urinary stream? 4-More than half the time   Straining: How often have you had to push or strain to begin urination? 4-More than half the time   Sleeping: How many times did you most typically get up to urinate from the time you went to bed at night until the time you got up in the morning?  2 - Two times   Quality of life due to urinary symptoms: If you were to spend the rest of your life with your urinary condition the way it is now, how would you feel about that?  5-Unhappy   IPSS Score  (range: 0 - 35) 24 (Severe symtoms )         Patient denies consumption of bladder irritants  Patient interested in a procedure    Medically Necessary ROS documented in HPI    Past Medical History  Active Ambulatory Problems     Diagnosis Date Noted    Mixed hyperlipidemia long term DAPT 07/09/2014    Essential hypertension 07/09/2014    Gastroesophageal reflux disease 07/09/2014    Benign non-nodular prostatic hyperplasia with lower urinary tract symptoms 07/09/2014     Vitamin D deficiency 07/09/2014    Hernia of abdominal wall 07/09/2014    Coronary artery disease of native artery of native heart with stable angina pectoris; 2021 plan is long term DAPT 11/19/2014    Anxiety and depression with assoc memory loss; seen by neuro 2015, negative workup 11/24/2014    Prediabetes 11/24/2014    Hypothyroidism 06/01/2016    Obesity (BMI 30.0-34.9) 06/01/2016    Aortic ectasia 04/28/2017    JAYLEN (obstructive sleep apnea) 04/28/2017    Peripheral vascular disease with LE US 6/2017 and carotid US  06/20/2017    Aortic root dilatation on TTE 8/2019 11/14/2017    History of concussion 1/2018 head CT negative 01/31/2018    Venous insufficiency 05/10/2018    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 05/20/2018    Tubular adenoma of colon 2/2009 10/11/2018    Allergy to iodine 08/27/2019    Nuclear sclerosis of both eyes 05/20/2021    History of COVID-19 02/25/2022    Chronic midline low back pain without sciatica 10/26/2022    Refractive error 03/31/2023     Resolved Ambulatory Problems     Diagnosis Date Noted    Myocardial infarct, old 07/09/2014    Angina effort 07/09/2014    Other malaise and fatigue 11/24/2014    Chronic cough 11/24/2014    Dysuria 11/24/2014    Dysarthria 11/24/2014    Aphasia 11/24/2014    Mild cognitive impairment, so stated 11/24/2014    Retrocalcaneal bursitis 11/24/2014    Other abnormal glucose 11/24/2014    Knee pain 11/24/2014    Memory difficulties 11/24/2014    Attention and concentration deficit 01/27/2015    Headache 01/27/2015    Chest pain 05/16/2018    Pleurisy 05/16/2018    Coronary artery disease of native artery of native heart with stable angina pectoris 02/21/2019    Pneumonia due to COVID-19 virus 01/31/2022     Past Medical History:   Diagnosis Date    Allergy     Angina pectoris     Anxiety     Cancer     Coronary artery disease     Depression     Eye injury as a teen    GERD (gastroesophageal reflux disease)     Hyperlipidemia     Hypertension     Memory  loss     Myocardial infarction     Obesity     Sleep apnea          Past Surgical History  Past Surgical History:   Procedure Laterality Date    APPENDECTOMY  1986    bone spur Right     COLONOSCOPY      hand trauma Right     pencil     heart stent      LEFT HEART CATHETERIZATION Left 9/20/2019    Procedure: Left heart cath 12 pm start, R rad access;  Surgeon: Brad Bahena MD;  Location: [facility] CATH LAB;  Service: Cardiology;  Laterality: Left;  RN PREOP 9/13/2019  NEEDS IODINE PREMED    LEFT HEART CATHETERIZATION Left 9/21/2020    Procedure: Left heart cath 730am start, R rad access;  Surgeon: Brad Bahena MD;  Location: [facility] CATH LAB;  Service: Cardiology;  Laterality: Left;  RN PREOP 9/14/2020, COVID NEGATIVE---9/18       Social History  Social Connections: Moderately Isolated    Frequency of Communication with Friends and Family: Three times a week    Frequency of Social Gatherings with Friends and Family: Twice a week    Attends Adventism Services: Never    Active Member of Clubs or Organizations: No    Attends Club or Organization Meetings: Never    Marital Status:        Medications    Current Outpatient Medications:     aspirin (ECOTRIN) 81 MG EC tablet, Take 1 tablet (81 mg total) by mouth once daily., Disp: 90 tablet, Rfl: 3    atorvastatin (LIPITOR) 80 MG tablet, TAKE 1 TABLET EVERY EVENING, Disp: 90 tablet, Rfl: 3    clopidogreL (PLAVIX) 75 mg tablet, TAKE 1 TABLET EVERY DAY, Disp: 90 tablet, Rfl: 3    furosemide (LASIX) 20 MG tablet, TAKE 1 TABLET EVERY DAY, Disp: 90 tablet, Rfl: 3    gabapentin (NEURONTIN) 300 MG capsule, Take 1 capsule (300 mg total) by mouth every evening., Disp: 90 capsule, Rfl: 0    isosorbide mononitrate (IMDUR) 120 MG 24 hr tablet, TAKE 2 TABLETS (240 MG TOTAL) BY MOUTH ONCE DAILY., Disp: 180 tablet, Rfl: 3    KRILL/OM3/DHA/EPA/OM6/LIP/ASTX (KRILL OIL, OMEGA 3 AND 6, ORAL), Take by mouth., Disp: , Rfl:     levothyroxine (SYNTHROID) 75 MCG tablet, Take 1 tablet  (75 mcg total) by mouth before breakfast., Disp: 90 tablet, Rfl: 3    losartan (COZAAR) 50 MG tablet, TAKE 1 TABLET EVERY DAY, Disp: 90 tablet, Rfl: 3    metFORMIN (GLUCOPHAGE) 500 MG tablet, TAKE 1 TABLET BY MOUTH EVERY DAY WITH BREAKFAST, Disp: 90 tablet, Rfl: 1    metoprolol tartrate (LOPRESSOR) 25 MG tablet, TAKE 1 TABLET TWICE DAILY, Disp: 180 tablet, Rfl: 3    nitroGLYCERIN (NITROSTAT) 0.4 MG SL tablet, Place 1 tablet (0.4 mg total) under the tongue every 5 (five) minutes as needed for Chest pain., Disp: 50 tablet, Rfl: 3    pantoprazole (PROTONIX) 40 MG tablet, TAKE 1 TABLET EVERY DAY, Disp: 90 tablet, Rfl: 3    tamsulosin (FLOMAX) 0.4 mg Cap, TAKE 1 CAPSULE (0.4 MG TOTAL) BY MOUTH ONCE DAILY., Disp: 90 capsule, Rfl: 3    vitamin D 1000 units Tab, Take 1 tablet (1,000 Units total) by mouth once daily., Disp: 90 tablet, Rfl: 3    albuterol (PROVENTIL/VENTOLIN HFA) 90 mcg/actuation inhaler, INHALE 2 PUFFS INTO THE LUNGS EVERY 6 (SIX) HOURS AS NEEDED FOR WHEEZING. RESCUE (Patient not taking: Reported on 3/31/2023), Disp: 18 g, Rfl: 0    diclofenac sodium (VOLTAREN) 1 % Gel, APPLY THE GEL (2 G) TO THE AFFECTED AREA 4 TIMES DAILY. DO NOT APPLY MORE THAN 8 G DAILY TO ANY ONE AFFECTED JOINT (Patient not taking: Reported on 3/31/2023), Disp: 100 g, Rfl: 1    traMADoL (ULTRAM) 50 mg tablet, Take 1 tablet (50 mg total) by mouth every 6 (six) hours as needed for Pain. (Patient not taking: Reported on 3/31/2023), Disp: 15 tablet, Rfl: 0    Allergies  Review of patient's allergies indicates:   Allergen Reactions    Iodine and iodide containing products Anaphylaxis    Naproxen Other (See Comments)     hallucinations  Hallucinations^  Hallucinations^    Hydrocodone-acetaminophen      Rash (skin)^    Iodine      Anaphylaxis^    Oxycodone-acetaminophen      Rash (skin)^       Patient's PMH, FH, Social hx, Medications, allergies reviewed and updated as pertinent to today's visit    Objective:      Physical  Exam  Constitutional:       General: He is not in acute distress.     Appearance: He is well-developed. He is not ill-appearing, toxic-appearing or diaphoretic.   HENT:      Head: Normocephalic and atraumatic.      Mouth/Throat:      Mouth: Mucous membranes are moist.   Eyes:      Conjunctiva/sclera: Conjunctivae normal.   Pulmonary:      Effort: Pulmonary effort is normal. No respiratory distress.   Abdominal:      General: Abdomen is flat. There is no distension.      Palpations: Abdomen is soft. There is no mass.      Tenderness: There is no abdominal tenderness. There is no guarding.   Genitourinary:     Comments: Redundant foreskin despite hx of circumcision, penile skin bridge along coronal sulcus  No signs of cellulitis  Musculoskeletal:         General: No swelling or deformity.      Cervical back: Neck supple.   Skin:     General: Skin is warm.      Capillary Refill: Capillary refill takes less than 2 seconds.      Findings: No rash.   Neurological:      Mental Status: He is alert and oriented to person, place, and time.      Gait: Gait normal.   Psychiatric:         Mood and Affect: Mood normal.         Thought Content: Thought content normal.         Judgment: Judgment normal.           Lab Results   Component Value Date    PSADIAG 1.1 10/11/2021        Assessment:       1. BPH associated with nocturia    2. Type 2 diabetes mellitus with other specified complication, unspecified whether long term insulin use    3. Penile adhesions w/skin bridging          Plan:         PSA due  RBUS to measure prostate and discuss next steps for management as it appears patient is not having satisfactory response to med management  PVR 0 cc no retention  POCT UA w/o signs of infection    Penile skin bridges  Topical steroid rx  x 7 days w/ antifungal powder PRN  Can consider circumcision revision if conservative measures are ineffecive    RTC 1 year or sooner if PSA found to be abn

## 2023-04-12 ENCOUNTER — PATIENT MESSAGE (OUTPATIENT)
Dept: ADMINISTRATIVE | Facility: HOSPITAL | Age: 73
End: 2023-04-12
Payer: MEDICARE

## 2023-04-12 ENCOUNTER — LAB VISIT (OUTPATIENT)
Dept: LAB | Facility: HOSPITAL | Age: 73
End: 2023-04-12
Attending: INTERNAL MEDICINE
Payer: MEDICARE

## 2023-04-12 DIAGNOSIS — R73.03 PREDIABETES: Chronic | ICD-10-CM

## 2023-04-12 DIAGNOSIS — R35.1 BPH ASSOCIATED WITH NOCTURIA: ICD-10-CM

## 2023-04-12 DIAGNOSIS — N40.1 BPH ASSOCIATED WITH NOCTURIA: ICD-10-CM

## 2023-04-12 DIAGNOSIS — E78.2 MIXED HYPERLIPIDEMIA: Chronic | ICD-10-CM

## 2023-04-12 LAB
BASOPHILS # BLD AUTO: 0.07 K/UL (ref 0–0.2)
BASOPHILS NFR BLD: 0.9 % (ref 0–1.9)
CHOLEST SERPL-MCNC: 128 MG/DL (ref 120–199)
CHOLEST/HDLC SERPL: 3.5 {RATIO} (ref 2–5)
COMPLEXED PSA SERPL-MCNC: 0.96 NG/ML (ref 0–4)
DIFFERENTIAL METHOD: ABNORMAL
EOSINOPHIL # BLD AUTO: 0.3 K/UL (ref 0–0.5)
EOSINOPHIL NFR BLD: 3.5 % (ref 0–8)
ERYTHROCYTE [DISTWIDTH] IN BLOOD BY AUTOMATED COUNT: 13.2 % (ref 11.5–14.5)
ESTIMATED AVG GLUCOSE: 148 MG/DL (ref 68–131)
HBA1C MFR BLD: 6.8 % (ref 4–5.6)
HCT VFR BLD AUTO: 43.8 % (ref 40–54)
HDLC SERPL-MCNC: 37 MG/DL (ref 40–75)
HDLC SERPL: 28.9 % (ref 20–50)
HGB BLD-MCNC: 14.8 G/DL (ref 14–18)
IMM GRANULOCYTES # BLD AUTO: 0.04 K/UL (ref 0–0.04)
IMM GRANULOCYTES NFR BLD AUTO: 0.5 % (ref 0–0.5)
LDLC SERPL CALC-MCNC: 49.4 MG/DL (ref 63–159)
LYMPHOCYTES # BLD AUTO: 2.5 K/UL (ref 1–4.8)
LYMPHOCYTES NFR BLD: 33.1 % (ref 18–48)
MCH RBC QN AUTO: 31.3 PG (ref 27–31)
MCHC RBC AUTO-ENTMCNC: 33.8 G/DL (ref 32–36)
MCV RBC AUTO: 93 FL (ref 82–98)
MONOCYTES # BLD AUTO: 0.6 K/UL (ref 0.3–1)
MONOCYTES NFR BLD: 8.2 % (ref 4–15)
NEUTROPHILS # BLD AUTO: 4.1 K/UL (ref 1.8–7.7)
NEUTROPHILS NFR BLD: 53.8 % (ref 38–73)
NONHDLC SERPL-MCNC: 91 MG/DL
NRBC BLD-RTO: 0 /100 WBC
PLATELET # BLD AUTO: 195 K/UL (ref 150–450)
PMV BLD AUTO: 10 FL (ref 9.2–12.9)
RBC # BLD AUTO: 4.73 M/UL (ref 4.6–6.2)
TRIGL SERPL-MCNC: 208 MG/DL (ref 30–150)
WBC # BLD AUTO: 7.53 K/UL (ref 3.9–12.7)

## 2023-04-12 PROCEDURE — 84153 ASSAY OF PSA TOTAL: CPT | Mod: HCNC | Performed by: STUDENT IN AN ORGANIZED HEALTH CARE EDUCATION/TRAINING PROGRAM

## 2023-04-12 PROCEDURE — 80053 COMPREHEN METABOLIC PANEL: CPT | Mod: HCNC | Performed by: INTERNAL MEDICINE

## 2023-04-12 PROCEDURE — 36415 COLL VENOUS BLD VENIPUNCTURE: CPT | Mod: HCNC,PO | Performed by: STUDENT IN AN ORGANIZED HEALTH CARE EDUCATION/TRAINING PROGRAM

## 2023-04-12 PROCEDURE — 80061 LIPID PANEL: CPT | Mod: HCNC | Performed by: INTERNAL MEDICINE

## 2023-04-12 PROCEDURE — 83036 HEMOGLOBIN GLYCOSYLATED A1C: CPT | Mod: HCNC | Performed by: INTERNAL MEDICINE

## 2023-04-12 PROCEDURE — 85025 COMPLETE CBC W/AUTO DIFF WBC: CPT | Mod: HCNC | Performed by: INTERNAL MEDICINE

## 2023-04-13 LAB
ALBUMIN SERPL BCP-MCNC: 4 G/DL (ref 3.5–5.2)
ALP SERPL-CCNC: 51 U/L (ref 55–135)
ALT SERPL W/O P-5'-P-CCNC: 21 U/L (ref 10–44)
ANION GAP SERPL CALC-SCNC: 10 MMOL/L (ref 8–16)
AST SERPL-CCNC: 18 U/L (ref 10–40)
BILIRUB SERPL-MCNC: 0.7 MG/DL (ref 0.1–1)
BUN SERPL-MCNC: 13 MG/DL (ref 8–23)
CALCIUM SERPL-MCNC: 9.4 MG/DL (ref 8.7–10.5)
CHLORIDE SERPL-SCNC: 103 MMOL/L (ref 95–110)
CO2 SERPL-SCNC: 26 MMOL/L (ref 23–29)
CREAT SERPL-MCNC: 1 MG/DL (ref 0.5–1.4)
EST. GFR  (NO RACE VARIABLE): >60 ML/MIN/1.73 M^2
GLUCOSE SERPL-MCNC: 181 MG/DL (ref 70–110)
POTASSIUM SERPL-SCNC: 4.8 MMOL/L (ref 3.5–5.1)
PROT SERPL-MCNC: 6.6 G/DL (ref 6–8.4)
SODIUM SERPL-SCNC: 139 MMOL/L (ref 136–145)

## 2023-04-15 ENCOUNTER — HOSPITAL ENCOUNTER (OUTPATIENT)
Dept: RADIOLOGY | Facility: HOSPITAL | Age: 73
Discharge: HOME | End: 2023-04-15
Attending: STUDENT IN AN ORGANIZED HEALTH CARE EDUCATION/TRAINING PROGRAM
Payer: MEDICARE

## 2023-04-15 DIAGNOSIS — N40.1 BPH ASSOCIATED WITH NOCTURIA: ICD-10-CM

## 2023-04-15 DIAGNOSIS — R35.1 BPH ASSOCIATED WITH NOCTURIA: ICD-10-CM

## 2023-04-15 PROCEDURE — 76770 US EXAM ABDO BACK WALL COMP: CPT | Mod: 26,HCNC,, | Performed by: INTERNAL MEDICINE

## 2023-04-15 PROCEDURE — 76770 US EXAM ABDO BACK WALL COMP: CPT | Mod: TC,HCNC

## 2023-04-15 PROCEDURE — 76770 US RETROPERITONEAL COMPLETE: ICD-10-PCS | Mod: 26,HCNC,, | Performed by: INTERNAL MEDICINE

## 2023-04-17 ENCOUNTER — TELEPHONE (OUTPATIENT)
Dept: UROLOGY | Facility: CLINIC | Age: 73
End: 2023-04-17
Payer: MEDICARE

## 2023-04-17 NOTE — TELEPHONE ENCOUNTER
Pt notified of appt with Dr. Orona 5/5/23 @ 4:15pm      ----- Message from Robles Orona MD sent at 4/15/2023  1:26 PM CDT -----  pls arrange follow up appt to discuss prostate procedure

## 2023-04-25 ENCOUNTER — TELEPHONE (OUTPATIENT)
Dept: ADMINISTRATIVE | Facility: CLINIC | Age: 73
End: 2023-04-25
Payer: MEDICARE

## 2023-04-27 ENCOUNTER — PATIENT MESSAGE (OUTPATIENT)
Dept: ADMINISTRATIVE | Facility: OTHER | Age: 73
End: 2023-04-27
Payer: MEDICARE

## 2023-04-27 ENCOUNTER — OFFICE VISIT (OUTPATIENT)
Dept: FAMILY MEDICINE | Facility: CLINIC | Age: 73
End: 2023-04-27
Payer: MEDICARE

## 2023-04-27 VITALS
WEIGHT: 234.69 LBS | HEIGHT: 70 IN | BODY MASS INDEX: 33.6 KG/M2 | OXYGEN SATURATION: 98 % | SYSTOLIC BLOOD PRESSURE: 124 MMHG | HEART RATE: 55 BPM | DIASTOLIC BLOOD PRESSURE: 68 MMHG | TEMPERATURE: 98 F

## 2023-04-27 DIAGNOSIS — Z00.00 ENCOUNTER FOR PREVENTIVE HEALTH EXAMINATION: Primary | ICD-10-CM

## 2023-04-27 DIAGNOSIS — R00.1 BRADYCARDIA: ICD-10-CM

## 2023-04-27 DIAGNOSIS — E11.69 TYPE 2 DIABETES MELLITUS WITH OTHER SPECIFIED COMPLICATION, WITHOUT LONG-TERM CURRENT USE OF INSULIN: ICD-10-CM

## 2023-04-27 DIAGNOSIS — I77.819 AORTIC ECTASIA: ICD-10-CM

## 2023-04-27 DIAGNOSIS — E66.9 OBESITY (BMI 30.0-34.9): Chronic | ICD-10-CM

## 2023-04-27 DIAGNOSIS — E78.2 MIXED HYPERLIPIDEMIA: Chronic | ICD-10-CM

## 2023-04-27 DIAGNOSIS — I10 ESSENTIAL HYPERTENSION: Chronic | ICD-10-CM

## 2023-04-27 DIAGNOSIS — I25.118 CORONARY ARTERY DISEASE OF NATIVE ARTERY OF NATIVE HEART WITH STABLE ANGINA PECTORIS: ICD-10-CM

## 2023-04-27 DIAGNOSIS — I73.9 PERIPHERAL VASCULAR DISEASE: ICD-10-CM

## 2023-04-27 DIAGNOSIS — I77.810 AORTIC ROOT DILATATION: ICD-10-CM

## 2023-04-27 PROCEDURE — 3008F PR BODY MASS INDEX (BMI) DOCUMENTED: ICD-10-PCS | Mod: HCNC,CPTII,S$GLB, | Performed by: NURSE PRACTITIONER

## 2023-04-27 PROCEDURE — 3074F PR MOST RECENT SYSTOLIC BLOOD PRESSURE < 130 MM HG: ICD-10-PCS | Mod: HCNC,CPTII,S$GLB, | Performed by: NURSE PRACTITIONER

## 2023-04-27 PROCEDURE — 3078F DIAST BP <80 MM HG: CPT | Mod: HCNC,CPTII,S$GLB, | Performed by: NURSE PRACTITIONER

## 2023-04-27 PROCEDURE — 4010F PR ACE/ARB THEARPY RXD/TAKEN: ICD-10-PCS | Mod: HCNC,CPTII,S$GLB, | Performed by: NURSE PRACTITIONER

## 2023-04-27 PROCEDURE — 3044F HG A1C LEVEL LT 7.0%: CPT | Mod: HCNC,CPTII,S$GLB, | Performed by: NURSE PRACTITIONER

## 2023-04-27 PROCEDURE — 1126F AMNT PAIN NOTED NONE PRSNT: CPT | Mod: HCNC,CPTII,S$GLB, | Performed by: NURSE PRACTITIONER

## 2023-04-27 PROCEDURE — 1170F PR FUNCTIONAL STATUS ASSESSED: ICD-10-PCS | Mod: HCNC,CPTII,S$GLB, | Performed by: NURSE PRACTITIONER

## 2023-04-27 PROCEDURE — 4010F ACE/ARB THERAPY RXD/TAKEN: CPT | Mod: HCNC,CPTII,S$GLB, | Performed by: NURSE PRACTITIONER

## 2023-04-27 PROCEDURE — 1101F PT FALLS ASSESS-DOCD LE1/YR: CPT | Mod: HCNC,CPTII,S$GLB, | Performed by: NURSE PRACTITIONER

## 2023-04-27 PROCEDURE — 1160F RVW MEDS BY RX/DR IN RCRD: CPT | Mod: HCNC,CPTII,S$GLB, | Performed by: NURSE PRACTITIONER

## 2023-04-27 PROCEDURE — 3078F PR MOST RECENT DIASTOLIC BLOOD PRESSURE < 80 MM HG: ICD-10-PCS | Mod: HCNC,CPTII,S$GLB, | Performed by: NURSE PRACTITIONER

## 2023-04-27 PROCEDURE — 1126F PR PAIN SEVERITY QUANTIFIED, NO PAIN PRESENT: ICD-10-PCS | Mod: HCNC,CPTII,S$GLB, | Performed by: NURSE PRACTITIONER

## 2023-04-27 PROCEDURE — 99999 PR PBB SHADOW E&M-EST. PATIENT-LVL V: CPT | Mod: PBBFAC,HCNC,, | Performed by: NURSE PRACTITIONER

## 2023-04-27 PROCEDURE — 3074F SYST BP LT 130 MM HG: CPT | Mod: HCNC,CPTII,S$GLB, | Performed by: NURSE PRACTITIONER

## 2023-04-27 PROCEDURE — 1101F PR PT FALLS ASSESS DOC 0-1 FALLS W/OUT INJ PAST YR: ICD-10-PCS | Mod: HCNC,CPTII,S$GLB, | Performed by: NURSE PRACTITIONER

## 2023-04-27 PROCEDURE — 3288F FALL RISK ASSESSMENT DOCD: CPT | Mod: HCNC,CPTII,S$GLB, | Performed by: NURSE PRACTITIONER

## 2023-04-27 PROCEDURE — 99999 PR PBB SHADOW E&M-EST. PATIENT-LVL V: ICD-10-PCS | Mod: PBBFAC,HCNC,, | Performed by: NURSE PRACTITIONER

## 2023-04-27 PROCEDURE — 3066F NEPHROPATHY DOC TX: CPT | Mod: HCNC,CPTII,S$GLB, | Performed by: NURSE PRACTITIONER

## 2023-04-27 PROCEDURE — G0439 PR MEDICARE ANNUAL WELLNESS SUBSEQUENT VISIT: ICD-10-PCS | Mod: HCNC,S$GLB,, | Performed by: NURSE PRACTITIONER

## 2023-04-27 PROCEDURE — 3044F PR MOST RECENT HEMOGLOBIN A1C LEVEL <7.0%: ICD-10-PCS | Mod: HCNC,CPTII,S$GLB, | Performed by: NURSE PRACTITIONER

## 2023-04-27 PROCEDURE — 1159F MED LIST DOCD IN RCRD: CPT | Mod: HCNC,CPTII,S$GLB, | Performed by: NURSE PRACTITIONER

## 2023-04-27 PROCEDURE — 1159F PR MEDICATION LIST DOCUMENTED IN MEDICAL RECORD: ICD-10-PCS | Mod: HCNC,CPTII,S$GLB, | Performed by: NURSE PRACTITIONER

## 2023-04-27 PROCEDURE — 3066F PR DOCUMENTATION OF TREATMENT FOR NEPHROPATHY: ICD-10-PCS | Mod: HCNC,CPTII,S$GLB, | Performed by: NURSE PRACTITIONER

## 2023-04-27 PROCEDURE — 3061F PR NEG MICROALBUMINURIA RESULT DOCUMENTED/REVIEW: ICD-10-PCS | Mod: HCNC,CPTII,S$GLB, | Performed by: NURSE PRACTITIONER

## 2023-04-27 PROCEDURE — 3008F BODY MASS INDEX DOCD: CPT | Mod: HCNC,CPTII,S$GLB, | Performed by: NURSE PRACTITIONER

## 2023-04-27 PROCEDURE — 3288F PR FALLS RISK ASSESSMENT DOCUMENTED: ICD-10-PCS | Mod: HCNC,CPTII,S$GLB, | Performed by: NURSE PRACTITIONER

## 2023-04-27 PROCEDURE — 3061F NEG MICROALBUMINURIA REV: CPT | Mod: HCNC,CPTII,S$GLB, | Performed by: NURSE PRACTITIONER

## 2023-04-27 PROCEDURE — G0439 PPPS, SUBSEQ VISIT: HCPCS | Mod: HCNC,S$GLB,, | Performed by: NURSE PRACTITIONER

## 2023-04-27 PROCEDURE — 1160F PR REVIEW ALL MEDS BY PRESCRIBER/CLIN PHARMACIST DOCUMENTED: ICD-10-PCS | Mod: HCNC,CPTII,S$GLB, | Performed by: NURSE PRACTITIONER

## 2023-04-27 PROCEDURE — 1170F FXNL STATUS ASSESSED: CPT | Mod: HCNC,CPTII,S$GLB, | Performed by: NURSE PRACTITIONER

## 2023-04-27 NOTE — PATIENT INSTRUCTIONS
Counseling and Referral of Other Preventative  (Italic type indicates deductible and co-insurance are waived)    Patient Name: Clinton Rosenberg  Today's Date: 4/27/2023    Health Maintenance       Date Due Completion Date    Shingles Vaccine (1 of 2) Never done ---    Pneumococcal Vaccines (Age 65+) (3 - PPSV23 if available, else PCV20) 03/16/2022 10/16/2019    Colorectal Cancer Screening 03/15/2023 12/4/2019    COVID-19 Vaccine (1) 06/15/2023 (Originally 3/30/1951) ---    Hemoglobin A1c 10/12/2023 4/12/2023    Foot Exam 10/13/2023 10/13/2022    High Dose Statin 03/31/2024 3/31/2023    Aspirin/Antiplatelet Therapy 03/31/2024 3/31/2023    Eye Exam 03/31/2024 3/31/2023    Diabetes Urine Screening 04/12/2024 4/12/2023    Lipid Panel 04/12/2024 4/12/2023    TETANUS VACCINE 06/01/2026 6/1/2016        No orders of the defined types were placed in this encounter.      The following information is provided to all patients.  This information is to help you find resources for any of the problems found today that may be affecting your health:                Living healthy guide: www.[facility].[place].gov      Understanding Diabetes: www.diabetes.org      Eating healthy: www.cdc.gov/healthyweight      CDC home safety checklist: www.cdc.gov/steadi/patient.html      Agency on Aging: www.goea.[place].[facility]      Alcoholics anonymous (AA): www.aa.org      Physical Activity: www.[place].nih.gov/yn5hsjv      Tobacco use: www.quitwithusla.org

## 2023-04-27 NOTE — PROGRESS NOTES
"  Clinton Rosenberg presented for a  Medicare AWV and comprehensive Health Risk Assessment today. The following components were reviewed and updated:    Medical history  Family History  Social history  Allergies and Current Medications  Health Risk Assessment  Health Maintenance  Care Team       ** See Completed Assessments for Annual Wellness Visit within the encounter summary.**       The following assessments were completed:  Living Situation  CAGE  Depression Screening  Timed Get Up and Go  Whisper Test  Cognitive Function Screening  Nutrition Screening  ADL Screening  PAQ Screening          Vitals:    04/27/23 1017   BP: 124/68   BP Location: Right arm   Patient Position: Sitting   BP Method: Large (Manual)   Pulse: (!) 55   Temp: 98.3 °F (36.8 °C)   TempSrc: Oral   SpO2: 98%   Weight: 106.4 kg (234 lb 10.9 oz)   Height: 5' 10" (1.778 m)     Body mass index is 33.67 kg/m².  Physical Exam  Vitals and nursing note reviewed.   Constitutional:       Appearance: Normal appearance. He is obese.   Cardiovascular:      Rate and Rhythm: Normal rate.      Pulses: Normal pulses.      Heart sounds: Normal heart sounds.   Pulmonary:      Effort: Pulmonary effort is normal.      Breath sounds: Normal breath sounds.   Musculoskeletal:         General: Normal range of motion.   Neurological:      Mental Status: He is alert and oriented to person, place, and time.   Psychiatric:         Mood and Affect: Mood normal.         Behavior: Behavior normal.           Diagnoses and health risks identified today and associated recommendations/orders:    1. Encounter for preventive health examination  Pt, accompanied by his wife, was seen today for an Annual Wellness visit. Healthcare maintenance and screening recommendations were discussed and updated as indicated. Return in one year for AWV.    Review current opioid prescriptions:n/a  Screen for potential Substance Use Disorders:n/a    2. Aortic ectasia  The current medical regimen is " effective;  continue present plan and medications.    3. Aortic root dilatation on TTE 8/2019  The current medical regimen is effective;  continue present plan and medications.    4. Coronary artery disease of native artery of native heart with stable angina pectoris; 2021 plan is long term DAPT  The current medical regimen is effective;  continue present plan and medications.    5. Peripheral vascular disease with LE US 6/2017 and carotid US   The current medical regimen is effective;  continue present plan and medications.    6. Type 2 diabetes mellitus with other specified complication, without long-term current use of insulin  The current medical regimen is effective;  continue present plan and medications.  Hemoglobin A1C   Date Value Ref Range Status   04/12/2023 6.8 (H) 4.0 - 5.6 % Final             10/07/2022 7.0 (H) 4.0 - 5.6 % Final             04/07/2022 6.5 (H) 4.0 - 5.6 % Final               7. Obesity (BMI 30.0-34.9)  The patient is asked to make an attempt to improve diet and exercise patterns to aid in medical management of this problem.    8. Essential hypertension  Stable. The current medical regimen is effective;  continue present plan and medications.    9. Bradycardia  Asymptomatic. The current medical regimen is effective;  continue present plan and medications.    10. Mixed hyperlipidemia long term DAPT  The current medical regimen is effective;  continue present plan and medications.        Provided Clinton with a 5-10 year written screening schedule and personal prevention plan. Recommendations were developed using the USPSTF age appropriate recommendations. Education, counseling, and referrals were provided as needed. After Visit Summary printed and given to patient which includes a list of additional screenings\tests needed.    Follow up in about 1 year (around 4/27/2024).    CHAGO Wesley  I offered to discuss advanced care planning, including how to pick a person who would make  decisions for you if you were unable to make them for yourself, called a health care power of , and what kind of decisions you might make such as use of life sustaining treatments such as ventilators and tube feeding when faced with a life limiting illness recorded on a living will that they will need to know. (How you want to be cared for as you near the end of your natural life)     X Patient is interested in learning more about how to make advanced directives.  I provided them paperwork and offered to discuss this with them.

## 2023-05-02 ENCOUNTER — PATIENT OUTREACH (OUTPATIENT)
Dept: ADMINISTRATIVE | Facility: HOSPITAL | Age: 73
End: 2023-05-02
Payer: MEDICARE

## 2023-05-05 ENCOUNTER — OFFICE VISIT (OUTPATIENT)
Dept: UROLOGY | Facility: CLINIC | Age: 73
End: 2023-05-05
Payer: MEDICARE

## 2023-05-05 ENCOUNTER — PATIENT MESSAGE (OUTPATIENT)
Dept: UROLOGY | Facility: CLINIC | Age: 73
End: 2023-05-05

## 2023-05-05 VITALS — WEIGHT: 235.88 LBS | BODY MASS INDEX: 33.77 KG/M2 | HEIGHT: 70 IN

## 2023-05-05 DIAGNOSIS — N40.1 BPH ASSOCIATED WITH NOCTURIA: Primary | ICD-10-CM

## 2023-05-05 DIAGNOSIS — N28.1 RENAL CYST: ICD-10-CM

## 2023-05-05 DIAGNOSIS — R35.1 BPH ASSOCIATED WITH NOCTURIA: Primary | ICD-10-CM

## 2023-05-05 PROCEDURE — 1101F PT FALLS ASSESS-DOCD LE1/YR: CPT | Mod: HCNC,CPTII,S$GLB, | Performed by: STUDENT IN AN ORGANIZED HEALTH CARE EDUCATION/TRAINING PROGRAM

## 2023-05-05 PROCEDURE — 99999 PR PBB SHADOW E&M-EST. PATIENT-LVL III: ICD-10-PCS | Mod: PBBFAC,HCNC,, | Performed by: STUDENT IN AN ORGANIZED HEALTH CARE EDUCATION/TRAINING PROGRAM

## 2023-05-05 PROCEDURE — 4010F PR ACE/ARB THEARPY RXD/TAKEN: ICD-10-PCS | Mod: HCNC,CPTII,S$GLB, | Performed by: STUDENT IN AN ORGANIZED HEALTH CARE EDUCATION/TRAINING PROGRAM

## 2023-05-05 PROCEDURE — 1160F RVW MEDS BY RX/DR IN RCRD: CPT | Mod: HCNC,CPTII,S$GLB, | Performed by: STUDENT IN AN ORGANIZED HEALTH CARE EDUCATION/TRAINING PROGRAM

## 2023-05-05 PROCEDURE — 1159F PR MEDICATION LIST DOCUMENTED IN MEDICAL RECORD: ICD-10-PCS | Mod: HCNC,CPTII,S$GLB, | Performed by: STUDENT IN AN ORGANIZED HEALTH CARE EDUCATION/TRAINING PROGRAM

## 2023-05-05 PROCEDURE — 99999 PR PBB SHADOW E&M-EST. PATIENT-LVL III: CPT | Mod: PBBFAC,HCNC,, | Performed by: STUDENT IN AN ORGANIZED HEALTH CARE EDUCATION/TRAINING PROGRAM

## 2023-05-05 PROCEDURE — 1159F MED LIST DOCD IN RCRD: CPT | Mod: HCNC,CPTII,S$GLB, | Performed by: STUDENT IN AN ORGANIZED HEALTH CARE EDUCATION/TRAINING PROGRAM

## 2023-05-05 PROCEDURE — 3066F NEPHROPATHY DOC TX: CPT | Mod: HCNC,CPTII,S$GLB, | Performed by: STUDENT IN AN ORGANIZED HEALTH CARE EDUCATION/TRAINING PROGRAM

## 2023-05-05 PROCEDURE — 1160F PR REVIEW ALL MEDS BY PRESCRIBER/CLIN PHARMACIST DOCUMENTED: ICD-10-PCS | Mod: HCNC,CPTII,S$GLB, | Performed by: STUDENT IN AN ORGANIZED HEALTH CARE EDUCATION/TRAINING PROGRAM

## 2023-05-05 PROCEDURE — 3061F PR NEG MICROALBUMINURIA RESULT DOCUMENTED/REVIEW: ICD-10-PCS | Mod: HCNC,CPTII,S$GLB, | Performed by: STUDENT IN AN ORGANIZED HEALTH CARE EDUCATION/TRAINING PROGRAM

## 2023-05-05 PROCEDURE — 99213 OFFICE O/P EST LOW 20 MIN: CPT | Mod: HCNC,S$GLB,, | Performed by: STUDENT IN AN ORGANIZED HEALTH CARE EDUCATION/TRAINING PROGRAM

## 2023-05-05 PROCEDURE — 3008F PR BODY MASS INDEX (BMI) DOCUMENTED: ICD-10-PCS | Mod: HCNC,CPTII,S$GLB, | Performed by: STUDENT IN AN ORGANIZED HEALTH CARE EDUCATION/TRAINING PROGRAM

## 2023-05-05 PROCEDURE — 1101F PR PT FALLS ASSESS DOC 0-1 FALLS W/OUT INJ PAST YR: ICD-10-PCS | Mod: HCNC,CPTII,S$GLB, | Performed by: STUDENT IN AN ORGANIZED HEALTH CARE EDUCATION/TRAINING PROGRAM

## 2023-05-05 PROCEDURE — 3061F NEG MICROALBUMINURIA REV: CPT | Mod: HCNC,CPTII,S$GLB, | Performed by: STUDENT IN AN ORGANIZED HEALTH CARE EDUCATION/TRAINING PROGRAM

## 2023-05-05 PROCEDURE — 1126F PR PAIN SEVERITY QUANTIFIED, NO PAIN PRESENT: ICD-10-PCS | Mod: HCNC,CPTII,S$GLB, | Performed by: STUDENT IN AN ORGANIZED HEALTH CARE EDUCATION/TRAINING PROGRAM

## 2023-05-05 PROCEDURE — 99213 PR OFFICE/OUTPT VISIT, EST, LEVL III, 20-29 MIN: ICD-10-PCS | Mod: HCNC,S$GLB,, | Performed by: STUDENT IN AN ORGANIZED HEALTH CARE EDUCATION/TRAINING PROGRAM

## 2023-05-05 PROCEDURE — 4010F ACE/ARB THERAPY RXD/TAKEN: CPT | Mod: HCNC,CPTII,S$GLB, | Performed by: STUDENT IN AN ORGANIZED HEALTH CARE EDUCATION/TRAINING PROGRAM

## 2023-05-05 PROCEDURE — 3044F PR MOST RECENT HEMOGLOBIN A1C LEVEL <7.0%: ICD-10-PCS | Mod: HCNC,CPTII,S$GLB, | Performed by: STUDENT IN AN ORGANIZED HEALTH CARE EDUCATION/TRAINING PROGRAM

## 2023-05-05 PROCEDURE — 3288F FALL RISK ASSESSMENT DOCD: CPT | Mod: HCNC,CPTII,S$GLB, | Performed by: STUDENT IN AN ORGANIZED HEALTH CARE EDUCATION/TRAINING PROGRAM

## 2023-05-05 PROCEDURE — 3044F HG A1C LEVEL LT 7.0%: CPT | Mod: HCNC,CPTII,S$GLB, | Performed by: STUDENT IN AN ORGANIZED HEALTH CARE EDUCATION/TRAINING PROGRAM

## 2023-05-05 PROCEDURE — 3008F BODY MASS INDEX DOCD: CPT | Mod: HCNC,CPTII,S$GLB, | Performed by: STUDENT IN AN ORGANIZED HEALTH CARE EDUCATION/TRAINING PROGRAM

## 2023-05-05 PROCEDURE — 1126F AMNT PAIN NOTED NONE PRSNT: CPT | Mod: HCNC,CPTII,S$GLB, | Performed by: STUDENT IN AN ORGANIZED HEALTH CARE EDUCATION/TRAINING PROGRAM

## 2023-05-05 PROCEDURE — 3288F PR FALLS RISK ASSESSMENT DOCUMENTED: ICD-10-PCS | Mod: HCNC,CPTII,S$GLB, | Performed by: STUDENT IN AN ORGANIZED HEALTH CARE EDUCATION/TRAINING PROGRAM

## 2023-05-05 PROCEDURE — 3066F PR DOCUMENTATION OF TREATMENT FOR NEPHROPATHY: ICD-10-PCS | Mod: HCNC,CPTII,S$GLB, | Performed by: STUDENT IN AN ORGANIZED HEALTH CARE EDUCATION/TRAINING PROGRAM

## 2023-05-05 RX ORDER — REGADENOSON 0.08 MG/ML
INJECTION, SOLUTION INTRAVENOUS
COMMUNITY
Start: 2023-03-09 | End: 2023-11-13 | Stop reason: ALTCHOICE

## 2023-05-05 NOTE — PROGRESS NOTES
Patient ID: Clinton Rosenberg is a 72 y.o. male.    Chief Complaint: Follow-up    HPI  72 y.o. who presents to the Urology clinic for evaluation of LUTS after completion of . RBUS. Patient doing well on flomax. Daughter w/ hx of RCC.     Medically Necessary ROS documented in HPI    Past Medical History  Active Ambulatory Problems     Diagnosis Date Noted    Mixed hyperlipidemia long term DAPT 07/09/2014    Essential hypertension 07/09/2014    Gastroesophageal reflux disease 07/09/2014    Benign non-nodular prostatic hyperplasia with lower urinary tract symptoms 07/09/2014    Vitamin D deficiency 07/09/2014    Hernia of abdominal wall 07/09/2014    Coronary artery disease of native artery of native heart with stable angina pectoris; 2021 plan is long term DAPT 11/19/2014    Anxiety and depression with assoc memory loss; seen by neuro 2015, negative workup 11/24/2014    Hypothyroidism 06/01/2016    Obesity (BMI 30.0-34.9) 06/01/2016    Aortic ectasia 04/28/2017    JAYLEN (obstructive sleep apnea) 04/28/2017    Peripheral vascular disease with LE US 6/2017 and carotid US  06/20/2017    Aortic root dilatation on TTE 8/2019 11/14/2017    History of concussion 1/2018 head CT negative 01/31/2018    Venous insufficiency 05/10/2018    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 05/20/2018    Tubular adenoma of colon 2/2009 10/11/2018    Allergy to iodine 08/27/2019    Nuclear sclerosis of both eyes 05/20/2021    History of COVID-19 02/25/2022    Chronic midline low back pain without sciatica 10/26/2022    Refractive error 03/31/2023    Type 2 diabetes mellitus with other specified complication 04/27/2023    Bradycardia 04/27/2023     Resolved Ambulatory Problems     Diagnosis Date Noted    Myocardial infarct, old 07/09/2014    Angina effort 07/09/2014    Other malaise and fatigue 11/24/2014    Chronic cough 11/24/2014    Dysuria 11/24/2014    Dysarthria 11/24/2014    Aphasia 11/24/2014    Mild cognitive impairment, so stated  11/24/2014    Retrocalcaneal bursitis 11/24/2014    Prediabetes 11/24/2014    Other abnormal glucose 11/24/2014    Knee pain 11/24/2014    Memory difficulties 11/24/2014    Attention and concentration deficit 01/27/2015    Headache 01/27/2015    Chest pain 05/16/2018    Pleurisy 05/16/2018    Coronary artery disease of native artery of native heart with stable angina pectoris 02/21/2019    Pneumonia due to COVID-19 virus 01/31/2022     Past Medical History:   Diagnosis Date    Allergy     Angina pectoris     Anxiety     Cancer     Coronary artery disease     Depression     Eye injury as a teen    GERD (gastroesophageal reflux disease)     Hyperlipidemia     Hypertension     Memory loss     Myocardial infarction     Obesity     Sleep apnea          Past Surgical History  Past Surgical History:   Procedure Laterality Date    APPENDECTOMY  1986    bone spur Right     COLONOSCOPY      hand trauma Right     pencil     heart stent      LEFT HEART CATHETERIZATION Left 9/20/2019    Procedure: Left heart cath 12 pm start, R rad access;  Surgeon: Brad Bahena MD;  Location: [facility] CATH LAB;  Service: Cardiology;  Laterality: Left;  RN PREOP 9/13/2019  NEEDS IODINE PREMED    LEFT HEART CATHETERIZATION Left 9/21/2020    Procedure: Left heart cath 730am start, R rad access;  Surgeon: Brad Bahena MD;  Location: [facility] CATH LAB;  Service: Cardiology;  Laterality: Left;  RN PREOP 9/14/2020, COVID NEGATIVE---9/18       Social History  Social Connections: Moderately Isolated    Frequency of Communication with Friends and Family: Three times a week    Frequency of Social Gatherings with Friends and Family: Twice a week    Attends Anabaptist Services: Never    Active Member of Clubs or Organizations: No    Attends Club or Organization Meetings: Never    Marital Status:        Medications    Current Outpatient Medications:     aspirin (ECOTRIN) 81 MG EC tablet, Take 1 tablet (81 mg total) by mouth once daily., Disp: 90  tablet, Rfl: 3    atorvastatin (LIPITOR) 80 MG tablet, TAKE 1 TABLET EVERY EVENING, Disp: 90 tablet, Rfl: 3    clopidogreL (PLAVIX) 75 mg tablet, TAKE 1 TABLET EVERY DAY, Disp: 90 tablet, Rfl: 3    furosemide (LASIX) 20 MG tablet, TAKE 1 TABLET EVERY DAY, Disp: 90 tablet, Rfl: 3    gabapentin (NEURONTIN) 300 MG capsule, Take 1 capsule (300 mg total) by mouth every evening., Disp: 90 capsule, Rfl: 0    isosorbide mononitrate (IMDUR) 120 MG 24 hr tablet, TAKE 2 TABLETS (240 MG TOTAL) BY MOUTH ONCE DAILY., Disp: 180 tablet, Rfl: 3    KRILL/OM3/DHA/EPA/OM6/LIP/ASTX (KRILL OIL, OMEGA 3 AND 6, ORAL), Take by mouth., Disp: , Rfl:     levothyroxine (SYNTHROID) 75 MCG tablet, Take 1 tablet (75 mcg total) by mouth before breakfast., Disp: 90 tablet, Rfl: 3    LEXISCAN 0.4 mg/5 mL Syrg, , Disp: , Rfl:     losartan (COZAAR) 50 MG tablet, TAKE 1 TABLET EVERY DAY, Disp: 90 tablet, Rfl: 3    metFORMIN (GLUCOPHAGE) 500 MG tablet, TAKE 1 TABLET BY MOUTH EVERY DAY WITH BREAKFAST, Disp: 90 tablet, Rfl: 1    metoprolol tartrate (LOPRESSOR) 25 MG tablet, TAKE 1 TABLET TWICE DAILY, Disp: 180 tablet, Rfl: 3    nitroGLYCERIN (NITROSTAT) 0.4 MG SL tablet, Place 1 tablet (0.4 mg total) under the tongue every 5 (five) minutes as needed for Chest pain., Disp: 50 tablet, Rfl: 3    nystatin (MYCOSTATIN) powder, Apply topically 4 (four) times daily as needed (to keep penis/scrotum dry)., Disp: 15 g, Rfl: 1    pantoprazole (PROTONIX) 40 MG tablet, TAKE 1 TABLET EVERY DAY, Disp: 90 tablet, Rfl: 3    tamsulosin (FLOMAX) 0.4 mg Cap, TAKE 1 CAPSULE (0.4 MG TOTAL) BY MOUTH ONCE DAILY., Disp: 90 capsule, Rfl: 3    vitamin D 1000 units Tab, Take 1 tablet (1,000 Units total) by mouth once daily., Disp: 90 tablet, Rfl: 3    Allergies  Review of patient's allergies indicates:   Allergen Reactions    Iodine and iodide containing products Anaphylaxis    Naproxen Other (See Comments)     hallucinations  Hallucinations^  Hallucinations^     Hydrocodone-acetaminophen      Rash (skin)^    Iodine      Anaphylaxis^    Oxycodone-acetaminophen      Rash (skin)^       Patient's PMH, FH, Social hx, Medications, allergies reviewed and updated as pertinent to today's visit    Objective:      Physical Exam  Constitutional:       General: He is not in acute distress.     Appearance: He is well-developed. He is not ill-appearing, toxic-appearing or diaphoretic.   HENT:      Head: Normocephalic and atraumatic.      Mouth/Throat:      Mouth: Mucous membranes are moist.   Eyes:      Conjunctiva/sclera: Conjunctivae normal.   Pulmonary:      Effort: Pulmonary effort is normal. No respiratory distress.   Abdominal:      General: Abdomen is flat. There is no distension.   Musculoskeletal:         General: No swelling or deformity.      Cervical back: Neck supple.   Skin:     General: Skin is warm.      Capillary Refill: Capillary refill takes less than 2 seconds.      Findings: No rash.   Neurological:      Mental Status: He is alert and oriented to person, place, and time.      Gait: Gait normal.   Psychiatric:         Mood and Affect: Mood normal.         Thought Content: Thought content normal.         Judgment: Judgment normal.           Lab Results   Component Value Date    PSADIAG 0.96 04/12/2023    PSADIAG 1.1 10/11/2021      Imaging results: personally reviewed imaging with the following findings: 58g prostate, left simple cyst  Assessment:       1. BPH associated with nocturia    2. Renal cyst        Plan:         Simple renal cyst, no need for survellance 2/2 very low risk malignant potential      BPH/LUTS  58 g glant  Discussed AIKEN procedure vs continuing med mgmnt  Patient prefers med mgmnt  Alarm symptoms to advance mgmnt discussed

## 2023-05-10 ENCOUNTER — OFFICE VISIT (OUTPATIENT)
Dept: FAMILY MEDICINE | Facility: CLINIC | Age: 73
End: 2023-05-10
Payer: MEDICARE

## 2023-05-10 VITALS
HEART RATE: 61 BPM | HEIGHT: 70 IN | BODY MASS INDEX: 33.95 KG/M2 | TEMPERATURE: 98 F | DIASTOLIC BLOOD PRESSURE: 60 MMHG | OXYGEN SATURATION: 95 % | WEIGHT: 237.13 LBS | SYSTOLIC BLOOD PRESSURE: 116 MMHG

## 2023-05-10 DIAGNOSIS — K21.9 GASTROESOPHAGEAL REFLUX DISEASE, UNSPECIFIED WHETHER ESOPHAGITIS PRESENT: Chronic | ICD-10-CM

## 2023-05-10 DIAGNOSIS — R60.0 PERIPHERAL EDEMA: Chronic | ICD-10-CM

## 2023-05-10 DIAGNOSIS — E03.9 HYPOTHYROIDISM, UNSPECIFIED TYPE: Chronic | ICD-10-CM

## 2023-05-10 DIAGNOSIS — R29.818 NEUROGENIC CLAUDICATION: ICD-10-CM

## 2023-05-10 DIAGNOSIS — M54.16 LUMBAR RADICULOPATHY, CHRONIC: ICD-10-CM

## 2023-05-10 DIAGNOSIS — E78.2 MIXED HYPERLIPIDEMIA: Chronic | ICD-10-CM

## 2023-05-10 DIAGNOSIS — I10 ESSENTIAL HYPERTENSION: Chronic | ICD-10-CM

## 2023-05-10 DIAGNOSIS — K43.9 VENTRAL HERNIA WITHOUT OBSTRUCTION OR GANGRENE: ICD-10-CM

## 2023-05-10 DIAGNOSIS — I25.118 CORONARY ARTERY DISEASE OF NATIVE ARTERY OF NATIVE HEART WITH STABLE ANGINA PECTORIS: ICD-10-CM

## 2023-05-10 DIAGNOSIS — Z00.00 NORMAL PHYSICAL EXAM: Primary | ICD-10-CM

## 2023-05-10 DIAGNOSIS — Z12.12 SCREENING FOR COLORECTAL CANCER: ICD-10-CM

## 2023-05-10 DIAGNOSIS — Z12.11 SCREENING FOR COLORECTAL CANCER: ICD-10-CM

## 2023-05-10 DIAGNOSIS — E11.69 TYPE 2 DIABETES MELLITUS WITH OTHER SPECIFIED COMPLICATION, WITHOUT LONG-TERM CURRENT USE OF INSULIN: ICD-10-CM

## 2023-05-10 PROCEDURE — 99397 PER PM REEVAL EST PAT 65+ YR: CPT | Mod: HCNC,S$GLB,, | Performed by: INTERNAL MEDICINE

## 2023-05-10 PROCEDURE — 99397 PR PREVENTIVE VISIT,EST,65 & OVER: ICD-10-PCS | Mod: HCNC,S$GLB,, | Performed by: INTERNAL MEDICINE

## 2023-05-10 PROCEDURE — 1126F AMNT PAIN NOTED NONE PRSNT: CPT | Mod: HCNC,CPTII,S$GLB, | Performed by: INTERNAL MEDICINE

## 2023-05-10 PROCEDURE — 3061F NEG MICROALBUMINURIA REV: CPT | Mod: HCNC,CPTII,S$GLB, | Performed by: INTERNAL MEDICINE

## 2023-05-10 PROCEDURE — 3066F PR DOCUMENTATION OF TREATMENT FOR NEPHROPATHY: ICD-10-PCS | Mod: HCNC,CPTII,S$GLB, | Performed by: INTERNAL MEDICINE

## 2023-05-10 PROCEDURE — 1159F MED LIST DOCD IN RCRD: CPT | Mod: HCNC,CPTII,S$GLB, | Performed by: INTERNAL MEDICINE

## 2023-05-10 PROCEDURE — 99999 PR PBB SHADOW E&M-EST. PATIENT-LVL IV: CPT | Mod: PBBFAC,HCNC,, | Performed by: INTERNAL MEDICINE

## 2023-05-10 PROCEDURE — 3074F PR MOST RECENT SYSTOLIC BLOOD PRESSURE < 130 MM HG: ICD-10-PCS | Mod: HCNC,CPTII,S$GLB, | Performed by: INTERNAL MEDICINE

## 2023-05-10 PROCEDURE — 3008F BODY MASS INDEX DOCD: CPT | Mod: HCNC,CPTII,S$GLB, | Performed by: INTERNAL MEDICINE

## 2023-05-10 PROCEDURE — 3074F SYST BP LT 130 MM HG: CPT | Mod: HCNC,CPTII,S$GLB, | Performed by: INTERNAL MEDICINE

## 2023-05-10 PROCEDURE — 1159F PR MEDICATION LIST DOCUMENTED IN MEDICAL RECORD: ICD-10-PCS | Mod: HCNC,CPTII,S$GLB, | Performed by: INTERNAL MEDICINE

## 2023-05-10 PROCEDURE — 3078F DIAST BP <80 MM HG: CPT | Mod: HCNC,CPTII,S$GLB, | Performed by: INTERNAL MEDICINE

## 2023-05-10 PROCEDURE — 3008F PR BODY MASS INDEX (BMI) DOCUMENTED: ICD-10-PCS | Mod: HCNC,CPTII,S$GLB, | Performed by: INTERNAL MEDICINE

## 2023-05-10 PROCEDURE — 1160F RVW MEDS BY RX/DR IN RCRD: CPT | Mod: HCNC,CPTII,S$GLB, | Performed by: INTERNAL MEDICINE

## 2023-05-10 PROCEDURE — 3044F HG A1C LEVEL LT 7.0%: CPT | Mod: HCNC,CPTII,S$GLB, | Performed by: INTERNAL MEDICINE

## 2023-05-10 PROCEDURE — 1126F PR PAIN SEVERITY QUANTIFIED, NO PAIN PRESENT: ICD-10-PCS | Mod: HCNC,CPTII,S$GLB, | Performed by: INTERNAL MEDICINE

## 2023-05-10 PROCEDURE — 1101F PT FALLS ASSESS-DOCD LE1/YR: CPT | Mod: HCNC,CPTII,S$GLB, | Performed by: INTERNAL MEDICINE

## 2023-05-10 PROCEDURE — 3288F FALL RISK ASSESSMENT DOCD: CPT | Mod: HCNC,CPTII,S$GLB, | Performed by: INTERNAL MEDICINE

## 2023-05-10 PROCEDURE — 1160F PR REVIEW ALL MEDS BY PRESCRIBER/CLIN PHARMACIST DOCUMENTED: ICD-10-PCS | Mod: HCNC,CPTII,S$GLB, | Performed by: INTERNAL MEDICINE

## 2023-05-10 PROCEDURE — 3044F PR MOST RECENT HEMOGLOBIN A1C LEVEL <7.0%: ICD-10-PCS | Mod: HCNC,CPTII,S$GLB, | Performed by: INTERNAL MEDICINE

## 2023-05-10 PROCEDURE — 3078F PR MOST RECENT DIASTOLIC BLOOD PRESSURE < 80 MM HG: ICD-10-PCS | Mod: HCNC,CPTII,S$GLB, | Performed by: INTERNAL MEDICINE

## 2023-05-10 PROCEDURE — 4010F PR ACE/ARB THEARPY RXD/TAKEN: ICD-10-PCS | Mod: HCNC,CPTII,S$GLB, | Performed by: INTERNAL MEDICINE

## 2023-05-10 PROCEDURE — 3061F PR NEG MICROALBUMINURIA RESULT DOCUMENTED/REVIEW: ICD-10-PCS | Mod: HCNC,CPTII,S$GLB, | Performed by: INTERNAL MEDICINE

## 2023-05-10 PROCEDURE — 99999 PR PBB SHADOW E&M-EST. PATIENT-LVL IV: ICD-10-PCS | Mod: PBBFAC,HCNC,, | Performed by: INTERNAL MEDICINE

## 2023-05-10 PROCEDURE — 1101F PR PT FALLS ASSESS DOC 0-1 FALLS W/OUT INJ PAST YR: ICD-10-PCS | Mod: HCNC,CPTII,S$GLB, | Performed by: INTERNAL MEDICINE

## 2023-05-10 PROCEDURE — 4010F ACE/ARB THERAPY RXD/TAKEN: CPT | Mod: HCNC,CPTII,S$GLB, | Performed by: INTERNAL MEDICINE

## 2023-05-10 PROCEDURE — 3288F PR FALLS RISK ASSESSMENT DOCUMENTED: ICD-10-PCS | Mod: HCNC,CPTII,S$GLB, | Performed by: INTERNAL MEDICINE

## 2023-05-10 PROCEDURE — 3066F NEPHROPATHY DOC TX: CPT | Mod: HCNC,CPTII,S$GLB, | Performed by: INTERNAL MEDICINE

## 2023-05-10 RX ORDER — METFORMIN HYDROCHLORIDE 500 MG/1
500 TABLET ORAL DAILY
Qty: 90 TABLET | Refills: 3 | Status: SHIPPED | OUTPATIENT
Start: 2023-05-10 | End: 2023-06-21 | Stop reason: SDUPTHER

## 2023-05-10 RX ORDER — PANTOPRAZOLE SODIUM 20 MG/1
40 TABLET, DELAYED RELEASE ORAL DAILY
Qty: 90 TABLET | Refills: 3 | Status: SHIPPED | OUTPATIENT
Start: 2023-05-10 | End: 2023-11-13 | Stop reason: SDUPTHER

## 2023-05-10 RX ORDER — ATORVASTATIN CALCIUM 80 MG/1
80 TABLET, FILM COATED ORAL NIGHTLY
Qty: 90 TABLET | Refills: 3 | Status: SHIPPED | OUTPATIENT
Start: 2023-05-10 | End: 2023-11-13 | Stop reason: SDUPTHER

## 2023-05-10 RX ORDER — GABAPENTIN 300 MG/1
300 CAPSULE ORAL NIGHTLY
Qty: 90 CAPSULE | Refills: 3 | Status: SHIPPED | OUTPATIENT
Start: 2023-05-10 | End: 2023-11-13 | Stop reason: SDUPTHER

## 2023-05-10 RX ORDER — FUROSEMIDE 20 MG/1
20 TABLET ORAL DAILY
Qty: 90 TABLET | Refills: 3 | Status: SHIPPED | OUTPATIENT
Start: 2023-05-10 | End: 2023-07-28

## 2023-05-10 RX ORDER — METOPROLOL TARTRATE 25 MG/1
25 TABLET, FILM COATED ORAL 2 TIMES DAILY
Qty: 180 TABLET | Refills: 3 | Status: SHIPPED | OUTPATIENT
Start: 2023-05-10 | End: 2023-09-14 | Stop reason: SDUPTHER

## 2023-05-10 RX ORDER — LOSARTAN POTASSIUM 50 MG/1
50 TABLET ORAL DAILY
Qty: 90 TABLET | Refills: 3 | Status: SHIPPED | OUTPATIENT
Start: 2023-05-10 | End: 2023-06-02

## 2023-05-10 RX ORDER — LEVOTHYROXINE SODIUM 75 UG/1
75 TABLET ORAL
Qty: 90 TABLET | Refills: 3 | Status: SHIPPED | OUTPATIENT
Start: 2023-05-10 | End: 2023-06-14

## 2023-05-10 NOTE — PROGRESS NOTES
This note was created by combination of typed  and M-Modal dictation.  Transcription errors may be present.  If there are any questions, please contact me.    Assessment and Plan:   Assessment and Plan   Normal physical exam  Screening for colorectal cancer  -colonoscopy ordered  -     Ambulatory referral/consult to Endo Procedure ; Future; Expected date: 05/11/2023    Type 2 diabetes mellitus with other specified complication, without long-term current use of insulin  -labs stable on metformin. No changes.   -     Comprehensive Metabolic Panel; Future; Expected date: 11/05/2023  -     Hemoglobin A1C; Future; Expected date: 11/05/2023  -     Microalbumin/Creatinine Ratio, Urine; Future; Expected date: 11/05/2023  -     CBC Without Differential; Future; Expected date: 11/09/2023  -     metFORMIN (GLUCOPHAGE) 500 MG tablet; Take 1 tablet (500 mg total) by mouth once daily.  Dispense: 90 tablet; Refill: 3    Essential hypertension  -home readings high.  Not stopping and sitting for 5 minutes. Discussed proper technique. Nurse visit 3 weeks for BP check. If BP still high at home and cuff accurate, increase losartan. On metoprolol. HR low.  -     metoprolol tartrate (LOPRESSOR) 25 MG tablet; Take 1 tablet (25 mg total) by mouth 2 (two) times daily.  Dispense: 180 tablet; Refill: 3  -     losartan (COZAAR) 50 MG tablet; Take 1 tablet (50 mg total) by mouth once daily.  Dispense: 90 tablet; Refill: 3    Coronary artery disease of native artery of native heart with stable angina pectoris  Mixed hyperlipidemia long term DAPT  -followed by cards. Toprol, losartan, atorvastatin, DAPT. Labs were OK TG mild high but overall ok; no changes.  -     metoprolol tartrate (LOPRESSOR) 25 MG tablet; Take 1 tablet (25 mg total) by mouth 2 (two) times daily.  Dispense: 180 tablet; Refill: 3  -     losartan (COZAAR) 50 MG tablet; Take 1 tablet (50 mg total) by mouth once daily.  Dispense: 90 tablet; Refill: 3  -      atorvastatin (LIPITOR) 80 MG tablet; Take 1 tablet (80 mg total) by mouth every evening.  Dispense: 90 tablet; Refill: 3    Hypothyroidism, unspecified type  -labs good on LT no changes.   -     levothyroxine (SYNTHROID) 75 MCG tablet; Take 1 tablet (75 mcg total) by mouth before breakfast.  Dispense: 90 tablet; Refill: 3    Neurogenic claudication  Lumbar radiculopathy, chronic  -refilled dedrick.   -     gabapentin (NEURONTIN) 300 MG capsule; Take 1 capsule (300 mg total) by mouth every evening.  Dispense: 90 capsule; Refill: 3    Peripheral edema  -refilled lasix.  -     furosemide (LASIX) 20 MG tablet; Take 1 tablet (20 mg total) by mouth once daily.  Dispense: 90 tablet; Refill: 3    Gastroesophageal reflux disease, unspecified whether esophagitis present  -no symptoms, taking PPI high dose, occasional missed doses no symptoms. Discussed lower dose.  He is going to try stopping though I cautioned him about rebound reflux. If symptoms resume after stopping challenge on lower dose ppi.  -     pantoprazole (PROTONIX) 20 MG tablet; Take 2 tablets (40 mg total) by mouth once daily.  Dispense: 90 tablet; Refill: 3    Ventral hernia without obstruction or gangrene  -lower abd pain in the hernia area. monitor. Check colonoscopy    Medications Discontinued During This Encounter   Medication Reason    levothyroxine (SYNTHROID) 75 MCG tablet Reorder    pantoprazole (PROTONIX) 40 MG tablet Reorder    metoprolol tartrate (LOPRESSOR) 25 MG tablet Reorder    furosemide (LASIX) 20 MG tablet Reorder    losartan (COZAAR) 50 MG tablet Reorder    atorvastatin (LIPITOR) 80 MG tablet Reorder    gabapentin (NEURONTIN) 300 MG capsule Reorder    metFORMIN (GLUCOPHAGE) 500 MG tablet Reorder       meds sent this encounter:  Medications Ordered This Encounter   Medications    atorvastatin (LIPITOR) 80 MG tablet     Sig: Take 1 tablet (80 mg total) by mouth every evening.     Dispense:  90 tablet     Refill:  3     Pharmacy update refills,  keep on file, not requesting Rx to be filled today.    furosemide (LASIX) 20 MG tablet     Sig: Take 1 tablet (20 mg total) by mouth once daily.     Dispense:  90 tablet     Refill:  3     Pharmacy update refills, keep on file, not requesting Rx to be filled today.    gabapentin (NEURONTIN) 300 MG capsule     Sig: Take 1 capsule (300 mg total) by mouth every evening.     Dispense:  90 capsule     Refill:  3     Pharmacy update refills, keep on file, not requesting Rx to be filled today.    levothyroxine (SYNTHROID) 75 MCG tablet     Sig: Take 1 tablet (75 mcg total) by mouth before breakfast.     Dispense:  90 tablet     Refill:  3     Pharmacy update refills, keep on file, not requesting Rx to be filled today.    losartan (COZAAR) 50 MG tablet     Sig: Take 1 tablet (50 mg total) by mouth once daily.     Dispense:  90 tablet     Refill:  3     Pharmacy update refills, keep on file, not requesting Rx to be filled today.    metFORMIN (GLUCOPHAGE) 500 MG tablet     Sig: Take 1 tablet (500 mg total) by mouth once daily.     Dispense:  90 tablet     Refill:  3     Pharmacy update refills, keep on file, not requesting Rx to be filled today.    metoprolol tartrate (LOPRESSOR) 25 MG tablet     Sig: Take 1 tablet (25 mg total) by mouth 2 (two) times daily.     Dispense:  180 tablet     Refill:  3     Pharmacy update refills, keep on file, not requesting Rx to be filled today.    pantoprazole (PROTONIX) 20 MG tablet     Sig: Take 2 tablets (40 mg total) by mouth once daily.     Dispense:  90 tablet     Refill:  3     Pharmacy update refills, keep on file, not requesting Rx to be filled today. Lower dose         Follow Up: OV 6 m with labs  Future Appointments   Date Time Provider Department Center   5/31/2023  3:40 PM NURSE, ANGELIT[facility]   11/3/2023  9:15 AM LAB, [facility]   11/10/2023  3:30 PM Robles Orona MD [facility] [place] [facility]   11/13/2023  3:40 PM Michael Gonzalez MD [facility]  [facility]          Subjective:   Subjective   Chief Complaint   Patient presents with    Annual Exam       HPI  Clinton is a 72 y.o. male.     Social History     Tobacco Use    Smoking status: Never     Passive exposure: Never    Smokeless tobacco: Never   Substance Use Topics    Alcohol use: Not Currently     Comment: rarely          Social History     Social History Narrative    Not on file       Last appointment with this clinic was 4/27/2023. Last visit with me 10/13/2022   To summarize last visit and events leading up to today:  Diabetes range A1c on metformin  Hypothyroid, labs good on higher dose 75  Hypertension stable   Coronary artery disease followed by Cardiology.  On statin.    LUTS, Urology; 5/2023 pt opted for med mgmt rather than surgery.   Simple renal cyst; seen by urology - no surveillance needed.  Neurogenic claudication vs peripheral neuropathy    10/26/22 MRI Degenerative disc, facet joint arthropathy, spondylosis most prominent at L3-L4 and L4-5 levels  2/2023 cards added amlodipine  3/2023 TTE grade 1 DD  3/2023 nu stress test neg  3/2023 LE venous US with GSV reflux bilateral    Saw urology end of march. Karyn prostatomegaly.    Labs 4/12  CBC WNL  CMP hyperglycemia  Lipid TG high nonHDL 91  A1c 6.8  PSA WNL      Today's visit:  Please note: Chronic medical problems that are stable but are being addressed at this visit may not be listed/documented here, but may be addressed in more detail in the Assessment and Plan section.   Below are salient features of chronic medical conditions, or new/acute conditions and their details.    Home BP readings high  Home glucose checks - some readings high post prandial    Bp good on intake today.  Does not sit and rest for 5 min before checking     Last 5 Patient Entered Readings                Current 30 Day Average: 138/77  Recent Readings 5/10/2023 5/9/2023 5/8/2023 5/7/2023 5/6/2023   SBP (mmHg) 136 143 150 140 131   DBP (mmHg) 78 79 86 83 72   Pulse  63 55 59 55 57               Glucose readings - variable. Checks pre and post prandial. Discussed not testing for 2 hours post prandial.    Pt concerned about renal US - was benign but daughter also had nodule benign but ended up being malignant. Pt asymptomatic.  Daughter had symptoms.    Pt's US showed  2.5 cm simple cyst in the midpole  He is ok not to pursue further monitoring.     Patient Care Team:  Michael Gonzalez MD as PCP - General (Internal Medicine)  [facility]  Andreas Henriquez MD as Consulting Physician (Dermatology)  Brad Bahena MD as Consulting Physician (Cardiology)  Michael Gonzalez MD as Hyperlipidemia Digital Medicine Responsible Provider (Internal Medicine)  Kamini Butterfield MA as Care Coordinator  Shiloh Polk PharmD as Hypertension Digital Medicine Clinician  Shiloh Polk PharmD as Hyperlipidemia Digital Medicine Clinician  Michael Gonzalez MD as Hypertension Digital Medicine Responsible Provider (Internal Medicine)  Clara Seaman as Digital Medicine Health   [employer] as Hypertension Digital Medicine Contract  Michael Gonzalez MD as Diabetes Digital Medicine Responsible Provider (Internal Medicine)  Shiloh Polk PharmD as Diabetes Digital Medicine Clinician  [employer] as Diabetes Digital Medicine Contract    Patient Active Problem List    Diagnosis Date Noted    Type 2 diabetes mellitus with other specified complication 04/27/2023    Bradycardia 04/27/2023    Refractive error 03/31/2023    Chronic midline low back pain without sciatica 10/26/2022     10/26/2022 MRI L spine: Degenerative disc, facet joint arthropathy, spondylosis most prominent at L3-L4 and L4-5 levels        History of COVID-19 02/25/2022     Symptoms resolved. Persistent residual finding on cxr, recommend repeating test in 1 month to ensure resolution.         Nuclear sclerosis of both eyes 05/20/2021    Allergy to iodine 08/27/2019     "Tubular adenoma of colon 2/2009 10/11/2018     2/28/2009 colonoscopy tubular adenoma  3/15/2013 colonoscopy thickened haustral folds indicative of prediverticular state. internal hemorrhoids. repeat 10 years.  biopsies negative (no colitis)        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 05/20/2018    Venous insufficiency 05/10/2018     03/09/2023 lower extremity venous ultrasound GSV reflux right leg below knee  GSV reflux left leg mid, distal thigh        History of concussion 1/2018 head CT negative 01/31/2018    Aortic root dilatation on TTE 8/2019 11/14/2017 08/08/2019 Lexiscan nuclear stress test probably normal study.  LVEF 66%.  Normal wall motion.  08/08/2019 normal LV systolic function LVEF 65%.  Concentric LV remodeling.  No wall motion abnormalities.  Aortic root diameter mildly increased verses report 03/2018.  7/30/20  TTE (Ao root 4.2cm at annulus, 3.8cm at sinuses) normal LV systolic function LVEF 65%.  Mild concentric LVH.  Grade 1 diastolic dysfunction.  Normal RV systolic function.  Normal CVP.  PA pressure 20.        Peripheral vascular disease with LE US 6/2017 and carotid US  06/20/2017     LE art US/TANA 6/8/17 1.  Mild/atherosclerotic plaquing. 2.  No stenosis about 30 percent femoral popliteal arteries. 3.  Normal bilateral ABIs.  Carotid US 12/3/14 1. Less than 50% stenosis of the right internal carotid artery. 2. Less than 50% stenosis of the left internal carotid artery.        Aortic ectasia 04/28/2017     "The aorta is elongated" - Xray Chest 5-        JAYLEN (obstructive sleep apnea) 04/28/2017    Hypothyroidism 06/01/2016    Obesity (BMI 30.0-34.9) 06/01/2016    Anxiety and depression with assoc memory loss; seen by neuro 2015, negative workup 11/24/2014     12/3/2014 MRI brain: 1. No evidence for acute infarct 2. unremarkable MRI of the brain  12/3/2014 carotid US normal        Coronary artery disease of native artery of native heart with stable angina pectoris; 2021 plan is long " term DAPT 11/19/2014     Stent LAD 2012 9/27/2013 [facility] prox LAD 30% stenosis; RCA 20% stenosis. patent LAD stent.    3/9/2015 nu med stress test negative. 3/9/2015 TTE normal LVEF 55-60; concentric remodeling.  L MPI 6/8/17 (images pers rev) Nuclear Quantitative Functional Analysis:  LVEF: 68 % Impression: NORMAL MYOCARDIAL PERFUSION  Echo 3/15/18 LVEF 60-65%; upper limit of normal aortic root 3.7 cm  08/08/2019 Lexiscan nuclear stress test probably normal study.  LVEF 66%.  Normal wall motion.  08/08/2019 normal LV systolic function LVEF 65%.  Concentric LV remodeling.  No wall motion abnormalities.  Aortic root diameter mildly increased verses report 03/2018.  9/21/2019 [facility]: Dominance: Right  LM: normal  LAD: mid 50% followed by patent stent, distal/transapical 80% (small caliber)  Ramus: prox 30%  LCx: prox 50%  RCA: prox 30%, dist 40%              RPDA ost 50%              RPLB mid 90%    Stent 2.5x18 Resolute [place] SILVER 16 abi    9/21/2020 [facility]  Dominance: Right  LM: normal  LAD: MLI, mid stent patent (2013)  Ramus: MLI, prox 30%  LCx: MLI, ost 40%  RCA: MLI, dist eccentric 40-50%              RPDA: ost 50%, iFR 0.97              RPLB mid stent patent (9/20/19 2.5x18 Resolute [place] SILVER)     Impression:  2V CAD, normal LV fxn  Patent mid LAD and mid RPLB stents  iFR dist RCA/ost RPDA neg     Plan:  Cont med rx  Cont ASA/Plavix/Statin/BBL/Imdur  Plan long term DAPT given diffuse CAD and prior MV PCI    03/09/2023 nuclear stress test negative for ischemia   03/09/2023 TTE LV normal size and systolic function LVEF 65%.  Grade 1 diastolic dysfunction.  03/09/2023 lower extremity venous ultrasound GSV reflux right leg below knee  GSV reflux left leg mid, distal thigh      Mixed hyperlipidemia long term DAPT 07/09/2014    Essential hypertension 07/09/2014 7/30/20  TTE (Ao root 4.2cm at annulus, 3.8cm at sinuses) normal LV systolic function LVEF 65%.  Mild concentric LVH.  Grade 1 diastolic dysfunction.  Normal RV  systolic function.  Normal CVP.  PA pressure 20.    03/09/2023 TTE LV normal size and systolic function LVEF 65%.  Grade 1 diastolic dysfunction.        Gastroesophageal reflux disease 07/09/2014    Benign non-nodular prostatic hyperplasia with lower urinary tract symptoms 07/09/2014    Vitamin D deficiency 07/09/2014    Hernia of abdominal wall 07/09/2014       PAST MEDICAL PROBLEMS, PAST SURGICAL HISTORY: please see relevant portions of the electronic medical record    ALLERGIES AND MEDICATIONS: updated and reviewed.  Medication List with Changes/Refills   Current Medications    ASPIRIN (ECOTRIN) 81 MG EC TABLET    Take 1 tablet (81 mg total) by mouth once daily.    ATORVASTATIN (LIPITOR) 80 MG TABLET    TAKE 1 TABLET EVERY EVENING    CLOPIDOGREL (PLAVIX) 75 MG TABLET    TAKE 1 TABLET EVERY DAY    FUROSEMIDE (LASIX) 20 MG TABLET    TAKE 1 TABLET EVERY DAY    GABAPENTIN (NEURONTIN) 300 MG CAPSULE    Take 1 capsule (300 mg total) by mouth every evening.    ISOSORBIDE MONONITRATE (IMDUR) 120 MG 24 HR TABLET    TAKE 2 TABLETS (240 MG TOTAL) BY MOUTH ONCE DAILY.    KRILL/OM3/DHA/EPA/OM6/LIP/ASTX (KRILL OIL, OMEGA 3 AND 6, ORAL)    Take by mouth.    LEVOTHYROXINE (SYNTHROID) 75 MCG TABLET    Take 1 tablet (75 mcg total) by mouth before breakfast.    LEXISCAN 0.4 MG/5 ML SYRG        LOSARTAN (COZAAR) 50 MG TABLET    TAKE 1 TABLET EVERY DAY    METFORMIN (GLUCOPHAGE) 500 MG TABLET    TAKE 1 TABLET BY MOUTH EVERY DAY WITH BREAKFAST    METOPROLOL TARTRATE (LOPRESSOR) 25 MG TABLET    TAKE 1 TABLET TWICE DAILY    NITROGLYCERIN (NITROSTAT) 0.4 MG SL TABLET    Place 1 tablet (0.4 mg total) under the tongue every 5 (five) minutes as needed for Chest pain.    NYSTATIN (MYCOSTATIN) POWDER    Apply topically 4 (four) times daily as needed (to keep penis/scrotum dry).    PANTOPRAZOLE (PROTONIX) 40 MG TABLET    TAKE 1 TABLET EVERY DAY    TAMSULOSIN (FLOMAX) 0.4 MG CAP    TAKE 1 CAPSULE (0.4 MG TOTAL) BY MOUTH ONCE DAILY.    VITAMIN D  "1000 UNITS TAB    Take 1 tablet (1,000 Units total) by mouth once daily.         Objective:   Objective   Physical Exam   Vitals:    05/10/23 1547   BP: 116/60   Pulse: 61   Temp: 98.2 °F (36.8 °C)   TempSrc: Oral   SpO2: 95%   Weight: 107.6 kg (237 lb 1.7 oz)   Height: 5' 10" (1.778 m)    Body mass index is 34.02 kg/m².  Weight: 107.6 kg (237 lb 1.7 oz)   Height: 5' 10" (177.8 cm)     Physical Exam  Constitutional:       Appearance: Normal appearance. He is well-developed.   HENT:      Right Ear: Tympanic membrane and external ear normal.      Left Ear: Tympanic membrane and external ear normal.      Nose: Nose normal.   Eyes:      General: No scleral icterus.     Conjunctiva/sclera: Conjunctivae normal.   Neck:      Thyroid: No thyroid mass or thyromegaly.      Trachea: Trachea normal.   Cardiovascular:      Rate and Rhythm: Normal rate and regular rhythm.      Heart sounds: Normal heart sounds, S1 normal and S2 normal. No murmur heard.  Pulmonary:      Effort: Pulmonary effort is normal.      Breath sounds: Normal breath sounds.   Abdominal:      General: There is no distension.      Palpations: Abdomen is soft. There is no hepatomegaly, splenomegaly or mass.      Tenderness: There is no abdominal tenderness.   Musculoskeletal:         General: No deformity.      Right lower leg: No edema.      Left lower leg: No edema.   Lymphadenopathy:      Cervical: No cervical adenopathy.   Skin:     General: Skin is warm and dry.      Findings: No rash (on exposed skin).      Comments: On exposed skin   Neurological:      Mental Status: He is alert and oriented to person, place, and time.      Cranial Nerves: No cranial nerve deficit.      Sensory: No sensory deficit.      Deep Tendon Reflexes: Reflexes are normal and symmetric.   Psychiatric:         Speech: Speech normal.         Behavior: Behavior normal.         Thought Content: Thought content normal.         Judgment: Judgment normal.              "

## 2023-05-17 ENCOUNTER — TELEPHONE (OUTPATIENT)
Dept: FAMILY MEDICINE | Facility: CLINIC | Age: 73
End: 2023-05-17
Payer: MEDICARE

## 2023-05-17 NOTE — TELEPHONE ENCOUNTER
----- Message from Oni Guillen LPN sent at 3/27/2023 12:44 PM CDT -----  Regarding: FW: PA concerns    ----- Message -----  From: Ledy Claros  Sent: 3/27/2023  10:56 AM CDT  To: [facility]  Subject: PA concerns                                      Name of Who is Calling: Carlos Menendez           What is the request in detail: Gemma called and stated the referral that was requested isnt needed to patent to be seen. The reference code is [id number].           Can the clinic reply by [facility]: No           What Number to Call Back if not in [facility]: [phone]

## 2023-05-31 ENCOUNTER — CLINICAL SUPPORT (OUTPATIENT)
Dept: FAMILY MEDICINE | Facility: CLINIC | Age: 73
End: 2023-05-31
Payer: MEDICARE

## 2023-05-31 VITALS — HEART RATE: 63 BPM | OXYGEN SATURATION: 98 % | SYSTOLIC BLOOD PRESSURE: 138 MMHG | DIASTOLIC BLOOD PRESSURE: 82 MMHG

## 2023-05-31 DIAGNOSIS — I10 ESSENTIAL HYPERTENSION: Primary | ICD-10-CM

## 2023-05-31 PROCEDURE — 99999 PR PBB SHADOW E&M-EST. PATIENT-LVL II: CPT | Mod: PBBFAC,HCNC,,

## 2023-05-31 PROCEDURE — 99999 PR PBB SHADOW E&M-EST. PATIENT-LVL II: ICD-10-PCS | Mod: PBBFAC,HCNC,,

## 2023-05-31 NOTE — PROGRESS NOTES
Clinton Rosenberg 72 y.o. male is here today for Blood Pressure check.   History of HTN yes.    Review of patient's allergies indicates:   Allergen Reactions    Iodine and iodide containing products Anaphylaxis    Naproxen Other (See Comments)     hallucinations  Hallucinations^  Hallucinations^    Hydrocodone-acetaminophen      Rash (skin)^    Iodine      Anaphylaxis^    Oxycodone-acetaminophen      Rash (skin)^     Creatinine   Date Value Ref Range Status   04/12/2023 1.0 0.5 - 1.4 mg/dL Final     Sodium   Date Value Ref Range Status   04/12/2023 139 136 - 145 mmol/L Final     Potassium   Date Value Ref Range Status   04/12/2023 4.8 3.5 - 5.1 mmol/L Final   ]  Patient verifies taking blood pressure medications on a regular basis at the same time of the day.     Current Outpatient Medications:     aspirin (ECOTRIN) 81 MG EC tablet, Take 1 tablet (81 mg total) by mouth once daily., Disp: 90 tablet, Rfl: 3    atorvastatin (LIPITOR) 80 MG tablet, Take 1 tablet (80 mg total) by mouth every evening., Disp: 90 tablet, Rfl: 3    clopidogreL (PLAVIX) 75 mg tablet, TAKE 1 TABLET EVERY DAY, Disp: 90 tablet, Rfl: 3    furosemide (LASIX) 20 MG tablet, Take 1 tablet (20 mg total) by mouth once daily., Disp: 90 tablet, Rfl: 3    gabapentin (NEURONTIN) 300 MG capsule, Take 1 capsule (300 mg total) by mouth every evening., Disp: 90 capsule, Rfl: 3    isosorbide mononitrate (IMDUR) 120 MG 24 hr tablet, TAKE 2 TABLETS (240 MG TOTAL) BY MOUTH ONCE DAILY., Disp: 180 tablet, Rfl: 3    KRILL/OM3/DHA/EPA/OM6/LIP/ASTX (KRILL OIL, OMEGA 3 AND 6, ORAL), Take by mouth., Disp: , Rfl:     levothyroxine (SYNTHROID) 75 MCG tablet, Take 1 tablet (75 mcg total) by mouth before breakfast., Disp: 90 tablet, Rfl: 3    LEXISCAN 0.4 mg/5 mL Syrg, , Disp: , Rfl:     losartan (COZAAR) 50 MG tablet, Take 1 tablet (50 mg total) by mouth once daily., Disp: 90 tablet, Rfl: 3    metFORMIN (GLUCOPHAGE) 500 MG tablet, Take 1 tablet (500 mg total) by mouth once  daily., Disp: 90 tablet, Rfl: 3    metoprolol tartrate (LOPRESSOR) 25 MG tablet, Take 1 tablet (25 mg total) by mouth 2 (two) times daily., Disp: 180 tablet, Rfl: 3    nitroGLYCERIN (NITROSTAT) 0.4 MG SL tablet, Place 1 tablet (0.4 mg total) under the tongue every 5 (five) minutes as needed for Chest pain., Disp: 50 tablet, Rfl: 3    nystatin (MYCOSTATIN) powder, Apply topically 4 (four) times daily as needed (to keep penis/scrotum dry)., Disp: 15 g, Rfl: 1    pantoprazole (PROTONIX) 20 MG tablet, Take 2 tablets (40 mg total) by mouth once daily., Disp: 90 tablet, Rfl: 3    tamsulosin (FLOMAX) 0.4 mg Cap, TAKE 1 CAPSULE (0.4 MG TOTAL) BY MOUTH ONCE DAILY., Disp: 90 capsule, Rfl: 3    vitamin D 1000 units Tab, Take 1 tablet (1,000 Units total) by mouth once daily., Disp: 90 tablet, Rfl: 3  Does patient have record of home blood pressure readings no. Patient is on digital medicine  Last dose of blood pressure medication was taken at 3pm.  Patient is asymptomatic.   Complains of none.    Vitals:    05/31/23 1530   BP: 138/82   BP Location: Left arm   Patient Position: Sitting   BP Method: Large (Manual)   Pulse: 63   SpO2: 98%         Dr. Gonzalez informed of nurse visit.

## 2023-06-02 ENCOUNTER — PATIENT MESSAGE (OUTPATIENT)
Dept: FAMILY MEDICINE | Facility: CLINIC | Age: 73
End: 2023-06-02
Payer: MEDICARE

## 2023-06-02 DIAGNOSIS — I10 ESSENTIAL HYPERTENSION: Chronic | ICD-10-CM

## 2023-06-02 DIAGNOSIS — I25.118 CORONARY ARTERY DISEASE OF NATIVE ARTERY OF NATIVE HEART WITH STABLE ANGINA PECTORIS: ICD-10-CM

## 2023-06-02 RX ORDER — LOSARTAN POTASSIUM 100 MG/1
100 TABLET ORAL DAILY
Qty: 90 TABLET | Refills: 3 | Status: SHIPPED | OUTPATIENT
Start: 2023-06-02 | End: 2023-08-02 | Stop reason: ALTCHOICE

## 2023-06-02 NOTE — PROGRESS NOTES
Last 5 Patient Entered Readings                Current 30 Day Average: 135/76  Recent Readings 6/1/2023 5/31/2023 5/31/2023 5/30/2023 5/29/2023   SBP (mmHg) 140 156 163 105 141   DBP (mmHg) 81 86 93 68 78   Pulse 69 62 64 67 70               Would increase the losartan dose.

## 2023-07-20 ENCOUNTER — PATIENT MESSAGE (OUTPATIENT)
Dept: ENDOSCOPY | Facility: HOSPITAL | Age: 73
End: 2023-07-20

## 2023-07-20 ENCOUNTER — TELEPHONE (OUTPATIENT)
Dept: ENDOSCOPY | Facility: HOSPITAL | Age: 73
End: 2023-07-20

## 2023-07-20 ENCOUNTER — CLINICAL SUPPORT (OUTPATIENT)
Dept: ENDOSCOPY | Facility: HOSPITAL | Age: 73
End: 2023-07-20
Attending: INTERNAL MEDICINE
Payer: MEDICARE

## 2023-07-20 VITALS — WEIGHT: 237 LBS | BODY MASS INDEX: 33.93 KG/M2 | HEIGHT: 70 IN

## 2023-07-20 DIAGNOSIS — Z12.11 SCREENING FOR COLORECTAL CANCER: ICD-10-CM

## 2023-07-20 DIAGNOSIS — Z12.12 SCREENING FOR COLORECTAL CANCER: ICD-10-CM

## 2023-07-20 RX ORDER — POLYETHYLENE GLYCOL 3350, SODIUM SULFATE ANHYDROUS, SODIUM BICARBONATE, SODIUM CHLORIDE, POTASSIUM CHLORIDE 236; 22.74; 6.74; 5.86; 2.97 G/4L; G/4L; G/4L; G/4L; G/4L
4 POWDER, FOR SOLUTION ORAL ONCE
Qty: 4000 ML | Refills: 0 | Status: SHIPPED | OUTPATIENT
Start: 2023-07-20 | End: 2023-07-20

## 2023-07-20 NOTE — TELEPHONE ENCOUNTER
Dear Dr Bahena,    Patient has a scheduled procedure Colonoscopy on 9/27/23 and is currently taking a blood thinner prescribed by your office. In order to ensure patient safety, we would like to confirm that the patient can place their blood thinner medication on hold for the procedure. Can he/she discontinue Plavix (clopidogrel) for a minimum of 5 days prior to the procedure?     Thank you for your prompt reply.    [facility] Scheduling

## 2023-07-20 NOTE — TELEPHONE ENCOUNTER
Spoke to patient to schedule procedure(s) Colonoscopy       Physician to perform procedure(s) Dr. Altamirano  Date of Procedure (s) 9/27/23  Arrival Time 9:30 AM  Time of Procedure(s) 10:30 AM   Location of Procedure(s) [address]  Type of Rx Prep sent to patient: PEG  Instructions provided to patient via [facility]    Patient was informed on the following information and verbalized understanding. Screening questionnaire reviewed with patient and complete. If procedure requires anesthesia, a responsible adult needs to be present to accompany the patient home, patient cannot drive after receiving anesthesia. Appointment details are tentative, especially check-in time. Patient will receive a prep-op call 4 days prior to confirm check-in time for procedure. If applicable the patient should contact their pharmacy to verify Rx for procedure prep is ready for pick-up. Patient was advised to call the scheduling department at [phone] if pharmacy states no Rx is available. Patient was advised to call the endoscopy scheduling department if any questions or concerns arise.      [facility] Scheduling Department

## 2023-07-21 ENCOUNTER — TELEPHONE (OUTPATIENT)
Dept: ENDOSCOPY | Facility: HOSPITAL | Age: 73
End: 2023-07-21
Payer: MEDICARE

## 2023-07-21 NOTE — TELEPHONE ENCOUNTER
----- Message from Kayla Rogel RN sent at 2023 12:07 PM CDT -----  Regardin/27 BT WB  The patient is currently under an internal cardiologist Dr. Brad villalba and requires a blood thinner Plavix (clopidogrel) for their upcoming scheduled Colonoscopy on 23.     Notes: [facility]

## 2023-08-01 ENCOUNTER — HOSPITAL ENCOUNTER (EMERGENCY)
Facility: HOSPITAL | Age: 73
Discharge: HOME | End: 2023-08-01
Attending: EMERGENCY MEDICINE
Payer: MEDICARE

## 2023-08-01 VITALS
DIASTOLIC BLOOD PRESSURE: 63 MMHG | SYSTOLIC BLOOD PRESSURE: 116 MMHG | RESPIRATION RATE: 18 BRPM | HEIGHT: 70 IN | WEIGHT: 237 LBS | OXYGEN SATURATION: 97 % | TEMPERATURE: 98 F | HEART RATE: 63 BPM | BODY MASS INDEX: 33.93 KG/M2

## 2023-08-01 DIAGNOSIS — R07.89 CHEST WALL PAIN: ICD-10-CM

## 2023-08-01 DIAGNOSIS — R10.9 ABDOMINAL PAIN, UNSPECIFIED ABDOMINAL LOCATION: ICD-10-CM

## 2023-08-01 DIAGNOSIS — R07.9 CHEST PAIN: ICD-10-CM
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
## 2023-08-01 LAB
ALBUMIN SERPL-MCNC: 4 G/DL (ref 3.3–5.5)
ALLENS TEST: ABNORMAL
ALP SERPL-CCNC: 49 U/L (ref 42–141)
BILIRUB SERPL-MCNC: 0.9 MG/DL (ref 0.2–1.6)
BUN SERPL-MCNC: 12 MG/DL (ref 7–22)
CALCIUM SERPL-MCNC: 10 MG/DL (ref 8–10.3)
CHLORIDE SERPL-SCNC: 108 MMOL/L (ref 98–108)
CREAT SERPL-MCNC: 1.1 MG/DL (ref 0.6–1.2)
GLUCOSE SERPL-MCNC: 165 MG/DL (ref 73–118)
HCO3 UR-SCNC: 26.2 MMOL/L (ref 24–28)
LDH SERPL L TO P-CCNC: 1.79 MMOL/L (ref 0.5–2.2)
PCO2 BLDA: 43.6 MMHG (ref 35–45)
PH SMN: 7.39 [PH] (ref 7.35–7.45)
PO2 BLDA: 38 MMHG (ref 40–60)
POC ALT (SGPT): 23 U/L (ref 10–47)
POC AST (SGOT): 31 U/L (ref 11–38)
POC BE: 1 MMOL/L
POC PTINR: 1 (ref 0.9–1.2)
POC PTWBT: 12.2 SEC (ref 9.7–14.3)
POC SATURATED O2: 71 % (ref 95–100)
POC TCO2: 27 MMOL/L (ref 18–33)
POC TCO2: 28 MMOL/L (ref 24–29)
POTASSIUM BLD-SCNC: 4.4 MMOL/L (ref 3.6–5.1)
PROTEIN, POC: 6.8 G/DL (ref 6.4–8.1)
SAMPLE: ABNORMAL
SAMPLE: NORMAL
SITE: ABNORMAL
SODIUM BLD-SCNC: 143 MMOL/L (ref 128–145)

## 2023-08-01 PROCEDURE — 93005 ELECTROCARDIOGRAM TRACING: CPT | Mod: HCNC,ER

## 2023-08-01 PROCEDURE — 25000003 PHARM REV CODE 250: Mod: HCNC,ER | Performed by: PHYSICIAN ASSISTANT

## 2023-08-01 PROCEDURE — 93010 EKG 12-LEAD: ICD-10-PCS | Mod: HCNC,,, | Performed by: INTERNAL MEDICINE

## 2023-08-01 PROCEDURE — 82803 BLOOD GASES ANY COMBINATION: CPT | Mod: HCNC,ER

## 2023-08-01 PROCEDURE — 93010 ELECTROCARDIOGRAM REPORT: CPT | Mod: HCNC,,, | Performed by: INTERNAL MEDICINE

## 2023-08-01 PROCEDURE — 96374 THER/PROPH/DIAG INJ IV PUSH: CPT | Mod: HCNC,ER

## 2023-08-01 PROCEDURE — 63600175 PHARM REV CODE 636 W HCPCS: Mod: HCNC,ER | Performed by: EMERGENCY MEDICINE

## 2023-08-01 PROCEDURE — 99285 EMERGENCY DEPT VISIT HI MDM: CPT | Mod: 25,HCNC,ER

## 2023-08-01 RX ORDER — METHOCARBAMOL 750 MG/1
1500 TABLET, FILM COATED ORAL
Status: COMPLETED | OUTPATIENT
Start: 2023-08-01 | End: 2023-08-01

## 2023-08-01 RX ORDER — HYDROCODONE BITARTRATE AND ACETAMINOPHEN 10; 325 MG/1; MG/1
1 TABLET ORAL EVERY 6 HOURS PRN
Qty: 20 TABLET | Refills: 0 | Status: SHIPPED | OUTPATIENT
Start: 2023-08-01 | End: 2023-11-13 | Stop reason: ALTCHOICE

## 2023-08-01 RX ORDER — MORPHINE SULFATE 4 MG/ML
4 INJECTION, SOLUTION INTRAMUSCULAR; INTRAVENOUS
Status: COMPLETED | OUTPATIENT
Start: 2023-08-01 | End: 2023-08-01

## 2023-08-01 RX ORDER — METHOCARBAMOL 500 MG/1
1500 TABLET, FILM COATED ORAL 3 TIMES DAILY
Qty: 45 TABLET | Refills: 0 | Status: SHIPPED | OUTPATIENT
Start: 2023-08-01 | End: 2023-08-06

## 2023-08-01 RX ADMIN — MORPHINE SULFATE 4 MG: 4 INJECTION INTRAVENOUS at 12:08

## 2023-08-01 RX ADMIN — METHOCARBAMOL 1500 MG: 750 TABLET ORAL at 02:08
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
## 2023-08-01 NOTE — DISCHARGE INSTRUCTIONS
Apply ice or heat to the areas for pain relief.  Return to the emergency department for any change or concerning symptoms.  Rest.  Please follow-up with your primary care doctor this week.
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
## 2023-08-18 ENCOUNTER — PATIENT MESSAGE (OUTPATIENT)
Dept: ADMINISTRATIVE | Facility: OTHER | Age: 73
End: 2023-08-18
Payer: MEDICARE

## 2023-08-18 ENCOUNTER — TELEPHONE (OUTPATIENT)
Dept: FAMILY MEDICINE | Facility: CLINIC | Age: 73
End: 2023-08-18
Payer: MEDICARE

## 2023-08-18 DIAGNOSIS — E11.69 TYPE 2 DIABETES MELLITUS WITH OTHER SPECIFIED COMPLICATION, WITHOUT LONG-TERM CURRENT USE OF INSULIN: Primary | ICD-10-CM

## 2023-08-18 NOTE — TELEPHONE ENCOUNTER
----- Message from Mcihelle Walsh sent at 8/18/2023  1:51 PM CDT -----  .Type: Patient Call Back    Who called: Opal - wife    What is the request in detail: Requesting a referral  to see Podiatry    Can the clinic reply by [facility]? No    Would the patient rather a call back or a response via [facility]? Call Back     Best call back number:.[phone] (home)       Additional Information:

## 2023-08-28 ENCOUNTER — OFFICE VISIT (OUTPATIENT)
Dept: PODIATRY | Facility: CLINIC | Age: 73
End: 2023-08-28
Payer: MEDICARE

## 2023-08-28 VITALS — WEIGHT: 237 LBS | BODY MASS INDEX: 33.93 KG/M2 | HEIGHT: 70 IN

## 2023-08-28 DIAGNOSIS — B35.3 TINEA PEDIS OF BOTH FEET: Primary | ICD-10-CM

## 2023-08-28 DIAGNOSIS — L74.4 ANHYDROSIS: ICD-10-CM

## 2023-08-28 DIAGNOSIS — E11.69 TYPE 2 DIABETES MELLITUS WITH OTHER SPECIFIED COMPLICATION, WITHOUT LONG-TERM CURRENT USE OF INSULIN: ICD-10-CM

## 2023-08-28 PROCEDURE — 1160F RVW MEDS BY RX/DR IN RCRD: CPT | Mod: HCNC,CPTII,S$GLB, | Performed by: PODIATRIST

## 2023-08-28 PROCEDURE — 3066F NEPHROPATHY DOC TX: CPT | Mod: HCNC,CPTII,S$GLB, | Performed by: PODIATRIST

## 2023-08-28 PROCEDURE — 99204 PR OFFICE/OUTPT VISIT, NEW, LEVL IV, 45-59 MIN: ICD-10-PCS | Mod: HCNC,S$GLB,, | Performed by: PODIATRIST

## 2023-08-28 PROCEDURE — 3008F BODY MASS INDEX DOCD: CPT | Mod: HCNC,CPTII,S$GLB, | Performed by: PODIATRIST

## 2023-08-28 PROCEDURE — 1160F PR REVIEW ALL MEDS BY PRESCRIBER/CLIN PHARMACIST DOCUMENTED: ICD-10-PCS | Mod: HCNC,CPTII,S$GLB, | Performed by: PODIATRIST

## 2023-08-28 PROCEDURE — 1101F PR PT FALLS ASSESS DOC 0-1 FALLS W/OUT INJ PAST YR: ICD-10-PCS | Mod: HCNC,CPTII,S$GLB, | Performed by: PODIATRIST

## 2023-08-28 PROCEDURE — 3044F HG A1C LEVEL LT 7.0%: CPT | Mod: HCNC,CPTII,S$GLB, | Performed by: PODIATRIST

## 2023-08-28 PROCEDURE — 1101F PT FALLS ASSESS-DOCD LE1/YR: CPT | Mod: HCNC,CPTII,S$GLB, | Performed by: PODIATRIST

## 2023-08-28 PROCEDURE — 1159F PR MEDICATION LIST DOCUMENTED IN MEDICAL RECORD: ICD-10-PCS | Mod: HCNC,CPTII,S$GLB, | Performed by: PODIATRIST

## 2023-08-28 PROCEDURE — 3008F PR BODY MASS INDEX (BMI) DOCUMENTED: ICD-10-PCS | Mod: HCNC,CPTII,S$GLB, | Performed by: PODIATRIST

## 2023-08-28 PROCEDURE — 4010F PR ACE/ARB THEARPY RXD/TAKEN: ICD-10-PCS | Mod: HCNC,CPTII,S$GLB, | Performed by: PODIATRIST

## 2023-08-28 PROCEDURE — 3061F NEG MICROALBUMINURIA REV: CPT | Mod: HCNC,CPTII,S$GLB, | Performed by: PODIATRIST

## 2023-08-28 PROCEDURE — 3288F FALL RISK ASSESSMENT DOCD: CPT | Mod: HCNC,CPTII,S$GLB, | Performed by: PODIATRIST

## 2023-08-28 PROCEDURE — 3044F PR MOST RECENT HEMOGLOBIN A1C LEVEL <7.0%: ICD-10-PCS | Mod: HCNC,CPTII,S$GLB, | Performed by: PODIATRIST

## 2023-08-28 PROCEDURE — 1159F MED LIST DOCD IN RCRD: CPT | Mod: HCNC,CPTII,S$GLB, | Performed by: PODIATRIST

## 2023-08-28 PROCEDURE — 99204 OFFICE O/P NEW MOD 45 MIN: CPT | Mod: HCNC,S$GLB,, | Performed by: PODIATRIST

## 2023-08-28 PROCEDURE — 99999 PR PBB SHADOW E&M-EST. PATIENT-LVL III: CPT | Mod: PBBFAC,HCNC,, | Performed by: PODIATRIST

## 2023-08-28 PROCEDURE — 3066F PR DOCUMENTATION OF TREATMENT FOR NEPHROPATHY: ICD-10-PCS | Mod: HCNC,CPTII,S$GLB, | Performed by: PODIATRIST

## 2023-08-28 PROCEDURE — 3288F PR FALLS RISK ASSESSMENT DOCUMENTED: ICD-10-PCS | Mod: HCNC,CPTII,S$GLB, | Performed by: PODIATRIST

## 2023-08-28 PROCEDURE — 4010F ACE/ARB THERAPY RXD/TAKEN: CPT | Mod: HCNC,CPTII,S$GLB, | Performed by: PODIATRIST

## 2023-08-28 PROCEDURE — 99999 PR PBB SHADOW E&M-EST. PATIENT-LVL III: ICD-10-PCS | Mod: PBBFAC,HCNC,, | Performed by: PODIATRIST

## 2023-08-28 PROCEDURE — 3061F PR NEG MICROALBUMINURIA RESULT DOCUMENTED/REVIEW: ICD-10-PCS | Mod: HCNC,CPTII,S$GLB, | Performed by: PODIATRIST

## 2023-08-28 RX ORDER — CICLOPIROX OLAMINE 7.7 MG/G
CREAM TOPICAL 2 TIMES DAILY
Qty: 90 G | Refills: 4 | Status: SHIPPED | OUTPATIENT
Start: 2023-08-28

## 2023-08-28 NOTE — PROGRESS NOTES
Subjective:      Patient ID: Clinton Rosenberg is a 72 y.o. male.    Chief Complaint: Diabetes Mellitus, Diabetic Foot Exam (5/10/23 Dr Gonzalez pcp), and Foot Problem (Ball of feet feel swollen and painful)    Diabetes, increased risk amputation needing evaluation/management/optomization of foot care.    Cc2 dry cracking itching skin bottom of both feet.  Gradual onset, unchanged for years, aggravated by increased weight bearing, shoe gear, pressure.  No previous medical treatment.  No self treatment.    Chief Complaint   Patient presents with    Diabetes Mellitus    Diabetic Foot Exam     5/10/23 Dr Gonzalez pcp    Foot Problem     Ball of feet feel swollen and painful       Casual shoes      Review of Systems   Constitutional: Negative for chills, diaphoresis, fever, malaise/fatigue and night sweats.   Cardiovascular:  Negative for claudication, cyanosis, leg swelling and syncope.   Skin:  Positive for dry skin and itching. Negative for color change, nail changes, rash, suspicious lesions and unusual hair distribution.   Musculoskeletal:  Negative for falls, joint pain, joint swelling, muscle cramps, muscle weakness and stiffness.   Gastrointestinal:  Negative for constipation, diarrhea, nausea and vomiting.   Neurological:  Positive for numbness and sensory change. Negative for brief paralysis, disturbances in coordination, focal weakness, paresthesias and tremors.         Objective:      Physical Exam  Constitutional:       General: He is not in acute distress.     Appearance: He is well-developed. He is not diaphoretic.   Cardiovascular:      Pulses:           Popliteal pulses are 2+ on the right side and 2+ on the left side.        Dorsalis pedis pulses are 2+ on the right side and 2+ on the left side.        Posterior tibial pulses are 2+ on the right side and 2+ on the left side.      Comments: Capillary refill 3 seconds all toes/distal feet, all toes/both feet warm to touch.      Negative lymphadenopathy  bilateral popliteal fossa and tarsal tunnel.      Negavie lower extremity edema bilateral.    Musculoskeletal:      Right ankle: No swelling, deformity, ecchymosis or lacerations. Normal range of motion. Normal pulse.      Right Achilles Tendon: Normal. No defects. Tena's test negative.      Comments: Normal angle, base, station of gait. All ten toes without clubbing, cyanosis, or signs of ischemia.  No pain to palpation bilateral lower extremities.  Range of motion, stability, muscle strength, and muscle tone normal bilateral feet and legs.    Lymphadenopathy:      Lower Body: No right inguinal adenopathy. No left inguinal adenopathy.      Comments: Negative lymphadenopathy bilateral popliteal fossa and tarsal tunnel.    Negative lymphangitic streaking bilateral feet/ankles/legs.   Skin:     General: Skin is warm and dry.      Capillary Refill: Capillary refill takes 2 to 3 seconds.      Coloration: Skin is not pale.      Findings: No abrasion, bruising, burn, ecchymosis, erythema, laceration, lesion or rash.      Nails: There is no clubbing.      Comments: Dry scale with superficial flakes over an erythematous base  bottom of both feet. without ulceration, drainage, pus, tracking, fluctuance, malodor, or cardinal signs infection.    Otherwise, Skin is normal age and health appropriate color, turgor, texture, and temperature bilateral lower extremities without ulceration, hyperpigmentation, discoloration, masses nodules or cords palpated.  No ecchymosis, erythema, edema, or cardinal signs of infection bilateral lower extremities.    Toenails normal color and trophic qualities.      Neurological:      Mental Status: He is alert and oriented to person, place, and time.      Sensory: Sensory deficit present.      Motor: No tremor, atrophy or abnormal muscle tone.      Gait: Gait normal.      Deep Tendon Reflexes:      Reflex Scores:       Patellar reflexes are 2+ on the right side and 2+ on the left side.        Achilles reflexes are 2+ on the right side and 2+ on the left side.     Comments: Diminished/loss of protective sensation all toes bilateral to 10 gram monofilament.    Negative tinel sign to percussion sural, superficial peroneal, deep peroneal, saphenous, and posterior tibial nerves right and left ankles and feet.     Psychiatric:         Behavior: Behavior is cooperative.           Assessment:       Encounter Diagnoses   Name Primary?    Type 2 diabetes mellitus with other specified complication, without long-term current use of insulin     Tinea pedis of both feet Yes    Anhydrosis          Plan:       Clinton was seen today for diabetes mellitus, diabetic foot exam and foot problem.    Diagnoses and all orders for this visit:    Tinea pedis of both feet    Type 2 diabetes mellitus with other specified complication, without long-term current use of insulin  -     Ambulatory referral/consult to Podiatry    Anhydrosis    Other orders  -     ciclopirox (LOPROX) 0.77 % Crea; Apply topically 2 (two) times daily.      I counseled the patient on his conditions, their implications and medical management.        The patient has received literature on basic diabetic foot care.  Patient will inspect feet daily, wear protective shoe gear when ambulatory, and apply moisturizer to skin as needed to maintain elasticity and help prevent ulceration.    Discussed conservative treatment with shoes of adequate dimensions, material, and style to alleviate symptoms and delay or prevent surgical intervention.    Loprox cream bid.              Follow up in about 1 year (around 8/28/2024).

## 2023-09-08 NOTE — PLAN OF CARE
PRE OP DIAGNOSIS:  Casandra 2,3     POST OP DIAGNOSIS:  same     PROCEDURE: Loop electrosurgical excision procedure     SURGEON:  Magda Andrade DO    ANESTHESIA PROVIDER:  No responsible provider has been recorded for the case.    ANESTHESIA TYPE:  General    ESTIMATED BLOOD LOSS:  minimal     COMPLICATIONS:  None    DISPOSITION:  To recovery room in stable condition    FINDINGS:  Normal cervix     SPECIMEN: Ectocervical curettage     PROCEDURE:  The patient was brought to the operating room where anesthesia was placed and deemed to be adequate.  She was prepped and draped in a normal sterile fashion and placed in high lithotomy position.  I was then called into the room.  Speculum was placed in the vagina.  Vinegar solution and Lugol's was used to coat the cervix and vagina.      Colposcopy was performed with findings as above.  A cervical block was placed with 5ml of local anesthetic with epinephrine.  A size 12mm X20 mm loop was used.  An ECC was performed.   The entire transformation zone and all visual colposcopic lesions removed.  The specimen was handed off for permanent pathology and pinned in paraffin.  An endocervical curettage was then performed.  The LEEP bed was made hemostatic with Bovie cautery and monsels solution.     The patient tolerated the procedure well.  Instrument, lap, and needle counts were correct.  She received antibiotics prior to procedure.  She was taken to recovery room in stable condition.     Electronically signed by:    Magda Andrade DO  09/08/23  09:53 EDT       Problem: Pain, Chronic (Adult)  Intervention: Manage Persistent Pain Analgesia   05/19/18 1811   Manage Acute Burn Pain   Bowel Intervention adequate fluid intake promoted   Safety Interventions   Medication Review/Management medications reviewed     Intervention: Support Psychosocial Response to Persistent Pain   05/19/18 1811   Coping/Psychosocial Interventions   Supportive Measures active listening utilized     Intervention: Mutually Develop/Implement Persistent Pain Management Plan   05/19/18 1811   Pain/Comfort/Sleep Interventions   Pain Management Interventions awakened for pain meds per patient request       Goal: Identify Related Risk Factors and Signs and Symptoms  Related risk factors and signs and symptoms are identified upon initiation of Human Response Clinical Practice Guideline (CPG)  Outcome: Ongoing (interventions implemented as appropriate)   05/19/18 1811   Pain, Chronic   Signs and Symptoms (Chronic Pain) fatigue/weakness;verbalization of pain descriptors     Goal: Acceptable Pain Control/Comfort Level  Patient will demonstrate the desired outcomes by discharge/transition of care.  Outcome: Ongoing (interventions implemented as appropriate)   05/19/18 1811   Pain, Chronic (Adult)   Acceptable Pain Control/Comfort Level making progress toward outcome

## 2023-09-28 DIAGNOSIS — R39.9 LOWER URINARY TRACT SYMPTOMS (LUTS): ICD-10-CM

## 2023-09-28 RX ORDER — TAMSULOSIN HYDROCHLORIDE 0.4 MG/1
1 CAPSULE ORAL
Qty: 90 CAPSULE | Refills: 3 | Status: SHIPPED | OUTPATIENT
Start: 2023-09-28 | End: 2023-11-13 | Stop reason: SDUPTHER

## 2023-10-13 ENCOUNTER — PATIENT MESSAGE (OUTPATIENT)
Dept: ADMINISTRATIVE | Facility: HOSPITAL | Age: 73
End: 2023-10-13
Payer: MEDICARE

## 2023-10-13 ENCOUNTER — PATIENT OUTREACH (OUTPATIENT)
Dept: ADMINISTRATIVE | Facility: HOSPITAL | Age: 73
End: 2023-10-13
Payer: MEDICARE

## 2023-10-19 ENCOUNTER — TELEPHONE (OUTPATIENT)
Dept: ENDOSCOPY | Facility: HOSPITAL | Age: 73
End: 2023-10-19
Payer: MEDICARE

## 2023-10-26 ENCOUNTER — ANESTHESIA EVENT (OUTPATIENT)
Dept: ENDOSCOPY | Facility: HOSPITAL | Age: 73
End: 2023-10-26
Payer: MEDICARE

## 2023-10-26 ENCOUNTER — HOSPITAL ENCOUNTER (OUTPATIENT)
Facility: HOSPITAL | Age: 73
Discharge: HOME | End: 2023-10-26
Attending: STUDENT IN AN ORGANIZED HEALTH CARE EDUCATION/TRAINING PROGRAM | Admitting: STUDENT IN AN ORGANIZED HEALTH CARE EDUCATION/TRAINING PROGRAM
Payer: MEDICARE

## 2023-10-26 ENCOUNTER — ANESTHESIA (OUTPATIENT)
Dept: ENDOSCOPY | Facility: HOSPITAL | Age: 73
End: 2023-10-26
Payer: MEDICARE

## 2023-10-26 VITALS
RESPIRATION RATE: 18 BRPM | DIASTOLIC BLOOD PRESSURE: 72 MMHG | HEART RATE: 51 BPM | SYSTOLIC BLOOD PRESSURE: 151 MMHG | OXYGEN SATURATION: 99 % | TEMPERATURE: 98 F

## 2023-10-26 DIAGNOSIS — Z12.11 SCREENING FOR COLON CANCER: ICD-10-CM

## 2023-10-26 LAB — POCT GLUCOSE: 120 MG/DL (ref 70–110)

## 2023-10-26 PROCEDURE — 37000009 HC ANESTHESIA EA ADD 15 MINS: Mod: HCNC | Performed by: STUDENT IN AN ORGANIZED HEALTH CARE EDUCATION/TRAINING PROGRAM

## 2023-10-26 PROCEDURE — 45385 COLONOSCOPY W/LESION REMOVAL: CPT | Mod: PT,HCNC,, | Performed by: STUDENT IN AN ORGANIZED HEALTH CARE EDUCATION/TRAINING PROGRAM

## 2023-10-26 PROCEDURE — D9220A PRA ANESTHESIA: ICD-10-PCS | Mod: PT,CRNA,, | Performed by: NURSE ANESTHETIST, CERTIFIED REGISTERED

## 2023-10-26 PROCEDURE — 88305 TISSUE EXAM BY PATHOLOGIST: CPT | Mod: HCNC | Performed by: PATHOLOGY

## 2023-10-26 PROCEDURE — D9220A PRA ANESTHESIA: Mod: PT,CRNA,, | Performed by: NURSE ANESTHETIST, CERTIFIED REGISTERED

## 2023-10-26 PROCEDURE — 88305 TISSUE EXAM BY PATHOLOGIST: CPT | Mod: 26,HCNC,, | Performed by: PATHOLOGY

## 2023-10-26 PROCEDURE — 37000008 HC ANESTHESIA 1ST 15 MINUTES: Mod: HCNC | Performed by: STUDENT IN AN ORGANIZED HEALTH CARE EDUCATION/TRAINING PROGRAM

## 2023-10-26 PROCEDURE — 88305 TISSUE EXAM BY PATHOLOGIST: ICD-10-PCS | Mod: 26,HCNC,, | Performed by: PATHOLOGY

## 2023-10-26 PROCEDURE — 27201089 HC SNARE, DISP (ANY): Mod: HCNC | Performed by: STUDENT IN AN ORGANIZED HEALTH CARE EDUCATION/TRAINING PROGRAM

## 2023-10-26 PROCEDURE — D9220A PRA ANESTHESIA: Mod: PT,ANES,, | Performed by: ANESTHESIOLOGY

## 2023-10-26 PROCEDURE — 45385 PR COLONOSCOPY,REMV LESN,SNARE: ICD-10-PCS | Mod: PT,HCNC,, | Performed by: STUDENT IN AN ORGANIZED HEALTH CARE EDUCATION/TRAINING PROGRAM

## 2023-10-26 PROCEDURE — 45385 COLONOSCOPY W/LESION REMOVAL: CPT | Mod: PT,HCNC | Performed by: STUDENT IN AN ORGANIZED HEALTH CARE EDUCATION/TRAINING PROGRAM

## 2023-10-26 PROCEDURE — 63600175 PHARM REV CODE 636 W HCPCS: Mod: HCNC | Performed by: NURSE ANESTHETIST, CERTIFIED REGISTERED

## 2023-10-26 PROCEDURE — D9220A PRA ANESTHESIA: ICD-10-PCS | Mod: PT,ANES,, | Performed by: ANESTHESIOLOGY

## 2023-10-26 PROCEDURE — 25000003 PHARM REV CODE 250: Mod: HCNC | Performed by: STUDENT IN AN ORGANIZED HEALTH CARE EDUCATION/TRAINING PROGRAM

## 2023-10-26 PROCEDURE — 25000003 PHARM REV CODE 250: Mod: HCNC | Performed by: NURSE ANESTHETIST, CERTIFIED REGISTERED

## 2023-10-26 PROCEDURE — 82962 GLUCOSE BLOOD TEST: CPT | Mod: HCNC | Performed by: STUDENT IN AN ORGANIZED HEALTH CARE EDUCATION/TRAINING PROGRAM

## 2023-10-26 RX ORDER — LIDOCAINE HYDROCHLORIDE 20 MG/ML
INJECTION INTRAVENOUS
Status: DISCONTINUED | OUTPATIENT
Start: 2023-10-26 | End: 2023-10-26

## 2023-10-26 RX ORDER — PROPOFOL 10 MG/ML
INJECTION, EMULSION INTRAVENOUS
Status: DISCONTINUED
Start: 2023-10-26 | End: 2023-10-26 | Stop reason: HOSPADM

## 2023-10-26 RX ORDER — PROPOFOL 10 MG/ML
VIAL (ML) INTRAVENOUS
Status: DISCONTINUED | OUTPATIENT
Start: 2023-10-26 | End: 2023-10-26

## 2023-10-26 RX ORDER — LIDOCAINE HYDROCHLORIDE 20 MG/ML
INJECTION, SOLUTION EPIDURAL; INFILTRATION; INTRACAUDAL; PERINEURAL
Status: DISCONTINUED
Start: 2023-10-26 | End: 2023-10-26 | Stop reason: HOSPADM

## 2023-10-26 RX ORDER — SODIUM CHLORIDE 9 MG/ML
INJECTION, SOLUTION INTRAVENOUS CONTINUOUS
Status: DISCONTINUED | OUTPATIENT
Start: 2023-10-26 | End: 2023-10-26 | Stop reason: HOSPADM

## 2023-10-26 RX ADMIN — PROPOFOL 100 MG: 10 INJECTION, EMULSION INTRAVENOUS at 09:10

## 2023-10-26 RX ADMIN — LIDOCAINE HYDROCHLORIDE 50 MG: 20 INJECTION, SOLUTION INTRAVENOUS at 09:10

## 2023-10-26 RX ADMIN — SODIUM CHLORIDE: 0.9 INJECTION, SOLUTION INTRAVENOUS at 08:10

## 2023-10-26 RX ADMIN — PROPOFOL 50 MG: 10 INJECTION, EMULSION INTRAVENOUS at 09:10

## 2023-10-26 NOTE — ANESTHESIA POSTPROCEDURE EVALUATION
Anesthesia Post Evaluation    Patient: Clinton Rosenberg    Procedure(s) Performed: Procedure(s) (LRB):  COLONOSCOPY (N/A)    Final Anesthesia Type: general      Patient location during evaluation: GI PACU  Patient participation: Yes- Able to Participate  Level of consciousness: awake and alert  Post-procedure vital signs: reviewed and stable  Airway patency: patent    PONV status at discharge: No PONV  Anesthetic complications: no      Cardiovascular status: blood pressure returned to baseline and hemodynamically stable  Respiratory status: unassisted, spontaneous ventilation and room air  Hydration status: euvolemic  Follow-up not needed.          Vitals Value Taken Time   /72 10/26/23 1005   Temp 36.6 °C (97.8 °F) 10/26/23 0937   Pulse 55 10/26/23 1009   Resp 21 10/26/23 1009   SpO2 99 % 10/26/23 1009   Vitals shown include unvalidated device data.      No case tracking events are documented in the log.      Pain/Amna Score: No data recorded

## 2023-10-26 NOTE — TRANSFER OF CARE
Anesthesia Transfer of Care Note    Patient: Clinton Rosenberg    Procedure(s) Performed: Procedure(s) (LRB):  COLONOSCOPY (N/A)    Patient location: GI    Anesthesia Type: general    Transport from OR: Transported from OR on room air with adequate spontaneous ventilation    Post pain: adequate analgesia    Post assessment: no apparent anesthetic complications    Post vital signs: stable    Level of consciousness: awake    Nausea/Vomiting: no nausea/vomiting    Complications: none    Transfer of care protocol was followed      Last vitals:   Visit Vitals  /73   Pulse 61   Temp 36.6 °C (97.8 °F) (Oral)   Resp 18   SpO2 100%

## 2023-10-26 NOTE — H&P
Short Stay Endoscopy History and Physical    PCP - Michael Gonzalez MD    Procedure - Colonoscopy  ASA - per anesthesia  Mallampati - per anesthesia  Plan of anesthesia - MAC    HPI:  This is a 73 y.o. male here for evaluation of : asymptomatic screening exam    ROS:  Constitutional: No fevers, chills  CV: No chest pain  Pulm: No cough  Ophtho: No vision changes  GI: see HPI  Derm: No rash    Medical History:  has a past medical history of Allergy, Angina pectoris, Anxiety, Cancer, Chronic midline low back pain without sciatica (10/26/2022), Coronary artery disease, Depression, Eye injury (as a teen), GERD (gastroesophageal reflux disease), Hyperlipidemia, Hypertension, Hypothyroidism, Memory loss, Myocardial infarction, Nuclear sclerosis of both eyes (5/20/2021), Obesity, Peripheral vascular disease (6/20/2017), Retrocalcaneal bursitis (11/24/2014), Sleep apnea, and Type 2 diabetes mellitus with other specified complication (4/27/2023).    Surgical History:  has a past surgical history that includes Colonoscopy; heart stent; Appendectomy (1986); hand trauma (Right); bone spur (Right); Left heart catheterization (Left, 9/20/2019); and Left heart catheterization (Left, 9/21/2020).    Family History: family history includes Alcohol abuse in his father; Arthritis in his brother, brother, mother, and sister; Cancer in his brother and mother; Depression in his sister; Diabetes in his brother, brother, father, and mother; Early death in his brother, father, and mother; Heart disease in his brother, brother, brother, brother, brother, father, mother, and sister; Hypertension in his brother, brother, father, mother, and sister; Migraines in his mother; No Known Problems in his maternal aunt, maternal grandfather, maternal grandmother, maternal uncle, paternal aunt, paternal grandfather, paternal grandmother, and paternal uncle; Pneumonia in his brother; Seizures in his mother; Stroke in his father; Tremor in his  mother.. Otherwise no colon cancer, inflammatory bowel disease, or GI malignancies.    Social History:  reports that he has never smoked. He has never been exposed to tobacco smoke. He has never used smokeless tobacco. He reports that he does not currently use alcohol. He reports that he does not use drugs.    Review of patient's allergies indicates:   Allergen Reactions    Iodine and iodide containing products Anaphylaxis    Naproxen Other (See Comments)     hallucinations  Hallucinations^  Hallucinations^    Hydrocodone-acetaminophen      Rash (skin)^    Iodine      Anaphylaxis^    Oxycodone-acetaminophen      Rash (skin)^       Medications:   Medications Prior to Admission   Medication Sig Dispense Refill Last Dose    aspirin (ECOTRIN) 81 MG EC tablet Take 1 tablet (81 mg total) by mouth once daily. 90 tablet 3 10/25/2023    atorvastatin (LIPITOR) 80 MG tablet Take 1 tablet (80 mg total) by mouth every evening. 90 tablet 3 10/25/2023    clopidogreL (PLAVIX) 75 mg tablet TAKE 1 TABLET EVERY DAY 90 tablet 3 10/21/2023    furosemide (LASIX) 20 MG tablet TAKE 1 TABLET EVERY DAY 90 tablet 2 10/25/2023    isosorbide mononitrate (IMDUR) 120 MG 24 hr tablet TAKE 2 TABLETS (240 MG TOTAL) BY MOUTH ONCE DAILY. 180 tablet 3 10/25/2023    levothyroxine (SYNTHROID) 75 MCG tablet TAKE 1 TABLET BEFORE BREAKFAST 90 tablet 1 10/25/2023    metFORMIN (GLUCOPHAGE-XR) 500 MG ER 24hr tablet Take 1 tablet (500 mg total) by mouth 2 (two) times daily with meals. 180 tablet 1 10/25/2023    metoprolol tartrate (LOPRESSOR) 25 MG tablet Take 0.5 tablets (12.5 mg total) by mouth 2 (two) times daily. 90 tablet 1 10/25/2023    olmesartan (BENICAR) 40 MG tablet Take 1 tablet (40 mg total) by mouth once daily. 90 tablet 1 10/25/2023    vitamin D 1000 units Tab Take 1 tablet (1,000 Units total) by mouth once daily. 90 tablet 3 10/25/2023    ciclopirox (LOPROX) 0.77 % Crea Apply topically 2 (two) times daily. 90 g 4     gabapentin (NEURONTIN) 300 MG  capsule Take 1 capsule (300 mg total) by mouth every evening. 90 capsule 3     HYDROcodone-acetaminophen (NORCO)  mg per tablet Take 1 tablet by mouth every 6 (six) hours as needed for Pain. 20 tablet 0     KRILL/OM3/DHA/EPA/OM6/LIP/ASTX (KRILL OIL, OMEGA 3 AND 6, ORAL) Take by mouth.       LEXISCAN 0.4 mg/5 mL Syrg        nitroGLYCERIN (NITROSTAT) 0.4 MG SL tablet Place 1 tablet (0.4 mg total) under the tongue every 5 (five) minutes as needed for Chest pain. 50 tablet 3     nystatin (MYCOSTATIN) powder Apply topically 4 (four) times daily as needed (to keep penis/scrotum dry). 15 g 1     pantoprazole (PROTONIX) 20 MG tablet Take 2 tablets (40 mg total) by mouth once daily. 90 tablet 3     tamsulosin (FLOMAX) 0.4 mg Cap TAKE 1 CAPSULE ONE TIME DAILY 90 capsule 3          Vital Signs:   Vitals:    10/26/23 0838   BP: (!) 155/77   Pulse: (!) 55   Resp: 16   Temp: 98.2 °F (36.8 °C)       General Appearance: Well appearing in no acute distress  Eyes:    No scleral icterus  ENT: atraumatic  Abdomen: Soft, nondistended  Extremities: no tenderness  Skin: normal color    Labs:  Lab Results   Component Value Date    WBC 7.53 04/12/2023    HGB 14.8 04/12/2023    HCT 43.8 04/12/2023     04/12/2023    CHOL 128 04/12/2023    TRIG 208 (H) 04/12/2023    HDL 37 (L) 04/12/2023    ALT 21 04/12/2023    AST 18 04/12/2023     04/12/2023    K 4.8 04/12/2023     04/12/2023    CREATININE 1.0 04/12/2023    BUN 13 04/12/2023    CO2 26 04/12/2023    TSH 3.387 10/07/2022    PSA 0.76 10/12/2018    INR 1.0 08/01/2023    HGBA1C 6.8 (H) 04/12/2023       I have explained the risks and benefits of endoscopy procedures to the patient/their POA including but not limited to bleeding, perforation, infection, and death.  The patient/their POA was asked if they understand and allowed to ask any further questions to their satisfaction.    Argelia Altamirano MD

## 2023-10-26 NOTE — PROVATION PATIENT INSTRUCTIONS
Discharge Summary/Instructions after an Endoscopic Procedure  Patient Name: Clinton Rosenberg  Patient MRN: [id number]  Patient YOB: 1950 Thursday, October 26, 2023  Argelia Altamirano MD  Dear patient,  As a result of recent federal legislation (The Federal Cures Act), you may   receive lab or pathology results from your procedure in your [facility]   account before your physician is able to contact you. Your physician or   their representative will relay the results to you with their   recommendations at their soonest availability.  Thank you,  RESTRICTIONS:  During your procedure today, you received medications for sedation.  These   medications may affect your judgment, balance and coordination.  Therefore,   for 24 hours, you have the following restrictions:   - DO NOT drive a car, operate machinery, make legal/financial decisions,   sign important papers or drink alcohol.    ACTIVITY:  Today: no heavy lifting, straining or running due to procedural   sedation/anesthesia.  The following day: return to full activity including work.  DIET:  Eat and drink normally unless instructed otherwise.     TREATMENT FOR COMMON SIDE EFFECTS:  - Mild abdominal pain, nausea, belching, bloating or excessive gas:  rest,   eat lightly and use a heating pad.  - Sore Throat: treat with throat lozenges and/or gargle with warm salt   water.  - Because air was used during the procedure, expelling large amounts of air   from your rectum or belching is normal.  - If a bowel prep was taken, you may not have a bowel movement for 1-3 days.    This is normal.  SYMPTOMS TO WATCH FOR AND REPORT TO YOUR PHYSICIAN:  1. Abdominal pain or bloating, other than gas cramps.  2. Chest pain.  3. Back pain.  4. Signs of infection such as: chills or fever occurring within 24 hours   after the procedure.  5. Rectal bleeding, which would show as bright red, maroon, or black stools.   (A tablespoon of blood from the rectum is not serious, especially  if   hemorrhoids are present.)  6. Vomiting.  7. Weakness or dizziness.  GO DIRECTLY TO THE NEAREST EMERGENCY ROOM IF YOU HAVE ANY OF THE FOLLOWING:      Difficulty breathing              Chills and/or fever over 101 F   Persistent vomiting and/or vomiting blood   Severe abdominal pain   Severe chest pain   Black, tarry stools   Bleeding- more than one tablespoon   Any other symptom or condition that you feel may need urgent attention  Your doctor recommends these additional instructions:  If any biopsies were taken, your doctors clinic will contact you in 1 to 2   weeks with any results.  - Patient has a contact number available for emergencies.  The signs and   symptoms of potential delayed complications were discussed with the   patient.  Return to normal activities tomorrow.  Written discharge   instructions were provided to the patient.   - Discharge patient to home.   - Resume previous diet.   - Continue present medications.   - Await pathology results.   - Repeat colonoscopy in 10 years for surveillance after discussing risks and   benefits of screening and taking into consideration the patient's health at   the time.  For questions, problems or results please call your physician - Argelia Altamirano MD at Work:  [phone].  [facility] can be reached at [phone]     IF A COMPLICATION OR EMERGENCY SITUATION ARISES AND YOU ARE UNABLE TO REACH   YOUR PHYSICIAN - GO DIRECTLY TO THE EMERGENCY ROOM.  MD Argelia Meehan MD  10/26/2023 9:36:15 AM  This report has been verified and signed electronically.  Dear patient,  As a result of recent federal legislation (The Federal Cures Act), you may   receive lab or pathology results from your procedure in your [email or web address]   account before your physician is able to contact you. Your physician or   their representative will relay the results to you with their   recommendations at their soonest  availability.  Thank you,  PROVATION

## 2023-10-26 NOTE — ANESTHESIA PREPROCEDURE EVALUATION
10/26/2023  Clinton Rosenberg is a 73 y.o., male.      Pre-op Assessment    I have reviewed the Patient Summary Reports.     I have reviewed the Nursing Notes. I have reviewed the NPO Status.   I have reviewed the Medications.     Review of Systems  Anesthesia Hx:  No problems with previous Anesthesia  Denies Family Hx of Anesthesia complications.   Denies Personal Hx of Anesthesia complications.   Social:  Non-Smoker, No Alcohol Use    Hematology/Oncology:  Hematology Normal   Oncology Normal     EENT/Dental:EENT/Dental Normal   Cardiovascular:   Hypertension CAD   hyperlipidemia    Pulmonary:   Sleep Apnea    Hepatic/GI:   GERD    Neurological:  Neurology Normal    Endocrine:   Diabetes, type 2 Hypothyroidism    Psych:   anxiety depression          Physical Exam  General: Well nourished, Cooperative, Alert and Oriented    Airway:  Mallampati: II / I  Mouth Opening: Normal  TM Distance: Normal  Tongue: Normal  Neck ROM: Normal ROM    Dental:  Intact        Anesthesia Plan  Type of Anesthesia, risks & benefits discussed:    Anesthesia Type: Gen Natural Airway  Intra-op Monitoring Plan: Standard ASA Monitors  Induction:  IV  Informed Consent: Informed consent signed with the Patient and all parties understand the risks and agree with anesthesia plan.  All questions answered. Patient consented to blood products? No  ASA Score: 3    Ready For Surgery From Anesthesia Perspective.     .

## 2023-10-27 LAB
FINAL PATHOLOGIC DIAGNOSIS: NORMAL
GROSS: NORMAL
Lab: NORMAL

## 2023-11-03 ENCOUNTER — LAB VISIT (OUTPATIENT)
Dept: LAB | Facility: HOSPITAL | Age: 73
End: 2023-11-03
Attending: INTERNAL MEDICINE
Payer: MEDICARE

## 2023-11-03 DIAGNOSIS — E78.49 OTHER HYPERLIPIDEMIA: ICD-10-CM

## 2023-11-03 DIAGNOSIS — E11.69 TYPE 2 DIABETES MELLITUS WITH OTHER SPECIFIED COMPLICATION, WITHOUT LONG-TERM CURRENT USE OF INSULIN: ICD-10-CM

## 2023-11-03 LAB
ALBUMIN SERPL BCP-MCNC: 4 G/DL (ref 3.5–5.2)
ALP SERPL-CCNC: 53 U/L (ref 55–135)
ALT SERPL W/O P-5'-P-CCNC: 17 U/L (ref 10–44)
ANION GAP SERPL CALC-SCNC: 7 MMOL/L (ref 8–16)
AST SERPL-CCNC: 15 U/L (ref 10–40)
BILIRUB SERPL-MCNC: 0.7 MG/DL (ref 0.1–1)
BUN SERPL-MCNC: 18 MG/DL (ref 8–23)
CALCIUM SERPL-MCNC: 9.3 MG/DL (ref 8.7–10.5)
CHLORIDE SERPL-SCNC: 105 MMOL/L (ref 95–110)
CO2 SERPL-SCNC: 29 MMOL/L (ref 23–29)
CREAT SERPL-MCNC: 1.1 MG/DL (ref 0.5–1.4)
ERYTHROCYTE [DISTWIDTH] IN BLOOD BY AUTOMATED COUNT: 13.4 % (ref 11.5–14.5)
EST. GFR  (NO RACE VARIABLE): >60 ML/MIN/1.73 M^2
ESTIMATED AVG GLUCOSE: 131 MG/DL (ref 68–131)
GLUCOSE SERPL-MCNC: 147 MG/DL (ref 70–110)
HBA1C MFR BLD: 6.2 % (ref 4–5.6)
HCT VFR BLD AUTO: 44.4 % (ref 40–54)
HGB BLD-MCNC: 15 G/DL (ref 14–18)
MCH RBC QN AUTO: 31 PG (ref 27–31)
MCHC RBC AUTO-ENTMCNC: 33.8 G/DL (ref 32–36)
MCV RBC AUTO: 92 FL (ref 82–98)
PLATELET # BLD AUTO: 199 K/UL (ref 150–450)
PMV BLD AUTO: 10.5 FL (ref 9.2–12.9)
POTASSIUM SERPL-SCNC: 4.4 MMOL/L (ref 3.5–5.1)
PROT SERPL-MCNC: 6.8 G/DL (ref 6–8.4)
RBC # BLD AUTO: 4.84 M/UL (ref 4.6–6.2)
SODIUM SERPL-SCNC: 141 MMOL/L (ref 136–145)
TSH SERPL DL<=0.005 MIU/L-ACNC: 3.2 UIU/ML (ref 0.4–4)
WBC # BLD AUTO: 8.82 K/UL (ref 3.9–12.7)

## 2023-11-03 PROCEDURE — 84443 ASSAY THYROID STIM HORMONE: CPT | Mod: HCNC | Performed by: INTERNAL MEDICINE

## 2023-11-03 PROCEDURE — 80053 COMPREHEN METABOLIC PANEL: CPT | Mod: HCNC | Performed by: INTERNAL MEDICINE

## 2023-11-03 PROCEDURE — 85027 COMPLETE CBC AUTOMATED: CPT | Mod: HCNC | Performed by: INTERNAL MEDICINE

## 2023-11-03 PROCEDURE — 83036 HEMOGLOBIN GLYCOSYLATED A1C: CPT | Mod: HCNC | Performed by: INTERNAL MEDICINE

## 2023-11-03 PROCEDURE — 36415 COLL VENOUS BLD VENIPUNCTURE: CPT | Mod: HCNC,PO | Performed by: INTERNAL MEDICINE

## 2023-11-05 ENCOUNTER — PATIENT MESSAGE (OUTPATIENT)
Dept: OTHER | Facility: OTHER | Age: 73
End: 2023-11-05
Payer: MEDICARE
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
## 2023-11-13 ENCOUNTER — OFFICE VISIT (OUTPATIENT)
Dept: FAMILY MEDICINE | Facility: CLINIC | Age: 73
End: 2023-11-13
Payer: MEDICARE

## 2023-11-13 VITALS
OXYGEN SATURATION: 96 % | DIASTOLIC BLOOD PRESSURE: 68 MMHG | HEART RATE: 65 BPM | HEIGHT: 70 IN | BODY MASS INDEX: 32.78 KG/M2 | TEMPERATURE: 99 F | WEIGHT: 228.94 LBS | SYSTOLIC BLOOD PRESSURE: 118 MMHG

## 2023-11-13 DIAGNOSIS — I25.118 CORONARY ARTERY DISEASE OF NATIVE ARTERY OF NATIVE HEART WITH STABLE ANGINA PECTORIS: ICD-10-CM

## 2023-11-13 DIAGNOSIS — E11.69 TYPE 2 DIABETES MELLITUS WITH OTHER SPECIFIED COMPLICATION, WITHOUT LONG-TERM CURRENT USE OF INSULIN: Primary | ICD-10-CM

## 2023-11-13 DIAGNOSIS — R60.0 PERIPHERAL EDEMA: Chronic | ICD-10-CM

## 2023-11-13 DIAGNOSIS — I73.9 PERIPHERAL VASCULAR DISEASE: ICD-10-CM

## 2023-11-13 DIAGNOSIS — R29.818 NEUROGENIC CLAUDICATION: ICD-10-CM

## 2023-11-13 DIAGNOSIS — F41.9 ANXIETY AND DEPRESSION: Chronic | ICD-10-CM

## 2023-11-13 DIAGNOSIS — F32.A ANXIETY AND DEPRESSION: Chronic | ICD-10-CM

## 2023-11-13 DIAGNOSIS — M54.16 LUMBAR RADICULOPATHY, CHRONIC: ICD-10-CM

## 2023-11-13 DIAGNOSIS — K21.9 GASTROESOPHAGEAL REFLUX DISEASE, UNSPECIFIED WHETHER ESOPHAGITIS PRESENT: Chronic | ICD-10-CM

## 2023-11-13 DIAGNOSIS — I10 ESSENTIAL HYPERTENSION: Chronic | ICD-10-CM

## 2023-11-13 DIAGNOSIS — G89.29 CHRONIC MIDLINE LOW BACK PAIN WITHOUT SCIATICA: ICD-10-CM

## 2023-11-13 DIAGNOSIS — E03.9 HYPOTHYROIDISM, UNSPECIFIED TYPE: Chronic | ICD-10-CM

## 2023-11-13 DIAGNOSIS — D12.6 TUBULAR ADENOMA OF COLON: ICD-10-CM

## 2023-11-13 DIAGNOSIS — R39.9 LOWER URINARY TRACT SYMPTOMS (LUTS): ICD-10-CM

## 2023-11-13 DIAGNOSIS — E78.2 MIXED HYPERLIPIDEMIA: Chronic | ICD-10-CM

## 2023-11-13 DIAGNOSIS — M54.50 CHRONIC MIDLINE LOW BACK PAIN WITHOUT SCIATICA: ICD-10-CM

## 2023-11-13 PROCEDURE — 3044F PR MOST RECENT HEMOGLOBIN A1C LEVEL <7.0%: ICD-10-PCS | Mod: HCNC,CPTII,S$GLB, | Performed by: INTERNAL MEDICINE

## 2023-11-13 PROCEDURE — 1160F PR REVIEW ALL MEDS BY PRESCRIBER/CLIN PHARMACIST DOCUMENTED: ICD-10-PCS | Mod: HCNC,CPTII,S$GLB, | Performed by: INTERNAL MEDICINE

## 2023-11-13 PROCEDURE — 1101F PR PT FALLS ASSESS DOC 0-1 FALLS W/OUT INJ PAST YR: ICD-10-PCS | Mod: HCNC,CPTII,S$GLB, | Performed by: INTERNAL MEDICINE

## 2023-11-13 PROCEDURE — 1159F MED LIST DOCD IN RCRD: CPT | Mod: HCNC,CPTII,S$GLB, | Performed by: INTERNAL MEDICINE

## 2023-11-13 PROCEDURE — 1125F PR PAIN SEVERITY QUANTIFIED, PAIN PRESENT: ICD-10-PCS | Mod: HCNC,CPTII,S$GLB, | Performed by: INTERNAL MEDICINE

## 2023-11-13 PROCEDURE — 3044F HG A1C LEVEL LT 7.0%: CPT | Mod: HCNC,CPTII,S$GLB, | Performed by: INTERNAL MEDICINE

## 2023-11-13 PROCEDURE — 3008F BODY MASS INDEX DOCD: CPT | Mod: HCNC,CPTII,S$GLB, | Performed by: INTERNAL MEDICINE

## 2023-11-13 PROCEDURE — 3288F FALL RISK ASSESSMENT DOCD: CPT | Mod: HCNC,CPTII,S$GLB, | Performed by: INTERNAL MEDICINE

## 2023-11-13 PROCEDURE — 1160F RVW MEDS BY RX/DR IN RCRD: CPT | Mod: HCNC,CPTII,S$GLB, | Performed by: INTERNAL MEDICINE

## 2023-11-13 PROCEDURE — 3066F NEPHROPATHY DOC TX: CPT | Mod: HCNC,CPTII,S$GLB, | Performed by: INTERNAL MEDICINE

## 2023-11-13 PROCEDURE — 3061F NEG MICROALBUMINURIA REV: CPT | Mod: HCNC,CPTII,S$GLB, | Performed by: INTERNAL MEDICINE

## 2023-11-13 PROCEDURE — 3288F PR FALLS RISK ASSESSMENT DOCUMENTED: ICD-10-PCS | Mod: HCNC,CPTII,S$GLB, | Performed by: INTERNAL MEDICINE

## 2023-11-13 PROCEDURE — 4010F PR ACE/ARB THEARPY RXD/TAKEN: ICD-10-PCS | Mod: HCNC,CPTII,S$GLB, | Performed by: INTERNAL MEDICINE

## 2023-11-13 PROCEDURE — 99214 PR OFFICE/OUTPT VISIT, EST, LEVL IV, 30-39 MIN: ICD-10-PCS | Mod: HCNC,S$GLB,, | Performed by: INTERNAL MEDICINE

## 2023-11-13 PROCEDURE — 99999 PR PBB SHADOW E&M-EST. PATIENT-LVL IV: ICD-10-PCS | Mod: PBBFAC,HCNC,, | Performed by: INTERNAL MEDICINE

## 2023-11-13 PROCEDURE — 1159F PR MEDICATION LIST DOCUMENTED IN MEDICAL RECORD: ICD-10-PCS | Mod: HCNC,CPTII,S$GLB, | Performed by: INTERNAL MEDICINE

## 2023-11-13 PROCEDURE — 99214 OFFICE O/P EST MOD 30 MIN: CPT | Mod: HCNC,S$GLB,, | Performed by: INTERNAL MEDICINE

## 2023-11-13 PROCEDURE — 3078F DIAST BP <80 MM HG: CPT | Mod: HCNC,CPTII,S$GLB, | Performed by: INTERNAL MEDICINE

## 2023-11-13 PROCEDURE — 3074F PR MOST RECENT SYSTOLIC BLOOD PRESSURE < 130 MM HG: ICD-10-PCS | Mod: HCNC,CPTII,S$GLB, | Performed by: INTERNAL MEDICINE

## 2023-11-13 PROCEDURE — 3061F PR NEG MICROALBUMINURIA RESULT DOCUMENTED/REVIEW: ICD-10-PCS | Mod: HCNC,CPTII,S$GLB, | Performed by: INTERNAL MEDICINE

## 2023-11-13 PROCEDURE — 99999 PR PBB SHADOW E&M-EST. PATIENT-LVL IV: CPT | Mod: PBBFAC,HCNC,, | Performed by: INTERNAL MEDICINE

## 2023-11-13 PROCEDURE — 1101F PT FALLS ASSESS-DOCD LE1/YR: CPT | Mod: HCNC,CPTII,S$GLB, | Performed by: INTERNAL MEDICINE

## 2023-11-13 PROCEDURE — 3078F PR MOST RECENT DIASTOLIC BLOOD PRESSURE < 80 MM HG: ICD-10-PCS | Mod: HCNC,CPTII,S$GLB, | Performed by: INTERNAL MEDICINE

## 2023-11-13 PROCEDURE — 3066F PR DOCUMENTATION OF TREATMENT FOR NEPHROPATHY: ICD-10-PCS | Mod: HCNC,CPTII,S$GLB, | Performed by: INTERNAL MEDICINE

## 2023-11-13 PROCEDURE — 1125F AMNT PAIN NOTED PAIN PRSNT: CPT | Mod: HCNC,CPTII,S$GLB, | Performed by: INTERNAL MEDICINE

## 2023-11-13 PROCEDURE — 3008F PR BODY MASS INDEX (BMI) DOCUMENTED: ICD-10-PCS | Mod: HCNC,CPTII,S$GLB, | Performed by: INTERNAL MEDICINE

## 2023-11-13 PROCEDURE — 3074F SYST BP LT 130 MM HG: CPT | Mod: HCNC,CPTII,S$GLB, | Performed by: INTERNAL MEDICINE

## 2023-11-13 PROCEDURE — 4010F ACE/ARB THERAPY RXD/TAKEN: CPT | Mod: HCNC,CPTII,S$GLB, | Performed by: INTERNAL MEDICINE

## 2023-11-13 RX ORDER — GABAPENTIN 300 MG/1
300 CAPSULE ORAL NIGHTLY
Qty: 90 CAPSULE | Refills: 3 | Status: SHIPPED | OUTPATIENT
Start: 2023-11-13 | End: 2024-11-12

## 2023-11-13 RX ORDER — FUROSEMIDE 20 MG/1
20 TABLET ORAL DAILY
Qty: 90 TABLET | Refills: 3 | Status: SHIPPED | OUTPATIENT
Start: 2023-11-13

## 2023-11-13 RX ORDER — ATORVASTATIN CALCIUM 80 MG/1
80 TABLET, FILM COATED ORAL NIGHTLY
Qty: 90 TABLET | Refills: 3 | Status: SHIPPED | OUTPATIENT
Start: 2023-11-13

## 2023-11-13 RX ORDER — METOPROLOL TARTRATE 25 MG/1
12.5 TABLET, FILM COATED ORAL 2 TIMES DAILY
Qty: 90 TABLET | Refills: 3 | Status: SHIPPED | OUTPATIENT
Start: 2023-11-13

## 2023-11-13 RX ORDER — NITROGLYCERIN 0.4 MG/1
0.4 TABLET SUBLINGUAL EVERY 5 MIN PRN
Qty: 50 TABLET | Refills: 3 | Status: SHIPPED | OUTPATIENT
Start: 2023-11-13

## 2023-11-13 RX ORDER — PANTOPRAZOLE SODIUM 20 MG/1
40 TABLET, DELAYED RELEASE ORAL DAILY
Qty: 90 TABLET | Refills: 3 | Status: SHIPPED | OUTPATIENT
Start: 2023-11-13

## 2023-11-13 RX ORDER — OLMESARTAN MEDOXOMIL 40 MG/1
40 TABLET ORAL DAILY
Qty: 90 TABLET | Refills: 3 | Status: SHIPPED | OUTPATIENT
Start: 2023-11-13 | End: 2024-11-12

## 2023-11-13 RX ORDER — TAMSULOSIN HYDROCHLORIDE 0.4 MG/1
1 CAPSULE ORAL DAILY
Qty: 90 CAPSULE | Refills: 3 | Status: SHIPPED | OUTPATIENT
Start: 2023-11-13

## 2023-11-13 RX ORDER — ISOSORBIDE MONONITRATE 120 MG/1
240 TABLET, EXTENDED RELEASE ORAL DAILY
Qty: 180 TABLET | Refills: 3 | Status: SHIPPED | OUTPATIENT
Start: 2023-11-13

## 2023-11-13 RX ORDER — METFORMIN HYDROCHLORIDE 500 MG/1
500 TABLET, EXTENDED RELEASE ORAL 2 TIMES DAILY WITH MEALS
Qty: 180 TABLET | Refills: 3 | Status: SHIPPED | OUTPATIENT
Start: 2023-11-13 | End: 2024-11-12

## 2023-11-13 RX ORDER — CLOPIDOGREL BISULFATE 75 MG/1
75 TABLET ORAL DAILY
Qty: 90 TABLET | Refills: 3 | Status: SHIPPED | OUTPATIENT
Start: 2023-11-13

## 2023-11-13 RX ORDER — LEVOTHYROXINE SODIUM 75 UG/1
TABLET ORAL
Qty: 90 TABLET | Refills: 3 | Status: SHIPPED | OUTPATIENT
Start: 2023-11-13

## 2023-12-12 ENCOUNTER — TELEPHONE (OUTPATIENT)
Dept: FAMILY MEDICINE | Facility: CLINIC | Age: 73
End: 2023-12-12
Payer: MEDICARE

## 2024-02-09 ENCOUNTER — OFFICE VISIT (OUTPATIENT)
Dept: URGENT CARE | Facility: CLINIC | Age: 74
End: 2024-02-09
Payer: MEDICARE

## 2024-02-09 ENCOUNTER — OFFICE VISIT (OUTPATIENT)
Dept: FAMILY MEDICINE | Facility: CLINIC | Age: 74
End: 2024-02-09
Payer: MEDICARE

## 2024-02-09 VITALS
BODY MASS INDEX: 32.64 KG/M2 | HEIGHT: 70 IN | WEIGHT: 228 LBS | RESPIRATION RATE: 17 BRPM | TEMPERATURE: 99 F | OXYGEN SATURATION: 95 % | HEART RATE: 72 BPM | SYSTOLIC BLOOD PRESSURE: 129 MMHG | DIASTOLIC BLOOD PRESSURE: 73 MMHG

## 2024-02-09 VITALS
DIASTOLIC BLOOD PRESSURE: 68 MMHG | BODY MASS INDEX: 32.78 KG/M2 | SYSTOLIC BLOOD PRESSURE: 120 MMHG | HEART RATE: 64 BPM | WEIGHT: 228.94 LBS | HEIGHT: 70 IN | TEMPERATURE: 99 F | OXYGEN SATURATION: 98 %

## 2024-02-09 DIAGNOSIS — I77.819 AORTIC ECTASIA: ICD-10-CM

## 2024-02-09 DIAGNOSIS — K62.89 RECTAL PAIN: Primary | ICD-10-CM

## 2024-02-09 DIAGNOSIS — K21.9 GASTROESOPHAGEAL REFLUX DISEASE, UNSPECIFIED WHETHER ESOPHAGITIS PRESENT: Chronic | ICD-10-CM

## 2024-02-09 DIAGNOSIS — Z00.00 ENCOUNTER FOR MEDICARE ANNUAL WELLNESS EXAM: ICD-10-CM

## 2024-02-09 DIAGNOSIS — E78.2 MIXED HYPERLIPIDEMIA: Chronic | ICD-10-CM

## 2024-02-09 DIAGNOSIS — I25.118 CORONARY ARTERY DISEASE OF NATIVE ARTERY OF NATIVE HEART WITH STABLE ANGINA PECTORIS: ICD-10-CM

## 2024-02-09 DIAGNOSIS — I73.9 PERIPHERAL VASCULAR DISEASE: ICD-10-CM

## 2024-02-09 DIAGNOSIS — K62.5 RECTAL BLEEDING: ICD-10-CM

## 2024-02-09 DIAGNOSIS — E66.9 OBESITY (BMI 30.0-34.9): Chronic | ICD-10-CM

## 2024-02-09 DIAGNOSIS — I87.2 VENOUS INSUFFICIENCY: ICD-10-CM

## 2024-02-09 DIAGNOSIS — E11.69 TYPE 2 DIABETES MELLITUS WITH OTHER SPECIFIED COMPLICATION, WITHOUT LONG-TERM CURRENT USE OF INSULIN: ICD-10-CM

## 2024-02-09 DIAGNOSIS — K61.1 CELLULITIS OF RECTAL AREA: ICD-10-CM

## 2024-02-09 DIAGNOSIS — Z00.00 ENCOUNTER FOR PREVENTIVE HEALTH EXAMINATION: Primary | ICD-10-CM

## 2024-02-09 DIAGNOSIS — H91.90 DIMINISHED HEARING, UNSPECIFIED LATERALITY: ICD-10-CM

## 2024-02-09 DIAGNOSIS — I10 ESSENTIAL HYPERTENSION: Chronic | ICD-10-CM

## 2024-02-09 PROCEDURE — 99213 OFFICE O/P EST LOW 20 MIN: CPT | Mod: S$GLB,,,

## 2024-02-09 PROCEDURE — 99999 PR PBB SHADOW E&M-EST. PATIENT-LVL V: CPT | Mod: PBBFAC,HCNC,, | Performed by: NURSE PRACTITIONER

## 2024-02-09 PROCEDURE — 1125F AMNT PAIN NOTED PAIN PRSNT: CPT | Mod: HCNC,CPTII,S$GLB, | Performed by: NURSE PRACTITIONER

## 2024-02-09 PROCEDURE — 1101F PT FALLS ASSESS-DOCD LE1/YR: CPT | Mod: HCNC,CPTII,S$GLB, | Performed by: NURSE PRACTITIONER

## 2024-02-09 PROCEDURE — 3288F FALL RISK ASSESSMENT DOCD: CPT | Mod: HCNC,CPTII,S$GLB, | Performed by: NURSE PRACTITIONER

## 2024-02-09 PROCEDURE — 1170F FXNL STATUS ASSESSED: CPT | Mod: HCNC,CPTII,S$GLB, | Performed by: NURSE PRACTITIONER

## 2024-02-09 PROCEDURE — 3074F SYST BP LT 130 MM HG: CPT | Mod: HCNC,CPTII,S$GLB, | Performed by: NURSE PRACTITIONER

## 2024-02-09 PROCEDURE — G0439 PPPS, SUBSEQ VISIT: HCPCS | Mod: HCNC,S$GLB,, | Performed by: NURSE PRACTITIONER

## 2024-02-09 PROCEDURE — 3078F DIAST BP <80 MM HG: CPT | Mod: HCNC,CPTII,S$GLB, | Performed by: NURSE PRACTITIONER

## 2024-02-09 RX ORDER — CLOTRIMAZOLE AND BETAMETHASONE DIPROPIONATE 10; .64 MG/G; MG/G
CREAM TOPICAL 2 TIMES DAILY
Qty: 45 G | Refills: 0 | Status: SHIPPED | OUTPATIENT
Start: 2024-02-09

## 2024-02-09 RX ORDER — SULFAMETHOXAZOLE AND TRIMETHOPRIM 800; 160 MG/1; MG/1
1 TABLET ORAL 2 TIMES DAILY
Qty: 14 TABLET | Refills: 0 | Status: SHIPPED | OUTPATIENT
Start: 2024-02-09 | End: 2024-02-19 | Stop reason: ALTCHOICE

## 2024-02-09 NOTE — PATIENT INSTRUCTIONS
- Take antibiotics as prescribed  - Use topical antifungal as prescribed    - Follow up with your PCP or specialty clinic as directed in the next 1-2 weeks if not improved or as needed.  You can call [phone] to schedule an appointment with the appropriate provider.    - Go to the ER or seek medical attention immediately if you develop new or worsening symptoms.    - You must understand that you have received an Urgent Care treatment only and that you may be released before all of your medical problems are known or treated.   - You, the patient, will arrange for follow up care as instructed.   - If your condition worsens or fails to improve we recommend that you receive another evaluation at the ER immediately or contact your PCP to discuss your concerns or return here.

## 2024-02-09 NOTE — PROGRESS NOTES
"  Clinton Rosenberg presented for a  Medicare AWV and comprehensive Health Risk Assessment today. The following components were reviewed and updated:    Medical history  Family History  Social history  Allergies and Current Medications  Health Risk Assessment  Health Maintenance  Care Team       ** See Completed Assessments for Annual Wellness Visit within the encounter summary.**       The following assessments were completed:  Living Situation  CAGE  Depression Screening  Timed Get Up and Go  Whisper Test  Cognitive Function Screening  Nutrition Screening  ADL Screening  PAQ Screening          Vitals:    02/09/24 0848   BP: 120/68   Pulse: 64   Temp: 98.5 °F (36.9 °C)   TempSrc: Oral   SpO2: 98%   Weight: 103.8 kg (228 lb 15.2 oz)   Height: 5' 10" (1.778 m)     Body mass index is 32.85 kg/m².  Physical Exam  Vitals and nursing note reviewed.   Constitutional:       Appearance: Normal appearance.   Cardiovascular:      Rate and Rhythm: Normal rate.      Pulses: Normal pulses.      Heart sounds: Normal heart sounds.   Pulmonary:      Effort: Pulmonary effort is normal.      Breath sounds: Normal breath sounds.   Musculoskeletal:         General: Normal range of motion.   Neurological:      Mental Status: He is alert and oriented to person, place, and time.   Psychiatric:         Mood and Affect: Mood normal.         Behavior: Behavior normal.             Diagnoses and health risks identified today and associated recommendations/orders:    1. Encounter for preventive health examination  2. Encounter for Medicare annual wellness exam  Pt was seen today with his wife for an Annual Wellness visit. Healthcare maintenance and screening recommendations were discussed and updated as indicated. Return in one year for AWV.    Review current opioid prescriptions:n/a  Screen for potential Substance Use Disorders:n/a    - Ambulatory Referral/Consult to Enhanced Annual Wellness Visit (eAWV)    3. Coronary artery disease of native artery " of native heart with stable angina pectoris  The current medical regimen is effective;  continue present plan and medications. Followed by Cardiology, PCP.    4. Type 2 diabetes mellitus with other specified complication, without long-term current use of insulin  The current medical regimen is effective;  continue present plan and medications.  Hemoglobin A1C   Date Value Ref Range Status   11/03/2023 6.2 (H) 4.0 - 5.6 % Final             04/12/2023 6.8 (H) 4.0 - 5.6 % Final             10/07/2022 7.0 (H) 4.0 - 5.6 % Final               5. Peripheral vascular disease  The current medical regimen is effective;  continue present plan and medications.    6. Aortic ectasia  The current medical regimen is effective;  continue present plan and medications.    7. Essential hypertension  At goal. The current medical regimen is effective;  continue present plan and medications.    8. Mixed hyperlipidemia long term DAPT  On statin therapy. The current medical regimen is effective;  continue present plan and medications.    9. Venous insufficiency  The current medical regimen is effective;  continue present plan and medications.    10. Gastroesophageal reflux disease, unspecified whether esophagitis present  The current medical regimen is effective;  continue present plan and medications.    11. Diminished hearing, unspecified laterality  Failed whisper test.  - Ambulatory referral/consult to ENT; Future  - Ambulatory referral/consult to Audiology; Future    12. Obesity (BMI 30.0-34.9)  The patient is asked to make an attempt to improve diet and exercise patterns to aid in medical management of this problem.        Provided Clinton with a 5-10 year written screening schedule and personal prevention plan. Recommendations were developed using the USPSTF age appropriate recommendations. Education, counseling, and referrals were provided as needed. After Visit Summary printed and given to patient which includes a list of additional  screenings\tests needed.    Follow up in about 1 year (around 2/9/2025).    CHAGO Wesley    I offered to discuss advanced care planning, including how to pick a person who would make decisions for you if you were unable to make them for yourself, called a health care power of , and what kind of decisions you might make such as use of life sustaining treatments such as ventilators and tube feeding when faced with a life limiting illness recorded on a living will that they will need to know. (How you want to be cared for as you near the end of your natural life)     X Patient is interested in learning more about how to make advanced directives.  I provided them paperwork and offered to discuss this with them.

## 2024-02-09 NOTE — PATIENT INSTRUCTIONS
Counseling and Referral of Other Preventative  (Italic type indicates deductible and co-insurance are waived)    Patient Name: Clinton Rosenberg  Today's Date: 2/9/2024    Health Maintenance       Date Due Completion Date    COVID-19 Vaccine (1) Never done ---    Shingles Vaccine (1 of 2) Never done ---    RSV Vaccine (Age 60+ and Pregnant patients) (1 - 1-dose 60+ series) Never done ---    Pneumococcal Vaccines (Age 65+) (3 of 3 - PPSV23 or PCV20) 03/16/2022 10/16/2019    Eye Exam 03/31/2024 3/31/2023    Diabetes Urine Screening 04/12/2024 4/12/2023    Lipid Panel 04/12/2024 4/12/2023    Hemoglobin A1c 05/03/2024 11/3/2023    Foot Exam 08/28/2024 8/28/2023    High Dose Statin 11/13/2024 11/13/2023    Aspirin/Antiplatelet Therapy 11/13/2024 11/13/2023    TETANUS VACCINE 06/01/2026 6/1/2016    Colorectal Cancer Screening 10/26/2033 10/26/2023        Orders Placed This Encounter   Procedures    Ambulatory referral/consult to ENT    Ambulatory referral/consult to Audiology       The following information is provided to all patients.  This information is to help you find resources for any of the problems found today that may be affecting your health:                  Living healthy guide: www.[facility].[place].gov      Understanding Diabetes: www.diabetes.org      Eating healthy: www.cdc.gov/healthyweight      CDC home safety checklist: www.cdc.gov/steadi/patient.html      Agency on Aging: www.goea.[place].gov      Alcoholics anonymous (AA): www.aa.org      Physical Activity: www.melquiades.nih.gov/ka9lzkf      Tobacco use: www.quitwithusla.org

## 2024-02-09 NOTE — PROGRESS NOTES
"Subjective:      Patient ID: Clinton Rosenberg is a 73 y.o. male.    Vitals:  height is 5' 10" (1.778 m) and weight is 103.4 kg (228 lb). His oral temperature is 98.8 °F (37.1 °C). His blood pressure is 129/73 and his pulse is 72. His respiration is 17 and oxygen saturation is 95%.     Chief Complaint: Rectal Bleeding    Patient is a 73-year-old male presenting for rectal pain and rectal bleeding that started about 1 week ago.  He initially had extensive diarrhea for about 3 days.  After that, he is experiencing rectal pain whenever he has a bowel movement and she is bright red blood on the toilet paper.  There is no blood in the toilet bowl.  He does have previous history of internal hemorrhoids and anal fissures.  He is tried preparation H and triple antibiotic ointment.  Denies any other symptoms including fever, chills, abdominal pain, constipation, dysuria or any other urinary symptoms, testicular pain.    Rectal Bleeding  This is a new problem. The current episode started in the past 7 days. The problem occurs constantly. The problem has been unchanged. Pertinent negatives include no abdominal pain, chest pain, chills, congestion, coughing, fever, headaches, myalgias, nausea, neck pain, rash, sore throat or vomiting. Nothing aggravates the symptoms. Treatments tried: topical cream. The treatment provided no relief.       Constitution: Negative for chills and fever.   HENT:  Negative for ear pain, congestion and sore throat.    Neck: Negative for neck pain.   Cardiovascular:  Negative for chest pain.   Respiratory:  Negative for cough.    Gastrointestinal:  Positive for diarrhea (resolved), bright red blood in stool, rectal pain and hemorrhoids. Negative for abdominal pain, nausea and vomiting.   Genitourinary:  Negative for dysuria.   Musculoskeletal:  Negative for muscle ache.   Skin:  Negative for rash.   Allergic/Immunologic: Negative for sneezing.   Neurological:  Negative for dizziness and headaches.    "   Objective:     Physical Exam   Constitutional: He is oriented to person, place, and time. He appears well-developed.   HENT:   Head: Normocephalic and atraumatic.   Ears:   Right Ear: External ear normal.   Left Ear: External ear normal.   Nose: Nose normal.   Mouth/Throat: Oropharynx is clear and moist.   Eyes: Conjunctivae, EOM and lids are normal.   Neck: Trachea normal and phonation normal. Neck supple.   Cardiovascular: Normal rate, regular rhythm, normal heart sounds and normal pulses.   Pulmonary/Chest: Effort normal and breath sounds normal.   Genitourinary:    Testes and penis normal.   Rectum:      Tenderness present.   Right epididymis is/has Normal. Left epididymis is/has normal.         Comments: There is erythema and irritation around the circumference of the rectum with purulent discharge. TTP to the right of the rectum without fluctuance. No external hemorrhoids or anal fissures noted.     Musculoskeletal: Normal range of motion.         General: Normal range of motion.   Neurological: He is alert and oriented to person, place, and time.   Skin: Skin is warm, dry and intact.   Psychiatric: His speech is normal and behavior is normal. Judgment and thought content normal.   Nursing note and vitals reviewed.chaperone present (Nayely Wilburn MA)       Assessment:     1. Rectal pain    2. Rectal bleeding    3. Cellulitis of rectal area        Plan:     Rectal pain  -     Ambulatory referral/consult to Colorectal Surgery    Rectal bleeding  -     Ambulatory referral/consult to Colorectal Surgery    Cellulitis of rectal area  -     clotrimazole-betamethasone 1-0.05% (LOTRISONE) cream; Apply topically 2 (two) times daily.  Dispense: 45 g; Refill: 0  -     sulfamethoxazole-trimethoprim 800-160mg (BACTRIM DS) 800-160 mg Tab; Take 1 tablet by mouth 2 (two) times daily. for 7 days  Dispense: 14 tablet; Refill: 0      Patient is a 73-year-old male presenting with rectal pain and bright red blood when wiping  for the past week.  He initially had severe diarrhea for 3 days with a has since resolved.  He has pain when he has a bowel movement.  Vital signs are within normal limits.  On exam, patient nontoxic and in no acute distress.  On rectal exam, there is dermatitis around the circumference of the rectum.  Purulent discharge was also noted.  There is tenderness to the right of the rectum, but no fluctuance.  No anal fissure or external hemorrhoid.  No testicular tenderness or swelling.  Suspect Candida vs cellulitis vs abscess.  Do not suspect necrotizing fasciitis.  Will treat with Bactrim and topical Lotrisone.  Referral to Colorectal.  ED precautions discussed.  Patient verbalizes understanding and agrees with plan.          Patient Instructions   - Take antibiotics as prescribed  - Use topical antifungal as prescribed    - Follow up with your PCP or specialty clinic as directed in the next 1-2 weeks if not improved or as needed.  You can call [phone] to schedule an appointment with the appropriate provider.    - Go to the ER or seek medical attention immediately if you develop new or worsening symptoms.    - You must understand that you have received an Urgent Care treatment only and that you may be released before all of your medical problems are known or treated.   - You, the patient, will arrange for follow up care as instructed.   - If your condition worsens or fails to improve we recommend that you receive another evaluation at the ER immediately or contact your PCP to discuss your concerns or return here.

## 2024-02-14 PROBLEM — Z86.19 HISTORY OF SHINGLES: Status: ACTIVE | Noted: 2018-05-20

## 2024-02-15 NOTE — PROGRESS NOTES
This note was created by combination of typed  and M-Modal dictation.  Transcription errors may be present.  If there are any questions, please contact me.    Assessment and Plan:   Assessment and Plan   Perirectal skin irritation  -status post treatment with the antibiotic, taking combination steroid/antifungal cream.  He is almost finished with the tube, would finish out later this week to make a full 2 week course and after that continue using gold bond or similar to avoid moisture accumulation.  No evidence of abscess.  No evidence to extended antibiotic.    Acute pain of right shoulder  Ground-level fall  -discussed possibility of tear.  He would like to avoid seeing orthopedics if possible.  Check x-ray, self-directed physical therapy handout provided, topical diclofenac.  If no improvement I have asked him to contact me for referral to Orthopedics  -     X-ray Shoulder 2 or More Views Right; Future; Expected date: 02/19/2024  -     diclofenac sodium (VOLTAREN) 1 % Gel; Apply the gel (2 g) to the affected area 4 times daily. Do not apply more than 8 g daily to any one affected joint  Dispense: 100 g; Refill: 1               Medications Discontinued During This Encounter   Medication Reason    sulfamethoxazole-trimethoprim 800-160mg (BACTRIM DS) 800-160 mg Tab Therapy completed       meds sent this encounter:  Medications Ordered This Encounter   Medications    diclofenac sodium (VOLTAREN) 1 % Gel     Sig: Apply the gel (2 g) to the affected area 4 times daily. Do not apply more than 8 g daily to any one affected joint     Dispense:  100 g     Refill:  1         Follow Up:  Has follow-up scheduled in May  Future Appointments   Date Time Provider Department Center   2/19/2024  2:30 PM [facility] XR1 300 LB LIMIT [facility] XRAY [place]   3/22/2024 11:00 AM Brad Bahena MD [facility] CARDIO [place]   5/6/2024  8:00 AM [facility], [facility] SPECLAB [place]   5/6/2024  8:15 AM [facility] [place]    5/13/2024  2:20 PM Michael Gonzalez MD [facility] MED [place]          Subjective:   Subjective   Chief Complaint   Patient presents with    rectal infection    Back Pain       RODO Alonzo is a 73 y.o. male.     Social History     Tobacco Use    Smoking status: Never     Passive exposure: Never    Smokeless tobacco: Never   Substance Use Topics    Alcohol use: Yes     Comment: rarely          Social History     Social History Narrative    Not on file       Last appointment with this clinic was 2/9/2024. Last visit with me 11/13/2023   To summarize last visit and events leading up to today:  Diabetes range A1c on metformin  Hypothyroid, labs good on higher dose 75  Hypertension not practicing proper home technique; 6/2/23 incr losartan  Peripheral edema  Coronary artery disease followed by Cardiology.  On statin.    LUTS, Urology; 5/2023 pt opted for med mgmt rather than surgery.   Simple renal cyst; seen by urology - no surveillance needed.  Neurogenic claudication vs peripheral neuropathy  10/26/22 MRI Degenerative disc, facet joint arthropathy, spondylosis most prominent at L3-L4 and L4-5 levels  2/2023 cards added amlodipine  3/2023 TTE grade 1 DD  3/2023 nu stress test neg  3/2023 LE venous US with GSV reflux bilateral     Saw urology end of march. Karyn prostatomegaly.  GERD trial lower dose 5/10/23   10/26/23 colonscopy 3 mm sigmoid HP    OV 11/13  A1c better with therapeutic lifestyle modification (TLC)  Ventral hernia reducible.    UC 2/9 with rectal pain and bleeding. Preceding diarrhea. Exam with rash and purulent discharge. No fluctuance.treated for cellulitis with lotrisone and bactrim    OV scheduled 5/2024    Today's visit:    Has a cut in the anal area but it's slow to heal.  Feels better compared to when it started.  But not 100%. Finished the antibiotic and still using the cream.  It hurts sometimes when he applies the cream and thinks the sensation is like a tear. At UC they had squeezed and pus came  out and it was very painful.  It's better but not 100%.  Uses gold bond prior to all this.  Has felt irritated ever since his colonoscopy.     And fell, right shoulder pain  Fall 2-3 months ago.  Lifting the right arm the shoulder hurts.  He fell on rocks/boulders.  He was fishing.  The rocks shiifted beneath his feet. No improvement compared to initial event. Popping sensation.  No hx of shoulder injury.  Has been taking ibuprofen for this.    Patient Care Team:  Michael Gonzalez MD as PCP - General (Internal Medicine)  [place]  Andreas Henriquez MD as Consulting Physician (Dermatology)  Brad Bahena MD as Consulting Physician (Cardiology)  Michael Gonzalez MD as Hyperlipidemia Digital Medicine Responsible Provider (Internal Medicine)  Kamini Butterfield MA as Care Coordinator  Shiloh Polk PharmD as Hypertension Digital Medicine Clinician  Shiloh Polk PharmD as Hyperlipidemia Digital Medicine Clinician  Michael Gonzalez MD as Hypertension Digital Medicine Responsible Provider (Internal Medicine)  Clara Seaman as Digital Medicine Health   [employer] as Hypertension Digital Medicine Contract  Michael Gonzalez MD as Diabetes Digital Medicine Responsible Provider (Internal Medicine)  Shiloh Polk PharmD as Diabetes Digital Medicine Clinician  [employer] as Diabetes Digital Medicine Contract    Patient Active Problem List    Diagnosis Date Noted    Type 2 diabetes mellitus with other specified complication 04/27/2023    Bradycardia 04/27/2023    Refractive error 03/31/2023    Chronic midline low back pain without sciatica 10/26/2022     10/26/2022 MRI L spine: Degenerative disc, facet joint arthropathy, spondylosis most prominent at L3-L4 and L4-5 levels      History of COVID-19 02/25/2022     Symptoms resolved. Persistent residual finding on cxr, recommend repeating test in 1 month to ensure resolution.        "Nuclear sclerosis of both eyes 05/20/2021    Allergy to iodine 08/27/2019    Tubular adenoma of colon 2/2009; 10/26/23 colonscopy 3 mm sigmoid HP 10/11/2018     2/28/2009 colonoscopy tubular adenoma  3/15/2013 colonoscopy thickened haustral folds indicative of prediverticular state. internal hemorrhoids. repeat 10 years.  biopsies negative (no colitis)  10/26/23 colonscopy 3 mm sigmoid HP      History of shingles 5/2018 05/20/2018    Venous insufficiency 05/10/2018     03/09/2023 lower extremity venous ultrasound GSV reflux right leg below knee  GSV reflux left leg mid, distal thigh      History of concussion 1/2018 head CT negative 01/31/2018    Aortic root dilatation on TTE 8/2019 11/14/2017 08/08/2019 Lexiscan nuclear stress test probably normal study.  LVEF 66%.  Normal wall motion.  08/08/2019 normal LV systolic function LVEF 65%.  Concentric LV remodeling.  No wall motion abnormalities.  Aortic root diameter mildly increased verses report 03/2018.  7/30/20  TTE (Ao root 4.2cm at annulus, 3.8cm at sinuses) normal LV systolic function LVEF 65%.  Mild concentric LVH.  Grade 1 diastolic dysfunction.  Normal RV systolic function.  Normal CVP.  PA pressure 20.      Peripheral vascular disease with LE US 6/2017 and carotid US  06/20/2017     LE art US/TANA 6/8/17 1.  Mild/atherosclerotic plaquing. 2.  No stenosis about 30 percent femoral popliteal arteries. 3.  Normal bilateral ABIs.  Carotid US 12/3/14 1. Less than 50% stenosis of the right internal carotid artery. 2. Less than 50% stenosis of the left internal carotid artery.      Aortic ectasia 04/28/2017     "The aorta is elongated" - Xray Chest 5-      JAYLEN (obstructive sleep apnea) 04/28/2017    Hypothyroidism 06/01/2016    Obesity (BMI 30.0-34.9) 06/01/2016    Anxiety and depression with assoc memory loss; seen by neuro 2015, negative workup 11/24/2014     12/3/2014 MRI brain: 1. No evidence for acute infarct 2. unremarkable MRI of the " brain  12/3/2014 carotid US normal      Coronary artery disease of native artery of native heart with stable angina pectoris; 2021 plan is long term DAPT 11/19/2014     Stent LAD 2012 9/27/2013 [facility] prox LAD 30% stenosis; RCA 20% stenosis. patent LAD stent.    3/9/2015 nu med stress test negative. 3/9/2015 TTE normal LVEF 55-60; concentric remodeling.  L MPI 6/8/17 (images pers rev) Nuclear Quantitative Functional Analysis:  LVEF: 68 % Impression: NORMAL MYOCARDIAL PERFUSION  Echo 3/15/18 LVEF 60-65%; upper limit of normal aortic root 3.7 cm  08/08/2019 Lexiscan nuclear stress test probably normal study.  LVEF 66%.  Normal wall motion.  08/08/2019 normal LV systolic function LVEF 65%.  Concentric LV remodeling.  No wall motion abnormalities.  Aortic root diameter mildly increased verses report 03/2018.  9/21/2019 [facility]: Dominance: Right  LM: normal  LAD: mid 50% followed by patent stent, distal/transapical 80% (small caliber)  Ramus: prox 30%  LCx: prox 50%  RCA: prox 30%, dist 40%              RPDA ost 50%              RPLB mid 90%    Stent 2.5x18 Resolute Yon SILVER 16 abi    9/21/2020 [facility]  Dominance: Right  LM: normal  LAD: MLI, mid stent patent (2013)  Ramus: MLI, prox 30%  LCx: MLI, ost 40%  RCA: MLI, dist eccentric 40-50%              RPDA: ost 50%, iFR 0.97              RPLB mid stent patent (9/20/19 2.5x18 Resolute Yon SILVER)     Impression:  2V CAD, normal LV fxn  Patent mid LAD and mid RPLB stents  iFR dist RCA/ost RPDA neg     Plan:  Cont med rx  Cont ASA/Plavix/Statin/BBL/Imdur  Plan long term DAPT given diffuse CAD and prior MV PCI    03/09/2023 nuclear stress test negative for ischemia   03/09/2023 TTE LV normal size and systolic function LVEF 65%.  Grade 1 diastolic dysfunction.  03/09/2023 lower extremity venous ultrasound GSV reflux right leg below knee  GSV reflux left leg mid, distal thigh      Mixed hyperlipidemia long term DAPT 07/09/2014    Essential hypertension 07/09/2014 7/30/20  TTE (Ao root  4.2cm at annulus, 3.8cm at sinuses) normal LV systolic function LVEF 65%.  Mild concentric LVH.  Grade 1 diastolic dysfunction.  Normal RV systolic function.  Normal CVP.  PA pressure 20.    03/09/2023 TTE LV normal size and systolic function LVEF 65%.  Grade 1 diastolic dysfunction.        Gastroesophageal reflux disease 07/09/2014    Benign non-nodular prostatic hyperplasia with lower urinary tract symptoms 07/09/2014    Vitamin D deficiency 07/09/2014    Hernia of abdominal wall 07/09/2014       PAST MEDICAL PROBLEMS, PAST SURGICAL HISTORY: please see relevant portions of the electronic medical record    ALLERGIES AND MEDICATIONS: updated and reviewed.  Medication List with Changes/Refills   Current Medications    ASPIRIN (ECOTRIN) 81 MG EC TABLET    Take 1 tablet (81 mg total) by mouth once daily.    ATORVASTATIN (LIPITOR) 80 MG TABLET    Take 1 tablet (80 mg total) by mouth every evening.    CICLOPIROX (LOPROX) 0.77 % CREA    Apply topically 2 (two) times daily.    CLOPIDOGREL (PLAVIX) 75 MG TABLET    Take 1 tablet (75 mg total) by mouth once daily.    CLOTRIMAZOLE-BETAMETHASONE 1-0.05% (LOTRISONE) CREAM    Apply topically 2 (two) times daily.    FUROSEMIDE (LASIX) 20 MG TABLET    Take 1 tablet (20 mg total) by mouth once daily.    GABAPENTIN (NEURONTIN) 300 MG CAPSULE    Take 1 capsule (300 mg total) by mouth every evening.    ISOSORBIDE MONONITRATE (IMDUR) 120 MG 24 HR TABLET    Take 2 tablets (240 mg total) by mouth once daily.    KRILL/OM3/DHA/EPA/OM6/LIP/ASTX (KRILL OIL, OMEGA 3 AND 6, ORAL)    Take by mouth.    LEVOTHYROXINE (SYNTHROID) 75 MCG TABLET    TAKE 1 TABLET BEFORE BREAKFAST    METFORMIN (GLUCOPHAGE-XR) 500 MG ER 24HR TABLET    Take 1 tablet (500 mg total) by mouth 2 (two) times daily with meals.    METOPROLOL TARTRATE (LOPRESSOR) 25 MG TABLET    Take 0.5 tablets (12.5 mg total) by mouth 2 (two) times daily.    NITROGLYCERIN (NITROSTAT) 0.4 MG SL TABLET    Place 1 tablet (0.4 mg total) under the  "tongue every 5 (five) minutes as needed for Chest pain.    NYSTATIN (MYCOSTATIN) POWDER    Apply topically 4 (four) times daily as needed (to keep penis/scrotum dry).    OLMESARTAN (BENICAR) 40 MG TABLET    Take 1 tablet (40 mg total) by mouth once daily.    PANTOPRAZOLE (PROTONIX) 20 MG TABLET    Take 2 tablets (40 mg total) by mouth once daily.    TAMSULOSIN (FLOMAX) 0.4 MG CAP    Take 1 capsule (0.4 mg total) by mouth once daily.    VITAMIN D 1000 UNITS TAB    Take 1 tablet (1,000 Units total) by mouth once daily.         Objective:   Objective   Physical Exam   Vitals:    24 1340   BP: 134/76   Pulse: 72   Temp: 98.7 °F (37.1 °C)   TempSrc: Oral   SpO2: 96%   Weight: 104.7 kg (230 lb 13.2 oz)   Height: 5' 10" (1.778 m)    Body mass index is 33.12 kg/m².            Physical Exam  Constitutional:       General: He is not in acute distress.     Appearance: He is well-developed.   HENT:      Head: Normocephalic and atraumatic.   Eyes:      General: No scleral icterus.  Pulmonary:      Effort: Pulmonary effort is normal.   Genitourinary:         Comments: The perianal area looks like an area of denuded skin at about 2:00 o'clock, about 2 cm from the verge, it is about 5 mm in diameter, no induration no discharge.  Diffuse mildly erythematous continuous patch around the perianal area extending to the  cleft  Musculoskeletal:         General: Tenderness present.      Comments: Both shoulders, the AC joints are tender.  No deformity.  Clavicle otherwise unremarkable on the right.  Pain with external rotation and internal rotation, lift-off causes pain.  Able to sustain lift-off but internal rotation requires assistance from his other arm.  Able to raise his arm to 110° horizontal, unable to raise further.  Drop arm negative.    Biceps strength 5/5 but tenderness in the anterior biceps groove area   Skin:     General: Skin is warm and dry.   Neurological:      Mental Status: He is alert and oriented to person, " place, and time.   Psychiatric:         Behavior: Behavior normal.         Thought Content: Thought content normal.

## 2024-02-19 ENCOUNTER — HOSPITAL ENCOUNTER (OUTPATIENT)
Dept: RADIOLOGY | Facility: HOSPITAL | Age: 74
Discharge: HOME | End: 2024-02-19
Attending: INTERNAL MEDICINE
Payer: MEDICARE

## 2024-02-19 ENCOUNTER — OFFICE VISIT (OUTPATIENT)
Dept: FAMILY MEDICINE | Facility: CLINIC | Age: 74
End: 2024-02-19
Payer: MEDICARE

## 2024-02-19 VITALS
OXYGEN SATURATION: 96 % | HEART RATE: 72 BPM | SYSTOLIC BLOOD PRESSURE: 134 MMHG | HEIGHT: 70 IN | TEMPERATURE: 99 F | WEIGHT: 230.81 LBS | BODY MASS INDEX: 33.04 KG/M2 | DIASTOLIC BLOOD PRESSURE: 76 MMHG

## 2024-02-19 DIAGNOSIS — M25.511 ACUTE PAIN OF RIGHT SHOULDER: ICD-10-CM
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
## 2024-02-19 DIAGNOSIS — K62.89 PERIRECTAL SKIN IRRITATION: Primary | ICD-10-CM

## 2024-02-19 PROCEDURE — 1125F AMNT PAIN NOTED PAIN PRSNT: CPT | Mod: HCNC,CPTII,S$GLB, | Performed by: INTERNAL MEDICINE

## 2024-02-19 PROCEDURE — 99999 PR PBB SHADOW E&M-EST. PATIENT-LVL V: CPT | Mod: PBBFAC,HCNC,, | Performed by: INTERNAL MEDICINE

## 2024-02-19 PROCEDURE — 73030 X-RAY EXAM OF SHOULDER: CPT | Mod: TC,HCNC,FY,PO,RT

## 2024-02-19 PROCEDURE — 1160F RVW MEDS BY RX/DR IN RCRD: CPT | Mod: HCNC,CPTII,S$GLB, | Performed by: INTERNAL MEDICINE

## 2024-02-19 PROCEDURE — 99214 OFFICE O/P EST MOD 30 MIN: CPT | Mod: HCNC,S$GLB,, | Performed by: INTERNAL MEDICINE

## 2024-02-19 PROCEDURE — 1101F PT FALLS ASSESS-DOCD LE1/YR: CPT | Mod: HCNC,CPTII,S$GLB, | Performed by: INTERNAL MEDICINE

## 2024-02-19 PROCEDURE — 3078F DIAST BP <80 MM HG: CPT | Mod: HCNC,CPTII,S$GLB, | Performed by: INTERNAL MEDICINE

## 2024-02-19 PROCEDURE — 3008F BODY MASS INDEX DOCD: CPT | Mod: HCNC,CPTII,S$GLB, | Performed by: INTERNAL MEDICINE

## 2024-02-19 PROCEDURE — 1159F MED LIST DOCD IN RCRD: CPT | Mod: HCNC,CPTII,S$GLB, | Performed by: INTERNAL MEDICINE

## 2024-02-19 PROCEDURE — 3288F FALL RISK ASSESSMENT DOCD: CPT | Mod: HCNC,CPTII,S$GLB, | Performed by: INTERNAL MEDICINE

## 2024-02-19 PROCEDURE — 3075F SYST BP GE 130 - 139MM HG: CPT | Mod: HCNC,CPTII,S$GLB, | Performed by: INTERNAL MEDICINE

## 2024-02-19 PROCEDURE — 73030 X-RAY EXAM OF SHOULDER: CPT | Mod: 26,HCNC,RT, | Performed by: RADIOLOGY

## 2024-02-19 RX ORDER — DICLOFENAC SODIUM 10 MG/G
GEL TOPICAL
Qty: 100 G | Refills: 1 | Status: SHIPPED | OUTPATIENT
Start: 2024-02-19

## 2024-02-19 NOTE — PROGRESS NOTES
No evidence of acute fracture or dislocation.     Results to pt via MyChart. Work on self directed PT, if no improvement to ortho

## 2024-03-18 ENCOUNTER — PATIENT MESSAGE (OUTPATIENT)
Dept: OTHER | Facility: OTHER | Age: 74
End: 2024-03-18
Payer: MEDICARE

## 2024-03-19 ENCOUNTER — PATIENT MESSAGE (OUTPATIENT)
Dept: ADMINISTRATIVE | Facility: OTHER | Age: 74
End: 2024-03-19
Payer: MEDICARE

## 2024-03-27 ENCOUNTER — OFFICE VISIT (OUTPATIENT)
Dept: URGENT CARE | Facility: CLINIC | Age: 74
End: 2024-03-27
Payer: MEDICARE

## 2024-03-27 VITALS
BODY MASS INDEX: 32.76 KG/M2 | HEIGHT: 70 IN | WEIGHT: 228.81 LBS | HEART RATE: 77 BPM | TEMPERATURE: 98 F | DIASTOLIC BLOOD PRESSURE: 77 MMHG | RESPIRATION RATE: 16 BRPM | OXYGEN SATURATION: 96 % | SYSTOLIC BLOOD PRESSURE: 126 MMHG

## 2024-03-27 DIAGNOSIS — R11.0 NAUSEA: ICD-10-CM

## 2024-03-27 DIAGNOSIS — R10.13 EPIGASTRIC PAIN: Primary | ICD-10-CM

## 2024-03-27 PROCEDURE — 99213 OFFICE O/P EST LOW 20 MIN: CPT | Mod: S$GLB,,,

## 2024-03-27 RX ORDER — ONDANSETRON 4 MG/1
4 TABLET, ORALLY DISINTEGRATING ORAL EVERY 6 HOURS PRN
Qty: 28 TABLET | Refills: 0 | Status: SHIPPED | OUTPATIENT
Start: 2024-03-27 | End: 2024-04-03

## 2024-03-27 NOTE — PROGRESS NOTES
"Subjective:      Patient ID: Clinton Rosenberg is a 73 y.o. male.    Vitals:  height is 5' 10" (1.778 m) and weight is 103.8 kg (228 lb 13.4 oz). His temperature is 98.1 °F (36.7 °C). His blood pressure is 126/77 and his pulse is 77. His respiration is 16 and oxygen saturation is 96%.     Chief Complaint: Abdominal Pain    Pt c/o abdominal pain x3days. Pt sts his BM are black today. Pt denies any injury. Pt has taken pepto bismol and antacids with no relief.     Provider note starts below:  Patient presents to clinic for evaluation of epigastric abdominal pain which onset gradually 3 days ago. Pain has been worsening since then. Pain is constant. No exacerbating or alleviating factors. Associated symptoms include nausea, but no vomiting. He reports several days of constipation until this morning when he had two normal bowel movements. Of note, stool was black-colored. He does admit to take Pepto bismol over the last few days. He has also tried antacids which have not improved symptoms. Surg hx significant for appendectomy, med hx significant for CAD, DM, GERD, abdominal hernia, tubular adenoma of colon. He also admits chronic Advil use due to shoulder injury, but has not taken Advil in approximately 2 months. States he was taking 800 mg every other day for shoulder pain at the time.     Abdominal Cramping  This is a new problem. The current episode started in the past 7 days. The onset quality is sudden. The problem occurs constantly. The problem has been unchanged. The pain is located in the generalized abdominal region. The pain is at a severity of 6/10. The quality of the pain is cramping, tearing and a sensation of fullness. The abdominal pain does not radiate. Associated symptoms include nausea. Pertinent negatives include no anorexia, arthralgias, belching, constipation, diarrhea, dysuria, fever, flatus, frequency, headaches, hematochezia, hematuria, melena, myalgias, vomiting or weight loss. The pain is " aggravated by eating. The pain is relieved by Nothing. He has tried antacids for the symptoms. The treatment provided no relief. There is no history of abdominal surgery, colon cancer, Crohn's disease, gallstones, GERD, irritable bowel syndrome, pancreatitis, PUD or ulcerative colitis. Patient's medical history does not include kidney stones and UTI.       Constitution: Positive for appetite change (decreased). Negative for activity change, chills and fever.   Neck: Negative for neck pain and neck stiffness.   Cardiovascular:  Negative for chest pain and palpitations.   Eyes:  Negative for double vision and blurred vision.   Respiratory:  Negative for cough and shortness of breath.    Gastrointestinal:  Positive for abdominal pain (epigastric), nausea and dark colored stools. Negative for abdominal trauma, abdominal bloating, history of abdominal surgery, vomiting, constipation, diarrhea, bright red blood in stool, rectal bleeding, rectal pain and hemorrhoids.   Genitourinary:  Negative for dysuria, frequency, urgency, urine decreased, flank pain and hematuria.   Musculoskeletal:  Negative for joint pain, muscle cramps and muscle ache.   Skin:  Negative for pale and rash.   Neurological:  Negative for dizziness, light-headedness, headaches, disorientation and altered mental status.   Psychiatric/Behavioral:  Negative for altered mental status, disorientation and confusion.       Objective:     Physical Exam   Constitutional: He is oriented to person, place, and time.  Non-toxic appearance. He does not appear ill. No distress.   HENT:   Head: Normocephalic and atraumatic.   Eyes: Conjunctivae are normal. Extraocular movement intact   Neck: Neck supple. No neck rigidity present.   Cardiovascular: Normal rate, regular rhythm, normal heart sounds and normal pulses.   Pulmonary/Chest: Effort normal and breath sounds normal. He has no wheezes. He has no rhonchi. He has no rales.   Abdominal: Normal appearance. He exhibits  no distension and no mass. Soft. flat abdomen There is abdominal tenderness (epigastric). There is no rebound and no guarding. No hernia.   Genitourinary:         Comments: Offered rectal exam and guaiac exam, pt declined     Musculoskeletal: Normal range of motion.         General: Normal range of motion.   Neurological: He is alert, oriented to person, place, and time and at baseline.   Skin: Skin is warm and dry.   Psychiatric: His behavior is normal. Mood normal.   Nursing note and vitals reviewed.    Assessment:     1. Epigastric pain    2. Nausea        Plan:     Epigastric pain  -     CT Abdomen Pelvis  Without Contrast; Future; Expected date: 03/27/2024  -     Ambulatory referral/consult to Gastroenterology    Nausea  -     ondansetron (ZOFRAN-ODT) 4 MG TbDL; Take 1 tablet (4 mg total) by mouth every 6 (six) hours as needed (for nausea).  Dispense: 28 tablet; Refill: 0      Medical Decision Making:   Urgent Care Management:  I discussed with patient that evaluation in the ED does not suggest any emergent or life threatening medical conditions requiring immediate intervention, and I believe patient is safe for outpatient follow up.  Regardless, an unremarkable evaluation does not preclude the development or presence of a serious of life threatening condition. As such, patient was instructed to go to ED for any new or worsening symptoms. Vital signs stable. Mild TTP of epigastric region, but abdomen is soft, non-distended, no rebound or guarding. Recommended CT scan for further evaluation, which could be done in ED or in outpatient setting based on severity of pt's pain. Pt opted for outpatient imaging. Strict ED precautions discussed. Pt agrees with plan and will go to ED for any worsening of symptoms. Otherwise, outpatient CT and follow up with GI.          Patient Instructions   I have ordered a CT scan of the abdomen to be done outpatient. You should receive a call to set up the appointment. Once your  imaging has been done, someone from [facility] will call with your results. You will also be able to see results on [facility] MyChart. I have forwarded these results to your primary care doctor.  Zofran 4 mg every 6 hours as needed for nausea and vomiting.  STOP taking pepto bismol as this can cause black-colored stools.  A referral has been placed for you to be seen with a GI doctor in the event you are unable to set up with your previous GI doctor. You should receive a call from [facility] within the next 1-3 days to set up an appointment. If you do not receive a call, you may call our Referral Hotline at [phone] to make an appointment.    What care is needed at home?   Drink small amounts of fluid every 15 to 30 minutes. Good fluids to drink are water, broth, sports drinks, and oral electrolyte solutions. It is also important to eat if you are able to keep food down.  Avoid beer, wine, and mixed drinks.  Check your blood sugar more often if you have high blood sugar.  If you are throwing up, try to drink if you can until you are able to eat small amounts of food.  If you cannot keep fluids down, suck on ice chips.  If you have loose stools, try to drink extra fluids to replace the water your body has lost.  If you are breastfeeding, keep feeding your baby as you normally would.  Avoid sharing your food and drinks.  Stay away from others until the throwing up or loose stools have stopped.  Follow good hygiene practices. Wash your hands often with soap and water for at least 20 seconds. Alcohol-based hand sanitizers also work to kill the virus. This is very important:  After using the bathroom  Before eating  Before cooking    Will physical activity be limited?   Your physical activity will not be limited. Make sure you get lots of rest. You may not be able to travel or go to work until the loose stools and throwing up have stopped for 24 hours.    What changes to diet are needed?   Take small sips of fluids often.  Eat foods that have high water content like broth, Jello, and popsicles.  Avoid liquids such as soda, ginger ale, tea, fruit juice, and drinks with caffeine. These can make fluid loss worse. Ask your doctor what foods and drinks are safe for you.  You can eat a variety of food as your appetite gets better. Watch to see if some foods seem to make diarrhea or other symptoms worse, such as dairy products.  Avoid fatty and sugary foods such as cakes, chocolates, ice cream, and take out foods.    How do I prevent this?  Avoid close contact with people who have this illness.  Clean things and surfaces in your home like door handles and phones. Use bleach to clean the area. This can help lower the spread of infection.  Do not share personal things like toothbrushes, towels, and drinking glasses.  Take extra care when traveling. Drink bottled water only and do not eat raw foods.  Consider being vaccinated against certain viral infections. Ask your doctor about the shots that you need.    Please go to the emergency room if:  You feel very weak, like you cant stand up, and your skin is cool, clammy, or looks blue or gray.  You have severe abdominal pain.  You have chest pain or trouble breathing.  You have signs of severe fluid loss, such as:  No urine for more than 8 hours.  You feel very lightheaded or like you are going to pass out.  You feel weak like you are going to fall.  You are not able to keep any fluids down.  You develop early signs of fluid loss again, such as:  Your urine is very dark colored.  Your mouth is dry.  You have muscle cramps.  You have a lack of energy.  You feel light-headed when you get up.    Should you develop any worsening or new symptoms after leaving urgent care, it is recommended that you go to the ER for further/repeat evaluation.      Follow up with your PCP in 3-5 days after your urgent care visit.     You may call [phone] to schedule an appointment with a primary care doctor or a  specialist.      Please remember that you have received care at an urgent care today. Urgent cares are not emergency rooms and are not equipped to handle life threatening emergencies and cannot rule in or out certain medical conditions and you may be released before all of your medical problems are known or treated, please schedule all follow up appointments as discussed and if you have worsening symptoms please go to the ER to rule out potential life threatening problems, as discussed.

## 2024-03-27 NOTE — PATIENT INSTRUCTIONS
I have ordered a CT scan of the abdomen to be done outpatient. You should receive a call to set up the appointment. Once your imaging has been done, someone from [facility] will call with your results. You will also be able to see results on [facility]. I have forwarded these results to your primary care doctor.  Zofran 4 mg every 6 hours as needed for nausea and vomiting.  STOP taking pepto bismol as this can cause black-colored stools.  A referral has been placed for you to be seen with a GI doctor in the event you are unable to set up with your previous GI doctor. You should receive a call from [facility] within the next 1-3 days to set up an appointment. If you do not receive a call, you may call our Referral Hotline at [phone] to make an appointment.    What care is needed at home?   Drink small amounts of fluid every 15 to 30 minutes. Good fluids to drink are water, broth, sports drinks, and oral electrolyte solutions. It is also important to eat if you are able to keep food down.  Avoid beer, wine, and mixed drinks.  Check your blood sugar more often if you have high blood sugar.  If you are throwing up, try to drink if you can until you are able to eat small amounts of food.  If you cannot keep fluids down, suck on ice chips.  If you have loose stools, try to drink extra fluids to replace the water your body has lost.  If you are breastfeeding, keep feeding your baby as you normally would.  Avoid sharing your food and drinks.  Stay away from others until the throwing up or loose stools have stopped.  Follow good hygiene practices. Wash your hands often with soap and water for at least 20 seconds. Alcohol-based hand sanitizers also work to kill the virus. This is very important:  After using the bathroom  Before eating  Before cooking    Will physical activity be limited?   Your physical activity will not be limited. Make sure you get lots of rest. You may not be able to travel or go to work until the  loose stools and throwing up have stopped for 24 hours.    What changes to diet are needed?   Take small sips of fluids often. Eat foods that have high water content like broth, Jello, and popsicles.  Avoid liquids such as soda, ginger ale, tea, fruit juice, and drinks with caffeine. These can make fluid loss worse. Ask your doctor what foods and drinks are safe for you.  You can eat a variety of food as your appetite gets better. Watch to see if some foods seem to make diarrhea or other symptoms worse, such as dairy products.  Avoid fatty and sugary foods such as cakes, chocolates, ice cream, and take out foods.    How do I prevent this?  Avoid close contact with people who have this illness.  Clean things and surfaces in your home like door handles and phones. Use bleach to clean the area. This can help lower the spread of infection.  Do not share personal things like toothbrushes, towels, and drinking glasses.  Take extra care when traveling. Drink bottled water only and do not eat raw foods.  Consider being vaccinated against certain viral infections. Ask your doctor about the shots that you need.    Please go to the emergency room if:  You feel very weak, like you cant stand up, and your skin is cool, clammy, or looks blue or gray.  You have severe abdominal pain.  You have chest pain or trouble breathing.  You have signs of severe fluid loss, such as:  No urine for more than 8 hours.  You feel very lightheaded or like you are going to pass out.  You feel weak like you are going to fall.  You are not able to keep any fluids down.  You develop early signs of fluid loss again, such as:  Your urine is very dark colored.  Your mouth is dry.  You have muscle cramps.  You have a lack of energy.  You feel light-headed when you get up.    Should you develop any worsening or new symptoms after leaving urgent care, it is recommended that you go to the ER for further/repeat evaluation.      Follow up with your PCP in 3-5  days after your urgent care visit.     You may call [phone] to schedule an appointment with a primary care doctor or a specialist.      Please remember that you have received care at an urgent care today. Urgent cares are not emergency rooms and are not equipped to handle life threatening emergencies and cannot rule in or out certain medical conditions and you may be released before all of your medical problems are known or treated, please schedule all follow up appointments as discussed and if you have worsening symptoms please go to the ER to rule out potential life threatening problems, as discussed.

## 2024-04-15 ENCOUNTER — TELEPHONE (OUTPATIENT)
Dept: CARDIOLOGY | Facility: CLINIC | Age: 74
End: 2024-04-15
Payer: MEDICARE

## 2024-04-15 DIAGNOSIS — I77.810 AORTIC ROOT DILATATION: Primary | ICD-10-CM

## 2024-04-22 ENCOUNTER — HOSPITAL ENCOUNTER (OUTPATIENT)
Dept: CARDIOLOGY | Facility: HOSPITAL | Age: 74
Discharge: HOME | End: 2024-04-22
Attending: INTERNAL MEDICINE
Payer: MEDICARE

## 2024-04-22 DIAGNOSIS — I77.810 AORTIC ROOT DILATATION: ICD-10-CM

## 2024-04-22 LAB
ASCENDING AORTA: 4.16 CM
AV INDEX (PROSTH): 0.79
AV MEAN GRADIENT: 6 MMHG
AV PEAK GRADIENT: 9 MMHG
AV VALVE AREA BY VELOCITY RATIO: 3.08 CM²
AV VALVE AREA: 3.53 CM²
AV VELOCITY RATIO: 0.69
CV ECHO LV RWT: 0.66 CM
DOP CALC AO PEAK VEL: 1.53 M/S
DOP CALC AO VTI: 31.1 CM
DOP CALC LVOT AREA: 4.4 CM2
DOP CALC LVOT DIAMETER: 2.38 CM
DOP CALC LVOT PEAK VEL: 1.06 M/S
DOP CALC LVOT STROKE VOLUME: 109.83 CM3
DOP CALCLVOT PEAK VEL VTI: 24.7 CM
E WAVE DECELERATION TIME: 281.42 MSEC
E/A RATIO: 0.68
E/E' RATIO: 6.6 M/S
ECHO LV POSTERIOR WALL: 1.26 CM (ref 0.6–1.1)
FRACTIONAL SHORTENING: 32 % (ref 28–44)
INTERVENTRICULAR SEPTUM: 1.27 CM (ref 0.6–1.1)
IVC DIAMETER: 1.55 CM
IVRT: 98.95 MSEC
LA MAJOR: 5.06 CM
LA MINOR: 5.12 CM
LA WIDTH: 3.5 CM
LEFT ATRIUM SIZE: 3.85 CM
LEFT ATRIUM VOLUME: 58.3 CM3
LEFT INTERNAL DIMENSION IN SYSTOLE: 2.59 CM (ref 2.1–4)
LEFT VENTRICLE DIASTOLIC VOLUME: 62.09 ML
LEFT VENTRICLE SYSTOLIC VOLUME: 24.35 ML
LEFT VENTRICULAR INTERNAL DIMENSION IN DIASTOLE: 3.8 CM (ref 3.5–6)
LEFT VENTRICULAR MASS: 165.97 G
LV LATERAL E/E' RATIO: 6.6 M/S
LV SEPTAL E/E' RATIO: 6.6 M/S
LVOT MG: 2.85 MMHG
LVOT MV: 0.81 CM/S
MV PEAK A VEL: 0.97 M/S
MV PEAK E VEL: 0.66 M/S
MV STENOSIS PRESSURE HALF TIME: 81.61 MS
MV VALVE AREA P 1/2 METHOD: 2.7 CM2
OHS CV RV/LV RATIO: 0.76 CM
PISA TR MAX VEL: 2.33 M/S
PULM VEIN S/D RATIO: 1.58
PV PEAK D VEL: 0.4 M/S
PV PEAK GRADIENT: 8 MMHG
PV PEAK S VEL: 0.63 M/S
PV PEAK VELOCITY: 1.38 M/S
RA MAJOR: 4.37 CM
RA PRESSURE ESTIMATED: 3 MMHG
RA WIDTH: 2.8 CM
RIGHT VENTRICULAR END-DIASTOLIC DIMENSION: 2.9 CM
RV TB RVSP: 5 MMHG
RV TISSUE DOPPLER FREE WALL SYSTOLIC VELOCITY 1 (APICAL 4 CHAMBER VIEW): 8.81 CM/S
SINUS: 4.05 CM
STJ: 3.38 CM
TDI LATERAL: 0.1 M/S
TDI SEPTAL: 0.1 M/S
TDI: 0.1 M/S
TR MAX PG: 22 MMHG
TRICUSPID ANNULAR PLANE SYSTOLIC EXCURSION: 2.38 CM
TV PEAK GRADIENT: 2 MMHG
TV REST PULMONARY ARTERY PRESSURE: 25 MMHG

## 2024-04-22 PROCEDURE — 93306 TTE W/DOPPLER COMPLETE: CPT | Mod: 26,HCNC,, | Performed by: INTERNAL MEDICINE

## 2024-04-22 PROCEDURE — 93306 TTE W/DOPPLER COMPLETE: CPT | Mod: HCNC

## 2024-04-26 ENCOUNTER — PATIENT OUTREACH (OUTPATIENT)
Dept: ADMINISTRATIVE | Facility: HOSPITAL | Age: 74
End: 2024-04-26
Payer: MEDICARE

## 2024-04-26 ENCOUNTER — OFFICE VISIT (OUTPATIENT)
Dept: CARDIOLOGY | Facility: CLINIC | Age: 74
End: 2024-04-26
Payer: MEDICARE

## 2024-04-26 ENCOUNTER — HOSPITAL ENCOUNTER (OUTPATIENT)
Dept: RADIOLOGY | Facility: HOSPITAL | Age: 74
Discharge: HOME | End: 2024-04-26
Payer: MEDICARE

## 2024-04-26 VITALS
BODY MASS INDEX: 33.66 KG/M2 | HEART RATE: 73 BPM | DIASTOLIC BLOOD PRESSURE: 62 MMHG | SYSTOLIC BLOOD PRESSURE: 130 MMHG | RESPIRATION RATE: 18 BRPM | OXYGEN SATURATION: 95 % | WEIGHT: 235.13 LBS | HEIGHT: 70 IN

## 2024-04-26 DIAGNOSIS — R10.13 EPIGASTRIC PAIN: ICD-10-CM

## 2024-04-26 DIAGNOSIS — I10 ESSENTIAL HYPERTENSION: ICD-10-CM

## 2024-04-26 DIAGNOSIS — R94.31 ABNORMAL EKG: ICD-10-CM

## 2024-04-26 DIAGNOSIS — I25.10 CORONARY ARTERY DISEASE INVOLVING NATIVE CORONARY ARTERY OF NATIVE HEART WITHOUT ANGINA PECTORIS: Primary | ICD-10-CM

## 2024-04-26 DIAGNOSIS — G47.33 OSA (OBSTRUCTIVE SLEEP APNEA): ICD-10-CM

## 2024-04-26 DIAGNOSIS — E66.9 NON MORBID OBESITY, UNSPECIFIED OBESITY TYPE: ICD-10-CM

## 2024-04-26 DIAGNOSIS — Z98.61 HISTORY OF PERCUTANEOUS CORONARY INTERVENTION: ICD-10-CM

## 2024-04-26 DIAGNOSIS — I77.810 AORTIC ROOT DILATATION: ICD-10-CM

## 2024-04-26 DIAGNOSIS — I10 HYPERTENSION, UNSPECIFIED TYPE: ICD-10-CM

## 2024-04-26 DIAGNOSIS — E78.2 MIXED HYPERLIPIDEMIA: ICD-10-CM

## 2024-04-26 PROCEDURE — 1159F MED LIST DOCD IN RCRD: CPT | Mod: HCNC,CPTII,S$GLB, | Performed by: INTERNAL MEDICINE

## 2024-04-26 PROCEDURE — 1101F PT FALLS ASSESS-DOCD LE1/YR: CPT | Mod: HCNC,CPTII,S$GLB, | Performed by: INTERNAL MEDICINE

## 2024-04-26 PROCEDURE — 1126F AMNT PAIN NOTED NONE PRSNT: CPT | Mod: HCNC,CPTII,S$GLB, | Performed by: INTERNAL MEDICINE

## 2024-04-26 PROCEDURE — 99999 PR PBB SHADOW E&M-EST. PATIENT-LVL V: CPT | Mod: PBBFAC,HCNC,, | Performed by: INTERNAL MEDICINE

## 2024-04-26 PROCEDURE — 3008F BODY MASS INDEX DOCD: CPT | Mod: HCNC,CPTII,S$GLB, | Performed by: INTERNAL MEDICINE

## 2024-04-26 PROCEDURE — 1160F RVW MEDS BY RX/DR IN RCRD: CPT | Mod: HCNC,CPTII,S$GLB, | Performed by: INTERNAL MEDICINE

## 2024-04-26 PROCEDURE — 93000 ELECTROCARDIOGRAM COMPLETE: CPT | Mod: HCNC,S$GLB,, | Performed by: INTERNAL MEDICINE

## 2024-04-26 PROCEDURE — 4010F ACE/ARB THERAPY RXD/TAKEN: CPT | Mod: HCNC,CPTII,S$GLB, | Performed by: INTERNAL MEDICINE

## 2024-04-26 PROCEDURE — 74176 CT ABD & PELVIS W/O CONTRAST: CPT | Mod: TC,HCNC

## 2024-04-26 PROCEDURE — 74176 CT ABD & PELVIS W/O CONTRAST: CPT | Mod: 26,HCNC,, | Performed by: RADIOLOGY

## 2024-04-26 PROCEDURE — 3078F DIAST BP <80 MM HG: CPT | Mod: HCNC,CPTII,S$GLB, | Performed by: INTERNAL MEDICINE

## 2024-04-26 PROCEDURE — 3288F FALL RISK ASSESSMENT DOCD: CPT | Mod: HCNC,CPTII,S$GLB, | Performed by: INTERNAL MEDICINE

## 2024-04-26 PROCEDURE — 99214 OFFICE O/P EST MOD 30 MIN: CPT | Mod: HCNC,S$GLB,, | Performed by: INTERNAL MEDICINE

## 2024-04-26 PROCEDURE — 3075F SYST BP GE 130 - 139MM HG: CPT | Mod: HCNC,CPTII,S$GLB, | Performed by: INTERNAL MEDICINE

## 2024-04-26 NOTE — PROGRESS NOTES
CT abd/pelvis negative. Done for UC visit for abd pain.  GI referral was submitted at that time.  Incidental abd aortic atherosclerosis. On high dose statin  Results to pt via [employer].   Has GI consult in July

## 2024-04-26 NOTE — PROGRESS NOTES
CARDIOVASCULAR PROGRESS NOTE    REASON FOR CONSULT:   Clinton Rosenberg is a 73 y.o. male who presents for follow up of CAD, ao root dil.    PCP: Dair  HISTORY OF PRESENT ILLNESS:   The patient returns for follow up.  He is accompanied by his wife and daughter.  He denies intercurrent angina, dyspnea, palpitations, or syncope.  There has been no PND, orthopnea, melena, hematuria, or claudication symptoms.  I reviewed the results of his echocardiogram noting stable aortic root dilatation.    CARDIOVASCULAR HISTORY:   CAD/MI  2013: LAD PCI    9/20/19: mid RPLB 2.5x18 Resolute [place] SILVER    JAYLEN, unable to afford CPAP  Aortic root dil, 3.7->4.0->3.8->4.2  cm (echo 4/2024), 3.8cm (asc Ao) on CTA 9/2019  CVI (bilat GSV, R LSV, US 3/2018)    PAST MEDICAL HISTORY:     Past Medical History:   Diagnosis Date    Allergy     Angina pectoris     Anxiety     Cancer     skin rwemoved from head    Chronic midline low back pain without sciatica 10/26/2022    Coronary artery disease     Depression     Eye injury as a teen    stuck object in os    GERD (gastroesophageal reflux disease)     Hyperlipidemia     Hypertension     Hypothyroidism     Memory loss     12/3/2014 MRI brain: 1. No evidence for acute infarct 2. unremarkable MRI of the brain; 12/3/2014 carotid US normal    Myocardial infarction     Nuclear sclerosis of both eyes 5/20/2021    Obesity     Peripheral vascular disease 6/20/2017    Retrocalcaneal bursitis 11/24/2014    Sleep apnea     Type 2 diabetes mellitus with other specified complication 4/27/2023       PAST SURGICAL HISTORY:     Past Surgical History:   Procedure Laterality Date    APPENDECTOMY  1986    bone spur Right     COLONOSCOPY      COLONOSCOPY N/A 10/26/2023    Procedure: COLONOSCOPY;  Surgeon: Argelia Altamirano MD;  Location: [facility];  Service: Endoscopy;  Laterality: N/A;  Referred by: Dr. Michael Gonzalez  BT/Clearance: ok to hold Plavix 5 days per Dr Bahena-IVANNA  Prep: golytely  Route instructions sent:  [id number]  8/3 proc jessica to 10/26, pt has prep and instr  10/19- pre call complete.  DBM    hand trauma Right     pencil     heart stent      LEFT HEART CATHETERIZATION Left 9/20/2019    Procedure: Left heart cath 12 pm start, R rad access;  Surgeon: Brad Bahena MD;  Location: [facility] CATH LAB;  Service: Cardiology;  Laterality: Left;  RN PREOP 9/13/2019  NEEDS IODINE PREMED    LEFT HEART CATHETERIZATION Left 9/21/2020    Procedure: Left heart cath 730am start, R rad access;  Surgeon: Brad Bahena MD;  Location: [facility] CATH LAB;  Service: Cardiology;  Laterality: Left;  RN PREOP 9/14/2020, COVID NEGATIVE---9/18       ALLERGIES AND MEDICATION:     Review of patient's allergies indicates:   Allergen Reactions    Iodine containing multivitamin Anaphylaxis    Naproxen Other (See Comments)     hallucinations    Hydrocodone-acetaminophen      Rash (skin)^    Oxycodone-acetaminophen      Rash (skin)^        Medication List            Accurate as of April 26, 2024 10:02 AM. If you have any questions, ask your nurse or doctor.                CONTINUE taking these medications      aspirin 81 MG EC tablet  Commonly known as: ECOTRIN  Take 1 tablet (81 mg total) by mouth once daily.     atorvastatin 80 MG tablet  Commonly known as: LIPITOR  Take 1 tablet (80 mg total) by mouth every evening.     ciclopirox 0.77 % Crea  Commonly known as: LOPROX  Apply topically 2 (two) times daily.     clopidogreL 75 mg tablet  Commonly known as: PLAVIX  Take 1 tablet (75 mg total) by mouth once daily.     clotrimazole-betamethasone 1-0.05% cream  Commonly known as: LOTRISONE  Apply topically 2 (two) times daily.     diclofenac sodium 1 % Gel  Commonly known as: VOLTAREN  Apply the gel (2 g) to the affected area 4 times daily. Do not apply more than 8 g daily to any one affected joint     furosemide 20 MG tablet  Commonly known as: LASIX  Take 1 tablet (20 mg total) by mouth once daily.     gabapentin 300 MG capsule  Commonly  known as: NEURONTIN  Take 1 capsule (300 mg total) by mouth every evening.     isosorbide mononitrate 120 MG 24 hr tablet  Commonly known as: IMDUR  Take 2 tablets (240 mg total) by mouth once daily.     KRILL OIL (OMEGA 3 AND 6) ORAL     levothyroxine 75 MCG tablet  Commonly known as: SYNTHROID  TAKE 1 TABLET BEFORE BREAKFAST     metFORMIN 500 MG ER 24hr tablet  Commonly known as: GLUCOPHAGE-XR  Take 1 tablet (500 mg total) by mouth 2 (two) times daily with meals.     metoprolol tartrate 25 MG tablet  Commonly known as: LOPRESSOR  Take 0.5 tablets (12.5 mg total) by mouth 2 (two) times daily.     nitroGLYCERIN 0.4 MG SL tablet  Commonly known as: NITROSTAT  Place 1 tablet (0.4 mg total) under the tongue every 5 (five) minutes as needed for Chest pain.     nystatin powder  Commonly known as: MYCOSTATIN  Apply topically 4 (four) times daily as needed (to keep penis/scrotum dry).     olmesartan 40 MG tablet  Commonly known as: BENICAR  Take 1 tablet (40 mg total) by mouth once daily.     pantoprazole 20 MG tablet  Commonly known as: PROTONIX  Take 2 tablets (40 mg total) by mouth once daily.     tamsulosin 0.4 mg Cap  Commonly known as: FLOMAX  Take 1 capsule (0.4 mg total) by mouth once daily.     vitamin D 1000 units Tab  Commonly known as: VITAMIN D3  Take 1 tablet (1,000 Units total) by mouth once daily.                SOCIAL HISTORY:     Social History     Socioeconomic History    Marital status:    Tobacco Use    Smoking status: Never     Passive exposure: Never    Smokeless tobacco: Never   Substance and Sexual Activity    Alcohol use: Yes     Comment: rarely    Drug use: No    Sexual activity: Yes     Social Determinants of Health     Financial Resource Strain: Low Risk  (11/12/2023)    Overall Financial Resource Strain (CARDIA)     Difficulty of Paying Living Expenses: Not very hard   Food Insecurity: No Food Insecurity (11/12/2023)    Hunger Vital Sign     Worried About Running Out of Food in the Last  Year: Never true     Ran Out of Food in the Last Year: Never true   Recent Concern: Food Insecurity - Food Insecurity Present (8/25/2023)    Hunger Vital Sign     Worried About Running Out of Food in the Last Year: Sometimes true     Ran Out of Food in the Last Year: Sometimes true   Transportation Needs: No Transportation Needs (11/12/2023)    PRAPARE - Transportation     Lack of Transportation (Medical): No     Lack of Transportation (Non-Medical): No   Physical Activity: Insufficiently Active (11/12/2023)    Exercise Vital Sign     Days of Exercise per Week: 2 days     Minutes of Exercise per Session: 60 min   Stress: No Stress Concern Present (11/12/2023)    [employer] - Occupational Stress Questionnaire     Feeling of Stress : Only a little   Social Connections: Unknown (11/12/2023)    Social Connection and Isolation Panel [NHANES]     Frequency of Communication with Friends and Family: More than three times a week     Frequency of Social Gatherings with Friends and Family: More than three times a week     Active Member of Clubs or Organizations: No     Attends Club or Organization Meetings: Never     Marital Status:    Recent Concern: Social Connections - Moderately Isolated (8/25/2023)    Social Connection and Isolation Panel [NHANES]     Frequency of Communication with Friends and Family: More than three times a week     Frequency of Social Gatherings with Friends and Family: Three times a week     Attends Caodaism Services: Never     Active Member of Clubs or Organizations: No     Attends Club or Organization Meetings: Never     Marital Status:    Housing Stability: Low Risk  (11/12/2023)    Housing Stability Vital Sign     Unable to Pay for Housing in the Last Year: No     Number of Places Lived in the Last Year: 1     Unstable Housing in the Last Year: No       FAMILY HISTORY:     Family History   Problem Relation Name Age of Onset    Arthritis Mother       Early death Mother      Heart disease Mother      Hypertension Mother      Migraines Mother      Seizures Mother      Tremor Mother      Diabetes Mother      Cancer Mother      Early death Father      Heart disease Father      Hypertension Father      Stroke Father      Diabetes Father      Alcohol abuse Father      Arthritis Brother Maximino     Cancer Brother Maximino     Diabetes Brother Maximino     Early death Brother Maximino     Heart disease Brother Maximino     Hypertension Brother Maximino     Heart disease Sister Otilia     Hypertension Sister Otilia     Arthritis Sister Otilia     Depression Sister Otilia     Heart disease Brother Robert     Arthritis Brother Robert     Heart disease Brother Azael     Pneumonia Brother Azael     Heart disease Brother Martin     Hypertension Brother Martin     Diabetes Brother Martin     Heart disease Brother Dario     No Known Problems Maternal Aunt      No Known Problems Maternal Uncle      No Known Problems Paternal Aunt      No Known Problems Paternal Uncle      No Known Problems Maternal Grandmother      No Known Problems Maternal Grandfather      No Known Problems Paternal Grandmother      No Known Problems Paternal Grandfather      Amblyopia Neg Hx      Blindness Neg Hx      Cataracts Neg Hx      Glaucoma Neg Hx      Macular degeneration Neg Hx      Retinal detachment Neg Hx      Strabismus Neg Hx      Thyroid disease Neg Hx         REVIEW OF SYSTEMS:   Review of Systems   Constitutional:  Negative for chills, diaphoresis and fever.   HENT:  Negative for nosebleeds.    Eyes:  Negative for blurred vision, double vision and photophobia.   Respiratory:  Negative for cough, hemoptysis, sputum production, shortness of breath and wheezing.    Cardiovascular:  Negative for chest pain, palpitations, orthopnea, claudication, leg swelling and PND.   Gastrointestinal:  Negative for abdominal pain, blood in stool, heartburn, melena, nausea and vomiting.   Genitourinary:  Negative for flank pain  "and hematuria.   Musculoskeletal:  Negative for falls, myalgias and neck pain.   Skin:  Negative for rash.   Neurological:  Negative for dizziness, seizures, loss of consciousness, weakness and headaches.   Endo/Heme/Allergies:  Negative for polydipsia. Does not bruise/bleed easily.   Psychiatric/Behavioral:  Negative for depression and memory loss. The patient is not nervous/anxious.        PHYSICAL EXAM:     Vitals:    04/26/24 0948   BP: 130/62   Pulse: 73   Resp: 18    Body mass index is 33.74 kg/m².  Weight: 106.7 kg (235 lb 1.9 oz)   Height: 5' 10" (177.8 cm)     Physical Exam  Vitals reviewed.   Constitutional:       General: He is not in acute distress.     Appearance: He is well-developed. He is obese. He is not ill-appearing, toxic-appearing or diaphoretic.   HENT:      Head: Normocephalic and atraumatic.   Eyes:      General: No scleral icterus.     Extraocular Movements: Extraocular movements intact.      Conjunctiva/sclera: Conjunctivae normal.      Pupils: Pupils are equal, round, and reactive to light.   Neck:      Thyroid: No thyromegaly.      Vascular: Normal carotid pulses. No carotid bruit or JVD.      Trachea: Trachea normal. No tracheal deviation.   Cardiovascular:      Rate and Rhythm: Normal rate and regular rhythm.      Pulses:           Carotid pulses are 2+ on the right side and 2+ on the left side.     Heart sounds: S1 normal and S2 normal. No murmur heard.     No friction rub. No gallop.   Pulmonary:      Effort: Pulmonary effort is normal. No respiratory distress.      Breath sounds: Normal breath sounds. No stridor. No wheezing, rhonchi or rales.   Chest:      Chest wall: No tenderness.   Abdominal:      General: There is no distension.      Palpations: Abdomen is soft.   Musculoskeletal:         General: No swelling or tenderness. Normal range of motion.      Cervical back: Normal range of motion and neck supple. No edema or rigidity.      Right lower leg: No edema.      Left lower " leg: No edema.      Comments: ?RLE CVI   Feet:      Right foot:      Skin integrity: No ulcer.      Left foot:      Skin integrity: No ulcer.   Skin:     General: Skin is warm and dry.      Coloration: Skin is not jaundiced.   Neurological:      General: No focal deficit present.      Mental Status: He is alert and oriented to person, place, and time.      Cranial Nerves: No cranial nerve deficit.   Psychiatric:         Mood and Affect: Mood normal.         Speech: Speech normal.         Behavior: Behavior normal. Behavior is cooperative.         DATA:   EKG: (personally reviewed tracing(s))  4/26/24 SR 69, LAD/PRWP    Laboratory:  CBC:  Recent Labs   Lab 10/07/22  0734 04/12/23  0809 11/03/23  0740   WBC 8.17 7.53 8.82   Hemoglobin 14.3 14.8 15.0   Hematocrit 41.7 43.8 44.4   Platelets 184 195 199       CHEMISTRIES:  Recent Labs   Lab 10/08/21  0724 04/07/22  0716 10/07/22  0734 04/12/23  0809 11/03/23  0740   Glucose 156 H 157 H 179 H 181 H 147 H   Sodium 141 141 138 139 141   Potassium 4.6 4.6 4.1 4.8 4.4   BUN 11 12 12 13 18   Creatinine 1.1 1.0 0.9 1.0 1.1   eGFR if African American >60.0 >60.0  --   --   --    eGFR if non African American >60.0 >60.0  --   --   --    Calcium 10.0 9.5 9.3 9.4 9.3       CARDIAC BIOMARKERS:          COAGS:  Recent Labs   Lab 08/01/23  1156   INR 1.0       LIPIDS/LFTS:  Recent Labs   Lab 10/08/21  0724 04/07/22  0716 10/07/22  0734 04/12/23  0809 11/03/23  0740   Cholesterol 112 L 108 L  --  128  --    Triglycerides 211 H 204 H  --  208 H  --    HDL 37 L 33 L  --  37 L  --    LDL Cholesterol 32.8 L 34.2 L  --  49.4 L  --    Non-HDL Cholesterol 75 75  --  91  --    AST 17 17 16 18 15   ALT 22 22 20 21 17       Cardiovascular Testing:  Echo 4/22/24    Left Ventricle: The left ventricle is normal in size. Mildly increased wall thickness. There is mild concentric hypertrophy. There is normal systolic function with a visually estimated ejection fraction of 55 - 60%. Grade I diastolic  dysfunction.    Right Ventricle: Normal right ventricular cavity size. Systolic function is normal.    Aorta: Aortic root is mildly dilated measuring 4.05 cm. Ascending aorta is mildly dilated measuring 4.16 cm.    Pulmonary Artery: The estimated pulmonary artery systolic pressure is 25 mmHg.    L MPI 3/9/23    Normal myocardial perfusion scan. There is no evidence of myocardial ischemia or infarction.    There is a  mild intensity fixed perfusion abnormality in the inferior wall of the left ventricle secondary to diaphragm attenuation.    The gated perfusion images showed an ejection fraction of 67% at rest.    There is normal wall motion at rest and post stress.    The ECG portion of the study is negative for ischemia.    The patient reported chest pain during the stress test.    There were no arrhythmias during stress.    Echo 3/9/23 (Ao root 3.9cm, stable vs report 11/2021)  The left ventricle is normal in size with normal systolic function.  The estimated ejection fraction is 65%.  Grade I left ventricular diastolic dysfunction.  Normal right ventricular size with normal right ventricular systolic function.  Intermediate central venous pressure (8 mmHg).  The estimated PA systolic pressure is 22 mmHg.  Mild left atrial enlargement.    Aortic US 3/9/23  No evidence of AAA.    LE venous US/reflux 3/9/23  Right leg:  No DVT.    GSV reflux (below knee segment).    SSV reflux.    Left leg:  No DVT.    GSV reflux (mid and distal thigh segments).    Cath 9/21/20  LVEDP: 7mmHg  LVEF: 65% by echo  Dominance: Right  LM: normal  LAD: MLI, mid stent patent (2013)  Ramus: MLI, prox 30%  LCx: MLI, ost 40%  RCA: MLI, dist eccentric 40-50%              RPDA: ost 50%, iFR 0.97              RPLB mid stent patent (9/20/19 2.5x18 Resolute [place] SILVER)  Hemostasis:  R Radial band  Impression:  Recurrent CP on mult meds  2V CAD, normal LV fxn  Patent mid LAD and mid RPLB stents  iFR dist RCA/ost RPDA neg  R rad vasband for  hemostasis  Successful pre-treatment of iodine allergy  Plan:  Cont med rx  Cont ASA/Plavix/Statin/BBL/Imdur  Plan long term DAPT given diffuse CAD and prior MV PCI  Home today  Follow up with Dr. Bahena as planned  Outpatient GI/Pulm evals    CTA Chest 9/20/19 (post-cath)  The main pulmonary artery is enlarged suggesting underlying pulmonary artery hypertension.  Single-vessel coronary disease.  Normal thoracic aorta and visualized abdominal aorta.  Aortic measurements: STJ 3.4cm, Asc 3.8cm, Desc: 2.6.    Holter 6/8/17  PREDOMINANT RHYTHM  1. Sinus rhythm with heart rates varying between 43 and 94 bpm with an average of 62 bpm.   VENTRICULAR ARRHYTHMIAS  1. There were very rare PVCs recorded totalling 5 and averaging less than 1 per hour.   2. There were no episodes of ventricular tachycardia.  SUPRA VENTRICULAR ARRHYTHMIAS  1. There were very rare PACs recorded totalling 3 and averaging less than 1 per hour.   2. There were no episodes of sustained supraventricular tachycardia.  SINUS NODE FUNCTION  1. There was no evidence of high grade SA deanne block.   AV CONDUCTION  1. There was no evidence of high grade AV block.   DIARY  1. The diary was not returned  MISCELLANEOUS  1. There were rare hookup related artifacts. Overall, the study was of good quality.   2. This was a tape of adequate length (24 hrs).    LE art US/TANA 6/8/17  1.  Mild/atherosclerotic plaquing.  2.  No stenosis about 30 percent femoral popliteal arteries.  3.  Normal bilateral ABIs.    Carotid US 12/3/14  1. Less than 50% stenosis of the right internal carotid artery.  2. Less than 50% stenosis of the left internal carotid artery.      ASSESSMENT:   # CAD s/p LAD PCI 2013, RPLB PCI 9/2019, cath 9/2020 with patent LAD/RPLB stents.  Echo/MPI 3/2023 neg.  No further CP noted.  # HTN, controlled.  # HLP, on atorva 80mg  # Ao root dil, 3.7->4.0->3.8->4.2->4.1->3.9->4.2 cm (echo 4/2024), 3.8cm (asc Ao CTA 9/2019)  # CVI (bilat GSV, R LSV) on US  3/2018.  No edema at present (but complains of intermittent swelling), prev declined referral for venous ablation.  Sxs improved with compression rx.  # hx JAYLEN, pt unable to obtain CPAP apparatus  # BMI 34, up 1 unit(s) vs last OV  # Iodine allergy (?Betadine), successfully premedicated for cath 9/2020  # aortic atherosclerosis (CT A/P 4/26/24)    PLAN:   Cont med rx  Cont ASA 81mg qd indefinitely  Cont Plavix 75mg qd, long term rx planned given hx MV PCI/CAD.  Cont compression rx  Diet/exercise/weight loss  RTC 1 year with surveillance echo (Apr 2025)    Brad Bahena MD, FACC

## 2024-04-28 LAB
OHS QRS DURATION: 82 MS
OHS QTC CALCULATION: 413 MS

## 2024-05-02 DIAGNOSIS — K21.9 GASTROESOPHAGEAL REFLUX DISEASE, UNSPECIFIED WHETHER ESOPHAGITIS PRESENT: Chronic | ICD-10-CM

## 2024-05-02 RX ORDER — PANTOPRAZOLE SODIUM 20 MG/1
40 TABLET, DELAYED RELEASE ORAL
Qty: 180 TABLET | Refills: 3 | Status: SHIPPED | OUTPATIENT
Start: 2024-05-02

## 2024-05-02 NOTE — TELEPHONE ENCOUNTER
Provider Staff:  Action required for this patient       Please see care gap opportunities below in Care Due Message.    Thanks!  [facility]     Appointments      Date Provider   Last Visit   2/19/2024 Michael Gonzalez MD   Next Visit   5/13/2024 Michael Gonzalez MD     Refill Decision Note   Clinton Rosenberg  is requesting a refill authorization.  Brief Assessment and Rationale for Refill:  Approve     Medication Therapy Plan:  FLOS      Comments:     Note composed:8:55 AM 05/02/2024

## 2024-05-02 NOTE — TELEPHONE ENCOUNTER
Care Due:                  Date            Visit Type   Department     Provider  --------------------------------------------------------------------------------                                [facility]                              PRIMARY      MED/ INTERNAL  Last Visit: 02-      CARE ([facility])   MED/ AGUSTINA Gonzalez                              [facility]                              PRIMARY      MED/ INTERNAL  Next Visit: 05-      CARE ([facility])   MED/ PEDS      Michael Gonzalez                                                            Last  Test          Frequency    Reason                     Performed    Due Date  --------------------------------------------------------------------------------    HBA1C.......  6 months...  metFORMIN................  11- 05-    Lipid Panel.  12 months..  atorvastatin.............  04- 04-    [facility] Embedded Care Due Messages. Reference number: [id number].   5/02/2024 8:27:03 AM CDT

## 2024-05-06 ENCOUNTER — LAB VISIT (OUTPATIENT)
Dept: LAB | Facility: HOSPITAL | Age: 74
End: 2024-05-06
Attending: INTERNAL MEDICINE
Payer: MEDICARE

## 2024-05-06 DIAGNOSIS — E03.9 HYPOTHYROIDISM, UNSPECIFIED TYPE: Chronic | ICD-10-CM

## 2024-05-06 DIAGNOSIS — E11.69 TYPE 2 DIABETES MELLITUS WITH OTHER SPECIFIED COMPLICATION, WITHOUT LONG-TERM CURRENT USE OF INSULIN: ICD-10-CM

## 2024-05-06 LAB
ALBUMIN SERPL BCP-MCNC: 3.9 G/DL (ref 3.5–5.2)
ALP SERPL-CCNC: 51 U/L (ref 55–135)
ALT SERPL W/O P-5'-P-CCNC: 17 U/L (ref 10–44)
ANION GAP SERPL CALC-SCNC: 11 MMOL/L (ref 8–16)
AST SERPL-CCNC: 19 U/L (ref 10–40)
BILIRUB SERPL-MCNC: 0.6 MG/DL (ref 0.1–1)
BUN SERPL-MCNC: 16 MG/DL (ref 8–23)
CALCIUM SERPL-MCNC: 9.5 MG/DL (ref 8.7–10.5)
CHLORIDE SERPL-SCNC: 105 MMOL/L (ref 95–110)
CHOLEST SERPL-MCNC: 135 MG/DL (ref 120–199)
CHOLEST/HDLC SERPL: 3.8 {RATIO} (ref 2–5)
CO2 SERPL-SCNC: 24 MMOL/L (ref 23–29)
CREAT SERPL-MCNC: 1.1 MG/DL (ref 0.5–1.4)
ERYTHROCYTE [DISTWIDTH] IN BLOOD BY AUTOMATED COUNT: 13.3 % (ref 11.5–14.5)
EST. GFR  (NO RACE VARIABLE): >60 ML/MIN/1.73 M^2
ESTIMATED AVG GLUCOSE: 131 MG/DL (ref 68–131)
GLUCOSE SERPL-MCNC: 145 MG/DL (ref 70–110)
HBA1C MFR BLD: 6.2 % (ref 4–5.6)
HCT VFR BLD AUTO: 45.1 % (ref 40–54)
HDLC SERPL-MCNC: 36 MG/DL (ref 40–75)
HDLC SERPL: 26.7 % (ref 20–50)
HGB BLD-MCNC: 15.1 G/DL (ref 14–18)
LDLC SERPL CALC-MCNC: 42 MG/DL (ref 63–159)
MCH RBC QN AUTO: 30.9 PG (ref 27–31)
MCHC RBC AUTO-ENTMCNC: 33.5 G/DL (ref 32–36)
MCV RBC AUTO: 92 FL (ref 82–98)
NONHDLC SERPL-MCNC: 99 MG/DL
PLATELET # BLD AUTO: 202 K/UL (ref 150–450)
PMV BLD AUTO: 10.2 FL (ref 9.2–12.9)
POTASSIUM SERPL-SCNC: 4.2 MMOL/L (ref 3.5–5.1)
PROT SERPL-MCNC: 6.8 G/DL (ref 6–8.4)
RBC # BLD AUTO: 4.88 M/UL (ref 4.6–6.2)
SODIUM SERPL-SCNC: 140 MMOL/L (ref 136–145)
TRIGL SERPL-MCNC: 285 MG/DL (ref 30–150)
TSH SERPL DL<=0.005 MIU/L-ACNC: 2.56 UIU/ML (ref 0.4–4)
WBC # BLD AUTO: 7.21 K/UL (ref 3.9–12.7)

## 2024-05-06 PROCEDURE — 83036 HEMOGLOBIN GLYCOSYLATED A1C: CPT | Mod: HCNC | Performed by: INTERNAL MEDICINE

## 2024-05-06 PROCEDURE — 84443 ASSAY THYROID STIM HORMONE: CPT | Mod: HCNC | Performed by: INTERNAL MEDICINE

## 2024-05-06 PROCEDURE — 80053 COMPREHEN METABOLIC PANEL: CPT | Mod: HCNC | Performed by: INTERNAL MEDICINE

## 2024-05-06 PROCEDURE — 85027 COMPLETE CBC AUTOMATED: CPT | Mod: HCNC | Performed by: INTERNAL MEDICINE

## 2024-05-06 PROCEDURE — 36415 COLL VENOUS BLD VENIPUNCTURE: CPT | Mod: HCNC,PO | Performed by: INTERNAL MEDICINE

## 2024-05-06 PROCEDURE — 80061 LIPID PANEL: CPT | Mod: HCNC | Performed by: INTERNAL MEDICINE

## 2024-05-12 NOTE — PROGRESS NOTES
This note was created by combination of typed  and M-Modal dictation.  Transcription errors may be present.  If there are any questions, please contact me.    Assessment and Plan:   Assessment and Plan   Type 2 diabetes mellitus with other specified complication, without long-term current use of insulin  -taking metformin ER 1/2 tab BID per pt.  Pre visit A1c good. No changes.   Needs to update eye exam  -     Comprehensive Metabolic Panel; Future; Expected date: 11/12/2024  -     Hemoglobin A1C; Future; Expected date: 11/12/2024  -     Ambulatory referral/consult to Ophthalmology; Future; Expected date: 05/20/2024  -     metFORMIN (GLUCOPHAGE-XR) 500 MG ER 24hr tablet; 1/2 tab BID    Blurred vision, right eye  -ongoing for almost a year now.  Thought it was due to glasses but persisting even without eyeglass use.   Referral to ophthalmology - concern for retinopathy progression in DM  -     Ambulatory referral/consult to Ophthalmology; Future; Expected date: 05/20/2024    Essential hypertension  -BP home readings high compared to the office. Return for nurse visit for BP check, if masked HTN, add amlodipine    Mixed hyperlipidemia long term DAPT  -known CAD.  TG, HDL high. Add icosapent to high dose statin  Followed by cards. On statin, DAPT  -     icosapent ethyL (VASCEPA) 1 gram Cap; Take 2 capsules (2,000 mg total) by mouth 2 (two) times a day.  Dispense: 360 capsule; Refill: 3  -     Lipid Panel; Future; Expected date: 11/13/2024    Hypothyroidism, unspecified type  -pre visit labs stable on current dose LT no changes, monitor  -     TSH; Future; Expected date: 11/12/2024    Aortic ectasia  Aortic root dilatation on TTE 8/2019  -followed by cards.  Routine echo monitoring    Anxiety and depression with assoc memory loss; seen by neuro 2015, negative workup  -stable.    Tubular adenoma of colon 2/2009; 10/26/23 colonscopy 3 mm sigmoid HP  -repeat 2028             Medications Discontinued During This  Encounter   Medication Reason    metFORMIN (GLUCOPHAGE-XR) 500 MG ER 24hr tablet        meds sent this encounter:  Medications Ordered This Encounter   Medications    icosapent ethyL (VASCEPA) 1 gram Cap     Sig: Take 2 capsules (2,000 mg total) by mouth 2 (two) times a day.     Dispense:  360 capsule     Refill:  3    metFORMIN (GLUCOPHAGE-XR) 500 MG ER 24hr tablet     Si/2 tab BID         Follow Up: OV 6 months with labs.  Future Appointments   Date Time Provider Department Center   2024  8:00 AM NURSE, [facility] MED/INT MED [facility]   2024  2:30 PM Xochilt Porter MD [facility] GASTR[facility]   2024  7:15 AM LABKELSI [facility] [place]   2024  3:40 PM Michael Gonzalez MD [facility] [place]          Subjective:   Subjective   Chief Complaint   Patient presents with    Results       HPI  Clinton is a 73 y.o. male.     Social History     Tobacco Use    Smoking status: Never     Passive exposure: Never    Smokeless tobacco: Never   Substance Use Topics    Alcohol use: Yes     Comment: rarely          Social History     Social History Narrative    Not on file       Last appointment with this clinic was 2024. Last visit with me 2024   To summarize last visit and events leading up to today:  Diabetes range A1c on metformin  Hypothyroid, labs good on higher dose 75  Hypertension not practicing proper home technique; 23 incr losartan  Peripheral edema  Coronary artery disease followed by Cardiology.  On statin.    LUTS, Urology; 2023 pt opted for med mgmt rather than surgery.   Simple renal cyst; seen by urology - no surveillance needed.  Neurogenic claudication vs peripheral neuropathy  10/26/22 MRI Degenerative disc, facet joint arthropathy, spondylosis most prominent at L3-L4 and L4-5 levels  2023 cards added amlodipine  3/2023 TTE grade 1 DD  3/2023 nu stress test neg  3/2023 LE venous US with GSV reflux bilateral     Saw urology end of march. Karyn  prostatomegaly.  GERD trial lower dose 5/10/23   10/26/23 colonscopy 3 mm sigmoid HP     OV 11/13  A1c better with therapeutic lifestyle modification (TLC)  Ventral hernia reducible.     UC 2/9 with rectal pain and bleeding. Preceding diarrhea. Exam with rash and purulent discharge. No fluctuance.treated for cellulitis with lotrisone and bactrim    OV 2/19/24  Rectal irritatation  Fall, shoulder pain. Did not want to see ortho. XR negative.    UC 3/27/24.  Epigastric pain.   CT abd/pelvis negative. Done for UC visit for abd pain. GI referral was submitted at that time.  Incidental abd aortic atherosclerosis. On high dose statin     4/22/24 TTE LV normal size, mildly increased wall thickness.  Mild concentric hypertrophy.  Normal systolic function LVEF 55-60%.  Grade 1 diastolic dysfunction.  Aortic root mildly dilated 4.05 cm.  Ascending aorta mildly dilated 4.16 cm.    Saw cards 4/26/24. Aortic root dilitation.  Follow up 1 year with echo    Pre visit labs  CBC normal   CMP normal   Lipid triglycerides high HDL low non HDL 99   A1c 6.2   TSH normal  Urine microalbumin normal  Today's visit:    Home BP readings high compared to in office  Takes 1/2 metoprolol BID  Whole olmesartan  Takes furosemide    A1c good.  Taking 1/2 tab metformin BID.  He seems to recall being instructed to take 1/2 tab, I don't see where we instructed him to take that dose, followed by digital medicine, in August he had c/o diarrhea, and plan was to change regular release to ER to see if that would help  And then was instructed to hold for 2 weeks to see if that was cause of diarrhea  Diarrhea persisted so 9/28/23 restarted on metformin ER  Pre visit A1c was good.  OK to continue 1/2 tab BID.    Lipid TG high.  Known CAD.  Discussed that in patients with known CAD, addition of eicosapentaneoic acid can further reduce risk for CV events.    BP home digital monitoring, BP high. In office normal.  Was supposed to bring in cuff to verify  accuracy but forgot to bring in today.  Previously cards had recommended to start amlodipine but he was never given prescription so never tried.  Discussed that if BP high/masked HTN, next would be amlodipine.    C/o blurred vision right eye.  History of floaters, known, and history of cataracts but this is different.  Going on for maybe a year?  Initially he thought that this was because of new eyeglass prescription - would be painful to put on eyeglasses.  But that seems to be persisting.  If he stares straight, gets floaters, black floaters he thinks on the right eye, they are not in fixed spots, but will move, but generally blurred vision on the right.  Not on the left.        Shoulder pain.  Hurts to lie down on it.  But does not want to pursue orthopedics evaluation for this.      Patient Care Team:  Michael Gonzalez MD as PCP - General (Internal Medicine)  [place]  Andreas Henriquez MD as Consulting Physician (Dermatology)  Brad Bahena MD as Consulting Physician (Cardiology)  Michael Gonzalez MD as Hyperlipidemia Digital Medicine Responsible Provider (Internal Medicine)  Kamini Butterfield MA as Care Coordinator  Shiloh Polk PharmD as Hypertension Digital Medicine Clinician  Shiloh Polk PharmD as Hyperlipidemia Digital Medicine Clinician  Michael Gonzalez MD as Hypertension Digital Medicine Responsible Provider (Internal Medicine)  Clara Seaman as Digital Medicine Health   [employer] as Hypertension Digital Medicine Contract  Michael Gonzalez MD as Diabetes Digital Medicine Responsible Provider (Internal Medicine)  Shiloh Polk PharmD as Diabetes Digital Medicine Clinician  [facility], [employer] as Diabetes Digital Medicine Contract    Patient Active Problem List    Diagnosis Date Noted    Type 2 diabetes mellitus with other specified complication 04/27/2023    Bradycardia 04/27/2023    Refractive error 03/31/2023  "   Chronic midline low back pain without sciatica 10/26/2022     10/26/2022 MRI L spine: Degenerative disc, facet joint arthropathy, spondylosis most prominent at L3-L4 and L4-5 levels      History of COVID-19 02/25/2022     Symptoms resolved. Persistent residual finding on cxr, recommend repeating test in 1 month to ensure resolution.       Nuclear sclerosis of both eyes 05/20/2021    Allergy to iodine 08/27/2019    Tubular adenoma of colon 2/2009; 10/26/23 colonscopy 3 mm sigmoid HP 10/11/2018     2/28/2009 colonoscopy tubular adenoma  3/15/2013 colonoscopy thickened haustral folds indicative of prediverticular state. internal hemorrhoids. repeat 10 years.  biopsies negative (no colitis)  10/26/23 colonscopy 3 mm sigmoid HP      History of shingles 5/2018 05/20/2018    Venous insufficiency 05/10/2018     03/09/2023 lower extremity venous ultrasound GSV reflux right leg below knee  GSV reflux left leg mid, distal thigh      History of concussion 1/2018 head CT negative 01/31/2018    Aortic root dilatation on TTE 8/2019 11/14/2017 08/08/2019 Lexiscan nuclear stress test probably normal study.  LVEF 66%.  Normal wall motion.  08/08/2019 normal LV systolic function LVEF 65%.  Concentric LV remodeling.  No wall motion abnormalities.  Aortic root diameter mildly increased verses report 03/2018.  7/30/20  TTE (Ao root 4.2cm at annulus, 3.8cm at sinuses) normal LV systolic function LVEF 65%.  Mild concentric LVH.  Grade 1 diastolic dysfunction.  Normal RV systolic function.  Normal CVP.  PA pressure 20.      Peripheral vascular disease with LE US 6/2017 and carotid US  06/20/2017     LE art US/TANA 6/8/17 1.  Mild/atherosclerotic plaquing. 2.  No stenosis about 30 percent femoral popliteal arteries. 3.  Normal bilateral ABIs.  Carotid US 12/3/14 1. Less than 50% stenosis of the right internal carotid artery. 2. Less than 50% stenosis of the left internal carotid artery.      Aortic ectasia 04/28/2017     "The aorta is " "elongated" - Xray Chest 5-      JAYLEN (obstructive sleep apnea) 04/28/2017    Hypothyroidism 06/01/2016    Obesity (BMI 30.0-34.9) 06/01/2016    Anxiety and depression with assoc memory loss; seen by neuro 2015, negative workup 11/24/2014     12/3/2014 MRI brain: 1. No evidence for acute infarct 2. unremarkable MRI of the brain  12/3/2014 carotid US normal      Coronary artery disease of native artery of native heart with stable angina pectoris; 2021 plan is long term DAPT 11/19/2014     Stent LAD 2012 9/27/2013 [facility] prox LAD 30% stenosis; RCA 20% stenosis. patent LAD stent.    3/9/2015 nu med stress test negative. 3/9/2015 TTE normal LVEF 55-60; concentric remodeling.  L MPI 6/8/17 (images pers rev) Nuclear Quantitative Functional Analysis:  LVEF: 68 % Impression: NORMAL MYOCARDIAL PERFUSION  Echo 3/15/18 LVEF 60-65%; upper limit of normal aortic root 3.7 cm  08/08/2019 Lexiscan nuclear stress test probably normal study.  LVEF 66%.  Normal wall motion.  08/08/2019 normal LV systolic function LVEF 65%.  Concentric LV remodeling.  No wall motion abnormalities.  Aortic root diameter mildly increased verses report 03/2018.  9/21/2019 [facility]: Dominance: Right  LM: normal  LAD: mid 50% followed by patent stent, distal/transapical 80% (small caliber)  Ramus: prox 30%  LCx: prox 50%  RCA: prox 30%, dist 40%              RPDA ost 50%              RPLB mid 90%    Stent 2.5x18 Resolute [place] SILVER 16 abi    9/21/2020 [facility]  Dominance: Right  LM: normal  LAD: MLI, mid stent patent (2013)  Ramus: MLI, prox 30%  LCx: MLI, ost 40%  RCA: MLI, dist eccentric 40-50%              RPDA: ost 50%, iFR 0.97              RPLB mid stent patent (9/20/19 2.5x18 Resolute [place] SILVER)     Impression:  2V CAD, normal LV fxn  Patent mid LAD and mid RPLB stents  iFR dist RCA/ost RPDA neg     Plan:  Cont med rx  Cont ASA/Plavix/Statin/BBL/Imdur  Plan long term DAPT given diffuse CAD and prior MV PCI    03/09/2023 nuclear stress test negative for " ischemia   03/09/2023 TTE LV normal size and systolic function LVEF 65%.  Grade 1 diastolic dysfunction.  03/09/2023 lower extremity venous ultrasound GSV reflux right leg below knee  GSV reflux left leg mid, distal thigh      Mixed hyperlipidemia long term DAPT 07/09/2014    Essential hypertension 07/09/2014 7/30/20  TTE (Ao root 4.2cm at annulus, 3.8cm at sinuses) normal LV systolic function LVEF 65%.  Mild concentric LVH.  Grade 1 diastolic dysfunction.  Normal RV systolic function.  Normal CVP.  PA pressure 20.    03/09/2023 TTE LV normal size and systolic function LVEF 65%.  Grade 1 diastolic dysfunction.        Gastroesophageal reflux disease 07/09/2014    Benign non-nodular prostatic hyperplasia with lower urinary tract symptoms 07/09/2014    Vitamin D deficiency 07/09/2014    Hernia of abdominal wall 07/09/2014       PAST MEDICAL PROBLEMS, PAST SURGICAL HISTORY: please see relevant portions of the electronic medical record    ALLERGIES AND MEDICATIONS: updated and reviewed.  Medication List with Changes/Refills   Current Medications    ASPIRIN (ECOTRIN) 81 MG EC TABLET    Take 1 tablet (81 mg total) by mouth once daily.    ATORVASTATIN (LIPITOR) 80 MG TABLET    Take 1 tablet (80 mg total) by mouth every evening.    CICLOPIROX (LOPROX) 0.77 % CREA    Apply topically 2 (two) times daily.    CLOPIDOGREL (PLAVIX) 75 MG TABLET    Take 1 tablet (75 mg total) by mouth once daily.    CLOTRIMAZOLE-BETAMETHASONE 1-0.05% (LOTRISONE) CREAM    Apply topically 2 (two) times daily.    DICLOFENAC SODIUM (VOLTAREN) 1 % GEL    Apply the gel (2 g) to the affected area 4 times daily. Do not apply more than 8 g daily to any one affected joint    FUROSEMIDE (LASIX) 20 MG TABLET    Take 1 tablet (20 mg total) by mouth once daily.    GABAPENTIN (NEURONTIN) 300 MG CAPSULE    Take 1 capsule (300 mg total) by mouth every evening.    ISOSORBIDE MONONITRATE (IMDUR) 120 MG 24 HR TABLET    Take 2 tablets (240 mg total) by mouth once  "daily.    KRILL/OM3/DHA/EPA/OM6/LIP/ASTX (KRILL OIL, OMEGA 3 AND 6, ORAL)    Take by mouth.    LEVOTHYROXINE (SYNTHROID) 75 MCG TABLET    TAKE 1 TABLET BEFORE BREAKFAST    METFORMIN (GLUCOPHAGE-XR) 500 MG ER 24HR TABLET    Take 1 tablet (500 mg total) by mouth 2 (two) times daily with meals.    METOPROLOL TARTRATE (LOPRESSOR) 25 MG TABLET    Take 0.5 tablets (12.5 mg total) by mouth 2 (two) times daily.    NITROGLYCERIN (NITROSTAT) 0.4 MG SL TABLET    Place 1 tablet (0.4 mg total) under the tongue every 5 (five) minutes as needed for Chest pain.    NYSTATIN (MYCOSTATIN) POWDER    Apply topically 4 (four) times daily as needed (to keep penis/scrotum dry).    OLMESARTAN (BENICAR) 40 MG TABLET    Take 1 tablet (40 mg total) by mouth once daily.    PANTOPRAZOLE (PROTONIX) 20 MG TABLET    TAKE 2 TABLETS EVERY DAY    TAMSULOSIN (FLOMAX) 0.4 MG CAP    Take 1 capsule (0.4 mg total) by mouth once daily.    VITAMIN D 1000 UNITS TAB    Take 1 tablet (1,000 Units total) by mouth once daily.         Objective:   Objective   Physical Exam   Vitals:    05/13/24 1409   BP: 112/66   BP Location: Right arm   Patient Position: Sitting   BP Method: Large (Automatic)   Pulse: 66   Temp: 98.5 °F (36.9 °C)   TempSrc: Oral   SpO2: 96%   Weight: 105 kg (231 lb 9.5 oz)   Height: 5' 10" (1.778 m)    Body mass index is 33.23 kg/m².            Physical Exam  Constitutional:       General: He is not in acute distress.     Appearance: He is well-developed.   Eyes:      General: No scleral icterus.        Right eye: No discharge.         Left eye: No discharge.      Extraocular Movements: Extraocular movements intact.      Pupils: Pupils are equal, round, and reactive to light.      Comments: Visual fields intact to confrontation bilaterally  Pupils constricted, funduscopic exam very difficult   Cardiovascular:      Rate and Rhythm: Normal rate and regular rhythm.      Heart sounds: Normal heart sounds. No murmur heard.  Pulmonary:      Effort: " Pulmonary effort is normal.      Breath sounds: Normal breath sounds.   Musculoskeletal:         General: Normal range of motion.      Right lower leg: No edema.      Left lower leg: No edema.   Skin:     General: Skin is warm and dry.   Neurological:      Mental Status: He is alert and oriented to person, place, and time.      Coordination: Coordination normal.   Psychiatric:         Behavior: Behavior normal.         Thought Content: Thought content normal.         Component      Latest Ref Rng 11/3/2023 5/6/2024   Sodium      136 - 145 mmol/L 141  140    Potassium      3.5 - 5.1 mmol/L 4.4  4.2    Chloride      95 - 110 mmol/L 105  105    CO2      23 - 29 mmol/L 29  24    Glucose      70 - 110 mg/dL 147 (H)  145 (H)    BUN      8 - 23 mg/dL 18  16    Creatinine      0.5 - 1.4 mg/dL 1.1  1.1    Calcium      8.7 - 10.5 mg/dL 9.3  9.5    PROTEIN TOTAL      6.0 - 8.4 g/dL 6.8  6.8    Albumin      3.5 - 5.2 g/dL 4.0  3.9    BILIRUBIN TOTAL      0.1 - 1.0 mg/dL 0.7  0.6    ALP      55 - 135 U/L 53 (L)  51 (L)    AST      10 - 40 U/L 15  19    ALT      10 - 44 U/L 17  17    eGFR      >60 mL/min/1.73 m^2 >60.0  >60.0    Anion Gap      8 - 16 mmol/L 7 (L)  11    WBC      3.90 - 12.70 K/uL 8.82  7.21    RBC      4.60 - 6.20 M/uL 4.84  4.88    Hemoglobin      14.0 - 18.0 g/dL 15.0  15.1    Hematocrit      40.0 - 54.0 % 44.4  45.1    MCV      82 - 98 fL 92  92    MCH      27.0 - 31.0 pg 31.0  30.9    MCHC      32.0 - 36.0 g/dL 33.8  33.5    RDW      11.5 - 14.5 % 13.4  13.3    Platelet Count      150 - 450 K/uL 199  202    MPV      9.2 - 12.9 fL 10.5  10.2    Cholesterol Total      120 - 199 mg/dL  135    Triglycerides      30 - 150 mg/dL  285 (H)    HDL      40 - 75 mg/dL  36 (L)    LDL Cholesterol      63.0 - 159.0 mg/dL  42.0 (L)    HDL/Cholesterol Ratio      20.0 - 50.0 %  26.7    Total Cholesterol/HDL Ratio      2.0 - 5.0   3.8    Non-HDL Cholesterol      mg/dL  99    Urine Microalbumin      ug/mL  17.0    Urine  Creatinine      23.0 - 375.0 mg/dL  169.0    MICROALB/CREAT RATIO      0.0 - 30.0 ug/mg  10.1    Hemoglobin A1C External      4.0 - 5.6 % 6.2 (H)  6.2 (H)    Estimated Avg Glucose      68 - 131 mg/dL 131  131    TSH      0.400 - 4.000 uIU/mL 3.204  2.559       Legend:  (H) High  (L) Low

## 2024-05-13 ENCOUNTER — OFFICE VISIT (OUTPATIENT)
Dept: FAMILY MEDICINE | Facility: CLINIC | Age: 74
End: 2024-05-13
Payer: MEDICARE

## 2024-05-13 VITALS
HEART RATE: 66 BPM | TEMPERATURE: 99 F | BODY MASS INDEX: 33.15 KG/M2 | WEIGHT: 231.56 LBS | OXYGEN SATURATION: 96 % | DIASTOLIC BLOOD PRESSURE: 66 MMHG | HEIGHT: 70 IN | SYSTOLIC BLOOD PRESSURE: 112 MMHG

## 2024-05-13 DIAGNOSIS — I77.810 AORTIC ROOT DILATATION: ICD-10-CM

## 2024-05-13 DIAGNOSIS — H53.8 BLURRED VISION, RIGHT EYE: ICD-10-CM

## 2024-05-13 DIAGNOSIS — F41.9 ANXIETY AND DEPRESSION: Chronic | ICD-10-CM

## 2024-05-13 DIAGNOSIS — I10 ESSENTIAL HYPERTENSION: Chronic | ICD-10-CM

## 2024-05-13 DIAGNOSIS — E03.9 HYPOTHYROIDISM, UNSPECIFIED TYPE: Chronic | ICD-10-CM

## 2024-05-13 DIAGNOSIS — D12.6 TUBULAR ADENOMA OF COLON: ICD-10-CM

## 2024-05-13 DIAGNOSIS — E78.2 MIXED HYPERLIPIDEMIA: Chronic | ICD-10-CM

## 2024-05-13 DIAGNOSIS — F32.A ANXIETY AND DEPRESSION: Chronic | ICD-10-CM

## 2024-05-13 DIAGNOSIS — I77.819 AORTIC ECTASIA: ICD-10-CM

## 2024-05-13 DIAGNOSIS — E11.69 TYPE 2 DIABETES MELLITUS WITH OTHER SPECIFIED COMPLICATION, WITHOUT LONG-TERM CURRENT USE OF INSULIN: Primary | ICD-10-CM

## 2024-05-13 PROCEDURE — 99999 PR PBB SHADOW E&M-EST. PATIENT-LVL V: CPT | Mod: PBBFAC,HCNC,, | Performed by: INTERNAL MEDICINE

## 2024-05-13 PROCEDURE — 3061F NEG MICROALBUMINURIA REV: CPT | Mod: HCNC,CPTII,S$GLB, | Performed by: INTERNAL MEDICINE

## 2024-05-13 PROCEDURE — 3044F HG A1C LEVEL LT 7.0%: CPT | Mod: HCNC,CPTII,S$GLB, | Performed by: INTERNAL MEDICINE

## 2024-05-13 PROCEDURE — 3008F BODY MASS INDEX DOCD: CPT | Mod: HCNC,CPTII,S$GLB, | Performed by: INTERNAL MEDICINE

## 2024-05-13 PROCEDURE — 3078F DIAST BP <80 MM HG: CPT | Mod: HCNC,CPTII,S$GLB, | Performed by: INTERNAL MEDICINE

## 2024-05-13 PROCEDURE — 3074F SYST BP LT 130 MM HG: CPT | Mod: HCNC,CPTII,S$GLB, | Performed by: INTERNAL MEDICINE

## 2024-05-13 PROCEDURE — 1101F PT FALLS ASSESS-DOCD LE1/YR: CPT | Mod: HCNC,CPTII,S$GLB, | Performed by: INTERNAL MEDICINE

## 2024-05-13 PROCEDURE — 1160F RVW MEDS BY RX/DR IN RCRD: CPT | Mod: HCNC,CPTII,S$GLB, | Performed by: INTERNAL MEDICINE

## 2024-05-13 PROCEDURE — 3066F NEPHROPATHY DOC TX: CPT | Mod: HCNC,CPTII,S$GLB, | Performed by: INTERNAL MEDICINE

## 2024-05-13 PROCEDURE — 3288F FALL RISK ASSESSMENT DOCD: CPT | Mod: HCNC,CPTII,S$GLB, | Performed by: INTERNAL MEDICINE

## 2024-05-13 PROCEDURE — 1126F AMNT PAIN NOTED NONE PRSNT: CPT | Mod: HCNC,CPTII,S$GLB, | Performed by: INTERNAL MEDICINE

## 2024-05-13 PROCEDURE — 99214 OFFICE O/P EST MOD 30 MIN: CPT | Mod: HCNC,S$GLB,, | Performed by: INTERNAL MEDICINE

## 2024-05-13 PROCEDURE — 4010F ACE/ARB THERAPY RXD/TAKEN: CPT | Mod: HCNC,CPTII,S$GLB, | Performed by: INTERNAL MEDICINE

## 2024-05-13 PROCEDURE — 1159F MED LIST DOCD IN RCRD: CPT | Mod: HCNC,CPTII,S$GLB, | Performed by: INTERNAL MEDICINE

## 2024-05-13 RX ORDER — METFORMIN HYDROCHLORIDE 500 MG/1
TABLET, EXTENDED RELEASE ORAL
Start: 2024-05-13

## 2024-05-13 RX ORDER — ICOSAPENT ETHYL 1 G/1
2 CAPSULE ORAL 2 TIMES DAILY
Qty: 360 CAPSULE | Refills: 3 | Status: SHIPPED | OUTPATIENT
Start: 2024-05-13

## 2024-05-20 ENCOUNTER — CLINICAL SUPPORT (OUTPATIENT)
Dept: FAMILY MEDICINE | Facility: CLINIC | Age: 74
End: 2024-05-20
Payer: MEDICARE

## 2024-05-20 VITALS — HEART RATE: 70 BPM | DIASTOLIC BLOOD PRESSURE: 62 MMHG | SYSTOLIC BLOOD PRESSURE: 102 MMHG

## 2024-05-20 DIAGNOSIS — I10 ESSENTIAL HYPERTENSION: Primary | ICD-10-CM

## 2024-05-20 PROCEDURE — 99999 PR PBB SHADOW E&M-EST. PATIENT-LVL I: CPT | Mod: PBBFAC,HCNC,,

## 2024-05-20 NOTE — PROGRESS NOTES
Clinton Rosenberg 73 y.o. male is here today for Blood Pressure check.   History of HTN yes.    Review of patient's allergies indicates:   Allergen Reactions    Iodine and iodide containing products Anaphylaxis    Naproxen Other (See Comments)     hallucinations  Hallucinations^  Hallucinations^    Hydrocodone-acetaminophen      Rash (skin)^    Iodine      Anaphylaxis^    Oxycodone-acetaminophen      Rash (skin)^     Creatinine   Date Value Ref Range Status   05/06/2024 1.1 0.5 - 1.4 mg/dL Final     Sodium   Date Value Ref Range Status   05/06/2024 140 136 - 145 mmol/L Final     Potassium   Date Value Ref Range Status   05/06/2024 4.2 3.5 - 5.1 mmol/L Final   ]  Patient verifies taking blood pressure medications on a regular basis at the same time of the day.     Current Outpatient Medications:     aspirin (ECOTRIN) 81 MG EC tablet, Take 1 tablet (81 mg total) by mouth once daily., Disp: 90 tablet, Rfl: 3    atorvastatin (LIPITOR) 80 MG tablet, Take 1 tablet (80 mg total) by mouth every evening., Disp: 90 tablet, Rfl: 3    ciclopirox (LOPROX) 0.77 % Crea, Apply topically 2 (two) times daily., Disp: 90 g, Rfl: 4    clopidogreL (PLAVIX) 75 mg tablet, Take 1 tablet (75 mg total) by mouth once daily., Disp: 90 tablet, Rfl: 3    clotrimazole-betamethasone 1-0.05% (LOTRISONE) cream, Apply topically 2 (two) times daily., Disp: 45 g, Rfl: 0    diclofenac sodium (VOLTAREN) 1 % Gel, Apply the gel (2 g) to the affected area 4 times daily. Do not apply more than 8 g daily to any one affected joint (Patient not taking: Reported on 4/26/2024), Disp: 100 g, Rfl: 1    furosemide (LASIX) 20 MG tablet, Take 1 tablet (20 mg total) by mouth once daily., Disp: 90 tablet, Rfl: 3    gabapentin (NEURONTIN) 300 MG capsule, Take 1 capsule (300 mg total) by mouth every evening., Disp: 90 capsule, Rfl: 3    icosapent ethyL (VASCEPA) 1 gram Cap, Take 2 capsules (2,000 mg total) by mouth 2 (two) times a day., Disp: 360 capsule, Rfl: 3     isosorbide mononitrate (IMDUR) 120 MG 24 hr tablet, Take 2 tablets (240 mg total) by mouth once daily., Disp: 180 tablet, Rfl: 3    KRILL/OM3/DHA/EPA/OM6/LIP/ASTX (KRILL OIL, OMEGA 3 AND 6, ORAL), Take by mouth., Disp: , Rfl:     levothyroxine (SYNTHROID) 75 MCG tablet, TAKE 1 TABLET BEFORE BREAKFAST, Disp: 90 tablet, Rfl: 3    metFORMIN (GLUCOPHAGE-XR) 500 MG ER 24hr tablet, 1/2 tab BID, Disp: , Rfl:     metoprolol tartrate (LOPRESSOR) 25 MG tablet, Take 0.5 tablets (12.5 mg total) by mouth 2 (two) times daily., Disp: 90 tablet, Rfl: 3    nitroGLYCERIN (NITROSTAT) 0.4 MG SL tablet, Place 1 tablet (0.4 mg total) under the tongue every 5 (five) minutes as needed for Chest pain., Disp: 50 tablet, Rfl: 3    nystatin (MYCOSTATIN) powder, Apply topically 4 (four) times daily as needed (to keep penis/scrotum dry)., Disp: 15 g, Rfl: 1    olmesartan (BENICAR) 40 MG tablet, Take 1 tablet (40 mg total) by mouth once daily., Disp: 90 tablet, Rfl: 3    pantoprazole (PROTONIX) 20 MG tablet, TAKE 2 TABLETS EVERY DAY, Disp: 180 tablet, Rfl: 3    tamsulosin (FLOMAX) 0.4 mg Cap, Take 1 capsule (0.4 mg total) by mouth once daily., Disp: 90 capsule, Rfl: 3    vitamin D 1000 units Tab, Take 1 tablet (1,000 Units total) by mouth once daily., Disp: 90 tablet, Rfl: 3  Does patient have record of home blood pressure readings no.   Last dose of blood pressure medication was taken at 7 am.  Patient is asymptomatic.     BP: 102/62 , Pulse: 70 .    Dr. Gonzalez notified.

## 2024-05-31 ENCOUNTER — HOSPITAL ENCOUNTER (EMERGENCY)
Facility: HOSPITAL | Age: 74
Discharge: HOME | End: 2024-05-31
Attending: EMERGENCY MEDICINE
Payer: MEDICARE

## 2024-05-31 VITALS
OXYGEN SATURATION: 99 % | HEART RATE: 77 BPM | BODY MASS INDEX: 32.79 KG/M2 | HEIGHT: 70 IN | RESPIRATION RATE: 18 BRPM | WEIGHT: 229.06 LBS | SYSTOLIC BLOOD PRESSURE: 122 MMHG | TEMPERATURE: 99 F | DIASTOLIC BLOOD PRESSURE: 84 MMHG

## 2024-05-31 DIAGNOSIS — M54.32 BILATERAL SCIATICA: ICD-10-CM

## 2024-05-31 DIAGNOSIS — M54.31 BILATERAL SCIATICA: ICD-10-CM

## 2024-05-31 DIAGNOSIS — S39.012A STRAIN OF LUMBAR REGION, INITIAL ENCOUNTER: Primary | ICD-10-CM

## 2024-05-31 PROCEDURE — 99285 EMERGENCY DEPT VISIT HI MDM: CPT | Mod: 25,HCNC,ER

## 2024-05-31 PROCEDURE — 25000003 PHARM REV CODE 250: Mod: HCNC,ER

## 2024-05-31 RX ORDER — METHOCARBAMOL 500 MG/1
1000 TABLET, FILM COATED ORAL 3 TIMES DAILY
Qty: 30 TABLET | Refills: 0 | Status: SHIPPED | OUTPATIENT
Start: 2024-05-31 | End: 2024-06-05

## 2024-05-31 RX ORDER — GABAPENTIN 100 MG/1
100 CAPSULE ORAL 3 TIMES DAILY
Qty: 180 CAPSULE | Refills: 0 | Status: SHIPPED | OUTPATIENT
Start: 2024-05-31 | End: 2024-07-30

## 2024-05-31 RX ORDER — LIDOCAINE 50 MG/G
1 PATCH TOPICAL ONCE
Status: DISCONTINUED | OUTPATIENT
Start: 2024-05-31 | End: 2024-05-31 | Stop reason: HOSPADM

## 2024-05-31 RX ORDER — LIDOCAINE 50 MG/G
1 PATCH TOPICAL DAILY
Qty: 15 PATCH | Refills: 0 | Status: SHIPPED | OUTPATIENT
Start: 2024-05-31

## 2024-05-31 RX ORDER — METHOCARBAMOL 500 MG/1
1000 TABLET, FILM COATED ORAL
Status: COMPLETED | OUTPATIENT
Start: 2024-05-31 | End: 2024-05-31

## 2024-05-31 RX ADMIN — LIDOCAINE 1 PATCH: 700 PATCH TOPICAL at 06:05

## 2024-05-31 RX ADMIN — METHOCARBAMOL 1000 MG: 500 TABLET ORAL at 06:05

## 2024-05-31 NOTE — ED PROVIDER NOTES
Encounter Date: 5/31/2024    SCRIBE #1 NOTE: ICLAY, am scribing for, and in the presence of,  Bandar Peterson PA-C. I have scribed the following portions of the note - Other sections scribed: HPI, ROS.       History     Chief Complaint   Patient presents with    Back Pain     Lower left Back pain x one week since falling off boat and landing on cement      Clinton Rosenberg is a 73 y.o. male, with a PMHx of HTN, HLD, DM, and chronic midline lower back pain, who presents to the ED with worsening lower back pain which started ~1 week ago. Pain radiates down bilateral legs. Patient reports he fell trying to get out of a boat and landed on concrete. He states his back and posterior head hit the pavement. Denies any LOC, N/V, weakness, change in vision. He attempted treatment with Advil to minimal relief. No other exacerbating or alleviating factors. Denies chest pain, SOB, headache, dizziness, saddle anesthesia, urinary or bowel retention or incontinence, or other associated symptoms. He endorses the use of blood thinners; can not recall the names of each medicine, but states he uses Plavix. Pt reports he is allergic to most pain medication which causes him to break out in hives.     The history is provided by the patient. No  was used.     Review of patient's allergies indicates:   Allergen Reactions    Iodine and iodide containing products Anaphylaxis    Naproxen Other (See Comments)     hallucinations  Hallucinations^  Hallucinations^    Hydrocodone-acetaminophen      Rash (skin)^    Iodine      Anaphylaxis^    Oxycodone-acetaminophen      Rash (skin)^     Past Medical History:   Diagnosis Date    Allergy     Angina pectoris     Anxiety     Cancer     skin rwemoved from head    Chronic midline low back pain without sciatica 10/26/2022    Coronary artery disease     Depression     Eye injury as a teen    stuck object in os    GERD (gastroesophageal reflux disease)     Hyperlipidemia      Hypertension     Hypothyroidism     Memory loss     12/3/2014 MRI brain: 1. No evidence for acute infarct 2. unremarkable MRI of the brain; 12/3/2014 carotid US normal    Myocardial infarction     Nuclear sclerosis of both eyes 5/20/2021    Obesity     Peripheral vascular disease 6/20/2017    Retrocalcaneal bursitis 11/24/2014    Sleep apnea     Type 2 diabetes mellitus with other specified complication 4/27/2023     Past Surgical History:   Procedure Laterality Date    APPENDECTOMY  1986    bone spur Right     COLONOSCOPY      COLONOSCOPY N/A 10/26/2023    Procedure: COLONOSCOPY;  Surgeon: Argelia Altamirano MD;  Location: [facility] ENDO;  Service: Endoscopy;  Laterality: N/A;  Referred by: Dr. Michael Gonzalez  BT/Clearance: ok to hold Plavix 5 days per Dr Bahena-GT  Prep: golytely  Route instructions sent: [id number]  8/3 proc jessica to 10/26, pt has prep and instr  10/19- pre call complete.  DBM    hand trauma Right     pencil     heart stent      LEFT HEART CATHETERIZATION Left 9/20/2019    Procedure: Left heart cath 12 pm start, R rad access;  Surgeon: Brad Bahena MD;  Location: [facility] CATH LAB;  Service: Cardiology;  Laterality: Left;  RN PREOP 9/13/2019  NEEDS IODINE PREMED    LEFT HEART CATHETERIZATION Left 9/21/2020    Procedure: Left heart cath 730am start, R rad access;  Surgeon: Brad Bahena MD;  Location: [facility] CATH LAB;  Service: Cardiology;  Laterality: Left;  RN PREOP 9/14/2020, COVID NEGATIVE---9/18     Family History   Problem Relation Name Age of Onset    Arthritis Mother      Early death Mother      Heart disease Mother      Hypertension Mother      Migraines Mother      Seizures Mother      Tremor Mother      Diabetes Mother      Cancer Mother      Early death Father      Heart disease Father      Hypertension Father      Stroke Father      Diabetes Father      Alcohol abuse Father      Heart disease Sister Otilia     Hypertension Sister Otilia     Arthritis Sister Otilia     Depression  Sister Otilia     Arthritis Brother Maximino     Cancer Brother Maximino     Diabetes Brother Maximino     Early death Brother Maximino     Heart disease Brother Maximino     Hypertension Brother Maximino     Heart disease Brother Robert     Arthritis Brother Robert     Heart disease Brother Azael     Pneumonia Brother Azael     Heart disease Brother Martin     Hypertension Brother Martin     Diabetes Brother Martin     Heart disease Brother Dario     No Known Problems Maternal Aunt      No Known Problems Maternal Uncle      No Known Problems Paternal Aunt      No Known Problems Paternal Uncle      No Known Problems Maternal Grandmother      No Known Problems Maternal Grandfather      No Known Problems Paternal Grandmother      No Known Problems Paternal Grandfather      Amblyopia Neg Hx      Blindness Neg Hx      Cataracts Neg Hx      Glaucoma Neg Hx      Macular degeneration Neg Hx      Retinal detachment Neg Hx      Strabismus Neg Hx      Thyroid disease Neg Hx       Social History     Tobacco Use    Smoking status: Never     Passive exposure: Never    Smokeless tobacco: Never   Substance Use Topics    Alcohol use: Not Currently     Comment: rarely    Drug use: No     Review of Systems   Constitutional:  Negative for chills, diaphoresis, fatigue and fever.   HENT:  Negative for congestion, ear discharge, ear pain, rhinorrhea, sore throat and trouble swallowing.    Eyes:  Negative for photophobia, redness and visual disturbance.   Respiratory:  Negative for cough and shortness of breath.    Cardiovascular:  Negative for chest pain, palpitations and leg swelling.   Gastrointestinal:  Negative for abdominal pain, diarrhea, nausea and vomiting.   Genitourinary:  Negative for difficulty urinating, dysuria, flank pain, frequency and hematuria.   Musculoskeletal:  Positive for back pain (Low back radiating down both legs). Negative for arthralgias, gait problem, joint swelling, myalgias, neck pain and neck stiffness.   Skin:  Negative for  color change, pallor, rash and wound.   Neurological:  Negative for dizziness, seizures, syncope, weakness, light-headedness, numbness and headaches.   Hematological:  Does not bruise/bleed easily.       Physical Exam     Initial Vitals [05/31/24 1743]   BP Pulse Resp Temp SpO2   117/78 64 19 98 °F (36.7 °C) 98 %      MAP       --         Physical Exam    Nursing note and vitals reviewed.  Constitutional: He appears well-developed and well-nourished. He is not diaphoretic. He does not appear ill. No distress.   HENT:   Head: Normocephalic and atraumatic. Head is without raccoon's eyes, without Lucas's sign, without abrasion, without contusion and without laceration.   Right Ear: Tympanic membrane, external ear and ear canal normal.   Left Ear: Tympanic membrane, external ear and ear canal normal.   Nose: Nose normal. No rhinorrhea, sinus tenderness, nasal deformity, septal deviation or nasal septal hematoma. No epistaxis.   Mouth/Throat: Uvula is midline, oropharynx is clear and moist and mucous membranes are normal.   Eyes: Conjunctivae and EOM are normal. Pupils are equal, round, and reactive to light. Right eye exhibits no discharge. Left eye exhibits no discharge. No scleral icterus.   Neck: Trachea normal. Neck supple.   Normal range of motion.   Full passive range of motion without pain.     Cardiovascular:  Normal rate, regular rhythm, normal heart sounds, intact distal pulses and normal pulses.     Exam reveals no distant heart sounds and no friction rub.       Pulmonary/Chest: Effort normal and breath sounds normal. No respiratory distress. He exhibits no tenderness and no bony tenderness.   Abdominal: Abdomen is soft. Bowel sounds are normal. He exhibits no distension, no pulsatile midline mass and no mass. There is no splenomegaly. There is no abdominal tenderness.   No right CVA tenderness.  No left CVA tenderness. There is no rebound and no guarding.   Musculoskeletal:         General: Normal range of  motion.      Right shoulder: Normal.      Left shoulder: Normal.      Right elbow: Normal.      Left elbow: Normal.      Right wrist: Normal.      Left wrist: Normal.      Right hand: Normal.      Left hand: Normal.      Cervical back: Normal, full passive range of motion without pain, normal range of motion and neck supple. No edema, erythema or rigidity. No spinous process tenderness or muscular tenderness. Normal range of motion.      Thoracic back: Normal.      Lumbar back: Spasms, tenderness and bony tenderness present. No swelling, edema, deformity, signs of trauma or lacerations. Normal range of motion. Negative right straight leg raise test and negative left straight leg raise test. No scoliosis.        Back:       Right hip: Normal.      Left hip: Normal.      Right knee: Normal.      Left knee: Normal.      Right lower leg: Normal.      Left lower leg: Normal.      Right ankle: Normal.      Left ankle: Normal.      Right foot: Normal.      Left foot: Normal.      Comments: Full range of motion of both upper and lower extremities bilaterally with 5/5 strength neurovascularly intact with 2+ distal pulses.  Full range of motion of the cervical spine without pain or limitation no cervical spinal tenderness.  No thoracic spinal tenderness.  There was noted lower lumbar spinal tenderness and left lower lumbar paraspinal tenderness.  No overlying skin changes.  No step-off.  Negative straight leg bilaterally.     Neurological: He is alert and oriented to person, place, and time. He has normal strength. No cranial nerve deficit or sensory deficit. He displays a negative Romberg sign. Coordination and gait normal. GCS eye subscore is 4. GCS verbal subscore is 5. GCS motor subscore is 6.   He is awake, alert, and oriented x3. PERRL. EOM's Intact. Gross visual acuity is intact. No tongue deviation or slurred speech. Bilateral facial movement is symmetrical. Symmetrical shoulder shrug and head moves appropriately side  to side. Sensation is intact and symmetric to face, upper, and lower extremities. Patient hears commands and appropriately responds. Strength is 5/5 UE/LE bilaterally. No truncal ataxia. Ambulates with a steady gait.  Normal finger-to-nose.  Cervical Nerve Exam Normal: Equal strength with upper extremities (thumbs up/down/side, fingers spread, wrist and elbow flexion/extension, arms crossed).      Skin: Skin is warm and dry. Capillary refill takes less than 2 seconds. No bruising, no ecchymosis and no rash noted. No erythema.   Psychiatric: He has a normal mood and affect. His speech is normal and behavior is normal. Thought content normal.         ED Course   Procedures  Labs Reviewed - No data to display       Imaging Results              CT Lumbar Spine Without Contrast (Final result)  Result time 05/31/24 18:52:58      Final result by Mary Jo Cuellar MD (05/31/24 18:52:58)                   Impression:      No acute lumbar spine abnormalities identified.      Electronically signed by: Mary Jo Cuellar MD  Date:    05/31/2024  Time:    18:52               Narrative:    EXAMINATION:  CT LUMBAR SPINE WITHOUT CONTRAST    CLINICAL HISTORY:  Low back pain, trauma;    TECHNIQUE:  Low-dose axial, sagittal and coronal reformations are obtained through the lumbar spine.  Contrast was not administered.    COMPARISON:  MRI lumbar spine from October 2022.    FINDINGS:  Lumbar spine alignment is within normal limits.  No evidence of acute lumbar spine fracture or subluxation.  Intervertebral disc space narrowing and degenerative endplate changes are seen most pronounced at the L3-4 and L4-5 levels..    Surrounding soft tissues show no significant abnormalities.  Partially visualized intra-abdominal structures show no acute abnormalities.                                       CT Cervical Spine Without Contrast (Final result)  Result time 05/31/24 18:37:47      Final result by Helder Clements MD (05/31/24 18:37:47)                    Impression:      1. Allowing for motion artifact, no convincing acute displaced fracture or dislocation of the cervical spine noting degenerative changes and additional findings above.      Electronically signed by: Helder Clements MD  Date:    05/31/2024  Time:    18:37               Narrative:    EXAMINATION:  CT CERVICAL SPINE WITHOUT CONTRAST    CLINICAL HISTORY:  Neck trauma (Age >= 65y);    TECHNIQUE:  Low dose axial images, sagittal and coronal reformations were performed though the cervical spine.  Contrast was not administered.    COMPARISON:  08/01/2023    FINDINGS:  There is motion artifact.  There is osteopenia.    Visualized portions of the lung apices are remarkable for biapical emphysematous change.  There is a punctate focus of calcification within the left lobe of the thyroid.  The parotid glands and remaining visualized salivary glands are grossly unremarkable.  No significant tonsillar enlargement.  No significant cervical lymphadenopathy.  The posterior paraspinous and hypopharyngeal soft tissues are unremarkable.  Sagittal reformatted imaging demonstrates adequate alignment of the cervical spine without significant vertebral body height loss.  There is multilevel disc space height loss and disc degenerative disease, primarily involving C5-C6 and C6-C7.  Prominent anterior marginal osteophytosis arises from C4-C5 and C6.  There is multilevel facet arthropathy.  No convincing acute displaced fracture or dislocation of the cervical spine.  Motion artifact limits evaluation for spondylitic changes noting multilevel moderate spinal canal stenosis and neuroforaminal narrowing.  The airway is patent.                                       CT Head Without Contrast (Final result)  Result time 05/31/24 18:32:34      Final result by Helder Clements MD (05/31/24 18:32:34)                   Impression:      1. Allowing for motion artifact, no convincing acute intracranial abnormalities noting  sequela of chronic microvascular ischemic change and senescent change.      Electronically signed by: Helder Clements MD  Date:    05/31/2024  Time:    18:32               Narrative:    EXAMINATION:  CT HEAD WITHOUT CONTRAST    CLINICAL HISTORY:  Head trauma, minor (Age >= 65y);    TECHNIQUE:  Low dose axial images were obtained through the head.  Coronal and sagittal reformations were also performed. Contrast was not administered.    COMPARISON:  08/01/2023    FINDINGS:  There is motion artifact.    There is generalized cerebral volume loss.  There is hypoattenuation in a periventricular fashion, likely sequela of chronic microvascular ischemic change.  There is no evidence of acute major vascular territory infarct, hemorrhage, or mass.  There is no hydrocephalus.  There are no abnormal extra-axial fluid collections.  The paranasal sinuses and mastoid air cells are clear, and there is no evidence of calvarial fracture.  The visualized soft tissues are unremarkable.                                       Medications   LIDOcaine 5 % patch 1 patch (1 patch Transdermal Patch Applied 5/31/24 1847)   methocarbamoL tablet 1,000 mg (1,000 mg Oral Given 5/31/24 1848)     Medical Decision Making  Clinton Rosenberg is a 73 y.o. male, with a PMHx of HTN, HLD, DM, and chronic midline lower back pain, who presents to the ED with worsening lower back pain which started ~1 week ago. Pain radiates down bilateral legs. Patient reports he fell trying to get out of a boat and landed on concrete. He states his back and posterior head hit the pavement. Denies any LOC, N/V, weakness, change in vision. He attempted treatment with Advil to minimal relief. No other exacerbating or alleviating factors. Denies chest pain, SOB, headache, dizziness, saddle anesthesia, urinary or bowel retention or incontinence, or other associated symptoms. He endorses the use of blood thinners; can not recall the names of each medicine, but states he uses  Plavix. Pt reports he is allergic to most pain medication which causes him to break out in hives.   Patient's chart and medical history reviewed.  Patient's vitals reviewed.  They are afebrile, no respiratory distress, nontoxic-appearing in the ED.  On exam, as noted above.  Differential diagnosis is considered as following.  - SAH, Epidural hematoma, Subdural hematoma: considered with head injury and use of Plavix, CT head ordered for further evaluation.  - Cervical/neck fracture: considered with injury, CT cervical spine ordered for further evaluation due to age and JUWAN.  - skull fracture, facial fracture: considered with complaint although unlikely with no raccoon eyes, he signs, evidence of CSF leakage in nose/ears bilaterally, EOMI without pain  - Fracture/Dislocation: considered with complaint, although full range of motion of both upper and lower extremities bilaterally no joint pain no bony deformities.  - Contusion/Sprain/Strain: considered with pain with ROM with lower lumbar tenderness  - Compartment Syndrome: unlikely with 2+ distal pulses, no pallor, no paresthesias, no woody induration.   - Spinal Fx: unlikely with normal neurovascular exam, no step-off or deformity on exam, although with the patient's age and lower lumbar spinal tenderness CT ordered for further evaluation.  - Cauda Equina: unlikely with no saddle anesthesia, urinary or bowel retention or incontinence.  - internal abdominal injury/rupture/laceration: unlikely with no abdominal bruising, no abdominal TTP.   Robaxin and lidocaine patches provided for pain due to patient's allergies and Plavix use.  Physical exam findings and imaging results discussed and reviewed with the patient plan is made to have him follow up with his primary care physician in the next 1-2 days for re-evaluation.  Patient will be discharged home with Robaxin and gabapentin for pain as prescribed below.  At this time I'll discharge home to follow up with primary  care physician in the next 1-2 days for further evaluation.  If the pain continues the pt will need to see back and spine for further evaluation.  The patient is comfortable with this plan and comfortable going home at this time. After taking into careful account the historical factors and physical exam findings of the patient's presentation today, in conjunction with the empirical and objective data obtained on ED workup, no acute emergent medical condition has been identified. The patient appears to be low risk for an emergent medical condition and I feel it is safe and appropriate at this time for the patient to be discharged to follow-up as detailed in their discharge instructions for reevaluation and possible continued outpatient workup and management. I have discussed the specifics of the workup with the patient and the patient has verbalized understanding of the details of the workup, the diagnosis, the treatment plan, and the need for outpatient follow-up.  Although the patient has no emergent etiology today this does not preclude the development of an emergent condition so in addition, I have advised the patient that they can return to the ED and/or activate EMS at any time with worsening of their symptoms, change of their symptoms, or with any other medical complaint.  The patient remained comfortable and stable during their visit in the ED.  Discharge and follow-up instructions discussed with the patient who expressed understanding and willingness to comply with my recommendations. I discussed with the patient/family the diagnosis, treatment plan, indications for return to the emergency department, and for expected follow-up. Please follow up with your primary doctor in 1-2 days and return to the ED in any new, worsening, or continued symptoms. The patient/family verbalized an understanding. The patient/family is asked if there are any questions or concerns. We discuss the case, until all issues are  addressed to the patient/family's satisfaction. Patient/family understands and is agreeable to the plan.  HIPOLITO PATINO PA-C    DISCLAIMER: This note was prepared with [employer] voice recognition transcription software. Garbled syntax, mangled pronouns, and other bizarre constructions may be attributed to that software system.          Amount and/or Complexity of Data Reviewed  Radiology: ordered.    Risk  Prescription drug management.            Scribe Attestation:   Scribe #1: I performed the above scribed service and the documentation accurately describes the services I performed. I attest to the accuracy of the note.        ED Course as of 05/31/24 1911   Fri May 31, 2024   1859 At personally reviewed the patient's CT imaging and agree with the fissural findings.  Notable for degenerative changes of the L3 through L5 lumbar spine.  Facet arthropathy of the cervical spine.  No acute intracranial abnormalities. [AF]      ED Course User Index  [AF] Hipolito Patino PA-C                         I, Hipolito Patino PA-C, personally performed the services described in this documentation.  All medical record entries made by the scribe were at my direction and in my presence.  I have reviewed the chart and agree that the record reflects my personal performance and is accurate and complete.    Clinical Impression:  Final diagnoses:  [S39.012A] Strain of lumbar region, initial encounter (Primary)  [M54.31, M54.32] Bilateral sciatica          ED Disposition Condition    Discharge Stable          ED Prescriptions       Medication Sig Dispense Start Date End Date Auth. Provider    methocarbamoL (ROBAXIN) 500 MG Tab Take 2 tablets (1,000 mg total) by mouth 3 (three) times daily. for 5 days 30 tablet 5/31/2024 6/5/2024 Hipolito Patino PA-C    LIDOcaine (LIDODERM) 5 % Place 1 patch onto the skin once daily. Apply patch for 12 hours and then leave off for 12 hours 15 patch 5/31/2024 -- Hipolito Patino PA-C    gabapentin  (NEURONTIN) 100 MG capsule Take 1 capsule (100 mg total) by mouth 3 (three) times daily. 180 capsule 5/31/2024 7/30/2024 Hipolito Peterson PA-C          Follow-up Information       Follow up With Specialties Details Why Contact Info    Michael Gonzalez MD Internal Medicine Schedule an appointment as soon as possible for a visit in 1 day for follow up [address] [facility]  [place] [address]  [phone]               Hipolito Peterson PA-C  05/31/24 1911

## 2024-06-01 NOTE — DISCHARGE INSTRUCTIONS
Thank you for coming to our Emergency Department today. It is important to remember that some problems or medical conditions are difficult to diagnose and may not be found or addressed during your Emergency Department visit.  These conditions often start with non-specific symptoms and can only be diagnosed on follow up visits with your primary care physician or specialist when the symptoms continue or change. Please remember that all medical conditions can change, and we cannot predict how you will be feeling tomorrow or the next day. Return to the ER with any questions/concerns, new/concerning symptoms including fever, chest pain, shortness of breath, loss of consciousness, dizziness, weakness, worsening symptoms, failure to improve, or any other concerns. Also, please follow up with your Primary Care Physician and/or Pediatrician in the next 1-2 days to review your ED visit in entirety and for re-evaluation.   Be sure to follow up with your primary care doctor and review all labs/imaging/tests that were performed during your ER visit with them. It is very common for us to identify non-emergent incidental findings which must be followed up with your primary care physician.  Some labs/imaging/tests may be outside of the normal range, and require non-emergent follow-up and/or further investigation/treatment/procedures/testing to help diagnose/exclude/prevent complications or other potentially serious medical conditions. Some abnormalities may not have been discussed or addressed during your ER visit. Some lab results may not return during your ER visit but can be accessible by downloading the free [facility] yuko or by visiting [email or web address] . It is important for you to review all labs/imaging/tests which are outside of the normal range with your physician.  An ER visit does not replace a primary care visit, and many screening tests or follow-up tests cannot be ordered by an ER doctor or performed by  the ER. Some tests may even require pre-approval.  If you do not have a primary care doctor, you may contact the one listed on your discharge paperwork or you may also call the [facility] Appointment Desk at [phone] , or [address] [place] [address] at  [phone] to schedule an appointment, or establish care with a primary care doctor or even a specialist and to obtain information about local resources. It is important to your health that you have a primary care doctor.  Please take all medications as directed. We have done our best to select a medication for you that will treat your condition however, all medications may potentially have side-effects and it is impossible to predict which medications may give you side-effects or what those side-effects (if any) those medications may give you.  If you feel that you are having a negative effect or side-effect of any medication you should stop taking those medications immediately and seek medical attention. If you feel that you are having a life-threatening reaction call 911.  Do not drive, swim, climb to height, take a bath, operate heavy machinery, drink alcohol or take potentially sedating medications, sign any legal documents or make any important decisions for 24 hours if you have received any pain medications, sedatives or mood altering drugs during your ER visit or within 24 hours of taking them if they have been prescribed to you.   You can find additional resources for Dentists, hearing aids, durable medical equipment, low cost pharmacies and other resources at [email or web address]  Patient agrees with this plan. Discussed with her strict return precautions, they verbalized understanding. Patient is stable for discharge.   § Please take all medication as prescribed.  [facility] - Orthopedics Clinic:  If you would like to follow up with the [facility] for further care of your injury, please call the [facility]  Center Scheduling Department at [phone] during business hours. Please let the  know you need a follow-up appointment for your injury with Orthopedics, and you will be scheduled in the Orthopedic Clinic. Please bring your Discharge Papers with you to the clinic appointment.    Splint: If you were placed in a splint, please keep your splint on and dry until you are seen by Orthopedics and they tell you that you are OK to remove it. Rest and Elevate the extremity to help reduce swelling and pain. For muscular pain please apply a compressive ACE bandage. Rest and elevate the affected painful area.  Apply cold compresses intermittently as needed.  As pain recedes, begin normal activities slowly as tolerated.      If prescribed Lidocaine Patch - Please place over the area of most pain. You can apply it once every 24 hours. Please remove the patch 12 hours after you apply it.   If you are not able to get the prescription Lidoderm Patch, you can try one of the over the counter Lidocaine Patches.     If you have been prescribed Ibuprofen or Tylenol, these medications may be used for pain every 6-8 hours. Do not exceed a dose of 800 mg of Ibuprofen or a dose of 1000 mg of Tylenol in this 6-8 hour period. Please stop taking these medications if you experience: weakness, itching, yellow skin or eyes, joint pains, vomiting blood, blood or black stools, unusual weight gain, or swelling in your arms, legs, hands, or feet.     If you have been prescribed Naproxen for pain. This is an Non-Steroidal Anti-Inflammatory (NSAID) Medication. Please do not take any additional NSAIDs while you are taking this medication including (Advil, Aleve, Motrin, Ibuprofen, Mobic\meloxicam, Naprosyn, Toradol, ketoralac, etc.). Please stop taking this medication if you experience: weakness, itching, yellow skin or eyes, joint pains, vomiting blood, blood or black stools, unusual weight gain, or swelling in your arms, legs, hands, or feet.      You have been prescribed Robaxin (Methocarbamol) for muscle spasms/pain. Please do not take this medication while working, drinking alcohol, swimming, or while driving/operating heavy machinery. This medication may cause drowsiness, dizziness, impair judgment, and reduce physical capabilities.You should not drive, operate heavy machinery, or make life changing decisions while taking this medication.    If you were prescribed a medication such as Norco or Percocet remember that this medication contains Tylenol. Please do not take any additional Tylenol while you are taking this medication.     While in the Emergency Department you received medication that may cause drowsiness, dizziness, impaired judgment, and reduced physical capabilities. You should not drive, operate heavy machinery, swim, or make life  changing decisions within 24 hours of receiving this medication.

## 2024-06-13 ENCOUNTER — OFFICE VISIT (OUTPATIENT)
Dept: OPHTHALMOLOGY | Facility: CLINIC | Age: 74
End: 2024-06-13
Payer: MEDICARE

## 2024-06-13 DIAGNOSIS — E11.9 DIABETES MELLITUS TYPE 2 WITHOUT RETINOPATHY: Primary | ICD-10-CM

## 2024-06-13 DIAGNOSIS — H43.822 VITREOMACULAR ADHESION, LEFT: ICD-10-CM

## 2024-06-13 DIAGNOSIS — H25.13 AGE-RELATED NUCLEAR CATARACT OF BOTH EYES: ICD-10-CM

## 2024-06-13 DIAGNOSIS — H43.811 POSTERIOR VITREOUS DETACHMENT, RIGHT: ICD-10-CM

## 2024-06-13 PROCEDURE — 3044F HG A1C LEVEL LT 7.0%: CPT | Mod: HCNC,CPTII,S$GLB, | Performed by: OPHTHALMOLOGY

## 2024-06-13 PROCEDURE — 1101F PT FALLS ASSESS-DOCD LE1/YR: CPT | Mod: HCNC,CPTII,S$GLB, | Performed by: OPHTHALMOLOGY

## 2024-06-13 PROCEDURE — 4010F ACE/ARB THERAPY RXD/TAKEN: CPT | Mod: HCNC,CPTII,S$GLB, | Performed by: OPHTHALMOLOGY

## 2024-06-13 PROCEDURE — 1160F RVW MEDS BY RX/DR IN RCRD: CPT | Mod: HCNC,CPTII,S$GLB, | Performed by: OPHTHALMOLOGY

## 2024-06-13 PROCEDURE — 92202 OPSCPY EXTND ON/MAC DRAW: CPT | Mod: HCNC,S$GLB,, | Performed by: OPHTHALMOLOGY

## 2024-06-13 PROCEDURE — 3066F NEPHROPATHY DOC TX: CPT | Mod: HCNC,CPTII,S$GLB, | Performed by: OPHTHALMOLOGY

## 2024-06-13 PROCEDURE — 1126F AMNT PAIN NOTED NONE PRSNT: CPT | Mod: HCNC,CPTII,S$GLB, | Performed by: OPHTHALMOLOGY

## 2024-06-13 PROCEDURE — 99999 PR PBB SHADOW E&M-EST. PATIENT-LVL IV: CPT | Mod: PBBFAC,HCNC,, | Performed by: OPHTHALMOLOGY

## 2024-06-13 PROCEDURE — 92004 COMPRE OPH EXAM NEW PT 1/>: CPT | Mod: HCNC,S$GLB,, | Performed by: OPHTHALMOLOGY

## 2024-06-13 PROCEDURE — 1159F MED LIST DOCD IN RCRD: CPT | Mod: HCNC,CPTII,S$GLB, | Performed by: OPHTHALMOLOGY

## 2024-06-13 PROCEDURE — 92134 CPTRZ OPH DX IMG PST SGM RTA: CPT | Mod: HCNC,S$GLB,, | Performed by: OPHTHALMOLOGY

## 2024-06-13 PROCEDURE — 3288F FALL RISK ASSESSMENT DOCD: CPT | Mod: HCNC,CPTII,S$GLB, | Performed by: OPHTHALMOLOGY

## 2024-06-13 PROCEDURE — 3061F NEG MICROALBUMINURIA REV: CPT | Mod: HCNC,CPTII,S$GLB, | Performed by: OPHTHALMOLOGY

## 2024-06-13 NOTE — PROGRESS NOTES
HPI    Pt is here today for Diabetic Retinopathy eval.  He states fir the past year or so he has been experiencing intermittent   blurred vision in the right eye. He does have a history of Retinopathy.   When he turns his head to the side he does see bright flashes of light. He   does see floaters as well but they have been there for some time.    No Current Eye Meds.    Last edited by Srinivasa Chavira on 6/13/2024  1:35 PM.         A/P    ICD-10-CM ICD-9-CM   1. Diabetes mellitus type 2 without retinopathy  E11.9 250.00   2. Posterior vitreous detachment, right  H43.811 379.21   3. Vitreomacular adhesion, left  H43.822 379.27   4. Age-related nuclear cataract of both eyes  H25.13 366.16       1. Diabetes mellitus type 2 without retinopathy  Here for retina eval  PCP Michael Gonzalez MD - Recent notes reviewed  05/06/2024  6.2  A1C   F/b Dr. Alcantara in past - Recent notes reviewed    No DR or DME OU  Plan: Observation  Recommend good blood pressure control, tight blood glucose control, and good cholesterol control     2. Posterior vitreous detachment, right  3. Vitreomacular adhesion, left  No RT/RD  Plan: Observation      4. Age-related nuclear cataract of both eyes  Mild NS, NVS  Plan: Observation Update Mrx prn     RTC Optom annual, me prn         I saw and examined the patient and reviewed in detail the findings documented. The final examination findings, image interpretations which have been independently interpreted, and plan as documented in the record represent my personal judgment and conclusions.    Ezio Haas MD  Vitreoretinal Surgery   [facility]

## 2024-06-15 NOTE — TELEPHONE ENCOUNTER
----- Message from Lorna Daniel sent at 5/4/2017 12:26 PM CDT -----  Contact: Anabel  I've received an order from Dr Gonzalez for a Cpap and have contacted patient regarding copay and he does not want to be set-up at this time. Any questions, please call me @ [phone]. Thanks!   General Cardiology   Progress Note -  [facility]   Lupe Menjivar 61 y.o. female MRN: [id number]    Unit/Bed#: [facility] 422-01 Encounter: [id number]    Assessment:  Acute on chronic diastolic heart failure  -Chest x-ray (6/13/2024) pulmonary edema.  BNP on admission 926 (previously 761 in March 2024)  -Currently diuresing with Bumex 3 mg IV twice daily  -Net -2 L over the last 24 hours  -Admission weight 105 kg (weight today 103 kg)  -Dry weight 98 kg (216 lbs)   -Echocardiogram (6/14/2024): Left ventricular ejection fraction 60%.  Wall motion is normal.  Grade 2 diastolic dysfunction.  No significant valvular disease.  Left atrial dilation 35 to 41 mL/M2)  -Nitroglycerin drip titrated off 6/14/2024    Hypertensive emergency  -Blood pressure on admission 265/128  -Nitroglycerin drip titrated off 6/14/2024  -Amlodipine 5 mg daily  -Metoprolol succinate 50 mg daily  -Average blood pressure 147/81    Chest pain/elevated troponin  -Elevated troponins -176 in the setting of hypertensive emergency and acute congestive heart failure  -EKG at baseline normal sinus rhythm with AV pacing  -[facility] (2019): No significant epicardial coronary artery disease    History of atrial fibrillation/a flutter  -History of permanent atrial fibrillation  -Status post AVJ ablation  -EPL1TA8-EPAc score 4  -Anticoagulated with Xarelto 15 mg daily  -Telemetry AV paced on monitor no ectopy    HCM  -Echocardiogram showing moderate to severe asymmetric hypertrophy.   Mild atrial dilation (35 to 41 mL/m2)     History of VT  -Status post dual-chamber ICD implant 2016  -Metoprolol 50 mg daily    CKD stage IIIb  -Baseline creatinine 1.5-1.6  -Continue to monitor renal function and electrolytes  -Creatinine today 1.66/GFR 33    Tobacco abuse  -NicoDerm CQ 21 mg / 24 hours  -Encouraged smoking cessation            Plan/Recommendations:  -Continue Bumex 3 mg twice daily for  "today        ______________________________________________________________________________________    Subjective Patient seen and examined in bed.  She states she is feeling much better and denies chest pain, shortness of breath, palpitations, PND or orthopnea or lower extremity edema.   She reports baseline weight around 216 pounds.  Weight today 227 pounds.  Discussion regarding smoking sensation.      Echocardiogram performed 6/14/2024 showed left ventricular ejection fraction 60%.  Systolic function mildly reduced.  Wall motion normal.  Grade 2 diastolic dysfunction.  Moderate to severe asymmetric hypertrophy.  Mild left atrial dilation (35 to 41 mL/M2).  No significant valvular disease    Review of Systems   Constitutional: Negative. Negative for chills and fever.   HENT: Negative.     Eyes: Negative.    Cardiovascular: Negative.  Negative for chest pain, dyspnea on exertion, orthopnea, palpitations, paroxysmal nocturnal dyspnea and syncope.   Respiratory:  Negative for cough, shortness of breath, sputum production and wheezing.    Endocrine: Negative.    Hematologic/Lymphatic: Negative.    Skin: Negative.    Musculoskeletal: Negative.    Gastrointestinal: Negative.  Negative for abdominal pain, nausea and vomiting.   Genitourinary: Negative.    Neurological: Negative.    Psychiatric/Behavioral: Negative.     Allergic/Immunologic: Negative.        Objective:   Vitals: Blood pressure 150/85, pulse 62, temperature 98.9 °F (37.2 °C), temperature source Oral, resp. rate 20, height 5' 7\" (1.702 m), weight 103 kg (227 lb 4.7 oz), SpO2 94%, not currently breastfeeding.,     Wt Readings from Last 3 Encounters:   06/15/24 103 kg (227 lb 4.7 oz)   04/28/24 99.3 kg (218 lb 14.7 oz)   04/01/24 99.6 kg (219 lb 9.6 oz)        Lab Results   Component Value Date    CREATININE 1.66 (H) 06/15/2024    CREATININE 1.60 (H) 06/14/2024    CREATININE 1.62 (H) 06/13/2024         Body mass index is 35.6 kg/m².,     Systolic (24hrs), " Av , Min:102 , Max:154     Diastolic (24hrs), Av, Min:57, Max:88          Intake/Output Summary (Last 24 hours) at 6/15/2024 0908  Last data filed at 6/15/2024 0828  Gross per 24 hour   Intake 890 ml   Output 2450 ml   Net -1560 ml     Weight (last 2 days)       Date/Time Weight    06/15/24 0600 103 (227.29)    24 1116 104 (230)    24 05:24:55 105 (230.82)              Telemetry Review: No significant arrhythmias seen on telemetry review.       Physical Exam  Constitutional:       General: She is not in acute distress.     Appearance: Normal appearance. She is obese. She is not ill-appearing.   HENT:      Head: Normocephalic.      Nose: Nose normal.      Mouth/Throat:      Mouth: Mucous membranes are moist.   Cardiovascular:      Rate and Rhythm: Normal rate and regular rhythm.      Heart sounds: Normal heart sounds. No murmur heard.     No friction rub. No gallop.      Comments: AV paced  Pulmonary:      Effort: Pulmonary effort is normal.      Breath sounds: Examination of the right-lower field reveals decreased breath sounds. Examination of the left-lower field reveals decreased breath sounds. Decreased breath sounds present.   Abdominal:      General: Bowel sounds are normal.      Palpations: Abdomen is soft.   Musculoskeletal:      Cervical back: Neck supple.      Right lower leg: No edema.      Left lower leg: No edema.   Skin:     General: Skin is warm.      Capillary Refill: Capillary refill takes less than 2 seconds.   Neurological:      Mental Status: She is alert and oriented to person, place, and time.   Psychiatric:         Mood and Affect: Mood normal.         Behavior: Behavior normal.         Thought Content: Thought content normal.         LABORATORY RESULTS      CBC with diff:   Results from last 7 days   Lab Units 06/15/24  0503 24  2327 24  2322   WBC Thousand/uL 4.20* 9.12  --    HEMOGLOBIN g/dL 10.7* 10.8*  --    I STAT HEMOGLOBIN g/dl  --   --  11.6  "  HEMATOCRIT % 35.3 36.0  --    HEMATOCRIT, ISTAT %  --   --  34*   MCV fL 84 88  --    PLATELETS Thousands/uL 110* 168  --    RBC Million/uL 4.20 4.11  --    MCH pg 25.5* 26.3*  --    MCHC g/dL 30.3* 30.0*  --    RDW % 15.7* 15.9*  --    MPV fL 11.8 11.5  --    NRBC AUTO /100 WBCs 0 0  --        CMP:  Results from last 7 days   Lab Units 06/15/24  0503 06/14/24  0409 06/13/24  2327 06/13/24  2322   POTASSIUM mmol/L 3.7 4.9 4.4  --    CHLORIDE mmol/L 102 106 108  --    CO2 mmol/L 30 27 21  --    CO2, I-STAT mmol/L  --   --   --  23   BUN mg/dL 20 21 20  --    CREATININE mg/dL 1.66* 1.60* 1.62*  --    GLUCOSE, ISTAT mg/dl  --   --   --  196*   CALCIUM mg/dL 8.6 8.2* 8.5  --    AST U/L  --   --  15  --    ALT U/L  --   --  7  --    ALK PHOS U/L  --   --  75  --    EGFR ml/min/1.73sq m 33 35 34  --        BMP:  Results from last 7 days   Lab Units 06/15/24  0503 06/14/24  0409 06/13/24  2327 06/13/24  2322   POTASSIUM mmol/L 3.7 4.9 4.4  --    CHLORIDE mmol/L 102 106 108  --    CO2 mmol/L 30 27 21  --    CO2, I-STAT mmol/L  --   --   --  23   BUN mg/dL 20 21 20  --    CREATININE mg/dL 1.66* 1.60* 1.62*  --    GLUCOSE, ISTAT mg/dl  --   --   --  196*   CALCIUM mg/dL 8.6 8.2* 8.5  --        Lab Results   Component Value Date    NTBNP 9,053 (H) 01/13/2023    NTBNP 5,423 (H) 01/04/2023    NTBNP 16,909 (H) 04/07/2022             Results from last 7 days   Lab Units 06/14/24  0409   MAGNESIUM mg/dL 2.1                           Lipid Profile:   No results found for: \"CHOL\"  Lab Results   Component Value Date    HDL 40 (L) 04/05/2023    HDL 42 (L) 02/07/2021    HDL 43 09/17/2020     Lab Results   Component Value Date    LDLCALC 68 04/05/2023    LDLCALC 57 02/07/2021    LDLCALC 72 09/17/2020     Lab Results   Component Value Date    TRIG 83 04/05/2023    TRIG 80.9 02/07/2021    TRIG 89 09/17/2020         Meds/Allergies   all current active meds have been reviewed    Medications Prior to Admission:     bumetanide (BUMEX) 2 mg " tablet    ferrous gluconate (FERGON) 240 (27 FE) MG tablet    metolazone (ZAROXOLYN) 5 mg tablet    metoprolol succinate (TOPROL-XL) 50 mg 24 hr tablet    nystatin (MYCOSTATIN) powder    ondansetron (Zofran ODT) 4 mg disintegrating tablet    oxyCODONE (ROXICODONE) 5 immediate release tablet    pantoprazole (PROTONIX) 40 mg tablet    polyethylene glycol (MIRALAX) 17 g packet    polymyxin b-trimethoprim (POLYTRIM) ophthalmic solution    potassium chloride (Klor-Con M20) 20 mEq tablet    rivaroxaban (XARELTO) 15 mg tablet    nitroGLYcerin, 50 mcg/min, Last Rate: Stopped (06/14/24 1123)        Counseling / Coordination of Care  Total floor / unit time spent today 20 minutes.  Greater than 50% of total time was spent with the patient and / or family counseling and / or coordination of care.      ** Please Note: Dragon 360 Dictation voice to text software may have been used in the creation of this document. **

## 2024-06-19 ENCOUNTER — PATIENT MESSAGE (OUTPATIENT)
Dept: OTHER | Facility: OTHER | Age: 74
End: 2024-06-19
Payer: MEDICARE

## 2024-07-18 ENCOUNTER — TELEPHONE (OUTPATIENT)
Dept: ENDOSCOPY | Facility: HOSPITAL | Age: 74
End: 2024-07-18
Payer: MEDICARE

## 2024-07-18 ENCOUNTER — OFFICE VISIT (OUTPATIENT)
Dept: GASTROENTEROLOGY | Facility: CLINIC | Age: 74
End: 2024-07-18
Payer: MEDICARE

## 2024-07-18 VITALS
WEIGHT: 233.69 LBS | HEIGHT: 70 IN | BODY MASS INDEX: 33.36 KG/M2 | DIASTOLIC BLOOD PRESSURE: 73 MMHG | HEART RATE: 66 BPM | WEIGHT: 233 LBS | HEIGHT: 70 IN | SYSTOLIC BLOOD PRESSURE: 124 MMHG | BODY MASS INDEX: 33.46 KG/M2

## 2024-07-18 DIAGNOSIS — R10.9 ABDOMINAL PAIN, UNSPECIFIED ABDOMINAL LOCATION: Primary | ICD-10-CM

## 2024-07-18 DIAGNOSIS — K21.9 GASTROESOPHAGEAL REFLUX DISEASE, UNSPECIFIED WHETHER ESOPHAGITIS PRESENT: Primary | ICD-10-CM

## 2024-07-18 PROCEDURE — 99999 PR PBB SHADOW E&M-EST. PATIENT-LVL III: CPT | Mod: PBBFAC,HCNC,, | Performed by: INTERNAL MEDICINE

## 2024-07-18 PROCEDURE — 3288F FALL RISK ASSESSMENT DOCD: CPT | Mod: CPTII,S$GLB,, | Performed by: INTERNAL MEDICINE

## 2024-07-18 PROCEDURE — 3008F BODY MASS INDEX DOCD: CPT | Mod: CPTII,S$GLB,, | Performed by: INTERNAL MEDICINE

## 2024-07-18 PROCEDURE — 3074F SYST BP LT 130 MM HG: CPT | Mod: CPTII,S$GLB,, | Performed by: INTERNAL MEDICINE

## 2024-07-18 PROCEDURE — 3078F DIAST BP <80 MM HG: CPT | Mod: CPTII,S$GLB,, | Performed by: INTERNAL MEDICINE

## 2024-07-18 PROCEDURE — 1126F AMNT PAIN NOTED NONE PRSNT: CPT | Mod: CPTII,S$GLB,, | Performed by: INTERNAL MEDICINE

## 2024-07-18 PROCEDURE — 3066F NEPHROPATHY DOC TX: CPT | Mod: CPTII,S$GLB,, | Performed by: INTERNAL MEDICINE

## 2024-07-18 PROCEDURE — 1100F PTFALLS ASSESS-DOCD GE2>/YR: CPT | Mod: CPTII,S$GLB,, | Performed by: INTERNAL MEDICINE

## 2024-07-18 PROCEDURE — 99204 OFFICE O/P NEW MOD 45 MIN: CPT | Mod: S$GLB,,, | Performed by: INTERNAL MEDICINE

## 2024-07-18 PROCEDURE — 3044F HG A1C LEVEL LT 7.0%: CPT | Mod: CPTII,S$GLB,, | Performed by: INTERNAL MEDICINE

## 2024-07-18 PROCEDURE — 4010F ACE/ARB THERAPY RXD/TAKEN: CPT | Mod: CPTII,S$GLB,, | Performed by: INTERNAL MEDICINE

## 2024-07-18 PROCEDURE — 3061F NEG MICROALBUMINURIA REV: CPT | Mod: CPTII,S$GLB,, | Performed by: INTERNAL MEDICINE

## 2024-07-18 RX ORDER — PANTOPRAZOLE SODIUM 40 MG/1
40 TABLET, DELAYED RELEASE ORAL 2 TIMES DAILY
Qty: 180 TABLET | Refills: 3 | Status: SHIPPED | OUTPATIENT
Start: 2024-07-18 | End: 2025-07-18

## 2024-07-18 NOTE — PROGRESS NOTES
"[facility]    CC: abdominal pain    HPI 73 y.o. male with past medical history of CAD on plavix who presents with several months of mid epigastric, intermittent abdominal pain with associated acid reflux and nausea after eating.  He does take Protonix 40mg daily.    He also has diarrhea about every 3rd day.  Prior to this he has normal bowel movements without constipation.    Past Medical History  Past Medical History:   Diagnosis Date    Allergy     Angina pectoris     Anxiety     Cancer     skin rwemoved from head    Chronic midline low back pain without sciatica 10/26/2022    Coronary artery disease     Depression     Eye injury as a teen    stuck object in os    GERD (gastroesophageal reflux disease)     Hyperlipidemia     Hypertension     Hypothyroidism     Memory loss     12/3/2014 MRI brain: 1. No evidence for acute infarct 2. unremarkable MRI of the brain; 12/3/2014 carotid US normal    Myocardial infarction     Nuclear sclerosis of both eyes 5/20/2021    Obesity     Peripheral vascular disease 6/20/2017    Retrocalcaneal bursitis 11/24/2014    Sleep apnea     Type 2 diabetes mellitus with other specified complication 4/27/2023       Physical Examination  /73   Pulse 66   Ht 5' 10" (1.778 m)   Wt 106 kg (233 lb 11 oz)   BMI 33.53 kg/m²   General appearance: alert, cooperative, no distress  HENT: Normocephalic, atraumatic, neck symmetrical, no nasal discharge   Abdomen: soft, mildly TTP in the epigastrium, non distended, no rebound or guarding  Neurologic: Alert and oriented X 3, moving all four extremities, intact sensation to light touch    Labs:  Lab Results   Component Value Date    WBC 7.21 05/06/2024    HGB 15.1 05/06/2024    HCT 45.1 05/06/2024    MCV 92 05/06/2024     05/06/2024         CMP  Sodium   Date Value Ref Range Status   05/06/2024 140 136 - 145 mmol/L Final     Potassium   Date Value Ref Range Status   05/06/2024 4.2 3.5 - 5.1 mmol/L Final     Chloride "   Date Value Ref Range Status   05/06/2024 105 95 - 110 mmol/L Final     CO2   Date Value Ref Range Status   05/06/2024 24 23 - 29 mmol/L Final     Glucose   Date Value Ref Range Status   05/06/2024 145 (H) 70 - 110 mg/dL Final     BUN   Date Value Ref Range Status   05/06/2024 16 8 - 23 mg/dL Final     Creatinine   Date Value Ref Range Status   05/06/2024 1.1 0.5 - 1.4 mg/dL Final     Calcium   Date Value Ref Range Status   05/06/2024 9.5 8.7 - 10.5 mg/dL Final     Total Protein   Date Value Ref Range Status   05/06/2024 6.8 6.0 - 8.4 g/dL Final     Albumin   Date Value Ref Range Status   05/06/2024 3.9 3.5 - 5.2 g/dL Final     Total Bilirubin   Date Value Ref Range Status   05/06/2024 0.6 0.1 - 1.0 mg/dL Final     Comment:     For infants and newborns, interpretation of results should be based  on gestational age, weight and in agreement with clinical  observations.    Premature Infant recommended reference ranges:  Up to 24 hours.............<8.0 mg/dL  Up to 48 hours............<12.0 mg/dL  3-5 days..................<15.0 mg/dL  6-29 days.................<15.0 mg/dL       Alkaline Phosphatase   Date Value Ref Range Status   05/06/2024 51 (L) 55 - 135 U/L Final     AST   Date Value Ref Range Status   05/06/2024 19 10 - 40 U/L Final     ALT   Date Value Ref Range Status   05/06/2024 17 10 - 44 U/L Final     Anion Gap   Date Value Ref Range Status   05/06/2024 11 8 - 16 mmol/L Final     eGFR   Date Value Ref Range Status   05/06/2024 >60.0 >60 mL/min/1.73 m^2 Final         Assessment:   1. Gastroesophageal reflux disease, unspecified whether esophagitis present    2.     Epigastric abdominal pain  3.     Nausea  4.     Diarrhea every 3rd day.  Chronic for years    Plan:  -Increase Protonix to 40mg twice per day.  -Schedule EGD for further evaluation.  -Start a daily fiber supplement.    Xochilt Porter MD

## 2024-07-18 NOTE — TELEPHONE ENCOUNTER
"----- Message from Xochilt Porter MD sent at 7/18/2024  2:34 PM CDT -----  Regarding: EGD  Procedure: EGD    Diagnosis: Abdominal pain and GERD    Procedure Timing: Within 4 weeks (Urgent)    #If within 4 weeks selected, please jessica as high priority#    #If greater than 12 weeks, please select "5-12 weeks" and delay sending until 3 months prior to requested date#     Location: Hospital Based ([address], [facility], [facility])    Additional Scheduling Information: Blood thinners    Prep Specifications:Standard prep    Is the patient taking a GLP-1 Agonist:no    Have you attached a patient to this message: yes  "

## 2024-07-19 ENCOUNTER — TELEPHONE (OUTPATIENT)
Dept: ENDOSCOPY | Facility: HOSPITAL | Age: 74
End: 2024-07-19
Payer: MEDICARE

## 2024-07-19 ENCOUNTER — E-CONSULT (OUTPATIENT)
Dept: CARDIOLOGY | Facility: CLINIC | Age: 74
End: 2024-07-19
Payer: MEDICARE

## 2024-07-19 DIAGNOSIS — I25.10 CORONARY ARTERY DISEASE INVOLVING NATIVE CORONARY ARTERY OF NATIVE HEART WITHOUT ANGINA PECTORIS: ICD-10-CM

## 2024-07-19 DIAGNOSIS — Z01.810 PREOP CARDIOVASCULAR EXAM: Primary | ICD-10-CM

## 2024-07-19 PROCEDURE — 99451 NTRPROF PH1/NTRNET/EHR 5/>: CPT | Mod: S$GLB,,, | Performed by: INTERNAL MEDICINE

## 2024-07-19 NOTE — CONSULTS
[facility] - Cardiology  Response for E-Consult     Patient Name: Clinton Rosenberg  MRN: [id number]  Primary Care Provider: Michael Gonzalez MD   Requesting Provider: Gabrielle Aaron NP  E-Consult to Cardiology  Consult performed by: Brad Bahena MD  Consult ordered by: Gabrielle Aaron NP  Reason for consult: periop plavix recommendations          Recommendation: OK to hold Plavix as requested (5d) and resume asap after procedure.    Contingency that warrants a repeat eConsult or referral: if further information needed.    Total time of Consultation: 5 minute    I did not speak to the requesting provider verbally about this.     *This eConsult is based on the clinical data available to me and is furnished without benefit of a physical examination. The eConsult will need to be interpreted in light of any clinical issues or changes in patient status not available to me at the time of filing this eConsults. Significant changes in patient condition or level of acuity should result in immediate formal consultation and reevaluation. Please alert me if you have further questions.    Thank you for this eConsult referral.     Brad Bahena MD  [facility] - Cardiology

## 2024-07-19 NOTE — TELEPHONE ENCOUNTER
----- Message from Angelo Puckett MA sent at 2024  2:59 PM CDT -----  Regardin/2 BT  The patient is currently under an internal cardiologist Dr. Josef villalba and requires a blood thinner Plavix (clopidogrel) for their upcoming scheduled Upper Endoscopy (EGD) on 2024.

## 2024-07-19 NOTE — TELEPHONE ENCOUNTER
Spoke to patient to schedule procedure(s) Upper Endoscopy (EGD)       Physician to perform procedure(s) Dr. BERNADETTE Altamirano  Date of Procedure (s) 8/2/2024  Arrival Time 8:00 AM   Time of Procedure(s) 9:00 Am    Location of Procedure(s) [address]   Type of Rx Prep sent to patient: N/A  Instructions provided to patient via [facility]    Patient was informed on the following information and verbalized understanding. Screening questionnaire reviewed with patient and complete. If procedure requires anesthesia, a responsible adult needs to be present to accompany the patient home, patient cannot drive after receiving anesthesia. Appointment details are tentative, especially check-in time. Patient will receive a prep-op call 7 days prior to confirm check-in time for procedure. If applicable the patient should contact their pharmacy to verify Rx for procedure prep is ready for pick-up. Patient was advised to call the scheduling department at [phone] if pharmacy states no Rx is available. Patient was advised to call the endoscopy scheduling department if any questions or concerns arise.      [facility] Scheduling Department

## 2024-07-26 ENCOUNTER — TELEPHONE (OUTPATIENT)
Dept: ENDOSCOPY | Facility: HOSPITAL | Age: 74
End: 2024-07-26
Payer: MEDICARE

## 2024-07-26 NOTE — TELEPHONE ENCOUNTER
Left voicemail and sent portal message for patient to call Endoscopy Scheduling to review instructions and confirm appointment for Upper endoscopy (EGD)  on 8/2/24.

## 2024-07-30 ENCOUNTER — PATIENT MESSAGE (OUTPATIENT)
Dept: FAMILY MEDICINE | Facility: CLINIC | Age: 74
End: 2024-07-30
Payer: MEDICARE

## 2024-07-30 ENCOUNTER — TELEPHONE (OUTPATIENT)
Dept: ENDOSCOPY | Facility: HOSPITAL | Age: 74
End: 2024-07-30
Payer: MEDICARE

## 2024-07-30 NOTE — TELEPHONE ENCOUNTER
Pt returned call to confirm endoscopy. Pt has started holding his plavix on 7/27/24.  Pt has  instructions (or knows how to access instructions) and confirmed ride. Verified if any GLP1's and blood thinners. Encouraged to call back for any needs. Note made in case comments.

## 2024-07-30 NOTE — TELEPHONE ENCOUNTER
Spoke to patient for pre-call to confirm scheduled Upper Endoscopy (EGD) and patient verbalized understanding of the following:       Date & arrival time of procedure(s) verified 8/2/24, 8:00 AM.  Location of procedure(s) [address]  verified.  NPO status reinforced. Ok to continue clear liquids until 5:00 AM.   Patient is taking Plavix (clopidogrel), instructed to take last dose on:  7/27/24.  Approval to hold Plavix (clopidogrel) received from Dr. Bahena.  Patient confirmed receipt of prep instructions.  Instructions provided to patient via [facility].  Patient confirmed ride home after procedure if procedure requires anesthesia.   Pre-call screening questionnaire reviewed and completed with patient.   Appointment details are tentative, including check-in time.  If the patient begins taking any blood thinning medications, injectable weight loss/diabetes medications (other than insulin), or Adipex (phentermine) patient was instructed to contact the endoscopy scheduling department as soon as possible.  Patient was advised to call the endoscopy scheduling department if any questions or concerns arise.     [facility] Scheduling [facility]

## 2024-07-30 NOTE — LETTER
July 31, 2024    Dr. John Madden             [facility]  [address]  [place] [address]  Phone: [phone]  Fax: [phone] July 31, 2024     Patient: Clinton Rosenberg  YOB: 1950  Date of Visit: 7/30/24      To Whom It May Concern:    Clinton Rosenberg may hold his aspirin and clopidogrel for 5 days prior to his dental procedure.  If you have any questions or concerns, or if I can be of further assistance, please do not hesitate to contact me.      Sincerely,    Michael Gonzalez MD

## 2024-08-01 ENCOUNTER — ANESTHESIA EVENT (OUTPATIENT)
Dept: ENDOSCOPY | Facility: HOSPITAL | Age: 74
End: 2024-08-01
Payer: MEDICARE

## 2024-08-02 ENCOUNTER — TELEPHONE (OUTPATIENT)
Dept: ENDOSCOPY | Facility: HOSPITAL | Age: 74
End: 2024-08-02
Payer: MEDICARE

## 2024-08-02 ENCOUNTER — ANESTHESIA (OUTPATIENT)
Dept: ENDOSCOPY | Facility: HOSPITAL | Age: 74
End: 2024-08-02
Payer: MEDICARE

## 2024-08-02 ENCOUNTER — HOSPITAL ENCOUNTER (OUTPATIENT)
Facility: HOSPITAL | Age: 74
Discharge: HOME | End: 2024-08-02
Attending: STUDENT IN AN ORGANIZED HEALTH CARE EDUCATION/TRAINING PROGRAM | Admitting: STUDENT IN AN ORGANIZED HEALTH CARE EDUCATION/TRAINING PROGRAM
Payer: MEDICARE

## 2024-08-02 VITALS
OXYGEN SATURATION: 99 % | TEMPERATURE: 98 F | BODY MASS INDEX: 33.36 KG/M2 | DIASTOLIC BLOOD PRESSURE: 75 MMHG | RESPIRATION RATE: 16 BRPM | HEART RATE: 63 BPM | WEIGHT: 233 LBS | SYSTOLIC BLOOD PRESSURE: 127 MMHG | HEIGHT: 70 IN

## 2024-08-02 DIAGNOSIS — R07.9 CHEST PAIN, UNSPECIFIED: ICD-10-CM

## 2024-08-02 DIAGNOSIS — R13.10 DYSPHAGIA, UNSPECIFIED TYPE: Primary | ICD-10-CM

## 2024-08-02 LAB
OHS QRS DURATION: 80 MS
OHS QTC CALCULATION: 405 MS
POCT GLUCOSE: 157 MG/DL (ref 70–110)

## 2024-08-02 PROCEDURE — 27201012 HC FORCEPS, HOT/COLD, DISP: Mod: HCNC | Performed by: STUDENT IN AN ORGANIZED HEALTH CARE EDUCATION/TRAINING PROGRAM

## 2024-08-02 PROCEDURE — 43239 EGD BIOPSY SINGLE/MULTIPLE: CPT | Mod: HCNC,GC,, | Performed by: STUDENT IN AN ORGANIZED HEALTH CARE EDUCATION/TRAINING PROGRAM

## 2024-08-02 PROCEDURE — 25000003 PHARM REV CODE 250: Mod: HCNC

## 2024-08-02 PROCEDURE — 93005 ELECTROCARDIOGRAM TRACING: CPT | Mod: HCNC

## 2024-08-02 PROCEDURE — 37000008 HC ANESTHESIA 1ST 15 MINUTES: Mod: HCNC | Performed by: STUDENT IN AN ORGANIZED HEALTH CARE EDUCATION/TRAINING PROGRAM

## 2024-08-02 PROCEDURE — 63600175 PHARM REV CODE 636 W HCPCS: Mod: HCNC

## 2024-08-02 PROCEDURE — 88305 TISSUE EXAM BY PATHOLOGIST: CPT | Mod: 59,HCNC | Performed by: PATHOLOGY

## 2024-08-02 PROCEDURE — 37000009 HC ANESTHESIA EA ADD 15 MINS: Mod: HCNC | Performed by: STUDENT IN AN ORGANIZED HEALTH CARE EDUCATION/TRAINING PROGRAM

## 2024-08-02 PROCEDURE — 25000003 PHARM REV CODE 250: Mod: HCNC | Performed by: ANESTHESIOLOGY

## 2024-08-02 PROCEDURE — 43239 EGD BIOPSY SINGLE/MULTIPLE: CPT | Mod: HCNC | Performed by: STUDENT IN AN ORGANIZED HEALTH CARE EDUCATION/TRAINING PROGRAM

## 2024-08-02 RX ORDER — PROPOFOL 10 MG/ML
VIAL (ML) INTRAVENOUS
Status: DISCONTINUED
Start: 2024-08-02 | End: 2024-08-02 | Stop reason: HOSPADM

## 2024-08-02 RX ORDER — PROPOFOL 10 MG/ML
VIAL (ML) INTRAVENOUS
Status: DISCONTINUED | OUTPATIENT
Start: 2024-08-02 | End: 2024-08-02

## 2024-08-02 RX ORDER — LIDOCAINE HYDROCHLORIDE 20 MG/ML
INJECTION, SOLUTION EPIDURAL; INFILTRATION; INTRACAUDAL; PERINEURAL
Status: DISCONTINUED
Start: 2024-08-02 | End: 2024-08-02 | Stop reason: HOSPADM

## 2024-08-02 RX ORDER — SODIUM CHLORIDE 9 MG/ML
INJECTION, SOLUTION INTRAVENOUS CONTINUOUS
Status: DISCONTINUED | OUTPATIENT
Start: 2024-08-02 | End: 2024-08-02 | Stop reason: HOSPADM

## 2024-08-02 RX ORDER — LIDOCAINE HYDROCHLORIDE 20 MG/ML
INJECTION INTRAVENOUS
Status: DISCONTINUED | OUTPATIENT
Start: 2024-08-02 | End: 2024-08-02

## 2024-08-02 RX ADMIN — PROPOFOL 20 MG: 10 INJECTION, EMULSION INTRAVENOUS at 10:08

## 2024-08-02 RX ADMIN — PROPOFOL 100 MG: 10 INJECTION, EMULSION INTRAVENOUS at 09:08

## 2024-08-02 RX ADMIN — SODIUM CHLORIDE: 0.9 INJECTION, SOLUTION INTRAVENOUS at 09:08

## 2024-08-02 RX ADMIN — LIDOCAINE HYDROCHLORIDE 140 MG: 20 INJECTION, SOLUTION INTRAVENOUS at 09:08

## 2024-08-02 NOTE — TELEPHONE ENCOUNTER
"----- Message from Severo Butterfield MD sent at 2024 10:13 AM CDT -----  Regarding: Esophageal manometry  Procedure: Esoph. manometry    Diagnosis: Dysphagia    Procedure Timin-12 weeks    #If within 4 weeks selected, please jessica as high priority#    #If greater than 12 weeks, please select "5-12 weeks" and delay sending until 3 months prior to requested date#     Location: Any Site    Additional Scheduling Information: No scheduling concerns    Prep Specifications:N/A    Is the patient taking a GLP-1 Agonist:no    Have you attached a patient to this message: yes  "

## 2024-08-02 NOTE — TRANSFER OF CARE
Anesthesia Transfer of Care Note    Patient: Clinton Rosenberg    Procedure(s) Performed: Procedure(s) (LRB):  EGD (ESOPHAGOGASTRODUODENOSCOPY) (N/A)    Patient location: GI    Anesthesia Type: general    Transport from OR: Transported from OR on 2-3 L/min O2 by NC with adequate spontaneous ventilation    Post assessment: no apparent anesthetic complications and tolerated procedure well    Post vital signs: stable    Level of consciousness: awake    Nausea/Vomiting: no nausea/vomiting    Complications: none    Transfer of care protocol was followed      Last vitals: Visit Vitals  BP (!) 90/54 (BP Location: Left arm, Patient Position: Lying)   Pulse 63   Temp 36.4 °C (97.5 °F) (Oral)   Resp 12   SpO2 97%

## 2024-08-02 NOTE — ANESTHESIA PREPROCEDURE EVALUATION
08/02/2024  Clinton Rosenberg is a 73 y.o., male.      Pre-op Assessment    I have reviewed the Patient Summary Reports.     I have reviewed the Nursing Notes. I have reviewed the NPO Status.   I have reviewed the Medications.     Review of Systems  Social:  No Alcohol Use, Non-Smoker       Hematology/Oncology:    Oncology Normal                Hematology Comments: Last dose of Plavix July 28, 2024                    EENT/Dental:  EENT/Dental Normal           Cardiovascular:     Hypertension  Past MI CAD   CABG/stent       PVD                              Pulmonary:        Sleep Apnea                Renal/:  Renal/ Normal                 Hepatic/GI:     GERD             Musculoskeletal:  Musculoskeletal Normal                Neurological:  Neurology Normal                                      Endocrine:  Diabetes, well controlled, type 2 Hypothyroidism        Obesity / BMI > 30  Psych:   anxiety depression                Physical Exam  General: Well nourished, Cooperative, Alert and Oriented    Airway:  Mallampati: II / II  Mouth Opening: Normal  TM Distance: Normal  Tongue: Normal  Neck ROM: Normal ROM    Dental:  Intact    Chest/Lungs:  Clear to auscultation, Normal Respiratory Rate    Heart:  Rate: Normal  Rhythm: Regular Rhythm  Sounds: Normal        Anesthesia Plan  Type of Anesthesia, risks & benefits discussed:    Anesthesia Type: Gen Natural Airway, MAC  Intra-op Monitoring Plan: Standard ASA Monitors  Induction:  IV  Airway Plan: Direct and Video, Post-Induction  Informed Consent: Informed consent signed with the Patient and all parties understand the risks and agree with anesthesia plan.  All questions answered.   ASA Score: 3  Day of Surgery Review of History & Physical: H&P Update referred to the surgeon/provider.    Ready For Surgery From Anesthesia Perspective.     .

## 2024-08-02 NOTE — ANESTHESIA POSTPROCEDURE EVALUATION
Anesthesia Post Evaluation    Patient: Clinton Rosenberg    Procedure(s) Performed: Procedure(s) (LRB):  EGD (ESOPHAGOGASTRODUODENOSCOPY) (N/A)    Final Anesthesia Type: general      Patient location during evaluation: PACU  Patient participation: Yes- Able to Participate  Level of consciousness: awake and alert  Post-procedure vital signs: reviewed and stable  Pain management: adequate  Airway patency: patent    PONV status at discharge: No PONV  Anesthetic complications: no      Respiratory status: spontaneous ventilation and room air  Hydration status: euvolemic  Follow-up not needed.              Vitals Value Taken Time   /75 08/02/24 1045   Temp 36.4 °C (97.5 °F) 08/02/24 1015   Pulse 63 08/02/24 1045   Resp 16 08/02/24 1045   SpO2 99 % 08/02/24 1045         Event Time   Out of Recovery 10:46:16         Pain/Amna Score: Amna Score: 10 (8/2/2024 10:45 AM)

## 2024-08-02 NOTE — TELEPHONE ENCOUNTER
Spoke to Clinton to schedule procedure(s) Esophageal Manometry       Physician to perform procedure(s) Dr. CORONA [place]  Date of Procedure (s) 9/13/24  Arrival Time 7:00 AM  Time of Procedure(s) 8:00 AM   Location of Procedure(s) [place] [address]  Type of Rx Prep sent to patient: Other  Instructions provided to patient via [facility]    Patient was informed on the following information and verbalized understanding. Screening questionnaire reviewed with patient and complete. No ride arrangements are required for this procedure.   Appointment details are tentative, especially check-in time. Patient will receive a prep-op call 7 days prior to confirm check-in time for procedure. If applicable the patient should contact their pharmacy to verify Rx for procedure prep is ready for pick-up. Patient was advised to call the scheduling department at [phone] if pharmacy states no Rx is available. Patient was advised to call the endoscopy scheduling department if any questions or concerns arise.       Endoscopy Scheduling Department

## 2024-08-06 LAB
FINAL PATHOLOGIC DIAGNOSIS: NORMAL
GROSS: NORMAL
Lab: NORMAL

## 2024-08-08 ENCOUNTER — OFFICE VISIT (OUTPATIENT)
Dept: URGENT CARE | Facility: CLINIC | Age: 74
End: 2024-08-08
Payer: MEDICARE

## 2024-08-08 VITALS
TEMPERATURE: 98 F | HEART RATE: 66 BPM | WEIGHT: 230.63 LBS | HEIGHT: 70 IN | RESPIRATION RATE: 16 BRPM | OXYGEN SATURATION: 96 % | BODY MASS INDEX: 33.02 KG/M2 | SYSTOLIC BLOOD PRESSURE: 138 MMHG | DIASTOLIC BLOOD PRESSURE: 84 MMHG

## 2024-08-08 DIAGNOSIS — K62.89 PERIRECTAL SKIN IRRITATION: Primary | ICD-10-CM

## 2024-08-08 PROCEDURE — 99213 OFFICE O/P EST LOW 20 MIN: CPT | Mod: ,,,

## 2024-08-08 RX ORDER — HYDROCODONE BITARTRATE AND ACETAMINOPHEN 7.5; 325 MG/1; MG/1
TABLET ORAL
COMMUNITY
Start: 2024-08-07

## 2024-08-08 RX ORDER — SULFAMETHOXAZOLE AND TRIMETHOPRIM 800; 160 MG/1; MG/1
1 TABLET ORAL 2 TIMES DAILY
Qty: 14 TABLET | Refills: 0 | Status: SHIPPED | OUTPATIENT
Start: 2024-08-08 | End: 2024-08-15

## 2024-08-08 RX ORDER — PENICILLIN V POTASSIUM 500 MG/1
TABLET, FILM COATED ORAL
COMMUNITY
Start: 2024-07-24

## 2024-08-08 RX ORDER — KETOCONAZOLE 20 MG/G
CREAM TOPICAL 2 TIMES DAILY
Qty: 60 G | Refills: 0 | Status: SHIPPED | OUTPATIENT
Start: 2024-08-08 | End: 2024-08-22

## 2024-09-09 ENCOUNTER — PATIENT MESSAGE (OUTPATIENT)
Dept: ADMINISTRATIVE | Facility: OTHER | Age: 74
End: 2024-09-09
Payer: MEDICARE

## 2024-09-10 ENCOUNTER — TELEPHONE (OUTPATIENT)
Dept: ENDOSCOPY | Facility: HOSPITAL | Age: 74
End: 2024-09-10
Payer: MEDICARE

## 2024-09-10 NOTE — TELEPHONE ENCOUNTER
Spoke to patient's wife for pre-call to confirm scheduled Esophageal Manometry and patient verbalized understanding of the following:       Date of Procedure (s)  verified 9/13/24  Arrival Time 7:00 AM verified.  Location of Procedure(s) [place] [address] verified.  NPO status reinforced.   Pt confirmed receipt of prep instructions and Rx prep (if applicable).  Instructions provided to patient via [facility]  Pt confirmed ride home after procedure if procedure requires anesthesia.   Pre-call screening questionnaire reviewed and completed with patient.   Appointment details are tentative, including check-in time.  If the patient begins taking any blood thinning medications, injectable weight loss/diabetes medications (other than insulin), or Adipex (phentermine) patient was instructed to contact the endoscopy scheduling department as soon as possible.  Patient was advised to call the endoscopy scheduling department if any questions or concerns arise.       [facility] Scheduling Department

## 2024-09-12 ENCOUNTER — TELEPHONE (OUTPATIENT)
Dept: ENDOSCOPY | Facility: HOSPITAL | Age: 74
End: 2024-09-12
Payer: MEDICARE

## 2024-09-12 DIAGNOSIS — I10 ESSENTIAL HYPERTENSION: Chronic | ICD-10-CM

## 2024-09-12 DIAGNOSIS — I25.118 CORONARY ARTERY DISEASE OF NATIVE ARTERY OF NATIVE HEART WITH STABLE ANGINA PECTORIS: ICD-10-CM

## 2024-09-12 RX ORDER — METOPROLOL TARTRATE 25 MG/1
12.5 TABLET, FILM COATED ORAL 2 TIMES DAILY
Qty: 90 TABLET | Refills: 3 | Status: SHIPPED | OUTPATIENT
Start: 2024-09-12

## 2024-09-12 NOTE — TELEPHONE ENCOUNTER
Pt called to r/s procedure. He hurt his back and leg and needs to recover from this before. Pt wants date in December. He will call back in 3 weeks to see if schedule open.

## 2024-09-12 NOTE — TELEPHONE ENCOUNTER
Care Due:                  Date            Visit Type   Department     Provider  --------------------------------------------------------------------------------                                [facility]                              PRIMARY      MED/ INTERNAL  Last Visit: 05-      CARE ([facility])   MED/ AGUSTINA Gonzalez                              [facility]                              PRIMARY      MED/ INTERNAL  Next Visit: 11-      CARE ([facility])   MED/ PEDROSALIND Gonzalez                                                            Last  Test          Frequency    Reason                     Performed    Due Date  --------------------------------------------------------------------------------    HBA1C.......  6 months...  metFORMIN................  05- 11-    [facility] Embedded Care Due Messages. Reference number: [id number].   9/12/2024 1:38:23 AM CDT

## 2024-09-24 ENCOUNTER — OFFICE VISIT (OUTPATIENT)
Dept: FAMILY MEDICINE | Facility: CLINIC | Age: 74
End: 2024-09-24
Payer: MEDICARE

## 2024-09-24 VITALS
TEMPERATURE: 99 F | DIASTOLIC BLOOD PRESSURE: 76 MMHG | HEART RATE: 82 BPM | HEIGHT: 70 IN | OXYGEN SATURATION: 96 % | SYSTOLIC BLOOD PRESSURE: 120 MMHG | WEIGHT: 232.94 LBS | BODY MASS INDEX: 33.35 KG/M2

## 2024-09-24 DIAGNOSIS — E78.2 MIXED HYPERLIPIDEMIA: Chronic | ICD-10-CM

## 2024-09-24 DIAGNOSIS — E11.69 TYPE 2 DIABETES MELLITUS WITH OTHER SPECIFIED COMPLICATION, WITHOUT LONG-TERM CURRENT USE OF INSULIN: ICD-10-CM

## 2024-09-24 DIAGNOSIS — I10 ESSENTIAL HYPERTENSION: Chronic | ICD-10-CM

## 2024-09-24 DIAGNOSIS — M54.16 LUMBAR RADICULOPATHY, CHRONIC: Primary | ICD-10-CM

## 2024-09-24 DIAGNOSIS — I77.810 AORTIC ROOT DILATATION: ICD-10-CM

## 2024-09-24 DIAGNOSIS — K21.9 GASTROESOPHAGEAL REFLUX DISEASE WITHOUT ESOPHAGITIS: ICD-10-CM

## 2024-09-24 DIAGNOSIS — I25.118 CORONARY ARTERY DISEASE OF NATIVE ARTERY OF NATIVE HEART WITH STABLE ANGINA PECTORIS: ICD-10-CM

## 2024-09-24 DIAGNOSIS — I73.9 PERIPHERAL VASCULAR DISEASE: ICD-10-CM

## 2024-09-24 DIAGNOSIS — N50.811 RIGHT TESTICULAR PAIN: ICD-10-CM

## 2024-09-24 DIAGNOSIS — R29.818 NEUROGENIC CLAUDICATION: ICD-10-CM

## 2024-09-24 DIAGNOSIS — R39.9 LOWER URINARY TRACT SYMPTOMS (LUTS): ICD-10-CM

## 2024-09-24 DIAGNOSIS — R60.0 PERIPHERAL EDEMA: Chronic | ICD-10-CM

## 2024-09-24 DIAGNOSIS — E03.9 HYPOTHYROIDISM, UNSPECIFIED TYPE: Chronic | ICD-10-CM

## 2024-09-24 PROCEDURE — 99214 OFFICE O/P EST MOD 30 MIN: CPT | Mod: HCNC,S$GLB,, | Performed by: INTERNAL MEDICINE

## 2024-09-24 PROCEDURE — 3078F DIAST BP <80 MM HG: CPT | Mod: HCNC,CPTII,S$GLB, | Performed by: INTERNAL MEDICINE

## 2024-09-24 PROCEDURE — 1101F PT FALLS ASSESS-DOCD LE1/YR: CPT | Mod: HCNC,CPTII,S$GLB, | Performed by: INTERNAL MEDICINE

## 2024-09-24 PROCEDURE — 3044F HG A1C LEVEL LT 7.0%: CPT | Mod: HCNC,CPTII,S$GLB, | Performed by: INTERNAL MEDICINE

## 2024-09-24 PROCEDURE — 3066F NEPHROPATHY DOC TX: CPT | Mod: HCNC,CPTII,S$GLB, | Performed by: INTERNAL MEDICINE

## 2024-09-24 PROCEDURE — 3288F FALL RISK ASSESSMENT DOCD: CPT | Mod: HCNC,CPTII,S$GLB, | Performed by: INTERNAL MEDICINE

## 2024-09-24 PROCEDURE — 3008F BODY MASS INDEX DOCD: CPT | Mod: HCNC,CPTII,S$GLB, | Performed by: INTERNAL MEDICINE

## 2024-09-24 PROCEDURE — 99999 PR PBB SHADOW E&M-EST. PATIENT-LVL IV: CPT | Mod: PBBFAC,HCNC,, | Performed by: INTERNAL MEDICINE

## 2024-09-24 PROCEDURE — 4010F ACE/ARB THERAPY RXD/TAKEN: CPT | Mod: HCNC,CPTII,S$GLB, | Performed by: INTERNAL MEDICINE

## 2024-09-24 PROCEDURE — 1160F RVW MEDS BY RX/DR IN RCRD: CPT | Mod: HCNC,CPTII,S$GLB, | Performed by: INTERNAL MEDICINE

## 2024-09-24 PROCEDURE — 3061F NEG MICROALBUMINURIA REV: CPT | Mod: HCNC,CPTII,S$GLB, | Performed by: INTERNAL MEDICINE

## 2024-09-24 PROCEDURE — 3074F SYST BP LT 130 MM HG: CPT | Mod: HCNC,CPTII,S$GLB, | Performed by: INTERNAL MEDICINE

## 2024-09-24 PROCEDURE — 1159F MED LIST DOCD IN RCRD: CPT | Mod: HCNC,CPTII,S$GLB, | Performed by: INTERNAL MEDICINE

## 2024-09-24 PROCEDURE — 1125F AMNT PAIN NOTED PAIN PRSNT: CPT | Mod: HCNC,CPTII,S$GLB, | Performed by: INTERNAL MEDICINE

## 2024-09-24 RX ORDER — FUROSEMIDE 20 MG/1
20 TABLET ORAL DAILY
Qty: 90 TABLET | Refills: 3 | Status: SHIPPED | OUTPATIENT
Start: 2024-09-24

## 2024-09-24 RX ORDER — ATORVASTATIN CALCIUM 80 MG/1
80 TABLET, FILM COATED ORAL NIGHTLY
Qty: 90 TABLET | Refills: 3 | Status: SHIPPED | OUTPATIENT
Start: 2024-09-24

## 2024-09-24 RX ORDER — METFORMIN HYDROCHLORIDE 500 MG/1
TABLET, EXTENDED RELEASE ORAL
Qty: 90 TABLET | Refills: 3 | Status: SHIPPED | OUTPATIENT
Start: 2024-09-24

## 2024-09-24 RX ORDER — ISOSORBIDE MONONITRATE 120 MG/1
240 TABLET, EXTENDED RELEASE ORAL DAILY
Qty: 180 TABLET | Refills: 3 | Status: SHIPPED | OUTPATIENT
Start: 2024-09-24

## 2024-09-24 RX ORDER — GABAPENTIN 300 MG/1
300 CAPSULE ORAL NIGHTLY
Qty: 90 CAPSULE | Refills: 3 | Status: SHIPPED | OUTPATIENT
Start: 2024-09-24 | End: 2025-09-24

## 2024-09-24 RX ORDER — ICOSAPENT ETHYL 1 G/1
2 CAPSULE ORAL 2 TIMES DAILY
Qty: 360 CAPSULE | Refills: 3 | Status: SHIPPED | OUTPATIENT
Start: 2024-09-24 | End: 2024-09-27 | Stop reason: SDUPTHER

## 2024-09-24 RX ORDER — CLOPIDOGREL BISULFATE 75 MG/1
75 TABLET ORAL DAILY
Qty: 90 TABLET | Refills: 3 | Status: SHIPPED | OUTPATIENT
Start: 2024-09-24

## 2024-09-24 RX ORDER — METHYLPREDNISOLONE 4 MG/1
TABLET ORAL
Qty: 1 EACH | Refills: 0 | Status: SHIPPED | OUTPATIENT
Start: 2024-09-24 | End: 2024-09-24 | Stop reason: SDUPTHER

## 2024-09-24 RX ORDER — METHYLPREDNISOLONE 4 MG/1
TABLET ORAL
Qty: 1 EACH | Refills: 0 | Status: SHIPPED | OUTPATIENT
Start: 2024-09-24

## 2024-09-24 RX ORDER — LEVOTHYROXINE SODIUM 75 UG/1
TABLET ORAL
Qty: 90 TABLET | Refills: 3 | Status: SHIPPED | OUTPATIENT
Start: 2024-09-24

## 2024-09-24 RX ORDER — METOPROLOL TARTRATE 25 MG/1
12.5 TABLET, FILM COATED ORAL 2 TIMES DAILY
Qty: 90 TABLET | Refills: 3 | Status: SHIPPED | OUTPATIENT
Start: 2024-09-24

## 2024-09-24 RX ORDER — TAMSULOSIN HYDROCHLORIDE 0.4 MG/1
1 CAPSULE ORAL DAILY
Qty: 90 CAPSULE | Refills: 3 | Status: SHIPPED | OUTPATIENT
Start: 2024-09-24

## 2024-09-24 RX ORDER — OLMESARTAN MEDOXOMIL 40 MG/1
40 TABLET ORAL DAILY
Qty: 90 TABLET | Refills: 3 | Status: SHIPPED | OUTPATIENT
Start: 2024-09-24 | End: 2025-09-24

## 2024-09-24 RX ORDER — AMLODIPINE BESYLATE 2.5 MG/1
2.5 TABLET ORAL DAILY
Qty: 90 TABLET | Refills: 3 | Status: SHIPPED | OUTPATIENT
Start: 2024-09-24 | End: 2025-09-24
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
## 2024-09-25 ENCOUNTER — OFFICE VISIT (OUTPATIENT)
Dept: UROLOGY | Facility: CLINIC | Age: 74
End: 2024-09-25
Payer: MEDICARE

## 2024-09-25 VITALS — WEIGHT: 230.94 LBS | BODY MASS INDEX: 33.14 KG/M2

## 2024-09-25 DIAGNOSIS — E03.9 HYPOTHYROIDISM, UNSPECIFIED TYPE: Chronic | ICD-10-CM

## 2024-09-25 DIAGNOSIS — I25.118 CORONARY ARTERY DISEASE OF NATIVE ARTERY OF NATIVE HEART WITH STABLE ANGINA PECTORIS: ICD-10-CM

## 2024-09-25 DIAGNOSIS — E78.2 MIXED HYPERLIPIDEMIA: Chronic | ICD-10-CM

## 2024-09-25 DIAGNOSIS — N50.819 PAIN IN TESTICLE, UNSPECIFIED LATERALITY: Primary | ICD-10-CM

## 2024-09-25 DIAGNOSIS — I10 ESSENTIAL HYPERTENSION: Chronic | ICD-10-CM

## 2024-09-25 PROCEDURE — 3066F NEPHROPATHY DOC TX: CPT | Mod: HCNC,CPTII,S$GLB, | Performed by: STUDENT IN AN ORGANIZED HEALTH CARE EDUCATION/TRAINING PROGRAM

## 2024-09-25 PROCEDURE — 3044F HG A1C LEVEL LT 7.0%: CPT | Mod: HCNC,CPTII,S$GLB, | Performed by: STUDENT IN AN ORGANIZED HEALTH CARE EDUCATION/TRAINING PROGRAM

## 2024-09-25 PROCEDURE — 99213 OFFICE O/P EST LOW 20 MIN: CPT | Mod: HCNC,S$GLB,, | Performed by: STUDENT IN AN ORGANIZED HEALTH CARE EDUCATION/TRAINING PROGRAM

## 2024-09-25 PROCEDURE — 4010F ACE/ARB THERAPY RXD/TAKEN: CPT | Mod: HCNC,CPTII,S$GLB, | Performed by: STUDENT IN AN ORGANIZED HEALTH CARE EDUCATION/TRAINING PROGRAM

## 2024-09-25 PROCEDURE — 1101F PT FALLS ASSESS-DOCD LE1/YR: CPT | Mod: HCNC,CPTII,S$GLB, | Performed by: STUDENT IN AN ORGANIZED HEALTH CARE EDUCATION/TRAINING PROGRAM

## 2024-09-25 PROCEDURE — 87086 URINE CULTURE/COLONY COUNT: CPT | Mod: HCNC | Performed by: STUDENT IN AN ORGANIZED HEALTH CARE EDUCATION/TRAINING PROGRAM

## 2024-09-25 PROCEDURE — 3061F NEG MICROALBUMINURIA REV: CPT | Mod: HCNC,CPTII,S$GLB, | Performed by: STUDENT IN AN ORGANIZED HEALTH CARE EDUCATION/TRAINING PROGRAM

## 2024-09-25 PROCEDURE — 1160F RVW MEDS BY RX/DR IN RCRD: CPT | Mod: HCNC,CPTII,S$GLB, | Performed by: STUDENT IN AN ORGANIZED HEALTH CARE EDUCATION/TRAINING PROGRAM

## 2024-09-25 PROCEDURE — 1125F AMNT PAIN NOTED PAIN PRSNT: CPT | Mod: HCNC,CPTII,S$GLB, | Performed by: STUDENT IN AN ORGANIZED HEALTH CARE EDUCATION/TRAINING PROGRAM

## 2024-09-25 PROCEDURE — 3008F BODY MASS INDEX DOCD: CPT | Mod: HCNC,CPTII,S$GLB, | Performed by: STUDENT IN AN ORGANIZED HEALTH CARE EDUCATION/TRAINING PROGRAM

## 2024-09-25 PROCEDURE — 99999 PR PBB SHADOW E&M-EST. PATIENT-LVL IV: CPT | Mod: PBBFAC,HCNC,, | Performed by: STUDENT IN AN ORGANIZED HEALTH CARE EDUCATION/TRAINING PROGRAM

## 2024-09-25 PROCEDURE — 3288F FALL RISK ASSESSMENT DOCD: CPT | Mod: HCNC,CPTII,S$GLB, | Performed by: STUDENT IN AN ORGANIZED HEALTH CARE EDUCATION/TRAINING PROGRAM

## 2024-09-25 PROCEDURE — 1159F MED LIST DOCD IN RCRD: CPT | Mod: HCNC,CPTII,S$GLB, | Performed by: STUDENT IN AN ORGANIZED HEALTH CARE EDUCATION/TRAINING PROGRAM

## 2024-09-25 RX ORDER — DOXYCYCLINE HYCLATE 100 MG
100 TABLET ORAL 2 TIMES DAILY
Qty: 20 TABLET | Refills: 0 | Status: SHIPPED | OUTPATIENT
Start: 2024-09-25 | End: 2024-10-05

## 2024-09-25 NOTE — PROGRESS NOTES
Patient ID: Clinton Rosenberg is a 73 y.o. male.    Chief Complaint: Testicle Pain    Referral: No referring provider defined for this encounter.     HPI  73 y.o. who presents to the Urology clinic for evaluation of painful swelling of R groin/scrotum. Patient denies trauma to scrotum. Notes usual heavy lifting. Denies nausea, vomiting, hematuria. Patient denies issues with voiding. No perineal pain or discomfort.  58 g prostate. Follows for BPH. Thre is FH of RCC.   Medically Necessary ROS documented in HPI    Past Medical History  Active Ambulatory Problems     Diagnosis Date Noted    Mixed hyperlipidemia long term DAPT 07/09/2014    Essential hypertension 07/09/2014    Gastroesophageal reflux disease without esophagitis 07/09/2014    Benign non-nodular prostatic hyperplasia with lower urinary tract symptoms 07/09/2014    Vitamin D deficiency 07/09/2014    Hernia of abdominal wall 07/09/2014    Coronary artery disease of native artery of native heart with stable angina pectoris; 2021 plan is long term DAPT 11/19/2014    Anxiety and depression with assoc memory loss; seen by neuro 2015, negative workup 11/24/2014    Hypothyroidism 06/01/2016    Obesity (BMI 30.0-34.9) 06/01/2016    Aortic ectasia 04/28/2017    JAYLEN (obstructive sleep apnea) 04/28/2017    Peripheral vascular disease with LE US 6/2017 and carotid US  06/20/2017    Aortic root dilatation on TTE 8/2019 11/14/2017    History of concussion 1/2018 head CT negative 01/31/2018    Venous insufficiency 05/10/2018    History of shingles 5/2018 05/20/2018    Tubular adenoma of colon 2/2009; 10/26/23 colonscopy 3 mm sigmoid HP 10/11/2018    Allergy to iodine 08/27/2019    Nuclear sclerosis of both eyes 05/20/2021    History of COVID-19 02/25/2022    Chronic midline low back pain without sciatica 10/26/2022    Refractive error 03/31/2023    Type 2 diabetes mellitus with other specified complication 04/27/2023    Bradycardia 04/27/2023    Diabetes mellitus type 2  without retinopathy 06/13/2024    Posterior vitreous detachment, right 06/13/2024    Vitreomacular adhesion, left 06/13/2024    Age-related nuclear cataract of both eyes 06/13/2024     Resolved Ambulatory Problems     Diagnosis Date Noted    Myocardial infarct, old 07/09/2014    Angina effort 07/09/2014    Other malaise and fatigue 11/24/2014    Chronic cough 11/24/2014    Dysuria 11/24/2014    Dysarthria 11/24/2014    Aphasia 11/24/2014    Mild cognitive impairment, so stated 11/24/2014    Retrocalcaneal bursitis 11/24/2014    Prediabetes 11/24/2014    Other abnormal glucose 11/24/2014    Knee pain 11/24/2014    Memory difficulties 11/24/2014    Attention and concentration deficit 01/27/2015    Headache 01/27/2015    Chest pain 05/16/2018    Pleurisy 05/16/2018    Coronary artery disease of native artery of native heart with stable angina pectoris 02/21/2019    Pneumonia due to COVID-19 virus 01/31/2022     Past Medical History:   Diagnosis Date    Allergy     Angina pectoris     Anxiety     Cancer     Coronary artery disease     Depression     Eye injury as a teen    GERD (gastroesophageal reflux disease)     Hyperlipidemia     Hypertension     Memory loss     Myocardial infarction     Obesity     Sleep apnea          Past Surgical History  Past Surgical History:   Procedure Laterality Date    APPENDECTOMY  1986    bone spur Right     COLONOSCOPY      COLONOSCOPY N/A 10/26/2023    Procedure: COLONOSCOPY;  Surgeon: Argelia Altamirano MD;  Location: [facility];  Service: Endoscopy;  Laterality: N/A;  Referred by: Dr. Michael Gonzalez  BT/Clearance: ok to hold Plavix 5 days per Dr Bahena-GT  Prep: golytely  Route instructions sent: [id number]  8/3 proc jessica to 10/26, pt has prep and instr  10/19- pre call complete.  DBM    ESOPHAGOGASTRODUODENOSCOPY N/A 8/2/2024    Procedure: EGD (ESOPHAGOGASTRODUODENOSCOPY);  Surgeon: Argelia Altamirano MD;  Location: [facility];  Service: Endoscopy;  Laterality: N/A;  Dr. Porter,  Urgent EGD, abdominal pain, standard prep, Instr to portal. Plavix hold pending.  ok to hold Plavix 5 days per Dr Bahena-GT  7/26/24- lvm/portal for pc. DBM  7/30 pt confirmed. has been holding plavix since 7/27 cf  7/30/24- pc complete. DBM    hand trauma Right     pencil     heart stent      LEFT HEART CATHETERIZATION Left 9/20/2019    Procedure: Left heart cath 12 pm start, R rad access;  Surgeon: Brad Bahena MD;  Location: [facility] CATH LAB;  Service: Cardiology;  Laterality: Left;  RN PREOP 9/13/2019  NEEDS IODINE PREMED    LEFT HEART CATHETERIZATION Left 9/21/2020    Procedure: Left heart cath 730am start, R rad access;  Surgeon: Brad Bahena MD;  Location: [facility] CATH LAB;  Service: Cardiology;  Laterality: Left;  RN PREOP 9/14/2020, COVID NEGATIVE---9/18       Social History       Medications    Current Outpatient Medications:     amLODIPine (NORVASC) 2.5 MG tablet, Take 1 tablet (2.5 mg total) by mouth once daily., Disp: 90 tablet, Rfl: 3    aspirin (ECOTRIN) 81 MG EC tablet, Take 1 tablet (81 mg total) by mouth once daily., Disp: 90 tablet, Rfl: 3    atorvastatin (LIPITOR) 80 MG tablet, Take 1 tablet (80 mg total) by mouth every evening., Disp: 90 tablet, Rfl: 3    ciclopirox (LOPROX) 0.77 % Crea, Apply topically 2 (two) times daily., Disp: 90 g, Rfl: 4    clopidogreL (PLAVIX) 75 mg tablet, Take 1 tablet (75 mg total) by mouth once daily., Disp: 90 tablet, Rfl: 3    clotrimazole-betamethasone 1-0.05% (LOTRISONE) cream, Apply topically 2 (two) times daily., Disp: 45 g, Rfl: 0    diclofenac sodium (VOLTAREN) 1 % Gel, Apply the gel (2 g) to the affected area 4 times daily. Do not apply more than 8 g daily to any one affected joint, Disp: 100 g, Rfl: 1    furosemide (LASIX) 20 MG tablet, Take 1 tablet (20 mg total) by mouth once daily., Disp: 90 tablet, Rfl: 3    gabapentin (NEURONTIN) 300 MG capsule, Take 1 capsule (300 mg total) by mouth every evening., Disp: 90 capsule, Rfl: 3    icosapent ethyL  (VASCEPA) 1 gram Cap, Take 2 capsules (2,000 mg total) by mouth 2 (two) times a day., Disp: 360 capsule, Rfl: 3    isosorbide mononitrate (IMDUR) 120 MG 24 hr tablet, Take 2 tablets (240 mg total) by mouth once daily., Disp: 180 tablet, Rfl: 3    KRILL/OM3/DHA/EPA/OM6/LIP/ASTX (KRILL OIL, OMEGA 3 AND 6, ORAL), Take by mouth., Disp: , Rfl:     levothyroxine (SYNTHROID) 75 MCG tablet, TAKE 1 TABLET BEFORE BREAKFAST, Disp: 90 tablet, Rfl: 3    metFORMIN (GLUCOPHAGE-XR) 500 MG ER 24hr tablet, 1/2 tab BID, Disp: 90 tablet, Rfl: 3    methylPREDNISolone (MEDROL DOSEPACK) 4 mg tablet, use as directed, Disp: 1 each, Rfl: 0    metoprolol tartrate (LOPRESSOR) 25 MG tablet, Take 0.5 tablets (12.5 mg total) by mouth 2 (two) times daily., Disp: 90 tablet, Rfl: 3    nitroGLYCERIN (NITROSTAT) 0.4 MG SL tablet, Place 1 tablet (0.4 mg total) under the tongue every 5 (five) minutes as needed for Chest pain., Disp: 50 tablet, Rfl: 3    olmesartan (BENICAR) 40 MG tablet, Take 1 tablet (40 mg total) by mouth once daily., Disp: 90 tablet, Rfl: 3    pantoprazole (PROTONIX) 40 MG tablet, Take 1 tablet (40 mg total) by mouth 2 (two) times daily., Disp: 180 tablet, Rfl: 3    penicillin v potassium (VEETID) 500 MG tablet, TAKE 1 TABLET BY MOUTH 4 TIMES DAILY, Disp: , Rfl:     tamsulosin (FLOMAX) 0.4 mg Cap, Take 1 capsule (0.4 mg total) by mouth once daily., Disp: 90 capsule, Rfl: 3    vitamin D 1000 units Tab, Take 1 tablet (1,000 Units total) by mouth once daily., Disp: 90 tablet, Rfl: 3    doxycycline (VIBRA-TABS) 100 MG tablet, Take 1 tablet (100 mg total) by mouth 2 (two) times daily. for 10 days, Disp: 20 tablet, Rfl: 0    ketoconazole (NIZORAL) 2 % cream, Apply topically 2 (two) times daily. for 14 days, Disp: 60 g, Rfl: 0    nystatin (MYCOSTATIN) powder, Apply topically 4 (four) times daily as needed (to keep penis/scrotum dry)., Disp: 15 g, Rfl: 1    Allergies  Review of patient's allergies indicates:   Allergen Reactions    Iodine  and iodide containing products Anaphylaxis    Naproxen Other (See Comments)     hallucinations  Hallucinations^  Hallucinations^    Hydrocodone-acetaminophen      Rash (skin)^    Iodine      Anaphylaxis^    Oxycodone-acetaminophen      Rash (skin)^       Patient's PMH, FH, Social hx, Medications, allergies reviewed and updated as pertinent to today's visit    Objective:      Physical Exam  Constitutional:       General: He is not in acute distress.     Appearance: He is well-developed. He is not ill-appearing, toxic-appearing or diaphoretic.   HENT:      Head: Normocephalic and atraumatic.      Mouth/Throat:      Mouth: Mucous membranes are moist.   Eyes:      Conjunctiva/sclera: Conjunctivae normal.   Pulmonary:      Effort: Pulmonary effort is normal. No respiratory distress.   Abdominal:      General: Abdomen is flat. There is no distension.      Palpations: Abdomen is soft. There is no mass.      Tenderness: There is no right CVA tenderness or left CVA tenderness.   Genitourinary:     Penis: Normal.       Comments: R epididymal tenderness, no significant swelling bilaterally  No perineal tenderness  Musculoskeletal:         General: No swelling or deformity.      Cervical back: Neck supple.   Skin:     Findings: No rash.   Neurological:      Mental Status: He is alert and oriented to person, place, and time.      Gait: Gait normal.   Psychiatric:         Mood and Affect: Mood normal.         Thought Content: Thought content normal.         Judgment: Judgment normal.             Lab Results   Component Value Date    PSADIAG 0.96 04/12/2023    PSADIAG 1.1 10/11/2021        Assessment:       1. Pain in testicle, unspecified laterality        Plan:     Ddx epididymitis, orchitis, hydrocele, urinary tract infection    Urine culture  Doxycycline rx provided  Scrotal US to eval for current symptoms

## 2024-09-25 NOTE — TELEPHONE ENCOUNTER
No care due was identified.  [facility] Embedded Care Due Messages. Reference number: [id number].   9/25/2024 1:44:41 PM CDT

## 2024-09-26 RX ORDER — OLMESARTAN MEDOXOMIL 40 MG/1
40 TABLET ORAL
Qty: 90 TABLET | Refills: 3 | OUTPATIENT
Start: 2024-09-26

## 2024-09-26 RX ORDER — ATORVASTATIN CALCIUM 80 MG/1
80 TABLET, FILM COATED ORAL NIGHTLY
Qty: 90 TABLET | Refills: 3 | OUTPATIENT
Start: 2024-09-26

## 2024-09-26 RX ORDER — CLOPIDOGREL BISULFATE 75 MG/1
75 TABLET ORAL
Qty: 90 TABLET | Refills: 3 | OUTPATIENT
Start: 2024-09-26

## 2024-09-26 RX ORDER — LEVOTHYROXINE SODIUM 75 UG/1
TABLET ORAL
Qty: 90 TABLET | Refills: 3 | OUTPATIENT
Start: 2024-09-26

## 2024-09-26 NOTE — TELEPHONE ENCOUNTER
Refill Decision Note   Clinton Rosenberg  is requesting a refill authorization.  Brief Assessment and Rationale for Refill:  Quick Discontinue     Medication Therapy Plan:  Receipt confirmed by pharmacy (9/24/2024 11:26 AM CDT)      Comments:     Note composed:8:26 AM 09/26/2024

## 2024-09-27 ENCOUNTER — TELEPHONE (OUTPATIENT)
Dept: FAMILY MEDICINE | Facility: CLINIC | Age: 74
End: 2024-09-27
Payer: MEDICARE

## 2024-09-27 DIAGNOSIS — E78.2 MIXED HYPERLIPIDEMIA: Chronic | ICD-10-CM

## 2024-09-27 LAB — BACTERIA UR CULT: NO GROWTH

## 2024-09-27 RX ORDER — ICOSAPENT ETHYL 1000 MG/1
2 CAPSULE ORAL 2 TIMES DAILY
Qty: 360 CAPSULE | Refills: 3 | Status: SHIPPED | OUTPATIENT
Start: 2024-09-27

## 2024-09-27 NOTE — TELEPHONE ENCOUNTER
"Icosapent ethyl is the same as vascepa. I will send in as "JOSSUE" but I suspect it's going to cost the same.  "

## 2024-09-30 ENCOUNTER — HOSPITAL ENCOUNTER (OUTPATIENT)
Dept: RADIOLOGY | Facility: HOSPITAL | Age: 74
Discharge: HOME | End: 2024-09-30
Attending: STUDENT IN AN ORGANIZED HEALTH CARE EDUCATION/TRAINING PROGRAM
Payer: MEDICARE

## 2024-09-30 DIAGNOSIS — N50.819 PAIN IN TESTICLE, UNSPECIFIED LATERALITY: ICD-10-CM

## 2024-09-30 PROCEDURE — 76870 US EXAM SCROTUM: CPT | Mod: TC,HCNC

## 2024-09-30 PROCEDURE — 76870 US EXAM SCROTUM: CPT | Mod: 26,HCNC,, | Performed by: RADIOLOGY

## 2024-10-01 ENCOUNTER — TELEPHONE (OUTPATIENT)
Dept: UROLOGY | Facility: CLINIC | Age: 74
End: 2024-10-01
Payer: MEDICARE

## 2024-10-01 NOTE — TELEPHONE ENCOUNTER
Patients spouse has been contacted about results & also says if Patient has any questions he will give the clinic a call.    ----- Message from Robles Orona MD sent at 10/1/2024  9:12 AM CDT -----  No evidence of infection. epididymal cysts are not considered abnormal, they can periodically become inflamed causing discomfort. scrotal support encouraged. Pls alert pt

## 2024-10-01 NOTE — PROGRESS NOTES
No evidence of infection. epididymal cysts are not considered abnormal, they can periodically become inflamed causing discomfort. scrotal support encouraged. Pls alert pt

## 2024-11-04 ENCOUNTER — LAB VISIT (OUTPATIENT)
Dept: LAB | Facility: HOSPITAL | Age: 74
End: 2024-11-04
Attending: INTERNAL MEDICINE
Payer: MEDICARE

## 2024-11-04 DIAGNOSIS — E03.9 HYPOTHYROIDISM, UNSPECIFIED TYPE: Chronic | ICD-10-CM

## 2024-11-04 DIAGNOSIS — E11.69 TYPE 2 DIABETES MELLITUS WITH OTHER SPECIFIED COMPLICATION, WITHOUT LONG-TERM CURRENT USE OF INSULIN: ICD-10-CM

## 2024-11-04 DIAGNOSIS — E78.2 MIXED HYPERLIPIDEMIA: Chronic | ICD-10-CM

## 2024-11-04 LAB
ALBUMIN SERPL BCP-MCNC: 4.1 G/DL (ref 3.5–5.2)
ALP SERPL-CCNC: 56 U/L (ref 40–150)
ALT SERPL W/O P-5'-P-CCNC: 22 U/L (ref 10–44)
ANION GAP SERPL CALC-SCNC: 11 MMOL/L (ref 8–16)
AST SERPL-CCNC: 17 U/L (ref 10–40)
BILIRUB SERPL-MCNC: 0.8 MG/DL (ref 0.1–1)
BUN SERPL-MCNC: 15 MG/DL (ref 8–23)
CALCIUM SERPL-MCNC: 9.5 MG/DL (ref 8.7–10.5)
CHLORIDE SERPL-SCNC: 106 MMOL/L (ref 95–110)
CHOLEST SERPL-MCNC: 119 MG/DL (ref 120–199)
CHOLEST/HDLC SERPL: 3.2 {RATIO} (ref 2–5)
CO2 SERPL-SCNC: 25 MMOL/L (ref 23–29)
CREAT SERPL-MCNC: 1.2 MG/DL (ref 0.5–1.4)
EST. GFR  (NO RACE VARIABLE): >60 ML/MIN/1.73 M^2
ESTIMATED AVG GLUCOSE: 137 MG/DL (ref 68–131)
GLUCOSE SERPL-MCNC: 155 MG/DL (ref 70–110)
HBA1C MFR BLD: 6.4 % (ref 4–5.6)
HDLC SERPL-MCNC: 37 MG/DL (ref 40–75)
HDLC SERPL: 31.1 % (ref 20–50)
LDLC SERPL CALC-MCNC: 30.6 MG/DL (ref 63–159)
NONHDLC SERPL-MCNC: 82 MG/DL
POTASSIUM SERPL-SCNC: 4.9 MMOL/L (ref 3.5–5.1)
PROT SERPL-MCNC: 7 G/DL (ref 6–8.4)
SODIUM SERPL-SCNC: 142 MMOL/L (ref 136–145)
TRIGL SERPL-MCNC: 257 MG/DL (ref 30–150)
TSH SERPL DL<=0.005 MIU/L-ACNC: 3.7 UIU/ML (ref 0.4–4)

## 2024-11-04 PROCEDURE — 36415 COLL VENOUS BLD VENIPUNCTURE: CPT | Mod: HCNC,PO | Performed by: INTERNAL MEDICINE

## 2024-11-04 PROCEDURE — 83036 HEMOGLOBIN GLYCOSYLATED A1C: CPT | Mod: HCNC | Performed by: INTERNAL MEDICINE

## 2024-11-04 PROCEDURE — 80061 LIPID PANEL: CPT | Mod: HCNC | Performed by: INTERNAL MEDICINE

## 2024-11-04 PROCEDURE — 84443 ASSAY THYROID STIM HORMONE: CPT | Mod: HCNC | Performed by: INTERNAL MEDICINE

## 2024-11-04 PROCEDURE — 80053 COMPREHEN METABOLIC PANEL: CPT | Mod: HCNC | Performed by: INTERNAL MEDICINE

## 2024-11-12 NOTE — PROGRESS NOTES
This note was created by combination of typed  and M-Modal dictation.  Transcription errors may be present.   This note was also generated with the assistance of ambient listening technology. Verbal consent was obtained by the patient and accompanying visitor(s) for the recording of patient appointment to facilitate this note. I attest to having reviewed and edited the generated note for accuracy, though some syntax or spelling errors may persist. Please contact the author of this note for any clarification.    Assessment and Plan:   Assessment and Plan   Plantar fasciitis of right foot  -wears cushion shoes, continue, encouraged to get arch supports to further help.  Offered him referral to Podiatry he declined    Diabetes mellitus type 2 without retinopathy  Obesity (BMI 30.0-34.9)  -neuropathy from chronic low back pain versus diabetes.  On gabapentin   Pre visit labs A1c good on metformin.  Metformin XR half tablet b.i.d., not supposed to split it, so change to 1 whole tablet once daily  -     Comprehensive Metabolic Panel; Future; Expected date: 05/11/2025  -     Lipid Panel; Future; Expected date: 05/11/2025  -     Hemoglobin A1C; Future; Expected date: 05/11/2025  -     Microalbumin/Creatinine Ratio, Urine; Future; Expected date: 05/11/2025  -     CBC Without Differential; Future; Expected date: 05/12/2025  -     metFORMIN (GLUCOPHAGE-XR) 500 MG ER 24hr tablet; Take 1 tablet (500 mg total) by mouth once daily. 1/2 tab BID  Dispense: 90 tablet; Refill: 3    Essential hypertension  Peripheral edema  Venous insufficiency  -no edema today.  Blood pressure today is good.  No changes updated prescription for amlodipine, metoprolol, olmesartan, Lasix  -     amLODIPine (NORVASC) 2.5 MG tablet; Take 1 tablet (2.5 mg total) by mouth once daily.  Dispense: 90 tablet; Refill: 3  -     metoprolol tartrate (LOPRESSOR) 25 MG tablet; Take 0.5 tablets (12.5 mg total) by mouth 2 (two) times daily.  Dispense: 90  tablet; Refill: 3  -     olmesartan (BENICAR) 40 MG tablet; Take 1 tablet (40 mg total) by mouth once daily.  Dispense: 90 tablet; Refill: 3  -     furosemide (LASIX) 20 MG tablet; Take 1 tablet (20 mg total) by mouth once daily.  Dispense: 90 tablet; Refill: 3      Mixed hyperlipidemia long term DAPT  Coronary artery disease of native artery of native heart with stable angina pectoris; 2021 plan is long term DAPT  Peripheral vascular disease with LE US 6/2017 and carotid US   -pre visit labs reviewed triglycerides high HDL low.  On Vascepa.  Some modest improvement in triglycerides.  No changes.  Stay on atorvastatin on Plavix   Isosorbide   Metoprolol.  Has been reluctant to take nitroglycerin and had episode of what sounds concerning for anginal equivalent yesterday.  Strongly encouraged to take nitroglycerin when this recurs.  Reminded that if this persists despite 3 doses Q 5 minutes, he needs to go to ED and he is aware of this  -     VASCEPA 1 gram Cap; Take 2 capsules (2,000 mg total) by mouth 2 (two) times a day.  Dispense: 360 capsule; Refill: 3  -     atorvastatin (LIPITOR) 80 MG tablet; Take 1 tablet (80 mg total) by mouth every evening.  Dispense: 90 tablet; Refill: 3  -     clopidogreL (PLAVIX) 75 mg tablet; Take 1 tablet (75 mg total) by mouth once daily.  Dispense: 90 tablet; Refill: 3  -     isosorbide mononitrate (IMDUR) 120 MG 24 hr tablet; Take 2 tablets (240 mg total) by mouth once daily.  Dispense: 180 tablet; Refill: 3  -     metoprolol tartrate (LOPRESSOR) 25 MG tablet; Take 0.5 tablets (12.5 mg total) by mouth 2 (two) times daily.  Dispense: 90 tablet; Refill: 3  -     nitroGLYCERIN (NITROSTAT) 0.4 MG SL tablet; Place 1 tablet (0.4 mg total) under the tongue every 5 (five) minutes as needed for Chest pain.  Dispense: 50 tablet; Refill: 3    Aortic ectasia  -followed by Cardiology, plan is follow up with Cardiology with surveillance echo 04/2025    Hypothyroidism, unspecified type  -pre visit  labs good updated prescription for levothyroxine check future surveillance TSH  -     TSH; Future; Expected date: 2025  -     levothyroxine (SYNTHROID) 75 MCG tablet; TAKE 1 TABLET BEFORE BREAKFAST  Dispense: 90 tablet; Refill: 3    Gastroesophageal reflux disease without esophagitis  -GI had increase his PPI to b.i.d. pending EGD, subsequent EGD was unremarkable, plan was to revert back to once daily.  It sounds like he has been taking it twice daily.  Referred back to once daily  -     pantoprazole (PROTONIX) 40 MG tablet; Take 1 tablet (40 mg total) by mouth once daily.  Dispense: 90 tablet; Refill: 3    JAYLEN (obstructive sleep apnea)    Neurogenic claudication  Lumbar radiculopathy, chronic  -neurogenic claudication verses diabetic neuropathy.  Stable on gabapentin  -     gabapentin (NEURONTIN) 300 MG capsule; Take 1 capsule (300 mg total) by mouth every evening.  Dispense: 90 capsule; Refill: 3    Lower urinary tract symptoms (LUTS)  Screening for prostate cancer  -stable on Flomax update prescription  -     tamsulosin (FLOMAX) 0.4 mg Cap; Take 1 capsule (0.4 mg total) by mouth once daily.  Dispense: 90 capsule; Refill: 3  -     PSA, Screening; Future; Expected date: 2025    Tubular adenoma of colon 2009; 10/26/23 colonscopy 3 mm sigmoid HP  -repeat colonoscopy due      granddaughter yesterday.  He updated his Tdap shot and flu shot accordingly.      Medications Discontinued During This Encounter   Medication Reason    methylPREDNISolone (MEDROL DOSEPACK) 4 mg tablet Therapy completed    pantoprazole (PROTONIX) 40 MG tablet Reorder    amLODIPine (NORVASC) 2.5 MG tablet Reorder    atorvastatin (LIPITOR) 80 MG tablet Reorder    clopidogreL (PLAVIX) 75 mg tablet Reorder    furosemide (LASIX) 20 MG tablet Reorder    gabapentin (NEURONTIN) 300 MG capsule Reorder    isosorbide mononitrate (IMDUR) 120 MG 24 hr tablet Reorder    levothyroxine (SYNTHROID) 75 MCG tablet Reorder    metFORMIN  (GLUCOPHAGE-XR) 500 MG ER 24hr tablet Reorder    metoprolol tartrate (LOPRESSOR) 25 MG tablet Reorder    olmesartan (BENICAR) 40 MG tablet Reorder    tamsulosin (FLOMAX) 0.4 mg Cap Reorder    VASCEPA 1 gram Cap Reorder    nitroGLYCERIN (NITROSTAT) 0.4 MG SL tablet Reorder    pantoprazole (PROTONIX) 40 MG tablet        meds sent this encounter:  Medications Ordered This Encounter   Medications    amLODIPine (NORVASC) 2.5 MG tablet     Sig: Take 1 tablet (2.5 mg total) by mouth once daily.     Dispense:  90 tablet     Refill:  3     Pharmacy update refills, keep on file, not requesting Rx to be filled today.    atorvastatin (LIPITOR) 80 MG tablet     Sig: Take 1 tablet (80 mg total) by mouth every evening.     Dispense:  90 tablet     Refill:  3     Pharmacy update refills, keep on file, not requesting Rx to be filled today.    clopidogreL (PLAVIX) 75 mg tablet     Sig: Take 1 tablet (75 mg total) by mouth once daily.     Dispense:  90 tablet     Refill:  3     Pharmacy update refills, keep on file, not requesting Rx to be filled today.    furosemide (LASIX) 20 MG tablet     Sig: Take 1 tablet (20 mg total) by mouth once daily.     Dispense:  90 tablet     Refill:  3     Pharmacy update refills, keep on file, not requesting Rx to be filled today.    gabapentin (NEURONTIN) 300 MG capsule     Sig: Take 1 capsule (300 mg total) by mouth every evening.     Dispense:  90 capsule     Refill:  3     Pharmacy update refills, keep on file, not requesting Rx to be filled today.    isosorbide mononitrate (IMDUR) 120 MG 24 hr tablet     Sig: Take 2 tablets (240 mg total) by mouth once daily.     Dispense:  180 tablet     Refill:  3     Pharmacy update refills, keep on file, not requesting Rx to be filled today.    levothyroxine (SYNTHROID) 75 MCG tablet     Sig: TAKE 1 TABLET BEFORE BREAKFAST     Dispense:  90 tablet     Refill:  3     Pharmacy update refills, keep on file, not requesting Rx to be filled today.    metFORMIN  (GLUCOPHAGE-XR) 500 MG ER 24hr tablet     Sig: Take 1 tablet (500 mg total) by mouth once daily. 1/2 tab BID     Dispense:  90 tablet     Refill:  3     Pharmacy update refills, keep on file, not requesting Rx to be filled today.    metoprolol tartrate (LOPRESSOR) 25 MG tablet     Sig: Take 0.5 tablets (12.5 mg total) by mouth 2 (two) times daily.     Dispense:  90 tablet     Refill:  3     Pharmacy update refills, keep on file, not requesting Rx to be filled today.    nitroGLYCERIN (NITROSTAT) 0.4 MG SL tablet     Sig: Place 1 tablet (0.4 mg total) under the tongue every 5 (five) minutes as needed for Chest pain.     Dispense:  50 tablet     Refill:  3     Pharmacy update refills, keep on file, not requesting Rx to be filled today.    olmesartan (BENICAR) 40 MG tablet     Sig: Take 1 tablet (40 mg total) by mouth once daily.     Dispense:  90 tablet     Refill:  3     Pharmacy update refills, keep on file, not requesting Rx to be filled today.    pantoprazole (PROTONIX) 40 MG tablet     Sig: Take 1 tablet (40 mg total) by mouth once daily.     Dispense:  90 tablet     Refill:  3     Decreased dose    tamsulosin (FLOMAX) 0.4 mg Cap     Sig: Take 1 capsule (0.4 mg total) by mouth once daily.     Dispense:  90 capsule     Refill:  3     Pharmacy update refills, keep on file, not requesting Rx to be filled today.    VASCEPA 1 gram Cap     Sig: Take 2 capsules (2,000 mg total) by mouth 2 (two) times a day.     Dispense:  360 capsule     Refill:  3     Pharmacy update refills, keep on file, not requesting Rx to be filled today.         Follow Up:  Six-month follow-up with labs  Future Appointments   Date Time Provider Department Center   11/13/2024  3:40 PM Michael Gonzalez MD [facility]   11/22/2024  1:15 PM Ruby Alcantara OD [facility] [place]          Subjective:   Subjective   Chief Complaint   Patient presents with    Foot Pain     Right foot       HPI  Clinton is a 74 y.o. male.     Social History     Tobacco  Use    Smoking status: Never     Passive exposure: Never    Smokeless tobacco: Never   Substance Use Topics    Alcohol use: Not Currently     Comment: rarely          Social History     Social History Narrative    Not on file       Patient Care Team:  Michael Gonzalez MD as PCP - General (Internal Medicine)  [place], [facility] Fre[facility]  Andreas Henriquez MD as Consulting Physician (Dermatology)  Brad Bahena MD as Consulting Physician (Cardiology)  Michael Gonzalez MD as Hyperlipidemia Digital Medicine Responsible Provider (Internal Medicine)  Shiloh Polk PharmD as Hypertension Digital Medicine Clinician  Shiloh Polk PharmD as Hyperlipidemia Digital Medicine Clinician  Michael Gonzalez MD as Hypertension Digital Medicine Responsible Provider (Internal Medicine)  Clara Seaman as Digital Medicine Health   [employer] as Hypertension Digital Medicine Contract  Michael Gonzalez MD as Diabetes Digital Medicine Responsible Provider (Internal Medicine)  Shiloh Polk PharmD as Diabetes Digital Medicine Clinician  [employer] as Diabetes Digital Medicine Contract    Last appointment with this clinic was 9/24/2024. Last visit with me 9/24/2024   To summarize last visit and events leading up to today:  Diabetes range A1c on metformin  Hypothyroid, labs good on higher dose 75  Hypertension not practicing proper home technique; 6/2/23 incr losartan  2/2023 cards added amlodipine  3/2023 TTE grade 1 DD  3/2023 nu stress test neg  4/22/24 TTE LV normal size, mildly increased wall thickness.  Mild concentric hypertrophy.  Normal systolic function LVEF 55-60%.  Grade 1 diastolic dysfunction.  Aortic root mildly dilated 4.05 cm.  Ascending aorta mildly dilated 4.16 cm.  Aortic root dilatation incidental on TTE 04/2024   Saw cards 4/26/24. Aortic root dilitation.  Follow up 1 year with echo  Peripheral edema  3/2023 LE venous US with GSV reflux  bilateral  Coronary artery disease followed by Cardiology.  On statin.    LUTS, Urology; 5/2023 pt opted for med mgmt rather than surgery.   Renal ultrasound showing prostatomegaly  Simple renal cyst; seen by urology - no surveillance needed.  Neurogenic claudication vs peripheral neuropathy  10/26/22 MRI Degenerative disc, facet joint arthropathy, spondylosis most prominent at L3-L4 and L4-5 levels  GERD   trial lower dose 5/10/23   Saw GI 7/18/24 for GERD. Increase protonix 40 BID. Schedule EGD  8/2/24 EGD normal, bx's neg for EE, H pylori  10/26/23 colonscopy 3 mm sigmoid HP  Ventral hernia  UC 3/27/24.  Epigastric pain.   CT abd/pelvis negative. Done for UC visit for abd pain. GI referral was submitted at that time.  Incidental abd aortic atherosclerosis. On high dose statin      Last visit 5/2024  DM2 stable metformin ER 1/2 BID  Chronic blurry vision referred to optometry  HTN masked HTN? Nurse visit for BP check.  CAD, lipid, add vascepa to statin. Followed by cards.  Aortic ectasia, cards, routine monitoring.     ED 5/31/24 for back pain after fall, hitting head.  Head CT, CT C spine, L spine negative.     Optometry 6/13/24  Vitreous detachment, no retinal tear     Saw GI 7/18/24 for GERD. Increase protonix 40 BID. Schedule EGD     8/2/24 EGD normal, bx's neg for EE, H pylori     Due for follow up with cards 4/2025 with echo    Last visit me 09/04/2024   Acute exacerbation of chronic low back pain.  Medrol Dosepak.  On gabapentin next would be tizanidine  Right testicular pain with a known history of hydrocele and varicocele.  Update ultrasound and arrange follow up with Urology  Due for follow-up with Cardiology 04/2025 with echo  GERD increase by GI from once daily to twice daily until EGD.  Subsequent EGD showing no worrisome findings.  Reverted back to once daily use.    09/25/2024 saw Urology.  Epididymitis versus orchitis versus hydrocele versus UTI.  Doxycycline.  09/30/2024 testicular ultrasound  bilateral epididymal cysts and small hydroceles.    Scrotal support.  Not infection.    Pre visit labs   CMP elevated glucose   Lipid good   A1c 6.4   TSH normal    Today's visit:    History of Present Illness    HPI:  His 7th grandchild was born yesterday!  Granddaughter.  He got updated flu shot and Tdap and plans to visit today after his visit with me    Clinton reports pain in the bottom of his right heel and pad for approximately 1 month. The pain has progressively worsened, causing difficulty walking, particularly when waking. It improves slightly after ambulation and weight-bearing. Clinton regularly wears cushioned footwear, a habit predating pain onset. No prior history of similar foot issues. Clinton's brother, diagnosed with plantar fasciitis, noted symptom similarity.    Clinton reports an episode of severe chest pain 1 day ago, which he considered warranting hospital evaluation. The pain resolved spontaneously by today. Clinton did not use his prescribed nitroglycerin despite its availability.    Clinton also notes discomfort in lower abdomen, exacerbated by straining or heavy lifting. He attributes this to recent gardening activities involving moving heavy sacks of soil. The pain is mild but noticeable.  Had CT imaging 04/2024 unremarkable    Clinton denies numbness or burning sensations in feet and current indigestion issues.    MEDICATIONS:  - Plavix (blood thinner)  - Isosorbide, 2 times daily, for blood flow to heart  - Metoprolol, half pill twice daily, for blood pressure and heart  - Atorvastatin, for cholesterol  - Norvasc (Amlodipine), for blood pressure  - Olmesartan, for blood pressure  - Protonix (Pantoprazole), twice daily, for heartburn  - Levothyroxine, daily, for thyroid  - Tamsulosin (Flomax), for prostate  - Gabapentin, for back pain  - Vascepa (omega-3), 2 capsules twice daily  - Lasix (Furosemide), fluid pill  - Metformin, half pill b.i.d., for blood sugar  - Nitroglycerin, as needed for chest pain    MEDICAL  HISTORY:  - Cardiovascular disease  - Diabetes  - Hypothyroidism  - Benign prostatic hyperplasia  - Hyperlipidemia  - Hypertension  - Flu shot received recently.  T-dap (for whooping cough) received recently.  RSV vaccine received recently.    FAMILY HISTORY:  - Brother: Plantar fasciitis      ROS:  Cardiovascular: reports chest pain  Gastrointestinal: reports abdominal pain  Musculoskeletal: reports joint pain  Neurological: no numbness, reports difficulty walking         ALLERGIES AND MEDICATIONS: updated and reviewed.  Medication List with Changes/Refills   Current Medications    AMLODIPINE (NORVASC) 2.5 MG TABLET    Take 1 tablet (2.5 mg total) by mouth once daily.    ASPIRIN (ECOTRIN) 81 MG EC TABLET    Take 1 tablet (81 mg total) by mouth once daily.    ATORVASTATIN (LIPITOR) 80 MG TABLET    Take 1 tablet (80 mg total) by mouth every evening.    CICLOPIROX (LOPROX) 0.77 % CREA    Apply topically 2 (two) times daily.    CLOPIDOGREL (PLAVIX) 75 MG TABLET    Take 1 tablet (75 mg total) by mouth once daily.    CLOTRIMAZOLE-BETAMETHASONE 1-0.05% (LOTRISONE) CREAM    Apply topically 2 (two) times daily.    DICLOFENAC SODIUM (VOLTAREN) 1 % GEL    Apply the gel (2 g) to the affected area 4 times daily. Do not apply more than 8 g daily to any one affected joint    FUROSEMIDE (LASIX) 20 MG TABLET    Take 1 tablet (20 mg total) by mouth once daily.    GABAPENTIN (NEURONTIN) 300 MG CAPSULE    Take 1 capsule (300 mg total) by mouth every evening.    ISOSORBIDE MONONITRATE (IMDUR) 120 MG 24 HR TABLET    Take 2 tablets (240 mg total) by mouth once daily.    KETOCONAZOLE (NIZORAL) 2 % CREAM    Apply topically 2 (two) times daily. for 14 days    KRILL/OM3/DHA/EPA/OM6/LIP/ASTX (KRILL OIL, OMEGA 3 AND 6, ORAL)    Take by mouth.    LEVOTHYROXINE (SYNTHROID) 75 MCG TABLET    TAKE 1 TABLET BEFORE BREAKFAST    METFORMIN (GLUCOPHAGE-XR) 500 MG ER 24HR TABLET    1/2 tab BID    METHYLPREDNISOLONE (MEDROL DOSEPACK) 4 MG TABLET    use as  "directed    METOPROLOL TARTRATE (LOPRESSOR) 25 MG TABLET    Take 0.5 tablets (12.5 mg total) by mouth 2 (two) times daily.    NITROGLYCERIN (NITROSTAT) 0.4 MG SL TABLET    Place 1 tablet (0.4 mg total) under the tongue every 5 (five) minutes as needed for Chest pain.    NYSTATIN (MYCOSTATIN) POWDER    Apply topically 4 (four) times daily as needed (to keep penis/scrotum dry).    OLMESARTAN (BENICAR) 40 MG TABLET    Take 1 tablet (40 mg total) by mouth once daily.    PANTOPRAZOLE (PROTONIX) 40 MG TABLET    Take 1 tablet (40 mg total) by mouth 2 (two) times daily.    PENICILLIN V POTASSIUM (VEETID) 500 MG TABLET    TAKE 1 TABLET BY MOUTH 4 TIMES DAILY    TAMSULOSIN (FLOMAX) 0.4 MG CAP    Take 1 capsule (0.4 mg total) by mouth once daily.    VASCEPA 1 GRAM CAP    Take 2 capsules (2,000 mg total) by mouth 2 (two) times a day.    VITAMIN D 1000 UNITS TAB    Take 1 tablet (1,000 Units total) by mouth once daily.         Objective:   Objective   Physical Exam   Vitals:    11/13/24 1525   BP: 130/62   Pulse: 71   Temp: 98.1 °F (36.7 °C)   TempSrc: Oral   SpO2: 96%   Weight: 106.3 kg (234 lb 3.8 oz)   Height: 5' 10" (1.778 m)    Body mass index is 33.61 kg/m².  Weight: 106.3 kg (234 lb 3.8 oz)   Height: 5' 10" (177.8 cm)     Physical Exam  Constitutional:       General: He is not in acute distress.     Appearance: He is well-developed.   Eyes:      Extraocular Movements: Extraocular movements intact.   Cardiovascular:      Rate and Rhythm: Normal rate and regular rhythm.      Pulses:           Dorsalis pedis pulses are 1+ on the right side.        Posterior tibial pulses are 1+ on the right side.      Heart sounds: Normal heart sounds. No murmur heard.  Pulmonary:      Effort: Pulmonary effort is normal.      Breath sounds: Normal breath sounds.   Musculoskeletal:         General: Normal range of motion.      Right lower leg: No edema.      Left lower leg: No edema.   Feet:      Right foot:      Protective Sensation: 5 sites " tested.  3 sites sensed.      Skin integrity: Dry skin present.      Toenail Condition: Right toenails are normal.   Skin:     General: Skin is warm and dry.   Neurological:      Mental Status: He is alert and oriented to person, place, and time.      Coordination: Coordination normal.   Psychiatric:         Behavior: Behavior normal.         Thought Content: Thought content normal.       Component      Latest Ref Rng 4/12/2023 5/6/2024 11/4/2024   Sodium      136 - 145 mmol/L  140  142    Potassium      3.5 - 5.1 mmol/L  4.2  4.9    Chloride      95 - 110 mmol/L  105  106    CO2      23 - 29 mmol/L  24  25    Glucose      70 - 110 mg/dL  145 (H)  155 (H)    BUN      8 - 23 mg/dL  16  15    Creatinine      0.5 - 1.4 mg/dL  1.1  1.2    Calcium      8.7 - 10.5 mg/dL  9.5  9.5    PROTEIN TOTAL      6.0 - 8.4 g/dL  6.8  7.0    Albumin      3.5 - 5.2 g/dL  3.9  4.1    BILIRUBIN TOTAL      0.1 - 1.0 mg/dL  0.6  0.8    ALP      40 - 150 U/L  51 (L)  56    AST      10 - 40 U/L  19  17    ALT      10 - 44 U/L  17  22    eGFR      >60 mL/min/1.73 m^2  >60.0  >60.0    Anion Gap      8 - 16 mmol/L  11  11    WBC      3.90 - 12.70 K/uL  7.21     RBC      4.60 - 6.20 M/uL  4.88     Hemoglobin      14.0 - 18.0 g/dL  15.1     Hematocrit      40.0 - 54.0 %  45.1     MCV      82 - 98 fL  92     MCH      27.0 - 31.0 pg  30.9     MCHC      32.0 - 36.0 g/dL  33.5     RDW      11.5 - 14.5 %  13.3     Platelet Count      150 - 450 K/uL  202     MPV      9.2 - 12.9 fL  10.2     Cholesterol Total      120 - 199 mg/dL  135  119 (L)    Triglycerides      30 - 150 mg/dL  285 (H)  257 (H)    HDL      40 - 75 mg/dL  36 (L)  37 (L)    LDL Cholesterol      63.0 - 159.0 mg/dL  42.0 (L)  30.6 (L)    HDL/Cholesterol Ratio      20.0 - 50.0 %  26.7  31.1    Total Cholesterol/HDL Ratio      2.0 - 5.0   3.8  3.2    Non-HDL Cholesterol      mg/dL  99  82    Urine Microalbumin      ug/mL  17.0     Urine Creatinine      23.0 - 375.0 mg/dL  169.0      MICROALB/CREAT RATIO      0.0 - 30.0 ug/mg  10.1     Hemoglobin A1C External      4.0 - 5.6 %  6.2 (H)  6.4 (H)    Estimated Avg Glucose      68 - 131 mg/dL  131  137 (H)    PSA Diagnostic      0.00 - 4.00 ng/mL 0.96      TSH      0.400 - 4.000 uIU/mL  2.559  3.703       Legend:  (H) High  (L) Low

## 2024-11-13 ENCOUNTER — OFFICE VISIT (OUTPATIENT)
Dept: FAMILY MEDICINE | Facility: CLINIC | Age: 74
End: 2024-11-13
Payer: MEDICARE

## 2024-11-13 VITALS
OXYGEN SATURATION: 96 % | TEMPERATURE: 98 F | HEART RATE: 71 BPM | BODY MASS INDEX: 33.53 KG/M2 | HEIGHT: 70 IN | SYSTOLIC BLOOD PRESSURE: 130 MMHG | DIASTOLIC BLOOD PRESSURE: 62 MMHG | WEIGHT: 234.25 LBS

## 2024-11-13 DIAGNOSIS — K21.9 GASTROESOPHAGEAL REFLUX DISEASE WITHOUT ESOPHAGITIS: ICD-10-CM

## 2024-11-13 DIAGNOSIS — D12.6 TUBULAR ADENOMA OF COLON: ICD-10-CM

## 2024-11-13 DIAGNOSIS — I77.819 AORTIC ECTASIA: ICD-10-CM

## 2024-11-13 DIAGNOSIS — I10 ESSENTIAL HYPERTENSION: Chronic | ICD-10-CM

## 2024-11-13 DIAGNOSIS — R60.0 PERIPHERAL EDEMA: Chronic | ICD-10-CM

## 2024-11-13 DIAGNOSIS — Z12.5 SCREENING FOR PROSTATE CANCER: ICD-10-CM

## 2024-11-13 DIAGNOSIS — E78.2 MIXED HYPERLIPIDEMIA: Chronic | ICD-10-CM

## 2024-11-13 DIAGNOSIS — R39.9 LOWER URINARY TRACT SYMPTOMS (LUTS): ICD-10-CM

## 2024-11-13 DIAGNOSIS — I87.2 VENOUS INSUFFICIENCY: ICD-10-CM

## 2024-11-13 DIAGNOSIS — M72.2 PLANTAR FASCIITIS OF RIGHT FOOT: Primary | ICD-10-CM

## 2024-11-13 DIAGNOSIS — G47.33 OSA (OBSTRUCTIVE SLEEP APNEA): Chronic | ICD-10-CM

## 2024-11-13 DIAGNOSIS — I73.9 PERIPHERAL VASCULAR DISEASE: ICD-10-CM

## 2024-11-13 DIAGNOSIS — E66.811 OBESITY (BMI 30.0-34.9): Chronic | ICD-10-CM

## 2024-11-13 DIAGNOSIS — M54.16 LUMBAR RADICULOPATHY, CHRONIC: ICD-10-CM

## 2024-11-13 DIAGNOSIS — I25.118 CORONARY ARTERY DISEASE OF NATIVE ARTERY OF NATIVE HEART WITH STABLE ANGINA PECTORIS: ICD-10-CM

## 2024-11-13 DIAGNOSIS — R29.818 NEUROGENIC CLAUDICATION: ICD-10-CM

## 2024-11-13 DIAGNOSIS — E03.9 HYPOTHYROIDISM, UNSPECIFIED TYPE: Chronic | ICD-10-CM

## 2024-11-13 DIAGNOSIS — E11.9 DIABETES MELLITUS TYPE 2 WITHOUT RETINOPATHY: ICD-10-CM

## 2024-11-13 PROCEDURE — 99214 OFFICE O/P EST MOD 30 MIN: CPT | Mod: HCNC,S$GLB,, | Performed by: INTERNAL MEDICINE

## 2024-11-13 PROCEDURE — G2211 COMPLEX E/M VISIT ADD ON: HCPCS | Mod: HCNC,S$GLB,, | Performed by: INTERNAL MEDICINE

## 2024-11-13 PROCEDURE — 1101F PT FALLS ASSESS-DOCD LE1/YR: CPT | Mod: HCNC,CPTII,S$GLB, | Performed by: INTERNAL MEDICINE

## 2024-11-13 PROCEDURE — 3078F DIAST BP <80 MM HG: CPT | Mod: HCNC,CPTII,S$GLB, | Performed by: INTERNAL MEDICINE

## 2024-11-13 PROCEDURE — 4010F ACE/ARB THERAPY RXD/TAKEN: CPT | Mod: HCNC,CPTII,S$GLB, | Performed by: INTERNAL MEDICINE

## 2024-11-13 PROCEDURE — 3044F HG A1C LEVEL LT 7.0%: CPT | Mod: HCNC,CPTII,S$GLB, | Performed by: INTERNAL MEDICINE

## 2024-11-13 PROCEDURE — 1159F MED LIST DOCD IN RCRD: CPT | Mod: HCNC,CPTII,S$GLB, | Performed by: INTERNAL MEDICINE

## 2024-11-13 PROCEDURE — 3066F NEPHROPATHY DOC TX: CPT | Mod: HCNC,CPTII,S$GLB, | Performed by: INTERNAL MEDICINE

## 2024-11-13 PROCEDURE — 3061F NEG MICROALBUMINURIA REV: CPT | Mod: HCNC,CPTII,S$GLB, | Performed by: INTERNAL MEDICINE

## 2024-11-13 PROCEDURE — 3008F BODY MASS INDEX DOCD: CPT | Mod: HCNC,CPTII,S$GLB, | Performed by: INTERNAL MEDICINE

## 2024-11-13 PROCEDURE — 1160F RVW MEDS BY RX/DR IN RCRD: CPT | Mod: HCNC,CPTII,S$GLB, | Performed by: INTERNAL MEDICINE

## 2024-11-13 PROCEDURE — 3288F FALL RISK ASSESSMENT DOCD: CPT | Mod: HCNC,CPTII,S$GLB, | Performed by: INTERNAL MEDICINE

## 2024-11-13 PROCEDURE — 1125F AMNT PAIN NOTED PAIN PRSNT: CPT | Mod: HCNC,CPTII,S$GLB, | Performed by: INTERNAL MEDICINE

## 2024-11-13 PROCEDURE — 3075F SYST BP GE 130 - 139MM HG: CPT | Mod: HCNC,CPTII,S$GLB, | Performed by: INTERNAL MEDICINE

## 2024-11-13 PROCEDURE — 99999 PR PBB SHADOW E&M-EST. PATIENT-LVL IV: CPT | Mod: PBBFAC,HCNC,, | Performed by: INTERNAL MEDICINE

## 2024-11-13 RX ORDER — GABAPENTIN 300 MG/1
300 CAPSULE ORAL NIGHTLY
Qty: 90 CAPSULE | Refills: 3 | Status: SHIPPED | OUTPATIENT
Start: 2024-11-13 | End: 2025-11-13

## 2024-11-13 RX ORDER — ISOSORBIDE MONONITRATE 120 MG/1
240 TABLET, EXTENDED RELEASE ORAL DAILY
Qty: 180 TABLET | Refills: 3 | Status: SHIPPED | OUTPATIENT
Start: 2024-11-13

## 2024-11-13 RX ORDER — ICOSAPENT ETHYL 1000 MG/1
2 CAPSULE ORAL 2 TIMES DAILY
Qty: 360 CAPSULE | Refills: 3 | Status: SHIPPED | OUTPATIENT
Start: 2024-11-13

## 2024-11-13 RX ORDER — PANTOPRAZOLE SODIUM 40 MG/1
40 TABLET, DELAYED RELEASE ORAL 2 TIMES DAILY
Qty: 180 TABLET | Refills: 3 | Status: SHIPPED | OUTPATIENT
Start: 2024-11-13 | End: 2024-11-13

## 2024-11-13 RX ORDER — NITROGLYCERIN 0.4 MG/1
0.4 TABLET SUBLINGUAL EVERY 5 MIN PRN
Qty: 50 TABLET | Refills: 3 | Status: SHIPPED | OUTPATIENT
Start: 2024-11-13

## 2024-11-13 RX ORDER — AMLODIPINE BESYLATE 2.5 MG/1
2.5 TABLET ORAL DAILY
Qty: 90 TABLET | Refills: 3 | Status: SHIPPED | OUTPATIENT
Start: 2024-11-13 | End: 2025-11-13

## 2024-11-13 RX ORDER — FUROSEMIDE 20 MG/1
20 TABLET ORAL DAILY
Qty: 90 TABLET | Refills: 3 | Status: SHIPPED | OUTPATIENT
Start: 2024-11-13

## 2024-11-13 RX ORDER — OLMESARTAN MEDOXOMIL 40 MG/1
40 TABLET ORAL DAILY
Qty: 90 TABLET | Refills: 3 | Status: SHIPPED | OUTPATIENT
Start: 2024-11-13 | End: 2025-11-13

## 2024-11-13 RX ORDER — CLOPIDOGREL BISULFATE 75 MG/1
75 TABLET ORAL DAILY
Qty: 90 TABLET | Refills: 3 | Status: SHIPPED | OUTPATIENT
Start: 2024-11-13

## 2024-11-13 RX ORDER — TAMSULOSIN HYDROCHLORIDE 0.4 MG/1
1 CAPSULE ORAL DAILY
Qty: 90 CAPSULE | Refills: 3 | Status: SHIPPED | OUTPATIENT
Start: 2024-11-13

## 2024-11-13 RX ORDER — METFORMIN HYDROCHLORIDE 500 MG/1
500 TABLET, EXTENDED RELEASE ORAL DAILY
Qty: 90 TABLET | Refills: 3 | Status: SHIPPED | OUTPATIENT
Start: 2024-11-13 | End: 2024-11-15

## 2024-11-13 RX ORDER — METOPROLOL TARTRATE 25 MG/1
12.5 TABLET, FILM COATED ORAL 2 TIMES DAILY
Qty: 90 TABLET | Refills: 3 | Status: SHIPPED | OUTPATIENT
Start: 2024-11-13

## 2024-11-13 RX ORDER — ATORVASTATIN CALCIUM 80 MG/1
80 TABLET, FILM COATED ORAL NIGHTLY
Qty: 90 TABLET | Refills: 3 | Status: SHIPPED | OUTPATIENT
Start: 2024-11-13

## 2024-11-13 RX ORDER — PANTOPRAZOLE SODIUM 40 MG/1
40 TABLET, DELAYED RELEASE ORAL DAILY
Qty: 90 TABLET | Refills: 3 | Status: SHIPPED | OUTPATIENT
Start: 2024-11-13 | End: 2025-11-13

## 2024-11-13 RX ORDER — LEVOTHYROXINE SODIUM 75 UG/1
TABLET ORAL
Qty: 90 TABLET | Refills: 3 | Status: SHIPPED | OUTPATIENT
Start: 2024-11-13

## 2024-11-15 RX ORDER — METFORMIN HYDROCHLORIDE 500 MG/1
500 TABLET, EXTENDED RELEASE ORAL DAILY
Qty: 90 TABLET | Refills: 3 | Status: SHIPPED | OUTPATIENT
Start: 2024-11-15

## 2024-11-22 ENCOUNTER — OFFICE VISIT (OUTPATIENT)
Dept: OPTOMETRY | Facility: CLINIC | Age: 74
End: 2024-11-22
Payer: MEDICARE

## 2024-11-22 DIAGNOSIS — H52.7 REFRACTIVE ERROR: ICD-10-CM

## 2024-11-22 DIAGNOSIS — H25.13 NUCLEAR SCLEROSIS OF BOTH EYES: Primary | ICD-10-CM

## 2024-11-22 DIAGNOSIS — E11.69 TYPE 2 DIABETES MELLITUS WITH OTHER SPECIFIED COMPLICATION, WITHOUT LONG-TERM CURRENT USE OF INSULIN: ICD-10-CM

## 2024-11-22 PROCEDURE — 99999 PR PBB SHADOW E&M-EST. PATIENT-LVL III: CPT | Mod: PBBFAC,HCNC,, | Performed by: OPTOMETRIST

## 2024-11-22 NOTE — PROGRESS NOTES
Subjective:       Patient ID: Clinton Rosenberg is a 74 y.o. male      Chief Complaint   Patient presents with    Concerns About Ocular Health    Diabetic Eye Exam     History of Present Illness  Dls: 3/31/23 Dr. Alcantara     75 y/o male presents today for diabetic eye exam.  Pt c/o blurry vision at near ou.  Pt wears pal's always had problems seeing at near with glasses and head pain when wearing them      today     No tearing  No itching  No burning  No pain  + ha's  + ou  floaters  No flashes    Eye meds  None    Pohx:   None    Fohx:   None      Hemoglobin A1C       Date                     Value               Ref Range             Status                11/04/2024               6.4 (H)             4.0 - 5.6 %           Final                   05/06/2024               6.2 (H)             4.0 - 5.6 %           Final                  11/03/2023               6.2 (H)             4.0 - 5.6 %           Final                    Assessment/Plan:     1. Nuclear sclerosis of both eyes (Primary)  Educated pt on presence of cataracts and effects on vision. No surgery at this time. Recheck in one year, sooner PRN.    2. Type 2 diabetes mellitus with other specified complication, without long-term current use of insulin  Had DFE with Dr. Haas in June. Defers today. Discussed with pt the effects of diabetes on vision, importance of good blood sugar control, compliance with meds, and follow up care with PCP. Return in 1 year for dilated eye exam, sooner PRN.    - Ambulatory referral/consult to Ophthalmology    3. Refractive error  Pt states vision blurry to the side of PAL. Discussed with pt that there is no Rx in side of PAL and needs to turn head to side if trying to see periphery.    Educated patient on refractive error and discussed lens options. Dispensed updated spectacle Rx. Educated about adaptation period to new specs.    Eyeglass Final Rx       Eyeglass Final Rx         Sphere Cylinder Axis Add    Right -1.75 +1.75  180 +2.75    Left -0.50 +1.00 165 +2.75      Expiration Date: 11/22/2025                      Follow up in about 1 year (around 11/22/2025).

## 2024-12-05 DIAGNOSIS — I25.118 CORONARY ARTERY DISEASE OF NATIVE ARTERY OF NATIVE HEART WITH STABLE ANGINA PECTORIS: ICD-10-CM

## 2024-12-05 RX ORDER — NITROGLYCERIN 0.4 MG/1
0.4 TABLET SUBLINGUAL EVERY 5 MIN PRN
Qty: 50 TABLET | Refills: 3 | Status: SHIPPED | OUTPATIENT
Start: 2024-12-05

## 2024-12-05 NOTE — TELEPHONE ENCOUNTER
Refill Decision Note   Clinton Rosenberg  is requesting a refill authorization.  Brief Assessment and Rationale for Refill:  Approve     Medication Therapy Plan:         Comments:     Note composed:11:02 AM 12/05/2024

## 2024-12-05 NOTE — TELEPHONE ENCOUNTER
No care due was identified.  Health [facility] Embedded Care Due Messages. Reference number: [id number].   12/05/2024 2:28:33 AM CST

## 2024-12-27 ENCOUNTER — PATIENT MESSAGE (OUTPATIENT)
Dept: OPTOMETRY | Facility: CLINIC | Age: 74
End: 2024-12-27
Payer: MEDICARE

## 2025-01-07 ENCOUNTER — OFFICE VISIT (OUTPATIENT)
Dept: URGENT CARE | Facility: CLINIC | Age: 75
End: 2025-01-07
Payer: MEDICARE

## 2025-01-07 VITALS
BODY MASS INDEX: 36.49 KG/M2 | WEIGHT: 254.88 LBS | OXYGEN SATURATION: 95 % | HEART RATE: 74 BPM | TEMPERATURE: 99 F | DIASTOLIC BLOOD PRESSURE: 64 MMHG | SYSTOLIC BLOOD PRESSURE: 108 MMHG | HEIGHT: 70 IN

## 2025-01-07 DIAGNOSIS — B96.89 ACUTE BACTERIAL BRONCHITIS: Primary | ICD-10-CM

## 2025-01-07 DIAGNOSIS — R06.02 SOB (SHORTNESS OF BREATH): ICD-10-CM

## 2025-01-07 DIAGNOSIS — J20.8 ACUTE BACTERIAL BRONCHITIS: Primary | ICD-10-CM

## 2025-01-07 PROCEDURE — 71046 X-RAY EXAM CHEST 2 VIEWS: CPT | Mod: S$GLB,,, | Performed by: RADIOLOGY

## 2025-01-07 PROCEDURE — 99214 OFFICE O/P EST MOD 30 MIN: CPT | Mod: S$GLB,,, | Performed by: EMERGENCY MEDICINE

## 2025-01-07 RX ORDER — DOXYCYCLINE 100 MG/1
100 CAPSULE ORAL 2 TIMES DAILY
Qty: 20 CAPSULE | Refills: 0 | Status: SHIPPED | OUTPATIENT
Start: 2025-01-07 | End: 2025-01-17

## 2025-01-07 RX ORDER — PROMETHAZINE HYDROCHLORIDE AND DEXTROMETHORPHAN HYDROBROMIDE 6.25; 15 MG/5ML; MG/5ML
5 SYRUP ORAL EVERY 8 HOURS PRN
Qty: 118 ML | Refills: 0 | Status: SHIPPED | OUTPATIENT
Start: 2025-01-07 | End: 2025-01-12

## 2025-01-07 NOTE — PATIENT INSTRUCTIONS
HYDRATE WITH PLENTY OF FLUIDS   OVER-THE-COUNTER ANTIHISTAMINE FOR RUNNY NOSE POSTNASAL DRIP OR EAR CONGESTION     PROMETHAZINE DM PRESCRIPTION FOR COUGH     DOXYCYCLINE ANTIBIOTIC PRESCRIPTION FOR PROLONGED COUGH    CHEST X-RAY SHOWS NO PNEUMONIA     RETURN FOR ANY CONCERNS OR PROBLEMS AND GO TO THE ER IF WORSE DESPITE TREATMENT REGIMEN ABOVE     SEE BRONCHITIS SHEET

## 2025-03-06 ENCOUNTER — HOSPITAL ENCOUNTER (EMERGENCY)
Facility: HOSPITAL | Age: 75
Discharge: HOME | End: 2025-03-06
Attending: EMERGENCY MEDICINE
Payer: MEDICARE

## 2025-03-06 VITALS
WEIGHT: 237 LBS | HEART RATE: 75 BPM | DIASTOLIC BLOOD PRESSURE: 72 MMHG | HEIGHT: 70 IN | TEMPERATURE: 99 F | RESPIRATION RATE: 19 BRPM | BODY MASS INDEX: 33.93 KG/M2 | SYSTOLIC BLOOD PRESSURE: 119 MMHG | OXYGEN SATURATION: 95 %

## 2025-03-06 DIAGNOSIS — S40.012A CONTUSION OF LEFT SHOULDER, INITIAL ENCOUNTER: ICD-10-CM

## 2025-03-06 DIAGNOSIS — S01.112A LACERATION OF LEFT EYEBROW, INITIAL ENCOUNTER: ICD-10-CM
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
## 2025-03-06 DIAGNOSIS — S89.90XA KNEE INJURY, INITIAL ENCOUNTER: ICD-10-CM

## 2025-03-06 DIAGNOSIS — S09.90XA INJURY OF HEAD, INITIAL ENCOUNTER: Primary | ICD-10-CM

## 2025-03-06 PROCEDURE — 12011 RPR F/E/E/N/L/M 2.5 CM/<: CPT | Mod: HCNC,ER

## 2025-03-06 PROCEDURE — 25000003 PHARM REV CODE 250: Mod: HCNC,ER | Performed by: EMERGENCY MEDICINE

## 2025-03-06 PROCEDURE — 99284 EMERGENCY DEPT VISIT MOD MDM: CPT | Mod: 25,HCNC,ER

## 2025-03-06 PROCEDURE — 63600175 PHARM REV CODE 636 W HCPCS: Mod: HCNC,ER | Performed by: EMERGENCY MEDICINE

## 2025-03-06 RX ORDER — LIDOCAINE HYDROCHLORIDE 10 MG/ML
5 INJECTION, SOLUTION INFILTRATION; PERINEURAL
Status: COMPLETED | OUTPATIENT
Start: 2025-03-06 | End: 2025-03-06

## 2025-03-06 RX ORDER — HYDROCODONE BITARTRATE AND ACETAMINOPHEN 5; 325 MG/1; MG/1
1 TABLET ORAL EVERY 6 HOURS PRN
Qty: 12 TABLET | Refills: 0 | Status: SHIPPED | OUTPATIENT
Start: 2025-03-06 | End: 2025-03-11 | Stop reason: ALTCHOICE

## 2025-03-06 RX ORDER — HYDROCODONE BITARTRATE AND ACETAMINOPHEN 5; 325 MG/1; MG/1
1 TABLET ORAL
Refills: 0 | Status: COMPLETED | OUTPATIENT
Start: 2025-03-06 | End: 2025-03-06

## 2025-03-06 RX ORDER — CEPHALEXIN 500 MG/1
500 CAPSULE ORAL EVERY 6 HOURS
Qty: 28 CAPSULE | Refills: 0 | Status: SHIPPED | OUTPATIENT
Start: 2025-03-06 | End: 2025-03-13

## 2025-03-06 RX ADMIN — LIDOCAINE HYDROCHLORIDE 5 ML: 10 INJECTION, SOLUTION INFILTRATION; PERINEURAL at 03:03

## 2025-03-06 RX ADMIN — HYDROCODONE BITARTRATE AND ACETAMINOPHEN 1 TABLET: 5; 325 TABLET ORAL at 01:03

## 2025-03-06 NOTE — ED PROVIDER NOTES
Encounter Date: 3/6/2025       History     Chief Complaint   Patient presents with    Fall     Pt present to the ER post fall with multiple complaints. Pt was sanding metal and missed placed his foot. Pt fell face forward. +Laceration near (left) eye, (left) arm pain from elbow to shoulder, and bilateral knee pain. Pt is on an anticoagulant.      This is a 74-year-old man presenting to the emergency department today for evaluation after a mechanical fall.  Patient states that he was outside sanding a trellis frame, turned, and misplaced his foot causing himself to fall onto the frame.  He states that he struck his left face, left arm, and bilateral knees.  He states he takes clopidogrel.  Patient states he is unsure if he lost consciousness when he fell.  No other complaints or concerns at this time.        Review of patient's allergies indicates:   Allergen Reactions    Iodine and iodide containing products Anaphylaxis    Naproxen Other (See Comments)     hallucinations  Hallucinations^  Hallucinations^    Hydrocodone-acetaminophen      Rash (skin)^    Iodine      Anaphylaxis^    Oxycodone-acetaminophen      Rash (skin)^     Past Medical History:   Diagnosis Date    Allergy     Angina pectoris     Anxiety     Cancer     skin rwemoved from head    Chronic midline low back pain without sciatica 10/26/2022    Coronary artery disease     Depression     Eye injury as a teen    stuck object in os    GERD (gastroesophageal reflux disease)     Hyperlipidemia     Hypertension     Hypothyroidism     Memory loss     12/3/2014 MRI brain: 1. No evidence for acute infarct 2. unremarkable MRI of the brain; 12/3/2014 carotid US normal    Myocardial infarction     Nuclear sclerosis of both eyes 5/20/2021    Obesity     Peripheral vascular disease 6/20/2017    Retrocalcaneal bursitis 11/24/2014    Sleep apnea     Type 2 diabetes mellitus with other specified complication 4/27/2023     Past Surgical History:   Procedure Laterality  Date    APPENDECTOMY  1986    bone spur Right     COLONOSCOPY      COLONOSCOPY N/A 10/26/2023    Procedure: COLONOSCOPY;  Surgeon: Argelia Altamirano MD;  Location: [facility] ENDO;  Service: Endoscopy;  Laterality: N/A;  Referred by: Dr. Michael Gonzalez  BT/Clearance: ok to hold Plavix 5 days per Dr Dawn  Prep: golytely  Route instructions sent: [id number]  8/3 proc jessica to 10/26, pt has prep and instr  10/19- pre call complete.  DBM    ESOPHAGOGASTRODUODENOSCOPY N/A 8/2/2024    Procedure: EGD (ESOPHAGOGASTRODUODENOSCOPY);  Surgeon: Argelia Altamirano MD;  Location: [facility] ENDO;  Service: Endoscopy;  Laterality: N/A;  Dr. Porter, Urgent EGD, abdominal pain, standard prep, Instr to portal. Plavix hold pending.  ok to hold Plavix 5 days per Dr Dawn  7/26/24- lvm/portal for pc. DBM  7/30 pt confirmed. has been holding plavix since 7/27 cf  7/30/24- pc complete. DBM    hand trauma Right     pencil     heart stent      LEFT HEART CATHETERIZATION Left 9/20/2019    Procedure: Left heart cath 12 pm start, R rad access;  Surgeon: Brad Bahena MD;  Location: [facility] CATH LAB;  Service: Cardiology;  Laterality: Left;  RN PREOP 9/13/2019  NEEDS IODINE PREMED    LEFT HEART CATHETERIZATION Left 9/21/2020    Procedure: Left heart cath 730am start, R rad access;  Surgeon: Brad Bahena MD;  Location: [facility] CATH LAB;  Service: Cardiology;  Laterality: Left;  RN PREOP 9/14/2020, COVID NEGATIVE---9/18     Family History   Problem Relation Name Age of Onset    Arthritis Mother      Early death Mother      Heart disease Mother      Hypertension Mother      Migraines Mother      Seizures Mother      Tremor Mother      Diabetes Mother      Cancer Mother      Early death Father      Heart disease Father      Hypertension Father      Stroke Father      Diabetes Father      Alcohol abuse Father      Heart disease Sister Otilia     Hypertension Sister Otilia     Arthritis Sister Otilia     Depression Sister Otilia     Arthritis  Brother Maximino     Cancer Brother Maximino     Diabetes Brother Maximino     Early death Brother Maximino     Heart disease Brother Maximino     Hypertension Brother Maximino     Heart disease Brother Robert     Arthritis Brother Robert     Heart disease Brother Azael     Pneumonia Brother Azael     Heart disease Brother Martin     Hypertension Brother Martin     Diabetes Brother Martin     Heart disease Brother Dario     No Known Problems Maternal Aunt      No Known Problems Maternal Uncle      No Known Problems Paternal Aunt      No Known Problems Paternal Uncle      No Known Problems Maternal Grandmother      No Known Problems Maternal Grandfather      No Known Problems Paternal Grandmother      No Known Problems Paternal Grandfather      Amblyopia Neg Hx      Blindness Neg Hx      Cataracts Neg Hx      Glaucoma Neg Hx      Macular degeneration Neg Hx      Retinal detachment Neg Hx      Strabismus Neg Hx      Thyroid disease Neg Hx       Social History[1]  Review of Systems   Constitutional:  Negative for activity change and fatigue.   HENT:  Negative for facial swelling.    Eyes:  Negative for pain, redness and visual disturbance.   Respiratory:  Negative for shortness of breath.    Cardiovascular:  Negative for chest pain.   Gastrointestinal:  Negative for abdominal pain and vomiting.   Genitourinary:  Negative for flank pain.   Musculoskeletal:  Positive for arthralgias (Bilateral knees and left elbow). Negative for back pain, gait problem and neck pain.   Skin:  Positive for wound (Left eyebrow).   Neurological:  Negative for dizziness, weakness and headaches.       Physical Exam     Initial Vitals [03/06/25 1305]   BP Pulse Resp Temp SpO2   118/71 79 18 98.5 °F (36.9 °C) 95 %      MAP       --         Physical Exam    Nursing note and vitals reviewed.  Constitutional: He appears well-developed.   HENT:   Head: Normocephalic.       Eyes: Conjunctivae and EOM are normal. Pupils are equal, round, and reactive to light.   Neck:  Neck supple.   Cardiovascular:  Regular rhythm and normal heart sounds.           Pulmonary/Chest: Breath sounds normal. No respiratory distress. He has no wheezes.   Abdominal: He exhibits no distension. There is no abdominal tenderness.   Musculoskeletal:      Left shoulder: Tenderness present. Decreased range of motion.      Cervical back: Neck supple.      Right knee: Tenderness present.      Left knee: Tenderness present.     Neurological: He is alert.   Skin: Skin is warm and dry. Laceration noted.        Psychiatric: He has a normal mood and affect.         ED Course   Lac Repair    Date/Time: 3/6/2025 3:48 PM    Performed by: Marcie Rogers NP  Authorized by: Chinyere Sargent MD    Consent:     Consent obtained:  Verbal    Consent given by:  Patient    Risks, benefits, and alternatives were discussed: yes    Universal protocol:     Patient identity confirmed:  Verbally with patient  Anesthesia:     Anesthesia method:  Local infiltration    Local anesthetic:  Lidocaine 1% w/o epi  Laceration details:     Location:  Face    Face location:  L eyebrow    Length (cm):  2  Pre-procedure details:     Preparation:  Patient was prepped and draped in usual sterile fashion and imaging obtained to evaluate for foreign bodies  Exploration:     Hemostasis achieved with:  Direct pressure    Imaging outcome: foreign body not noted      Wound extent: no underlying fracture noted      Contaminated: no    Treatment:     Area cleansed with:  Saline    Amount of cleaning:  Standard    Irrigation solution:  Sterile saline    Irrigation method:  Syringe  Skin repair:     Repair method:  Sutures and tissue adhesive    Suture size:  5-0    Suture material:  Prolene    Suture technique:  Simple interrupted    Number of sutures:  5  Approximation:     Approximation:  Close  Repair type:     Repair type:  Simple  Post-procedure details:     Dressing:  Open (no dressing)    Procedure completion:  Tolerated well, no immediate  complications    Labs Reviewed - No data to display       Imaging Results              X-Ray Knee 3 View Bilateral (Final result)  Result time 03/06/25 15:03:41      Final result by Min Tolliver MD (03/06/25 15:03:41)                   Impression:      No convincing evidence of acute fracture or dislocation.      Electronically signed by: Min Tolliver  Date:    03/06/2025  Time:    15:03               Narrative:    EXAMINATION:  XR KNEE 3 VIEW BILATERAL    CLINICAL HISTORY:  Unspecified injury of unspecified lower leg, initial encounter    TECHNIQUE:  AP, lateral, and Merchant views of both knees were performed.    COMPARISON:  None    FINDINGS:  Right knee: No definite evidence of acute fracture or dislocation.  Joint spaces appear maintained.  DJD.  No large joint effusion.  Minor enthesopathic change at the patella.  No radiopaque foreign body.    Left knee: No evidence of acute fracture or dislocation.  Joint spaces appear maintained.  DJD.  No large joint effusion.  No evidence of radiopaque foreign body.                                       X-Ray Shoulder Trauma Left (Final result)  Result time 03/06/25 14:28:51      Final result by Jagdish Membreno MD (03/06/25 14:28:51)                   Impression:      No evidence of a fracture or dislocation of the left shoulder.    Osteoarthrosis of the left shoulder.      Electronically signed by: Jagdish Membreno MD  Date:    03/06/2025  Time:    14:28               Narrative:    EXAMINATION:  XR SHOULDER TRAUMA 3 VIEW LEFT    CLINICAL HISTORY:  Unspecified fall, initial encounter    TECHNIQUE:  Three views of the left shoulder were performed.    COMPARISON  02/09/2024.    FINDINGS:  The bone mineralization is within normal limits.  There is no cortical step-off.  There is no evidence of periostitis.    The glenohumeral articulation is maintained.  There is joint space narrowing of the AC joint along with osteophytosis.    The visualized left hemithorax is  unremarkable.    There is no evidence of fracture or dislocation.                                       CT Maxillofacial Without Contrast (Final result)  Result time 03/06/25 14:08:54      Final result by Min Tolliver MD (03/06/25 14:08:54)                   Impression:      1. No acute intracranial findings.  2. No acute facial bone fractures.  3. No acute cervical spine fractures.      Electronically signed by: Min Tolliver  Date:    03/06/2025  Time:    14:08               Narrative:    EXAMINATION:  CT HEAD WITHOUT CONTRAST; CT MAXILLOFACIAL WITHOUT CONTRAST; CT CERVICAL SPINE WITHOUT CONTRAST    CLINICAL HISTORY:  Facial trauma, blunt;; Neck trauma (Age >= 65y);    TECHNIQUE:  Low dose axial images were obtained through the head, facial bones, and cervical spine.  Coronal and sagittal reformations were also performed. Contrast was not administered.    COMPARISON:  CT of the head and cervical spine performed 05/31/2024.    FINDINGS:  Head:    Blood: No acute intracranial hemorrhage.    Parenchyma: No definite loss of gray-white differentiation to suggest acute or subacute transcortical infarct. Generalized pattern of age-related parenchymal volume loss.  Nonspecific areas of white matter hypoattenuation may reflect sequela of chronic small vessel ischemic disease.    Ventricles/Extra-axial spaces: No abnormal extra-axial fluid collection. Basal cisterns are patent.    Vessels: Mild atherosclerotic calcification.    Orbits: Unremarkable.    Scalp: Unremarkable.    Skull: There are no depressed skull fractures or destructive bone lesions.    Sinuses and mastoids: Essentially clear.    Other findings: None    Facial bones:    Acute facial fractures: There are no acute facial bone fractures.    Pterygoid plates, Zygomatic arches, Sphenotemporal buttresses: Intact.    Nasal Bones: No acute nasal bone fracture.  Chronic appearing mild irregularity of the nasal arch, felt similar to 05/31/2024 head  CT.    Mandible: The mandible is intact.  Mild TMJ DJD.    Dentition: No tooth fracture. No periapical lucencies.    Sinuses: There are no fluid levels in the sinuses.    Imaged mastoids and middle ears: The imaged mastoid air cells and middle ear cavities are clear.    Imaged upper cervical spine: See below    Facial soft tissues: No discrete facial hematoma or foreign body is identified.    Orbits: The bony orbits and orbital contents are atraumatic.    Imaged intracranial structures and upper aerodigestive tract: Unremarkable.    Cervical spine:    Fractures: No acute fractures    Alignment: There is no significant vertebral subluxation  Atlanto-axial and atlanto-occipital joints: Atlanto-axial and atlanto-occipital intervals are not widened.  Facet joints: There is no traumatic facet joint widening.  Degenerative facet arthropathy.  Vertebral bodies: Degenerative endplate change.  Anterior predominant osteophyte formation.  Discs: Disc osteophyte complex formation.  Spinal canal and foraminal narrowing: Although CT does not optimally evaluate the soft tissue contents of the spinal canal and foramina, no critical stenosis is suggested.    At C5-6, mild central spinal canal stenosis and moderate bilateral narrowing    At C6-7, mild to moderate central spinal canal stenosis eccentric towards the right and moderate right foraminal narrowing      Paraspinal soft tissues: Unremarkable.    Upper Lungs:Clear                                       CT Head Without Contrast (Final result)  Result time 03/06/25 14:08:54      Final result by Min Tolliver MD (03/06/25 14:08:54)                   Impression:      1. No acute intracranial findings.  2. No acute facial bone fractures.  3. No acute cervical spine fractures.      Electronically signed by: Min Tolliver  Date:    03/06/2025  Time:    14:08               Narrative:    EXAMINATION:  CT HEAD WITHOUT CONTRAST; CT MAXILLOFACIAL WITHOUT CONTRAST; CT CERVICAL SPINE  WITHOUT CONTRAST    CLINICAL HISTORY:  Facial trauma, blunt;; Neck trauma (Age >= 65y);    TECHNIQUE:  Low dose axial images were obtained through the head, facial bones, and cervical spine.  Coronal and sagittal reformations were also performed. Contrast was not administered.    COMPARISON:  CT of the head and cervical spine performed 05/31/2024.    FINDINGS:  Head:    Blood: No acute intracranial hemorrhage.    Parenchyma: No definite loss of gray-white differentiation to suggest acute or subacute transcortical infarct. Generalized pattern of age-related parenchymal volume loss.  Nonspecific areas of white matter hypoattenuation may reflect sequela of chronic small vessel ischemic disease.    Ventricles/Extra-axial spaces: No abnormal extra-axial fluid collection. Basal cisterns are patent.    Vessels: Mild atherosclerotic calcification.    Orbits: Unremarkable.    Scalp: Unremarkable.    Skull: There are no depressed skull fractures or destructive bone lesions.    Sinuses and mastoids: Essentially clear.    Other findings: None    Facial bones:    Acute facial fractures: There are no acute facial bone fractures.    Pterygoid plates, Zygomatic arches, Sphenotemporal buttresses: Intact.    Nasal Bones: No acute nasal bone fracture.  Chronic appearing mild irregularity of the nasal arch, felt similar to 05/31/2024 head CT.    Mandible: The mandible is intact.  Mild TMJ DJD.    Dentition: No tooth fracture. No periapical lucencies.    Sinuses: There are no fluid levels in the sinuses.    Imaged mastoids and middle ears: The imaged mastoid air cells and middle ear cavities are clear.    Imaged upper cervical spine: See below    Facial soft tissues: No discrete facial hematoma or foreign body is identified.    Orbits: The bony orbits and orbital contents are atraumatic.    Imaged intracranial structures and upper aerodigestive tract: Unremarkable.    Cervical spine:    Fractures: No acute fractures    Alignment: There  is no significant vertebral subluxation  Atlanto-axial and atlanto-occipital joints: Atlanto-axial and atlanto-occipital intervals are not widened.  Facet joints: There is no traumatic facet joint widening.  Degenerative facet arthropathy.  Vertebral bodies: Degenerative endplate change.  Anterior predominant osteophyte formation.  Discs: Disc osteophyte complex formation.  Spinal canal and foraminal narrowing: Although CT does not optimally evaluate the soft tissue contents of the spinal canal and foramina, no critical stenosis is suggested.    At C5-6, mild central spinal canal stenosis and moderate bilateral narrowing    At C6-7, mild to moderate central spinal canal stenosis eccentric towards the right and moderate right foraminal narrowing      Paraspinal soft tissues: Unremarkable.    Upper Lungs:Clear                                       CT Cervical Spine Without Contrast (Final result)  Result time 03/06/25 14:08:54      Final result by Min Tolliver MD (03/06/25 14:08:54)                   Impression:      1. No acute intracranial findings.  2. No acute facial bone fractures.  3. No acute cervical spine fractures.      Electronically signed by: Min Tolliver  Date:    03/06/2025  Time:    14:08               Narrative:    EXAMINATION:  CT HEAD WITHOUT CONTRAST; CT MAXILLOFACIAL WITHOUT CONTRAST; CT CERVICAL SPINE WITHOUT CONTRAST    CLINICAL HISTORY:  Facial trauma, blunt;; Neck trauma (Age >= 65y);    TECHNIQUE:  Low dose axial images were obtained through the head, facial bones, and cervical spine.  Coronal and sagittal reformations were also performed. Contrast was not administered.    COMPARISON:  CT of the head and cervical spine performed 05/31/2024.    FINDINGS:  Head:    Blood: No acute intracranial hemorrhage.    Parenchyma: No definite loss of gray-white differentiation to suggest acute or subacute transcortical infarct. Generalized pattern of age-related parenchymal volume loss.   Nonspecific areas of white matter hypoattenuation may reflect sequela of chronic small vessel ischemic disease.    Ventricles/Extra-axial spaces: No abnormal extra-axial fluid collection. Basal cisterns are patent.    Vessels: Mild atherosclerotic calcification.    Orbits: Unremarkable.    Scalp: Unremarkable.    Skull: There are no depressed skull fractures or destructive bone lesions.    Sinuses and mastoids: Essentially clear.    Other findings: None    Facial bones:    Acute facial fractures: There are no acute facial bone fractures.    Pterygoid plates, Zygomatic arches, Sphenotemporal buttresses: Intact.    Nasal Bones: No acute nasal bone fracture.  Chronic appearing mild irregularity of the nasal arch, felt similar to 05/31/2024 head CT.    Mandible: The mandible is intact.  Mild TMJ DJD.    Dentition: No tooth fracture. No periapical lucencies.    Sinuses: There are no fluid levels in the sinuses.    Imaged mastoids and middle ears: The imaged mastoid air cells and middle ear cavities are clear.    Imaged upper cervical spine: See below    Facial soft tissues: No discrete facial hematoma or foreign body is identified.    Orbits: The bony orbits and orbital contents are atraumatic.    Imaged intracranial structures and upper aerodigestive tract: Unremarkable.    Cervical spine:    Fractures: No acute fractures    Alignment: There is no significant vertebral subluxation  Atlanto-axial and atlanto-occipital joints: Atlanto-axial and atlanto-occipital intervals are not widened.  Facet joints: There is no traumatic facet joint widening.  Degenerative facet arthropathy.  Vertebral bodies: Degenerative endplate change.  Anterior predominant osteophyte formation.  Discs: Disc osteophyte complex formation.  Spinal canal and foraminal narrowing: Although CT does not optimally evaluate the soft tissue contents of the spinal canal and foramina, no critical stenosis is suggested.    At C5-6, mild central spinal canal  stenosis and moderate bilateral narrowing    At C6-7, mild to moderate central spinal canal stenosis eccentric towards the right and moderate right foraminal narrowing      Paraspinal soft tissues: Unremarkable.    Upper Lungs:Clear                                       Medications   HYDROcodone-acetaminophen 5-325 mg per tablet 1 tablet (1 tablet Oral Given 3/6/25 1350)   LIDOcaine HCL 10 mg/ml (1%) injection 5 mL (5 mLs Infiltration Given 3/6/25 1519)     Medical Decision Making  This is an emergent evaluation of a 74-year-old man who presented to the emergency department today for evaluation after a mechanical fall.  Differential diagnoses included closed head injury, fracture, dislocation, laceration, amongst others.  On physical examination, patient was in no acute distress however was bleeding from a laceration to the left eyebrow.  He was holding his left arm in close to his chest.  He reported decreased range of motion secondary to pain.  He had tenderness over bilateral knees.  Review of records revealed patient has had a recent tetanus immunization within the past year therefore this will not be provided.  CT scans were performed and were negative for acute findings.  X-ray of the shoulder revealed no evidence of fracture or dislocation nor did X-rays of the knees. Laceration sutured per procedure note. Return precautions and closed head injury precautions given. Return for suture removal in 5 days to PCP or here, wound care advised and advised to be vigilant for signs of infection.     Chinyere Sargent MD  2:39 PM  3/6/2025       Amount and/or Complexity of Data Reviewed  Radiology: ordered.    Risk  Prescription drug management.                                      Clinical Impression:  Final diagnoses:  [W19.XXXA] Fall  [S89.90XA] Knee injury, initial encounter  [S89.90XA] Knee injury, initial encounter - fell onto bilat knees  [S09.90XA] Injury of head, initial encounter (Primary)  [S01.112A] Laceration of  left eyebrow, initial encounter  [S40.012A] Contusion of left shoulder, initial encounter          ED Disposition Condition    Discharge Stable          ED Prescriptions       Medication Sig Dispense Start Date End Date Auth. Provider    HYDROcodone-acetaminophen (NORCO) 5-325 mg per tablet Take 1 tablet by mouth every 6 (six) hours as needed for Pain. 12 tablet 3/6/2025 3/9/2025 Chinyere Sargent MD    cephALEXin (KEFLEX) 500 MG capsule Take 1 capsule (500 mg total) by mouth every 6 (six) hours. for 7 days 28 capsule 3/6/2025 3/13/2025 Chinyere Sargent MD          Follow-up Information       Follow up With Specialties Details Why Contact Info    Michael Gonzalez MD Internal Medicine Schedule an appointment as soon as possible for a visit in 5 days For suture removal or return to this Emergency Department for suture removal [address] [facility]  [place] [address]  [phone]                   [1]   Social History  Tobacco Use    Smoking status: Never     Passive exposure: Never    Smokeless tobacco: Never   Substance Use Topics    Alcohol use: Not Currently     Comment: rarely    Drug use: No        Chinyere Sargent MD  03/06/25 1608

## 2025-03-10 ENCOUNTER — TELEPHONE (OUTPATIENT)
Dept: FAMILY MEDICINE | Facility: CLINIC | Age: 75
End: 2025-03-10
Payer: MEDICARE

## 2025-03-10 NOTE — PROGRESS NOTES
This note was created by combination of typed  and M-Modal dictation.  Transcription errors may be present.   This note was also generated with the assistance of ambient listening technology. Verbal consent was obtained by the patient and accompanying visitor(s) for the recording of patient appointment to facilitate this note. I attest to having reviewed and edited the generated note for accuracy, though some syntax or spelling errors may persist. Please contact the author of this note for any clarification.    Assessment and Plan:   Assessment and Plan   Assessment & Plan  Ground-level fall  Traumatic head injury with multiple lacerations, initial encounter  Visit for suture removal  03/11/2025:  4 sutures removed from the laceration.  Recommended Vaseline, sunscreen with UVA and UVB protection for the next 3 months to minimize scarring.    Orders:    Ambulatory referral/consult to Orthopedics; Future    oxyCODONE-acetaminophen (PERCOCET) 5-325 mg per tablet; Take 1 tablet by mouth every 6 (six) hours as needed for Pain.    Acute pain of left shoulder  03/11/2025: After fall.  Severe shoulder pain.  X-ray done in the ED negative for fracture.  Can not completely rule out tear.  The hydrocodone was ineffective at controlling pain.    Will send in oxycodone.  Recalls a history of rash with both hydrocodone and oxycodone but most recent trial of hydrocodone without rash.  Referral to Orthopedics for evaluation  Orders:    Ambulatory referral/consult to Orthopedics; Future    oxyCODONE-acetaminophen (PERCOCET) 5-325 mg per tablet; Take 1 tablet by mouth every 6 (six) hours as needed for Pain.    Aortic root dilatation on TTE 8/2019 03/11/2025:  Needs to schedule surveillance echo with Cardiology       Diabetes mellitus type 2 without retinopathy  03/11/2025: Recent start on Ozempic currently at 5 mg dose.  On metformin.  Check future labs         Medications Discontinued During This Encounter   Medication  Reason    HYDROcodone-acetaminophen (NORCO) 5-325 mg per tablet Alternate therapy       meds sent this encounter:  Medications Ordered This Encounter   Medications    oxyCODONE-acetaminophen (PERCOCET) 5-325 mg per tablet     Sig: Take 1 tablet by mouth every 6 (six) hours as needed for Pain.     Dispense:  15 tablet     Refill:  0     Quantity prescribed more than 7 day supply? no     I have reviewed the Prescription Drug Monitoring Program (PDMP) database for this patient prior to prescribing the above opioid medication:   Yes         Follow Up:  Keep follow up with me in May  Future Appointments   Date Time Provider Department Center   3/11/2025  2:00 PM Michael Gonzalez MD [facility]   5/6/2025  7:30 AM [facility], [facility]   5/13/2025 10:20 AM [facility]          Subjective:   Subjective   Chief Complaint   Patient presents with    Follow-up    Fall    Arm Pain     Left side     Joint Swelling    Headache       HPI  Clinton is a 74 y.o. male.     Social History     Socioeconomic History    Marital status:    Tobacco Use    Smoking status: Never     Passive exposure: Never    Smokeless tobacco: Never   Substance and Sexual Activity    Alcohol use: Not Currently     Comment: rarely    Drug use: No    Sexual activity: Not Currently     Social Drivers of Health     Financial Resource Strain: Low Risk  (3/10/2025)    Overall Financial Resource Strain (CARDIA)     Difficulty of Paying Living Expenses: Not very hard   Food Insecurity: No Food Insecurity (3/10/2025)    Hunger Vital Sign     Worried About Running Out of Food in the Last Year: Never true     Ran Out of Food in the Last Year: Never true   Transportation Needs: No Transportation Needs (3/10/2025)    PRAPARE - Transportation     Lack of Transportation (Medical): No     Lack of Transportation (Non-Medical): No   Physical Activity: Sufficiently Active (3/10/2025)    Exercise Vital Sign     Days of Exercise  per Week: 3 days     Minutes of Exercise per Session: 80 min   Stress: Stress Concern Present (3/10/2025)    [employer] - Occupational Stress Questionnaire     Feeling of Stress : To some extent   Housing Stability: Low Risk  (3/10/2025)    Housing Stability Vital Sign     Unable to Pay for Housing in the Last Year: No     Number of Times Moved in the Last Year: 0     Homeless in the Last Year: No       Last appointment with this clinic was 11/13/2024. Last visit with me 11/13/2024   To summarize last visit and events leading up to today:  Diabetes on metformin  Hypothyroid, labs good on higher dose 75  Hypertension not practicing proper home technique; 6/2/23 incr losartan  2/2023 cards added amlodipine  3/2023 TTE grade 1 DD  3/2023 nu stress test neg  4/22/24 TTE LV normal size, mildly increased wall thickness.  Mild concentric hypertrophy.  Normal systolic function LVEF 55-60%.  Grade 1 diastolic dysfunction.  Aortic root mildly dilated 4.05 cm.  Ascending aorta mildly dilated 4.16 cm.  Aortic root dilatation incidental on TTE 04/2024   Saw cards 4/26/24. Aortic root dilitation.  Follow up 1 year with echo  Peripheral edema  3/2023 LE venous US with GSV reflux bilateral  Coronary artery disease followed by Cardiology.  On statin.  05/2024 add Vascepa  LUTS, Urology; 5/2023 pt opted for med mgmt rather than surgery.   Renal ultrasound showing prostatomegaly  Simple renal cyst; seen by urology - no surveillance needed.  Neurogenic claudication vs peripheral neuropathy  10/26/22 MRI Degenerative disc, facet joint arthropathy, spondylosis most prominent at L3-L4 and L4-5 levels  GERD   trial lower dose 5/10/23   Saw GI 7/18/24 for GERD. Increase protonix 40 BID. Schedule EGD  8/2/24 EGD normal, bx's neg for EE, H pylori  Back to once daily  10/26/23 colonscopy 3 mm sigmoid HP  Ventral hernia  UC 3/27/24.  Epigastric pain.   CT abd/pelvis negative. Done for UC visit for abd pain. GI  referral was submitted at that time.  Incidental abd aortic atherosclerosis. On high dose statin      ED 5/31/24 for back pain after fall, hitting head.  Head CT, CT C spine, L spine negative.     Optometry 6/13/24  Vitreous detachment, no retinal tear     Last visit me 11/13/2024  Plantar fasciitis declined referral to Podiatry   Diabetes stable on metformin XR half tablet b.i.d..  Change to 1 whole tablet once daily.  Hypertension peripheral edema no edema today.  No changes   Coronary artery disease peripheral artery disease triglycerides high HDL low on Vascepa.  No changes.  Also on Plavix and statin.  On isosorbide and metoprolol  Aortic ectasia followed by Cardiology plan is repeat echo 04/2025   Hypothyroid/levothyroxine  GERD GI had increase his PPI to b.i.d..  Subsequent EGD was unremarkable.  Revert back to once daily.  Neurogenic claudication gabapentin   LUTS stable on Flomax    Followed by digital monitoring, 02/2025 started on Ozempic    ED 03/06/2025 after fall and hit his left face arm and knees.  CT head, maxillofacial, C-spine no acute findings no fractures.  Mild-to-moderate central spinal canal stenosis    Has follow up with labs 05/2025  Needs cardiology follow up with echo    Today's visit:  He is here accompanied by his brother.    History of Present Illness    HPI:  Clinton reports falling over a frame approximately 4 days ago, with his legs elevated and his face and shoulder impacting the frame. Since the incident, he has been having constant throbbing pain in his left shoulder, radiating down his arm, which disrupts his sleep. Clinton reports inability to raise his arm and reduced  strength.    Clinton was evaluated in the emergency room post-incident, where brain scans and a shoulder x-ray were performed, with no fractures identified. The ER prescribed hydrocodone for pain management, which was ineffective in alleviating his pain.    Clinton notes that during the fall, his safety glasses shattered  upon impact, potentially preventing more severe eye injury. He is uncertain if he lost consciousness during the incident, though ER staff reportedly indicated he did not. It took approximately 2 minutes for others to reach him after the fall.    Clinton reports some swelling in the affected area. He has been applying Neosporin to the injury site and cleaning it with soap and water. The pain has not shown significant improvement since the incident occurred.    Clinton denies feeling unsteady or lightheaded before the fall.    MEDICATIONS:  - Metformin, for blood sugar  - atorvastatin, for cholesterol  - Amlodipine, for blood pressure  - Metoprolol, for blood pressure  - Omosarum, for blood pressure  - Pantoprazole, for stomach  - Levothyroxine, for thyroid  - Tamsulosin, for prostate  - Gabapentin, for back  - Vaskepa, for cholesterol  - Lasix, for swelling  - Ozempic, weekly injection for blood sugar, currently on 5th shot  - Plavix  - Isosorbide  - Ozempic, started recently, ramped up from 2.5 to 5  - Discontinued hydrocodone for pain, as it was ineffective    MEDICAL HISTORY:  - Diabetes  - Hyperlipidemia  - Hypertension  - GERD  - Hypothyroidism  - BPH  - Chronic back pain  - Edema  - Coronary artery disease      ROS:  Musculoskeletal: reports joint pain, reports muscle pain  Skin: reports rash  Neurological: no dizziness         Patient Care Team:  Michael Gonzalez MD as PCP - General (Internal Medicine)  [facility]YNE [facility] Hospital[facility]re[facility] montgomery MD as Consulting Physician (Dermatology)  Brad Bahena MD as Consulting Physician (Cardiology)  Michael Gonzalez MD as Hyperlipidemia Digital Medicine Responsible Provider (Internal Medicine)  Shiloh Polk, PharmD as Hypertension Digital Medicine Clinician  Shiloh Polk PharmD as Hyperlipidemia Digital Medicine Clinician  Michael Gonzalez MD as Hypertension Digital Medicine Responsible Provider (Internal Medicine)  [facility] [employer]  Care as Hypertension Digital Medicine Contract  Michael Gonzalez MD as Diabetes Digital Medicine Responsible Provider (Internal Medicine)  Shiloh Polk, PharmD as Diabetes Digital Medicine Clinician  [employer] [facility] Care as Diabetes Digital Medicine Contract    Patient Active Problem List    Diagnosis Date Noted    Knee injury, initial encounter 03/06/2025    Diabetes mellitus type 2 without retinopathy 06/13/2024    Posterior vitreous detachment, right 06/13/2024    Vitreomacular adhesion, left 06/13/2024    Age-related nuclear cataract of both eyes 06/13/2024    Type 2 diabetes mellitus with other specified complication 04/27/2023    Bradycardia 04/27/2023    Refractive error 03/31/2023    Chronic midline low back pain without sciatica 10/26/2022     10/26/2022 MRI L spine: Degenerative disc, facet joint arthropathy, spondylosis most prominent at L3-L4 and L4-5 levels      History of COVID-19 02/25/2022     Symptoms resolved. Persistent residual finding on cxr, recommend repeating test in 1 month to ensure resolution.       Nuclear sclerosis of both eyes 05/20/2021    Allergy to iodine 08/27/2019    Tubular adenoma of colon 2/2009; 10/26/23 colonscopy 3 mm sigmoid HP 10/11/2018     2/28/2009 colonoscopy tubular adenoma  3/15/2013 colonoscopy thickened haustral folds indicative of prediverticular state. internal hemorrhoids. repeat 10 years.  biopsies negative (no colitis)  10/26/23 colonscopy 3 mm sigmoid HP      History of shingles 5/2018 05/20/2018    Venous insufficiency 05/10/2018     03/09/2023 lower extremity venous ultrasound GSV reflux right leg below knee  GSV reflux left leg mid, distal thigh      History of concussion 1/2018 head CT negative 01/31/2018    Aortic root dilatation on TTE 8/2019 11/14/2017 08/08/2019 Lexiscan nuclear stress test probably normal study.  LVEF 66%.  Normal wall motion.  08/08/2019 normal LV systolic function LVEF 65%.  Concentric LV remodeling.  No wall motion  "abnormalities.  Aortic root diameter mildly increased verses report 03/2018.  7/30/20  TTE (Ao root 4.2cm at annulus, 3.8cm at sinuses) normal LV systolic function LVEF 65%.  Mild concentric LVH.  Grade 1 diastolic dysfunction.  Normal RV systolic function.  Normal CVP.  PA pressure 20.  4/22/24 TTE LV normal size, mildly increased wall thickness.  Mild concentric hypertrophy.  Normal systolic function LVEF 55-60%.  Grade 1 diastolic dysfunction.  Aortic root mildly dilated 4.05 cm.  Ascending aorta mildly dilated 4.16 cm.      Peripheral vascular disease with LE US 6/2017 and carotid US  06/20/2017     LE art US/TANA 6/8/17 1.  Mild/atherosclerotic plaquing. 2.  No stenosis about 30 percent femoral popliteal arteries. 3.  Normal bilateral ABIs.  Carotid US 12/3/14 1. Less than 50% stenosis of the right internal carotid artery. 2. Less than 50% stenosis of the left internal carotid artery.      Aortic ectasia 04/28/2017     "The aorta is elongated" - Xray Chest 5-      JAYLEN (obstructive sleep apnea) 04/28/2017    Hypothyroidism 06/01/2016    Obesity (BMI 30.0-34.9) 06/01/2016    Anxiety and depression with assoc memory loss; seen by neuro 2015, negative workup 11/24/2014     12/3/2014 MRI brain: 1. No evidence for acute infarct 2. unremarkable MRI of the brain  12/3/2014 carotid US normal      Coronary artery disease of native artery of native heart with stable angina pectoris; 2021 plan is long term DAPT 11/19/2014     Stent LAD 2012 9/27/2013 [facility] prox LAD 30% stenosis; RCA 20% stenosis. patent LAD stent.    3/9/2015 nu med stress test negative. 3/9/2015 TTE normal LVEF 55-60; concentric remodeling.  L MPI 6/8/17 (images pers rev) Nuclear Quantitative Functional Analysis:  LVEF: 68 % Impression: NORMAL MYOCARDIAL PERFUSION  Echo 3/15/18 LVEF 60-65%; upper limit of normal aortic root 3.7 cm  08/08/2019 Lexiscan nuclear stress test probably normal study.  LVEF 66%.  Normal wall motion.  08/08/2019 normal LV " systolic function LVEF 65%.  Concentric LV remodeling.  No wall motion abnormalities.  Aortic root diameter mildly increased verses report 03/2018.  9/21/2019 LHC: Dominance: Right  LM: normal  LAD: mid 50% followed by patent stent, distal/transapical 80% (small caliber)  Ramus: prox 30%  LCx: prox 50%  RCA: prox 30%, dist 40%              RPDA ost 50%              RPLB mid 90%    Stent 2.5x18 [employer] [place] SILVER 16 abi    9/21/2020 [facility]  Dominance: Right  LM: normal  LAD: MLI, mid stent patent (2013)  Ramus: MLI, prox 30%  LCx: MLI, ost 40%  RCA: MLI, dist eccentric 40-50%              RPDA: ost 50%, iFR 0.97              RPLB mid stent patent (9/20/19 2.5x18 [employer] [place] SILVER)     Impression:  2V CAD, normal LV fxn  Patent mid LAD and mid RPLB stents  iFR dist RCA/ost RPDA neg     Plan:  Cont med rx  Cont ASA/Plavix/Statin/BBL/Imdur  Plan long term DAPT given diffuse CAD and prior MV PCI    03/09/2023 nuclear stress test negative for ischemia   03/09/2023 TTE LV normal size and systolic function LVEF 65%.  Grade 1 diastolic dysfunction.  03/09/2023 lower extremity venous ultrasound GSV reflux right leg below knee  GSV reflux left leg mid, distal thigh      Mixed hyperlipidemia long term DAPT 07/09/2014    Essential hypertension 07/09/2014 7/30/20  TTE (Ao root 4.2cm at annulus, 3.8cm at sinuses) normal LV systolic function LVEF 65%.  Mild concentric LVH.  Grade 1 diastolic dysfunction.  Normal RV systolic function.  Normal CVP.  PA pressure 20.    03/09/2023 TTE LV normal size and systolic function LVEF 65%.  Grade 1 diastolic dysfunction.        Gastroesophageal reflux disease without esophagitis 07/09/2014 8/2/24 EGD normal, bx's neg for EE, H pylori      Benign non-nodular prostatic hyperplasia with lower urinary tract symptoms 07/09/2014    Vitamin D deficiency 07/09/2014    Hernia of abdominal wall 07/09/2014       PAST MEDICAL PROBLEMS, PAST SURGICAL HISTORY: please see relevant portions of the  electronic medical record    ALLERGIES AND MEDICATIONS: updated and reviewed.  Medication List with Changes/Refills   Current Medications    AMLODIPINE (NORVASC) 5 MG TABLET    Take 1 tablet (5 mg total) by mouth once daily.    ASPIRIN (ECOTRIN) 81 MG EC TABLET    Take 1 tablet (81 mg total) by mouth once daily.    ATORVASTATIN (LIPITOR) 80 MG TABLET    Take 1 tablet (80 mg total) by mouth every evening.    CEPHALEXIN (KEFLEX) 500 MG CAPSULE    Take 1 capsule (500 mg total) by mouth every 6 (six) hours. for 7 days    CICLOPIROX (LOPROX) 0.77 % CREA    Apply topically 2 (two) times daily.    CLOPIDOGREL (PLAVIX) 75 MG TABLET    Take 1 tablet (75 mg total) by mouth once daily.    CLOTRIMAZOLE-BETAMETHASONE 1-0.05% (LOTRISONE) CREAM    Apply topically 2 (two) times daily.    DICLOFENAC SODIUM (VOLTAREN) 1 % GEL    Apply the gel (2 g) to the affected area 4 times daily. Do not apply more than 8 g daily to any one affected joint    FUROSEMIDE (LASIX) 20 MG TABLET    Take 1 tablet (20 mg total) by mouth once daily.    GABAPENTIN (NEURONTIN) 300 MG CAPSULE    Take 1 capsule (300 mg total) by mouth every evening.    ISOSORBIDE MONONITRATE (IMDUR) 120 MG 24 HR TABLET    Take 2 tablets (240 mg total) by mouth once daily.    KETOCONAZOLE (NIZORAL) 2 % CREAM    Apply topically 2 (two) times daily. for 14 days    KRILL/OM3/DHA/EPA/OM6/LIP/ASTX (KRILL OIL, OMEGA 3 AND 6, ORAL)    Take by mouth.    LEVOTHYROXINE (SYNTHROID) 75 MCG TABLET    TAKE 1 TABLET BEFORE BREAKFAST    METFORMIN (GLUCOPHAGE-XR) 500 MG ER 24HR TABLET    Take 1 tablet (500 mg total) by mouth once daily.    METOPROLOL SUCCINATE (TOPROL-XL) 25 MG 24 HR TABLET    Take 1 tablet (25 mg total) by mouth once daily.    NITROGLYCERIN (NITROSTAT) 0.4 MG SL TABLET    PLACE 1 TABLET (0.4 MG TOTAL) UNDER THE TONGUE EVERY 5 (FIVE) MINUTES AS NEEDED FOR CHEST PAIN.    NYSTATIN (MYCOSTATIN) POWDER    Apply topically 4 (four) times daily as needed (to keep penis/scrotum dry).  "   OLMESARTAN (BENICAR) 40 MG TABLET    Take 1 tablet (40 mg total) by mouth once daily.    PANTOPRAZOLE (PROTONIX) 40 MG TABLET    Take 1 tablet (40 mg total) by mouth once daily.    PENICILLIN V POTASSIUM (VEETID) 500 MG TABLET    TAKE 1 TABLET BY MOUTH 4 TIMES DAILY    SEMAGLUTIDE (OZEMPIC) 0.25 MG OR 0.5 MG (2 MG/3 ML) PEN INJECTOR    Inject 0.5 mg into the skin every 7 days.    TAMSULOSIN (FLOMAX) 0.4 MG CAP    Take 1 capsule (0.4 mg total) by mouth once daily.    VASCEPA 1 GRAM CAP    Take 2 capsules (2,000 mg total) by mouth 2 (two) times a day.    VITAMIN D 1000 UNITS TAB    Take 1 tablet (1,000 Units total) by mouth once daily.         Objective:   Objective   Physical Exam   Vitals:    03/11/25 1400   BP: 122/62   Pulse: 68   Temp: 98 °F (36.7 °C)   TempSrc: Oral   SpO2: 96%   Weight: 104.6 kg (230 lb 9.6 oz)   Height: 5' 10" (1.778 m)    Body mass index is 33.09 kg/m².            Physical Exam  Constitutional:       General: He is not in acute distress.     Appearance: He is well-developed.   HENT:      Head:      Comments: The left lateral eyebrow with healing laceration with 4 sutures in place.  Clean, dry, intact, no drainage no pus no erythema  Eyes:      Extraocular Movements: Extraocular movements intact.   Cardiovascular:      Rate and Rhythm: Normal rate and regular rhythm.      Heart sounds: Normal heart sounds. No murmur heard.  Pulmonary:      Effort: Pulmonary effort is normal.      Breath sounds: Normal breath sounds.   Musculoskeletal:         General: Tenderness present. Normal range of motion.      Comments: Splinting the left arm/shoulder.  Tender over the joint space in the AC joint.  Limited abduction due to pain.   5/5   Skin:     General: Skin is warm and dry.   Neurological:      Mental Status: He is alert and oriented to person, place, and time.      Coordination: Coordination normal.   Psychiatric:         Behavior: Behavior normal.         Thought Content: Thought content " normal.

## 2025-03-10 NOTE — TELEPHONE ENCOUNTER
----- Message from Caron sent at 3/7/2025  1:44 PM CST -----  Regarding: DEBORAH ZAMBRANO [[id number]]  Type : Patient Call  Who Called :patient   Does the patient know what this is regarding?:patient just discharged from the E.R on 03/06/25 For a fall and is in pain and needs to schedule a 7 day window time frame follow up. Patient wants to discuss MRI and stiches. Please call as soon as possible as patient Is in pain. patient was told that stiches need to be removed in 5 days. Thanks!!  Would the patient rather a call back or a response via [facility]?call back    Best Call Back Number:[phone]/ cell: [phone]  Additional Information:patient was told that stiches need to be removed in 5 days.

## 2025-03-11 ENCOUNTER — OFFICE VISIT (OUTPATIENT)
Dept: FAMILY MEDICINE | Facility: CLINIC | Age: 75
End: 2025-03-11
Payer: MEDICARE

## 2025-03-11 VITALS
SYSTOLIC BLOOD PRESSURE: 122 MMHG | BODY MASS INDEX: 33.02 KG/M2 | OXYGEN SATURATION: 96 % | DIASTOLIC BLOOD PRESSURE: 62 MMHG | WEIGHT: 230.63 LBS | HEIGHT: 70 IN | TEMPERATURE: 98 F | HEART RATE: 68 BPM

## 2025-03-11 DIAGNOSIS — E11.9 DIABETES MELLITUS TYPE 2 WITHOUT RETINOPATHY: ICD-10-CM

## 2025-03-11 DIAGNOSIS — M25.512 ACUTE PAIN OF LEFT SHOULDER: ICD-10-CM

## 2025-03-11 DIAGNOSIS — Z48.02 VISIT FOR SUTURE REMOVAL: ICD-10-CM

## 2025-03-11 DIAGNOSIS — S09.90XA TRAUMATIC HEAD INJURY WITH MULTIPLE LACERATIONS, INITIAL ENCOUNTER: ICD-10-CM
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
## 2025-03-11 DIAGNOSIS — S01.91XA TRAUMATIC HEAD INJURY WITH MULTIPLE LACERATIONS, INITIAL ENCOUNTER: ICD-10-CM

## 2025-03-11 DIAGNOSIS — I77.810 AORTIC ROOT DILATATION: ICD-10-CM

## 2025-03-11 PROCEDURE — 99999 PR PBB SHADOW E&M-EST. PATIENT-LVL III: CPT | Mod: PBBFAC,HCNC,, | Performed by: INTERNAL MEDICINE

## 2025-03-11 RX ORDER — OXYCODONE AND ACETAMINOPHEN 5; 325 MG/1; MG/1
1 TABLET ORAL EVERY 6 HOURS PRN
Qty: 15 TABLET | Refills: 0 | Status: SHIPPED | OUTPATIENT
Start: 2025-03-11

## 2025-03-11 NOTE — ASSESSMENT & PLAN NOTE
03/11/2025: Recent start on Ozempic currently at 5 mg dose.  On metformin.  Check future labs

## 2025-03-17 ENCOUNTER — HOSPITAL ENCOUNTER (OUTPATIENT)
Dept: RADIOLOGY | Facility: HOSPITAL | Age: 75
Discharge: HOME | End: 2025-03-17
Attending: PHYSICIAN ASSISTANT
Payer: MEDICARE

## 2025-03-17 ENCOUNTER — OFFICE VISIT (OUTPATIENT)
Dept: SPORTS MEDICINE | Facility: CLINIC | Age: 75
End: 2025-03-17
Payer: MEDICARE

## 2025-03-17 VITALS
DIASTOLIC BLOOD PRESSURE: 71 MMHG | BODY MASS INDEX: 32.93 KG/M2 | HEART RATE: 66 BPM | WEIGHT: 230 LBS | HEIGHT: 70 IN | SYSTOLIC BLOOD PRESSURE: 110 MMHG

## 2025-03-17 DIAGNOSIS — M25.512 ACUTE PAIN OF LEFT SHOULDER: ICD-10-CM

## 2025-03-17 DIAGNOSIS — M75.42 IMPINGEMENT SYNDROME OF LEFT SHOULDER: ICD-10-CM
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
## 2025-03-17 DIAGNOSIS — M75.22 BICEPS TENDINITIS OF LEFT UPPER EXTREMITY: ICD-10-CM

## 2025-03-17 DIAGNOSIS — M25.569 KNEE PAIN, UNSPECIFIED CHRONICITY, UNSPECIFIED LATERALITY: ICD-10-CM

## 2025-03-17 DIAGNOSIS — M75.102 ROTATOR CUFF SYNDROME OF LEFT SHOULDER: Primary | ICD-10-CM

## 2025-03-17 PROCEDURE — 99999 PR PBB SHADOW E&M-EST. PATIENT-LVL V: CPT | Mod: PBBFAC,,, | Performed by: PHYSICIAN ASSISTANT

## 2025-03-17 PROCEDURE — 73564 X-RAY EXAM KNEE 4 OR MORE: CPT | Mod: TC,50,HCNC

## 2025-03-17 PROCEDURE — 73564 X-RAY EXAM KNEE 4 OR MORE: CPT | Mod: 26,50,, | Performed by: RADIOLOGY

## 2025-03-17 NOTE — PROGRESS NOTES
Subjective:     Chief Complaint: Clinton Rosenberg is a 74 y.o. male who had concerns including Pain of the Left Shoulder and Pain of the Left Knee.    Clinton Rosenberg, a 74-year-old male, presented to the ER on March 6, 2025, after a mechanical fall while sanding metal, resulting in a left eyebrow laceration, left arm pain, and bilateral knee pain [2]. He was discharged stable with prescriptions and advised to follow up for suture removal [2]. On March 11, 2025, he had four sutures removed and reported ongoing left shoulder pain, with a negative X-ray for fractures [5]. He was prescribed Percocet for pain management [5]. His medical history includes diabetes, hypertension, and hyperlipidemia, with current management involving metformin and Ozempic [5],     On plavix.    History of Present Illness    CHIEF COMPLAINT:  - Left shoulder and left knee pain after a fall    HPI:  Clinton presents with left shoulder and knee pain following a fall on March 6, 2025, while building a trellis for snap beans. He fell off a frame, hitting his head and left side, requiring five stitches on his head which have since been removed.    Left shoulder pain is constant, 6-8/10 severity, worsening at night. Pain is throbbing, radiating down to his elbow, with associated weakness. He has limited range of motion, unable to raise his arm due to extreme pain localized to the lateral side. Pain interferes with sleep, requiring frequent position changes. He has tried heating pads, cold pads, and a massage tool without significant relief.    Left knee pain is located in the front, described as feeling like a real soft sponge, 5/10 severity, worsening with walking and straightening the leg. Pain has increased since the initial injury. He has difficulty walking in stores and needs to sit down frequently. Elevation provides temporary relief.    He was initially seen in the ER and prescribed narcotic pain medication. His primary care physician  later prescribed Percocet. Neither medication provided pain relief. He is unable to take ibuprofen due to being on blood thinners.    Clinton reports new lightning flashes in the corner of his left eye, starting 3-4 days ago and progressively worsening.    Clinton denies any burning sensation in the shoulder and any new numbness or tingling in his foot. Clinton denies any prior trauma or surgeries to the affected areas before this fall.    PREVIOUS TREATMENTS:  - Clinton has been applying heating pads and cold packs alternately to the left shoulder, with no apparent benefit  - Clinton has been using a vibrator massage tool on the left shoulder, with no apparent benefit  - Clinton has been elevating the left knee, which helped for a while  - Clinton has been applying ice to the left knee    MEDICATIONS:  - Narco  - Percocet  - Plavix  - Gabapentin: for lower back  - BioFreeze: topical  - Deep Blue: essential oil, topical    ALLERGIES:  - Naproxen: causes whelps (severe reaction)  - Iodine: brings out whelps    SURGICAL HISTORY:  - Stent placement in LAD: 2012  - Left heart catheterization: 2019  - Left heart catheterization: 2020    WORK STATUS:  - Clinton is retired      ROS:  General: negative fever, negative chills, negative fatigue, negative weight gain, negative weight loss, +difficulty falling asleep, +sleep disturbances, +difficulty staying asleep  Eyes: negative vision changes, negative redness, negative discharge, +spots, specks or flashing lights  ENT: negative ear pain, negative nasal congestion, negative sore throat  Cardiovascular: negative chest pain, negative palpitations, negative lower extremity edema  Respiratory: negative cough, negative shortness of breath  Gastrointestinal: negative abdominal pain, negative nausea, negative vomiting, negative diarrhea, negative constipation, negative blood in stool  Genitourinary: negative dysuria, negative hematuria, negative frequency  Musculoskeletal: +joint pain, negative muscle pain, +pain  with movement, +nightime pain, +joint swelling, +muscle weakness  Skin: negative rash, negative lesion  Neurological: negative headache, negative dizziness, negative numbness, negative tingling  Psychiatric: negative anxiety, negative depression, +sleep difficulty                     Past Surgical History:   Procedure Laterality Date    APPENDECTOMY  1986    bone spur Right     COLONOSCOPY      COLONOSCOPY N/A 10/26/2023    Procedure: COLONOSCOPY;  Surgeon: Argelia Altamirano MD;  Location: [facility] ENDO;  Service: Endoscopy;  Laterality: N/A;  Referred by: Dr. Michael Gonzalez  BT/Clearance: ok to hold Plavix 5 days per Dr Dawn  Prep: golytely  Route instructions sent: [id number]  8/3 proc jessica to 10/26, pt has prep and instr  10/19- pre call complete.  DBM    ESOPHAGOGASTRODUODENOSCOPY N/A 8/2/2024    Procedure: EGD (ESOPHAGOGASTRODUODENOSCOPY);  Surgeon: Argelia Altamirano MD;  Location: [facility] ENDO;  Service: Endoscopy;  Laterality: N/A;  Dr. Porter, Urgent EGD, abdominal pain, standard prep, Instr to portal. Plavix hold pending.  ok to hold Plavix 5 days per Dr Dawn  7/26/24- lvm/portal for pc. DBM  7/30 pt confirmed. has been holding plavix since 7/27 cf  7/30/24- pc complete. DBM    hand trauma Right     pencil     heart stent      LEFT HEART CATHETERIZATION Left 9/20/2019    Procedure: Left heart cath 12 pm start, R rad access;  Surgeon: Brad Bahena MD;  Location: [facility] CATH LAB;  Service: Cardiology;  Laterality: Left;  RN PREOP 9/13/2019  NEEDS IODINE PREMED    LEFT HEART CATHETERIZATION Left 9/21/2020    Procedure: Left heart cath 730am start, R rad access;  Surgeon: Brad Bahena MD;  Location: [facility] CATH LAB;  Service: Cardiology;  Laterality: Left;  RN PREOP 9/14/2020, COVID NEGATIVE---9/18       Objective:     General: Clinton is well-developed, well-nourished, appears stated age, in no acute distress, alert and oriented to time, place and person.     General    Nursing note and vitals  reviewed.  Constitutional: He is oriented to person, place, and time. He appears well-developed and well-nourished. No distress.   HENT:   Head: Normocephalic and atraumatic.   Nose: Nose normal.   Eyes: Conjunctivae and EOM are normal. Pupils are equal, round, and reactive to light.   Neck: Neck supple. No JVD present.   Cardiovascular:  Normal rate, regular rhythm, normal heart sounds and intact distal pulses.            No murmur heard.  Pulmonary/Chest: Effort normal and breath sounds normal. No respiratory distress.   Abdominal: Soft. Bowel sounds are normal. He exhibits no distension. There is no abdominal tenderness.   Neurological: He is alert and oriented to person, place, and time. He has normal reflexes. Coordination normal.   Psychiatric: He has a normal mood and affect. His behavior is normal. Judgment and thought content normal.     General Musculoskeletal Exam   Gait: normal       Right Knee Exam     Inspection   Erythema: absent  Scars: absent  Swelling: absent  Effusion: absent  Deformity: absent  Bruising: absent    Tenderness   The patient is experiencing no tenderness.     Range of Motion   Extension:  0   Flexion:  130     Tests   Meniscus   Renetta:  Medial - negative Lateral - negative  Ligament Examination   Lachman: normal (-1 to 2mm)   PCL-Posterior Drawer: normal (0 to 2mm)     MCL - Valgus: normal (0 to 2mm)  LCL - Varus: normal  Dial Test at 90 degrees: normal (< 5 degrees)  Posterolateral Corner: stable  Patella   Patellar Glide (quadrants): Lateral - 1   Medial - 2  Patellar Grind: negative    Other   Popliteal (Baker's) Cyst: absent  Sensation: normal    Left Knee Exam     Inspection   Erythema: absent  Scars: present (healing scab/abrasion)  Swelling: present  Effusion: present (prepatellar)  Deformity: absent  Bruising: absent    Tenderness   The patient tender to palpation of the patella.    Crepitus   The patient has crepitus of the prepatellar bursa.    Range of Motion    Extension:  0   Flexion:  130     Tests   Meniscus   Renetta:  Medial - negative Lateral - negative  Stability   Lachman: normal (-1 to 2mm)   PCL-Posterior Drawer: normal (0 to 2mm)  MCL - Valgus: normal (0 to 2mm)  LCL - Varus: normal (0 to 2mm)  Dial Test at 90 degrees: normal (< 5 degrees)  Posterolateral Corner: stable  Patella   Patellar Glide (Quadrants): Lateral - 1 Medial - 2  Patellar Grind: negative    Other   Popliteal (Baker's) Cyst: absent  Sensation: normal    Right Hip Exam     Tests   Ekta: negative  Left Hip Exam     Tests   Ekta: negative      Right Shoulder Exam     Inspection/Observation   Swelling: absent  Bruising: absent  Scars: absent  Deformity: absent  Scapular Winging: absent  Scapular Dyskinesia: negative  Atrophy: absent    Range of Motion   Active abduction:  150   Passive abduction:  150   Extension:  50   Forward Flexion:  180   Forward Elevation: 180  Adduction: 30   External Rotation 0 degrees:  80   Internal rotation 0 degrees:  T6   Internal rotation 90 degrees:  90     Tests & Signs   Apprehension: negative  Cross arm: negative  Drop arm: negative  Michelle test: negative  Impingement: negative  Lift Off Sign: negative  Belly Press: negative  Active Compression Test (Cherry's Sign): negative  Yergason's Test: negative  Speed's Test: negative  Relocation 90 degrees: negative  Jerk Test: negative    Other   Sensation: normal    Left Shoulder Exam     Inspection/Observation   Swelling: absent  Bruising: absent  Scars: absent  Deformity: absent  Scapular Winging: absent  Scapular Dyskinesia: negative  Atrophy: absent    Tenderness   The patient is tender to palpation of the greater tuberosity, biceps tendon, acromioclavicular joint and supraspinatus.    Range of Motion   Active abduction:  40 abnormal   Passive abduction:  40 abnormal   Extension:  50   Forward Flexion:  40 abnormal   Forward Elevation: 40 abnormal  Adduction: 30   External Rotation 0 degrees:  20 abnormal    Internal rotation 0 degrees:  Sacrum abnormal   Internal rotation 90 degrees:  abnormal     Tests & Signs   Apprehension: negative  Cross arm: negative  Drop arm: positive  Michelle test: positive  Impingement: positive  Rotator Cuff Painful Arc/Range: severe  Lift Off Sign: positive  Belly Press: positive  Active Compression test (Butte's Sign): negative  Yergasons's Test: negative  Speed's Test: positive  Relocation 90 degrees: negative  Jerk Test: negative    Other   Sensation: normal       Muscle Strength   Right Upper Extremity   Shoulder Abduction: 5/5   Shoulder Internal Rotation: 5/5   Shoulder External Rotation: 5/5   Supraspinatus: 5/5   Subscapularis: 5/5   Biceps: 5/5   Left Upper Extremity  Shoulder Abduction: 3/5   Shoulder Internal Rotation: 4/5   Shoulder External Rotation: 3/5   Supraspinatus: 3/5   Subscapularis: 3/5   Biceps: 3/5   Right Lower Extremity   Hip Abduction: 5/5   Quadriceps:  5/5   Hamstrin/5   Left Lower Extremity   Hip Abduction: 5/5   Quadriceps:  5/5   Hamstrin/5     Vascular Exam     Right Pulses  Dorsalis Pedis:      2+  Posterior Tibial:      2+  Radial:                    2+      Left Pulses  Dorsalis Pedis:      2+  Posterior Tibial:      2+  Radial:                    2+      Capillary Refill  Right Hand: normal capillary refill  Left Hand: normal capillary refill        Edema  Right Lower Leg: absent  Left Lower Leg: absent          Radiographic findings:    X-Ray Knee 3 View Bilateral  Narrative: EXAMINATION:  XR KNEE 3 VIEW BILATERAL    CLINICAL HISTORY:  Unspecified injury of unspecified lower leg, initial encounter    TECHNIQUE:  AP, lateral, and Merchant views of both knees were performed.    COMPARISON:  None    FINDINGS:  Right knee: No definite evidence of acute fracture or dislocation.  Joint spaces appear maintained.  DJD.  No large joint effusion.  Minor enthesopathic change at the patella.  No radiopaque foreign body.    Left knee: No evidence of  acute fracture or dislocation.  Joint spaces appear maintained.  DJD.  No large joint effusion.  No evidence of radiopaque foreign body.  Impression: No convincing evidence of acute fracture or dislocation.    Electronically signed by: Min Tolliver  Date:    03/06/2025  Time:    15:03  X-Ray Shoulder Trauma Left  Narrative: EXAMINATION:  XR SHOULDER TRAUMA 3 VIEW LEFT    CLINICAL HISTORY:  Unspecified fall, initial encounter    TECHNIQUE:  Three views of the left shoulder were performed.    COMPARISON  02/09/2024.    FINDINGS:  The bone mineralization is within normal limits.  There is no cortical step-off.  There is no evidence of periostitis.    The glenohumeral articulation is maintained.  There is joint space narrowing of the AC joint along with osteophytosis.    The visualized left hemithorax is unremarkable.    There is no evidence of fracture or dislocation.  Impression: No evidence of a fracture or dislocation of the left shoulder.    Osteoarthrosis of the left shoulder.    Electronically signed by: Jagdish Membreno MD  Date:    03/06/2025  Time:    14:28  CT Maxillofacial Without Contrast  Narrative: EXAMINATION:  CT HEAD WITHOUT CONTRAST; CT MAXILLOFACIAL WITHOUT CONTRAST; CT CERVICAL SPINE WITHOUT CONTRAST    CLINICAL HISTORY:  Facial trauma, blunt;; Neck trauma (Age >= 65y);    TECHNIQUE:  Low dose axial images were obtained through the head, facial bones, and cervical spine.  Coronal and sagittal reformations were also performed. Contrast was not administered.    COMPARISON:  CT of the head and cervical spine performed 05/31/2024.    FINDINGS:  Head:    Blood: No acute intracranial hemorrhage.    Parenchyma: No definite loss of gray-white differentiation to suggest acute or subacute transcortical infarct. Generalized pattern of age-related parenchymal volume loss.  Nonspecific areas of white matter hypoattenuation may reflect sequela of chronic small vessel ischemic disease.    Ventricles/Extra-axial  spaces: No abnormal extra-axial fluid collection. Basal cisterns are patent.    Vessels: Mild atherosclerotic calcification.    Orbits: Unremarkable.    Scalp: Unremarkable.    Skull: There are no depressed skull fractures or destructive bone lesions.    Sinuses and mastoids: Essentially clear.    Other findings: None    Facial bones:    Acute facial fractures: There are no acute facial bone fractures.    Pterygoid plates, Zygomatic arches, Sphenotemporal buttresses: Intact.    Nasal Bones: No acute nasal bone fracture.  Chronic appearing mild irregularity of the nasal arch, felt similar to 05/31/2024 head CT.    Mandible: The mandible is intact.  Mild TMJ DJD.    Dentition: No tooth fracture. No periapical lucencies.    Sinuses: There are no fluid levels in the sinuses.    Imaged mastoids and middle ears: The imaged mastoid air cells and middle ear cavities are clear.    Imaged upper cervical spine: See below    Facial soft tissues: No discrete facial hematoma or foreign body is identified.    Orbits: The bony orbits and orbital contents are atraumatic.    Imaged intracranial structures and upper aerodigestive tract: Unremarkable.    Cervical spine:    Fractures: No acute fractures    Alignment: There is no significant vertebral subluxation  Atlanto-axial and atlanto-occipital joints: Atlanto-axial and atlanto-occipital intervals are not widened.  Facet joints: There is no traumatic facet joint widening.  Degenerative facet arthropathy.  Vertebral bodies: Degenerative endplate change.  Anterior predominant osteophyte formation.  Discs: Disc osteophyte complex formation.  Spinal canal and foraminal narrowing: Although CT does not optimally evaluate the soft tissue contents of the spinal canal and foramina, no critical stenosis is suggested.    At C5-6, mild central spinal canal stenosis and moderate bilateral narrowing    At C6-7, mild to moderate central spinal canal stenosis eccentric towards the right and  moderate right foraminal narrowing    Paraspinal soft tissues: Unremarkable.    Upper Lungs:Clear  Impression: 1. No acute intracranial findings.  2. No acute facial bone fractures.  3. No acute cervical spine fractures.    Electronically signed by: Min Tolliver  Date:    03/06/2025  Time:    14:08  CT Cervical Spine Without Contrast  Narrative: EXAMINATION:  CT HEAD WITHOUT CONTRAST; CT MAXILLOFACIAL WITHOUT CONTRAST; CT CERVICAL SPINE WITHOUT CONTRAST    CLINICAL HISTORY:  Facial trauma, blunt;; Neck trauma (Age >= 65y);    TECHNIQUE:  Low dose axial images were obtained through the head, facial bones, and cervical spine.  Coronal and sagittal reformations were also performed. Contrast was not administered.    COMPARISON:  CT of the head and cervical spine performed 05/31/2024.    FINDINGS:  Head:    Blood: No acute intracranial hemorrhage.    Parenchyma: No definite loss of gray-white differentiation to suggest acute or subacute transcortical infarct. Generalized pattern of age-related parenchymal volume loss.  Nonspecific areas of white matter hypoattenuation may reflect sequela of chronic small vessel ischemic disease.    Ventricles/Extra-axial spaces: No abnormal extra-axial fluid collection. Basal cisterns are patent.    Vessels: Mild atherosclerotic calcification.    Orbits: Unremarkable.    Scalp: Unremarkable.    Skull: There are no depressed skull fractures or destructive bone lesions.    Sinuses and mastoids: Essentially clear.    Other findings: None    Facial bones:    Acute facial fractures: There are no acute facial bone fractures.    Pterygoid plates, Zygomatic arches, Sphenotemporal buttresses: Intact.    Nasal Bones: No acute nasal bone fracture.  Chronic appearing mild irregularity of the nasal arch, felt similar to 05/31/2024 head CT.    Mandible: The mandible is intact.  Mild TMJ DJD.    Dentition: No tooth fracture. No periapical lucencies.    Sinuses: There are no fluid levels in the  sinuses.    Imaged mastoids and middle ears: The imaged mastoid air cells and middle ear cavities are clear.    Imaged upper cervical spine: See below    Facial soft tissues: No discrete facial hematoma or foreign body is identified.    Orbits: The bony orbits and orbital contents are atraumatic.    Imaged intracranial structures and upper aerodigestive tract: Unremarkable.    Cervical spine:    Fractures: No acute fractures    Alignment: There is no significant vertebral subluxation  Atlanto-axial and atlanto-occipital joints: Atlanto-axial and atlanto-occipital intervals are not widened.  Facet joints: There is no traumatic facet joint widening.  Degenerative facet arthropathy.  Vertebral bodies: Degenerative endplate change.  Anterior predominant osteophyte formation.  Discs: Disc osteophyte complex formation.  Spinal canal and foraminal narrowing: Although CT does not optimally evaluate the soft tissue contents of the spinal canal and foramina, no critical stenosis is suggested.    At C5-6, mild central spinal canal stenosis and moderate bilateral narrowing    At C6-7, mild to moderate central spinal canal stenosis eccentric towards the right and moderate right foraminal narrowing    Paraspinal soft tissues: Unremarkable.    Upper Lungs:Clear  Impression: 1. No acute intracranial findings.  2. No acute facial bone fractures.  3. No acute cervical spine fractures.    Electronically signed by: Min Tolliver  Date:    03/06/2025  Time:    14:08  CT Head Without Contrast  Narrative: EXAMINATION:  CT HEAD WITHOUT CONTRAST; CT MAXILLOFACIAL WITHOUT CONTRAST; CT CERVICAL SPINE WITHOUT CONTRAST    CLINICAL HISTORY:  Facial trauma, blunt;; Neck trauma (Age >= 65y);    TECHNIQUE:  Low dose axial images were obtained through the head, facial bones, and cervical spine.  Coronal and sagittal reformations were also performed. Contrast was not administered.    COMPARISON:  CT of the head and cervical spine performed  05/31/2024.    FINDINGS:  Head:    Blood: No acute intracranial hemorrhage.    Parenchyma: No definite loss of gray-white differentiation to suggest acute or subacute transcortical infarct. Generalized pattern of age-related parenchymal volume loss.  Nonspecific areas of white matter hypoattenuation may reflect sequela of chronic small vessel ischemic disease.    Ventricles/Extra-axial spaces: No abnormal extra-axial fluid collection. Basal cisterns are patent.    Vessels: Mild atherosclerotic calcification.    Orbits: Unremarkable.    Scalp: Unremarkable.    Skull: There are no depressed skull fractures or destructive bone lesions.    Sinuses and mastoids: Essentially clear.    Other findings: None    Facial bones:    Acute facial fractures: There are no acute facial bone fractures.    Pterygoid plates, Zygomatic arches, Sphenotemporal buttresses: Intact.    Nasal Bones: No acute nasal bone fracture.  Chronic appearing mild irregularity of the nasal arch, felt similar to 05/31/2024 head CT.    Mandible: The mandible is intact.  Mild TMJ DJD.    Dentition: No tooth fracture. No periapical lucencies.    Sinuses: There are no fluid levels in the sinuses.    Imaged mastoids and middle ears: The imaged mastoid air cells and middle ear cavities are clear.    Imaged upper cervical spine: See below    Facial soft tissues: No discrete facial hematoma or foreign body is identified.    Orbits: The bony orbits and orbital contents are atraumatic.    Imaged intracranial structures and upper aerodigestive tract: Unremarkable.    Cervical spine:    Fractures: No acute fractures    Alignment: There is no significant vertebral subluxation  Atlanto-axial and atlanto-occipital joints: Atlanto-axial and atlanto-occipital intervals are not widened.  Facet joints: There is no traumatic facet joint widening.  Degenerative facet arthropathy.  Vertebral bodies: Degenerative endplate change.  Anterior predominant osteophyte  formation.  Discs: Disc osteophyte complex formation.  Spinal canal and foraminal narrowing: Although CT does not optimally evaluate the soft tissue contents of the spinal canal and foramina, no critical stenosis is suggested.    At C5-6, mild central spinal canal stenosis and moderate bilateral narrowing    At C6-7, mild to moderate central spinal canal stenosis eccentric towards the right and moderate right foraminal narrowing    Paraspinal soft tissues: Unremarkable.    Upper Lungs:Clear  Impression: 1. No acute intracranial findings.  2. No acute facial bone fractures.  3. No acute cervical spine fractures.    Electronically signed by: Min Tolliver  Date:    2025  Time:    14:08       Kellgren-Richie : 2,      These findings were discussed and reviewed with the patient.     Assessment:     Encounter Diagnoses   Name Primary?    Rotator cuff syndrome of left shoulder Yes    Biceps tendinitis of left upper extremity     Impingement syndrome of left shoulder     Ground-level fall     Acute pain of left shoulder     Knee pain, unspecified chronicity, unspecified laterality         Plan:     Assessment & Plan    PATIENT INSTRUCTIONS:  1. Use compression sleeve on left knee to help reabsorb fluid.  2. Apply ice compress over left knee area.  3. Use provided formal black sleeve for knee support.    MEDICATIONS:  1. Take Tylenol as needed for pain.    IMAGIN. We have discussed a variety of treatment options including medications, injections, physical therapy and other alternative treatments. I also explained the indications, risks and benefits of surgery. Given the patient's hx and examination, we should proceed with MRI at this time. Pt agrees with treatment plan.    I made the decision to obtain old records of the patient including previous notes and imaging. I independently reviewed and interpreted lab results today as well as prior imaging.     1. MRI left shoulder to assess for rotator cuff  pathology.   2. Ambulatory referral to physical therapy for rotator cuff and periscapular strengthening.    All of the patient's questions were answered and the patient will contact us if they have any questions or concerns in the interim.    REFERRALS:  1. Referred to PT for shoulder mobility exercises.    FOLLOW UP:  1. Follow up after MRI results are available.           All of the patient's questions were answered and the patient will contact us if they have any questions or concerns in the interim.    This note was generated with the assistance of ambient listening technology. Verbal consent was obtained by the patient and accompanying visitor(s) for the recording of patient appointment to facilitate this note. I attest to having reviewed and edited the generated note for accuracy, though some syntax or spelling errors may persist. Please contact the author of this note for any clarification.

## 2025-03-21 ENCOUNTER — PATIENT MESSAGE (OUTPATIENT)
Dept: SPORTS MEDICINE | Facility: CLINIC | Age: 75
End: 2025-03-21
Payer: MEDICARE

## 2025-03-23 ENCOUNTER — HOSPITAL ENCOUNTER (OUTPATIENT)
Dept: RADIOLOGY | Facility: HOSPITAL | Age: 75
Discharge: HOME | End: 2025-03-23
Attending: PHYSICIAN ASSISTANT
Payer: MEDICARE

## 2025-03-23 DIAGNOSIS — M75.42 IMPINGEMENT SYNDROME OF LEFT SHOULDER: ICD-10-CM

## 2025-03-23 DIAGNOSIS — M75.102 ROTATOR CUFF SYNDROME OF LEFT SHOULDER: ICD-10-CM

## 2025-03-23 DIAGNOSIS — M75.22 BICEPS TENDINITIS OF LEFT UPPER EXTREMITY: ICD-10-CM

## 2025-03-23 PROCEDURE — 73221 MRI JOINT UPR EXTREM W/O DYE: CPT | Mod: 26,HCNC,LT, | Performed by: RADIOLOGY

## 2025-03-23 PROCEDURE — 73221 MRI JOINT UPR EXTREM W/O DYE: CPT | Mod: TC,HCNC,LT

## 2025-03-24 ENCOUNTER — OFFICE VISIT (OUTPATIENT)
Dept: SPORTS MEDICINE | Facility: CLINIC | Age: 75
End: 2025-03-24
Payer: MEDICARE

## 2025-03-24 ENCOUNTER — TELEPHONE (OUTPATIENT)
Dept: SPORTS MEDICINE | Facility: CLINIC | Age: 75
End: 2025-03-24

## 2025-03-24 ENCOUNTER — PATIENT MESSAGE (OUTPATIENT)
Dept: FAMILY MEDICINE | Facility: CLINIC | Age: 75
End: 2025-03-24
Payer: MEDICARE

## 2025-03-24 ENCOUNTER — RESULTS FOLLOW-UP (OUTPATIENT)
Dept: SPORTS MEDICINE | Facility: CLINIC | Age: 75
End: 2025-03-24

## 2025-03-24 DIAGNOSIS — S46.012D TRAUMATIC COMPLETE TEAR OF LEFT ROTATOR CUFF, SUBSEQUENT ENCOUNTER: Primary | ICD-10-CM

## 2025-03-24 DIAGNOSIS — G44.309 HEADACHES DUE TO OLD HEAD TRAUMA: Primary | ICD-10-CM

## 2025-03-24 DIAGNOSIS — M75.20 BICEPS TENDONITIS: ICD-10-CM

## 2025-03-24 DIAGNOSIS — M75.22 BICEPS TENDINITIS OF LEFT UPPER EXTREMITY: ICD-10-CM

## 2025-03-24 DIAGNOSIS — S46.019A: Primary | ICD-10-CM

## 2025-03-24 DIAGNOSIS — S09.90XS HEADACHES DUE TO OLD HEAD TRAUMA: Primary | ICD-10-CM

## 2025-03-24 PROCEDURE — 4010F ACE/ARB THERAPY RXD/TAKEN: CPT | Mod: HCNC,CPTII,95, | Performed by: PHYSICIAN ASSISTANT

## 2025-03-24 PROCEDURE — 99999 PR PBB SHADOW E&M-EST. PATIENT-LVL III: CPT | Mod: PBBFAC,,, | Performed by: PHYSICIAN ASSISTANT

## 2025-03-24 PROCEDURE — 98006 SYNCH AUDIO-VIDEO EST MOD 30: CPT | Mod: HCNC,95,, | Performed by: PHYSICIAN ASSISTANT

## 2025-03-24 NOTE — PROGRESS NOTES
The patient location is: home  The chief complaint leading to consultation is: Pt presents for MRI results and follow-up.       Visit type: audiovisual    Face to Face time with patient:   15 minutes of total time spent on the encounter, which includes face to face time and non-face to face time preparing to see the patient (eg, review of tests), Obtaining and/or reviewing separately obtained history, Documenting clinical information in the electronic or other health record, Independently interpreting results (not separately reported) and communicating results to the patient/family/caregiver, or Care coordination (not separately reported).         Each patient to whom he or she provides medical services by telemedicine is:  (1) informed of the relationship between the physician and patient and the respective role of any other health care provider with respect to management of the patient; and (2) notified that he or she may decline to receive medical services by telemedicine and may withdraw from such care at any time.    Notes:     HPI:   Interval hx: Pt presents for MRI results and follow-up. He reports symptoms have not improved.    MRI SHOULDER WITHOUT CONTRAST LEFT     CLINICAL HISTORY:  Shoulder pain, rotator cuff disorder suspected, xray done;RC tear and biceps tear;  Unspecified rotator cuff tear or rupture of left shoulder, not specified as traumatic     FINDINGS:  There is complete tear with retraction of the supraspinatus, and infraspinatus tendons.  Subscapularis appears intact.  There is a joint effusion.  No fracture, dislocation, or bone destruction seen.  There is biceps tendon sheath fluid.  There is subacromial-subdeltoid bursitis.  There is edema of supraspinatus and infraspinatus muscles.  No soft tissue masses are seen.  Biceps tendon is intact.  Labrum is grossly unremarkable.     Impression:     Internal derangement as above including rotator cuff tear and other changes.  This could be acute  tear because there is edema within the supraspinatus and infraspinatus muscles.     My read, Significant amount of fatty infiltration and atrophy of the supraspinatus muscle tendon.      ICD-10-CM ICD-9-CM   1. Traumatic complete tear of left rotator cuff, subsequent encounter  S46.012D V58.89     840.4   2. Biceps tendinitis of left upper extremity  M75.22 726.12       Plan:  1. MRI results reviewed today and discussed with Eugenio Reese MD. We reviewed with Clinton Rosenberg today, the pathology and natural history of his diagnosis. We have discussed a variety of treatment options including medications, physical therapy and other alternative treatments. I also explained the indications, risks and benefits of surgery. After discussion, he decided to proceed with surgery. The decision was made to go forward with         left Shoulder:     65236 Total Shoulder (glenoid and proximal humeral) Replacement, reverse  27322 Biceps tenodesis     Clearance Medically Necessary: x Yes   - PCP and Cardiology     Pre-Op Center Clearance Medically Necessary: x Yes    On plavix, hold for 5 days.    Plan for 4/10/2025    Will meet Eugenio Reese MD at preop visit.          The details of the surgical procedure were explained, including the location of probable incisions and a description of likely hardware and/or grafts to be used.  The patient understands the likely convalescence after surgery.  Also, we have thoroughly discussed the risks, benefits and alternatives to surgery, including, but not limited to, the risk of infection, joint stiffness, blood clot (including DVT and/or pulmonary embolus), neurologic and vascular injury.  It was explained that, if tissue has been repaired or reconstructed, there is a chance of failure, which may require further management.    I made the decision to obtain old records of the patient including previous notes and imaging. I independently reviewed and interpreted lab results today as well as  prior imaging.     2. Ice compress to the affected area 2-3x a day for 15-20 minutes as needed for pain management.  3. Ambulatory referral to physical therapy for pre-habilitation.    All of the patient's questions were answered and the patient will contact us if they have any questions or concerns in the interim.

## 2025-03-24 NOTE — TELEPHONE ENCOUNTER
----- Message from Nadir Gloria PA-C sent at 3/24/2025  2:49 PM CDT -----  Regarding: FW: please review chart [facility] candidate  DESEAN, thank you.  ----- Message -----  From: Georgiana Zuñiga MD  Sent: 3/24/2025   2:48 PM CDT  To: Nadir Gloria PA-C  Subject: RE: please review chart [facility]            The rosa isela has CAD, aortic root dilation, JAYLEN, HTN and mild carotid stenosis but nothing that precludes him from coming to [facility].  GTGKB  ----- Message -----  From: Nadir Gloria PA-C  Sent: 3/24/2025   1:25 PM CDT  To: eGorgiana Zuñiga MD  Subject: please review chart [facility] candidate                [facility] [place] instead of [facility] for a total shoulder, yes?Thank you,Ludin

## 2025-03-25 ENCOUNTER — TELEPHONE (OUTPATIENT)
Dept: PREADMISSION TESTING | Facility: HOSPITAL | Age: 75
End: 2025-03-25
Payer: MEDICARE

## 2025-04-02 ENCOUNTER — HOSPITAL ENCOUNTER (OUTPATIENT)
Dept: CARDIOLOGY | Facility: CLINIC | Age: 75
Discharge: HOME | End: 2025-04-02
Payer: MEDICARE

## 2025-04-02 ENCOUNTER — LAB VISIT (OUTPATIENT)
Dept: LAB | Facility: HOSPITAL | Age: 75
End: 2025-04-02
Payer: MEDICARE

## 2025-04-02 ENCOUNTER — OFFICE VISIT (OUTPATIENT)
Dept: INTERNAL MEDICINE | Facility: CLINIC | Age: 75
End: 2025-04-02
Payer: MEDICARE

## 2025-04-02 VITALS
BODY MASS INDEX: 32.26 KG/M2 | OXYGEN SATURATION: 96 % | DIASTOLIC BLOOD PRESSURE: 62 MMHG | HEART RATE: 76 BPM | HEIGHT: 70 IN | SYSTOLIC BLOOD PRESSURE: 108 MMHG | WEIGHT: 225.31 LBS | TEMPERATURE: 99 F

## 2025-04-02 DIAGNOSIS — E03.9 HYPOTHYROIDISM, UNSPECIFIED TYPE: Chronic | ICD-10-CM

## 2025-04-02 DIAGNOSIS — I25.118 CORONARY ARTERY DISEASE OF NATIVE ARTERY OF NATIVE HEART WITH STABLE ANGINA PECTORIS: ICD-10-CM

## 2025-04-02 DIAGNOSIS — R00.1 BRADYCARDIA: ICD-10-CM

## 2025-04-02 DIAGNOSIS — G47.33 OSA (OBSTRUCTIVE SLEEP APNEA): Chronic | ICD-10-CM

## 2025-04-02 DIAGNOSIS — I10 ESSENTIAL HYPERTENSION: Chronic | ICD-10-CM

## 2025-04-02 DIAGNOSIS — E78.2 MIXED HYPERLIPIDEMIA: ICD-10-CM

## 2025-04-02 DIAGNOSIS — K21.9 GASTROESOPHAGEAL REFLUX DISEASE WITHOUT ESOPHAGITIS: ICD-10-CM

## 2025-04-02 DIAGNOSIS — I87.2 VENOUS INSUFFICIENCY: ICD-10-CM

## 2025-04-02 DIAGNOSIS — E11.9 DIABETES MELLITUS TYPE 2 WITHOUT RETINOPATHY: ICD-10-CM

## 2025-04-02 DIAGNOSIS — E78.2 MIXED HYPERLIPIDEMIA: Chronic | ICD-10-CM

## 2025-04-02 DIAGNOSIS — I10 ESSENTIAL HYPERTENSION: ICD-10-CM

## 2025-04-02 DIAGNOSIS — I77.810 AORTIC ROOT DILATATION: ICD-10-CM

## 2025-04-02 DIAGNOSIS — I77.819 AORTIC ECTASIA: ICD-10-CM

## 2025-04-02 DIAGNOSIS — N40.1 BENIGN NON-NODULAR PROSTATIC HYPERPLASIA WITH LOWER URINARY TRACT SYMPTOMS: Chronic | ICD-10-CM

## 2025-04-02 DIAGNOSIS — G47.33 OSA (OBSTRUCTIVE SLEEP APNEA): ICD-10-CM

## 2025-04-02 DIAGNOSIS — E66.811 OBESITY (BMI 30.0-34.9): ICD-10-CM

## 2025-04-02 DIAGNOSIS — E66.811 OBESITY (BMI 30.0-34.9): Chronic | ICD-10-CM

## 2025-04-02 DIAGNOSIS — E11.9 DIABETES MELLITUS TYPE 2 WITHOUT RETINOPATHY: Primary | ICD-10-CM

## 2025-04-02 DIAGNOSIS — E66.811 CLASS 1 OBESITY WITH BODY MASS INDEX (BMI) OF 32.0 TO 32.9 IN ADULT, UNSPECIFIED OBESITY TYPE, UNSPECIFIED WHETHER SERIOUS COMORBIDITY PRESENT: ICD-10-CM

## 2025-04-02 DIAGNOSIS — E03.9 HYPOTHYROIDISM, UNSPECIFIED TYPE: ICD-10-CM

## 2025-04-02 DIAGNOSIS — N40.1 BENIGN NON-NODULAR PROSTATIC HYPERPLASIA WITH LOWER URINARY TRACT SYMPTOMS: ICD-10-CM

## 2025-04-02 DIAGNOSIS — N28.9 RENAL INSUFFICIENCY: ICD-10-CM

## 2025-04-02 PROBLEM — E11.69 TYPE 2 DIABETES MELLITUS WITH OTHER SPECIFIED COMPLICATION: Status: RESOLVED | Noted: 2023-04-27 | Resolved: 2025-04-02

## 2025-04-02 LAB
ABSOLUTE EOSINOPHIL (OHS): 0.26 K/UL
ABSOLUTE MONOCYTE (OHS): 0.74 K/UL (ref 0.3–1)
ABSOLUTE NEUTROPHIL COUNT (OHS): 6 K/UL (ref 1.8–7.7)
ALBUMIN SERPL BCP-MCNC: 4.3 G/DL (ref 3.5–5.2)
ALP SERPL-CCNC: 53 UNIT/L (ref 40–150)
ALT SERPL W/O P-5'-P-CCNC: 20 UNIT/L (ref 10–44)
ANION GAP (OHS): 11 MMOL/L (ref 8–16)
AST SERPL-CCNC: 16 UNIT/L (ref 11–45)
BASOPHILS # BLD AUTO: 0.07 K/UL
BASOPHILS NFR BLD AUTO: 0.7 %
BILIRUB SERPL-MCNC: 0.7 MG/DL (ref 0.1–1)
BUN SERPL-MCNC: 17 MG/DL (ref 8–23)
CALCIUM SERPL-MCNC: 9.6 MG/DL (ref 8.7–10.5)
CHLORIDE SERPL-SCNC: 103 MMOL/L (ref 95–110)
CO2 SERPL-SCNC: 25 MMOL/L (ref 23–29)
CREAT SERPL-MCNC: 1.3 MG/DL (ref 0.5–1.4)
EAG (OHS): 120 MG/DL (ref 68–131)
ERYTHROCYTE [DISTWIDTH] IN BLOOD BY AUTOMATED COUNT: 13.3 % (ref 11.5–14.5)
GFR SERPLBLD CREATININE-BSD FMLA CKD-EPI: 58 ML/MIN/1.73/M2
GLUCOSE SERPL-MCNC: 157 MG/DL (ref 70–110)
HBA1C MFR BLD: 5.8 % (ref 4–5.6)
HCT VFR BLD AUTO: 44.7 % (ref 40–54)
HGB BLD-MCNC: 15 GM/DL (ref 14–18)
IMM GRANULOCYTES # BLD AUTO: 0.03 K/UL (ref 0–0.04)
IMM GRANULOCYTES NFR BLD AUTO: 0.3 % (ref 0–0.5)
LYMPHOCYTES # BLD AUTO: 2.71 K/UL (ref 1–4.8)
MCH RBC QN AUTO: 30.5 PG (ref 27–31)
MCHC RBC AUTO-ENTMCNC: 33.6 G/DL (ref 32–36)
MCV RBC AUTO: 91 FL (ref 82–98)
NUCLEATED RBC (/100WBC) (OHS): 0 /100 WBC
PLATELET # BLD AUTO: 216 K/UL (ref 150–450)
PMV BLD AUTO: 9.9 FL (ref 9.2–12.9)
POTASSIUM SERPL-SCNC: 4.3 MMOL/L (ref 3.5–5.1)
PROT SERPL-MCNC: 7.3 GM/DL (ref 6–8.4)
RBC # BLD AUTO: 4.92 M/UL (ref 4.6–6.2)
RELATIVE EOSINOPHIL (OHS): 2.7 %
RELATIVE LYMPHOCYTE (OHS): 27.6 % (ref 18–48)
RELATIVE MONOCYTE (OHS): 7.5 % (ref 4–15)
RELATIVE NEUTROPHIL (OHS): 61.2 % (ref 38–73)
SODIUM SERPL-SCNC: 139 MMOL/L (ref 136–145)
TSH SERPL-ACNC: 2.08 UIU/ML (ref 0.4–4)
WBC # BLD AUTO: 9.81 K/UL (ref 3.9–12.7)

## 2025-04-02 PROCEDURE — 83036 HEMOGLOBIN GLYCOSYLATED A1C: CPT | Mod: HCNC

## 2025-04-02 PROCEDURE — 93010 ELECTROCARDIOGRAM REPORT: CPT | Mod: HCNC,S$GLB,, | Performed by: STUDENT IN AN ORGANIZED HEALTH CARE EDUCATION/TRAINING PROGRAM

## 2025-04-02 PROCEDURE — 85025 COMPLETE CBC W/AUTO DIFF WBC: CPT | Mod: HCNC

## 2025-04-02 PROCEDURE — 80053 COMPREHEN METABOLIC PANEL: CPT | Mod: HCNC

## 2025-04-02 PROCEDURE — 36415 COLL VENOUS BLD VENIPUNCTURE: CPT | Mod: HCNC

## 2025-04-02 PROCEDURE — 84443 ASSAY THYROID STIM HORMONE: CPT | Mod: HCNC

## 2025-04-02 PROCEDURE — 99999 PR PBB SHADOW E&M-EST. PATIENT-LVL III: CPT | Mod: PBBFAC,HCNC,, | Performed by: NURSE PRACTITIONER

## 2025-04-02 NOTE — PROGRESS NOTES
[facility]  Progress Note    Patient Name: Clinton Rosenberg  MRN: [id number]  Date of Evaluation- 04/04/2025  PCP- Michael Gonzalez MD    Future cases for Clinton Rosenberg [[id number]]       Case ID Status Date Time Parish Procedure Provider Location    [id number] [facility] 4/10/2025 10:30  ARTHROPLASTY, SHOULDER, TOTAL, REVERSE Eugenio Reese MD [[id number]] [facility] OR            HPI:  This is a 74 y.o. male  who presents today for a preoperative evaluation in preparation for shoulder arthroplasty left shoulder  Surgery is indicated for trauma rotator cuff, biceps tendonosis     .   Patient is new to me.    The history has been obtained by speaking with the patient and reviewing the electronic medical record and/or outside health information. Significant health conditions for the perioperative period are discussed below in assessment and plan.     Patient reports current health status to be Good.  Denies any new symptoms before surgery.       Subjective/ Objective:     Chief Complaint: Preoperative evaulation, perioperative medical management, and complication reduction plan.     Functional Capacity:  Able to climb a flight of stairs without CP SOB or Syncope.  Able to meet 4 METs      Anesthesia issues: None    Difficulty mouth opening: none    Steroid use in the last 12 months: none     Dental Issues: none    Family anesthesia difficulty: None     Family Hx of Thrombosis none    Past Medical History:   Diagnosis Date    Allergy     Angina pectoris     Anxiety     Cancer     skin rwemoved from head    Chronic midline low back pain without sciatica 10/26/2022    Coronary artery disease     Depression     Eye injury as a teen    stuck object in os    GERD (gastroesophageal reflux disease)     Hyperlipidemia     Hypertension     Hypothyroidism     Memory loss     12/3/2014 MRI brain: 1. No evidence for acute infarct 2. unremarkable MRI of the brain; 12/3/2014 carotid US normal    Myocardial infarction      Nuclear sclerosis of both eyes 05/20/2021    Obesity     Peripheral vascular disease 06/20/2017    Retrocalcaneal bursitis 11/24/2014    Sleep apnea     Type 2 diabetes mellitus with other specified complication 04/27/2023    Type 2 diabetes mellitus with other specified complication 04/27/2023       Past Surgical History:   Procedure Laterality Date    APPENDECTOMY  1986    bone spur Right     COLONOSCOPY      COLONOSCOPY N/A 10/26/2023    Procedure: COLONOSCOPY;  Surgeon: Argelia Altamirano MD;  Location: [facility] ENDO;  Service: Endoscopy;  Laterality: N/A;  Referred by: Dr. Michael Gonzalez  BT/Clearance: ok to hold Plavix 5 days per Dr Dawn  Prep: golytely  Route instructions sent: [id number]  8/3 proc jessica to 10/26, pt has prep and instr  10/19- pre call complete.  DBM    ESOPHAGOGASTRODUODENOSCOPY N/A 8/2/2024    Procedure: EGD (ESOPHAGOGASTRODUODENOSCOPY);  Surgeon: Argelia Altamirano MD;  Location: [facility] ENDO;  Service: Endoscopy;  Laterality: N/A;  Dr. Porter, Urgent EGD, abdominal pain, standard prep, Instr to portal. Plavix hold pending.  ok to hold Plavix 5 days per Dr Dawn  7/26/24- lvm/portal for pc. DBM  7/30 pt confirmed. has been holding plavix since 7/27 cf  7/30/24- pc complete. DBM    hand trauma Right     pencil     heart stent      LEFT HEART CATHETERIZATION Left 9/20/2019    Procedure: Left heart cath 12 pm start, R rad access;  Surgeon: Brad Bahena MD;  Location: [facility] CATH LAB;  Service: Cardiology;  Laterality: Left;  RN PREOP 9/13/2019  NEEDS IODINE PREMED    LEFT HEART CATHETERIZATION Left 9/21/2020    Procedure: Left heart cath 730am start, R rad access;  Surgeon: Brad Baehna MD;  Location: [facility] CATH LAB;  Service: Cardiology;  Laterality: Left;  RN PREOP 9/14/2020, COVID NEGATIVE---9/18       Review of Systems   Constitutional:  Negative for chills, fatigue, fever and unexpected weight change.   HENT:  Negative for trouble swallowing and voice change.    Eyes:   "Negative for photophobia and visual disturbance.        No acute visual changes   Respiratory:  Negative for cough, shortness of breath and wheezing.         STOP bang  Score  Low risk JAYLEN  High risk JAYLEN   Cardiovascular:  Negative for chest pain, palpitations and leg swelling.   Gastrointestinal:  Negative for abdominal pain, blood in stool, constipation, diarrhea, nausea and vomiting.         Hepatitis, Cirrhosis  No BRB or black tarry stool    No FLD,   Genitourinary:  Negative for difficulty urinating, dysuria, frequency, hematuria and urgency.        Nocturia 1-2   Musculoskeletal:  Positive for arthralgias and gait problem. Negative for myalgias, neck pain and neck stiffness.   Neurological:  Negative for dizziness, seizures, syncope, light-headedness, numbness and headaches.   Psychiatric/Behavioral:  Negative for agitation, behavioral problems, confusion, decreased concentration, dysphoric mood, hallucinations, self-injury, sleep disturbance and suicidal ideas. The patient is not nervous/anxious and is not hyperactive.           VITALS  Visit Vitals  /62 (BP Location: Right arm, Patient Position: Sitting)   Pulse 76   Temp 98.8 °F (37.1 °C) (Oral)   Ht 5' 10" (1.778 m)   Wt 102.2 kg (225 lb 5 oz)   SpO2 96%   BMI 32.33 kg/m²          Physical Exam  Constitutional:       General: He is not in acute distress.     Appearance: He is well-developed. He is not diaphoretic.   HENT:      Head: Normocephalic.      Right Ear: Hearing normal.      Left Ear: Hearing normal.      Nose: Nose normal.      Mouth/Throat:      Lips: Pink.      Mouth: Mucous membranes are moist.   Eyes:      General: Lids are normal.      Conjunctiva/sclera: Conjunctivae normal.      Pupils: Pupils are equal, round, and reactive to light.   Neck:      Vascular: No carotid bruit.      Trachea: Trachea and phonation normal.   Cardiovascular:      Rate and Rhythm: Normal rate and regular rhythm.      Pulses:           Carotid pulses are 2+ " on the right side and 2+ on the left side.       Radial pulses are 2+ on the right side and 2+ on the left side.        Posterior tibial pulses are 2+ on the right side and 2+ on the left side.      Heart sounds: Normal heart sounds. No murmur heard.     No friction rub. No gallop.      Comments: Trace LE edema  Pulmonary:      Effort: Pulmonary effort is normal.      Breath sounds: Normal breath sounds.      Comments: Clear and equal  Anterior and Posterior BBS  Abdominal:      General: Abdomen is protuberant. Bowel sounds are normal. There is no distension.      Palpations: Abdomen is soft.      Tenderness: There is no abdominal tenderness.   Musculoskeletal:         General: No tenderness or deformity. Normal range of motion.      Cervical back: Normal range of motion.      Right lower leg: No edema.      Left lower leg: No edema.   Lymphadenopathy:      Head:      Right side of head: No submental, submandibular, tonsillar, preauricular, posterior auricular or occipital adenopathy.      Left side of head: No submental, submandibular, tonsillar, preauricular, posterior auricular or occipital adenopathy.      Cervical:      Right cervical: No superficial cervical adenopathy.     Left cervical: No superficial cervical adenopathy.   Skin:     General: Skin is warm and dry.      Capillary Refill: Capillary refill takes 2 to 3 seconds.      Coloration: Skin is not pale.      Findings: No erythema or rash.   Neurological:      Mental Status: He is alert and oriented to person, place, and time.      GCS: GCS eye subscore is 4. GCS verbal subscore is 5. GCS motor subscore is 6.      Motor: No abnormal muscle tone.      Coordination: Coordination normal.   Psychiatric:         Attention and Perception: Attention and perception normal.         Mood and Affect: Mood and affect normal.         Speech: Speech normal.         Behavior: Behavior normal. Behavior is cooperative.         Thought Content: Thought content normal.          Cognition and Memory: Cognition and memory normal.         Judgment: Judgment normal.          Significant Labs:  Lab Results   Component Value Date    WBC 9.81 04/02/2025    HGB 15.0 04/02/2025    HCT 44.7 04/02/2025     04/02/2025    CHOL 119 (L) 11/04/2024    TRIG 257 (H) 11/04/2024    HDL 37 (L) 11/04/2024    ALT 20 04/02/2025    AST 16 04/02/2025     04/02/2025    K 4.3 04/02/2025     04/02/2025    CREATININE 1.3 04/02/2025    BUN 17 04/02/2025    CO2 25 04/02/2025    TSH 2.075 04/02/2025    PSA 0.76 10/12/2018    INR 1.0 08/01/2023    HGBA1C 5.8 (H) 04/02/2025       Diagnostic Studies: No relevant studies.    EKG:   Results for orders placed or performed during the hospital encounter of 04/02/25   EKG 12-lead    Collection Time: 04/02/25  3:57 PM   Result Value Ref Range    QRS Duration 88 ms    [facility]HS QTC Calculation 407 ms    Narrative    Test Reason : E11.9,N40.1,E03.9,E66.811,K21.9,G47.33,I25.118,I10,I77.819,I77.810,I87.2,R00.1,    Vent. Rate :  65 BPM     Atrial Rate :  65 BPM     P-R Int : 212 ms          QRS Dur :  88 ms      QT Int : 392 ms       P-R-T Axes :  29 -40  44 degrees    QTcB Int : 407 ms    Sinus rhythm with 1st degree A-V block  Left axis deviation  Prior inferior infarct, age undetermined  Abnormal ECG  When compared with ECG of 02-Aug-2024 10:41,  No significant change was found  Confirmed by Nadir Sanchez ([id number]) on 4/3/2025 9:49:43 AM    Referred By: CARLOS HARDING           Confirmed By: Nadir Sanchez       2D ECHO:  TTE:  Results for orders placed or performed during the hospital encounter of 04/22/24   Echo   Result Value Ref Range    LVOT stroke volume 109.83 cm3    LVIDd 3.80 3.5 - 6.0 cm    LV Systolic Volume 24.35 mL    LVIDs 2.59 2.1 - 4.0 cm    LV Diastolic Volume 62.09 mL    IVS 1.27 (A) 0.6 - 1.1 cm    LVOT diameter 2.38 cm    LVOT area 4.4 cm2    FS 32 28 - 44 %    Left Ventricle Relative Wall Thickness 0.66 cm    PW 1.26 (A) 0.6 - 1.1 cm     LV mass 165.97 g    MV Peak E Angel 0.66 m/s    TDI LATERAL 0.10 m/s    TDI SEPTAL 0.10 m/s    E/E' ratio 6.60 m/s    MV Peak A Angel 0.97 m/s    TR Max Angel 2.33 m/s    E/A ratio 0.68     IVRT 98.95 msec    E wave deceleration time 281.42 msec    LV SEPTAL E/E' RATIO 6.60 m/s    LV LATERAL E/E' RATIO 6.60 m/s    PV Peak S Angel 0.63 m/s    PV Peak D Angel 0.40 m/s    Pulm vein S/D ratio 1.58     LVOT peak angel 1.06 m/s    Left Ventricular Outflow Tract Mean Velocity 0.81 cm/s    Left Ventricular Outflow Tract Mean Gradient 2.85 mmHg    RVDD 2.90 cm    RV S' 8.81 cm/s    TAPSE 2.38 cm    LA size 3.85 cm    Left Atrium Minor Axis 5.12 cm    Left Atrium Major Axis 5.06 cm    RA Major Axis 4.37 cm    AV mean gradient 6 mmHg    AV peak gradient 9 mmHg    Ao peak angel 1.53 m/s    Ao VTI 31.10 cm    LVOT peak VTI 24.70 cm    AV valve area 3.53 cm²    AV Velocity Ratio 0.69     AV index (prosthetic) 0.79     MORENITA by Velocity Ratio 3.08 cm²    MV stenosis pressure 1/2 time 81.61 ms    MV valve area p 1/2 method 2.70 cm2    TV peak gradient 2 mmHg    Triscuspid Valve Regurgitation Peak Gradient 22 mmHg    PV PEAK VELOCITY 1.38 m/s    PV peak gradient 8 mmHg    Sinus 4.05 cm    STJ 3.38 cm    Ascending aorta 4.16 cm    IVC diameter 1.55 cm    RV/LV Ratio 0.76 cm    Mean e' 0.10 m/s    LA Vol 58.30 cm3    LA WIDTH 3.5 cm    RA Width 2.8 cm    TV resting pulmonary artery pressure 25 mmHg    RV TB RVSP 5 mmHg    Est. RA pres 3 mmHg    Narrative      Left Ventricle: The left ventricle is normal in size. Mildly increased   wall thickness. There is mild concentric hypertrophy. There is normal   systolic function with a visually estimated ejection fraction of 55 - 60%.   Grade I diastolic dysfunction.    Right Ventricle: Normal right ventricular cavity size. Systolic   function is normal.    Aorta: Aortic root is mildly dilated measuring 4.05 cm. Ascending aorta   is mildly dilated measuring 4.16 cm.    Pulmonary Artery: The estimated pulmonary  artery systolic pressure is   25 mmHg.         JOSE:  No results found. However, due to the size of the patient record, not all encounters were searched. Please check Results Review for a complete set of results.     Imaging     Active Cardiac Conditions: None      Revised Cardiac Risk Index   High -Risk Surgery  Intraperitoneal; Intrathoracic; suprainguinal vascular Yes- + 1 No- 0   History of Ischemic Heart Disease   (Hx of MI/positive exercise test/current chest pain due to ischemia/use of nitrate therapy/EKG with pathological Q waves) Yes- + 1 No- 0   History of CHF  (Pulmonary edema/bilateral rales or S3 gallop/PND/CXR showing pulmonary vascular redistribution) Yes- + 1 No- 0   History of CVA   (Prior stroke or TIA) Yes- + 1 No- 0   Pre-operative treatment with insulin Yes- + 1 No- 0   Pre-operative creatinine > 2mg/dl Yes- + 1 No- 0   Total:    Risk Status:  Estimated risk of cardiac complications after non-cardiac surgery using the Revised Cardiac Risk Index for Preoperative risk is 6%      ARISCAT (Ninfa) risk index: Low: 1.6% risk of post-op pulmonary complications.    American Society of Anesthesiologists Physical Status classification (ASA): 3               Preoperative cardiac risk assessment-  The patient does not have any active cardiac conditions . Revised cardiac risk index predictors- ---.Functional capacity is more than 4 Mets. He will be undergoing a Orthopedic procedure that carries a Moderate Risk risk     The estimated risk of the rate of adverse cardiac outcomes  6%    No further cardiac work up is indicated prior to proceeding with the surgery     Orders Placed This Encounter    CBC Auto Differential    Comprehensive Metabolic Panel    Hemoglobin A1C    TSH    Ambulatory referral/consult to Cardiology    EKG 12-lead    EKG 12-lead       American Society of Anesthesiologists Physical status classification ( ASA ) class: 3     Postoperative pulmonary complication risk assessment: 1.6     ARISCAT  ( [place]et) risk index- risk class -  Low, if duration of surgery is under 3 hours, intermediate, if duration of surgery is over 3 hours        Assessment/Plan:     Diabetes mellitus type 2 without retinopathy  Currently takes:   Ozempic - on hold for surgery    Most recent A1c is 5.8- patient states he does home A1c checks .    Managed per PCP    Denies peripheral neuropathy.   Denies visual changes. Up to date with eye exams. Overdue for eye exam.  Micro changes: Denies- Retinopathy, Nephropathy   Macro changes: Denies-  Carotid, Coronary , Peripheral disease     Maintain healthy weight. Exercise at least 150 minutes weekly. Encouraged diet rich in nutrients such as fruits, vegetables, and whole grains; reduce sugar intake from cakes, candy, and sugared drinks.    Benign non-nodular prostatic hyperplasia with lower urinary tract symptoms  Please monitor for urinary retention during the perioperative period  Monitor strict I&O and bladder scan as needed    Hypothyroidism  TSH 3.703  SYnthroid 75 mcg    Class 1 obesity with body mass index (BMI) of 32.0 to 32.9 in adult  BMI 32.33    Lost 12 pounds so far with Ozempic fo rDM    Patient would benefit from weight loss   Lifestyle changes should be made by eating healthy, exercising at least 150 minutes weekly, and avoiding sedentary behavior.     Gastroesophageal reflux disease without esophagitis  Currently being treated with Protonix  Encouraged to elevate HOB and avoid laying down for 2-3 hours after meals.   Weight loss encouraged as well as dietary changes such as reduction or avoidance of fatty foods, caffeine, spicy foods, and chocolate.    Smoking cessation was recommended as well as reduction of alcohol consumption.    JAYLEN (obstructive sleep apnea)  This client has a possible diagnosis of obstructive sleep apnea (JAYLEN)    Instructions were given to avoid the following: sleeping supine, weight gain ,alcoholic beverages , sedative medications, and CNS depressant  use as these can all worsen JAYLEN.    Untreated sleep apnea has been shown to increase daytime sleepiness, hypertension, heart disease and stroke which were discussed with the patient at this time    Please use caution with medications that induce respiratory depression in the perioperative period    Coronary artery disease of native artery of native heart with stable angina pectoris; 2021 plan is long term DAPT  H/O CAD and MI  MedicationsASA/Plavix/Statin/BBL/Imdur     RCRI 1,  Able to meet 4 METS, No active cardiac conditions, He denies any symptoms of chest pain, shortness of breath at rest, peripheral edema, orthopnea, palpitations, lightheadedness, presyncope, or syncope.       Cardiology clearance 4/3/25 Dr. Bahena for upcoming surgery  The patient is at low cardiac risk for the planned orthopedic surgical procedure and associated anesthesia.  No further preoperative cardiac testing is required.  Echocardiogram as ordered above does not need to hold up his procedure.  If necessary, it is acceptable hold his Plavix for 5 days and aspirin for 7 days prior to the procedure and resume them postoperatively as soon as possible.  Cont compression rx  Diet/exercise/weight loss  RTC 1 year (Apr 2026)    CAD Hx  Stent LAD 2012 9/27/2013 [facility] prox LAD 30% stenosis; RCA 20% stenosis. patent LAD stent.    3/9/2015 nu med stress test negative. 3/9/2015 TTE normal LVEF 55-60; concentric remodeling.  L MPI 6/8/17 (images pers rev) Nuclear Quantitative Functional Analysis:  LVEF: 68 % Impression: NORMAL MYOCARDIAL PERFUSION  Echo 3/15/18 LVEF 60-65%; upper limit of normal aortic root 3.7 cm  08/08/2019 Lexiscan nuclear stress test probably normal study.  LVEF 66%.  Normal wall motion.  08/08/2019 normal LV systolic function LVEF 65%.  Concentric LV remodeling.  No wall motion abnormalities.  Aortic root diameter mildly increased verses report 03/2018.  9/21/2019 [facility]: Dominance: Right  LM: normal  LAD: mid 50% followed by  patent stent, distal/transapical 80% (small caliber)  Ramus: prox 30%  LCx: prox 50%  RCA: prox 30%, dist 40%              RPDA ost 50%              RPLB mid 90%     Stent 2.5x18 [place]olute [place] SILVER 16 abi     9/21/2020 [facility]  Dominance: Right  LM: normal  LAD: MLI, mid stent patent (2013)  Ramus: MLI, prox 30%  LCx: MLI, ost 40%  RCA: MLI, dist eccentric 40-50%              RPDA: ost 50%, iFR 0.97              RPLB mid stent patent (9/20/19 2.5x18 [place] SILVER)  03/09/2023 nuclear stress test negative for ischemia   03/09/2023 TTE LV normal size and systolic function LVEF 65%.  Grade 1 diastolic dysfunction.  03/09/2023 lower extremity venous ultrasound GSV reflux right leg below knee  GSV reflux left leg mid, distal thigh    Essential hypertension  Current BP  108/62 today.    Taking: NorvascMetoprolol, Benicar  Patient reports home BP readings of: 130/80s  Digital HTN program      Lifestyle changes to reduce systolic BP:  Smoking cessation; exercise 30 minutes per day,  5 days per week or 150 minutes weekly; sodium reduction and avoidance of high salt foods such as processed meats, frozen meals and  fast foods.   Keeping a healthy weight/BMI can help with better BP control    BP acceptable for surgery. I recommend monitoring BP during perioperative period as uncontrolled pain can elevate blood pressure.         Mixed hyperlipidemia long term DAPT  COntinue Lipitor    Renal insufficiency  BUN 17 CR 1.3 GFR 57    Deleterious effects NSAID's , Beneficial effects of Hydration discussed   Tylenol as needed for pain   I suggest monitoring renal function, in put and out put status froy-operatively. I  suggest avoiding nephrotoxic medication including NSAIDs, COX2 inhibitors, intravenous contrast agent,avoiding hypotension to prevent further renal impairment.     Aortic root dilatation on TTE 8/2019  Echo 4/22/24    Left Ventricle: The left ventricle is normal in size. Mildly increased wall thickness. There is mild  concentric hypertrophy. There is normal systolic function with a visually estimated ejection fraction of 55 - 60%. Grade I diastolic dysfunction.    Right Ventricle: Normal right ventricular cavity size. Systolic function is normal.    Aorta: Aortic root is mildly dilated measuring 4.05 cm. Ascending aorta is mildly dilated measuring 4.16 cm.    Pulmonary Artery: The estimated pulmonary artery systolic pressure is 25 mmHg.        Preventive perioperative care    Thromboembolic prophylaxis:  His risk factors for thrombosis include morbid obesity, surgical procedure, age, and reduced mobility.I suggest  thromboembolic prophylaxis ( mechanical/pharmacological, weighing the risk benefits of pharmacological agent use considering froy procedural bleeding )  during the perioperative period.I suggested being active in the post operative period.      Postoperative pulmonary complication prophylaxis-Risk factors for post operative pulmonary complications include age over 65 years and ASA class >2- I suggest incentive spirometry use, early ambulation, and pain control so as to avoid diaphragmatic splinting  Brush teeth twice per day, oral rinses, sleep with the head of the bed up 30 degrees     Renal complication prophylaxis-Risk factors for renal complications include age, diabetes mellitus, and hypertension . I suggest keeping him well hydrated and avoidance/ minimizing the use of  NSAID's,GATES 2 Inhibitors ,IV contrast if possible in the perioperative period.I suggested drinking 2 litre's of water a day      Surgical site Infection Prophylaxis-I  suggest appropriate antibiotic for Prophylaxis against Surgical site infections Shower with Hibiclens in the night before surgery and the morning of surgery     In view of BPH the patient  is at risk of postoperative urinary retention.  I suggest avoidance / minimizing the of  Benzodiazepines,Anticholinergic medication,antihistamines ( Benadryl) , if possible in the perioperative  period. I suggest using the minimum possible use of opioids for the minimum period of time in the perioperative period. Benadryl avoidance suggested      This visit was focused on Preoperative evaluation, Perioperative Medical management, complication reduction plans. I suggest that the patient follows up with primary care or relevant sub specialists for ongoing health care.    I appreciate the opportunity to be involved in this patients care. Please feel free to contact me if there were any questions about this consultation.    Patient is pending optimization    Labs EKG Cards clearance    I spent a total of 40 minutes on the day of the visit.This includes face to face time and non-face to face time preparing to see the patient (eg, review of tests), obtaining and/or reviewing separately obtained history, documenting clinical information in the electronic or other health record, independently interpreting results and communicating results to the patient/family/caregiver, or care coordinator.      Joaquin Peacock NP  Perioperative Medicine  [facility]   Pager [phone]

## 2025-04-02 NOTE — ASSESSMENT & PLAN NOTE
Currently takes:   Ozempic - on hold for surgery    Most recent A1c is 5.8- patient states he does home A1c checks .    Managed per PCP    Denies peripheral neuropathy.   Denies visual changes. Up to date with eye exams. Overdue for eye exam.  Micro changes: Denies- Retinopathy, Nephropathy   Macro changes: Denies-  Carotid, Coronary , Peripheral disease     Maintain healthy weight. Exercise at least 150 minutes weekly. Encouraged diet rich in nutrients such as fruits, vegetables, and whole grains; reduce sugar intake from cakes, candy, and sugared drinks.

## 2025-04-02 NOTE — ASSESSMENT & PLAN NOTE
Current BP  108/62 today.    Taking: NorvascMetoprolol, Benicar  Patient reports home BP readings of: 130/80s  Digital HTN program      Lifestyle changes to reduce systolic BP:  Smoking cessation; exercise 30 minutes per day,  5 days per week or 150 minutes weekly; sodium reduction and avoidance of high salt foods such as processed meats, frozen meals and  fast foods.   Keeping a healthy weight/BMI can help with better BP control    BP acceptable for surgery. I recommend monitoring BP during perioperative period as uncontrolled pain can elevate blood pressure.

## 2025-04-02 NOTE — HPI
This is a 74 y.o. male  who presents today for a preoperative evaluation in preparation for shoulder arthroplasty left shoulder  Surgery is indicated for trauma rotator cuff, biceps tendonosis     .   Patient is new to me.    The history has been obtained by speaking with the patient and reviewing the electronic medical record and/or outside health information. Significant health conditions for the perioperative period are discussed below in assessment and plan.     Patient reports current health status to be Good.  Denies any new symptoms before surgery.

## 2025-04-02 NOTE — DISCHARGE INSTRUCTIONS
.Your surgery has been scheduled for:_______4/10/25___________________________________    You [facility] to:  [facility], located on the [place] side of the first floor of the           [facility] ([phone])  ____The Second Floor Surgery Center, located on the [facility] side of the            Second floor of the [facility] ([phone])  ____3rd Floor S[facility]U located on the [facility] side of the [facility] [phone]  __X__[facility]: located at [address] [place] [address]. [address].     Please Note   Tell your doctor if you take Aspirin, products containing Aspirin, herbal medications  or blood thinners, such as Coumadin, Ticlid, or Plavix.  (Consult your provider regarding holding or stopping before surgery).  Arrange for someone to drive you home following surgery.  You will not be allowed to leave the surgical facility alone or drive yourself home following sedation and anesthesia.    Before Surgery  Stop taking all herbal medications, vitamins, and supplements 7 days prior to surgery  No Motrin/Advil (Ibuprofen) 7 days before surgery  No Aleve (Naproxen) 7 days before surgery   No Goody's/BC Powder 7 days before surgery  Refrain from drinking alcoholic beverages for 24 hours before and after surgery  Stop or limit smoking at least 24 hours prior to surgery  You may take Tylenol for pain    Night before Surgery  Do not eat or drink after midnight  Take a shower or bath (shower is recommended).  Bathe with Hibiclens soap or an antibacterial soap from the neck down.  If not supplied by your surgeon, hibiclens soap will need to be purchased over the counter in pharmacy.  Rinse soap off thoroughly.  Shampoo your hair with your regular shampoo    The Day of Surgery  Take another bath or shower with hibiclens or any antibacterial soap, to reduce the chance of infection.  Take heart  and blood pressure medications with a small sip of water, as advised by the perioperative team.  Do not take fluid pills  You may brush your teeth and rinse your mouth, but do not swallow any additional water.   Do not apply perfumes, powder, body lotions or deodorant on the day of surgery.  Nail polish should be removed.  Do not wear makeup or moisturizer  Wear comfortable clothes, such as a button front shirt and loose fitting pants.  Leave all jewelry, including body piercings, and valuables at home.    Bring any devices you will need after surgery such as crutches or canes.  If you have sleep apnea, please bring your CPAP machine  In the event that your physical condition changes including the onset of a cold or respiratory illness, or if you have to delay or cancel your surgery, please notify your surgeon.

## 2025-04-02 NOTE — ASSESSMENT & PLAN NOTE
H/O CAD and MI  MedicationsASA/Plavix/Statin/BBL/Imdur     RCRI 1,  Able to meet 4 METS, No active cardiac conditions, He denies any symptoms of chest pain, shortness of breath at rest, peripheral edema, orthopnea, palpitations, lightheadedness, presyncope, or syncope.       Cardiology clearance 4/3/25 Dr. Bahena for upcoming surgery  The patient is at low cardiac risk for the planned orthopedic surgical procedure and associated anesthesia.  No further preoperative cardiac testing is required.  Echocardiogram as ordered above does not need to hold up his procedure.  If necessary, it is acceptable hold his Plavix for 5 days and aspirin for 7 days prior to the procedure and resume them postoperatively as soon as possible.  Cont compression rx  Diet/exercise/weight loss  RTC 1 year (Apr 2026)    CAD Hx  Stent LAD 2012 9/27/2013 [facility] prox LAD 30% stenosis; RCA 20% stenosis. patent LAD stent.    3/9/2015 nu med stress test negative. 3/9/2015 TTE normal LVEF 55-60; concentric remodeling.  L MPI 6/8/17 (images pers rev) Nuclear Quantitative Functional Analysis:  LVEF: 68 % Impression: NORMAL MYOCARDIAL PERFUSION  Echo 3/15/18 LVEF 60-65%; upper limit of normal aortic root 3.7 cm  08/08/2019 Lexiscan nuclear stress test probably normal study.  LVEF 66%.  Normal wall motion.  08/08/2019 normal LV systolic function LVEF 65%.  Concentric LV remodeling.  No wall motion abnormalities.  Aortic root diameter mildly increased verses report 03/2018.  9/21/2019 [facility]: Dominance: Right  LM: normal  LAD: mid 50% followed by patent stent, distal/transapical 80% (small caliber)  Ramus: prox 30%  LCx: prox 50%  RCA: prox 30%, dist 40%              RPDA ost 50%              RPLB mid 90%     Stent 2.5x18 Resolute Yon SILVER 16 abi     9/21/2020 [facility]  Dominance: Right  LM: normal  LAD: MLI, mid stent patent (2013)  Ramus: MLI, prox 30%  LCx: MLI, ost 40%  RCA: MLI, dist eccentric 40-50%              RPDA: ost 50%, iFR 0.97              RPLB mid  stent patent (9/20/19 2.5x18 Resolute Yon SILVER)  03/09/2023 nuclear stress test negative for ischemia   03/09/2023 TTE LV normal size and systolic function LVEF 65%.  Grade 1 diastolic dysfunction.  03/09/2023 lower extremity venous ultrasound GSV reflux right leg below knee  GSV reflux left leg mid, distal thigh

## 2025-04-02 NOTE — OUTPATIENT SUBJECTIVE & OBJECTIVE
Outpatient Subjective & Objective      Chief Complaint: Preoperative evaulation, perioperative medical management, and complication reduction plan.     Functional Capacity:  Able to climb a flight of stairs without CP SOB or Syncope.  Able to meet 4 METs      Anesthesia issues: None    Difficulty mouth opening: none    Steroid use in the last 12 months: none     Dental Issues: none    Family anesthesia difficulty: None     Family Hx of Thrombosis none    Past Medical History:   Diagnosis Date    Allergy     Angina pectoris     Anxiety     Cancer     skin rwemoved from head    Chronic midline low back pain without sciatica 10/26/2022    Coronary artery disease     Depression     Eye injury as a teen    stuck object in os    GERD (gastroesophageal reflux disease)     Hyperlipidemia     Hypertension     Hypothyroidism     Memory loss     12/3/2014 MRI brain: 1. No evidence for acute infarct 2. unremarkable MRI of the brain; 12/3/2014 carotid US normal    Myocardial infarction     Nuclear sclerosis of both eyes 05/20/2021    Obesity     Peripheral vascular disease 06/20/2017    Retrocalcaneal bursitis 11/24/2014    Sleep apnea     Type 2 diabetes mellitus with other specified complication 04/27/2023    Type 2 diabetes mellitus with other specified complication 04/27/2023       Past Surgical History:   Procedure Laterality Date    APPENDECTOMY  1986    bone spur Right     COLONOSCOPY      COLONOSCOPY N/A 10/26/2023    Procedure: COLONOSCOPY;  Surgeon: Argelia Altamirano MD;  Location: [facility];  Service: Endoscopy;  Laterality: N/A;  Referred by: Dr. Michael Gonzalez  BT/Clearance: ok to hold Plavix 5 days per Dr Bahena-IVANNA  Prep: golytely  Route instructions sent: [id number]  8/3 proc jessica to 10/26, pt has prep and instr  10/19- pre call complete.  DBM    ESOPHAGOGASTRODUODENOSCOPY N/A 8/2/2024    Procedure: EGD (ESOPHAGOGASTRODUODENOSCOPY);  Surgeon: Argelia Altamirano MD;  Location: [facility];  Service: Endoscopy;   Laterality: N/A;  Dr. Porter, Urgent EGD, abdominal pain, standard prep, Instr to portal. Plavix hold pending.  ok to hold Plavix 5 days per Dr Bahena-GT  7/26/24- lvm/portal for pc. DBM  7/30 pt confirmed. has been holding plavix since 7/27 cf  7/30/24- pc complete. DBM    hand trauma Right     pencil     heart stent      LEFT HEART CATHETERIZATION Left 9/20/2019    Procedure: Left heart cath 12 pm start, R rad access;  Surgeon: Brad Bahena MD;  Location: [facility] CATH LAB;  Service: Cardiology;  Laterality: Left;  RN PREOP 9/13/2019  NEEDS IODINE PREMED    LEFT HEART CATHETERIZATION Left 9/21/2020    Procedure: Left heart cath 730am start, R rad access;  Surgeon: Brad Bahena MD;  Location: [facility] CATH LAB;  Service: Cardiology;  Laterality: Left;  RN PREOP 9/14/2020, COVID NEGATIVE---9/18       Review of Systems   Constitutional:  Negative for chills, fatigue, fever and unexpected weight change.   HENT:  Negative for trouble swallowing and voice change.    Eyes:  Negative for photophobia and visual disturbance.        No acute visual changes   Respiratory:  Negative for cough, shortness of breath and wheezing.         STOP bang  Score  Low risk JAYLEN  High risk JAYLEN   Cardiovascular:  Negative for chest pain, palpitations and leg swelling.   Gastrointestinal:  Negative for abdominal pain, blood in stool, constipation, diarrhea, nausea and vomiting.         Hepatitis, Cirrhosis  No BRB or black tarry stool    No FLD,   Genitourinary:  Negative for difficulty urinating, dysuria, frequency, hematuria and urgency.        Nocturia 1-2   Musculoskeletal:  Positive for arthralgias and gait problem. Negative for myalgias, neck pain and neck stiffness.   Neurological:  Negative for dizziness, seizures, syncope, light-headedness, numbness and headaches.   Psychiatric/Behavioral:  Negative for agitation, behavioral problems, confusion, decreased concentration, dysphoric mood, hallucinations, self-injury, sleep  "disturbance and suicidal ideas. The patient is not nervous/anxious and is not hyperactive.           VITALS  Visit Vitals  /62 (BP Location: Right arm, Patient Position: Sitting)   Pulse 76   Temp 98.8 °F (37.1 °C) (Oral)   Ht 5' 10" (1.778 m)   Wt 102.2 kg (225 lb 5 oz)   SpO2 96%   BMI 32.33 kg/m²          Physical Exam  Constitutional:       General: He is not in acute distress.     Appearance: He is well-developed. He is not diaphoretic.   HENT:      Head: Normocephalic.      Right Ear: Hearing normal.      Left Ear: Hearing normal.      Nose: Nose normal.      Mouth/Throat:      Lips: Pink.      Mouth: Mucous membranes are moist.   Eyes:      General: Lids are normal.      Conjunctiva/sclera: Conjunctivae normal.      Pupils: Pupils are equal, round, and reactive to light.   Neck:      Vascular: No carotid bruit.      Trachea: Trachea and phonation normal.   Cardiovascular:      Rate and Rhythm: Normal rate and regular rhythm.      Pulses:           Carotid pulses are 2+ on the right side and 2+ on the left side.       Radial pulses are 2+ on the right side and 2+ on the left side.        Posterior tibial pulses are 2+ on the right side and 2+ on the left side.      Heart sounds: Normal heart sounds. No murmur heard.     No friction rub. No gallop.      Comments: Trace LE edema  Pulmonary:      Effort: Pulmonary effort is normal.      Breath sounds: Normal breath sounds.      Comments: Clear and equal  Anterior and Posterior BBS  Abdominal:      General: Abdomen is protuberant. Bowel sounds are normal. There is no distension.      Palpations: Abdomen is soft.      Tenderness: There is no abdominal tenderness.   Musculoskeletal:         General: No tenderness or deformity. Normal range of motion.      Cervical back: Normal range of motion.      Right lower leg: No edema.      Left lower leg: No edema.   Lymphadenopathy:      Head:      Right side of head: No submental, submandibular, tonsillar, " preauricular, posterior auricular or occipital adenopathy.      Left side of head: No submental, submandibular, tonsillar, preauricular, posterior auricular or occipital adenopathy.      Cervical:      Right cervical: No superficial cervical adenopathy.     Left cervical: No superficial cervical adenopathy.   Skin:     General: Skin is warm and dry.      Capillary Refill: Capillary refill takes 2 to 3 seconds.      Coloration: Skin is not pale.      Findings: No erythema or rash.   Neurological:      Mental Status: He is alert and oriented to person, place, and time.      GCS: GCS eye subscore is 4. GCS verbal subscore is 5. GCS motor subscore is 6.      Motor: No abnormal muscle tone.      Coordination: Coordination normal.   Psychiatric:         Attention and Perception: Attention and perception normal.         Mood and Affect: Mood and affect normal.         Speech: Speech normal.         Behavior: Behavior normal. Behavior is cooperative.         Thought Content: Thought content normal.         Cognition and Memory: Cognition and memory normal.         Judgment: Judgment normal.          Significant Labs:  Lab Results   Component Value Date    WBC 9.81 04/02/2025    HGB 15.0 04/02/2025    HCT 44.7 04/02/2025     04/02/2025    CHOL 119 (L) 11/04/2024    TRIG 257 (H) 11/04/2024    HDL 37 (L) 11/04/2024    ALT 20 04/02/2025    AST 16 04/02/2025     04/02/2025    K 4.3 04/02/2025     04/02/2025    CREATININE 1.3 04/02/2025    BUN 17 04/02/2025    CO2 25 04/02/2025    TSH 2.075 04/02/2025    PSA 0.76 10/12/2018    INR 1.0 08/01/2023    HGBA1C 5.8 (H) 04/02/2025       Diagnostic Studies: No relevant studies.    EKG:   Results for orders placed or performed during the hospital encounter of 04/02/25   EKG 12-lead    Collection Time: 04/02/25  3:57 PM   Result Value Ref Range    QRS Duration 88 ms    [facility] QTC Calculation 407 ms    Narrative    Test Reason :  E11.9,N40.1,E03.9,E66.811,K21.9,G47.33,I25.118,I10,I77.819,I77.810,I87.2,R00.1,    Vent. Rate :  65 BPM     Atrial Rate :  65 BPM     P-R Int : 212 ms          QRS Dur :  88 ms      QT Int : 392 ms       P-R-T Axes :  29 -40  44 degrees    QTcB Int : 407 ms    Sinus rhythm with 1st degree A-V block  Left axis deviation  Prior inferior infarct, age undetermined  Abnormal ECG  When compared with ECG of 02-Aug-2024 10:41,  No significant change was found  Confirmed by Nadir Sanchez ([id number]) on 4/3/2025 9:49:43 AM    Referred By: CARLOS HARDING           Confirmed By: Nadir Sanchez       2D ECHO:  TTE:  Results for orders placed or performed during the hospital encounter of 04/22/24   Echo   Result Value Ref Range    LVOT stroke volume 109.83 cm3    LVIDd 3.80 3.5 - 6.0 cm    LV Systolic Volume 24.35 mL    LVIDs 2.59 2.1 - 4.0 cm    LV Diastolic Volume 62.09 mL    IVS 1.27 (A) 0.6 - 1.1 cm    LVOT diameter 2.38 cm    LVOT area 4.4 cm2    FS 32 28 - 44 %    Left Ventricle Relative Wall Thickness 0.66 cm    PW 1.26 (A) 0.6 - 1.1 cm    LV mass 165.97 g    MV Peak E Angel 0.66 m/s    TDI LATERAL 0.10 m/s    TDI SEPTAL 0.10 m/s    E/E' ratio 6.60 m/s    MV Peak A Angel 0.97 m/s    TR Max Angel 2.33 m/s    E/A ratio 0.68     IVRT 98.95 msec    E wave deceleration time 281.42 msec    LV SEPTAL E/E' RATIO 6.60 m/s    LV LATERAL E/E' RATIO 6.60 m/s    PV Peak S Angel 0.63 m/s    PV Peak D Angel 0.40 m/s    Pulm vein S/D ratio 1.58     LVOT peak angel 1.06 m/s    Left Ventricular Outflow Tract Mean Velocity 0.81 cm/s    Left Ventricular Outflow Tract Mean Gradient 2.85 mmHg    RVDD 2.90 cm    RV S' 8.81 cm/s    TAPSE 2.38 cm    LA size 3.85 cm    Left Atrium Minor Axis 5.12 cm    Left Atrium Major Axis 5.06 cm    RA Major Axis 4.37 cm    AV mean gradient 6 mmHg    AV peak gradient 9 mmHg    Ao peak angel 1.53 m/s    Ao VTI 31.10 cm    LVOT peak VTI 24.70 cm    AV valve area 3.53 cm²    AV Velocity Ratio 0.69     AV index (prosthetic)  0.79     MORENITA by Velocity Ratio 3.08 cm²    MV stenosis pressure 1/2 time 81.61 ms    MV valve area p 1/2 method 2.70 cm2    TV peak gradient 2 mmHg    Triscuspid Valve Regurgitation Peak Gradient 22 mmHg    PV PEAK VELOCITY 1.38 m/s    PV peak gradient 8 mmHg    Sinus 4.05 cm    STJ 3.38 cm    Ascending aorta 4.16 cm    IVC diameter 1.55 cm    RV/LV Ratio 0.76 cm    Mean e' 0.10 m/s    LA Vol 58.30 cm3    LA WIDTH 3.5 cm    RA Width 2.8 cm    TV resting pulmonary artery pressure 25 mmHg    RV TB RVSP 5 mmHg    Est. RA pres 3 mmHg    Narrative      Left Ventricle: The left ventricle is normal in size. Mildly increased   wall thickness. There is mild concentric hypertrophy. There is normal   systolic function with a visually estimated ejection fraction of 55 - 60%.   Grade I diastolic dysfunction.    Right Ventricle: Normal right ventricular cavity size. Systolic   function is normal.    Aorta: Aortic root is mildly dilated measuring 4.05 cm. Ascending aorta   is mildly dilated measuring 4.16 cm.    Pulmonary Artery: The estimated pulmonary artery systolic pressure is   25 mmHg.         JOSE:  No results found. However, due to the size of the patient record, not all encounters were searched. Please check Results Review for a complete set of results.     Imaging     Active Cardiac Conditions: None      Revised Cardiac Risk Index   High -Risk Surgery  Intraperitoneal; Intrathoracic; suprainguinal vascular Yes- + 1 No- 0   History of Ischemic Heart Disease   (Hx of MI/positive exercise test/current chest pain due to ischemia/use of nitrate therapy/EKG with pathological Q waves) Yes- + 1 No- 0   History of CHF  (Pulmonary edema/bilateral rales or S3 gallop/PND/CXR showing pulmonary vascular redistribution) Yes- + 1 No- 0   History of CVA   (Prior stroke or TIA) Yes- + 1 No- 0   Pre-operative treatment with insulin Yes- + 1 No- 0   Pre-operative creatinine > 2mg/dl Yes- + 1 No- 0   Total:    Risk Status:  Estimated risk of  cardiac complications after non-cardiac surgery using the Revised Cardiac Risk Index for Preoperative risk is 6%      ARISCAT (Canet) risk index: Low: 1.6% risk of post-op pulmonary complications.    American Society of Anesthesiologists Physical Status classification (ASA): 3           Outpatient Subjective & Objective

## 2025-04-02 NOTE — ASSESSMENT & PLAN NOTE
This client has a possible diagnosis of obstructive sleep apnea (JAYLEN)    Instructions were given to avoid the following: sleeping supine, weight gain ,alcoholic beverages , sedative medications, and CNS depressant use as these can all worsen JAYLEN.    Untreated sleep apnea has been shown to increase daytime sleepiness, hypertension, heart disease and stroke which were discussed with the patient at this time    Please use caution with medications that induce respiratory depression in the perioperative period

## 2025-04-02 NOTE — ASSESSMENT & PLAN NOTE
BMI 32.33    Lost 12 pounds so far with Ozempic fo rDM    Patient would benefit from weight loss   Lifestyle changes should be made by eating healthy, exercising at least 150 minutes weekly, and avoiding sedentary behavior.

## 2025-04-03 ENCOUNTER — OFFICE VISIT (OUTPATIENT)
Dept: CARDIOLOGY | Facility: CLINIC | Age: 75
End: 2025-04-03
Payer: MEDICARE

## 2025-04-03 VITALS
WEIGHT: 225.75 LBS | HEART RATE: 69 BPM | OXYGEN SATURATION: 94 % | BODY MASS INDEX: 32.32 KG/M2 | SYSTOLIC BLOOD PRESSURE: 118 MMHG | RESPIRATION RATE: 18 BRPM | HEIGHT: 70 IN | DIASTOLIC BLOOD PRESSURE: 72 MMHG

## 2025-04-03 DIAGNOSIS — R94.31 ABNORMAL EKG: ICD-10-CM

## 2025-04-03 DIAGNOSIS — E78.2 MIXED HYPERLIPIDEMIA: ICD-10-CM

## 2025-04-03 DIAGNOSIS — Z01.810 PREOP CARDIOVASCULAR EXAM: ICD-10-CM

## 2025-04-03 DIAGNOSIS — E66.811 OBESITY (BMI 30.0-34.9): ICD-10-CM

## 2025-04-03 DIAGNOSIS — I25.10 CORONARY ARTERY DISEASE INVOLVING NATIVE CORONARY ARTERY OF NATIVE HEART WITHOUT ANGINA PECTORIS: Primary | ICD-10-CM

## 2025-04-03 DIAGNOSIS — I77.810 AORTIC ROOT DILATATION: ICD-10-CM

## 2025-04-03 DIAGNOSIS — I10 ESSENTIAL HYPERTENSION: ICD-10-CM

## 2025-04-03 DIAGNOSIS — Z98.61 HISTORY OF PERCUTANEOUS CORONARY INTERVENTION: ICD-10-CM

## 2025-04-03 LAB
OHS QRS DURATION: 88 MS
OHS QTC CALCULATION: 407 MS

## 2025-04-03 PROCEDURE — 99999 PR PBB SHADOW E&M-EST. PATIENT-LVL V: CPT | Mod: PBBFAC,HCNC,, | Performed by: INTERNAL MEDICINE

## 2025-04-03 NOTE — PROGRESS NOTES
CARDIOVASCULAR PROGRESS NOTE    REASON FOR CONSULT:   Clinton Rosenberg is a 74 y.o. male who presents for follow up of CAD, ao root dil.    PCP: Carlos Reese  HISTORY OF PRESENT ILLNESS:   Last seen April 2024.     The patient comes in today for preoperative cardiac risk stratification prior to planned left shoulder surgery.  He is accompanied by his wife.  He reports a nonsyncopal fall with a shoulder injury.  He otherwise denies angina, dyspnea, palpitations, or syncope.  There has been no PND, orthopnea, melena, hematuria, or claudication symptoms.  He reports good exercise capacity and tells me he is able to get up and down flights of stairs without symptoms.    CARDIOVASCULAR HISTORY:   CAD/MI  2013: LAD PCI    9/20/19: mid RPLB 2.5x18 Resolute [place] SILVER    JAYLEN, unable to afford CPAP  Aortic root dil, 3.7->4.0->3.8->4.2  cm (echo 4/2024), 3.8cm (asc Ao) on CTA 9/2019  CVI (bilat GSV, R LSV, US 3/2018)    PAST MEDICAL HISTORY:     Past Medical History:   Diagnosis Date    Allergy     Angina pectoris     Anxiety     Cancer     skin rwemoved from head    Chronic midline low back pain without sciatica 10/26/2022    Coronary artery disease     Depression     Eye injury as a teen    stuck object in os    GERD (gastroesophageal reflux disease)     Hyperlipidemia     Hypertension     Hypothyroidism     Memory loss     12/3/2014 MRI brain: 1. No evidence for acute infarct 2. unremarkable MRI of the brain; 12/3/2014 carotid US normal    Myocardial infarction     Nuclear sclerosis of both eyes 05/20/2021    Obesity     Peripheral vascular disease 06/20/2017    Retrocalcaneal bursitis 11/24/2014    Sleep apnea     Type 2 diabetes mellitus with other specified complication 04/27/2023    Type 2 diabetes mellitus with other specified complication 04/27/2023       PAST SURGICAL HISTORY:     Past Surgical History:   Procedure Laterality Date    APPENDECTOMY  1986    bone spur Right     COLONOSCOPY      COLONOSCOPY N/A  10/26/2023    Procedure: COLONOSCOPY;  Surgeon: Argelia Altamirano MD;  Location: [facility] ENDO;  Service: Endoscopy;  Laterality: N/A;  Referred by: Dr. Michael Gonzalez  BT/Clearance: ok to hold Plavix 5 days per Dr Dawn  Prep: golytely  Route instructions sent: [id number]  8/3 proc jessica to 10/26, pt has prep and instr  10/19- pre call complete.  DBM    ESOPHAGOGASTRODUODENOSCOPY N/A 8/2/2024    Procedure: EGD (ESOPHAGOGASTRODUODENOSCOPY);  Surgeon: Argelia Altamirano MD;  Location: [facility] ENDO;  Service: Endoscopy;  Laterality: N/A;  Dr. Porter, Urgent EGD, abdominal pain, standard prep, Instr to portal. Plavix hold pending.  ok to hold Plavix 5 days per Dr Dawn  7/26/24- lvm/portal for pc. DBM  7/30 pt confirmed. has been holding plavix since 7/27 cf  7/30/24- pc complete. DBM    hand trauma Right     pencil     heart stent      LEFT HEART CATHETERIZATION Left 9/20/2019    Procedure: Left heart cath 12 pm start, R rad access;  Surgeon: Brad Bahena MD;  Location: [facility] CATH LAB;  Service: Cardiology;  Laterality: Left;  RN PREOP 9/13/2019  NEEDS IODINE PREMED    LEFT HEART CATHETERIZATION Left 9/21/2020    Procedure: Left heart cath 730am start, R rad access;  Surgeon: Brad Bahena MD;  Location: [facility] CATH LAB;  Service: Cardiology;  Laterality: Left;  RN PREOP 9/14/2020, COVID NEGATIVE---9/18       ALLERGIES AND MEDICATION:     Review of patient's allergies indicates:   Allergen Reactions    Iodine containing multivitamin Anaphylaxis    Naproxen Other (See Comments)     hallucinations    Hydrocodone-acetaminophen      Rash (skin)^    Oxycodone-acetaminophen      Rash (skin)^        Medication List            Accurate as of April 3, 2025  2:19 PM. If you have any questions, ask your nurse or doctor.                CONTINUE taking these medications      amLODIPine 5 MG tablet  Commonly known as: NORVASC  Take 1 tablet (5 mg total) by mouth once daily.     aspirin 81 MG EC tablet  Commonly known  as: ECOTRIN  Take 1 tablet (81 mg total) by mouth once daily.     atorvastatin 80 MG tablet  Commonly known as: LIPITOR  Take 1 tablet (80 mg total) by mouth every evening.     ciclopirox 0.77 % Crea  Commonly known as: LOPROX  Apply topically 2 (two) times daily.     clopidogreL 75 mg tablet  Commonly known as: PLAVIX  Take 1 tablet (75 mg total) by mouth once daily.     clotrimazole-betamethasone 1-0.05% cream  Commonly known as: LOTRISONE  Apply topically 2 (two) times daily.     furosemide 20 MG tablet  Commonly known as: LASIX  Take 1 tablet (20 mg total) by mouth once daily.     gabapentin 300 MG capsule  Commonly known as: NEURONTIN  Take 1 capsule (300 mg total) by mouth every evening.     isosorbide mononitrate 120 MG 24 hr tablet  Commonly known as: IMDUR  Take 2 tablets (240 mg total) by mouth once daily.     ketoconazole 2 % cream  Commonly known as: NIZORAL  Apply topically 2 (two) times daily. for 14 days     KRILL OIL (OMEGA 3 AND 6) ORAL     levothyroxine 75 MCG tablet  Commonly known as: SYNTHROID  TAKE 1 TABLET BEFORE BREAKFAST     metFORMIN 500 MG ER 24hr tablet  Commonly known as: GLUCOPHAGE-XR  Take 1 tablet (500 mg total) by mouth once daily.     metoprolol succinate 25 MG 24 hr tablet  Commonly known as: TOPROL-XL  Take 1 tablet (25 mg total) by mouth once daily.     nitroGLYCERIN 0.4 MG SL tablet  Commonly known as: NITROSTAT  PLACE 1 TABLET (0.4 MG TOTAL) UNDER THE TONGUE EVERY 5 (FIVE) MINUTES AS NEEDED FOR CHEST PAIN.     nystatin powder  Commonly known as: MYCOSTATIN  Apply topically 4 (four) times daily as needed (to keep penis/scrotum dry).     olmesartan 40 MG tablet  Commonly known as: BENICAR  Take 1 tablet (40 mg total) by mouth once daily.     OZEMPIC 1 mg/dose (4 mg/3 mL)  Generic drug: semaglutide  Inject 1 mg into the skin every 7 days.     pantoprazole 40 MG tablet  Commonly known as: PROTONIX  Take 1 tablet (40 mg total) by mouth once daily.     tamsulosin 0.4 mg Cap  Commonly  known as: FLOMAX  Take 1 capsule (0.4 mg total) by mouth once daily.     VASCEPA 1 gram Cap  Generic drug: icosapent ethyL  Take 2 capsules (2,000 mg total) by mouth 2 (two) times a day.     vitamin D 1000 units Tab  Commonly known as: VITAMIN D3  Take 1 tablet (1,000 Units total) by mouth once daily.            STOP taking these medications      oxyCODONE-acetaminophen 5-325 mg per tablet  Commonly known as: PERCOCET  Stopped by: Brad Bahena MD     penicillin v potassium 500 MG tablet  Commonly known as: VEETID  Stopped by: Brad Bahena MD                SOCIAL HISTORY:     Social History     Socioeconomic History    Marital status:      Spouse name: Opal    Number of children: 4   Tobacco Use    Smoking status: Never     Passive exposure: Never    Smokeless tobacco: Never   Substance and Sexual Activity    Alcohol use: Not Currently     Comment: rarely    Drug use: No    Sexual activity: Not Currently     Social Drivers of Health     Financial Resource Strain: Low Risk  (3/10/2025)    Overall Financial Resource Strain (CARDIA)     Difficulty of Paying Living Expenses: Not very hard   Food Insecurity: No Food Insecurity (3/10/2025)    Hunger Vital Sign     Worried About Running Out of Food in the Last Year: Never true     Ran Out of Food in the Last Year: Never true   Transportation Needs: No Transportation Needs (3/10/2025)    PRAPARE - Transportation     Lack of Transportation (Medical): No     Lack of Transportation (Non-Medical): No   Physical Activity: Sufficiently Active (3/10/2025)    Exercise Vital Sign     Days of Exercise per Week: 3 days     Minutes of Exercise per Session: 80 min   Stress: Stress Concern Present (3/10/2025)    [employer] - Occupational Stress Questionnaire     Feeling of Stress : To some extent   Housing Stability: Low Risk  (3/10/2025)    Housing Stability Vital Sign     Unable to Pay for Housing in the Last Year: No     Number of  Times Moved in the Last Year: 0     Homeless in the Last Year: No       FAMILY HISTORY:     Family History   Problem Relation Name Age of Onset    Arthritis Mother      Early death Mother      Heart disease Mother      Hypertension Mother      Migraines Mother      Seizures Mother      Tremor Mother      Diabetes Mother      Cancer Mother      Early death Father      Heart disease Father      Hypertension Father      Stroke Father      Diabetes Father      Alcohol abuse Father      Heart disease Sister Otilia     Hypertension Sister Otilia     Arthritis Sister Otilia     Depression Sister Otilia     Arthritis Brother Maximino     Cancer Brother Maximino     Diabetes Brother Maximino     Early death Brother Maximino     Heart disease Brother Maximino     Hypertension Brother Maximino     Heart disease Brother Robert     Arthritis Brother Robert     Heart disease Brother Azael     Pneumonia Brother Azael     Heart disease Brother Martin     Hypertension Brother Martin     Diabetes Brother Martin     Heart disease Brother Dario     No Known Problems Maternal Aunt      No Known Problems Maternal Uncle      No Known Problems Paternal Aunt      No Known Problems Paternal Uncle      No Known Problems Maternal Grandmother      No Known Problems Maternal Grandfather      No Known Problems Paternal Grandmother      No Known Problems Paternal Grandfather      Amblyopia Neg Hx      Blindness Neg Hx      Cataracts Neg Hx      Glaucoma Neg Hx      Macular degeneration Neg Hx      Retinal detachment Neg Hx      Strabismus Neg Hx      Thyroid disease Neg Hx         REVIEW OF SYSTEMS:   Review of Systems   Constitutional:  Negative for chills, diaphoresis and fever.   HENT:  Negative for nosebleeds.    Eyes:  Negative for blurred vision, double vision and photophobia.   Respiratory:  Negative for cough, hemoptysis, sputum production, shortness of breath and wheezing.    Cardiovascular:  Negative for chest pain, palpitations, orthopnea, claudication, leg  "swelling and PND.   Gastrointestinal:  Negative for abdominal pain, blood in stool, heartburn, melena, nausea and vomiting.   Genitourinary:  Negative for flank pain and hematuria.   Musculoskeletal:  Positive for joint pain (L shoulder). Negative for falls, myalgias and neck pain.   Skin:  Negative for rash.   Neurological:  Negative for dizziness, seizures, loss of consciousness, weakness and headaches.   Endo/Heme/Allergies:  Negative for polydipsia. Does not bruise/bleed easily.   Psychiatric/Behavioral:  Negative for depression and memory loss. The patient is not nervous/anxious.        PHYSICAL EXAM:     Vitals:    04/03/25 1359   BP: 118/72   Pulse: 69   Resp: 18    Body mass index is 32.39 kg/m².  Weight: 102.4 kg (225 lb 12 oz)   Height: 5' 10" (177.8 cm)     Physical Exam  Vitals reviewed.   Constitutional:       General: He is not in acute distress.     Appearance: He is well-developed. He is obese. He is not ill-appearing, toxic-appearing or diaphoretic.   HENT:      Head: Normocephalic and atraumatic.   Eyes:      General: No scleral icterus.     Extraocular Movements: Extraocular movements intact.      Conjunctiva/sclera: Conjunctivae normal.      Pupils: Pupils are equal, round, and reactive to light.   Neck:      Thyroid: No thyromegaly.      Vascular: Normal carotid pulses. No carotid bruit or JVD.      Trachea: Trachea normal. No tracheal deviation.   Cardiovascular:      Rate and Rhythm: Normal rate and regular rhythm.      Pulses:           Carotid pulses are 2+ on the right side and 2+ on the left side.     Heart sounds: S1 normal and S2 normal. No murmur heard.     No friction rub. No gallop.   Pulmonary:      Effort: Pulmonary effort is normal. No respiratory distress.      Breath sounds: Normal breath sounds. No stridor. No wheezing, rhonchi or rales.   Chest:      Chest wall: No tenderness.   Abdominal:      General: There is no distension.      Palpations: Abdomen is soft. "   Musculoskeletal:         General: No swelling or tenderness.      Cervical back: Normal range of motion and neck supple. No edema or rigidity.      Right lower leg: No edema.      Left lower leg: No edema.      Comments: ?RLE CVI  L shoulder in sling   Feet:      Right foot:      Skin integrity: No ulcer.      Left foot:      Skin integrity: No ulcer.   Skin:     General: Skin is warm and dry.      Coloration: Skin is not jaundiced.   Neurological:      General: No focal deficit present.      Mental Status: He is alert and oriented to person, place, and time.      Cranial Nerves: No cranial nerve deficit.   Psychiatric:         Mood and Affect: Mood normal.         Speech: Speech normal.         Behavior: Behavior normal. Behavior is cooperative.         DATA:   EKG: (personally reviewed tracing(s))  4/2/25 SR 65, LAD/PRWP, similar to 8/2/24    Laboratory:  CBC:  Recent Labs   Lab 11/03/23  0740 05/06/24  0715 04/02/25  1543   WBC 8.82 7.21 9.81   Hemoglobin 15.0 15.1  --    HGB  --   --  15.0   Hematocrit 44.4 45.1  --    HCT  --   --  44.7   Platelet Count  --   --  216   Platelets 199 202  --        CHEMISTRIES:  Recent Labs   Lab 04/07/22  0716 10/07/22  0734 11/03/23  0740 05/06/24  0715 11/04/24  0655 04/02/25  1543   Glucose 157 H   < > 147 H 145 H 155 H  --    Sodium 141   < > 141 140 142 139   Potassium 4.6   < > 4.4 4.2 4.9 4.3   BUN 12   < > 18 16 15 17   Creatinine 1.0   < > 1.1 1.1 1.2 1.3   eGFR if  >60.0  --   --   --   --   --    eGFR if non  >60.0  --   --   --   --   --    Calcium 9.5   < > 9.3 9.5 9.5 9.6    < > = values in this interval not displayed.       CARDIAC BIOMARKERS:          COAGS:  Recent Labs   Lab 08/01/23  1156   INR 1.0       LIPIDS/LFTS:  Recent Labs   Lab 04/12/23  0809 11/03/23  0740 05/06/24  0715 11/04/24  0655 04/02/25  1543   Cholesterol 128  --  135 119 L  --    Triglycerides 208 H  --  285 H 257 H  --    HDL 37 L  --  36 L 37 L  --    LDL  Cholesterol 49.4 L  --  42.0 L 30.6 L  --    Non-HDL Cholesterol 91  --  99 82  --    AST 18   < > 19 17 16   ALT 21   < > 17 22 20    < > = values in this interval not displayed.       Cardiovascular Testing:  Echo 4/22/24    Left Ventricle: The left ventricle is normal in size. Mildly increased wall thickness. There is mild concentric hypertrophy. There is normal systolic function with a visually estimated ejection fraction of 55 - 60%. Grade I diastolic dysfunction.    Right Ventricle: Normal right ventricular cavity size. Systolic function is normal.    Aorta: Aortic root is mildly dilated measuring 4.05 cm. Ascending aorta is mildly dilated measuring 4.16 cm.    Pulmonary Artery: The estimated pulmonary artery systolic pressure is 25 mmHg.    L MPI 3/9/23    Normal myocardial perfusion scan. There is no evidence of myocardial ischemia or infarction.    There is a  mild intensity fixed perfusion abnormality in the inferior wall of the left ventricle secondary to diaphragm attenuation.    The gated perfusion images showed an ejection fraction of 67% at rest.    There is normal wall motion at rest and post stress.    The ECG portion of the study is negative for ischemia.    The patient reported chest pain during the stress test.    There were no arrhythmias during stress.    Echo 3/9/23 (Ao root 3.9cm, stable vs report 11/2021)  The left ventricle is normal in size with normal systolic function.  The estimated ejection fraction is 65%.  Grade I left ventricular diastolic dysfunction.  Normal right ventricular size with normal right ventricular systolic function.  Intermediate central venous pressure (8 mmHg).  The estimated PA systolic pressure is 22 mmHg.  Mild left atrial enlargement.    Aortic US 3/9/23  No evidence of AAA.    LE venous US/reflux 3/9/23  Right leg:  No DVT.    GSV reflux (below knee segment).    SSV reflux.    Left leg:  No DVT.    GSV reflux (mid and distal thigh segments).    Cath  9/21/20  LVEDP: 7mmHg  LVEF: 65% by echo  Dominance: Right  LM: normal  LAD: MLI, mid stent patent (2013)  Ramus: MLI, prox 30%  LCx: MLI, ost 40%  RCA: MLI, dist eccentric 40-50%              RPDA: ost 50%, iFR 0.97              RPLB mid stent patent (9/20/19 2.5x18 Resolute [place] SILVER)  Hemostasis:  R Radial band  Impression:  Recurrent CP on mult meds  2V CAD, normal LV fxn  Patent mid LAD and mid RPLB stents  iFR dist RCA/ost RPDA neg  R rad vasband for hemostasis  Successful pre-treatment of iodine allergy  Plan:  Cont med rx  Cont ASA/Plavix/Statin/BBL/Imdur  Plan long term DAPT given diffuse CAD and prior MV PCI  Home today  Follow up with Dr. Bahena as planned  Outpatient GI/Pulm evals    CTA Chest 9/20/19 (post-cath)  The main pulmonary artery is enlarged suggesting underlying pulmonary artery hypertension.  Single-vessel coronary disease.  Normal thoracic aorta and visualized abdominal aorta.  Aortic measurements: STJ 3.4cm, Asc 3.8cm, Desc: 2.6.    Holter 6/8/17  PREDOMINANT RHYTHM  1. Sinus rhythm with heart rates varying between 43 and 94 bpm with an average of 62 bpm.   VENTRICULAR ARRHYTHMIAS  1. There were very rare PVCs recorded totalling 5 and averaging less than 1 per hour.   2. There were no episodes of ventricular tachycardia.  SUPRA VENTRICULAR ARRHYTHMIAS  1. There were very rare PACs recorded totalling 3 and averaging less than 1 per hour.   2. There were no episodes of sustained supraventricular tachycardia.  SINUS NODE FUNCTION  1. There was no evidence of high grade SA deanne block.   AV CONDUCTION  1. There was no evidence of high grade AV block.   DIARY  1. The diary was not returned  MISCELLANEOUS  1. There were rare hookup related artifacts. Overall, the study was of good quality.   2. This was a tape of adequate length (24 hrs).    LE art US/TANA 6/8/17  1.  Mild/atherosclerotic plaquing.  2.  No stenosis about 30 percent femoral popliteal arteries.  3.  Normal bilateral ABIs.    Carotid  US 12/3/14  1. Less than 50% stenosis of the right internal carotid artery.  2. Less than 50% stenosis of the left internal carotid artery.      ASSESSMENT:   # CAD s/p LAD PCI 2013, RPLB PCI 9/2019, cath 9/2020 with patent LAD/RPLB stents.  Echo/MPI 3/2023 neg.  Asymptomatic.  # HTN, controlled.  # HLP, on atorva 80mg, LDL at goal  # Ao root dil, 3.7->4.0->3.8->4.2->4.1->3.9->4.2 cm (echo 4/2024), 3.8cm (asc Ao CTA 9/2019)  # CVI (bilat GSV, R LSV) on US 3/2018.  No edema at present (but complains of intermittent swelling), prev declined referral for venous ablation.  Sxs improved with compression rx.  # hx JAYLEN, pt unable to obtain CPAP apparatus  # BMI 32, down 2 unit(s) vs last OV  # Iodine allergy (?Betadine), successfully premedicated for cath 9/2020  # aortic atherosclerosis (CT A/P 4/26/24)  # preop for shoulder surgery.  The pt reports good exercise capacity.    PLAN:   Cont med rx  Cont ASA 81mg qd indefinitely  Cont Plavix 75mg qd, long term rx planned given hx MV PCI/CAD.  Check surveillance echo as prev ordered.  The patient is at low cardiac risk for the planned orthopedic surgical procedure and associated anesthesia.  No further preoperative cardiac testing is required.  Echocardiogram as ordered above does not need to hold up his procedure.  If necessary, it is acceptable hold his Plavix for 5 days and aspirin for 7 days prior to the procedure and resume them postoperatively as soon as possible.  Cont compression rx  Diet/exercise/weight loss  RTC 1 year (Apr 2026)    The above documents medical care services that are part of ongoing care related to this patient's serious/complex condition (Code ). (CAD/HTN/HLP/Ao root dil)        Brad Bahena MD, FACC

## 2025-04-04 PROBLEM — N28.9 RENAL INSUFFICIENCY: Status: ACTIVE | Noted: 2025-04-04

## 2025-04-04 NOTE — ASSESSMENT & PLAN NOTE
Echo 4/22/24    Left Ventricle: The left ventricle is normal in size. Mildly increased wall thickness. There is mild concentric hypertrophy. There is normal systolic function with a visually estimated ejection fraction of 55 - 60%. Grade I diastolic dysfunction.    Right Ventricle: Normal right ventricular cavity size. Systolic function is normal.    Aorta: Aortic root is mildly dilated measuring 4.05 cm. Ascending aorta is mildly dilated measuring 4.16 cm.    Pulmonary Artery: The estimated pulmonary artery systolic pressure is 25 mmHg.

## 2025-04-04 NOTE — ASSESSMENT & PLAN NOTE
BUN 17 CR 1.3 GFR 57    Deleterious effects NSAID's , Beneficial effects of Hydration discussed   Tylenol as needed for pain   I suggest monitoring renal function, in put and out put status froy-operatively. I  suggest avoiding nephrotoxic medication including NSAIDs, COX2 inhibitors, intravenous contrast agent,avoiding hypotension to prevent further renal impairment.

## 2025-04-07 ENCOUNTER — OFFICE VISIT (OUTPATIENT)
Dept: SPORTS MEDICINE | Facility: CLINIC | Age: 75
End: 2025-04-07
Payer: MEDICARE

## 2025-04-07 VITALS
HEIGHT: 70 IN | HEART RATE: 71 BPM | DIASTOLIC BLOOD PRESSURE: 66 MMHG | SYSTOLIC BLOOD PRESSURE: 109 MMHG | BODY MASS INDEX: 32.33 KG/M2 | WEIGHT: 225.81 LBS

## 2025-04-07 DIAGNOSIS — M75.22 BICEPS TENDINITIS OF LEFT UPPER EXTREMITY: ICD-10-CM

## 2025-04-07 DIAGNOSIS — S46.012D TRAUMATIC COMPLETE TEAR OF LEFT ROTATOR CUFF, SUBSEQUENT ENCOUNTER: Primary | ICD-10-CM

## 2025-04-07 PROCEDURE — 99999 PR PBB SHADOW E&M-EST. PATIENT-LVL IV: CPT | Mod: PBBFAC,HCNC,, | Performed by: PHYSICIAN ASSISTANT

## 2025-04-07 PROCEDURE — 99499 UNLISTED E&M SERVICE: CPT | Mod: HCNC,S$GLB,, | Performed by: PHYSICIAN ASSISTANT

## 2025-04-07 RX ORDER — PROMETHAZINE HYDROCHLORIDE 25 MG/1
25 TABLET ORAL EVERY 4 HOURS PRN
Qty: 30 TABLET | Refills: 0 | Status: SHIPPED | OUTPATIENT
Start: 2025-04-07

## 2025-04-07 RX ORDER — MORPHINE SULFATE 4 MG/ML
3 INJECTION, SOLUTION INTRAMUSCULAR; INTRAVENOUS
Refills: 0 | Status: CANCELLED | OUTPATIENT
Start: 2025-04-07

## 2025-04-07 RX ORDER — PREGABALIN 75 MG/1
75 CAPSULE ORAL 2 TIMES DAILY
Qty: 60 CAPSULE | Refills: 0 | Status: SHIPPED | OUTPATIENT
Start: 2025-04-07

## 2025-04-07 RX ORDER — ONDANSETRON 8 MG/1
8 TABLET, ORALLY DISINTEGRATING ORAL EVERY 8 HOURS PRN
Status: CANCELLED | OUTPATIENT
Start: 2025-04-07

## 2025-04-07 RX ORDER — AMOXICILLIN 250 MG
1 CAPSULE ORAL 2 TIMES DAILY
Status: CANCELLED | OUTPATIENT
Start: 2025-04-07

## 2025-04-07 RX ORDER — SODIUM CHLORIDE 0.9 % (FLUSH) 0.9 %
10 SYRINGE (ML) INJECTION
Status: CANCELLED | OUTPATIENT
Start: 2025-04-07

## 2025-04-07 RX ORDER — DEXTROSE MONOHYDRATE AND SODIUM CHLORIDE 5; .9 G/100ML; G/100ML
INJECTION, SOLUTION INTRAVENOUS CONTINUOUS
Status: CANCELLED | OUTPATIENT
Start: 2025-04-07

## 2025-04-07 RX ORDER — MUPIROCIN 20 MG/G
OINTMENT TOPICAL
Status: CANCELLED | OUTPATIENT
Start: 2025-04-07

## 2025-04-07 RX ORDER — PREGABALIN 150 MG/1
150 CAPSULE ORAL NIGHTLY
Status: CANCELLED | OUTPATIENT
Start: 2025-04-07

## 2025-04-07 RX ORDER — FENTANYL CITRATE 50 UG/ML
100 INJECTION, SOLUTION INTRAMUSCULAR; INTRAVENOUS
Refills: 0 | Status: CANCELLED | OUTPATIENT
Start: 2025-04-07 | End: 2025-04-08

## 2025-04-07 RX ORDER — SODIUM CHLORIDE 9 MG/ML
INJECTION, SOLUTION INTRAVENOUS CONTINUOUS
Status: CANCELLED | OUTPATIENT
Start: 2025-04-07

## 2025-04-07 RX ORDER — POLYETHYLENE GLYCOL 3350 17 G/17G
17 POWDER, FOR SOLUTION ORAL DAILY
Status: CANCELLED | OUTPATIENT
Start: 2025-04-07

## 2025-04-07 RX ORDER — OXYCODONE HYDROCHLORIDE 5 MG/1
5 TABLET ORAL
Refills: 0 | Status: CANCELLED | OUTPATIENT
Start: 2025-04-07

## 2025-04-07 RX ORDER — ROPIVACAINE/EPI/CLONIDINE/KET 2.46-0.005
SYRINGE (ML) INJECTION ONCE
Status: CANCELLED | OUTPATIENT
Start: 2025-04-07 | End: 2025-04-07

## 2025-04-07 RX ORDER — ACETAMINOPHEN 500 MG
1000 TABLET ORAL
Status: CANCELLED | OUTPATIENT
Start: 2025-04-07

## 2025-04-07 RX ORDER — MIDAZOLAM HYDROCHLORIDE 1 MG/ML
4 INJECTION, SOLUTION INTRAMUSCULAR; INTRAVENOUS
Status: CANCELLED | OUTPATIENT
Start: 2025-04-07 | End: 2025-04-08

## 2025-04-07 RX ORDER — DOXYCYCLINE 100 MG/1
100 CAPSULE ORAL 2 TIMES DAILY
Qty: 14 CAPSULE | Refills: 0 | Status: SHIPPED | OUTPATIENT
Start: 2025-04-07

## 2025-04-07 RX ORDER — MUPIROCIN 20 MG/G
1 OINTMENT TOPICAL 2 TIMES DAILY
Status: CANCELLED | OUTPATIENT
Start: 2025-04-07 | End: 2025-04-12

## 2025-04-07 RX ORDER — OXYCODONE HYDROCHLORIDE 5 MG/1
TABLET ORAL
Qty: 42 TABLET | Refills: 0 | Status: SHIPPED | OUTPATIENT
Start: 2025-04-07

## 2025-04-07 RX ORDER — FAMOTIDINE 20 MG/1
20 TABLET, FILM COATED ORAL 2 TIMES DAILY
Status: CANCELLED | OUTPATIENT
Start: 2025-04-07

## 2025-04-07 RX ORDER — PROCHLORPERAZINE EDISYLATE 5 MG/ML
5 INJECTION INTRAMUSCULAR; INTRAVENOUS EVERY 6 HOURS PRN
Status: CANCELLED | OUTPATIENT
Start: 2025-04-07

## 2025-04-07 RX ORDER — METHOCARBAMOL 500 MG/1
500 TABLET, FILM COATED ORAL 3 TIMES DAILY PRN
Qty: 40 TABLET | Refills: 0 | Status: SHIPPED | OUTPATIENT
Start: 2025-04-07

## 2025-04-07 RX ORDER — ACETAMINOPHEN 500 MG
1000 TABLET ORAL EVERY 6 HOURS
Status: CANCELLED | OUTPATIENT
Start: 2025-04-07 | End: 2025-04-09

## 2025-04-07 NOTE — H&P
"Clinton Rosenberg  is here for a completion of his perioperative paperwork. he  Is scheduled to undergo left Shoulder:     01294 Total Shoulder (glenoid and proximal humeral) Replacement, reverse  01801 Biceps tenodesis on 4/10/2025.      Same day surgery, patient plans to go home today.    He  does need clearance for this procedure.     Per cardiology, "The patient is at low cardiac risk for the planned orthopedic surgical procedure and associated anesthesia. No further preoperative cardiac testing is required. Echocardiogram as ordered above does not need to hold up his procedure. If necessary, it is acceptable hold his Plavix for 5 days and aspirin for 7 days prior to the procedure and resume them postoperatively as soon as possible."    Per preop center, pending optimization...    Risks, indications and benefits of the surgical procedure were discussed with the patient. All questions with regard to surgery, rehab, expected return to functional activities, activities of daily living and recreational endeavors were answered to his satisfaction.    Discussed COVID-19 with the patient, they are aware of our current policies and procedures, were given the option of delaying surgery, and they elect to proceed.    Patient was informed and understands the risks of surgery are greater for patients with a current condition or history of heart disease, obesity, clotting disorders, recurrent infections, steroid use, current or past smoking, and factors such as sedentary lifestyle and noncompliance with medications, therapy or follow-up. The degree of the increased risk is hard to estimate with any degree of precision.    Once no other questions were asked, a brief history and physical exam was then performed.    PAST MEDICAL HISTORY:   Past Medical History:   Diagnosis Date    Allergy     Angina pectoris     Anxiety     Cancer     skin rwemoved from head    Chronic midline low back pain without sciatica 10/26/2022    " Coronary artery disease     Depression     Eye injury as a teen    stuck object in os    GERD (gastroesophageal reflux disease)     Hyperlipidemia     Hypertension     Hypothyroidism     Memory loss     12/3/2014 MRI brain: 1. No evidence for acute infarct 2. unremarkable MRI of the brain; 12/3/2014 carotid US normal    Myocardial infarction     Nuclear sclerosis of both eyes 05/20/2021    Obesity     Peripheral vascular disease 06/20/2017    Retrocalcaneal bursitis 11/24/2014    Sleep apnea     Type 2 diabetes mellitus with other specified complication 04/27/2023    Type 2 diabetes mellitus with other specified complication 04/27/2023     PAST SURGICAL HISTORY:   Past Surgical History:   Procedure Laterality Date    APPENDECTOMY  1986    bone spur Right     COLONOSCOPY      COLONOSCOPY N/A 10/26/2023    Procedure: COLONOSCOPY;  Surgeon: Argelia Altamirano MD;  Location: [facility] ENDO;  Service: Endoscopy;  Laterality: N/A;  Referred by: Dr. Michael Gonzalez  BT/Clearance: ok to hold Plavix 5 days per Dr Dawn  Prep: golytely  Route instructions sent: [id number]  8/3 proc jessica to 10/26, pt has prep and instr  10/19- pre call complete.  DBM    ESOPHAGOGASTRODUODENOSCOPY N/A 8/2/2024    Procedure: EGD (ESOPHAGOGASTRODUODENOSCOPY);  Surgeon: Argelia Altamirano MD;  Location: [facility] ENDO;  Service: Endoscopy;  Laterality: N/A;  Dr. Porter, Urgent EGD, abdominal pain, standard prep, Instr to portal. Plavix hold pending.  ok to hold Plavix 5 days per Dr Dawn  7/26/24- lvm/portal for pc. DBM  7/30 pt confirmed. has been holding plavix since 7/27 cf  7/30/24- pc complete. DBM    hand trauma Right     pencil     heart stent      LEFT HEART CATHETERIZATION Left 9/20/2019    Procedure: Left heart cath 12 pm start, R rad access;  Surgeon: Brad Bahena MD;  Location: [facility] CATH LAB;  Service: Cardiology;  Laterality: Left;  RN PREOP 9/13/2019  NEEDS IODINE PREMED    LEFT HEART CATHETERIZATION Left 9/21/2020     Procedure: Left heart cath 730am start, R rad access;  Surgeon: Brad Bahena MD;  Location: [facility] CATH LAB;  Service: Cardiology;  Laterality: Left;  RN PREOP 9/14/2020, COVID NEGATIVE---9/18     FAMILY HISTORY:   Family History   Problem Relation Name Age of Onset    Arthritis Mother      Early death Mother      Heart disease Mother      Hypertension Mother      Migraines Mother      Seizures Mother      Tremor Mother      Diabetes Mother      Cancer Mother      Early death Father      Heart disease Father      Hypertension Father      Stroke Father      Diabetes Father      Alcohol abuse Father      Heart disease Sister Otilia     Hypertension Sister Otilia     Arthritis Sister Otilia     Depression Sister Otilia     Arthritis Brother Maximino     Cancer Brother Maximino     Diabetes Brother Maximino     Early death Brother Maximino     Heart disease Brother Maximino     Hypertension Brother Maximino     Heart disease Brother Robert     Arthritis Brother Robert     Heart disease Brother Azael     Pneumonia Brother Azael     Heart disease Brother Martin     Hypertension Brother Martin     Diabetes Brother Martin     Heart disease Brother Dario     No Known Problems Maternal Aunt      No Known Problems Maternal Uncle      No Known Problems Paternal Aunt      No Known Problems Paternal Uncle      No Known Problems Maternal Grandmother      No Known Problems Maternal Grandfather      No Known Problems Paternal Grandmother      No Known Problems Paternal Grandfather      Amblyopia Neg Hx      Blindness Neg Hx      Cataracts Neg Hx      Glaucoma Neg Hx      Macular degeneration Neg Hx      Retinal detachment Neg Hx      Strabismus Neg Hx      Thyroid disease Neg Hx       SOCIAL HISTORY: Social History[1]    MEDICATIONS: Current Medications[2]  ALLERGIES:   Review of patient's allergies indicates:   Allergen Reactions    Iodine and iodide containing products Anaphylaxis    Naproxen Other (See Comments)      hallucinations  Hallucinations^  Hallucinations^    Hydrocodone-acetaminophen      Rash (skin)^    Iodine      Anaphylaxis^    Oxycodone-acetaminophen      Rash (skin)^       Review of Systems   Constitution: Negative. Negative for chills, fever and night sweats.   HENT: Negative for congestion and headaches.    Eyes: Negative for blurred vision, left vision loss and right vision loss.   Cardiovascular: Negative for chest pain and syncope.   Respiratory: Negative for cough and shortness of breath.    Endocrine: Negative for polydipsia, polyphagia and polyuria.   Hematologic/Lymphatic: Negative for bleeding problem. Does not bruise/bleed easily.   Skin: Negative for dry skin, itching and rash.   Musculoskeletal: Negative for falls and muscle weakness.   Gastrointestinal: Negative for abdominal pain and bowel incontinence.   Genitourinary: Negative for bladder incontinence and nocturia.   Neurological: Negative for disturbances in coordination, loss of balance and seizures.   Psychiatric/Behavioral: Negative for depression. The patient does not have insomnia.    Allergic/Immunologic: Negative for hives and persistent infections.     PHYSICAL EXAM:  GEN: A&Ox3, WD WN NAD  HEENT: WNL  CHEST: CTAB, no W/R/R  HEART: RRR, no M/R/G   ABD: Soft, NT ND, BS x4 QUADS  MS: Refer to previous note for detailed MS exam  NEURO: CN II-XII intact       The surgical consent was then reviewed with the patient, who agreed with all the contents of the consent form and it was signed. he was instructed to wait for a phone call from the anesthesia department prior to surgery to discuss past medical history, medications, and clearance. Also, informed he may be required to get additional testing per the anesthesia department prior to having surgery.     he was also instructed to present to Joint Boot Camp Class prior to surgery or his surgery may be cancelled or postponed.     Due to the serious nature of total joint infection and the high  prevalence of community acquired MRSA, vancomycin will be used perioperatively.     PHYSICAL THERAPY:  He was also instructed regarding physical therapy and will begin POD # 1-3. He is doing physical therapy at [facility].    POST OP CARE:instructions were reviewed including care of the wound and dressing after surgery and when he can shower.     PAIN MANAGEMENT: Clinton Rosenberg was also given a pain management regime, which includes the TENS unit given to him by our DME team, along with the education required for its use. He was also instructed regarding the ice wrap that will be in place after surgery and his postoperative pain medications.     PAIN MEDICATION:  Roxicodone 5mg 1-2 po q 4-6 hours prn pain  Phenergan 25 mg one p.o. q.4-6 hours p.r.n. nausea and vomiting.  Robaxin 500mg TID PRN  Lyrica 75mg BID  Restart plavix for DVT prophylaxis starting on the evening after surgery.    Prophylactic antibiotics  TSA: doxycycline 100mg BID x 7 days      Post op meds to be delivered bedside prior to discharge. Deliver to family if patient is in surgery at 5pm.     Patient was instructed not to take benzodiazepines with opioid and Lyrica during the perioperative period. Patient denies history of seizures.     Patient will also use bilateral TEDs on lower extremities, SCDs during surgery, and early ambulation post-op. If the patient was previously taking 81mg baby aspirin, they may have been told to continue taking it during the perioperative period.     Patient was also told to buy over the counter Prilosec medication and take it once daily for GI protection as long as they are taking NSAIDs or Aspirin.    The mobility limitation cannot be sufficiently resolved by the use of a cane. Patient's functional mobility deficit can be sufficiently resolved with the use of a (Rolling Walker or Walker). Patient's mobility limitation significantly impairs their ability to participate in one of more  activities of daily living. The use of a (Rolling Walker or Walker) will significantly improve the patient's ability to participate in MRADLS and the patient will use it on regular basis in the home.     DVT prophylaxis was discussed with the patient today including risk factors for developing DVTs and history of DVTs. The patient was asked if any specific recommendations were given from the doctor/s that did pre-operative surgical clearance. I may have prescribed a mechanical DVT prophylaxis device, , for the above listed patient, to reduce the risk for clot formation and complications that can arise from a DVT. In my professional medical opinion, I consider this medically necessary and have prescribed the device for the purpose of postoperative treatment and rehabilitation. This can treat both the acute and chronic aspects of the inflammatory reaction that accompanies trauma and surgery. Additionally, I am prescribing mechanical DVT prophylaxis because the patient will have restricted mobility post-operatively and is at high risk for DVT. Failure to approve this device will compromise the outcome of this patient's healing process.     Patient was asked if they were taking or using OCP pills or devices. If they answered yes, then they were instructed to stop using OCPs at this pre-operative appointment until 2 months post-op to help prevent DVT development. They understand that there are other forms of birth control that do not involve hormones. They expressed understanding that ignoring/not following this instruction could result in a DVT which could turn into a deadly pulmonary embolism.      The patient was told that narcotic pain medications may make them drowsy and instructions were given to not sign legal documents, drive or operate heavy machinery, cars, or equipment while under the influence of narcotic medications.     Dr. Reese was present in clinic and directly involved in the additional informed  consent process with signing of paperwork and pre-op evaluation. The patient had the opportunity to ask Dr. Reese any additional questions and all questions were answered.    As there were no other questions to be asked, he was given my business card along with Dr. Reese business card if he has any questions or concerns prior to surgery or in the postop period.          [1]   Social History  Socioeconomic History    Marital status:      Spouse name: Opal    Number of children: 4   Tobacco Use    Smoking status: Never     Passive exposure: Never    Smokeless tobacco: Never   Substance and Sexual Activity    Alcohol use: Not Currently     Comment: rarely    Drug use: No    Sexual activity: Not Currently     Social Drivers of Health     Financial Resource Strain: Low Risk  (3/10/2025)    Overall Financial Resource Strain (CARDIA)     Difficulty of Paying Living Expenses: Not very hard   Food Insecurity: No Food Insecurity (3/10/2025)    Hunger Vital Sign     Worried About Running Out of Food in the Last Year: Never true     Ran Out of Food in the Last Year: Never true   Transportation Needs: No Transportation Needs (3/10/2025)    PRAPARE - Transportation     Lack of Transportation (Medical): No     Lack of Transportation (Non-Medical): No   Physical Activity: Sufficiently Active (3/10/2025)    Exercise Vital Sign     Days of Exercise per Week: 3 days     Minutes of Exercise per Session: 80 min   Stress: Stress Concern Present (3/10/2025)    [employer] [place] [employer] - Occupational Stress Questionnaire     Feeling of Stress : To some extent   Housing Stability: Low Risk  (3/10/2025)    Housing Stability Vital Sign     Unable to Pay for Housing in the Last Year: No     Number of Times Moved in the Last Year: 0     Homeless in the Last Year: No   [2]   Current Outpatient Medications:     amLODIPine (NORVASC) 5 MG tablet, Take 1 tablet (5 mg total) by mouth once daily., Disp: 90 tablet, Rfl: 1     aspirin (ECOTRIN) 81 MG EC tablet, Take 1 tablet (81 mg total) by mouth once daily., Disp: 90 tablet, Rfl: 3    atorvastatin (LIPITOR) 80 MG tablet, Take 1 tablet (80 mg total) by mouth every evening., Disp: 90 tablet, Rfl: 3    ciclopirox (LOPROX) 0.77 % Crea, Apply topically 2 (two) times daily., Disp: 90 g, Rfl: 4    clotrimazole-betamethasone 1-0.05% (LOTRISONE) cream, Apply topically 2 (two) times daily., Disp: 45 g, Rfl: 0    furosemide (LASIX) 20 MG tablet, Take 1 tablet (20 mg total) by mouth once daily., Disp: 90 tablet, Rfl: 3    gabapentin (NEURONTIN) 300 MG capsule, Take 1 capsule (300 mg total) by mouth every evening., Disp: 90 capsule, Rfl: 3    isosorbide mononitrate (IMDUR) 120 MG 24 hr tablet, Take 2 tablets (240 mg total) by mouth once daily., Disp: 180 tablet, Rfl: 3    levothyroxine (SYNTHROID) 75 MCG tablet, TAKE 1 TABLET BEFORE BREAKFAST, Disp: 90 tablet, Rfl: 3    metFORMIN (GLUCOPHAGE-XR) 500 MG ER 24hr tablet, Take 1 tablet (500 mg total) by mouth once daily., Disp: 90 tablet, Rfl: 3    metoprolol succinate (TOPROL-XL) 25 MG 24 hr tablet, Take 1 tablet (25 mg total) by mouth once daily., Disp: 90 tablet, Rfl: 1    nitroGLYCERIN (NITROSTAT) 0.4 MG SL tablet, PLACE 1 TABLET (0.4 MG TOTAL) UNDER THE TONGUE EVERY 5 (FIVE) MINUTES AS NEEDED FOR CHEST PAIN., Disp: 50 tablet, Rfl: 3    olmesartan (BENICAR) 40 MG tablet, Take 1 tablet (40 mg total) by mouth once daily., Disp: 90 tablet, Rfl: 3    pantoprazole (PROTONIX) 40 MG tablet, Take 1 tablet (40 mg total) by mouth once daily., Disp: 90 tablet, Rfl: 3    semaglutide (OZEMPIC) 1 mg/dose (4 mg/3 mL), Inject 1 mg into the skin every 7 days., Disp: 3 mL, Rfl: 5    tamsulosin (FLOMAX) 0.4 mg Cap, Take 1 capsule (0.4 mg total) by mouth once daily., Disp: 90 capsule, Rfl: 3    VASCEPA 1 gram Cap, Take 2 capsules (2,000 mg total) by mouth 2 (two) times a day., Disp: 360 capsule, Rfl: 3    vitamin D 1000 units Tab, Take 1 tablet (1,000 Units total) by  mouth once daily., Disp: 90 tablet, Rfl: 3    clopidogreL (PLAVIX) 75 mg tablet, Take 1 tablet (75 mg total) by mouth once daily. (Patient not taking: Reported on 4/7/2025), Disp: 90 tablet, Rfl: 3    ketoconazole (NIZORAL) 2 % cream, Apply topically 2 (two) times daily. for 14 days, Disp: 60 g, Rfl: 0    KRILL/OM3/DHA/EPA/OM6/LIP/ASTX (KRILL OIL, OMEGA 3 AND 6, ORAL), Take by mouth. (Patient not taking: Reported on 4/7/2025), Disp: , Rfl:     nystatin (MYCOSTATIN) powder, Apply topically 4 (four) times daily as needed (to keep penis/scrotum dry)., Disp: 15 g, Rfl: 1

## 2025-04-07 NOTE — H&P (VIEW-ONLY)
"Clinton Rosenberg  is here for a completion of his perioperative paperwork. he  Is scheduled to undergo left Shoulder:     42551 Total Shoulder (glenoid and proximal humeral) Replacement, reverse  99373 Biceps tenodesis on 4/10/2025.      Same day surgery, patient plans to go home today.    He  does need clearance for this procedure.     Per cardiology, "The patient is at low cardiac risk for the planned orthopedic surgical procedure and associated anesthesia. No further preoperative cardiac testing is required. Echocardiogram as ordered above does not need to hold up his procedure. If necessary, it is acceptable hold his Plavix for 5 days and aspirin for 7 days prior to the procedure and resume them postoperatively as soon as possible."    Per preop center, pending optimization...    Risks, indications and benefits of the surgical procedure were discussed with the patient. All questions with regard to surgery, rehab, expected return to functional activities, activities of daily living and recreational endeavors were answered to his satisfaction.    Discussed COVID-19 with the patient, they are aware of our current policies and procedures, were given the option of delaying surgery, and they elect to proceed.    Patient was informed and understands the risks of surgery are greater for patients with a current condition or history of heart disease, obesity, clotting disorders, recurrent infections, steroid use, current or past smoking, and factors such as sedentary lifestyle and noncompliance with medications, therapy or follow-up. The degree of the increased risk is hard to estimate with any degree of precision.    Once no other questions were asked, a brief history and physical exam was then performed.    PAST MEDICAL HISTORY:   Past Medical History:   Diagnosis Date    Allergy     Angina pectoris     Anxiety     Cancer     skin rwemoved from head    Chronic midline low back pain without sciatica 10/26/2022    " Coronary artery disease     Depression     Eye injury as a teen    stuck object in os    GERD (gastroesophageal reflux disease)     Hyperlipidemia     Hypertension     Hypothyroidism     Memory loss     12/3/2014 MRI brain: 1. No evidence for acute infarct 2. unremarkable MRI of the brain; 12/3/2014 carotid US normal    Myocardial infarction     Nuclear sclerosis of both eyes 05/20/2021    Obesity     Peripheral vascular disease 06/20/2017    Retrocalcaneal bursitis 11/24/2014    Sleep apnea     Type 2 diabetes mellitus with other specified complication 04/27/2023    Type 2 diabetes mellitus with other specified complication 04/27/2023     PAST SURGICAL HISTORY:   Past Surgical History:   Procedure Laterality Date    APPENDECTOMY  1986    bone spur Right     COLONOSCOPY      COLONOSCOPY N/A 10/26/2023    Procedure: COLONOSCOPY;  Surgeon: Argelia Altamirano MD;  Location: [facility] ENDO;  Service: Endoscopy;  Laterality: N/A;  Referred by: Dr. Michael Gonzalez  BT/Clearance: ok to hold Plavix 5 days per Dr Dawn  Prep: golytely  Route instructions sent: [id number]  8/3 proc jessica to 10/26, pt has prep and instr  10/19- pre call complete.  DBM    ESOPHAGOGASTRODUODENOSCOPY N/A 8/2/2024    Procedure: EGD (ESOPHAGOGASTRODUODENOSCOPY);  Surgeon: Argelia Altamirano MD;  Location: [facility] ENDO;  Service: Endoscopy;  Laterality: N/A;  Dr. Porter, Urgent EGD, abdominal pain, standard prep, Instr to portal. Plavix hold pending.  ok to hold Plavix 5 days per Dr Dawn  7/26/24- lvm/portal for pc. DBM  7/30 pt confirmed. has been holding plavix since 7/27 cf  7/30/24- pc complete. DBM    hand trauma Right     pencil     heart stent      LEFT HEART CATHETERIZATION Left 9/20/2019    Procedure: Left heart cath 12 pm start, R rad access;  Surgeon: Brad Bahena MD;  Location: [facility] CATH LAB;  Service: Cardiology;  Laterality: Left;  RN PREOP 9/13/2019  NEEDS IODINE PREMED    LEFT HEART CATHETERIZATION Left 9/21/2020     Procedure: Left heart cath 730am start, R rad access;  Surgeon: Brad Bahena MD;  Location: [facility] CATH LAB;  Service: Cardiology;  Laterality: Left;  RN PREOP 9/14/2020, COVID NEGATIVE---9/18     FAMILY HISTORY:   Family History   Problem Relation Name Age of Onset    Arthritis Mother      Early death Mother      Heart disease Mother      Hypertension Mother      Migraines Mother      Seizures Mother      Tremor Mother      Diabetes Mother      Cancer Mother      Early death Father      Heart disease Father      Hypertension Father      Stroke Father      Diabetes Father      Alcohol abuse Father      Heart disease Sister Otilia     Hypertension Sister Otilia     Arthritis Sister Otilia     Depression Sister Otilia     Arthritis Brother Maximino     Cancer Brother Maximino     Diabetes Brother Maximino     Early death Brother Maximino     Heart disease Brother Maximino     Hypertension Brother Maximino     Heart disease Brother Robert     Arthritis Brother Robert     Heart disease Brother Azael     Pneumonia Brother Azael     Heart disease Brother Martin     Hypertension Brother Martin     Diabetes Brother Martin     Heart disease Brother Dario     No Known Problems Maternal Aunt      No Known Problems Maternal Uncle      No Known Problems Paternal Aunt      No Known Problems Paternal Uncle      No Known Problems Maternal Grandmother      No Known Problems Maternal Grandfather      No Known Problems Paternal Grandmother      No Known Problems Paternal Grandfather      Amblyopia Neg Hx      Blindness Neg Hx      Cataracts Neg Hx      Glaucoma Neg Hx      Macular degeneration Neg Hx      Retinal detachment Neg Hx      Strabismus Neg Hx      Thyroid disease Neg Hx       SOCIAL HISTORY: Social History[1]    MEDICATIONS: Current Medications[2]  ALLERGIES:   Review of patient's allergies indicates:   Allergen Reactions    Iodine and iodide containing products Anaphylaxis    Naproxen Other (See Comments)      hallucinations  Hallucinations^  Hallucinations^    Hydrocodone-acetaminophen      Rash (skin)^    Iodine      Anaphylaxis^    Oxycodone-acetaminophen      Rash (skin)^       Review of Systems   Constitution: Negative. Negative for chills, fever and night sweats.   HENT: Negative for congestion and headaches.    Eyes: Negative for blurred vision, left vision loss and right vision loss.   Cardiovascular: Negative for chest pain and syncope.   Respiratory: Negative for cough and shortness of breath.    Endocrine: Negative for polydipsia, polyphagia and polyuria.   Hematologic/Lymphatic: Negative for bleeding problem. Does not bruise/bleed easily.   Skin: Negative for dry skin, itching and rash.   Musculoskeletal: Negative for falls and muscle weakness.   Gastrointestinal: Negative for abdominal pain and bowel incontinence.   Genitourinary: Negative for bladder incontinence and nocturia.   Neurological: Negative for disturbances in coordination, loss of balance and seizures.   Psychiatric/Behavioral: Negative for depression. The patient does not have insomnia.    Allergic/Immunologic: Negative for hives and persistent infections.     PHYSICAL EXAM:  GEN: A&Ox3, WD WN NAD  HEENT: WNL  CHEST: CTAB, no W/R/R  HEART: RRR, no M/R/G   ABD: Soft, NT ND, BS x4 QUADS  MS: Refer to previous note for detailed MS exam  NEURO: CN II-XII intact       The surgical consent was then reviewed with the patient, who agreed with all the contents of the consent form and it was signed. he was instructed to wait for a phone call from the anesthesia department prior to surgery to discuss past medical history, medications, and clearance. Also, informed he may be required to get additional testing per the anesthesia department prior to having surgery.     he was also instructed to present to Joint Boot Camp Class prior to surgery or his surgery may be cancelled or postponed.     Due to the serious nature of total joint infection and the high  prevalence of community acquired MRSA, vancomycin will be used perioperatively.     PHYSICAL THERAPY:  He was also instructed regarding physical therapy and will begin POD # 1-3. He is doing physical therapy at [facility].    POST OP CARE:instructions were reviewed including care of the wound and dressing after surgery and when he can shower.     PAIN MANAGEMENT: Clinton Rosenberg was also given a pain management regime, which includes the TENS unit given to him by our DME team, along with the education required for its use. He was also instructed regarding the ice wrap that will be in place after surgery and his postoperative pain medications.     PAIN MEDICATION:  Roxicodone 5mg 1-2 po q 4-6 hours prn pain  Phenergan 25 mg one p.o. q.4-6 hours p.r.n. nausea and vomiting.  Robaxin 500mg TID PRN  Lyrica 75mg BID  Restart plavix for DVT prophylaxis starting on the evening after surgery.    Prophylactic antibiotics  TSA: doxycycline 100mg BID x 7 days      Post op meds to be delivered bedside prior to discharge. Deliver to family if patient is in surgery at 5pm.     Patient was instructed not to take benzodiazepines with opioid and Lyrica during the perioperative period. Patient denies history of seizures.     Patient will also use bilateral TEDs on lower extremities, SCDs during surgery, and early ambulation post-op. If the patient was previously taking 81mg baby aspirin, they may have been told to continue taking it during the perioperative period.     Patient was also told to buy over the counter Prilosec medication and take it once daily for GI protection as long as they are taking NSAIDs or Aspirin.    The mobility limitation cannot be sufficiently resolved by the use of a cane. Patient's functional mobility deficit can be sufficiently resolved with the use of a (Rolling Walker or Walker). Patient's mobility limitation significantly impairs their ability to participate in one of more  activities of daily living. The use of a (Rolling Walker or Walker) will significantly improve the patient's ability to participate in MRADLS and the patient will use it on regular basis in the home.     DVT prophylaxis was discussed with the patient today including risk factors for developing DVTs and history of DVTs. The patient was asked if any specific recommendations were given from the doctor/s that did pre-operative surgical clearance. I may have prescribed a mechanical DVT prophylaxis device, , for the above listed patient, to reduce the risk for clot formation and complications that can arise from a DVT. In my professional medical opinion, I consider this medically necessary and have prescribed the device for the purpose of postoperative treatment and rehabilitation. This can treat both the acute and chronic aspects of the inflammatory reaction that accompanies trauma and surgery. Additionally, I am prescribing mechanical DVT prophylaxis because the patient will have restricted mobility post-operatively and is at high risk for DVT. Failure to approve this device will compromise the outcome of this patient's healing process.     Patient was asked if they were taking or using OCP pills or devices. If they answered yes, then they were instructed to stop using OCPs at this pre-operative appointment until 2 months post-op to help prevent DVT development. They understand that there are other forms of birth control that do not involve hormones. They expressed understanding that ignoring/not following this instruction could result in a DVT which could turn into a deadly pulmonary embolism.      The patient was told that narcotic pain medications may make them drowsy and instructions were given to not sign legal documents, drive or operate heavy machinery, cars, or equipment while under the influence of narcotic medications.     Dr. Reese was present in clinic and directly involved in the additional informed  consent process with signing of paperwork and pre-op evaluation. The patient had the opportunity to ask Dr. Reese any additional questions and all questions were answered.    As there were no other questions to be asked, he was given my business card along with Dr. Reese business card if he has any questions or concerns prior to surgery or in the postop period.          [1]   Social History  Socioeconomic History    Marital status:      Spouse name: Opal    Number of children: 4   Tobacco Use    Smoking status: Never     Passive exposure: Never    Smokeless tobacco: Never   Substance and Sexual Activity    Alcohol use: Not Currently     Comment: rarely    Drug use: No    Sexual activity: Not Currently     Social Drivers of Health     Financial Resource Strain: Low Risk  (3/10/2025)    Overall Financial Resource Strain (CARDIA)     Difficulty of Paying Living Expenses: Not very hard   Food Insecurity: No Food Insecurity (3/10/2025)    Hunger Vital Sign     Worried About Running Out of Food in the Last Year: Never true     Ran Out of Food in the Last Year: Never true   Transportation Needs: No Transportation Needs (3/10/2025)    PRAPARE - Transportation     Lack of Transportation (Medical): No     Lack of Transportation (Non-Medical): No   Physical Activity: Sufficiently Active (3/10/2025)    Exercise Vital Sign     Days of Exercise per Week: 3 days     Minutes of Exercise per Session: 80 min   Stress: Stress Concern Present (3/10/2025)    [employer] [place] [employer] - Occupational Stress Questionnaire     Feeling of Stress : To some extent   Housing Stability: Low Risk  (3/10/2025)    Housing Stability Vital Sign     Unable to Pay for Housing in the Last Year: No     Number of Times Moved in the Last Year: 0     Homeless in the Last Year: No   [2]   Current Outpatient Medications:     amLODIPine (NORVASC) 5 MG tablet, Take 1 tablet (5 mg total) by mouth once daily., Disp: 90 tablet, Rfl: 1     aspirin (ECOTRIN) 81 MG EC tablet, Take 1 tablet (81 mg total) by mouth once daily., Disp: 90 tablet, Rfl: 3    atorvastatin (LIPITOR) 80 MG tablet, Take 1 tablet (80 mg total) by mouth every evening., Disp: 90 tablet, Rfl: 3    ciclopirox (LOPROX) 0.77 % Crea, Apply topically 2 (two) times daily., Disp: 90 g, Rfl: 4    clotrimazole-betamethasone 1-0.05% (LOTRISONE) cream, Apply topically 2 (two) times daily., Disp: 45 g, Rfl: 0    furosemide (LASIX) 20 MG tablet, Take 1 tablet (20 mg total) by mouth once daily., Disp: 90 tablet, Rfl: 3    gabapentin (NEURONTIN) 300 MG capsule, Take 1 capsule (300 mg total) by mouth every evening., Disp: 90 capsule, Rfl: 3    isosorbide mononitrate (IMDUR) 120 MG 24 hr tablet, Take 2 tablets (240 mg total) by mouth once daily., Disp: 180 tablet, Rfl: 3    levothyroxine (SYNTHROID) 75 MCG tablet, TAKE 1 TABLET BEFORE BREAKFAST, Disp: 90 tablet, Rfl: 3    metFORMIN (GLUCOPHAGE-XR) 500 MG ER 24hr tablet, Take 1 tablet (500 mg total) by mouth once daily., Disp: 90 tablet, Rfl: 3    metoprolol succinate (TOPROL-XL) 25 MG 24 hr tablet, Take 1 tablet (25 mg total) by mouth once daily., Disp: 90 tablet, Rfl: 1    nitroGLYCERIN (NITROSTAT) 0.4 MG SL tablet, PLACE 1 TABLET (0.4 MG TOTAL) UNDER THE TONGUE EVERY 5 (FIVE) MINUTES AS NEEDED FOR CHEST PAIN., Disp: 50 tablet, Rfl: 3    olmesartan (BENICAR) 40 MG tablet, Take 1 tablet (40 mg total) by mouth once daily., Disp: 90 tablet, Rfl: 3    pantoprazole (PROTONIX) 40 MG tablet, Take 1 tablet (40 mg total) by mouth once daily., Disp: 90 tablet, Rfl: 3    semaglutide (OZEMPIC) 1 mg/dose (4 mg/3 mL), Inject 1 mg into the skin every 7 days., Disp: 3 mL, Rfl: 5    tamsulosin (FLOMAX) 0.4 mg Cap, Take 1 capsule (0.4 mg total) by mouth once daily., Disp: 90 capsule, Rfl: 3    VASCEPA 1 gram Cap, Take 2 capsules (2,000 mg total) by mouth 2 (two) times a day., Disp: 360 capsule, Rfl: 3    vitamin D 1000 units Tab, Take 1 tablet (1,000 Units total) by  mouth once daily., Disp: 90 tablet, Rfl: 3    clopidogreL (PLAVIX) 75 mg tablet, Take 1 tablet (75 mg total) by mouth once daily. (Patient not taking: Reported on 4/7/2025), Disp: 90 tablet, Rfl: 3    ketoconazole (NIZORAL) 2 % cream, Apply topically 2 (two) times daily. for 14 days, Disp: 60 g, Rfl: 0    KRILL/OM3/DHA/EPA/OM6/LIP/ASTX (KRILL OIL, OMEGA 3 AND 6, ORAL), Take by mouth. (Patient not taking: Reported on 4/7/2025), Disp: , Rfl:     nystatin (MYCOSTATIN) powder, Apply topically 4 (four) times daily as needed (to keep penis/scrotum dry)., Disp: 15 g, Rfl: 1

## 2025-04-09 ENCOUNTER — ANESTHESIA EVENT (OUTPATIENT)
Dept: SURGERY | Facility: HOSPITAL | Age: 75
End: 2025-04-09
Payer: MEDICARE

## 2025-04-09 ENCOUNTER — TELEPHONE (OUTPATIENT)
Dept: SPORTS MEDICINE | Facility: CLINIC | Age: 75
End: 2025-04-09
Payer: MEDICARE

## 2025-04-09 ENCOUNTER — CLINICAL SUPPORT (OUTPATIENT)
Dept: REHABILITATION | Facility: HOSPITAL | Age: 75
End: 2025-04-09
Payer: MEDICARE

## 2025-04-09 DIAGNOSIS — M25.512 CHRONIC LEFT SHOULDER PAIN: Primary | ICD-10-CM

## 2025-04-09 DIAGNOSIS — G89.29 CHRONIC LEFT SHOULDER PAIN: Primary | ICD-10-CM

## 2025-04-09 DIAGNOSIS — M75.102 ROTATOR CUFF SYNDROME OF LEFT SHOULDER: ICD-10-CM

## 2025-04-09 DIAGNOSIS — M75.22 BICEPS TENDINITIS OF LEFT UPPER EXTREMITY: ICD-10-CM

## 2025-04-09 DIAGNOSIS — S46.012D TRAUMATIC COMPLETE TEAR OF LEFT ROTATOR CUFF, SUBSEQUENT ENCOUNTER: Primary | ICD-10-CM

## 2025-04-09 DIAGNOSIS — M75.42 IMPINGEMENT SYNDROME OF LEFT SHOULDER: ICD-10-CM

## 2025-04-09 PROCEDURE — 97161 PT EVAL LOW COMPLEX 20 MIN: CPT | Mod: HCNC,PN

## 2025-04-09 NOTE — PROGRESS NOTES
Outpatient Rehab    Physical Therapy Evaluation    Patient Name: Clinton Rosenberg  MRN: [id number]  YOB: 1950  Encounter Date: 4/9/2025    Therapy Diagnosis:   Encounter Diagnoses   Name Primary?    Rotator cuff syndrome of left shoulder     Biceps tendinitis of left upper extremity     Impingement syndrome of left shoulder     Chronic left shoulder pain Yes     Physician: Nadir Gloria, *    Physician Orders: Eval and Treat  Medical Diagnosis: Rotator cuff syndrome of left shoulder  Biceps tendinitis of left upper extremity  Impingement syndrome of left shoulder    Visit # / Visits Authorized:  1 / 1  Insurance Authorization Period: 3/17/2025 to 3/17/2026  Date of Evaluation: 4/9/2025  Plan of Care Certification: 4/9/2025 to 7/2/25     Time In: 1500   Time Out: 1530  Total Time: 30   Total Billable Time: 30    Intake Outcome Measure for FOTO Survey    Therapist reviewed FOTO scores for Clinton Rosenberg on 4/9/2025.   FOTO report - see Media section or FOTO account episode details.     Intake Score: 66.7%         Subjective   History of Present Illness  Clinton is a 74 y.o. male who reports to physical therapy with a chief concern of L shoulder pain.         Diagnostic tests related to this condition: MRI studies.   MRI Studies Details: Impression:     Internal derangement as above including rotator cuff tear and other changes.  This could be acute tear because there is edema within the supraspinatus and infraspinatus muscles.    History of Present Condition/Illness: Pt states he has L shoulder pain. He tripped over metal object and fall on his L shoulder. He states he is schedule for surgery tomorrow. He states pain at rest 4/10. Pain with motion 10/10. He states he has been wearing sling on L shoulder.     Activities of Daily Living  Social history was obtained from Patient.               Previously independent with activities of daily living? Yes     Currently independent with activities of  daily living? No  Activities currently needing assistance include Functional mobility, Toileting, Dressing - upper body, and Bathing.        Previously independent with instrumental activities of daily living? Yes     Currently independent with instrumental activities of daily living? No  Activities currently needing assistance include: Grocery/shopping, Driving, and Community mobility.            Pain     Patient reports a current pain level of 6/10. Pain at best is reported as 4/10. Pain at worst is reported as 10/10.   Clinical Progression (since onset): Stable  Pain Qualities: Aching, Burning, Dull, Sharp, Throbbing  Pain-Relieving Factors: Immobilization  Pain-Aggravating Factors: Walking, Stretching, Standing, Straightening, Sleeping, Reaching, Movement, Lifting, Lying down, Holding objects         Treatment History  Treatments  Previously Received Treatments: No  Currently Receiving Treatments: No    Employment  Patient does not report that: Does the patient's condition impact their ability to work?  Employment Status: Retired          Past Medical History/Physical Systems Review:   Clinton Rosenberg  has a past medical history of Allergy, Angina pectoris, Anxiety, Cancer, Chronic midline low back pain without sciatica, Coronary artery disease, Depression, Eye injury, GERD (gastroesophageal reflux disease), Hyperlipidemia, Hypertension, Hypothyroidism, Memory loss, Myocardial infarction, Nuclear sclerosis of both eyes, Obesity, Peripheral vascular disease, Retrocalcaneal bursitis, Sleep apnea, Type 2 diabetes mellitus with other specified complication, and Type 2 diabetes mellitus with other specified complication.    Clinton Rosenberg  has a past surgical history that includes Colonoscopy; heart stent; Appendectomy (1986); hand trauma (Right); bone spur (Right); Left heart catheterization (Left, 9/20/2019); Left heart catheterization (Left, 9/21/2020); Colonoscopy (N/A, 10/26/2023); and  Esophagogastroduodenoscopy (N/A, 8/2/2024).    Clinton has a current medication list which includes the following prescription(s): amlodipine, aspirin, atorvastatin, ciclopirox, clopidogrel, clotrimazole-betamethasone 1-0.05%, doxycycline, furosemide, gabapentin, isosorbide mononitrate, ketoconazole, krill/om3/dha/epa/om6/lip/astx, levothyroxine, metformin, methocarbamol, metoprolol succinate, nitroglycerin, nystatin, olmesartan, oxycodone, pantoprazole, pregabalin, promethazine, ozempic, tamsulosin, vascepa, and vitamin d.    Review of patient's allergies indicates:   Allergen Reactions    Iodine and iodide containing products Anaphylaxis    Naproxen Other (See Comments)     hallucinations  Hallucinations^  Hallucinations^    Hydrocodone-acetaminophen      Rash (skin)^    Iodine      Anaphylaxis^    Oxycodone-acetaminophen      Rash (skin)^        Objective   Bracing   The shoulder brace type is Immobilizer.           Posture        Shoulders are Rounded.             Shoulder Range of Motion  Right Shoulder   Active (deg) Passive (deg) Pain   Flexion 155       Extension         Scaption         ABduction 155       ADduction         Horizontal ABduction         Horizontal ADduction         External Rotation (Shoulder ABducted 0 degrees)  (Functional reach: T3)       External Rotation (Shoulder ABducted 45 degrees)         External Rotation (Shoulder ABducted 90 degrees)         Internal Rotation (Shoulder ABducted 0 degrees)  (Functional reach: T12)       Internal Rotation (Shoulder ABducted 45 degrees)         Internal Rotation (Shoulder ABducted 90 degrees)           Left Shoulder   Active (deg) Passive (deg) Pain   Flexion 40   Yes   Extension         Scaption         ABduction 40   Yes   ADduction         Horizontal ABduction         Horizontal ADduction         External Rotation (Shoulder ABducted 0 degrees)         External Rotation (Shoulder ABducted 45 degrees)         External Rotation (Shoulder ABducted 90  degrees)         Internal Rotation (Shoulder ABducted 0 degrees)         Internal Rotation (Shoulder ABducted 45 degrees)         Internal Rotation (Shoulder ABducted 90 degrees)                       Shoulder Strength - Planes of Motion   Right Strength Right Pain Left Strength Left  Pain   Flexion 4+      (unable to perform due to severe pain)   Extension 4+         ABduction 4+      (unable to perform due to severe pain)   ADduction 4+         Horizontal ABduction 4+         Horizontal ADduction 4+         Internal Rotation 0° 4+      (unable to perform due to severe pain)   Internal Rotation 90° 4+         External Rotation 0° 4+      (unable to perform due to severe pain)   External Rotation 90° 4+             Shoulder Strength - Rotator Cuff Muscles   Right Strength Right Pain Left Strength Left  Pain   Supraspinatus 4      (unable to perform due to severe pain)   Infraspinatus        (unable to perform due to severe pain)   Teres Minor        (unable to perform due to severe pain)   Subscapularis        (unable to perform due to severe pain)                  Treatment:       Time Entry(in minutes):  PT Evaluation (Low) Time Entry: 30    Assessment & Plan   Assessment  Clinton presents with a condition of Low complexity.   Presentation of Symptoms: Stable  Will Comorbidities Impact Care: No       Functional Limitations: Activity tolerance, Carrying objects, Disrupted sleep pattern, Driving, Functional mobility, Increased risk of fall, Range of motion, Performing household chores, Pain with ADLs/IADLs, Pain when reaching, Manipulating objects, Reaching  Impairments: Abnormal muscle firing, Activity intolerance, Impaired physical strength, Lack of appropriate home exercise program, Pain with functional activity  Personal Factors Affecting Prognosis: Pain, Schedule, Physical limitations    Patient Goal for Therapy (PT): Decrase pain  Prognosis: Fair  Assessment Details: Pt presents with signs and symptoms consistent  with referring diagnosis. Evaluation has determined a decrease in functional status and subjective and objective deficits that can be addressed by physical therapy intervention. Pt demonstrates pain limiting functional activities. Decreased flexibility and strength limiting normal movement patterns. Decreased postural strength and awareness. Decreased participation in functional and recreational activities. Subjective and objective measures are addressed by goals in the plan of care.  Patient/family are involved in the development of these goals. Patient/family are educated about current injury and treatment. Pt demonstrates no additional cultural, spiritual or educational need and currently has no barriers to learning. Pt is schedule for L shoulder surgery on 4-10-25. Pt is a good candidate for skilled physical therapy intervention and has a good prognosis and is motivated to return to functional and  recreational activities.      Plan  From a physical therapy perspective, the patient would benefit from: Skilled Rehab Services    Planned therapy interventions include: Therapeutic exercise, Therapeutic activities, Neuromuscular re-education, Manual therapy, and ADLs/IADLs.            Visit Frequency: 2 times Per Week for 12 Weeks.       This plan was discussed with Patient and Family.   Discussion participants: Agreed Upon Plan of Care             Patient's spiritual, cultural, and educational needs considered and patient agreeable to plan of care and goals.     Education  Education was done with Patient. The patient's learning style includes Demonstration, Listening, and Pictures/video. The patient Demonstrates understanding and Verbalizes understanding.                 Goals:   Active       LTGs       Increase ROM to allow improved joint biomechanics during functional activities.          Start:  04/09/25    Expected End:  07/02/25            2. Independent with home exercise program.         Start:  04/09/25     Expected End:  07/02/25            3. Full return to functional activities with manageable complaints.         Start:  04/09/25    Expected End:  07/02/25            4. Patient to demonstrate improved posture and body mechanics.         Start:  04/09/25    Expected End:  07/02/25            5.Decrease pain by 75% during functional activities.         Start:  04/09/25    Expected End:  07/02/25               STGs       Pt to increase strength by a 1/2 grade of muscles test to allow for improvement in functional activities such as performing chores.          Start:  04/09/25    Expected End:  07/02/25            2. Pt to improve range of motion by 25% to allow for improved functional mobility to allow for improvement in IADL's.         Start:  04/09/25    Expected End:  07/02/25            3. Pt to report compliance with HEP and demonstrate proper exercise technique to PT to show competence with self management of condition.         Start:  04/09/25    Expected End:  07/02/25            4. Decrease pain by 25% during functional activities.         Start:  04/09/25    Expected End:  07/02/25                Adrian Meza, PT

## 2025-04-10 ENCOUNTER — ANESTHESIA (OUTPATIENT)
Dept: SURGERY | Facility: HOSPITAL | Age: 75
End: 2025-04-10
Payer: MEDICARE

## 2025-04-10 ENCOUNTER — HOSPITAL ENCOUNTER (OUTPATIENT)
Facility: HOSPITAL | Age: 75
Discharge: HOME | End: 2025-04-10
Attending: ORTHOPAEDIC SURGERY | Admitting: ORTHOPAEDIC SURGERY
Payer: MEDICARE

## 2025-04-10 VITALS
HEIGHT: 70 IN | OXYGEN SATURATION: 93 % | BODY MASS INDEX: 32.21 KG/M2 | DIASTOLIC BLOOD PRESSURE: 67 MMHG | RESPIRATION RATE: 16 BRPM | HEART RATE: 62 BPM | WEIGHT: 225 LBS | TEMPERATURE: 98 F | SYSTOLIC BLOOD PRESSURE: 105 MMHG

## 2025-04-10 DIAGNOSIS — M75.22 BICEPS TENDINITIS OF LEFT UPPER EXTREMITY: ICD-10-CM

## 2025-04-10 DIAGNOSIS — S46.012D TRAUMATIC COMPLETE TEAR OF LEFT ROTATOR CUFF, SUBSEQUENT ENCOUNTER: ICD-10-CM

## 2025-04-10 PROBLEM — S46.012A TRAUMATIC COMPLETE TEAR OF LEFT ROTATOR CUFF: Status: ACTIVE | Noted: 2025-04-10

## 2025-04-10 LAB
POCT GLUCOSE: 142 MG/DL (ref 70–110)
POCT GLUCOSE: 153 MG/DL (ref 70–110)

## 2025-04-10 PROCEDURE — 36000711: Performed by: ORTHOPAEDIC SURGERY

## 2025-04-10 PROCEDURE — 27201423 OPTIME MED/SURG SUP & DEVICES STERILE SUPPLY: Performed by: ORTHOPAEDIC SURGERY

## 2025-04-10 PROCEDURE — 25000003 PHARM REV CODE 250: Performed by: ANESTHESIOLOGY

## 2025-04-10 PROCEDURE — 25000003 PHARM REV CODE 250: Performed by: NURSE ANESTHETIST, CERTIFIED REGISTERED

## 2025-04-10 PROCEDURE — 71000016 HC POSTOP RECOV ADDL HR: Performed by: ORTHOPAEDIC SURGERY

## 2025-04-10 PROCEDURE — 71000039 HC RECOVERY, EACH ADD'L HOUR: Performed by: ORTHOPAEDIC SURGERY

## 2025-04-10 PROCEDURE — 37000009 HC ANESTHESIA EA ADD 15 MINS: Performed by: ORTHOPAEDIC SURGERY

## 2025-04-10 PROCEDURE — 82962 GLUCOSE BLOOD TEST: CPT | Performed by: ORTHOPAEDIC SURGERY

## 2025-04-10 PROCEDURE — C1776 JOINT DEVICE (IMPLANTABLE): HCPCS | Performed by: ORTHOPAEDIC SURGERY

## 2025-04-10 PROCEDURE — 63600175 PHARM REV CODE 636 W HCPCS: Performed by: ORTHOPAEDIC SURGERY

## 2025-04-10 PROCEDURE — 99900035 HC TECH TIME PER 15 MIN (STAT)

## 2025-04-10 PROCEDURE — 71000015 HC POSTOP RECOV 1ST HR: Performed by: ORTHOPAEDIC SURGERY

## 2025-04-10 PROCEDURE — 23472 RECONSTRUCT SHOULDER JOINT: CPT | Mod: HCNC,LT,, | Performed by: ORTHOPAEDIC SURGERY

## 2025-04-10 PROCEDURE — D9220A PRA ANESTHESIA: Mod: ANES,,, | Performed by: ANESTHESIOLOGY

## 2025-04-10 PROCEDURE — 64416 NJX AA&/STRD BRCH PL NFS IMG: CPT | Performed by: ANESTHESIOLOGY

## 2025-04-10 PROCEDURE — D9220A PRA ANESTHESIA: Mod: CRNA,,, | Performed by: NURSE ANESTHETIST, CERTIFIED REGISTERED

## 2025-04-10 PROCEDURE — 94761 N-INVAS EAR/PLS OXIMETRY MLT: CPT

## 2025-04-10 PROCEDURE — 25000003 PHARM REV CODE 250: Performed by: PHYSICIAN ASSISTANT

## 2025-04-10 PROCEDURE — 71000033 HC RECOVERY, INTIAL HOUR: Performed by: ORTHOPAEDIC SURGERY

## 2025-04-10 PROCEDURE — 37000008 HC ANESTHESIA 1ST 15 MINUTES: Performed by: ORTHOPAEDIC SURGERY

## 2025-04-10 PROCEDURE — 63600175 PHARM REV CODE 636 W HCPCS: Performed by: NURSE ANESTHETIST, CERTIFIED REGISTERED

## 2025-04-10 PROCEDURE — 63600175 PHARM REV CODE 636 W HCPCS: Performed by: ANESTHESIOLOGY

## 2025-04-10 PROCEDURE — 63600175 PHARM REV CODE 636 W HCPCS: Performed by: PHYSICIAN ASSISTANT

## 2025-04-10 PROCEDURE — 36000710: Performed by: ORTHOPAEDIC SURGERY

## 2025-04-10 PROCEDURE — C1713 ANCHOR/SCREW BN/BN,TIS/BN: HCPCS | Performed by: ORTHOPAEDIC SURGERY

## 2025-04-10 PROCEDURE — C1769 GUIDE WIRE: HCPCS | Performed by: ORTHOPAEDIC SURGERY

## 2025-04-10 DEVICE — 4.5MM PERIPHERAL SCREW
Type: IMPLANTABLE DEVICE | Site: SHOULDER | Status: FUNCTIONAL
Brand: REUNION

## 2025-04-10 DEVICE — X3 HUMERAL INSERT
Type: IMPLANTABLE DEVICE | Site: SHOULDER | Status: FUNCTIONAL
Brand: REUNION

## 2025-04-10 DEVICE — HUMERAL CUP
Type: IMPLANTABLE DEVICE | Site: SHOULDER | Status: FUNCTIONAL
Brand: REUNION

## 2025-04-10 DEVICE — 6.5MM CENTER SCREW
Type: IMPLANTABLE DEVICE | Site: SHOULDER | Status: FUNCTIONAL
Brand: REUNION

## 2025-04-10 DEVICE — GLENOSPHERE , CONCENTRIC
Type: IMPLANTABLE DEVICE | Site: SHOULDER | Status: FUNCTIONAL
Brand: REUNION

## 2025-04-10 DEVICE — GLENOID BASEPLATE
Type: IMPLANTABLE DEVICE | Site: SHOULDER | Status: FUNCTIONAL
Brand: REUNION

## 2025-04-10 DEVICE — PRESS-FIT HUMERAL STEM
Type: IMPLANTABLE DEVICE | Site: SHOULDER | Status: FUNCTIONAL
Brand: REUNION

## 2025-04-10 RX ORDER — ONDANSETRON HYDROCHLORIDE 2 MG/ML
INJECTION, SOLUTION INTRAVENOUS
Status: DISCONTINUED | OUTPATIENT
Start: 2025-04-10 | End: 2025-04-10

## 2025-04-10 RX ORDER — OXYCODONE HYDROCHLORIDE 5 MG/1
5 TABLET ORAL
Refills: 0 | Status: DISCONTINUED | OUTPATIENT
Start: 2025-04-10 | End: 2025-04-10 | Stop reason: HOSPADM

## 2025-04-10 RX ORDER — ROCURONIUM BROMIDE 10 MG/ML
INJECTION, SOLUTION INTRAVENOUS
Status: DISCONTINUED | OUTPATIENT
Start: 2025-04-10 | End: 2025-04-10

## 2025-04-10 RX ORDER — ROPIVACAINE HYDROCHLORIDE 2 MG/ML
INJECTION, SOLUTION EPIDURAL; INFILTRATION; PERINEURAL CONTINUOUS
Status: DISCONTINUED | OUTPATIENT
Start: 2025-04-10 | End: 2025-04-10 | Stop reason: HOSPADM

## 2025-04-10 RX ORDER — POLYETHYLENE GLYCOL 3350 17 G/17G
17 POWDER, FOR SOLUTION ORAL DAILY
Status: DISCONTINUED | OUTPATIENT
Start: 2025-04-10 | End: 2025-04-10 | Stop reason: HOSPADM

## 2025-04-10 RX ORDER — FENTANYL CITRATE 50 UG/ML
INJECTION, SOLUTION INTRAMUSCULAR; INTRAVENOUS
Status: DISCONTINUED | OUTPATIENT
Start: 2025-04-10 | End: 2025-04-10

## 2025-04-10 RX ORDER — EPHEDRINE SULFATE 50 MG/ML
INJECTION, SOLUTION INTRAVENOUS
Status: DISCONTINUED | OUTPATIENT
Start: 2025-04-10 | End: 2025-04-10

## 2025-04-10 RX ORDER — DEXAMETHASONE SODIUM PHOSPHATE 4 MG/ML
INJECTION, SOLUTION INTRA-ARTICULAR; INTRALESIONAL; INTRAMUSCULAR; INTRAVENOUS; SOFT TISSUE
Status: DISCONTINUED | OUTPATIENT
Start: 2025-04-10 | End: 2025-04-10

## 2025-04-10 RX ORDER — ONDANSETRON 4 MG/1
8 TABLET, ORALLY DISINTEGRATING ORAL EVERY 8 HOURS PRN
Status: DISCONTINUED | OUTPATIENT
Start: 2025-04-10 | End: 2025-04-10 | Stop reason: HOSPADM

## 2025-04-10 RX ORDER — LIDOCAINE HYDROCHLORIDE 20 MG/ML
INJECTION, SOLUTION EPIDURAL; INFILTRATION; INTRACAUDAL; PERINEURAL
Status: DISCONTINUED | OUTPATIENT
Start: 2025-04-10 | End: 2025-04-10

## 2025-04-10 RX ORDER — MIDAZOLAM HYDROCHLORIDE 1 MG/ML
4 INJECTION, SOLUTION INTRAMUSCULAR; INTRAVENOUS
Status: DISCONTINUED | OUTPATIENT
Start: 2025-04-10 | End: 2025-04-10 | Stop reason: HOSPADM

## 2025-04-10 RX ORDER — FENTANYL CITRATE 50 UG/ML
100 INJECTION, SOLUTION INTRAMUSCULAR; INTRAVENOUS
Refills: 0 | Status: DISCONTINUED | OUTPATIENT
Start: 2025-04-10 | End: 2025-04-10 | Stop reason: HOSPADM

## 2025-04-10 RX ORDER — MORPHINE SULFATE 2 MG/ML
3 INJECTION, SOLUTION INTRAMUSCULAR; INTRAVENOUS
Refills: 0 | Status: DISCONTINUED | OUTPATIENT
Start: 2025-04-10 | End: 2025-04-10 | Stop reason: HOSPADM

## 2025-04-10 RX ORDER — AMOXICILLIN 250 MG
1 CAPSULE ORAL 2 TIMES DAILY
Status: DISCONTINUED | OUTPATIENT
Start: 2025-04-10 | End: 2025-04-10 | Stop reason: HOSPADM

## 2025-04-10 RX ORDER — FAMOTIDINE 20 MG/1
20 TABLET, FILM COATED ORAL 2 TIMES DAILY
Status: DISCONTINUED | OUTPATIENT
Start: 2025-04-10 | End: 2025-04-10 | Stop reason: HOSPADM

## 2025-04-10 RX ORDER — ONDANSETRON HYDROCHLORIDE 2 MG/ML
4 INJECTION, SOLUTION INTRAVENOUS ONCE AS NEEDED
Status: DISCONTINUED | OUTPATIENT
Start: 2025-04-10 | End: 2025-04-10 | Stop reason: HOSPADM

## 2025-04-10 RX ORDER — OXYCODONE HYDROCHLORIDE 5 MG/1
5 TABLET ORAL ONCE
Status: COMPLETED | OUTPATIENT
Start: 2025-04-10 | End: 2025-04-10

## 2025-04-10 RX ORDER — VANCOMYCIN HYDROCHLORIDE 1 G/20ML
INJECTION, POWDER, LYOPHILIZED, FOR SOLUTION INTRAVENOUS
Status: DISCONTINUED | OUTPATIENT
Start: 2025-04-10 | End: 2025-04-10 | Stop reason: HOSPADM

## 2025-04-10 RX ORDER — CEFAZOLIN 2 G/1
2 INJECTION, POWDER, FOR SOLUTION INTRAMUSCULAR; INTRAVENOUS
Status: DISCONTINUED | OUTPATIENT
Start: 2025-04-10 | End: 2025-04-10 | Stop reason: HOSPADM

## 2025-04-10 RX ORDER — KETAMINE HCL IN 0.9 % NACL 50 MG/5 ML
SYRINGE (ML) INTRAVENOUS
Status: DISCONTINUED | OUTPATIENT
Start: 2025-04-10 | End: 2025-04-10

## 2025-04-10 RX ORDER — SODIUM CHLORIDE 9 MG/ML
INJECTION, SOLUTION INTRAVENOUS CONTINUOUS
Status: DISCONTINUED | OUTPATIENT
Start: 2025-04-10 | End: 2025-04-10 | Stop reason: HOSPADM

## 2025-04-10 RX ORDER — MUPIROCIN 20 MG/G
OINTMENT TOPICAL
Status: DISCONTINUED | OUTPATIENT
Start: 2025-04-10 | End: 2025-04-10 | Stop reason: HOSPADM

## 2025-04-10 RX ORDER — ACETAMINOPHEN 500 MG
1000 TABLET ORAL EVERY 6 HOURS
Status: DISCONTINUED | OUTPATIENT
Start: 2025-04-10 | End: 2025-04-10 | Stop reason: HOSPADM

## 2025-04-10 RX ORDER — PROCHLORPERAZINE EDISYLATE 5 MG/ML
5 INJECTION INTRAMUSCULAR; INTRAVENOUS EVERY 6 HOURS PRN
Status: DISCONTINUED | OUTPATIENT
Start: 2025-04-10 | End: 2025-04-10 | Stop reason: HOSPADM

## 2025-04-10 RX ORDER — SODIUM CHLORIDE 0.9 % (FLUSH) 0.9 %
3 SYRINGE (ML) INJECTION
Status: DISCONTINUED | OUTPATIENT
Start: 2025-04-10 | End: 2025-04-10 | Stop reason: HOSPADM

## 2025-04-10 RX ORDER — PROPOFOL 10 MG/ML
VIAL (ML) INTRAVENOUS
Status: DISCONTINUED | OUTPATIENT
Start: 2025-04-10 | End: 2025-04-10

## 2025-04-10 RX ORDER — DEXTROSE MONOHYDRATE AND SODIUM CHLORIDE 5; .9 G/100ML; G/100ML
INJECTION, SOLUTION INTRAVENOUS CONTINUOUS
Status: DISCONTINUED | OUTPATIENT
Start: 2025-04-10 | End: 2025-04-10 | Stop reason: HOSPADM

## 2025-04-10 RX ORDER — ROPIVACAINE/EPI/CLONIDINE/KET 2.46-0.005
SYRINGE (ML) INJECTION
Status: DISCONTINUED | OUTPATIENT
Start: 2025-04-10 | End: 2025-04-10 | Stop reason: HOSPADM

## 2025-04-10 RX ORDER — METHOCARBAMOL 500 MG/1
1000 TABLET, FILM COATED ORAL ONCE AS NEEDED
Status: COMPLETED | OUTPATIENT
Start: 2025-04-10 | End: 2025-04-10

## 2025-04-10 RX ORDER — ACETAMINOPHEN 500 MG
1000 TABLET ORAL
Status: COMPLETED | OUTPATIENT
Start: 2025-04-10 | End: 2025-04-10

## 2025-04-10 RX ORDER — SODIUM CHLORIDE 0.9 % (FLUSH) 0.9 %
10 SYRINGE (ML) INJECTION
Status: DISCONTINUED | OUTPATIENT
Start: 2025-04-10 | End: 2025-04-10 | Stop reason: HOSPADM

## 2025-04-10 RX ORDER — PHENYLEPHRINE HYDROCHLORIDE 10 MG/ML
INJECTION INTRAVENOUS
Status: DISCONTINUED | OUTPATIENT
Start: 2025-04-10 | End: 2025-04-10

## 2025-04-10 RX ORDER — ROPIVACAINE/EPI/CLONIDINE/KET 2.46-0.005
SYRINGE (ML) INJECTION ONCE
Status: DISCONTINUED | OUTPATIENT
Start: 2025-04-10 | End: 2025-04-10 | Stop reason: HOSPADM

## 2025-04-10 RX ORDER — MUPIROCIN 20 MG/G
1 OINTMENT TOPICAL 2 TIMES DAILY
Status: DISCONTINUED | OUTPATIENT
Start: 2025-04-10 | End: 2025-04-10 | Stop reason: HOSPADM

## 2025-04-10 RX ORDER — PREGABALIN 75 MG/1
150 CAPSULE ORAL NIGHTLY
Status: DISCONTINUED | OUTPATIENT
Start: 2025-04-10 | End: 2025-04-10 | Stop reason: HOSPADM

## 2025-04-10 RX ORDER — ROPIVACAINE HYDROCHLORIDE 5 MG/ML
INJECTION, SOLUTION EPIDURAL; INFILTRATION; PERINEURAL
Status: COMPLETED | OUTPATIENT
Start: 2025-04-10 | End: 2025-04-10

## 2025-04-10 RX ADMIN — PHENYLEPHRINE HYDROCHLORIDE 200 MCG: 10 INJECTION INTRAVENOUS at 01:04

## 2025-04-10 RX ADMIN — SODIUM CHLORIDE, SODIUM GLUCONATE, SODIUM ACETATE, POTASSIUM CHLORIDE, MAGNESIUM CHLORIDE, SODIUM PHOSPHATE, DIBASIC, AND POTASSIUM PHOSPHATE: .53; .5; .37; .037; .03; .012; .00082 INJECTION, SOLUTION INTRAVENOUS at 02:04

## 2025-04-10 RX ADMIN — OXYCODONE 5 MG: 5 TABLET ORAL at 04:04

## 2025-04-10 RX ADMIN — ONDANSETRON 4 MG: 2 INJECTION INTRAMUSCULAR; INTRAVENOUS at 01:04

## 2025-04-10 RX ADMIN — ACETAMINOPHEN 1000 MG: 500 TABLET ORAL at 08:04

## 2025-04-10 RX ADMIN — FENTANYL CITRATE 50 MCG: 50 INJECTION, SOLUTION INTRAMUSCULAR; INTRAVENOUS at 09:04

## 2025-04-10 RX ADMIN — MIDAZOLAM 2 MG: 1 INJECTION INTRAMUSCULAR; INTRAVENOUS at 09:04

## 2025-04-10 RX ADMIN — PHENYLEPHRINE HYDROCHLORIDE 200 MCG: 10 INJECTION INTRAVENOUS at 12:04

## 2025-04-10 RX ADMIN — SODIUM CHLORIDE: 0.9 INJECTION, SOLUTION INTRAVENOUS at 11:04

## 2025-04-10 RX ADMIN — EPHEDRINE SULFATE 10 MG: 50 INJECTION INTRAVENOUS at 01:04

## 2025-04-10 RX ADMIN — PHENYLEPHRINE HYDROCHLORIDE 150 MCG: 10 INJECTION INTRAVENOUS at 12:04

## 2025-04-10 RX ADMIN — OXYCODONE 5 MG: 5 TABLET ORAL at 03:04

## 2025-04-10 RX ADMIN — MUPIROCIN: 20 OINTMENT TOPICAL at 08:04

## 2025-04-10 RX ADMIN — DEXAMETHASONE SODIUM PHOSPHATE 8 MG: 4 INJECTION, SOLUTION INTRAMUSCULAR; INTRAVENOUS at 12:04

## 2025-04-10 RX ADMIN — ROCURONIUM BROMIDE 50 MG: 10 INJECTION, SOLUTION INTRAVENOUS at 12:04

## 2025-04-10 RX ADMIN — Medication 20 MG: at 12:04

## 2025-04-10 RX ADMIN — METHOCARBAMOL 1000 MG: 500 TABLET ORAL at 03:04

## 2025-04-10 RX ADMIN — PROPOFOL 200 MG: 10 INJECTION, EMULSION INTRAVENOUS at 11:04

## 2025-04-10 RX ADMIN — TRANEXAMIC ACID 1000 MG: 100 INJECTION INTRAVENOUS at 02:04

## 2025-04-10 RX ADMIN — ROCURONIUM BROMIDE 20 MG: 10 INJECTION, SOLUTION INTRAVENOUS at 01:04

## 2025-04-10 RX ADMIN — ROPIVACAINE HYDROCHLORIDE 7.5 ML: 5 INJECTION EPIDURAL; INFILTRATION; PERINEURAL at 09:04

## 2025-04-10 RX ADMIN — TRANEXAMIC ACID 1000 MG: 100 INJECTION INTRAVENOUS at 12:04

## 2025-04-10 RX ADMIN — Medication 5 MG: at 01:04

## 2025-04-10 RX ADMIN — ROCURONIUM BROMIDE 50 MG: 10 INJECTION, SOLUTION INTRAVENOUS at 11:04

## 2025-04-10 RX ADMIN — SODIUM CHLORIDE 3 G: 9 INJECTION, SOLUTION INTRAVENOUS at 12:04

## 2025-04-10 RX ADMIN — FENTANYL CITRATE 50 MCG: 50 INJECTION, SOLUTION INTRAMUSCULAR; INTRAVENOUS at 11:04

## 2025-04-10 RX ADMIN — SUGAMMADEX 200 MG: 100 INJECTION, SOLUTION INTRAVENOUS at 02:04

## 2025-04-10 RX ADMIN — Medication: at 02:04

## 2025-04-10 RX ADMIN — SODIUM CHLORIDE, SODIUM GLUCONATE, SODIUM ACETATE, POTASSIUM CHLORIDE, MAGNESIUM CHLORIDE, SODIUM PHOSPHATE, DIBASIC, AND POTASSIUM PHOSPHATE: .53; .5; .37; .037; .03; .012; .00082 INJECTION, SOLUTION INTRAVENOUS at 12:04

## 2025-04-10 RX ADMIN — PHENYLEPHRINE HYDROCHLORIDE 100 MCG: 10 INJECTION INTRAVENOUS at 01:04

## 2025-04-10 RX ADMIN — PHENYLEPHRINE HYDROCHLORIDE 100 MCG: 10 INJECTION INTRAVENOUS at 12:04

## 2025-04-10 RX ADMIN — LIDOCAINE HYDROCHLORIDE 100 MG: 20 INJECTION, SOLUTION EPIDURAL; INFILTRATION; INTRACAUDAL; PERINEURAL at 11:04

## 2025-04-10 RX ADMIN — EPHEDRINE SULFATE 5 MG: 50 INJECTION INTRAVENOUS at 01:04

## 2025-04-10 RX ADMIN — GLYCOPYRROLATE 0.2 MG: 0.2 INJECTION, SOLUTION INTRAMUSCULAR; INTRAVENOUS at 11:04

## 2025-04-10 RX ADMIN — ACETAMINOPHEN 1000 MG: 500 TABLET ORAL at 03:04

## 2025-04-10 RX ADMIN — VANCOMYCIN HYDROCHLORIDE 1500 MG: 1.5 INJECTION, POWDER, LYOPHILIZED, FOR SOLUTION INTRAVENOUS at 08:04

## 2025-04-10 NOTE — PLAN OF CARE
Patient is AAO and VSS.  Tolerating PO and states pain is tolerable.  Dressing CDI.  Patient states they are ready for d/c.  IV removed.  Catheter tip intact.  Wife at bedside.  Discharge instructions reviewed and copy given to the patient and wife.  Questions answered.  Both verbalized understanding.  Medication delivered to bedside. Patient wheeled to car by Edda LOCKE

## 2025-04-10 NOTE — DISCHARGE INSTRUCTIONS
"     [address], [place]                                    [phone]             Postoperative Instructions for Shoulder Surgery               Your Surgery Included:   Open              Arthroscopic [] Instability Repair     [] Diagnostic   [] Rotator Cuff Repair     [] Lysis of Adhesions / Manipulation [] Distal Clavicle Resection    [] Debridement [] Biceps Tenodesis       [] Labrum  [] Rotator Cuff   [] Cartilage   [] Contracture Release    [] SLAP Repair   [] Fracture Fixation    [] Instability Repair  [] AC Joint Reconstruction      [] Rotator Cuff Repair [x] Joint Replacement     [] Subacromial Decompression / Bursectomy   [] Hemiarthroplasty  [] Total Shoulder    [] Biceps Tenotomy / Tenodesis    [x] Reverse Total Shoulder       [] Distal Clavicle Resection          [] Amniox Arthrocentesis    [] Contracture Release                 Call our office ([phone]) immediately or message through [facility] if you experience any of the following:      Excessive bleeding or pus like drainage at the incision site      Uncontrollable pain not relieved by pain medication      Excessive swelling or redness at the incision site      Fever above 101.5 degrees not controlled with Tylenol or Motrin      Shortness of Breath or severe calf pain      Any foul odor or blistering from the surgery site      Any "pop" with pain and/or deformity in the biceps muscle   FOR EMERGENCIES: [facility] is the best way to contact us. If on the weekend, page the  at [phone] who will direct your call appropriately.   1.   Pain Management: A cold therapy cuff, pain medications, local injections, TENs unit, and in some cases, regional anesthesia injections are used to manage your post-operative pain. The decision to use each of these options is based on their risks and benefits.    Medications: You were given one or more of the following medication prescriptions during your preoperative " "appointment. Follow the instructions on the bottles.     Narcotic Medication (usually Percocet, Norco, or Tramadol): Begin taking the medication before your shoulder starts to hurt. Some patients do not like to take any medication, but if you wait until your pain is severe before taking, you will be very uncomfortable for several hours waiting for the narcotic to work. Always take with food.    Nausea / Vomiting: For this issue, we prescribe Zofran or Phenergan, use this medication as directed.    Cold Therapy: You may have been sent home with a [facility] cold therapy unit and wrap for your shoulder. Fill with ice and water to the indicated fill line. You can use 20-30 minutes on then off, several times a day. This will help relieve pain and control swelling. Do not sleep with on.    Regional Anesthesia Injections (Blocks): You may have been given a regional nerve block either before or after surgery. This may make your entire shoulder numb for 2-5 days.                    2.   Diet: Eat a bland diet for the first day after surgery. Progress your diet as tolerated. Constipation may occur with Narcotic usage. We recommend Colace 100 mg twice a day while on narcotics.   3.   Activity: Spend most of the first 24 hours resting in bed, on the couch, or in a reclining chair. After the first 24 hours at home, slowly increase your activity level based on your symptoms. If a biceps tenodesis procedure was performed, avoid any aggressive lifting (over 5 lbs) or "corkscrew" motion for 6 weeks.                     4.   Dressing Change: (c) The soft, bulky dressing will be removed on the 3rd day after surgery. The Aquacel dressing (tan, long adhesive bandage) will remain on until the 1st post operative appointment. Do not remove. It is normal for some blood to be seen on the dressings. It is also normal for you to see apparent bruising on the skin around your incisions. If you are concerned by the drainage or the appearance of " your wound site, you can send a picture via [facility].   5.   Showering: (c) You may shower on the 3rd day after surgery. The Aquacel bandage is to be covered with saran wrap before showering. Do not submerge limb in any water for 4 weeks or until incisions completely healed.  Do not get bandage wet.   6.   Shoulder Sling (with/without Pillow attachment): You may have been sent home with a sling / pillow attachment holding your arm away from your body. You may remove the sling when changing clothes or bathing. Make sure to wear the sling while sleeping unless instructed otherwise. You may remove at rest or for exercises.           [x] You need to wear the sling with pillow for 24 hours a day for 6 weeks.   7.  Shoulder Exercises: Begin these exercises the first day after surgery in order to help you regain your motion and strength. You may do the following marked exercises for 2-5 mins five times a day:   [x] Shoulder shrugs - Shrug your shoulders up and down.   [] Pendulums - Bend forward allowing your arm to hang down in front of you. Gently swing your arm side-to-side and front to back.                                                                                                                               [] Passive abduction - Have a family member gently lift your arm away from your body bringing your elbow up to the level of your shoulder.                                                                                                                   [] Shoulder rotation - With your arm at your side, have a family member gently rotate your arm internally and externally.                                                                                                                  [x] Scapular retractions - (Squeeze shoulder blades together): Squeeze shoulder blades together while slightly pulling them down (do not shrug your shoulders upward); You can perform 10-15 reps, several times throughout the day,  when seated at your desk, driving in the car, etc.                                                                                                                     [] Pulley exercises - Put a towel or long sleeve shirt over the top of a door. Stand facing the door. Use your good arm to gently pull your operative arm up in front of you.   [x] Elbow motion - Straighten and bend your elbow.                                                                                                               [x] Ball squeezes - use ball attached to sling/pillow or soft (nerf) ball for  strengthening                                                                                                                 8.  Physical Therapy: Physical therapy is an essential component to your recovery from surgery. Your physical therapy will start in 3 days.   FIRST POSTOPERATIVE VISIT: As scheduled.

## 2025-04-10 NOTE — ANESTHESIA PREPROCEDURE EVALUATION
04/10/2025  Pre-operative evaluation for Procedure(s) (LRB):  ARTHROPLASTY, SHOULDER, TOTAL, REVERSE (Left)  ARTHROSCOPY, SHOULDER, W/ BICEPS TENODESIS (Left)    Clinton Rosenberg is a 74 y.o. male     Problem List[1]    Review of patient's allergies indicates:   Allergen Reactions    Iodine and iodide containing products Anaphylaxis    Naproxen Other (See Comments)     hallucinations  Hallucinations^  Hallucinations^    Hydrocodone-acetaminophen      Rash (skin)^    Iodine      Anaphylaxis^    Oxycodone-acetaminophen      Rash (skin)^       Medications Ordered Prior to Encounter[2]    Past Surgical History:   Procedure Laterality Date    APPENDECTOMY  1986    bone spur Right     COLONOSCOPY      COLONOSCOPY N/A 10/26/2023    Procedure: COLONOSCOPY;  Surgeon: Argelia Altamirano MD;  Location: [facility] ENDO;  Service: Endoscopy;  Laterality: N/A;  Referred by: Dr. Michael Gonzalez  BT/Clearance: ok to hold Plavix 5 days per Dr Dawn  Prep: golytely  Route instructions sent: [id number]  8/3 proc jessica to 10/26, pt has prep and instr  10/19- pre call complete.  DBM    ESOPHAGOGASTRODUODENOSCOPY N/A 8/2/2024    Procedure: EGD (ESOPHAGOGASTRODUODENOSCOPY);  Surgeon: Argelia Altamirano MD;  Location: [facility] ENDO;  Service: Endoscopy;  Laterality: N/A;  Dr. Porter, Urgent EGD, abdominal pain, standard prep, Instr to portal. Plavix hold pending.  ok to hold Plavix 5 days per Dr Dawn  7/26/24- lvm/portal for pc. DBM  7/30 pt confirmed. has been holding plavix since 7/27 cf  7/30/24- pc complete. DBM    hand trauma Right     pencil     heart stent      LEFT HEART CATHETERIZATION Left 9/20/2019    Procedure: Left heart cath 12 pm start, R rad access;  Surgeon: Brad Bahena MD;  Location: [facility] CATH LAB;  Service: Cardiology;  Laterality: Left;  RN PREOP 9/13/2019  NEEDS IODINE PREMED    LEFT HEART  CATHETERIZATION Left 2020    Procedure: Left heart cath 730am start, R rad access;  Surgeon: Brad Bahena MD;  Location: [facility] CATH LAB;  Service: Cardiology;  Laterality: Left;  RN PREOP 2020, COVID NEGATIVE---       EKD Echo:  Results for orders placed or performed during the hospital encounter of 03/15/18   2D Echo w/ Color Flow Doppler    Collection Time: 03/15/18  3:00 PM   Result Value Ref Range    EF + QEF 65 55 - 65    Diastolic Dysfunction No     Est. PA Systolic Pressure 10.76     Pericardial Effusion NONE     Mitral Valve Mobility NORMAL            Pre-op Assessment    I have reviewed the Patient Summary Reports.     I have reviewed the Nursing Notes. I have reviewed the NPO Status.   I have reviewed the Medications.     Review of Systems  Anesthesia Hx:             Denies Family Hx of Anesthesia complications.    Denies Personal Hx of Anesthesia complications.                    Cardiovascular:  Exercise tolerance: good   Hypertension  Past MI CAD      Angina       Denies SIMS.                              Pulmonary:    Denies COPD.  Denies Asthma.    Denies Recent URI. Sleep Apnea                Renal/:  Chronic Renal Disease, CKD                Hepatic/GI:     GERD                Neurological:    Denies CVA.    Denies Seizures.                                Endocrine:  Diabetes, well controlled, type 2 Hypothyroidism          Psych:   anxiety depression                Physical Exam  General: Well nourished, Cooperative, Alert and Oriented    Airway:  Mallampati: II / I  Mouth Opening: Normal  TM Distance: Normal  Tongue: Normal  Neck ROM: Normal ROM    Dental:  Intact    Chest/Lungs:  Clear to auscultation, Normal Respiratory Rate    Heart:  Rate: Normal  Rhythm: Regular Rhythm        Anesthesia Plan  Type of Anesthesia, risks & benefits discussed:    Anesthesia Type: Gen Natural Airway, Regional, Spinal  Intra-op Monitoring Plan: Standard ASA Monitors  Post Op Pain  Control Plan: multimodal analgesia and peripheral nerve block  Induction:  IV  Informed Consent: Informed consent signed with the Patient and all parties understand the risks and agree with anesthesia plan.  All questions answered. Patient consented to blood products? Yes  ASA Score: 2  Day of Surgery Review of History & Physical: H&P Update referred to the surgeon/provider.    Ready For Surgery From Anesthesia Perspective.     .           [1]   Patient Active Problem List  Diagnosis    Mixed hyperlipidemia long term DAPT    Essential hypertension    Gastroesophageal reflux disease without esophagitis    Benign non-nodular prostatic hyperplasia with lower urinary tract symptoms    Vitamin D deficiency    Hernia of abdominal wall    Coronary artery disease of native artery of native heart with stable angina pectoris; 2021 plan is long term DAPT    Anxiety and depression with assoc memory loss; seen by neuro 2015, negative workup    Hypothyroidism    Class 1 obesity with body mass index (BMI) of 32.0 to 32.9 in adult    Aortic ectasia    JAYLEN (obstructive sleep apnea)    Peripheral vascular disease with LE US 6/2017 and carotid US     Aortic root dilatation on TTE 8/2019    History of concussion 1/2018 head CT negative    Venous insufficiency    History of shingles 5/2018    Tubular adenoma of colon 2/2009; 10/26/23 colonscopy 3 mm sigmoid HP    Allergy to iodine    Nuclear sclerosis of both eyes    History of COVID-19    Chronic midline low back pain without sciatica    Refractive error    Bradycardia    Diabetes mellitus type 2 without retinopathy    Posterior vitreous detachment, right    Vitreomacular adhesion, left    Age-related nuclear cataract of both eyes    Knee injury, initial encounter    Renal insufficiency    Chronic left shoulder pain   [2]   No current facility-administered medications on file prior to encounter.     Current Outpatient Medications on File Prior to Encounter   Medication Sig Dispense  Refill    amLODIPine (NORVASC) 5 MG tablet Take 1 tablet (5 mg total) by mouth once daily. 90 tablet 1    aspirin (ECOTRIN) 81 MG EC tablet Take 1 tablet (81 mg total) by mouth once daily. 90 tablet 3    atorvastatin (LIPITOR) 80 MG tablet Take 1 tablet (80 mg total) by mouth every evening. 90 tablet 3    clopidogreL (PLAVIX) 75 mg tablet Take 1 tablet (75 mg total) by mouth once daily. 90 tablet 3    furosemide (LASIX) 20 MG tablet Take 1 tablet (20 mg total) by mouth once daily. 90 tablet 3    gabapentin (NEURONTIN) 300 MG capsule Take 1 capsule (300 mg total) by mouth every evening. 90 capsule 3    isosorbide mononitrate (IMDUR) 120 MG 24 hr tablet Take 2 tablets (240 mg total) by mouth once daily. 180 tablet 3    levothyroxine (SYNTHROID) 75 MCG tablet TAKE 1 TABLET BEFORE BREAKFAST 90 tablet 3    metFORMIN (GLUCOPHAGE-XR) 500 MG ER 24hr tablet Take 1 tablet (500 mg total) by mouth once daily. 90 tablet 3    metoprolol succinate (TOPROL-XL) 25 MG 24 hr tablet Take 1 tablet (25 mg total) by mouth once daily. 90 tablet 1    nystatin (MYCOSTATIN) powder Apply topically 4 (four) times daily as needed (to keep penis/scrotum dry). 15 g 1    olmesartan (BENICAR) 40 MG tablet Take 1 tablet (40 mg total) by mouth once daily. 90 tablet 3    pantoprazole (PROTONIX) 40 MG tablet Take 1 tablet (40 mg total) by mouth once daily. 90 tablet 3    tamsulosin (FLOMAX) 0.4 mg Cap Take 1 capsule (0.4 mg total) by mouth once daily. 90 capsule 3    vitamin D 1000 units Tab Take 1 tablet (1,000 Units total) by mouth once daily. 90 tablet 3    ciclopirox (LOPROX) 0.77 % Crea Apply topically 2 (two) times daily. 90 g 4    clotrimazole-betamethasone 1-0.05% (LOTRISONE) cream Apply topically 2 (two) times daily. 45 g 0    ketoconazole (NIZORAL) 2 % cream Apply topically 2 (two) times daily. for 14 days 60 g 0    KRILL/OM3/DHA/EPA/OM6/LIP/ASTX (KRILL OIL, OMEGA 3 AND 6, ORAL) Take by mouth. (Patient not taking: Reported on 4/7/2025)       nitroGLYCERIN (NITROSTAT) 0.4 MG SL tablet PLACE 1 TABLET (0.4 MG TOTAL) UNDER THE TONGUE EVERY 5 (FIVE) MINUTES AS NEEDED FOR CHEST PAIN. 50 tablet 3    semaglutide (OZEMPIC) 1 mg/dose (4 mg/3 mL) Inject 1 mg into the skin every 7 days. 3 mL 5    VASCEPA 1 gram Cap Take 2 capsules (2,000 mg total) by mouth 2 (two) times a day. 360 capsule 3

## 2025-04-10 NOTE — ANESTHESIA PROCEDURE NOTES
Intubation    Date/Time: 4/10/2025 11:57 AM    Performed by: Martin Carrillo CRNA  Authorized by: Jefry Oden MD    Intubation:     Induction:  Intravenous    Intubated:  Postinduction    Mask Ventilation:  Easy with oral airway    Attempts:  1    Attempted By:  CRNA    Method of Intubation:  Video laryngoscopy    Blade:  Melchor 3    Laryngeal View Grade: Grade I - full view of cords      Difficult Airway Encountered?: No      Complications:  None    Airway Device:  Oral endotracheal tube    Airway Device Size:  7.5    Style/Cuff Inflation:  Cuffed (inflated to minimal occlusive pressure)    Inflation Amount (mL):  6    Tube secured:  23    Secured at:  The lips    Placement Verified By:  Capnometry    Complicating Factors:  Beard and large/floppy epiglottis    Findings Post-Intubation:  BS equal bilateral and atraumatic/condition of teeth unchanged

## 2025-04-10 NOTE — OP NOTE
"[facility] - [facility] ([facility])  Operative Note      Date of Procedure: 4/10/2025     Procedure: Procedure(s) (LRB):  ARTHROPLASTY, SHOULDER, TOTAL, REVERSE (Left)  ARTHROSCOPY, SHOULDER, W/ BICEPS TENODESIS (Left)     Surgeons and Role:     * Eugenio Reese MD - Primary    Assisting Surgeon: None    Pre-Operative Diagnosis: Traumatic rotator cuff tear [S46.019A]  Biceps tendonitis [M75.20]    Post-Operative Diagnosis: Post-Op Diagnosis Codes:     * Traumatic rotator cuff tear [S46.019A]     * Biceps tendonitis [M75.20]    Anesthesia: General    Operative Findings (including complications, if any): rotator cuff arthropathy    Description of Technical Procedures:   PREOPERATIVE DIAGNOSIS: Left Tear, Rotator Cuff, Non-Tramatic M66.829, M19.012  POSTOPERATIVE DIAGNOSES: Left Tear, Rotator Cuff, Non-Tramatic M66.829 , M19.012  OPERATIVE PROCEDURES PERFORMED:Left  Left Shoulder:    58673 Total Shoulder (glenoid and proximal humeral) Replacement   2.   05690 Biceps tenodesis    ANESTHESIA:General endotracheal anesthesia {Blank single:75026::"Exparel mixture (20 mL of Exparel, 30 mL of 0.5% Marcaine with epinephrine and 10 mL of normal saline","Ropivicaine Mixture (0.5% ropivicaine (30 ml), clonipin, toradol)], Interscalene block with cathter     FLUIDS: 1800 ml     URINE OUTPUT: 0 ml     ESTIMATED BLOOD LOSS: 100 mL     COMPLICATIONS: none      CONDITION ON RETURN TO RECOVERY ROOM: good     ARTICULAR CARTILAGE LESION(S):  Glenoid: ICRS Grade 4A   Size: 3 x 4 cm     Humeral head: ICRS Grade 4A  Size: 4 x 4 cm     EXAMINATION UNDER ANESTHESIA:   Forward Flexion 60 degrees  ER side 30 degrees  Abduction 60 degrees  ER in Abduction 45 degrees  IR in Abduction 20 degrees  Anterior stability 1+  Posterior stability 1+  Inferior sulcus sign side 1+  Inferior sulcus sign in ER 0        IMPLANTS UTILIZED: Trell  ReUnion RSA  28 mm Baseplate, 6.5 x   26 mm center screw, 4.5 x 24  mm, 4.5 x 24  mm, 4.5 x 36  mm, 4.5 x 32  mm " ReUnion RSA peripheral screws, ReUnion RSA Humeral insert 36 mm x 4 mm, ReUnion Press-Fit Humeral Stem 15  mm x 96 mm, ReUnion RSA Concentric Glenosphere 36 mm x 6  mm     DESCRIPTION OF PROCEDURE: The patient was brought into the Operating Room, placed in supine position. Upon application interscalene block  with catheter in the preoperative hold area, the patient underwent General endotracheal anesthesia and airway was stabilized. The patient was given the appropriate dose of antibiotics based on body weight. Time-out was utilized to verify right side as the operative site. Both lower extremities were placed in comfortable position and padded appropriately.The nonoperative upper extremity was placed in a well-padded well arm hargrove. Beach chair was used to maintain appropriate position in the area of concern. The operative shoulder was then prepped and draped in a sterile fashion. Cervical spine was carefully stabilized avoiding all pressure points     An anterior based incision was carried down through the skin down to the fat and fascia. The cephalic vein was visualized. Deltopectoral interval was entered. The cephalic vein was dissected and taken laterally. The edge of the conjoined tendon was visualized. Deep retractors applied to the area of concern. We then released the subscapularis off the lesser tuberosity area leaving a 1 cm cuff of tissue for later repair; The Biceps tendon was visualized with evidence of tenosynovitis and was treated with a release and open biceps tenodesis with tranosseous sutures at completion of the case. We turned our attention to the glenohumeral joint where osteotome was used to remove osteophytic formation along the base of the anterior, inferior and posterior aspect of the humeral head. With visualization and use of alignment guide from the [employer]en[employer] set, we then created the osteotomy along the proximal humeral head region using the oscillating saw and subsequently the osteotome  based on a 30 degree guide. Attention was then turned to the humeral region. The proxmial protective sleeve was removed. We then reamed sequentially up to a size 12 reamer and then broached up to a size 15 broach. Based on the final broach size, we felt we needed to perform a final trial with a size 15 anatomical Reverse shoulder stem.      Retractors were then placed anteriorly, inferiorly and posteriorly along the glenoid surface to further visualize the glenoid region. Osteophytes were then removed along this glenoid area with 0.5 inch curved osteotome and a complete release of the inferior and posterior capsule was performed with care to stay clear of the underlying axillary nerve and the medial brachial plexus regions. We then placed a central pin in the area of concern and reamed the glenoid sequentially down to a healthy bleeding bone level. Consideration of preexistent asymmetric wear was performed to verify that reaming occurred to correct the deformity back to a neutral position. The guide based on the preoperative CT scan was used to place the guide pin as well. The guide for the glenoid implant placement was placed over the central pin and we applied the guide for the glenoid base plate for the reverse replacement. We drilled superiorly and inferiorly. After reaming the area to create healing the base plate was applied after predrilling the central screw. It was fit in excellent fashion. As a result, the trial was removed. and we drilled the glenoid region and then applied the glenoshpere baseplate. The proximal and distal drill holes were applied. A proximal 36 mm screw was applied with the central screw while the 20 mm inferior screw was applied. We placed the anterior and posterior screws. The 36 mm glenosphere was applied with modular technique.      The trial humeral component was removed. We then opened the actual components sterilely on the back table, assembled them based on the alignment from  the trial implant and then impacted them into position with press-fit technique. Excellent fixation was achieved. Final removal of bone formation alongthe extremity was performed with a rongeur. We then irrigated the area of concern with a total of 3 liters of antibiotic solution placed throught the region with a pulsavac. We then closed the  #1 Vicryl sutures placed in figure-of-eight fashion. We assessed for stability demonstrating excellent rotation with the arm at the side 45 degrees. Forward flexion was found to be intact. Good stability was noted in all directions. Further irrigation performed along the area of concern. We then closed the deltopectoral interval with a series of #1 Vicryl sutures placed in figure-of-eight fashion. Subcutaneous tissues were closed with 3-0 Vicryl sutures placed in inverted fashion. Skin was closed with running 3-0 Monocryl sutures placed in subcuticular fashion along with application of Dermabond ointment and an aquacel bandage. Prior to full closure we injected the anterior capsule and intramuscular tissues with the ASHLYN mixture using a 21-gauge needle prior to closure of the skin structures for a total of 50 cc of the mixture. We applied Xeroform following closure of skin with application of gauze, ABD pads and Medipore tape along with coolingunit, sling and abduction pillow as well as sterile electrode pads peripherallyaround the shoulder. The patient was allowed to recover from the anestheticwith the arm in a sling and abduction pillow. The patient was allowed to recover from the anesthetic and taken to the Recovery Room in stable condition. The patient was extubated prior to transfer. At the completion case, all instrument and sponge counts were correct.     NOTE: I was present and scrubbed for the key portions of the procedure.        PHYSICAL THERAPY:  Protocol: Reverse total shoulder  The patient should begin physical therapy on postoperative day # 3-5 and will be  advanced to outpatient therapy      Range of Motion:limited to 30 degrees ER side; F. Flexion 90 degrees Abduction for axillary care as tolerated     Sling and pillow should be maintained at all times while not performing rehabilitation for 6 weeks and then discontinued at 6 weeks May take arm out of sling for elbow and wrist AAROM/PROM as tolerated.     Hold Pendulum exercises and aggressive ROM at shoulder level for 4-6 weeks protecting repair.     Discharge home when stable  Follow-up as Scheduled  Activity per instruction sheet and above  Condition Stable        Significant Surgical Tasks Conducted by the Assistant(s), if Applicable: preparation and closure    Estimated Blood Loss (EBL): 100 mL           Implants:   Implant Name Type Inv. Item Serial No.  Lot No. LRB No. Used Action   WIRE FIXATION REUN NIT PILT - [id number]  WIRE FIXATION REUN NIT PILT  JOSE[employer]ics HAIR. [id number] Left 1 Implanted and Explanted   CUP HUM REUNION RFX TRAIL ADPT - [id number]  CUP HUM REUNION RFX TRAIL ADPT  JOSE Gamelet HAIR. [id number] Left 1 Implanted and Explanted   BASEPLATE REUNION OTTONIEL 50L94TT - [id number]  BASEPLATE REUNION OTTONIEL 57E04DE  JOSE Gamelet HAIR. [id number] Left 1 Implanted   SCREW BONE CENTER 28X6.5MM - [id number]  SCREW BONE CENTER 28X6.5MM  JOSE Gamelet HAIR. [id number] Left 1 Implanted   SCREW BONE TSA 4.5X32MM - [id number]  SCREW BONE TSA 4.5X32MM  JOSE Gamelet HAIR. [id number] Left 1 Implanted   SCREW BONE TSA 4.5X36MM - [id number]  SCREW BONE TSA 4.5X36MM  JOSE Gamelet HAIR. [id number] Left 1 Implanted   SCREW BONE GLENOID 4.5X24MM - [id number]  SCREW BONE GLENOID 4.5X24MM  JOSE Gamelet HAIR. [id number] Left 1 Implanted   SCREW BONE GLENOID 4.5X24MM - [id number]  SCREW BONE GLENOID 4.5X24MM  JOSE Gamelet HAIR. [id number] Left 1 Implanted   COMPONENT GLENOSPHERE 36X6MM -  [id number]  COMPONENT GLENOSPHERE 36X6MM  JOS[employer]vo [employer]stream HAIR. [id number] Left 1 Implanted   CUP HUMERAL REUN RSA 36X4MM - [id number]  CUP HUMERAL REUN RSA 36X4MM  JOSE LiveGO HAIR. [id number] Left 1 Implanted   INSERT REUNION X3 HUM 4X36MM - [id number]  INSERT REUNION X3 HUM 4X36MM  JOSE LiveGO HAIR. [id number] Left 1 Implanted   STEM REUNION HUMERAL 99F77HS - [id number]  STEM REUNION HUMERAL 13N71OJ  JOSE LiveGO HAIR. [id number] Left 1 Implanted       Specimens:   Specimen (24h ago, onward)      None           * No specimens in log *           Condition: Good    Disposition: PACU - hemodynamically stable.    Attestation: I was present and scrubbed for the key portions of the procedure.    Discharge Note    OUTCOME: Patient tolerated treatment/procedure well without complication and is now ready for discharge.    DISPOSITION: Home or Self Care    FINAL DIAGNOSIS:  <principal problem not specified>    FOLLOWUP: In clinic    DISCHARGE INSTRUCTIONS:    Discharge Procedure Orders   Discharge diet   Order Comments: Eat a bland diet for the first day after surgery. Progress your diet as tolerated. Constipation may occur with Narcotic usage, contact our office if you are experiencing constipation.     Notify physician   Order Comments: Call the doctor's office immediately if you experience any of the following:  - Excessive bleeding or pus like drainage at the incision site  - Uncontrollable pain not relieved by pain medication  - Excessive swelling or redness at the incision site  - Fever above 101.5 degrees not controlled with Tylenol or Motrin  - Shortness of Breath  - Any foul odor or blistering from the surgery site  - Persistent nausea and vomiting or diarrhea  - Difficulty breathing or increased cough  - Severe persistent headache  - Persistent dizziness, light-headedness, or visual disturbances  - Increased confusion or weakness     Change dressing -  Aquacel   Order Comments: Keep in place until clinic visit     Discharge instructions - Pain Management Information   Order Comments: Pain Management: A cold therapy cuff, pain medications, local injections, and in some cases, regional anesthesia injections are used to manage your post-operative pain. The decision to use each of these options is based on their risks and benefits.  Medications: You were given one or more of the following medication prescriptions before leaving the hospital. Have the prescriptions filled at a pharmacy on your way home and follow the instructions on the bottles. If you need a refill, please call your pharmacy.   Narcotic Medication (usually Vicodin ES, Lortab, Percocet or Nucynta): Begin taking the medication before your shoulder starts to hurt. Some patients do not like to take any medication, but if you wait until your pain is severe before taking, you will be very uncomfortable for several hours waiting for the narcotic to work. Always take with food.  Nausea / Vomiting: For this issue, we prescribe Phenergan, use this medication as directed.  Cold Therapy: You may have been sent home with a [facility] cold therapy unit and wrap for your shoulder. Fill with ice and water to the indicated fill line and use throughout the day for the first two days and then as needed to help relieve pain and control swelling.   Regional Anesthesia Injections (Blocks): You may have been given a regional nerve block either before or after surgery. This may make your entire shoulder numb for 24-36 hours.     Showering - Aquacel   Order Comments: Sponge bath is recommended to upper body.  Do not get bandage wet.     1:  Activity   Order Comments: Exercises to be performed 5 times per day for 5 minutes each time; You may have been sent home with a sling / pillow attachment holding your arm away from your body. You may remove the sling when changing clothes or bathing. Make sure to wear the sling while  sleeping unless instructed otherwise. You may remove at rest or for exercises; no abduction, no external rotation, non weight bearing, and no active ROM on shoulder.   Exercises:   Pendulums: Bend forward allowing your arm to hang down in front of you. Gently swing your arm side-to-side and front to back  Elbow Motion: Straighten and bend your elbow.  Ball Squeezes: Use ball attached to sling/pillow or soft (nerf) ball for  strengthening  Shoulder Shrugs: Shrug your shoulders up and down.  Shoulder Retractions: (Squeeze shoulder blades together): Squeeze shoulder blades together while slightly pulling them down (do not shrug your shoulders upward); You can perform 10-15 reps, several times throughout the day, when seated at your desk, driving in the car, etc.

## 2025-04-10 NOTE — OPERATIVE NOTE ADDENDUM
Certification of Assistant at Surgery       Surgery Date: 4/10/2025     Participating Surgeons:  Surgeons and Role:     * Eugenio Reese MD - Primary    Procedures:  Procedure(s) (LRB):  ARTHROPLASTY, SHOULDER, TOTAL, REVERSE (Left)  ARTHROSCOPY, SHOULDER, W/ BICEPS TENODESIS (Left)    Assistant Surgeon's Certification of Necessity:  I understand that section 1842 (b) (6) (d) of the Social Security Act generally prohibits Medicare Part B reasonable charge payment for the services of assistants at surgery in teaching hospitals when qualified residents are available to furnish such services. I certify that the services for which payment is claimed were medically necessary, and that no qualified resident was available to perform the services. I further understand that these services are subject to post-payment review by the Medicare carrier.      John Mcdonald MD    04/10/2025  4:58 PM

## 2025-04-10 NOTE — TRANSFER OF CARE
"Anesthesia Transfer of Care Note    Patient: Clinton Rosenberg    Procedure(s) Performed: Procedure(s) (LRB):  ARTHROPLASTY, SHOULDER, TOTAL, REVERSE (Left)  ARTHROSCOPY, SHOULDER, W/ BICEPS TENODESIS (Left)    Patient location: PACU    Anesthesia Type: general    Transport from OR: Transported from OR on 6-10 L/min O2 by face mask with adequate spontaneous ventilation    Post pain: adequate analgesia    Post assessment: no apparent anesthetic complications and tolerated procedure well    Post vital signs: stable    Level of consciousness: lethargic, responds to stimulation and sedated    Nausea/Vomiting: no nausea/vomiting    Complications: none    Transfer of care protocol was followed    Last vitals: Visit Vitals  /65 (BP Location: Right arm, Patient Position: Lying)   Pulse 70   Temp 36.8 °C (98.2 °F)   Resp (!) 24   Ht 5' 10" (1.778 m)   Wt 102.1 kg (225 lb)   SpO2 96%   BMI 32.28 kg/m²     "

## 2025-04-10 NOTE — BRIEF OP NOTE
[facility] - [facility] (Hospital)  Brief Operative Note    Surgery Date: 4/10/2025     Surgeons and Role:     * Eugenio Reese MD - Primary    Assisting Surgeon: MD Ludin Trammell pa-c    Pre-op Diagnosis:  Traumatic rotator cuff tear [S46.019A]  Biceps tendonitis [M75.20]    Post-op Diagnosis:  Post-Op Diagnosis Codes:     * Traumatic rotator cuff tear [S46.019A]     * Biceps tendonitis [M75.20]    Procedure(s) (LRB):  ARTHROPLASTY, SHOULDER, TOTAL, REVERSE (Left)  ARTHROSCOPY, SHOULDER, W/ BICEPS TENODESIS (Left)    Anesthesia: General    Operative Findings: OA GH, supraspinatus tear; see op note for details.    Estimated Blood Loss: 100 mL         Specimens:   Specimen (24h ago, onward)      None            * No specimens in log *        Discharge Note    OUTCOME: Patient tolerated treatment/procedure well without complication and is now ready for discharge.    DISPOSITION: Home or Self Care    FINAL DIAGNOSIS:  <principal problem not specified>    FOLLOWUP: In clinic    DISCHARGE INSTRUCTIONS:    Discharge Procedure Orders   Discharge diet   Order Comments: Eat a bland diet for the first day after surgery. Progress your diet as tolerated. Constipation may occur with Narcotic usage, contact our office if you are experiencing constipation.     Notify physician   Order Comments: Call the doctor's office immediately if you experience any of the following:  - Excessive bleeding or pus like drainage at the incision site  - Uncontrollable pain not relieved by pain medication  - Excessive swelling or redness at the incision site  - Fever above 101.5 degrees not controlled with Tylenol or Motrin  - Shortness of Breath  - Any foul odor or blistering from the surgery site  - Persistent nausea and vomiting or diarrhea  - Difficulty breathing or increased cough  - Severe persistent headache  - Persistent dizziness, light-headedness, or visual disturbances  - Increased confusion or weakness     Change  dressing - Aquacel   Order Comments: Keep in place until clinic visit     Discharge instructions - Pain Management Information   Order Comments: Pain Management: A cold therapy cuff, pain medications, local injections, and in some cases, regional anesthesia injections are used to manage your post-operative pain. The decision to use each of these options is based on their risks and benefits.  Medications: You were given one or more of the following medication prescriptions before leaving the hospital. Have the prescriptions filled at a pharmacy on your way home and follow the instructions on the bottles. If you need a refill, please call your pharmacy.   Narcotic Medication (usually Vicodin ES, Lortab, Percocet or Nucynta): Begin taking the medication before your shoulder starts to hurt. Some patients do not like to take any medication, but if you wait until your pain is severe before taking, you will be very uncomfortable for several hours waiting for the narcotic to work. Always take with food.  Nausea / Vomiting: For this issue, we prescribe Phenergan, use this medication as directed.  Cold Therapy: You may have been sent home with a [facility] cold therapy unit and wrap for your shoulder. Fill with ice and water to the indicated fill line and use throughout the day for the first two days and then as needed to help relieve pain and control swelling.   Regional Anesthesia Injections (Blocks): You may have been given a regional nerve block either before or after surgery. This may make your entire shoulder numb for 24-36 hours.     Showering - Aquacel   Order Comments: Sponge bath is recommended to upper body.  Do not get bandage wet.     1:  Activity   Order Comments: Exercises to be performed 5 times per day for 5 minutes each time; You may have been sent home with a sling / pillow attachment holding your arm away from your body. You may remove the sling when changing clothes or bathing. Make sure to wear the  sling while sleeping unless instructed otherwise. You may remove at rest or for exercises; no abduction, no external rotation, non weight bearing, and no active ROM on shoulder.   Exercises:   Pendulums: Bend forward allowing your arm to hang down in front of you. Gently swing your arm side-to-side and front to back  Elbow Motion: Straighten and bend your elbow.  Ball Squeezes: Use ball attached to sling/pillow or soft (nerf) ball for  strengthening  Shoulder Shrugs: Shrug your shoulders up and down.  Shoulder Retractions: (Squeeze shoulder blades together): Squeeze shoulder blades together while slightly pulling them down (do not shrug your shoulders upward); You can perform 10-15 reps, several times throughout the day, when seated at your desk, driving in the car, etc.

## 2025-04-10 NOTE — ANESTHESIA PROCEDURE NOTES
Interscalene Continuous Nerve Catheter    Patient location during procedure: pre-op   Block not for primary anesthetic.  Reason for block: at surgeon's request and post-op pain management   Post-op Pain Location: L shoulder pain   Start time: 4/10/2025 9:15 AM  Timeout: 4/10/2025 9:15 AM   End time: 4/10/2025 9:18 AM    Staffing  Authorizing Provider: Jefry Oden MD  Performing Provider: Jefry Oden MD    Staffing  Performed by: Jefry Oden MD  Authorized by: Jefry Oden MD    Preanesthetic Checklist  Completed: patient identified, IV checked, site marked, risks and benefits discussed, surgical consent, monitors and equipment checked, pre-op evaluation and timeout performed  Peripheral Block  Patient position: sitting  Prep: ChloraPrep and site prepped and draped  Patient monitoring: heart rate, cardiac monitor, continuous pulse ox, continuous capnometry and frequent blood pressure checks  Block type: interscalene  Laterality: left  Injection technique: continuous  Needle  Needle type: Tuohy   Needle gauge: 18 G  Needle length: 2 in  Needle localization: anatomical landmarks and ultrasound guidance  Catheter type: non-stimulating  Catheter size: 20 G  Test dose: lidocaine 1.5% with Epi 1-to-200,000 and negative   -ultrasound image captured on disc.  Assessment  Injection assessment: negative aspiration, negative parasthesia and local visualized surrounding nerve  Paresthesia pain: none  Heart rate change: no  Slow fractionated injection: yes  Pain Tolerance: comfortable throughout block and no complaints  Medications:    Medications: ropivacaine (NAROPIN) injection 0.5% - Perineural   7.5 mL - 4/10/2025 9:18:00 AM    Additional Notes  VSS.  [facility] RN monitoring vitals throughout procedure.  Patient tolerated procedure well.     Diluted 7.5 cc of 0.5% Ropivacaine to 15 cc of 0.25% Ropivacaine.  Added 1:300k epi.

## 2025-04-11 NOTE — ANESTHESIA POST-OP PAIN MANAGEMENT
"Acute Pain Service Progress Note    4/11/2025 1216    Patient contacted regarding CADD infusion follow up, spoke with his daughter. Reports that pain has been well controlled with the infusion pump. Denies signs of local anesthetic toxicity. PNC dressing remains clean, dry, and intact. All questions answered. Reminded patient that the infusion should be discontinued and PNC removed whenever the "infusion complete" alert is seen on the display screen or by POD 5 (4/15/25) at 12pm, whichever comes first. Encouraged patient to call the CADD 24/7 support line at [phone] or the [facility] line at [phone] for any CADD related questions/issues. Verbalizes understanding.            "

## 2025-04-14 NOTE — ANESTHESIA POSTPROCEDURE EVALUATION
Anesthesia Post Evaluation    Patient: Clinton Rosenberg    Procedure(s) Performed: Procedure(s) (LRB):  ARTHROPLASTY, SHOULDER, TOTAL, REVERSE (Left)  ARTHROSCOPY, SHOULDER, W/ BICEPS TENODESIS (Left)    Final Anesthesia Type: general      Patient location during evaluation: PACU  Patient participation: Yes- Able to Participate  Level of consciousness: awake and alert and oriented  Post-procedure vital signs: reviewed and stable  Pain management: adequate  Airway patency: patent    PONV status at discharge: No PONV  Anesthetic complications: no      Cardiovascular status: blood pressure returned to baseline and hemodynamically stable  Respiratory status: unassisted, room air and spontaneous ventilation  Hydration status: euvolemic  Follow-up not needed.              Vitals Value Taken Time   /67 04/10/25 16:47   Temp 36.8 °C (98.2 °F) 04/10/25 14:42   Pulse 65 04/10/25 16:49   Resp 16 04/10/25 16:48   SpO2 94 % 04/10/25 16:49   Vitals shown include unfiled device data.      Event Time   Out of Recovery 16:00:00         Pain/Amna Score: No data recorded

## 2025-04-15 ENCOUNTER — CLINICAL SUPPORT (OUTPATIENT)
Dept: REHABILITATION | Facility: HOSPITAL | Age: 75
End: 2025-04-15
Payer: MEDICARE

## 2025-04-15 DIAGNOSIS — G89.29 CHRONIC LEFT SHOULDER PAIN: Primary | ICD-10-CM

## 2025-04-15 DIAGNOSIS — S46.012D TRAUMATIC COMPLETE TEAR OF LEFT ROTATOR CUFF, SUBSEQUENT ENCOUNTER: ICD-10-CM

## 2025-04-15 DIAGNOSIS — M25.512 CHRONIC LEFT SHOULDER PAIN: Primary | ICD-10-CM

## 2025-04-15 DIAGNOSIS — M75.22 BICEPS TENDINITIS OF LEFT UPPER EXTREMITY: ICD-10-CM

## 2025-04-15 PROCEDURE — 97112 NEUROMUSCULAR REEDUCATION: CPT | Mod: HCNC,PN

## 2025-04-15 PROCEDURE — 97110 THERAPEUTIC EXERCISES: CPT | Mod: HCNC,PN

## 2025-04-15 PROCEDURE — 97164 PT RE-EVAL EST PLAN CARE: CPT | Mod: HCNC,PN

## 2025-04-15 NOTE — PROGRESS NOTES
Outpatient Rehab    Physical Therapy Visit/Reassessment     Patient Name: Clinton Rosenberg  MRN: [id number]  YOB: 1950  Encounter Date: 4/15/2025    Therapy Diagnosis:   Encounter Diagnoses   Name Primary?    Traumatic complete tear of left rotator cuff, subsequent encounter     Biceps tendinitis of left upper extremity     Chronic left shoulder pain Yes     Physician: Nadir Gloria, *    Physician Orders: Eval and Treat  Medical Diagnosis: Traumatic complete tear of left rotator cuff, subsequent encounter  Biceps tendinitis of left upper extremity    Visit # / Visits Authorized:  1 / 1  Insurance Authorization Period: 4/9/2025 to 12/31/2025  Date of Evaluation: 4/9/2025  Plan of Care Certification: 4/15/2025 to 7/2/2025     PT/PTA: PT   Number of PTA visits since last PT visit:   Time In: 1450   Time Out: 1545  Total Time: 55   Total Billable Time: 55    FOTO:  Intake Score: 24%  Survey Score 1:  %  Survey Score 2:  %         Subjective   Brad states he underwent surgery last week on 4/10/2025 and is here for therapy now. He has been in a lot of pain since the surgery and states the shots haven't been working that well. The current pain level is a 6/10, 10/10 at worst, and 4/10 at best. Sleeping has also been a problem due to the pain. He has tried different beds and the recliner, but just can't get comfortable. Has been feeling dizzy and nausea since the surgery. Brad also mentions he isn't able to help his sister, who has disability, or prepare the garden anymore. Physical therapy goal is to relieve pain, get back to gardening, and helping his sister again..  Pain reported as 6/10. describes the pain as constant aching over the left superior shoulder down the lateral humerus about midway down the shaft.    Objective   Bracing   The shoulder brace type is Immobilizer with abduction.           Shoulder Range of Motion  Right Shoulder   Active (deg) Passive (deg) Pain   Flexion 160        Extension         Scaption         ABduction 165       ADduction         Horizontal ABduction         Horizontal ADduction         External Rotation (Shoulder ABducted 0 degrees) 55       External Rotation (Shoulder ABducted 45 degrees)         External Rotation (Shoulder ABducted 90 degrees)         Internal Rotation (Shoulder ABducted 0 degrees)         Internal Rotation (Shoulder ABducted 45 degrees)         Internal Rotation (Shoulder ABducted 90 degrees)                          Treatment:  Therapeutic Exercise  TE 1: Patient education on HEP, expectations, post-op DVT and infection, and Post-op precautions  Balance/Neuromuscular Re-Education  NMR 1: scapular retractions 3 x 15  NMR 2: Ball squeezes 3'' x 30  NMR 3: Wrist flexion    Time Entry(in minutes):  PT Re-Evaluation Time Entry: 30  Neuromuscular Re-Education Time Entry: 10  Therapeutic Exercise Time Entry: 15    Assessment & Plan   Assessment: Clinton presents to physical therapy 5 days post-op from a reverse total shoulder arthroplasty with a bicep tenodesis with signs and symptoms consistent with post-operative status. He ambulates into session wearing a left shoulder sling with abduction pillow and has a pain pump. Clinton's impairments include: decreased left shoulder range of motion, decreased left shoulder strength, decreased shoulder mobility,  and left shoulder pain. These impairments are contributing to the patient's limitations in his ability to complete functional activities like reaching and lifting, limitations in his participation in gardening, and preventing him from providing care for his sister. He will benefit from skilled physical therapy services to address these impairments to improve his overall function and participation in leisure activities.       Patient will continue to benefit from skilled outpatient physical therapy to address the deficits listed in the problem list box on initial evaluation, provide pt/family education and to  maximize pt's level of independence in the home and community environment.     Patient's spiritual, cultural, and educational needs considered and patient agreeable to plan of care and goals.           Plan: continue with POC    Goals:   Active       LTGs       Increase ROM to allow improved joint biomechanics during functional activities.          Start:  04/09/25    Expected End:  07/02/25            2. Independent with home exercise program.         Start:  04/09/25    Expected End:  07/02/25            3. Full return to functional activities with manageable complaints.         Start:  04/09/25    Expected End:  07/02/25            4. Patient to demonstrate improved posture and body mechanics.         Start:  04/09/25    Expected End:  07/02/25            5.Decrease pain by 75% during functional activities.         Start:  04/09/25    Expected End:  07/02/25               STGs       Pt to increase strength by a 1/2 grade of muscles test to allow for improvement in functional activities such as performing chores.          Start:  04/09/25    Expected End:  07/02/25            2. Pt to improve range of motion by 25% to allow for improved functional mobility to allow for improvement in IADL's.         Start:  04/09/25    Expected End:  07/02/25            3. Pt to report compliance with HEP and demonstrate proper exercise technique to PT to show competence with self management of condition.         Start:  04/09/25    Expected End:  07/02/25            4. Decrease pain by 25% during functional activities.         Start:  04/09/25    Expected End:  07/02/25                Donis Palafox, PT

## 2025-04-17 ENCOUNTER — CLINICAL SUPPORT (OUTPATIENT)
Dept: REHABILITATION | Facility: HOSPITAL | Age: 75
End: 2025-04-17
Payer: MEDICARE

## 2025-04-17 DIAGNOSIS — G89.29 CHRONIC LEFT SHOULDER PAIN: Primary | ICD-10-CM

## 2025-04-17 DIAGNOSIS — M25.512 CHRONIC LEFT SHOULDER PAIN: Primary | ICD-10-CM

## 2025-04-17 PROCEDURE — 97110 THERAPEUTIC EXERCISES: CPT | Mod: HCNC,PN

## 2025-04-17 PROCEDURE — 97530 THERAPEUTIC ACTIVITIES: CPT | Mod: HCNC,PN

## 2025-04-17 NOTE — PROGRESS NOTES
Outpatient Rehab    Physical Therapy Visit    Patient Name: Clinton Rosenberg  MRN: [id number]  YOB: 1950  Encounter Date: 4/17/2025    Therapy Diagnosis:   Encounter Diagnosis   Name Primary?    Chronic left shoulder pain Yes     Physician: Nadir Gloria, *    Physician Orders: Eval and Treat  Medical Diagnosis: Traumatic complete tear of left rotator cuff, subsequent encounter  Biceps tendinitis of left upper extremity    Visit # / Visits Authorized:  1 / 20  Insurance Authorization Period: 4/15/2025 to 6/17/2025  Date of Evaluation: 4/15/25  Plan of Care Certification: 4/15/2025 to 7/2/2025     PT/PTA: PT   Number of PTA visits since last PT visit:   Time In: 1600   Time Out: 1700  Total Time: 60   Total Billable Time: 30    FOTO:  Intake Score: 24%  Survey Score 1:  %  Survey Score 2:  %    Precautions     Range of Motion:limited to 30 degrees ER side; F. Flexion 90 degrees Abduction for axillary care as tolerated      Subjective   he is doing okayh today and feeling a little better than last time.  Pain reported as 6/10.      Objective      Shoulder Range of Motion  Left Shoulder   Active (deg) Passive (deg) Pain   Flexion   60 Yes   Extension         Scaption         ABduction         ADduction         Horizontal ABduction         Horizontal ADduction         External Rotation (Shoulder ABducted 0 degrees)         External Rotation (Shoulder ABducted 45 degrees)         External Rotation (Shoulder ABducted 90 degrees)         Internal Rotation (Shoulder ABducted 0 degrees)         Internal Rotation (Shoulder ABducted 45 degrees)         Internal Rotation (Shoulder ABducted 90 degrees)                          Treatment:  Therapeutic Exercise  TE 1: scapular retractions 3 x 20 reps  TE 2: wrist extension and flexion  3# DB 4 x 15 reps  TE 3: ball squeezes x 30  TE 4: Table walkbacks withing protocol  4 x 8  TE 5: Pt education on expected symptoms and progress with his current/future stage  of rehab  Manual Therapy  MT 1: PROM shoulder flexion and ER within protocols      Time Entry(in minutes):  Therapeutic Activity Time Entry: 20  Therapeutic Exercise Time Entry: 40    Assessment & Plan   Assessment: Clinton presents to therapy session with increased pain once we took his sling off for PROM interventions. Over the duration of the session pain level went down some with increased shoulder passive movement. He continues to have difficulty with relaxation at the moment secondary to pain. He was educated that these symptoms are normal and expected at this point in his recovery and that they will get better as time passes. We were able to improve passive range of motion shoulder flexion to 60 degrees at the end of session. he should continue to be progressed as tolerated.       Patient will continue to benefit from skilled outpatient physical therapy to address the deficits listed in the problem list box on initial evaluation, provide pt/family education and to maximize pt's level of independence in the home and community environment.     Patient's spiritual, cultural, and educational needs considered and patient agreeable to plan of care and goals.           Plan: continue with POC    Goals:   Active       LTGs       Increase ROM to allow improved joint biomechanics during functional activities.    (Progressing)       Start:  04/09/25    Expected End:  07/02/25            2. Independent with home exercise program.   (Progressing)       Start:  04/09/25    Expected End:  07/02/25            3. Full return to functional activities with manageable complaints.   (Progressing)       Start:  04/09/25    Expected End:  07/02/25            4. Patient to demonstrate improved posture and body mechanics.   (Progressing)       Start:  04/09/25    Expected End:  07/02/25            5.Decrease pain by 75% during functional activities.   (Progressing)       Start:  04/09/25    Expected End:  07/02/25               STGs        Pt to increase strength by a 1/2 grade of muscles test to allow for improvement in functional activities such as performing chores.    (Progressing)       Start:  04/09/25    Expected End:  07/02/25            2. Pt to improve range of motion by 25% to allow for improved functional mobility to allow for improvement in IADL's.   (Progressing)       Start:  04/09/25    Expected End:  07/02/25            3. Pt to report compliance with HEP and demonstrate proper exercise technique to PT to show competence with self management of condition.   (Progressing)       Start:  04/09/25    Expected End:  07/02/25            4. Decrease pain by 25% during functional activities.   (Progressing)       Start:  04/09/25    Expected End:  07/02/25                Donis Palafox, PT

## 2025-04-20 DIAGNOSIS — I10 ESSENTIAL HYPERTENSION: ICD-10-CM

## 2025-04-20 NOTE — TELEPHONE ENCOUNTER
No care due was identified.  [facility] Embedded Care Due Messages. Reference number: [id number].   4/20/2025 1:33:55 PM CDT

## 2025-04-21 RX ORDER — AMLODIPINE BESYLATE 5 MG/1
5 TABLET ORAL
Qty: 100 TABLET | Refills: 3 | Status: SHIPPED | OUTPATIENT
Start: 2025-04-21

## 2025-04-21 NOTE — TELEPHONE ENCOUNTER
Refill Routing Note   Medication(s) are not appropriate for processing by [facility] for the following reason(s):        New or recently adjusted medication    [facility] action(s):  Defer             Appointments  past 12m or future 3m with PCP    Date Provider   Last Visit   3/11/2025 Michael Gonzalez MD   Next Visit   5/13/2025 Michael Gonzalez MD   ED visits in past 90 days: 1        Note composed:3:16 PM 04/21/2025

## 2025-04-22 ENCOUNTER — CLINICAL SUPPORT (OUTPATIENT)
Dept: REHABILITATION | Facility: HOSPITAL | Age: 75
End: 2025-04-22
Payer: MEDICARE

## 2025-04-22 DIAGNOSIS — M75.22 BICEPS TENDINITIS OF LEFT UPPER EXTREMITY: ICD-10-CM

## 2025-04-22 DIAGNOSIS — M25.512 CHRONIC LEFT SHOULDER PAIN: Primary | ICD-10-CM

## 2025-04-22 DIAGNOSIS — G89.29 CHRONIC LEFT SHOULDER PAIN: Primary | ICD-10-CM

## 2025-04-22 DIAGNOSIS — S46.012D TRAUMATIC COMPLETE TEAR OF LEFT ROTATOR CUFF, SUBSEQUENT ENCOUNTER: ICD-10-CM

## 2025-04-22 PROCEDURE — 97140 MANUAL THERAPY 1/> REGIONS: CPT | Mod: HCNC,PN

## 2025-04-22 PROCEDURE — 97110 THERAPEUTIC EXERCISES: CPT | Mod: HCNC,PN

## 2025-04-22 RX ORDER — OXYCODONE HYDROCHLORIDE 5 MG/1
TABLET ORAL
Qty: 28 TABLET | Refills: 0 | Status: SHIPPED | OUTPATIENT
Start: 2025-04-22

## 2025-04-22 NOTE — PROGRESS NOTES
"  Outpatient Rehab    Physical Therapy Visit    Patient Name: Clinton Rosenberg  MRN: [id number]  YOB: 1950  Encounter Date: 4/22/2025    Therapy Diagnosis:   Encounter Diagnosis   Name Primary?    Chronic left shoulder pain Yes       Physician: Nadir Gloria, *    Physician Orders: Eval and Treat  Medical Diagnosis: Traumatic complete tear of left rotator cuff, subsequent encounter  Biceps tendinitis of left upper extremity    Visit # / Visits Authorized:  2 / 20  Insurance Authorization Period: 4/15/2025 to 6/17/2025  Date of Evaluation: 4/15/25  Plan of Care Certification: 4/15/2025 to 7/2/2025     PT/PTA: PT   Number of PTA visits since last PT visit:   Time In: 1500   Time Out: 1557  Total Time: 57   Total Billable Time: 57    FOTO:  Intake Score: 24%  Survey Score 1:  %  Survey Score 2:  %    Precautions     Range of Motion:limited to 30 degrees ER side; F. Flexion 90 degrees Abduction for axillary care as tolerated      Subjective   he is doing better today and in less pain.  Pain reported as 4/10.      Objective      Shoulder Range of Motion  Left Shoulder   Active (deg) Passive (deg) Pain   Flexion   90 Yes   Extension         Scaption         ABduction         ADduction         Horizontal ABduction         Horizontal ADduction         External Rotation (Shoulder ABducted 0 degrees)   20 Yes   External Rotation (Shoulder ABducted 45 degrees)         External Rotation (Shoulder ABducted 90 degrees)         Internal Rotation (Shoulder ABducted 0 degrees)         Internal Rotation (Shoulder ABducted 45 degrees)         Internal Rotation (Shoulder ABducted 90 degrees)                          Treatment:  Therapeutic Exercise  TE 1: scapular retractions 3 x 20 reps  TE 2: wrist extension and flexion  3# DB 4 x 15 reps  TE 3: Tables slides scaption 3 x 15 x 3 " holds  TE 4: Table walkbacks withing protocol  3 x 15  TE 5: pt educatation on updated HEP  Manual Therapy  MT 1: PROM shoulder " flexion, abduction, and ER within protocols  MT 2: Passive ROM elbow extension        Time Entry(in minutes):  Manual Therapy Time Entry: 25  Therapeutic Exercise Time Entry: 32    Assessment & Plan   Assessment: Clinton presents to therapy session with report of decreased pain and improved shoulder range of motion upon examination. His shoulder pain is progressing as expected with post-op status and he was given self PROM elbow extension and flexion to address decreased elbow extension range of motion.       Patient will continue to benefit from skilled outpatient physical therapy to address the deficits listed in the problem list box on initial evaluation, provide pt/family education and to maximize pt's level of independence in the home and community environment.     Patient's spiritual, cultural, and educational needs considered and patient agreeable to plan of care and goals.           Plan: continue with POC    Goals:   Active       LTGs       Increase ROM to allow improved joint biomechanics during functional activities.    (Progressing)       Start:  04/09/25    Expected End:  07/02/25            2. Independent with home exercise program.   (Progressing)       Start:  04/09/25    Expected End:  07/02/25            3. Full return to functional activities with manageable complaints.   (Progressing)       Start:  04/09/25    Expected End:  07/02/25            4. Patient to demonstrate improved posture and body mechanics.   (Progressing)       Start:  04/09/25    Expected End:  07/02/25            5.Decrease pain by 75% during functional activities.   (Progressing)       Start:  04/09/25    Expected End:  07/02/25               STGs       Pt to increase strength by a 1/2 grade of muscles test to allow for improvement in functional activities such as performing chores.    (Progressing)       Start:  04/09/25    Expected End:  07/02/25            2. Pt to improve range of motion by 25% to allow for improved functional  mobility to allow for improvement in IADL's.   (Progressing)       Start:  04/09/25    Expected End:  07/02/25            3. Pt to report compliance with HEP and demonstrate proper exercise technique to PT to show competence with self management of condition.   (Progressing)       Start:  04/09/25    Expected End:  07/02/25            4. Decrease pain by 25% during functional activities.   (Progressing)       Start:  04/09/25    Expected End:  07/02/25                Donis Palafox, PT

## 2025-04-23 ENCOUNTER — HOSPITAL ENCOUNTER (OUTPATIENT)
Dept: RADIOLOGY | Facility: HOSPITAL | Age: 75
Discharge: HOME | End: 2025-04-23
Attending: PHYSICIAN ASSISTANT
Payer: MEDICARE

## 2025-04-23 ENCOUNTER — OFFICE VISIT (OUTPATIENT)
Dept: SPORTS MEDICINE | Facility: CLINIC | Age: 75
End: 2025-04-23
Payer: MEDICARE

## 2025-04-23 VITALS
WEIGHT: 226.31 LBS | BODY MASS INDEX: 32.4 KG/M2 | SYSTOLIC BLOOD PRESSURE: 101 MMHG | HEART RATE: 78 BPM | DIASTOLIC BLOOD PRESSURE: 64 MMHG | HEIGHT: 70 IN

## 2025-04-23 DIAGNOSIS — I10 ESSENTIAL HYPERTENSION: ICD-10-CM

## 2025-04-23 DIAGNOSIS — Z09 SURGERY FOLLOW-UP EXAMINATION: ICD-10-CM

## 2025-04-23 DIAGNOSIS — M25.512 LEFT SHOULDER PAIN, UNSPECIFIED CHRONICITY: ICD-10-CM

## 2025-04-23 DIAGNOSIS — Z96.612 S/P REVERSE TOTAL SHOULDER ARTHROPLASTY, LEFT: Primary | ICD-10-CM

## 2025-04-23 PROCEDURE — 3044F HG A1C LEVEL LT 7.0%: CPT | Mod: HCNC,CPTII,S$GLB, | Performed by: PHYSICIAN ASSISTANT

## 2025-04-23 PROCEDURE — 3288F FALL RISK ASSESSMENT DOCD: CPT | Mod: HCNC,CPTII,S$GLB, | Performed by: PHYSICIAN ASSISTANT

## 2025-04-23 PROCEDURE — 1160F RVW MEDS BY RX/DR IN RCRD: CPT | Mod: HCNC,CPTII,S$GLB, | Performed by: PHYSICIAN ASSISTANT

## 2025-04-23 PROCEDURE — 73030 X-RAY EXAM OF SHOULDER: CPT | Mod: TC,HCNC,LT

## 2025-04-23 PROCEDURE — 4010F ACE/ARB THERAPY RXD/TAKEN: CPT | Mod: HCNC,CPTII,S$GLB, | Performed by: PHYSICIAN ASSISTANT

## 2025-04-23 PROCEDURE — 99024 POSTOP FOLLOW-UP VISIT: CPT | Mod: HCNC,S$GLB,, | Performed by: PHYSICIAN ASSISTANT

## 2025-04-23 PROCEDURE — 3074F SYST BP LT 130 MM HG: CPT | Mod: HCNC,CPTII,S$GLB, | Performed by: PHYSICIAN ASSISTANT

## 2025-04-23 PROCEDURE — 1159F MED LIST DOCD IN RCRD: CPT | Mod: HCNC,CPTII,S$GLB, | Performed by: PHYSICIAN ASSISTANT

## 2025-04-23 PROCEDURE — 1125F AMNT PAIN NOTED PAIN PRSNT: CPT | Mod: HCNC,CPTII,S$GLB, | Performed by: PHYSICIAN ASSISTANT

## 2025-04-23 PROCEDURE — 3078F DIAST BP <80 MM HG: CPT | Mod: HCNC,CPTII,S$GLB, | Performed by: PHYSICIAN ASSISTANT

## 2025-04-23 PROCEDURE — 73030 X-RAY EXAM OF SHOULDER: CPT | Mod: 26,HCNC,LT, | Performed by: RADIOLOGY

## 2025-04-23 PROCEDURE — 1101F PT FALLS ASSESS-DOCD LE1/YR: CPT | Mod: HCNC,CPTII,S$GLB, | Performed by: PHYSICIAN ASSISTANT

## 2025-04-23 PROCEDURE — 99999 PR PBB SHADOW E&M-EST. PATIENT-LVL V: CPT | Mod: PBBFAC,HCNC,, | Performed by: PHYSICIAN ASSISTANT

## 2025-04-23 RX ORDER — METOPROLOL SUCCINATE 25 MG/1
25 TABLET, EXTENDED RELEASE ORAL
Qty: 100 TABLET | Refills: 3 | Status: SHIPPED | OUTPATIENT
Start: 2025-04-23

## 2025-04-23 NOTE — PROGRESS NOTES
Subjective:     Chief Complaint: Clinton Rosenberg is a 74 y.o. male who had concerns including Pain of the Left Shoulder and Post-op Evaluation.    History of Present Illness    CHIEF COMPLAINT:  - Post-op follow-up for reverse total shoulder arthroplasty.    HPI:  Clinton presents for follow-up after a reverse shoulder arthroplasty performed by Dr. Reese. He reports improvement in his condition and engages in daily movement as part of his ongoing PT. His partner mentions that he ran out of pain medication, and a refill request was sent to the pharmacy the previous day. He experienced some nausea in the first couple of days post-surgery, which was effectively managed with medication. The incision site is described as looking excellent and aesthetically pleasing, exceeding his expectations. He can now shower normally, with instructions to use gentle soap and water over the incision area. I explained the details of the reverse shoulder procedure, including the placement of the ball on the shoulder blade, screws, stem in the upper arm, and the use of a polyethylene liner between metal components. The importance of following the PT regimen was emphasized, explaining that the deltoid muscle is now the prime  for shoulder movement.    PREVIOUS TREATMENTS:  - PT: Ongoing post-surgery, patient reporting improvement.    MEDICATIONS:  - Pain medication: Refill requested and sent to the pharmacy  - Anti-nausea medication: Taken in the first couple of days post-surgery, effective in managing nausea    SURGICAL HISTORY:  - Reverse shoulder arthroplasty: Recent, ball placed on shoulder blade, screws inserted into ball, stem placed into upper arm, metal component and polyethylene liner inserted      ROS:  General: negative fever, negative chills, negative fatigue, negative weight gain, negative weight loss  Eyes: negative vision changes, negative redness, negative discharge  ENT: negative ear pain, negative nasal congestion,  negative sore throat  Cardiovascular: negative chest pain, negative palpitations, negative lower extremity edema  Respiratory: negative cough, negative shortness of breath  Gastrointestinal: negative abdominal pain, +nausea, negative vomiting, negative diarrhea, negative constipation, negative blood in stool  Genitourinary: negative dysuria, negative hematuria, negative frequency  Musculoskeletal: negative joint pain, negative muscle pain  Skin: negative rash, negative lesion  Neurological: negative headache, negative dizziness, negative numbness, negative tingling  Psychiatric: negative anxiety, negative depression, negative sleep difficulty         Date of Procedure: 4/10/2025      Procedure: Procedure(s) (LRB):  ARTHROPLASTY, SHOULDER, TOTAL, REVERSE (Left)  ARTHROSCOPY, SHOULDER, W/ BICEPS TENODESIS (Left)      Surgeons and Role:     * Eugenio Reese MD - Primary     Assisting Surgeon: None     Pre-Operative Diagnosis: Traumatic rotator cuff tear [S46.019A]  Biceps tendonitis [M75.20]     Post-Operative Diagnosis: Post-Op Diagnosis Codes:     * Traumatic rotator cuff tear [S46.019A]     * Biceps tendonitis [M75.20]     Anesthesia: General     Operative Findings (including complications, if any): rotator cuff arthropathy     Description of Technical Procedures:   PREOPERATIVE DIAGNOSIS: Left Tear, Rotator Cuff, Non-Tramatic M66.829, M19.012  POSTOPERATIVE DIAGNOSES: Left Tear, Rotator Cuff, Non-Tramatic M66.829 , M19.012  OPERATIVE PROCEDURES PERFORMED:Left  Left Shoulder:     24778 Total Shoulder (glenoid and proximal humeral) Replacement   2.   95439 Biceps tenodesis  Pain Related Questions  Over the past 3 days, what was your average pain during activity? (I.e. running, jogging, walking, climbing stairs, getting dressed, ect.): 5  Over the past 3 days, what was your highest pain level?: 5  Over the past 3 days, what was your lowest pain level? : 2    Other  How many nights a week are you awakened by your  affected body part?: 7  Was the patient's HEIGHT measured or patient reported?: Patient Reported  Was the patient's WEIGHT measured or patient reported?: Measured    Past Medical History[1]     Past Surgical History:   Procedure Laterality Date    APPENDECTOMY  1986    ARTHROSCOPY,SHOULDER,WITH BICEPS TENODESIS Left 4/10/2025    Procedure: ARTHROSCOPY, SHOULDER, W/ BICEPS TENODESIS;  Surgeon: Eugenio Reese MD;  Location: [facility] OR;  Service: Orthopedics;  Laterality: Left;    bone spur Right     COLONOSCOPY      COLONOSCOPY N/A 10/26/2023    Procedure: COLONOSCOPY;  Surgeon: Argelia Altamirano MD;  Location: [facility] ENDO;  Service: Endoscopy;  Laterality: N/A;  Referred by: Dr. Michael Gonzalez  BT/Clearance: ok to hold Plavix 5 days per Dr Dawn  Prep: golytely  Route instructions sent: [id number]  8/3 proc jessica to 10/26, pt has prep and instr  10/19- pre call complete.  DBM    ESOPHAGOGASTRODUODENOSCOPY N/A 8/2/2024    Procedure: EGD (ESOPHAGOGASTRODUODENOSCOPY);  Surgeon: Argelia Altamirano MD;  Location: [facility] ENDO;  Service: Endoscopy;  Laterality: N/A;  Dr. Porter, Urgent EGD, abdominal pain, standard prep, Instr to portal. Plavix hold pending.  ok to hold Plavix 5 days per Dr Dawn  7/26/24- lvm/portal for pc. DBM  7/30 pt confirmed. has been holding plavix since 7/27 cf  7/30/24- pc complete. DBM    hand trauma Right     pencil     heart stent      LEFT HEART CATHETERIZATION Left 9/20/2019    Procedure: Left heart cath 12 pm start, R rad access;  Surgeon: Brad Bahena MD;  Location: [facility] CATH LAB;  Service: Cardiology;  Laterality: Left;  RN PREOP 9/13/2019  NEEDS IODINE PREMED    LEFT HEART CATHETERIZATION Left 9/21/2020    Procedure: Left heart cath 730am start, R rad access;  Surgeon: Brad Bahena MD;  Location: [facility] CATH LAB;  Service: Cardiology;  Laterality: Left;  RN PREOP 9/14/2020, COVID NEGATIVE---9/18    REVERSE TOTAL SHOULDER ARTHROPLASTY Left 4/10/2025    Procedure:  ARTHROPLASTY, SHOULDER, TOTAL, REVERSE;  Surgeon: Eugenio Reese MD;  Location: [facility];  Service: Orthopedics;  Laterality: Left;  regional w/ catheter (interscalene), ASHLYN 30cc       Objective:     General: Clinton is well-developed, well-nourished, appears stated age, in no acute distress, alert and oriented to time, place and person.     General    Nursing note and vitals reviewed.  Constitutional: He is oriented to person, place, and time. He appears well-developed and well-nourished. No distress.   HENT:   Head: Normocephalic and atraumatic.   Nose: Nose normal.   Eyes: Conjunctivae and EOM are normal. Pupils are equal, round, and reactive to light.   Cardiovascular:  Normal rate, regular rhythm and intact distal pulses.            Pulmonary/Chest: Breath sounds normal. No respiratory distress.   Abdominal: Soft. Bowel sounds are normal. He exhibits no distension.   Neurological: He is alert and oriented to person, place, and time. He has normal reflexes.   Psychiatric: He has a normal mood and affect. His behavior is normal. Judgment and thought content normal.         Right Shoulder Exam     Tenderness   The patient is experiencing no tenderness.    Other   Sensation: normal    Left Shoulder Exam     Inspection/Observation   Swelling: present  Scars: present    Other   Sensation: normal     Comments:  Incisions clean/dry/intact  Sutures intact  No sign of infection  Mild swelling  Compartments soft  Neurovascular status intact in extremity        Vascular Exam     Right Pulses      Radial:                    2+      Left Pulses      Radial:                    2+      Capillary Refill  Right Hand: normal capillary refill  Left Hand: normal capillary refill              Radiographic findings:    X-Ray Shoulder Left 1 View  Narrative: EXAMINATION:  XR SHOULDER 1 VIEW LEFT    CLINICAL HISTORY:  Total shoulder replacement;  Strain of muscle(s) and tendon(s) of the rotator cuff of left shoulder, subsequent  encounter    TECHNIQUE:  Single frontal view of the left shoulder is submitted    COMPARISON:  03/06/2025    FINDINGS:  Interval postoperative changes of total reverse shoulder arthroplasty.  Position and alignment of the prosthesis appear satisfactory.  Hardware appears intact.  Mild degenerative changes at the AC joint.  No unusual postoperative findings.  Impression: Interval total reverse shoulder arthroplasty with satisfactory position and alignment    Electronically signed by: Gerald Lowry MD  Date:    04/10/2025  Time:    16:46            These findings were discussed and reviewed with the patient.     Assessment:     Encounter Diagnoses   Name Primary?    S/P reverse total shoulder arthroplasty, left Yes    Left shoulder pain, unspecified chronicity     Surgery follow-up examination         Plan:     Assessment & Plan    PROCEDURES:  1. Reviewed post-op XRs with patient, explaining the components of the reverse shoulder arthroplasty.    PATIENT INSTRUCTIONS:  1. Use gentle soap and water over the incision area.  2. Shower normally.  3. Avoid picking at the peeling glue layer on the incision.  4. Limit external rotation to 30 degrees.  5. Limit forward flexion to 90 degrees.  6. Perform abduction for axillary care as tolerated.  7. Perform exercises.    MEDICATIONS:  1. Refilled pain medication.    FOLLOW UP:  1. Follow up in 4 weeks.  2. Contact the office if any refills are needed.  3. Contact the office if anything else is needed.           All of the patient's questions were answered and the patient will contact us if they have any questions or concerns in the interim.    This note was generated with the assistance of ambient listening technology. Verbal consent was obtained by the patient and accompanying visitor(s) for the recording of patient appointment to facilitate this note. I attest to having reviewed and edited the generated note for accuracy, though some syntax or spelling errors may persist.  Please contact the author of this note for any clarification.             [1]   Past Medical History:  Diagnosis Date    Allergy     Angina pectoris     Anxiety     Cancer     skin rwemoved from head    Chronic midline low back pain without sciatica 10/26/2022    Coronary artery disease     Depression     Eye injury as a teen    stuck object in os    GERD (gastroesophageal reflux disease)     Hyperlipidemia     Hypertension     Hypothyroidism     Memory loss     12/3/2014 MRI brain: 1. No evidence for acute infarct 2. unremarkable MRI of the brain; 12/3/2014 carotid US normal    Myocardial infarction     Nuclear sclerosis of both eyes 05/20/2021    Obesity     Peripheral vascular disease 06/20/2017    Retrocalcaneal bursitis 11/24/2014    Sleep apnea     Type 2 diabetes mellitus with other specified complication 04/27/2023    Type 2 diabetes mellitus with other specified complication 04/27/2023

## 2025-04-23 NOTE — TELEPHONE ENCOUNTER
Refill Routing Note   Medication(s) are not appropriate for processing by [facility] for the following reason(s):        New or recently adjusted medication    [facility] action(s):  Defer             Appointments  past 12m or future 3m with PCP    Date Provider   Last Visit   3/11/2025 Michael Gonzalez MD   Next Visit   5/13/2025 Michael Gonzalez MD   ED visits in past 90 days: 1        Note composed:4:34 PM 04/23/2025

## 2025-04-23 NOTE — TELEPHONE ENCOUNTER
No care due was identified.  [facility] Embedded Care Due Messages. Reference number: [id number].   4/23/2025 3:00:55 PM CDT

## 2025-04-24 ENCOUNTER — CLINICAL SUPPORT (OUTPATIENT)
Dept: REHABILITATION | Facility: HOSPITAL | Age: 75
End: 2025-04-24
Payer: MEDICARE

## 2025-04-24 DIAGNOSIS — G89.29 CHRONIC LEFT SHOULDER PAIN: Primary | ICD-10-CM

## 2025-04-24 DIAGNOSIS — M25.512 CHRONIC LEFT SHOULDER PAIN: Primary | ICD-10-CM

## 2025-04-24 PROCEDURE — 97140 MANUAL THERAPY 1/> REGIONS: CPT | Mod: HCNC,PN

## 2025-04-24 PROCEDURE — 97110 THERAPEUTIC EXERCISES: CPT | Mod: HCNC,PN

## 2025-04-24 NOTE — PROGRESS NOTES
"  Outpatient Rehab    Physical Therapy Visit    Patient Name: Clinton Rosenberg  MRN: [id number]  YOB: 1950  Encounter Date: 4/24/2025    Therapy Diagnosis:   Encounter Diagnosis   Name Primary?    Chronic left shoulder pain Yes       Physician: Nadir Gloria, *    Physician Orders: Eval and Treat  Medical Diagnosis: Traumatic complete tear of left rotator cuff, subsequent encounter  Biceps tendinitis of left upper extremity    Visit # / Visits Authorized:  3 / 20  Insurance Authorization Period: 4/15/2025 to 6/17/2025  Date of Evaluation: 4/15/25  Plan of Care Certification: 4/15/2025 to 7/2/2025     PT/PTA:     Number of PTA visits since last PT visit:   Time In: 1500   Time Out: 1600  Total Time: 60   Total Billable Time: 60    FOTO:  Intake Score: 24%  Survey Score 1:  %  Survey Score 2:  %         Subjective      Pain reported as 4/10.      Objective            Treatment:  Therapeutic Exercise  TE 1: scapular retractions 3 x 20 reps  TE 2: wrist extension and flexion  3# DB 4 x 15 reps  TE 3: Tables slides scaption 3 x 15 x 3 " holds  TE 4: Table walkbacks withing protocol  3 x 15  TE 5: pt educated on expected progress and pain with his post-op status  Manual Therapy  MT 1: PROM shoulder flexion, abduction, and ER within protocols  MT 2: Passive ROM elbow extension  MT 3: trigger point release along scapular and periscapular region        Time Entry(in minutes):  Manual Therapy Time Entry: 25  Therapeutic Exercise Time Entry: 35    Assessment & Plan   Assessment: Clinton had increased subjected pain today and continues to have increased muscle guarding during ROM. He did better with sidelying PROM today and responded well to trigger point release along the periscapular/scapular region subjectively. Continued to educate him on expected pain and progress with where he is at in his rehab. He should continue to be progressed as tolerated.       Patient will continue to benefit from skilled " outpatient physical therapy to address the deficits listed in the problem list box on initial evaluation, provide pt/family education and to maximize pt's level of independence in the home and community environment.     Patient's spiritual, cultural, and educational needs considered and patient agreeable to plan of care and goals.           Plan: continue with POC    Goals:   Active       LTGs       Increase ROM to allow improved joint biomechanics during functional activities.    (Progressing)       Start:  04/09/25    Expected End:  07/02/25            2. Independent with home exercise program.   (Progressing)       Start:  04/09/25    Expected End:  07/02/25            3. Full return to functional activities with manageable complaints.   (Progressing)       Start:  04/09/25    Expected End:  07/02/25            4. Patient to demonstrate improved posture and body mechanics.   (Progressing)       Start:  04/09/25    Expected End:  07/02/25            5.Decrease pain by 75% during functional activities.   (Progressing)       Start:  04/09/25    Expected End:  07/02/25               STGs       Pt to increase strength by a 1/2 grade of muscles test to allow for improvement in functional activities such as performing chores.    (Progressing)       Start:  04/09/25    Expected End:  07/02/25            2. Pt to improve range of motion by 25% to allow for improved functional mobility to allow for improvement in IADL's.   (Progressing)       Start:  04/09/25    Expected End:  07/02/25            3. Pt to report compliance with HEP and demonstrate proper exercise technique to PT to show competence with self management of condition.   (Progressing)       Start:  04/09/25    Expected End:  07/02/25            4. Decrease pain by 25% during functional activities.   (Progressing)       Start:  04/09/25    Expected End:  07/02/25                Donis Palafox, PT

## 2025-04-28 ENCOUNTER — PATIENT MESSAGE (OUTPATIENT)
Dept: SPORTS MEDICINE | Facility: CLINIC | Age: 75
End: 2025-04-28
Payer: MEDICARE

## 2025-04-28 DIAGNOSIS — M75.22 BICEPS TENDINITIS OF LEFT UPPER EXTREMITY: ICD-10-CM

## 2025-04-28 DIAGNOSIS — S46.012D TRAUMATIC COMPLETE TEAR OF LEFT ROTATOR CUFF, SUBSEQUENT ENCOUNTER: ICD-10-CM

## 2025-04-28 DIAGNOSIS — Z96.612 S/P REVERSE TOTAL SHOULDER ARTHROPLASTY, LEFT: Primary | ICD-10-CM

## 2025-04-29 ENCOUNTER — CLINICAL SUPPORT (OUTPATIENT)
Dept: REHABILITATION | Facility: HOSPITAL | Age: 75
End: 2025-04-29
Payer: MEDICARE

## 2025-04-29 DIAGNOSIS — G89.29 CHRONIC LEFT SHOULDER PAIN: Primary | ICD-10-CM

## 2025-04-29 DIAGNOSIS — M25.512 CHRONIC LEFT SHOULDER PAIN: Primary | ICD-10-CM

## 2025-04-29 PROCEDURE — 97530 THERAPEUTIC ACTIVITIES: CPT | Mod: HCNC,PN,CQ

## 2025-04-29 PROCEDURE — 97112 NEUROMUSCULAR REEDUCATION: CPT | Mod: HCNC,PN,CQ

## 2025-04-30 RX ORDER — PREGABALIN 75 MG/1
75 CAPSULE ORAL 2 TIMES DAILY
Qty: 60 CAPSULE | Refills: 0 | Status: SHIPPED | OUTPATIENT
Start: 2025-04-30

## 2025-05-01 ENCOUNTER — CLINICAL SUPPORT (OUTPATIENT)
Dept: REHABILITATION | Facility: HOSPITAL | Age: 75
End: 2025-05-01
Payer: MEDICARE

## 2025-05-01 DIAGNOSIS — M75.22 BICEPS TENDINITIS OF LEFT UPPER EXTREMITY: ICD-10-CM

## 2025-05-01 DIAGNOSIS — M75.42 IMPINGEMENT SYNDROME OF LEFT SHOULDER: ICD-10-CM

## 2025-05-01 DIAGNOSIS — S46.012D TRAUMATIC COMPLETE TEAR OF LEFT ROTATOR CUFF, SUBSEQUENT ENCOUNTER: ICD-10-CM

## 2025-05-01 DIAGNOSIS — M25.512 CHRONIC LEFT SHOULDER PAIN: Primary | ICD-10-CM

## 2025-05-01 DIAGNOSIS — G89.29 CHRONIC LEFT SHOULDER PAIN: Primary | ICD-10-CM

## 2025-05-01 DIAGNOSIS — M75.102 ROTATOR CUFF SYNDROME OF LEFT SHOULDER: ICD-10-CM

## 2025-05-01 PROCEDURE — 97530 THERAPEUTIC ACTIVITIES: CPT | Mod: HCNC,PN,CQ

## 2025-05-04 NOTE — PROGRESS NOTES
"  Outpatient Rehab    Physical Therapy Visit    Patient Name: Clinton Rosenberg  MRN: [id number]  YOB: 1950  Encounter Date: 4/29/2025    Therapy Diagnosis:   Encounter Diagnosis   Name Primary?    Chronic left shoulder pain Yes     Physician: Nadir Gloria, *    Physician Orders: Eval and Treat  Medical Diagnosis: Traumatic complete tear of left rotator cuff, subsequent encounter  Biceps tendinitis of left upper extremity    Visit # / Visits Authorized:  5 / 20  Insurance Authorization Period: 4/15/2025 to 6/17/2025  Date of Evaluation: 4/15/25  Plan of Care Certification: 4/15/2025 to 7/2/2025  Progress Note Date: 5/15/25     PT/PTA: PTA   Number of PTA visits since last PT visit:1  Time In: 1500   Time Out: 1600  Total Time (in minutes): 60   Total Billable Time (in minutes): 60    FOTO:  Intake Score:  %  Survey Score 2:  %  Survey Score 3:  %         Subjective   he is doing better today and in less pain.    Left shoulder  4/10/2025, POW 2 weeks and 5 days    Objective            Treatment:  Therapeutic Exercise  TE 4: Table walkbacks withing protocol  3 x 15  Manual Therapy  MT 1: PROM shoulder flexion, abduction, and ER within protocols  MT 2: Passive ROM elbow extension  Balance/Neuromuscular Re-Education  NMR 1: scapular retractions 3 x 15, sling on  NMR 2: Hand squeezes 7# 3'' x 30  NMR 3: wrist extension and flexion 3# DB 4 x 15 reps  Therapeutic Activity  TA 1: Pulley Flexion 4 min, facing the door, 0-90 degree  TA 2: Francine Scaption 4 min, facing the door. 0-90 degree  TA 3: Tables slides scaption 3 x 15 x 3 " holds  TA 4: Tables slides flexion 3 x 15 x 3 " holds    Time Entry(in minutes):  Manual Therapy Time Entry: 5  Neuromuscular Re-Education Time Entry: 25  Therapeutic Activity Time Entry: 25    Assessment & Plan   Assessment: Clinton continues to have increased muscle guarding during ROM. Added AAROM ex Pulley Felx/Scaption within protocols ROM 0-90 deg, cueing to move gently to " improve shoulder ROM, patient responded well to the ex. Will cont to progress per patient tolerance within protocols.  Evaluation/Treatment Tolerance: Patient tolerated treatment well    Patient will continue to benefit from skilled outpatient physical therapy to address the deficits listed in the problem list box on initial evaluation, provide pt/family education and to maximize pt's level of independence in the home and community environment.     Patient's spiritual, cultural, and educational needs considered and patient agreeable to plan of care and goals.           Plan: continue with POC    Goals:   Active       LTGs       Increase ROM to allow improved joint biomechanics during functional activities.    (Progressing)       Start:  04/09/25    Expected End:  07/02/25            2. Independent with home exercise program.   (Progressing)       Start:  04/09/25    Expected End:  07/02/25            3. Full return to functional activities with manageable complaints.   (Progressing)       Start:  04/09/25    Expected End:  07/02/25            4. Patient to demonstrate improved posture and body mechanics.   (Progressing)       Start:  04/09/25    Expected End:  07/02/25            5.Decrease pain by 75% during functional activities.   (Progressing)       Start:  04/09/25    Expected End:  07/02/25               STGs       Pt to increase strength by a 1/2 grade of muscles test to allow for improvement in functional activities such as performing chores.    (Progressing)       Start:  04/09/25    Expected End:  07/02/25            2. Pt to improve range of motion by 25% to allow for improved functional mobility to allow for improvement in IADL's.   (Progressing)       Start:  04/09/25    Expected End:  07/02/25            3. Pt to report compliance with HEP and demonstrate proper exercise technique to PT to show competence with self management of condition.   (Progressing)       Start:  04/09/25    Expected End:   07/02/25            4. Decrease pain by 25% during functional activities.   (Progressing)       Start:  04/09/25    Expected End:  07/02/25                Robert To, PTA

## 2025-05-04 NOTE — PROGRESS NOTES
"  Outpatient Rehab    Physical Therapy Visit    Patient Name: Clinton Rosenberg  MRN: [id number]  YOB: 1950  Encounter Date: 5/1/2025    Therapy Diagnosis:   Encounter Diagnoses   Name Primary?    Chronic left shoulder pain Yes    Traumatic complete tear of left rotator cuff, subsequent encounter     Biceps tendinitis of left upper extremity     Rotator cuff syndrome of left shoulder     Impingement syndrome of left shoulder      Physician: Nadir Gloria, *    Physician Orders: Eval and Treat  Medical Diagnosis: Traumatic complete tear of left rotator cuff, subsequent encounter  Biceps tendinitis of left upper extremity    Visit # / Visits Authorized:  5 / 20  Insurance Authorization Period: 4/15/2025 to 6/17/2025  Date of Evaluation: 4/15/25  Plan of Care Certification: 4/15/2025 to 7/2/2025  Progress Note Date: 5/15/25     PT/PTA: PTA   Number of PTA visits since last PT visit:2  Time In: 1500   Time Out: 1600  Total Time (in minutes): 60   Total Billable Time (in minutes): 30    FOTO:  Intake Score:  %  Survey Score 2:  %  Survey Score 3:  %         Subjective   Patient reports pain is not bad. He tries to do some ex at home..  Pain reported as 4/10. Left shoulder  4/10/2025, POW 3 weeks and 0 days    Objective            Treatment:  Manual Therapy  MT 1: PROM shoulder flexion, abduction, and ER within protocols  MT 2: Passive ROM elbow extension  Balance/Neuromuscular Re-Education  NMR 1: scapular retractions 3 x 15, sling on  NMR 2: Hand squeezes 7# 3'' x 30  NMR 3: wrist extension and flexion 3# DB 4 x 15 reps  Therapeutic Activity  TA 1: Pulley Flexion 4 min, facing the door, 0-90 degree  TA 2: Francine Scaption 4 min, facing the door. 0-90 degree  TA 3: Tables slides scaption 3 x 15 x 3 " holds  TA 4: Tables slides flexion 3 x 15 x 3 " holds    Time Entry(in minutes):  Manual Therapy Time Entry: 5  Neuromuscular Re-Education Time Entry: 25  Therapeutic Activity Time Entry: 25    Assessment " & Plan   Assessment: Clinton continues to have increased muscle guarding during ROM. We cont with the same intervention from last visit, he tolerated well. Cueing for proper form and tech, patient responded well to the ex. Will cont to progress per patient tolerance within protocols.  Evaluation/Treatment Tolerance: Patient tolerated treatment well    Patient will continue to benefit from skilled outpatient physical therapy to address the deficits listed in the problem list box on initial evaluation, provide pt/family education and to maximize pt's level of independence in the home and community environment.     Patient's spiritual, cultural, and educational needs considered and patient agreeable to plan of care and goals.           Plan: continue with POC    Goals:   Active       LTGs       Increase ROM to allow improved joint biomechanics during functional activities.    (Progressing)       Start:  04/09/25    Expected End:  07/02/25            2. Independent with home exercise program.   (Progressing)       Start:  04/09/25    Expected End:  07/02/25            3. Full return to functional activities with manageable complaints.   (Progressing)       Start:  04/09/25    Expected End:  07/02/25            4. Patient to demonstrate improved posture and body mechanics.   (Progressing)       Start:  04/09/25    Expected End:  07/02/25            5.Decrease pain by 75% during functional activities.   (Progressing)       Start:  04/09/25    Expected End:  07/02/25               STGs       Pt to increase strength by a 1/2 grade of muscles test to allow for improvement in functional activities such as performing chores.    (Progressing)       Start:  04/09/25    Expected End:  07/02/25            2. Pt to improve range of motion by 25% to allow for improved functional mobility to allow for improvement in IADL's.   (Progressing)       Start:  04/09/25    Expected End:  07/02/25            3. Pt to report compliance with HEP  and demonstrate proper exercise technique to PT to show competence with self management of condition.   (Progressing)       Start:  04/09/25    Expected End:  07/02/25            4. Decrease pain by 25% during functional activities.   (Progressing)       Start:  04/09/25    Expected End:  07/02/25                Robert To, PTA

## 2025-05-06 ENCOUNTER — HOSPITAL ENCOUNTER (OUTPATIENT)
Facility: HOSPITAL | Age: 75
Discharge: HOME | End: 2025-05-07
Attending: EMERGENCY MEDICINE | Admitting: EMERGENCY MEDICINE
Payer: MEDICARE

## 2025-05-06 ENCOUNTER — APPOINTMENT (OUTPATIENT)
Dept: RADIOLOGY | Facility: HOSPITAL | Age: 75
End: 2025-05-06
Attending: EMERGENCY MEDICINE
Payer: MEDICARE

## 2025-05-06 DIAGNOSIS — R06.02 SOB (SHORTNESS OF BREATH): ICD-10-CM

## 2025-05-06 DIAGNOSIS — R07.9 CHEST PAIN: ICD-10-CM

## 2025-05-06 DIAGNOSIS — R10.11 RIGHT UPPER QUADRANT ABDOMINAL PAIN: ICD-10-CM

## 2025-05-06 DIAGNOSIS — I25.118 CORONARY ARTERY DISEASE OF NATIVE ARTERY OF NATIVE HEART WITH STABLE ANGINA PECTORIS: ICD-10-CM

## 2025-05-06 PROBLEM — N17.9 AKI (ACUTE KIDNEY INJURY): Status: ACTIVE | Noted: 2025-05-06

## 2025-05-06 LAB
ALBUMIN SERPL-MCNC: 3.5 G/DL (ref 3.3–5.5)
ALBUMIN SERPL-MCNC: 3.6 G/DL (ref 3.3–5.5)
ALBUMIN SERPL-MCNC: 3.9 G/DL (ref 3.3–5.5)
ALP SERPL-CCNC: 56 U/L (ref 42–141)
ALP SERPL-CCNC: 60 U/L (ref 42–141)
ALP SERPL-CCNC: 61 U/L (ref 42–141)
BILIRUB SERPL-MCNC: 0.7 MG/DL (ref 0.2–1.6)
BILIRUB SERPL-MCNC: 0.8 MG/DL (ref 0.2–1.6)
BILIRUB SERPL-MCNC: 0.9 MG/DL (ref 0.2–1.6)
BUN SERPL-MCNC: 15 MG/DL (ref 7–22)
BUN SERPL-MCNC: 16 MG/DL (ref 7–22)
CALCIUM SERPL-MCNC: 9.2 MG/DL (ref 8–10.3)
CALCIUM SERPL-MCNC: 9.6 MG/DL (ref 8–10.3)
CHLORIDE SERPL-SCNC: 106 MMOL/L (ref 98–108)
CHLORIDE SERPL-SCNC: 108 MMOL/L (ref 98–108)
CREAT SERPL-MCNC: 1.8 MG/DL (ref 0.6–1.2)
CREAT SERPL-MCNC: 1.8 MG/DL (ref 0.6–1.2)
CTP QC/QA: YES
GLUCOSE SERPL-MCNC: 109 MG/DL (ref 73–118)
GLUCOSE SERPL-MCNC: 156 MG/DL (ref 73–118)
HCT, POC: NORMAL
HGB, POC: NORMAL (ref 14–18)
INFLUENZA A ANTIGEN, POC: NEGATIVE
INFLUENZA B ANTIGEN, POC: NEGATIVE
MCH, POC: NORMAL
MCHC, POC: NORMAL
MCV, POC: NORMAL
MPV, POC: NORMAL
POC ALT (SGPT): 13 U/L (ref 10–47)
POC ALT (SGPT): 8 U/L (ref 10–47)
POC ALT (SGPT): 8 U/L (ref 10–47)
POC AMYLASE: 28 U/L (ref 14–97)
POC AST (SGOT): 20 U/L (ref 11–38)
POC AST (SGOT): 20 U/L (ref 11–38)
POC AST (SGOT): 25 U/L (ref 11–38)
POC B-TYPE NATRIURETIC PEPTIDE: 13.3 PG/ML (ref 0–100)
POC CARDIAC TROPONIN I: 0 NG/ML (ref 0–0.08)
POC GGT: 17 U/L (ref 5–65)
POC PLATELET COUNT: NORMAL
POC TCO2: 27 MMOL/L (ref 18–33)
POC TCO2: 30 MMOL/L (ref 18–33)
POCT GLUCOSE: 113 MG/DL (ref 70–110)
POCT GLUCOSE: 126 MG/DL (ref 70–110)
POTASSIUM BLD-SCNC: 4.8 MMOL/L (ref 3.6–5.1)
POTASSIUM BLD-SCNC: 5.4 MMOL/L (ref 3.6–5.1)
PROTEIN, POC: 5.9 G/DL (ref 6.4–8.1)
PROTEIN, POC: 6.2 G/DL (ref 6.4–8.1)
PROTEIN, POC: 7.1 G/DL (ref 6.4–8.1)
RBC, POC: NORMAL
RDW, POC: NORMAL
SAMPLE: NORMAL
SARS-COV-2 RDRP RESP QL NAA+PROBE: NEGATIVE
SODIUM BLD-SCNC: 138 MMOL/L (ref 128–145)
SODIUM BLD-SCNC: 141 MMOL/L (ref 128–145)
TROPONIN I SERPL DL<=0.01 NG/ML-MCNC: <0.006 NG/ML
WBC, POC: NORMAL

## 2025-05-06 PROCEDURE — 25000003 PHARM REV CODE 250: Mod: HCNC | Performed by: PHYSICIAN ASSISTANT

## 2025-05-06 PROCEDURE — 25000003 PHARM REV CODE 250: Mod: HCNC,ER | Performed by: EMERGENCY MEDICINE

## 2025-05-06 PROCEDURE — 96361 HYDRATE IV INFUSION ADD-ON: CPT

## 2025-05-06 PROCEDURE — 93010 ELECTROCARDIOGRAM REPORT: CPT | Mod: HCNC,,, | Performed by: INTERNAL MEDICINE

## 2025-05-06 PROCEDURE — 93005 ELECTROCARDIOGRAM TRACING: CPT | Mod: HCNC,ER

## 2025-05-06 PROCEDURE — 99285 EMERGENCY DEPT VISIT HI MDM: CPT | Mod: 25,HCNC,ER

## 2025-05-06 PROCEDURE — 71045 X-RAY EXAM CHEST 1 VIEW: CPT | Mod: 26,HCNC,, | Performed by: RADIOLOGY

## 2025-05-06 PROCEDURE — 71045 X-RAY EXAM CHEST 1 VIEW: CPT | Mod: TC,HCNC,FY

## 2025-05-06 PROCEDURE — G0378 HOSPITAL OBSERVATION PER HR: HCPCS | Mod: HCNC

## 2025-05-06 PROCEDURE — 87804 INFLUENZA ASSAY W/OPTIC: CPT | Mod: HCNC,ER

## 2025-05-06 PROCEDURE — 84484 ASSAY OF TROPONIN QUANT: CPT | Mod: HCNC | Performed by: PHYSICIAN ASSISTANT

## 2025-05-06 PROCEDURE — G0378 HOSPITAL OBSERVATION PER HR: HCPCS | Mod: HCNC,ER

## 2025-05-06 PROCEDURE — 96360 HYDRATION IV INFUSION INIT: CPT | Mod: HCNC,ER

## 2025-05-06 PROCEDURE — 82962 GLUCOSE BLOOD TEST: CPT | Mod: HCNC,ER

## 2025-05-06 PROCEDURE — 36415 COLL VENOUS BLD VENIPUNCTURE: CPT | Mod: HCNC | Performed by: PHYSICIAN ASSISTANT

## 2025-05-06 PROCEDURE — 87635 SARS-COV-2 COVID-19 AMP PRB: CPT | Mod: HCNC,ER | Performed by: EMERGENCY MEDICINE

## 2025-05-06 RX ORDER — GLUCAGON 1 MG
1 KIT INJECTION
Status: DISCONTINUED | OUTPATIENT
Start: 2025-05-06 | End: 2025-05-07 | Stop reason: HOSPADM

## 2025-05-06 RX ORDER — ASPIRIN 325 MG
325 TABLET ORAL
Status: COMPLETED | OUTPATIENT
Start: 2025-05-06 | End: 2025-05-06

## 2025-05-06 RX ORDER — IBUPROFEN 200 MG
16 TABLET ORAL
Status: DISCONTINUED | OUTPATIENT
Start: 2025-05-06 | End: 2025-05-07 | Stop reason: HOSPADM

## 2025-05-06 RX ORDER — SODIUM CHLORIDE 9 MG/ML
INJECTION, SOLUTION INTRAVENOUS CONTINUOUS
Status: DISCONTINUED | OUTPATIENT
Start: 2025-05-06 | End: 2025-05-07 | Stop reason: HOSPADM

## 2025-05-06 RX ORDER — ONDANSETRON 4 MG/1
4 TABLET, ORALLY DISINTEGRATING ORAL EVERY 6 HOURS PRN
Status: DISCONTINUED | OUTPATIENT
Start: 2025-05-06 | End: 2025-05-07 | Stop reason: HOSPADM

## 2025-05-06 RX ORDER — AMOXICILLIN 250 MG
1 CAPSULE ORAL DAILY PRN
Status: DISCONTINUED | OUTPATIENT
Start: 2025-05-06 | End: 2025-05-07 | Stop reason: HOSPADM

## 2025-05-06 RX ORDER — ATORVASTATIN CALCIUM 40 MG/1
80 TABLET, FILM COATED ORAL NIGHTLY
Status: DISCONTINUED | OUTPATIENT
Start: 2025-05-06 | End: 2025-05-07 | Stop reason: HOSPADM

## 2025-05-06 RX ORDER — SODIUM CHLORIDE 0.9 % (FLUSH) 0.9 %
10 SYRINGE (ML) INJECTION
Status: DISCONTINUED | OUTPATIENT
Start: 2025-05-06 | End: 2025-05-07 | Stop reason: HOSPADM

## 2025-05-06 RX ORDER — CLOPIDOGREL BISULFATE 75 MG/1
75 TABLET ORAL DAILY
Status: DISCONTINUED | OUTPATIENT
Start: 2025-05-07 | End: 2025-05-07 | Stop reason: HOSPADM

## 2025-05-06 RX ORDER — NALOXONE HCL 0.4 MG/ML
0.02 VIAL (ML) INJECTION
Status: DISCONTINUED | OUTPATIENT
Start: 2025-05-06 | End: 2025-05-07 | Stop reason: HOSPADM

## 2025-05-06 RX ORDER — SODIUM CHLORIDE 0.9 % (FLUSH) 0.9 %
10 SYRINGE (ML) INJECTION EVERY 8 HOURS
Status: DISCONTINUED | OUTPATIENT
Start: 2025-05-06 | End: 2025-05-06

## 2025-05-06 RX ORDER — ASPIRIN 81 MG/1
81 TABLET ORAL DAILY
Status: DISCONTINUED | OUTPATIENT
Start: 2025-05-07 | End: 2025-05-07 | Stop reason: HOSPADM

## 2025-05-06 RX ORDER — INSULIN ASPART 100 [IU]/ML
0-5 INJECTION, SOLUTION INTRAVENOUS; SUBCUTANEOUS
Status: DISCONTINUED | OUTPATIENT
Start: 2025-05-06 | End: 2025-05-07 | Stop reason: HOSPADM

## 2025-05-06 RX ORDER — TALC
6 POWDER (GRAM) TOPICAL NIGHTLY PRN
Status: DISCONTINUED | OUTPATIENT
Start: 2025-05-06 | End: 2025-05-07 | Stop reason: HOSPADM

## 2025-05-06 RX ORDER — LEVOTHYROXINE SODIUM 75 UG/1
75 TABLET ORAL
Status: DISCONTINUED | OUTPATIENT
Start: 2025-05-07 | End: 2025-05-07 | Stop reason: HOSPADM

## 2025-05-06 RX ORDER — BENZONATATE 100 MG/1
100 CAPSULE ORAL 3 TIMES DAILY PRN
Status: DISCONTINUED | OUTPATIENT
Start: 2025-05-06 | End: 2025-05-07 | Stop reason: HOSPADM

## 2025-05-06 RX ORDER — CALCIUM CARBONATE 200(500)MG
500 TABLET,CHEWABLE ORAL DAILY PRN
Status: DISCONTINUED | OUTPATIENT
Start: 2025-05-06 | End: 2025-05-07 | Stop reason: HOSPADM

## 2025-05-06 RX ORDER — AMOXICILLIN 250 MG
1 CAPSULE ORAL 2 TIMES DAILY
Status: DISCONTINUED | OUTPATIENT
Start: 2025-05-06 | End: 2025-05-06

## 2025-05-06 RX ORDER — IBUPROFEN 200 MG
24 TABLET ORAL
Status: DISCONTINUED | OUTPATIENT
Start: 2025-05-06 | End: 2025-05-07 | Stop reason: HOSPADM

## 2025-05-06 RX ADMIN — ATORVASTATIN CALCIUM 80 MG: 40 TABLET, FILM COATED ORAL at 08:05

## 2025-05-06 RX ADMIN — SODIUM CHLORIDE: 9 INJECTION, SOLUTION INTRAVENOUS at 04:05

## 2025-05-06 RX ADMIN — SODIUM CHLORIDE 1000 ML: 9 INJECTION, SOLUTION INTRAVENOUS at 10:05

## 2025-05-06 RX ADMIN — ASPIRIN 325 MG ORAL TABLET 325 MG: 325 PILL ORAL at 10:05

## 2025-05-06 RX ADMIN — ONDANSETRON 4 MG: 4 TABLET, ORALLY DISINTEGRATING ORAL at 10:05

## 2025-05-06 NOTE — H&P
[facility]  History & Physical    Patient Name: Clinton Rosenberg  MRN: [id number]  Patient Class: OP- Observation  Admission Date: 5/6/2025  Attending Physician: Adonis Alicia MD   Primary Care Provider: Michael Gonzalez MD         Patient information was obtained from patient, past medical records, and ER records.     Subjective:     Principal Problem:Chest pain    Chief Complaint:   Chief Complaint   Patient presents with    Shortness of Breath    Dizziness    Chest Pain     A 75 y/o male presents to the ER c/o near syncopal episode, chest pain, shortness of breath, and dizziness x 2 days.   Pt report experiencing multiple near syncopal episodes accompanied with N/V x 2 days.  Pt has scheduled appointment with Neurologist for chronic HA post fall.   5 MI with 2 stents placed currently on Plavix.         HPI: Clinton Rosenberg 74 y.o. male with CAD, DM, hypothyroidism, HTN, HLD presents to the hospital chief complaint of lightheadedness near syncopal episodes chest pain.  He has had near syncopal episodes with associated lightheadedness that has been ongoing for multiple months.  He describes as lightheadedness has worsening with standing and improvement with sitting.  Finds his blood pressure has been on the lower limits of normal outpatient since the symptoms began.  He has also had 2 days of intermittent left-sided chest heaviness.  He is unsure if this feels similar to his previous angina.  He can not remember when he last took Ozempic.  He had surgery on his left rotator cuff in March and since that time he has had a significant weight loss.  He denies fever leg swelling melena hematuria hematemesis and syncope.    In the ED, afebrile without leukocytosis initial creatinine 1.8 repeat 1.4 troponin negative TSH within normal limits ultrasound abdomen without findings to suggest acute cholecystitis chest x-ray without acute cardiopulmonary process.    Past Medical History:    Diagnosis Date    Allergy     Angina pectoris     Anxiety     Cancer     skin rwemoved from head    Chronic midline low back pain without sciatica 10/26/2022    Coronary artery disease     Depression     Eye injury as a teen    stuck object in os    GERD (gastroesophageal reflux disease)     Hyperlipidemia     Hypertension     Hypothyroidism     Memory loss     12/3/2014 MRI brain: 1. No evidence for acute infarct 2. unremarkable MRI of the brain; 12/3/2014 carotid US normal    Myocardial infarction     Nuclear sclerosis of both eyes 05/20/2021    Obesity     Peripheral vascular disease 06/20/2017    Retrocalcaneal bursitis 11/24/2014    Sleep apnea     Type 2 diabetes mellitus with other specified complication 04/27/2023    Type 2 diabetes mellitus with other specified complication 04/27/2023       Past Surgical History:   Procedure Laterality Date    APPENDECTOMY  1986    ARTHROSCOPY,SHOULDER,WITH BICEPS TENODESIS Left 4/10/2025    Procedure: ARTHROSCOPY, SHOULDER, W/ BICEPS TENODESIS;  Surgeon: Eugenio Reese MD;  Location: [facility] OR;  Service: Orthopedics;  Laterality: Left;    bone spur Right     COLONOSCOPY      COLONOSCOPY N/A 10/26/2023    Procedure: COLONOSCOPY;  Surgeon: Argelia Altamirano MD;  Location: [facility];  Service: Endoscopy;  Laterality: N/A;  Referred by: Dr. Michael Gonzalez  BT/Clearance: ok to hold Plavix 5 days per Dr Dawn  Prep: golytely  Route instructions sent: [id number]  8/3 proc jessica to 10/26, pt has prep and instr  10/19- pre call complete.  DBM    ESOPHAGOGASTRODUODENOSCOPY N/A 8/2/2024    Procedure: EGD (ESOPHAGOGASTRODUODENOSCOPY);  Surgeon: Argelia Altamirano MD;  Location: [facility];  Service: Endoscopy;  Laterality: N/A;  Dr. Porter, Urgent EGD, abdominal pain, standard prep, Instr to portal. Plavix hold pending.  ok to hold Plavix 5 days per Dr Dawn  7/26/24- lvm/portal for pc. DBM  7/30 pt confirmed. has been holding plavix since 7/27 cf  7/30/24- pc complete.  DBM    hand trauma Right     pencil     heart stent      LEFT HEART CATHETERIZATION Left 9/20/2019    Procedure: Left heart cath 12 pm start, R rad access;  Surgeon: Brad Bahena MD;  Location: [facility] CATH LAB;  Service: Cardiology;  Laterality: Left;  RN PREOP 9/13/2019  NEEDS IODINE PREMED    LEFT HEART CATHETERIZATION Left 9/21/2020    Procedure: Left heart cath 730am start, R rad access;  Surgeon: Brad Bahena MD;  Location: [facility] CATH LAB;  Service: Cardiology;  Laterality: Left;  RN PREOP 9/14/2020, COVID NEGATIVE---9/18    REVERSE TOTAL SHOULDER ARTHROPLASTY Left 4/10/2025    Procedure: ARTHROPLASTY, SHOULDER, TOTAL, REVERSE;  Surgeon: Eugenio Reese MD;  Location: [facility] OR;  Service: Orthopedics;  Laterality: Left;  regional w/ catheter (interscalene), ASHLYN 30cc       Review of patient's allergies indicates:   Allergen Reactions    Iodine and iodide containing products Anaphylaxis    Naproxen Other (See Comments)     hallucinations  Hallucinations^  Hallucinations^    Hydrocodone-acetaminophen      Rash (skin)^    Iodine      Anaphylaxis^       No current facility-administered medications on file prior to encounter.     Current Outpatient Medications on File Prior to Encounter   Medication Sig    amLODIPine (NORVASC) 5 MG tablet TAKE 1 TABLET ONE TIME DAILY    aspirin (ECOTRIN) 81 MG EC tablet Take 1 tablet (81 mg total) by mouth once daily.    atorvastatin (LIPITOR) 80 MG tablet Take 1 tablet (80 mg total) by mouth every evening.    ciclopirox (LOPROX) 0.77 % Crea Apply topically 2 (two) times daily.    clopidogreL (PLAVIX) 75 mg tablet Take 1 tablet (75 mg total) by mouth once daily.    clotrimazole-betamethasone 1-0.05% (LOTRISONE) cream Apply topically 2 (two) times daily.    doxycycline (MONODOX) 100 MG capsule Take 1 capsule (100 mg total) by mouth 2 (two) times daily.    furosemide (LASIX) 20 MG tablet Take 1 tablet (20 mg total) by mouth once daily.    gabapentin (NEURONTIN) 300 MG  capsule Take 1 capsule (300 mg total) by mouth every evening.    isosorbide mononitrate (IMDUR) 120 MG 24 hr tablet Take 2 tablets (240 mg total) by mouth once daily.    ketoconazole (NIZORAL) 2 % cream Apply topically 2 (two) times daily. for 14 days    KRILL/OM3/DHA/EPA/OM6/LIP/ASTX (KRILL OIL, OMEGA 3 AND 6, ORAL) Take by mouth. (Patient not taking: Reported on 4/23/2025)    levothyroxine (SYNTHROID) 75 MCG tablet TAKE 1 TABLET BEFORE BREAKFAST    metFORMIN (GLUCOPHAGE-XR) 500 MG ER 24hr tablet Take 1 tablet (500 mg total) by mouth once daily.    methocarbamoL (ROBAXIN) 500 MG Tab Take 1 tablet (500 mg total) by mouth 3 (three) times daily as needed (muscle spasms).    metoprolol succinate (TOPROL-XL) 25 MG 24 hr tablet TAKE 1 TABLET ONE TIME DAILY    nitroGLYCERIN (NITROSTAT) 0.4 MG SL tablet PLACE 1 TABLET (0.4 MG TOTAL) UNDER THE TONGUE EVERY 5 (FIVE) MINUTES AS NEEDED FOR CHEST PAIN.    nystatin (MYCOSTATIN) powder Apply topically 4 (four) times daily as needed (to keep penis/scrotum dry).    olmesartan (BENICAR) 40 MG tablet Take 1 tablet (40 mg total) by mouth once daily.    oxyCODONE (ROXICODONE) 5 MG immediate release tablet Take 1-2 tablets every 6 hours as needed for pain.    pantoprazole (PROTONIX) 40 MG tablet Take 1 tablet (40 mg total) by mouth once daily.    pregabalin (LYRICA) 75 MG capsule Take 1 capsule (75 mg total) by mouth 2 (two) times daily.    promethazine (PHENERGAN) 25 MG tablet Take 1 tablet (25 mg total) by mouth every 4 (four) hours as needed for Nausea.    semaglutide (OZEMPIC) 0.25 mg or 0.5 mg (2 mg/3 mL) pen injector Inject 0.5 mg into the skin every 7 days.    tamsulosin (FLOMAX) 0.4 mg Cap Take 1 capsule (0.4 mg total) by mouth once daily.    VASCEPA 1 gram Cap Take 2 capsules (2,000 mg total) by mouth 2 (two) times a day.    vitamin D 1000 units Tab Take 1 tablet (1,000 Units total) by mouth once daily.     Family History       Problem Relation (Age of Onset)    Alcohol abuse  Father    Arthritis Mother, Sister, Brother, Brother    Cancer Mother, Brother    Depression Sister    Diabetes Mother, Father, Brother, Brother    Early death Mother, Father, Brother    Heart disease Mother, Father, Sister, Brother, Brother, Brother, Brother, Brother    Hypertension Mother, Father, Sister, Brother, Brother    Migraines Mother    No Known Problems Maternal Aunt, Maternal Uncle, Paternal Aunt, Paternal Uncle, Maternal Grandmother, Maternal Grandfather, Paternal Grandmother, Paternal Grandfather    Pneumonia Brother    Seizures Mother    Stroke Father    Tremor Mother          Tobacco Use    Smoking status: Never     Passive exposure: Never    Smokeless tobacco: Never   Substance and Sexual Activity    Alcohol use: Not Currently     Comment: rarely    Drug use: No    Sexual activity: Not Currently     Review of Systems   Constitutional:  Negative for chills and fever.   HENT:  Negative for nosebleeds and tinnitus.    Eyes:  Negative for photophobia and visual disturbance.   Respiratory:  Negative for shortness of breath and wheezing.    Cardiovascular:  Positive for chest pain. Negative for palpitations and leg swelling.   Gastrointestinal:  Negative for abdominal distention, nausea and vomiting.   Genitourinary:  Negative for dysuria, flank pain and hematuria.   Musculoskeletal:  Negative for gait problem and joint swelling.   Skin:  Negative for rash and wound.   Neurological:  Positive for light-headedness. Negative for seizures and syncope.     Objective:     Vital Signs (Most Recent):  Temp: 98.3 °F (36.8 °C) (05/06/25 1543)  Pulse: 65 (05/06/25 1543)  Resp: 20 (05/06/25 1543)  BP: 117/68 (05/06/25 1543)  SpO2: 96 % (05/06/25 1543) Vital Signs (24h Range):  Temp:  [98.3 °F (36.8 °C)-98.6 °F (37 °C)] 98.3 °F (36.8 °C)  Pulse:  [65-86] 65  Resp:  [17-20] 20  SpO2:  [95 %-100 %] 96 %  BP: ()/(53-68) 117/68     Weight: 102.5 kg (226 lb)  Body mass index is 32.43 kg/m².     Physical Exam  Vitals  and nursing note reviewed.   Constitutional:       General: He is not in acute distress.     Appearance: He is well-developed.   HENT:      Head: Normocephalic and atraumatic.      Right Ear: External ear normal.      Left Ear: External ear normal.   Eyes:      General:         Right eye: No discharge.         Left eye: No discharge.      Conjunctiva/sclera: Conjunctivae normal.   Neck:      Thyroid: No thyromegaly.   Cardiovascular:      Rate and Rhythm: Normal rate and regular rhythm.      Heart sounds: No murmur heard.  Pulmonary:      Effort: Pulmonary effort is normal. No respiratory distress.      Breath sounds: Normal breath sounds.   Abdominal:      General: Bowel sounds are normal. There is no distension.      Palpations: Abdomen is soft. There is no mass.      Tenderness: There is no abdominal tenderness.   Musculoskeletal:         General: No deformity.      Cervical back: Normal range of motion.      Comments: Left arm in sling. Sensation intact to hand   Skin:     General: Skin is warm and dry.   Neurological:      Mental Status: He is alert and oriented to person, place, and time.      Sensory: No sensory deficit.   Psychiatric:         Mood and Affect: Mood normal.         Behavior: Behavior normal.                Significant Labs: CBC:   Recent Labs   Lab 05/06/25  0743   WBC 8.41   HGB 14.3   HCT 43.6        CMP:   Recent Labs   Lab 05/06/25  0743      K 4.4      CO2 25   *   BUN 14   CREATININE 1.4   CALCIUM 9.2   PROT 6.6   ALBUMIN 3.7   BILITOT 0.8   ALKPHOS 64   AST 11   ALT 11   ANIONGAP 13     TSH:   Recent Labs   Lab 05/06/25  0743   TSH 3.237       Significant Imaging:   Imaging Results              US Abdomen Limited (Gallbladder) (Final result)  Result time 05/06/25 12:57:21      Final result by Helder Clements MD (05/06/25 12:57:21)                   Impression:      No findings to suggest acute cholecystitis.    Possible hepatic steatosis, correlation with  "LFTs recommended.      Electronically signed by: Helder Clements MD  Date:    05/06/2025  Time:    12:57               Narrative:    EXAMINATION:  US ABDOMEN LIMITED    CLINICAL HISTORY:  Right upper quadrant pain    TECHNIQUE:  Limited ultrasound of the right upper quadrant of the abdomen (including pancreas, liver, gallbladder, common bile duct, and spleen) was performed.    COMPARISON:  Renal ultrasound 04/15/2023, CT abdomen and pelvis 04/26/2024    FINDINGS:  The visualized portions of the pancreas are grossly unremarkable noting the pancreas is for the most part obscured by overlying soft tissue structures.  The gallbladder is nondistended.  No gallbladder wall thickening or pericholecystic fluid.  No gallbladder wall hyperemia.  Sonographic Echeverria sign is negative.  The duct is not dilated measuring 6 mm.  The hepatic parenchyma is echogenic, may reflect changes of steatosis.  The visualized portions of the right kidney are unremarkable.  No ascites.                                       X-Ray Chest AP Portable (Final result)  Result time 05/06/25 12:15:57      Final result by Darrian Martinez MD (05/06/25 12:15:57)                   Impression:      No acute cardiopulmonary finding identified on this single view.      Electronically signed by: Darrian Martinez MD  Date:    05/06/2025  Time:    12:15               Narrative:    EXAMINATION:  XR CHEST AP PORTABLE    CLINICAL HISTORY:  Provided history is "shortness of breath and chest pain;  ".    TECHNIQUE:  One view of the chest.    COMPARISON:  01/07/2025.    FINDINGS:  Cardiac silhouette is not enlarged.  No focal consolidation.  No sizable pleural effusion.  No pneumothorax.  Left shoulder arthroplasty.                                      Assessment/Plan:     Assessment & Plan  Chest pain  Presents with chest pain with history of CAD> EKG without ST elevation, troponin negative, chest x-ray without acute process. US abdomen without findings to suggest " cholecystitis. Also presents with lightheadedness described as near syncope when attempting to stand. With associated weight loss over the last couple of months. Suspect cause of lower BP due to overtreatment of BP in setting of weightloss  Continue aspirin/statin/plavix  Troponin trend  Telemetry  Echo  Cardiology consulted given history of CAD  Mixed hyperlipidemia long term DAPT  Continue statin  Essential hypertension  Patient's blood pressure range in the last 24 hours was: BP  Min: 91/60  Max: 117/68.The patient's inpatient anti-hypertensive regimen is listed below:  Current Antihypertensives       Plan  - BP is controlled, no changes needed to their regimen  - BP has been on lower limits of normal. Will hold home amlodipine, imdur, and olmesartan  Benign non-nodular prostatic hyperplasia with lower urinary tract symptoms  Home flomax held for BP on lower limits of normal  Coronary artery disease of native artery of native heart with stable angina pectoris; 2021 plan is long term DAPT  Patient with known CAD s/p stent placement, which is controlled Will continue ASA and Statin and monitor for S/Sx of angina/ACS. Continue to monitor on telemetry.     As above    9/21/2020 C  Dominance: Right  LM: normal  LAD: MLI, mid stent patent (2013)  Ramus: MLI, prox 30%  LCx: MLI, ost 40%  RCA: MLI, dist eccentric 40-50%              RPDA: ost 50%, iFR 0.97              RPLB mid stent patent (9/20/19 2.5x18 Resolute Mittie SILVER)  Hypothyroidism  Lab Results   Component Value Date    TSH 3.237 05/06/2025     Continue home synthroid  Class 1 obesity with body mass index (BMI) of 32.0 to 32.9 in adult  Body mass index is 32.61 kg/m². Morbid obesity complicates all aspects of disease management from diagnostic modalities to treatment. Weight loss encouraged and health benefits explained to patient.  Diabetes mellitus type 2 without retinopathy  Patient's FSGs are controlled on current medication regimen.  Last A1c reviewed-  "  Lab Results   Component Value Date    HGBA1C 5.8 (H) 05/06/2025     Most recent fingerstick glucose reviewed- No results for input(s): "POCTGLUCOSE" in the last 24 hours.  Current correctional scale  Low  Maintain anti-hyperglycemic dose as follows-   Antihyperglycemics (From admission, onward)      Start     Stop Route Frequency Ordered    05/06/25 1412  insulin aspart U-100 pen 0-5 Units         -- SubQ Before meals & nightly PRN 05/06/25 1312          Hold Oral hypoglycemics while patient is in the hospital.  DANIELE (acute kidney injury)  DANIELE is likely due to pre-renal azotemia due to intravascular volume depletion. Baseline creatinine is 0.9 to 1.1. Most recent creatinine and eGFR are listed below.  Recent Labs     05/06/25  0743   CREATININE 1.4   EGFRNORACEVR 53*      Plan  - DANIELE is stable  - Avoid nephrotoxins and renally dose meds for GFR listed above  - Monitor urine output, serial BMP, and adjust therapy as needed  - presents with Cr of 1.8 and 1.4 prior to arrival. home olmesartan held. Suspect related to lower blood pressure given weight loss and orthostatic symptoms for the last month  Check UA/retropertioneal US (history of BPH)  Continue IVF   VTE Risk Mitigation (From admission, onward)           Ordered     IP VTE HIGH RISK PATIENT  Once         05/06/25 1313     Place sequential compression device  Until discontinued         05/06/25 1313     Place ROCAEL hose  Until discontinued         05/06/25 1313                  Discussed with ED physician.    VTE: rocael/scd  Code: Full  Diet: diabetic  Dispo: pending cardiology eval and improvement in DANIELE    On 05/06/2025, patient should be placed in hospital observation services under my care in collaboration with Caio Walter MD.         Andreas Crowder PA-C  Department of Hospital Medicine  [facility] - Med Surg          "

## 2025-05-06 NOTE — PLAN OF CARE
Problem: Adult Inpatient Plan of Care  Goal: Plan of Care Review  Outcome: Progressing  Flowsheets (Taken 5/6/2025 1716)  Plan of Care Reviewed With: patient  Goal: Patient-Specific Goal (Individualized)  Outcome: Progressing  Goal: Absence of Hospital-Acquired Illness or Injury  Outcome: Progressing  Intervention: Identify and Manage Fall Risk  Flowsheets (Taken 5/6/2025 1716)  Safety Promotion/Fall Prevention:   assistive device/personal item within reach   nonskid shoes/socks when out of bed   side rails raised x 2   medications reviewed   high risk medications identified   bed alarm set  Intervention: Prevent Skin Injury  Flowsheets (Taken 5/6/2025 1716)  Body Position: position changed independently  Skin Protection: incontinence pads utilized  Device Skin Pressure Protection: absorbent pad utilized/changed  Intervention: Prevent and Manage VTE (Venous Thromboembolism) Risk  Flowsheets (Taken 5/6/2025 1716)  VTE Prevention/Management:   bleeding precautions maintained   ambulation promoted  Goal: Optimal Comfort and Wellbeing  Outcome: Progressing  Intervention: Monitor Pain and Promote Comfort  Flowsheets (Taken 5/6/2025 1716)  Pain Management Interventions:   pillow support provided   quiet environment facilitated   relaxation techniques promoted  Intervention: Provide Person-Centered Care  Flowsheets (Taken 5/6/2025 1716)  Trust Relationship/Rapport:   care explained   choices provided   emotional support provided   questions answered   empathic listening provided   questions encouraged   reassurance provided   thoughts/feelings acknowledged  Goal: Readiness for Transition of Care  Outcome: Progressing     Problem: Diabetes Comorbidity  Goal: Blood Glucose Level Within Targeted Range  Outcome: Progressing     Problem: Acute Kidney Injury/Impairment  Goal: Fluid and Electrolyte Balance  Outcome: Progressing  Goal: Improved Oral Intake  Outcome: Progressing  Goal: Effective Renal Function  Outcome:  How Severe Are Your Spot(S)?: mild Progressing     Problem: Wound  Goal: Optimal Coping  Outcome: Progressing  Goal: Optimal Functional Ability  Outcome: Progressing  Goal: Absence of Infection Signs and Symptoms  Outcome: Progressing  Goal: Improved Oral Intake  Outcome: Progressing  Goal: Optimal Pain Control and Function  Outcome: Progressing  Goal: Skin Health and Integrity  Outcome: Progressing  Goal: Optimal Wound Healing  Outcome: Progressing    Pt alert and able to make needs known. IVF remain in progress, tolerated well. Bed in lowest position, call light with in reach, and instructed to call for any assistance. Continue with plan of care.       What Type Of Note Output Would You Prefer (Optional)?: Standard Output What Is The Reason For Today's Visit?: Skin Lesion What Is The Reason For Today's Visit? (Being Monitored For X): the development of a new lesion

## 2025-05-06 NOTE — ASSESSMENT & PLAN NOTE
DANIELE is likely due to pre-renal azotemia due to intravascular volume depletion. Baseline creatinine is 0.9 to 1.1. Most recent creatinine and eGFR are listed below.  Recent Labs     05/06/25  0743   CREATININE 1.4   EGFRNORACEVR 53*      Plan  - DANIELE is stable  - Avoid nephrotoxins and renally dose meds for GFR listed above  - Monitor urine output, serial BMP, and adjust therapy as needed  - presents with Cr of 1.8 and 1.4 prior to arrival. home olmesartan held. Suspect related to lower blood pressure given weight loss and orthostatic symptoms for the last month  Check UA/retropertioneal US (history of BPH)  Continue IVF

## 2025-05-06 NOTE — ASSESSMENT & PLAN NOTE
Presents with chest pain with history of CAD> EKG without ST elevation, troponin negative, chest x-ray without acute process. US abdomen without findings to suggest cholecystitis. Also presents with lightheadedness described as near syncope when attempting to stand. With associated weight loss over the last couple of months. Suspect cause of lower BP due to overtreatment of BP in setting of weightloss  Continue aspirin/statin/plavix  Troponin trend  Telemetry  Echo  Cardiology consulted given history of CAD

## 2025-05-06 NOTE — ASSESSMENT & PLAN NOTE
Patient with known CAD s/p stent placement, which is controlled Will continue ASA and Statin and monitor for S/Sx of angina/ACS. Continue to monitor on telemetry.     As above    9/21/2020 LHC  Dominance: Right  LM: normal  LAD: MLI, mid stent patent (2013)  Ramus: MLI, prox 30%  LCx: MLI, ost 40%  RCA: MLI, dist eccentric 40-50%              RPDA: ost 50%, iFR 0.97              RPLB mid stent patent (9/20/19 2.5x18 Resolute Yon SILVER)

## 2025-05-06 NOTE — ED PROVIDER NOTES
"Encounter Date: 5/6/2025    SCRIBE #1 NOTE: I, Glynn Harris Do, am scribing for, and in the presence of,  Chinyere Sargent MD. I have scribed the following portions of the note - Other sections scribed: HPI, ROS, PE.       History     Chief Complaint   Patient presents with    Shortness of Breath    Dizziness    Chest Pain     A 73 y/o male presents to the ER c/o near syncopal episode, chest pain, shortness of breath, and dizziness x 2 days.   Pt report experiencing multiple near syncopal episodes accompanied with N/V x 2 days.  Pt has scheduled appointment with Neurologist for chronic HA post fall.   5 MI with 2 stents placed currently on Plavix.      Clinton Rosenberg is a 74 y.o. male, with a pertinent PMHx of aortic ectasia, CAD, diabetes, PVD, HLD, HTN, GERD, BPH, hypothyroidism, JAYLEN, and MI s/p left heart catheterization 09/21/2020, who presents to the ED with left chest pain described as "heaviness" onset yesterday morning. Patient reports SOB, headache, subjective fever, and productive cough. He also notes nausea and emesis for the past 2 days, states symptoms have improved this morning. Patient states having about 5 near syncopal episodes with standing for the past "couple" days. He notes relief after sitting down. Patient reports having a blood pressure machine at-home, states his blood pressure is normally around 150's/80's mmHg. Patient reports noticing elevated blood pressure a couple days ago, however denies noticing any decreased blood pressure for the past couple days. He notes having lab work performed this morning. Patient reports 5 previous myocardial infarctions with multiple stent placements. Patient denies a history of COPD or other lung disease. He reports adherence with Plavix. Per spouse, independent historian, she denies any attempted treatment with NTG or Aspirin 81 mg today. No other exacerbating or alleviating factors. Patient denies leg swelling or other associated symptoms.     Patient " notes wearing a left arm sling following a recent fall onto his left shoulder about 1 month ago. Per chart review 04/10/2025, left shoulder total arthroplasty and left bicep tenodesis. Spouse also reports previous head trauma and fall in March 2025. 03/06/2025, patient was seen at [facility] ED following fall. CT Head, Maxillofacial, and cervical spine without any acute intracranial findings, facial bone fractures, and cervical spine fractures. Patient discharged home with Cephalexin and Norco. She denies seeing neurology since this fall, states having an upcoming neurology appointment.     The history is provided by the patient and the spouse. No  was used.     Review of patient's allergies indicates:   Allergen Reactions    Iodine and iodide containing products Anaphylaxis    Naproxen Other (See Comments)     hallucinations  Hallucinations^  Hallucinations^    Hydrocodone-acetaminophen      Rash (skin)^    Iodine      Anaphylaxis^     Past Medical History:   Diagnosis Date    Allergy     Angina pectoris     Anxiety     Cancer     skin rwemoved from head    Chronic midline low back pain without sciatica 10/26/2022    Coronary artery disease     Depression     Eye injury as a teen    stuck object in os    GERD (gastroesophageal reflux disease)     Hyperlipidemia     Hypertension     Hypothyroidism     Memory loss     12/3/2014 MRI brain: 1. No evidence for acute infarct 2. unremarkable MRI of the brain; 12/3/2014 carotid US normal    Myocardial infarction     Nuclear sclerosis of both eyes 05/20/2021    Obesity     Peripheral vascular disease 06/20/2017    Retrocalcaneal bursitis 11/24/2014    Sleep apnea     Type 2 diabetes mellitus with other specified complication 04/27/2023    Type 2 diabetes mellitus with other specified complication 04/27/2023     Past Surgical History:   Procedure Laterality Date    APPENDECTOMY  1986    ARTHROSCOPY,SHOULDER,WITH BICEPS TENODESIS Left 4/10/2025    Procedure:  ARTHROSCOPY, SHOULDER, W/ BICEPS TENODESIS;  Surgeon: Eugenio Reese MD;  Location: [facility] OR;  Service: Orthopedics;  Laterality: Left;    bone spur Right     COLONOSCOPY      COLONOSCOPY N/A 10/26/2023    Procedure: COLONOSCOPY;  Surgeon: Argelia Altamirano MD;  Location: [facility] ENDO;  Service: Endoscopy;  Laterality: N/A;  Referred by: Dr. Michael Gonzalez  BT/Clearance: ok to hold Plavix 5 days per Dr Dawn  Prep: golytely  Route instructions sent: [id number]  8/3 proc jessica to 10/26, pt has prep and instr  10/19- pre call complete.  DBM    ESOPHAGOGASTRODUODENOSCOPY N/A 8/2/2024    Procedure: EGD (ESOPHAGOGASTRODUODENOSCOPY);  Surgeon: Argelia Altamirano MD;  Location: [facility] ENDO;  Service: Endoscopy;  Laterality: N/A;  Dr. Porter, Urgent EGD, abdominal pain, standard prep, Instr to portal. Plavix hold pending.  ok to hold Plavix 5 days per Dr Dawn  7/26/24- lvm/portal for pc. DBM  7/30 pt confirmed. has been holding plavix since 7/27 cf  7/30/24- pc complete. DBM    hand trauma Right     pencil     heart stent      LEFT HEART CATHETERIZATION Left 9/20/2019    Procedure: Left heart cath 12 pm start, R rad access;  Surgeon: Brad Bahena MD;  Location: [facility] CATH LAB;  Service: Cardiology;  Laterality: Left;  RN PREOP 9/13/2019  NEEDS IODINE PREMED    LEFT HEART CATHETERIZATION Left 9/21/2020    Procedure: Left heart cath 730am start, R rad access;  Surgeon: Brad Bahena MD;  Location: [facility] CATH LAB;  Service: Cardiology;  Laterality: Left;  RN PREOP 9/14/2020, COVID NEGATIVE---9/18    REVERSE TOTAL SHOULDER ARTHROPLASTY Left 4/10/2025    Procedure: ARTHROPLASTY, SHOULDER, TOTAL, REVERSE;  Surgeon: Eugenio Reese MD;  Location: [facility] OR;  Service: Orthopedics;  Laterality: Left;  regional w/ catheter (interscalene), ASHLYN 30cc     Family History   Problem Relation Name Age of Onset    Arthritis Mother      Early death Mother      Heart disease Mother      Hypertension Mother      Migraines  Mother      Seizures Mother      Tremor Mother      Diabetes Mother      Cancer Mother      Early death Father      Heart disease Father      Hypertension Father      Stroke Father      Diabetes Father      Alcohol abuse Father      Heart disease Sister Otilia     Hypertension Sister Otilia     Arthritis Sister Otilia     Depression Sister Otilia     Arthritis Brother Maximino     Cancer Brother Maximino     Diabetes Brother Maximino     Early death Brother Maximino     Heart disease Brother Maximino     Hypertension Brother Maximino     Heart disease Brother Robetr     Arthritis Brother Robert     Heart disease Brother Azael     Pneumonia Brother Azael     Heart disease Brother Martin     Hypertension Brother Martin     Diabetes Brother Martin     Heart disease Brother Dario     No Known Problems Maternal Aunt      No Known Problems Maternal Uncle      No Known Problems Paternal Aunt      No Known Problems Paternal Uncle      No Known Problems Maternal Grandmother      No Known Problems Maternal Grandfather      No Known Problems Paternal Grandmother      No Known Problems Paternal Grandfather      Amblyopia Neg Hx      Blindness Neg Hx      Cataracts Neg Hx      Glaucoma Neg Hx      Macular degeneration Neg Hx      Retinal detachment Neg Hx      Strabismus Neg Hx      Thyroid disease Neg Hx       Social History[1]  Review of Systems   Constitutional:  Positive for fever. Negative for activity change and fatigue.   HENT:  Negative for facial swelling.    Eyes:  Negative for pain.   Respiratory:  Positive for cough and shortness of breath. Negative for chest tightness.    Cardiovascular:  Positive for chest pain. Negative for leg swelling.   Gastrointestinal:  Positive for nausea and vomiting. Negative for abdominal pain, constipation and diarrhea.   Genitourinary:  Negative for difficulty urinating and dysuria.   Musculoskeletal:  Negative for back pain.   Skin:  Negative for rash.   Neurological:  Positive for light-headedness.  Negative for weakness and headaches.   Hematological:  Negative for adenopathy.   Psychiatric/Behavioral:  Negative for behavioral problems.        Physical Exam     Initial Vitals   BP Pulse Resp Temp SpO2   05/06/25 1025 05/06/25 1015 05/06/25 1025 05/06/25 1025 05/06/25 1025   103/63 86 18 98.6 °F (37 °C) 96 %      MAP       --                Physical Exam    Nursing note and vitals reviewed.  Constitutional: He appears well-developed.   HENT:   Head: Normocephalic.   Eyes: Conjunctivae are normal.   Neck: Neck supple.   Cardiovascular:  Regular rhythm and normal heart sounds.           Pulmonary/Chest: No respiratory distress. He has wheezes.   Scattered wheezing to all lung fields.    Abdominal: He exhibits no distension.   Musculoskeletal:         General: Normal range of motion.      Cervical back: Neck supple.      Right lower leg: No edema.      Left lower leg: No edema.      Comments: No lower extremity edema bilaterally.      Neurological: He is alert.   Skin: Skin is warm and dry.   Psychiatric: He has a normal mood and affect.         ED Course   Procedures  Labs Reviewed   POCT CMP - Abnormal       Result Value    Albumin, POC 3.9      Alkaline Phosphatase, POC 56      ALT (SGPT), POC 13      AST (SGOT), POC 25      POC BUN 15      Calcium, POC 9.6      POC Chloride 108      POC Creatinine 1.8 (*)     POC Glucose 156 (*)     POC Potassium 5.4 (*)     POC Sodium 138      Bilirubin, POC 0.7      POC TCO2 30      Protein, POC 7.1     POCT CMP - Abnormal    Albumin, POC 3.5      Alkaline Phosphatase, POC 60      ALT (SGPT), POC 8 (*)     AST (SGOT), POC 20      POC BUN 16      Calcium, POC 9.2      POC Chloride 106      POC Creatinine 1.8 (*)     POC Glucose 109      POC Potassium 4.8      POC Sodium 141      Bilirubin, POC 0.9      POC TCO2 27      Protein, POC 6.2 (*)    POCT LIVER PANEL - Abnormal    Albumin, POC 3.6      Alkaline Phosphatase, POC 61      ALT (SGPT), POC 8 (*)     Amylase, POC 28       AST (SGOT), POC 20      POC GGT 17      Bilirubin, POC 0.8      Protein, POC 5.9 (*)    TROPONIN ISTAT    POC Cardiac Troponin I 0.00      Sample unknown     POCT CBC    Hematocrit        Hemoglobin        RBC        WBC        MCV        MCH, POC        MCHC        RDW-CV        Platelet Count, POC        MPV       SARS-COV-2 RDRP GENE    POC Rapid COVID Negative       Acceptable Yes      Narrative:     This test utilizes isothermal nucleic acid amplification technology to detect the SARS-CoV-2 RdRp nucleic acid segment. The analytical sensitivity (limit of detection) is 500 copies/swab.     A POSITIVE result is indicative of the presence of SARS-CoV-2 RNA; clinical correlation with patient history and other diagnostic information is necessary to determine patient infection status.    A NEGATIVE result means that SARS-CoV-2 nucleic acids are not present above the limit of detection. A NEGATIVE result should be treated as presumptive. It does not rule out the possibility of COVID-19 and should not be the sole basis for treatment decisions. If COVID-19 is strongly suspected based on clinical and exposure history, re-testing using an alternate molecular assay should be considered.     Commercial kits are provided by [employer].       POCT INFLUENZA A/B MOLECULAR   POCT B-TYPE NATRIURETIC PEPTIDE (BNP)   POCT CMP   POCT TROPONIN   POCT RAPID INFLUENZA A/B    Influenza B Ag negative      Inflenza A Ag negative     POCT B-TYPE NATRIURETIC PEPTIDE (BNP)    POC B-Type Natriuretic Peptide 13.3     POCT CMP   POCT LIVER PANEL   POCT GLUCOSE MONITORING CONTINUOUS     EKG Readings: (Independently Interpreted)   Initial Reading: No STEMI. Rhythm: Normal Sinus Rhythm. Heart Rate: 86. Ectopy: No Ectopy.       Imaging Results              US Abdomen Limited (Gallbladder) (Final result)  Result time 05/06/25 12:57:21      Final result by Helder Clements MD (05/06/25 12:57:21)                   Impression:  "     No findings to suggest acute cholecystitis.    Possible hepatic steatosis, correlation with LFTs recommended.      Electronically signed by: Helder Clements MD  Date:    05/06/2025  Time:    12:57               Narrative:    EXAMINATION:  US ABDOMEN LIMITED    CLINICAL HISTORY:  Right upper quadrant pain    TECHNIQUE:  Limited ultrasound of the right upper quadrant of the abdomen (including pancreas, liver, gallbladder, common bile duct, and spleen) was performed.    COMPARISON:  Renal ultrasound 04/15/2023, CT abdomen and pelvis 04/26/2024    FINDINGS:  The visualized portions of the pancreas are grossly unremarkable noting the pancreas is for the most part obscured by overlying soft tissue structures.  The gallbladder is nondistended.  No gallbladder wall thickening or pericholecystic fluid.  No gallbladder wall hyperemia.  Sonographic Echeverria sign is negative.  The duct is not dilated measuring 6 mm.  The hepatic parenchyma is echogenic, may reflect changes of steatosis.  The visualized portions of the right kidney are unremarkable.  No ascites.                                       X-Ray Chest AP Portable (Final result)  Result time 05/06/25 12:15:57      Final result by Darrian Martinez MD (05/06/25 12:15:57)                   Impression:      No acute cardiopulmonary finding identified on this single view.      Electronically signed by: Darrian Martinez MD  Date:    05/06/2025  Time:    12:15               Narrative:    EXAMINATION:  XR CHEST AP PORTABLE    CLINICAL HISTORY:  Provided history is "shortness of breath and chest pain;  ".    TECHNIQUE:  One view of the chest.    COMPARISON:  01/07/2025.    FINDINGS:  Cardiac silhouette is not enlarged.  No focal consolidation.  No sizable pleural effusion.  No pneumothorax.  Left shoulder arthroplasty.                                       Medications   benzonatate capsule 100 mg (has no administration in time range)   calcium carbonate 200 mg calcium (500 " mg) chewable tablet 500 mg (has no administration in time range)   ondansetron disintegrating tablet 4 mg (has no administration in time range)   glucose chewable tablet 16 g (has no administration in time range)   glucose chewable tablet 24 g (has no administration in time range)   dextrose 50% injection 12.5 g (has no administration in time range)   dextrose 50% injection 25 g (has no administration in time range)   glucagon (human recombinant) injection 1 mg (has no administration in time range)   insulin aspart U-100 pen 0-5 Units (has no administration in time range)   melatonin tablet 6 mg (has no administration in time range)   naloxone 0.4 mg/mL injection 0.02 mg (has no administration in time range)   senna-docusate 8.6-50 mg per tablet 1 tablet (has no administration in time range)   sodium chloride 0.9% flush 10 mL (has no administration in time range)   sodium chloride 0.9% bolus 1,000 mL 1,000 mL (0 mLs Intravenous Stopped 5/6/25 1128)   aspirin tablet 325 mg (325 mg Oral Given 5/6/25 1031)     Medical Decision Making  This is an emergent evaluation of a 74-year-old man presenting to the emergency department with left-sided chest pressure and pain as well as lightheadedness, dizziness, and vomiting.  Differential diagnoses include ACS, electrolyte abnormality, dehydration, hypovolemia, amongst others.  On physical examination, patient was in no acute distress however was hypotensive with an initial blood pressure in the examination room with a systolic in the 80s.  He had scattered wheezes on lung examination.  He had no lower extremity edema.  Left arm was held in a sling from a previous injury.  Abdomen had tenderness in the right upper quadrant. Labs showed an DANIELE-pt given one liter of iv fluids. Imaging pending.     Chinyere Sargent MD  12:25 PM  5/6/2025    Patient's repeat labs showed continued elevation in his creatinine however potassium normalized.  Imaging reassuring.  Patient admitted to  observation at this time.    Chinyree Jackman MD  3:28 PM  5/6/2025          Amount and/or Complexity of Data Reviewed  Independent Historian: spouse     Details: See HPI.  External Data Reviewed: notes.     Details: See HPI.  Labs: ordered. Decision-making details documented in ED Course.  Radiology: ordered. Decision-making details documented in ED Course.  ECG/medicine tests: ordered and independent interpretation performed. Decision-making details documented in ED Course.    Risk  OTC drugs.  Prescription drug management.            Scribe Attestation:   Scribe #1: I performed the above scribed service and the documentation accurately describes the services I performed. I attest to the accuracy of the note.                               Clinical Impression:  Final diagnoses:  [R06.02] SOB (shortness of breath)  [R10.11] Right upper quadrant abdominal pain  [R07.9] Chest pain       I, Chinyere Jackman , personally performed the services described in this documentation. All medical record entries made by the scribe were at my direction and in my presence. I have reviewed the chart and agree that the record reflects my personal performance and is accurate and complete.      DISCLAIMER: This note was prepared with [employer] voice recognition transcription software. Garbled syntax, mangled pronouns, and other bizarre constructions may be attributed to that software system.     ED Disposition Condition    Observation                     [1]   Social History  Tobacco Use    Smoking status: Never     Passive exposure: Never    Smokeless tobacco: Never   Substance Use Topics    Alcohol use: Not Currently     Comment: rarely    Drug use: No        Chinyere Jackman MD  05/06/25 [id number]

## 2025-05-06 NOTE — ASSESSMENT & PLAN NOTE
"Patient's FSGs are controlled on current medication regimen.  Last A1c reviewed-   Lab Results   Component Value Date    HGBA1C 5.8 (H) 05/06/2025     Most recent fingerstick glucose reviewed- No results for input(s): "POCTGLUCOSE" in the last 24 hours.  Current correctional scale  Low  Maintain anti-hyperglycemic dose as follows-   Antihyperglycemics (From admission, onward)      Start     Stop Route Frequency Ordered    05/06/25 1412  insulin aspart U-100 pen 0-5 Units         -- SubQ Before meals & nightly PRN 05/06/25 1312          Hold Oral hypoglycemics while patient is in the hospital.  "

## 2025-05-06 NOTE — ASSESSMENT & PLAN NOTE
Patient's blood pressure range in the last 24 hours was: BP  Min: 91/60  Max: 117/68.The patient's inpatient anti-hypertensive regimen is listed below:  Current Antihypertensives       Plan  - BP is controlled, no changes needed to their regimen  - BP has been on lower limits of normal. Will hold home amlodipine, imdur, and olmesartan

## 2025-05-06 NOTE — SUBJECTIVE & OBJECTIVE
Past Medical History:   Diagnosis Date    Allergy     Angina pectoris     Anxiety     Cancer     skin rwemoved from head    Chronic midline low back pain without sciatica 10/26/2022    Coronary artery disease     Depression     Eye injury as a teen    stuck object in os    GERD (gastroesophageal reflux disease)     Hyperlipidemia     Hypertension     Hypothyroidism     Memory loss     12/3/2014 MRI brain: 1. No evidence for acute infarct 2. unremarkable MRI of the brain; 12/3/2014 carotid US normal    Myocardial infarction     Nuclear sclerosis of both eyes 05/20/2021    Obesity     Peripheral vascular disease 06/20/2017    Retrocalcaneal bursitis 11/24/2014    Sleep apnea     Type 2 diabetes mellitus with other specified complication 04/27/2023    Type 2 diabetes mellitus with other specified complication 04/27/2023       Past Surgical History:   Procedure Laterality Date    APPENDECTOMY  1986    ARTHROSCOPY,SHOULDER,WITH BICEPS TENODESIS Left 4/10/2025    Procedure: ARTHROSCOPY, SHOULDER, W/ BICEPS TENODESIS;  Surgeon: Eugenio Reese MD;  Location: [facility] OR;  Service: Orthopedics;  Laterality: Left;    bone spur Right     COLONOSCOPY      COLONOSCOPY N/A 10/26/2023    Procedure: COLONOSCOPY;  Surgeon: Argelia Altamirano MD;  Location: [facility] ENDO;  Service: Endoscopy;  Laterality: N/A;  Referred by: Dr. Michael Gonzalez  BT/Clearance: ok to hold Plavix 5 days per Dr Dawn  Prep: golytely  Route instructions sent: [id number]  8/3 proc jessica to 10/26, pt has prep and instr  10/19- pre call complete.  DBM    ESOPHAGOGASTRODUODENOSCOPY N/A 8/2/2024    Procedure: EGD (ESOPHAGOGASTRODUODENOSCOPY);  Surgeon: Argelia Altamirano MD;  Location: [facility] ENDO;  Service: Endoscopy;  Laterality: N/A;  Dr. Porter, Urgent EGD, abdominal pain, standard prep, Instr to portal. Plavix hold pending.  ok to hold Plavix 5 days per Dr Dawn  7/26/24- lvm/portal for pc. DBM  7/30 pt confirmed. has been holding plavix since 7/27  cf  7/30/24- pc complete. DBM    hand trauma Right     pencil     heart stent      LEFT HEART CATHETERIZATION Left 9/20/2019    Procedure: Left heart cath 12 pm start, R rad access;  Surgeon: Brad Bahena MD;  Location: [facility] CATH LAB;  Service: Cardiology;  Laterality: Left;  RN PREOP 9/13/2019  NEEDS IODINE PREMED    LEFT HEART CATHETERIZATION Left 9/21/2020    Procedure: Left heart cath 730am start, R rad access;  Surgeon: Brad Bahena MD;  Location: [facility] CATH LAB;  Service: Cardiology;  Laterality: Left;  RN PREOP 9/14/2020, COVID NEGATIVE---9/18    REVERSE TOTAL SHOULDER ARTHROPLASTY Left 4/10/2025    Procedure: ARTHROPLASTY, SHOULDER, TOTAL, REVERSE;  Surgeon: Eugenio Reese MD;  Location: [facility] OR;  Service: Orthopedics;  Laterality: Left;  regional w/ catheter (interscalene), ASHLYN 30cc       Review of patient's allergies indicates:   Allergen Reactions    Iodine and iodide containing products Anaphylaxis    Naproxen Other (See Comments)     hallucinations  Hallucinations^  Hallucinations^    Hydrocodone-acetaminophen      Rash (skin)^    Iodine      Anaphylaxis^       No current facility-administered medications on file prior to encounter.     Current Outpatient Medications on File Prior to Encounter   Medication Sig    amLODIPine (NORVASC) 5 MG tablet TAKE 1 TABLET ONE TIME DAILY    aspirin (ECOTRIN) 81 MG EC tablet Take 1 tablet (81 mg total) by mouth once daily.    atorvastatin (LIPITOR) 80 MG tablet Take 1 tablet (80 mg total) by mouth every evening.    ciclopirox (LOPROX) 0.77 % Crea Apply topically 2 (two) times daily.    clopidogreL (PLAVIX) 75 mg tablet Take 1 tablet (75 mg total) by mouth once daily.    clotrimazole-betamethasone 1-0.05% (LOTRISONE) cream Apply topically 2 (two) times daily.    doxycycline (MONODOX) 100 MG capsule Take 1 capsule (100 mg total) by mouth 2 (two) times daily.    furosemide (LASIX) 20 MG tablet Take 1 tablet (20 mg total) by mouth once daily.    gabapentin  (NEURONTIN) 300 MG capsule Take 1 capsule (300 mg total) by mouth every evening.    isosorbide mononitrate (IMDUR) 120 MG 24 hr tablet Take 2 tablets (240 mg total) by mouth once daily.    ketoconazole (NIZORAL) 2 % cream Apply topically 2 (two) times daily. for 14 days    KRILL/OM3/DHA/EPA/OM6/LIP/ASTX (KRILL OIL, OMEGA 3 AND 6, ORAL) Take by mouth. (Patient not taking: Reported on 4/23/2025)    levothyroxine (SYNTHROID) 75 MCG tablet TAKE 1 TABLET BEFORE BREAKFAST    metFORMIN (GLUCOPHAGE-XR) 500 MG ER 24hr tablet Take 1 tablet (500 mg total) by mouth once daily.    methocarbamoL (ROBAXIN) 500 MG Tab Take 1 tablet (500 mg total) by mouth 3 (three) times daily as needed (muscle spasms).    metoprolol succinate (TOPROL-XL) 25 MG 24 hr tablet TAKE 1 TABLET ONE TIME DAILY    nitroGLYCERIN (NITROSTAT) 0.4 MG SL tablet PLACE 1 TABLET (0.4 MG TOTAL) UNDER THE TONGUE EVERY 5 (FIVE) MINUTES AS NEEDED FOR CHEST PAIN.    nystatin (MYCOSTATIN) powder Apply topically 4 (four) times daily as needed (to keep penis/scrotum dry).    olmesartan (BENICAR) 40 MG tablet Take 1 tablet (40 mg total) by mouth once daily.    oxyCODONE (ROXICODONE) 5 MG immediate release tablet Take 1-2 tablets every 6 hours as needed for pain.    pantoprazole (PROTONIX) 40 MG tablet Take 1 tablet (40 mg total) by mouth once daily.    pregabalin (LYRICA) 75 MG capsule Take 1 capsule (75 mg total) by mouth 2 (two) times daily.    promethazine (PHENERGAN) 25 MG tablet Take 1 tablet (25 mg total) by mouth every 4 (four) hours as needed for Nausea.    semaglutide (OZEMPIC) 0.25 mg or 0.5 mg (2 mg/3 mL) pen injector Inject 0.5 mg into the skin every 7 days.    tamsulosin (FLOMAX) 0.4 mg Cap Take 1 capsule (0.4 mg total) by mouth once daily.    VASCEPA 1 gram Cap Take 2 capsules (2,000 mg total) by mouth 2 (two) times a day.    vitamin D 1000 units Tab Take 1 tablet (1,000 Units total) by mouth once daily.     Family History       Problem Relation (Age of Onset)     Alcohol abuse Father    Arthritis Mother, Sister, Brother, Brother    Cancer Mother, Brother    Depression Sister    Diabetes Mother, Father, Brother, Brother    Early death Mother, Father, Brother    Heart disease Mother, Father, Sister, Brother, Brother, Brother, Brother, Brother    Hypertension Mother, Father, Sister, Brother, Brother    Migraines Mother    No Known Problems Maternal Aunt, Maternal Uncle, Paternal Aunt, Paternal Uncle, Maternal Grandmother, Maternal Grandfather, Paternal Grandmother, Paternal Grandfather    Pneumonia Brother    Seizures Mother    Stroke Father    Tremor Mother          Tobacco Use    Smoking status: Never     Passive exposure: Never    Smokeless tobacco: Never   Substance and Sexual Activity    Alcohol use: Not Currently     Comment: rarely    Drug use: No    Sexual activity: Not Currently     Review of Systems   Constitutional:  Negative for chills and fever.   HENT:  Negative for nosebleeds and tinnitus.    Eyes:  Negative for photophobia and visual disturbance.   Respiratory:  Negative for shortness of breath and wheezing.    Cardiovascular:  Positive for chest pain. Negative for palpitations and leg swelling.   Gastrointestinal:  Negative for abdominal distention, nausea and vomiting.   Genitourinary:  Negative for dysuria, flank pain and hematuria.   Musculoskeletal:  Negative for gait problem and joint swelling.   Skin:  Negative for rash and wound.   Neurological:  Positive for light-headedness. Negative for seizures and syncope.     Objective:     Vital Signs (Most Recent):  Temp: 98.3 °F (36.8 °C) (05/06/25 1543)  Pulse: 65 (05/06/25 1543)  Resp: 20 (05/06/25 1543)  BP: 117/68 (05/06/25 1543)  SpO2: 96 % (05/06/25 1543) Vital Signs (24h Range):  Temp:  [98.3 °F (36.8 °C)-98.6 °F (37 °C)] 98.3 °F (36.8 °C)  Pulse:  [65-86] 65  Resp:  [17-20] 20  SpO2:  [95 %-100 %] 96 %  BP: ()/(53-68) 117/68     Weight: 102.5 kg (226 lb)  Body mass index is 32.43 kg/m².      Physical Exam  Vitals and nursing note reviewed.   Constitutional:       General: He is not in acute distress.     Appearance: He is well-developed.   HENT:      Head: Normocephalic and atraumatic.      Right Ear: External ear normal.      Left Ear: External ear normal.   Eyes:      General:         Right eye: No discharge.         Left eye: No discharge.      Conjunctiva/sclera: Conjunctivae normal.   Neck:      Thyroid: No thyromegaly.   Cardiovascular:      Rate and Rhythm: Normal rate and regular rhythm.      Heart sounds: No murmur heard.  Pulmonary:      Effort: Pulmonary effort is normal. No respiratory distress.      Breath sounds: Normal breath sounds.   Abdominal:      General: Bowel sounds are normal. There is no distension.      Palpations: Abdomen is soft. There is no mass.      Tenderness: There is no abdominal tenderness.   Musculoskeletal:         General: No deformity.      Cervical back: Normal range of motion.      Comments: Left arm in sling. Sensation intact to hand   Skin:     General: Skin is warm and dry.   Neurological:      Mental Status: He is alert and oriented to person, place, and time.      Sensory: No sensory deficit.   Psychiatric:         Mood and Affect: Mood normal.         Behavior: Behavior normal.                Significant Labs: CBC:   Recent Labs   Lab 05/06/25  0743   WBC 8.41   HGB 14.3   HCT 43.6        CMP:   Recent Labs   Lab 05/06/25  0743      K 4.4      CO2 25   *   BUN 14   CREATININE 1.4   CALCIUM 9.2   PROT 6.6   ALBUMIN 3.7   BILITOT 0.8   ALKPHOS 64   AST 11   ALT 11   ANIONGAP 13     TSH:   Recent Labs   Lab 05/06/25  0743   TSH 3.237       Significant Imaging:   Imaging Results              US Abdomen Limited (Gallbladder) (Final result)  Result time 05/06/25 12:57:21      Final result by Helder Clements MD (05/06/25 12:57:21)                   Impression:      No findings to suggest acute cholecystitis.    Possible hepatic  "steatosis, correlation with LFTs recommended.      Electronically signed by: Helder Clements MD  Date:    05/06/2025  Time:    12:57               Narrative:    EXAMINATION:  US ABDOMEN LIMITED    CLINICAL HISTORY:  Right upper quadrant pain    TECHNIQUE:  Limited ultrasound of the right upper quadrant of the abdomen (including pancreas, liver, gallbladder, common bile duct, and spleen) was performed.    COMPARISON:  Renal ultrasound 04/15/2023, CT abdomen and pelvis 04/26/2024    FINDINGS:  The visualized portions of the pancreas are grossly unremarkable noting the pancreas is for the most part obscured by overlying soft tissue structures.  The gallbladder is nondistended.  No gallbladder wall thickening or pericholecystic fluid.  No gallbladder wall hyperemia.  Sonographic Echeverria sign is negative.  The duct is not dilated measuring 6 mm.  The hepatic parenchyma is echogenic, may reflect changes of steatosis.  The visualized portions of the right kidney are unremarkable.  No ascites.                                       X-Ray Chest AP Portable (Final result)  Result time 05/06/25 12:15:57      Final result by Darrian Martinez MD (05/06/25 12:15:57)                   Impression:      No acute cardiopulmonary finding identified on this single view.      Electronically signed by: Darrian Martinez MD  Date:    05/06/2025  Time:    12:15               Narrative:    EXAMINATION:  XR CHEST AP PORTABLE    CLINICAL HISTORY:  Provided history is "shortness of breath and chest pain;  ".    TECHNIQUE:  One view of the chest.    COMPARISON:  01/07/2025.    FINDINGS:  Cardiac silhouette is not enlarged.  No focal consolidation.  No sizable pleural effusion.  No pneumothorax.  Left shoulder arthroplasty.                                      "

## 2025-05-06 NOTE — HPI
Clinton Rosenberg 74 y.o. male with CAD, DM, hypothyroidism, HTN, HLD presents to the hospital chief complaint of lightheadedness near syncopal episodes chest pain.  He has had near syncopal episodes with associated lightheadedness that has been ongoing for multiple months.  He describes as lightheadedness has worsening with standing and improvement with sitting.  Finds his blood pressure has been on the lower limits of normal outpatient since the symptoms began.  He has also had 2 days of intermittent left-sided chest heaviness.  He is unsure if this feels similar to his previous angina.  He can not remember when he last took Ozempic.  He had surgery on his left rotator cuff in March and since that time he has had a significant weight loss.  He denies fever leg swelling melena hematuria hematemesis and syncope.    In the ED, afebrile without leukocytosis initial creatinine 1.8 repeat 1.4 troponin negative TSH within normal limits ultrasound abdomen without findings to suggest acute cholecystitis chest x-ray without acute cardiopulmonary process.

## 2025-05-06 NOTE — NURSING
Patient arrived to floor via stretcher via transporter from ED [place].  Patient transferred to bed via x1 person assist.  AAOx4.  Patient was oriented to room, information on whiteboard, and medication regimen.  Bed low, adequate lighting provided, side rails x2 up, call bell within reach.  I agree with previous assessment, admission assessment completed.  VSS.  Patient denied having any acute distress at this time.  None observed.  Will continue to monitor and follow treatment plan.  Family at bedside.        [facility]  Nurses Note -- 4 Eyes      5/6/2025       Skin assessed on: Admit      [x] No Pressure Injuries Present    [x]Prevention Measures Documented  Previous surgical incision to L shoulder.   [] Yes LDA  for Pressure Injury Previously documented     [] Yes New Pressure Injury Discovered   [] LDA for New Pressure Injury Added      Attending RN:  Shruthi Asencio RN     Second RN:  BELEN Aguayo

## 2025-05-07 VITALS
RESPIRATION RATE: 18 BRPM | HEART RATE: 73 BPM | SYSTOLIC BLOOD PRESSURE: 151 MMHG | WEIGHT: 227.31 LBS | TEMPERATURE: 99 F | HEIGHT: 70 IN | DIASTOLIC BLOOD PRESSURE: 84 MMHG | BODY MASS INDEX: 32.54 KG/M2 | OXYGEN SATURATION: 97 %

## 2025-05-07 PROBLEM — N17.9 AKI (ACUTE KIDNEY INJURY): Status: RESOLVED | Noted: 2025-05-06 | Resolved: 2025-05-07

## 2025-05-07 PROBLEM — R07.9 CHEST PAIN: Status: RESOLVED | Noted: 2018-05-16 | Resolved: 2025-05-07

## 2025-05-07 LAB
ABSOLUTE EOSINOPHIL (OHS): 0.23 K/UL
ABSOLUTE MONOCYTE (OHS): 0.82 K/UL (ref 0.3–1)
ABSOLUTE NEUTROPHIL COUNT (OHS): 3.84 K/UL (ref 1.8–7.7)
ALBUMIN SERPL BCP-MCNC: 3.5 G/DL (ref 3.5–5.2)
ALP SERPL-CCNC: 57 UNIT/L (ref 40–150)
ALT SERPL W/O P-5'-P-CCNC: 10 UNIT/L (ref 10–44)
ANION GAP (OHS): 9 MMOL/L (ref 8–16)
AST SERPL-CCNC: 12 UNIT/L (ref 11–45)
BASOPHILS # BLD AUTO: 0.06 K/UL
BASOPHILS NFR BLD AUTO: 0.8 %
BILIRUB SERPL-MCNC: 0.7 MG/DL (ref 0.1–1)
BSA FOR ECHO PROCEDURE: 2.26 M2
BUN SERPL-MCNC: 19 MG/DL (ref 8–23)
CALCIUM SERPL-MCNC: 8.7 MG/DL (ref 8.7–10.5)
CHLORIDE SERPL-SCNC: 108 MMOL/L (ref 95–110)
CO2 SERPL-SCNC: 23 MMOL/L (ref 23–29)
CREAT SERPL-MCNC: 1.3 MG/DL (ref 0.5–1.4)
CV ECHO LV RWT: 0.55 CM
DOP CALC LVOT AREA: 3.1 CM2
DOP CALC LVOT DIAMETER: 2 CM
ECHO LV POSTERIOR WALL: 1.1 CM (ref 0.6–1.1)
ERYTHROCYTE [DISTWIDTH] IN BLOOD BY AUTOMATED COUNT: 13 % (ref 11.5–14.5)
FRACTIONAL SHORTENING: 25 % (ref 28–44)
GFR SERPLBLD CREATININE-BSD FMLA CKD-EPI: 58 ML/MIN/1.73/M2
GLUCOSE SERPL-MCNC: 103 MG/DL (ref 70–110)
HCT VFR BLD AUTO: 39.8 % (ref 40–54)
HGB BLD-MCNC: 13.7 GM/DL (ref 14–18)
HOLD SPECIMEN: NORMAL
IMM GRANULOCYTES # BLD AUTO: 0.03 K/UL (ref 0–0.04)
IMM GRANULOCYTES NFR BLD AUTO: 0.4 % (ref 0–0.5)
INTERVENTRICULAR SEPTUM: 1.1 CM (ref 0.6–1.1)
IVC DIAMETER: 1.91 CM
LEFT ATRIUM SIZE: 3.2 CM
LEFT INTERNAL DIMENSION IN SYSTOLE: 3 CM (ref 2.1–4)
LEFT VENTRICLE DIASTOLIC VOLUME INDEX: 31.36 ML/M2
LEFT VENTRICLE DIASTOLIC VOLUME: 69 ML
LEFT VENTRICLE MASS INDEX: 66.2 G/M2
LEFT VENTRICLE SYSTOLIC VOLUME INDEX: 15.5 ML/M2
LEFT VENTRICLE SYSTOLIC VOLUME: 34 ML
LEFT VENTRICULAR INTERNAL DIMENSION IN DIASTOLE: 4 CM (ref 3.5–6)
LEFT VENTRICULAR MASS: 145.6 G
LVED V (TEICH): 69.01 ML
LVES V (TEICH): 34.31 ML
LYMPHOCYTES # BLD AUTO: 2.84 K/UL (ref 1–4.8)
MCH RBC QN AUTO: 30.6 PG (ref 27–31)
MCHC RBC AUTO-ENTMCNC: 34.4 G/DL (ref 32–36)
MCV RBC AUTO: 89 FL (ref 82–98)
NUCLEATED RBC (/100WBC) (OHS): 0 /100 WBC
PLATELET # BLD AUTO: 178 K/UL (ref 150–450)
PMV BLD AUTO: 9.9 FL (ref 9.2–12.9)
POCT GLUCOSE: 112 MG/DL (ref 70–110)
POCT GLUCOSE: 147 MG/DL (ref 70–110)
POTASSIUM SERPL-SCNC: 4.2 MMOL/L (ref 3.5–5.1)
PROT SERPL-MCNC: 6.4 GM/DL (ref 6–8.4)
RA PRESSURE ESTIMATED: 3 MMHG
RBC # BLD AUTO: 4.47 M/UL (ref 4.6–6.2)
RELATIVE EOSINOPHIL (OHS): 2.9 %
RELATIVE LYMPHOCYTE (OHS): 36.3 % (ref 18–48)
RELATIVE MONOCYTE (OHS): 10.5 % (ref 4–15)
RELATIVE NEUTROPHIL (OHS): 49.1 % (ref 38–73)
SINUS: 3.78 CM
SODIUM SERPL-SCNC: 140 MMOL/L (ref 136–145)
STJ: 2.6 CM
TROPONIN I SERPL DL<=0.01 NG/ML-MCNC: <0.006 NG/ML
TROPONIN I SERPL DL<=0.01 NG/ML-MCNC: <0.006 NG/ML
WBC # BLD AUTO: 7.82 K/UL (ref 3.9–12.7)
Z-SCORE OF LEFT VENTRICULAR DIMENSION IN END DIASTOLE: -6.3
Z-SCORE OF LEFT VENTRICULAR DIMENSION IN END SYSTOLE: -3.32

## 2025-05-07 PROCEDURE — 85025 COMPLETE CBC W/AUTO DIFF WBC: CPT | Mod: HCNC | Performed by: PHYSICIAN ASSISTANT

## 2025-05-07 PROCEDURE — 84484 ASSAY OF TROPONIN QUANT: CPT | Mod: 91,HCNC

## 2025-05-07 PROCEDURE — 36415 COLL VENOUS BLD VENIPUNCTURE: CPT | Mod: HCNC

## 2025-05-07 PROCEDURE — 80053 COMPREHEN METABOLIC PANEL: CPT | Mod: HCNC | Performed by: PHYSICIAN ASSISTANT

## 2025-05-07 PROCEDURE — 25000003 PHARM REV CODE 250: Mod: HCNC | Performed by: PHYSICIAN ASSISTANT

## 2025-05-07 PROCEDURE — 96361 HYDRATE IV INFUSION ADD-ON: CPT

## 2025-05-07 PROCEDURE — 84484 ASSAY OF TROPONIN QUANT: CPT | Mod: HCNC | Performed by: PHYSICIAN ASSISTANT

## 2025-05-07 PROCEDURE — 99223 1ST HOSP IP/OBS HIGH 75: CPT | Mod: HCNC,,, | Performed by: INTERNAL MEDICINE

## 2025-05-07 PROCEDURE — 36415 COLL VENOUS BLD VENIPUNCTURE: CPT | Mod: HCNC | Performed by: PHYSICIAN ASSISTANT

## 2025-05-07 PROCEDURE — G0378 HOSPITAL OBSERVATION PER HR: HCPCS | Mod: HCNC

## 2025-05-07 RX ORDER — ISOSORBIDE MONONITRATE 120 MG/1
120 TABLET, EXTENDED RELEASE ORAL DAILY
Qty: 30 TABLET | Refills: 2 | Status: SHIPPED | OUTPATIENT
Start: 2025-05-07

## 2025-05-07 RX ORDER — ISOSORBIDE MONONITRATE 120 MG/1
120 TABLET, EXTENDED RELEASE ORAL DAILY
Qty: 30 TABLET | Refills: 3 | Status: SHIPPED | OUTPATIENT
Start: 2025-06-07

## 2025-05-07 RX ADMIN — ASPIRIN 81 MG: 81 TABLET, COATED ORAL at 09:05

## 2025-05-07 RX ADMIN — SODIUM CHLORIDE: 9 INJECTION, SOLUTION INTRAVENOUS at 11:05

## 2025-05-07 RX ADMIN — LEVOTHYROXINE SODIUM 75 MCG: 75 TABLET ORAL at 05:05

## 2025-05-07 RX ADMIN — SODIUM CHLORIDE: 9 INJECTION, SOLUTION INTRAVENOUS at 01:05

## 2025-05-07 RX ADMIN — CLOPIDOGREL BISULFATE 75 MG: 75 TABLET, FILM COATED ORAL at 09:05

## 2025-05-07 RX ADMIN — SENNOSIDES AND DOCUSATE SODIUM 1 TABLET: 50; 8.6 TABLET ORAL at 10:05

## 2025-05-07 NOTE — NURSING
[facility] [place]  Nurses Note -- 4 Eyes      5/7/2025       Skin assessed on: Q Shift      [x] No Pressure Injuries Present    [x]Prevention Measures Documented  Previous surgical incision to L shoulder    [] Yes LDA  for Pressure Injury Previously documented     [] Yes New Pressure Injury Discovered   [] LDA for New Pressure Injury Added      Attending RN:  Shruthi Asencio RN     Second RN:  CORNELIA Bernabe

## 2025-05-07 NOTE — ASSESSMENT & PLAN NOTE
Patient's blood pressure range in the last 24 hours was: BP  Min: 110/68  Max: 165/98.The patient's inpatient anti-hypertensive regimen is listed below:  Current Antihypertensives  isosorbide mononitrate (IMDUR) 24 hr tablet, Daily, Oral  isosorbide mononitrate (IMDUR) 24 hr tablet, Daily, Oral    Plan  - BP is controlled, no changes needed to their regimen  - BP has been on lower limits of normal. Will hold home amlodipine, imdur, and olmesartan

## 2025-05-07 NOTE — PLAN OF CARE
Problem: Adult Inpatient Plan of Care  Goal: Plan of Care Review  Outcome: Adequate for Care Transition  Goal: Patient-Specific Goal (Individualized)  Outcome: Adequate for Care Transition  Goal: Absence of Hospital-Acquired Illness or Injury  Outcome: Adequate for Care Transition  Goal: Optimal Comfort and Wellbeing  Outcome: Adequate for Care Transition  Goal: Readiness for Transition of Care  Outcome: Adequate for Care Transition     Problem: Diabetes Comorbidity  Goal: Blood Glucose Level Within Targeted Range  Outcome: Adequate for Care Transition     Problem: Acute Kidney Injury/Impairment  Goal: Fluid and Electrolyte Balance  Outcome: Adequate for Care Transition  Goal: Improved Oral Intake  Outcome: Adequate for Care Transition  Goal: Effective Renal Function  Outcome: Adequate for Care Transition     Problem: Wound  Goal: Optimal Coping  Outcome: Adequate for Care Transition  Goal: Optimal Functional Ability  Outcome: Adequate for Care Transition  Goal: Absence of Infection Signs and Symptoms  Outcome: Adequate for Care Transition  Goal: Improved Oral Intake  Outcome: Adequate for Care Transition  Goal: Optimal Pain Control and Function  Outcome: Adequate for Care Transition  Goal: Skin Health and Integrity  Outcome: Adequate for Care Transition  Goal: Optimal Wound Healing  Outcome: Adequate for Care Transition

## 2025-05-07 NOTE — HOSPITAL COURSE
Mr. Clinton Rosenberg is a 74-year-old male with past medical history of CAD status post PCI to LAD and PL branch, hypertension, hyperlipidemia and type 2 diabetes mellitus who was admitted to the hospital for evaluation of chest pain and postural dizziness. Upon arrival to hospital, EKG did not show any new ST-T wave changes.  Troponin I negative x3. Echocardiogram showed normal systolic function with a visually estimated ejection fraction of 55 - 60% Remained chest pain free throughout hospitalization and postural dizziness improved with IVFs.  Cardiology with recommendations of intensification of antianginal therapy with and consideration of outpatient stress test. Patient is hemodynamically stable for discharge.

## 2025-05-07 NOTE — CONSULTS
[facility]  Cardiology  Consult Note    Patient Name: Clinton Rosenberg  MRN: [id number]  Admission Date: 5/6/2025  Hospital Length of Stay: 0 days  Code Status: Full Code   Attending Provider: Adonis Alicia MD   Consulting Provider: Linda Schumacher MD  Primary Care Physician: Michael Gonzalez MD  Principal Problem:Chest pain    Patient information was obtained from patient and ER records.     Inpatient consult to Cardiology  Consult performed by: Linda Schumacher MD  Consult ordered by: Andreas Crowder PA-C        Subjective:     Chief Complaint:  Chest pain    HPI:   Clinton Rosenberg 74 y.o. male with CAD, DM, hypothyroidism, HTN, HLD presents to the hospital chief complaint of lightheadedness near syncopal episodes chest pain.  He has had near syncopal episodes with associated lightheadedness that has been ongoing for multiple months.  He describes as lightheadedness has worsening with standing and improvement with sitting.  Finds his blood pressure has been on the lower limits of normal outpatient since the symptoms began.  He has also had 2 days of intermittent left-sided chest heaviness.  He is unsure if this feels similar to his previous angina.  He can not remember when he last took Ozempic.  He had surgery on his left rotator cuff in March and since that time he has had a significant weight loss.  He denies fever leg swelling melena hematuria hematemesis and syncope. In the ED, afebrile without leukocytosis initial creatinine 1.8 repeat 1.4 troponin negative TSH within normal limits ultrasound abdomen without findings to suggest acute cholecystitis chest x-ray without acute cardiopulmonary process.    Cardiology is consulted for evaluation of chest pain.    Patient follows up with Dr. Bahena.    Mr. Rosenberg is a 74-year-old male with past medical history of CAD status post PCI to LAD and PL branch, hypertension, hyperlipidemia and type 2 diabetes mellitus who was admitted to the hospital  for evaluation of chest pain and dizziness.  Dizziness is more when he abruptly stents up from sitting position.  Chest pain is also more on exertion.  He can not recall whether it is similar chest pain to his prior cardiac events.  He has been taking his medications as prescribed.  Upon arrival to ED, EKG did not show any new ST-T wave change.  Troponin I negative x3.  He has a normal stress test almost 2 years ago.  He has been maintained on dual antiplatelet therapy due to extensive coronary artery disease.  He takes Imdur as well.  Would recommend intensification of antianginal therapy and consideration of outpatient stress test.    Past Medical History:   Diagnosis Date    Allergy     Angina pectoris     Anxiety     Cancer     skin rwemoved from head    Chronic midline low back pain without sciatica 10/26/2022    Coronary artery disease     Depression     Eye injury as a teen    stuck object in os    GERD (gastroesophageal reflux disease)     Hyperlipidemia     Hypertension     Hypothyroidism     Memory loss     12/3/2014 MRI brain: 1. No evidence for acute infarct 2. unremarkable MRI of the brain; 12/3/2014 carotid US normal    Myocardial infarction     Nuclear sclerosis of both eyes 05/20/2021    Obesity     Peripheral vascular disease 06/20/2017    Retrocalcaneal bursitis 11/24/2014    Sleep apnea     Type 2 diabetes mellitus with other specified complication 04/27/2023    Type 2 diabetes mellitus with other specified complication 04/27/2023       Past Surgical History:   Procedure Laterality Date    APPENDECTOMY  1986    ARTHROSCOPY,SHOULDER,WITH BICEPS TENODESIS Left 4/10/2025    Procedure: ARTHROSCOPY, SHOULDER, W/ BICEPS TENODESIS;  Surgeon: Eugenio Reees MD;  Location: [facility] OR;  Service: Orthopedics;  Laterality: Left;    bone spur Right     COLONOSCOPY      COLONOSCOPY N/A 10/26/2023    Procedure: COLONOSCOPY;  Surgeon: Argelia Altamirano MD;  Location: [facility] ENDO;  Service: Endoscopy;  Laterality:  N/A;  Referred by: Dr. Michael Gonzalez  BT/Clearance: ok to hold Plavix 5 days per Dr Dawn  Prep: golytely  Route instructions sent: [id number]  8/3 proc jessica to 10/26, pt has prep and instr  10/19- pre call complete.  DBM    ESOPHAGOGASTRODUODENOSCOPY N/A 8/2/2024    Procedure: EGD (ESOPHAGOGASTRODUODENOSCOPY);  Surgeon: Argelia Altamirano MD;  Location: [facility] ENDO;  Service: Endoscopy;  Laterality: N/A;  Dr. Porter, Urgent EGD, abdominal pain, standard prep, Instr to portal. Plavix hold pending.  ok to hold Plavix 5 days per Dr Dawn  7/26/24- lvm/portal for pc. DBM  7/30 pt confirmed. has been holding plavix since 7/27 cf  7/30/24- pc complete. DBM    hand trauma Right     pencil     heart stent      LEFT HEART CATHETERIZATION Left 9/20/2019    Procedure: Left heart cath 12 pm start, R rad access;  Surgeon: Brad Bahena MD;  Location: [facility] CATH LAB;  Service: Cardiology;  Laterality: Left;  RN PREOP 9/13/2019  NEEDS IODINE PREMED    LEFT HEART CATHETERIZATION Left 9/21/2020    Procedure: Left heart cath 730am start, R rad access;  Surgeon: Brad Bahena MD;  Location: [facility] CATH LAB;  Service: Cardiology;  Laterality: Left;  RN PREOP 9/14/2020, COVID NEGATIVE---9/18    REVERSE TOTAL SHOULDER ARTHROPLASTY Left 4/10/2025    Procedure: ARTHROPLASTY, SHOULDER, TOTAL, REVERSE;  Surgeon: Eugenio Reese MD;  Location: [facility] OR;  Service: Orthopedics;  Laterality: Left;  regional w/ catheter (interscalene), ASHLYN 30cc       Review of patient's allergies indicates:   Allergen Reactions    Iodine and iodide containing products Anaphylaxis    Naproxen Other (See Comments)     hallucinations  Hallucinations^  Hallucinations^    Hydrocodone-acetaminophen      Rash (skin)^    Iodine      Anaphylaxis^       No current facility-administered medications on file prior to encounter.     Current Outpatient Medications on File Prior to Encounter   Medication Sig    amLODIPine (NORVASC) 5 MG tablet TAKE 1 TABLET  ONE TIME DAILY    aspirin (ECOTRIN) 81 MG EC tablet Take 1 tablet (81 mg total) by mouth once daily.    atorvastatin (LIPITOR) 80 MG tablet Take 1 tablet (80 mg total) by mouth every evening.    ciclopirox (LOPROX) 0.77 % Crea Apply topically 2 (two) times daily.    clopidogreL (PLAVIX) 75 mg tablet Take 1 tablet (75 mg total) by mouth once daily.    clotrimazole-betamethasone 1-0.05% (LOTRISONE) cream Apply topically 2 (two) times daily.    doxycycline (MONODOX) 100 MG capsule Take 1 capsule (100 mg total) by mouth 2 (two) times daily.    furosemide (LASIX) 20 MG tablet Take 1 tablet (20 mg total) by mouth once daily.    gabapentin (NEURONTIN) 300 MG capsule Take 1 capsule (300 mg total) by mouth every evening.    isosorbide mononitrate (IMDUR) 120 MG 24 hr tablet Take 2 tablets (240 mg total) by mouth once daily.    ketoconazole (NIZORAL) 2 % cream Apply topically 2 (two) times daily. for 14 days    KRILL/OM3/DHA/EPA/OM6/LIP/ASTX (KRILL OIL, OMEGA 3 AND 6, ORAL) Take by mouth. (Patient not taking: Reported on 4/23/2025)    levothyroxine (SYNTHROID) 75 MCG tablet TAKE 1 TABLET BEFORE BREAKFAST    metFORMIN (GLUCOPHAGE-XR) 500 MG ER 24hr tablet Take 1 tablet (500 mg total) by mouth once daily.    methocarbamoL (ROBAXIN) 500 MG Tab Take 1 tablet (500 mg total) by mouth 3 (three) times daily as needed (muscle spasms).    metoprolol succinate (TOPROL-XL) 25 MG 24 hr tablet TAKE 1 TABLET ONE TIME DAILY    nitroGLYCERIN (NITROSTAT) 0.4 MG SL tablet PLACE 1 TABLET (0.4 MG TOTAL) UNDER THE TONGUE EVERY 5 (FIVE) MINUTES AS NEEDED FOR CHEST PAIN.    nystatin (MYCOSTATIN) powder Apply topically 4 (four) times daily as needed (to keep penis/scrotum dry).    olmesartan (BENICAR) 40 MG tablet Take 1 tablet (40 mg total) by mouth once daily.    oxyCODONE (ROXICODONE) 5 MG immediate release tablet Take 1-2 tablets every 6 hours as needed for pain.    pantoprazole (PROTONIX) 40 MG tablet Take 1 tablet (40 mg total) by mouth once  daily.    pregabalin (LYRICA) 75 MG capsule Take 1 capsule (75 mg total) by mouth 2 (two) times daily.    promethazine (PHENERGAN) 25 MG tablet Take 1 tablet (25 mg total) by mouth every 4 (four) hours as needed for Nausea.    semaglutide (OZEMPIC) 0.25 mg or 0.5 mg (2 mg/3 mL) pen injector Inject 0.5 mg into the skin every 7 days.    tamsulosin (FLOMAX) 0.4 mg Cap Take 1 capsule (0.4 mg total) by mouth once daily.    VASCEPA 1 gram Cap Take 2 capsules (2,000 mg total) by mouth 2 (two) times a day.    vitamin D 1000 units Tab Take 1 tablet (1,000 Units total) by mouth once daily.     Family History       Problem Relation (Age of Onset)    Alcohol abuse Father    Arthritis Mother, Sister, Brother, Brother    Cancer Mother, Brother    Depression Sister    Diabetes Mother, Father, Brother, Brother    Early death Mother, Father, Brother    Heart disease Mother, Father, Sister, Brother, Brother, Brother, Brother, Brother    Hypertension Mother, Father, Sister, Brother, Brother    Migraines Mother    No Known Problems Maternal Aunt, Maternal Uncle, Paternal Aunt, Paternal Uncle, Maternal Grandmother, Maternal Grandfather, Paternal Grandmother, Paternal Grandfather    Pneumonia Brother    Seizures Mother    Stroke Father    Tremor Mother          Tobacco Use    Smoking status: Never     Passive exposure: Never    Smokeless tobacco: Never   Substance and Sexual Activity    Alcohol use: Not Currently     Comment: rarely    Drug use: No    Sexual activity: Not Currently     Review of Systems   Cardiovascular:  Positive for chest pain. Negative for claudication, dyspnea on exertion, irregular heartbeat, leg swelling, near-syncope, orthopnea, palpitations, paroxysmal nocturnal dyspnea and syncope.   Respiratory:  Negative for cough, shortness of breath, snoring and sputum production.    Gastrointestinal:  Negative for abdominal pain, dysphagia, heartburn, nausea and vomiting.   Neurological:  Positive for dizziness. Negative  for headaches, loss of balance and weakness.     Objective:     Vital Signs (Most Recent):  Temp: 97.8 °F (36.6 °C) (05/07/25 0703)  Pulse: 72 (05/07/25 1004)  Resp: 18 (05/07/25 0703)  BP: (!) 141/80 (05/07/25 1004)  SpO2: 98 % (05/07/25 0353) Vital Signs (24h Range):  Temp:  [97.7 °F (36.5 °C)-98.6 °F (37 °C)] 97.8 °F (36.6 °C)  Pulse:  [58-85] 72  Resp:  [17-20] 18  SpO2:  [95 %-100 %] 98 %  BP: ()/(53-98) 141/80     Weight: 103.1 kg (227 lb 4.7 oz)  Body mass index is 32.61 kg/m².    SpO2: 98 %         Intake/Output Summary (Last 24 hours) at 5/7/2025 1017  Last data filed at 5/7/2025 0814  Gross per 24 hour   Intake 216.12 ml   Output 300 ml   Net -83.88 ml       Lines/Drains/Airways       Peripheral Intravenous Line  Duration                  Peripheral IV - Single Lumen 05/06/25 2215 22 G Posterior;Right Hand <1 day                     Physical Exam  HENT:      Head: Normocephalic and atraumatic.      Mouth/Throat:      Mouth: Mucous membranes are moist.   Eyes:      Extraocular Movements: Extraocular movements intact.      Pupils: Pupils are equal, round, and reactive to light.   Cardiovascular:      Rate and Rhythm: Normal rate and regular rhythm.      Pulses: Normal pulses.      Heart sounds: Normal heart sounds.   Pulmonary:      Effort: Pulmonary effort is normal.      Breath sounds: Normal breath sounds.   Abdominal:      General: Bowel sounds are normal.      Palpations: Abdomen is soft.   Musculoskeletal:      Left lower leg: No edema.   Skin:     General: Skin is warm.   Neurological:      General: No focal deficit present.      Mental Status: He is alert.   Psychiatric:         Mood and Affect: Mood normal.         Behavior: Behavior normal.          Current Medications:   aspirin  81 mg Oral Daily    atorvastatin  80 mg Oral QHS    clopidogreL  75 mg Oral Daily    levothyroxine  75 mcg Oral Before breakfast      0.9% NaCl   Intravenous Continuous 100 mL/hr at 05/07/25 0135 New Bag at 05/07/25  0135       Current Facility-Administered Medications:     benzonatate, 100 mg, Oral, TID PRN    calcium carbonate, 500 mg, Oral, Daily PRN    dextrose 50%, 12.5 g, Intravenous, PRN    dextrose 50%, 25 g, Intravenous, PRN    glucagon (human recombinant), 1 mg, Intramuscular, PRN    glucose, 16 g, Oral, PRN    glucose, 24 g, Oral, PRN    insulin aspart U-100, 0-5 Units, Subcutaneous, QID (AC + HS) PRN    melatonin, 6 mg, Oral, Nightly PRN    naloxone, 0.02 mg, Intravenous, PRN    ondansetron, 4 mg, Oral, Q6H PRN    senna-docusate, 1 tablet, Oral, Daily PRN    sodium chloride 0.9%, 10 mL, Intravenous, PRN    Laboratory (all labs reviewed):  CBC:  Recent Labs   Lab 11/03/23  0740 05/06/24  0715 04/02/25  1543 05/06/25  0743 05/07/25  0136   WBC 8.82 7.21 9.81 8.41 7.82   Hemoglobin 15.0 15.1  --   --   --    HGB  --   --  15.0 14.3 13.7 L   Hematocrit 44.4 45.1  --   --   --    HCT  --   --  44.7 43.6 39.8 L   Platelet Count  --   --  216 226 178   Platelets 199 202  --   --   --        CHEMISTRIES:  Recent Labs   Lab 05/06/24  0715 11/04/24  0655 04/02/25  1543 05/06/25  0743 05/07/25  0136   Glucose 145 H 155 H 157 H 160 H 103   Sodium 140 142 139 142 140   Potassium 4.2 4.9 4.3 4.4 4.2   BUN 16 15 17 14 19   Creatinine 1.1 1.2 1.3 1.4 1.3   eGFR >60.0 >60.0 58 L 53 L 58 L   Calcium 9.5 9.5 9.6 9.2 8.7       CARDIAC BIOMARKERS:  Recent Labs   Lab 05/06/25  1903 05/07/25  0136   Troponin-I <0.006 <0.006       COAGS:  Recent Labs   Lab 08/01/23  1156   INR 1.0       LIPIDS/LFTS:  Recent Labs   Lab 04/12/23  0809 11/03/23  0740 05/06/24  0715 11/04/24  0655 04/02/25  1543 05/06/25  0743 05/07/25  0136   Cholesterol Total  --   --   --   --   --  99 L  --    Cholesterol 128  --  135 119 L  --   --   --    Triglycerides 208 H  --  285 H 257 H  --   --   --    Triglyceride  --   --   --   --   --  158 H  --    HDL 37 L  --  36 L 37 L  --   --   --    HDL Cholesterol  --   --   --   --   --  32 L  --    LDL Cholesterol 49.4  L  --  42.0 L 30.6 L  --  35.4 L  --    Non-HDL Cholesterol 91  --  99 82  --   --   --    Non HDL Cholesterol  --   --   --   --   --  67  --    AST 18   < > 19 17 16 11 12   ALT 21   < > 17 22 20 11 10    < > = values in this interval not displayed.       BNP:        TSH:  Recent Labs   Lab 11/03/23  0740 05/06/24  0715 11/04/24  0655 04/02/25  1543 05/06/25  0743   TSH 3.204 2.559 3.703 2.075 3.237       Free T4:        Diagnostic Results:  ECG (personally reviewed and interpreted tracing(s)):  5/6/2025  Sinus rhythm, low-voltage QRS complex, age-indeterminate inferior infarct and poor R-wave progression (similar to prior)    Chest X-Ray (personally reviewed and interpreted image(s)):  No increased interstitial prominence    Echo: 4/2024      Left Ventricle: The left ventricle is normal in size. Mildly increased wall thickness. There is mild concentric hypertrophy. There is normal systolic function with a visually estimated ejection fraction of 55 - 60%. Grade I diastolic dysfunction.    Right Ventricle: Normal right ventricular cavity size. Systolic function is normal.    Aorta: Aortic root is mildly dilated measuring 4.05 cm. Ascending aorta is mildly dilated measuring 4.16 cm.    Pulmonary Artery: The estimated pulmonary artery systolic pressure is 25 mmHg.    Stress Test: 3/2023    Normal myocardial perfusion scan. There is no evidence of myocardial ischemia or infarction.    There is a  mild intensity fixed perfusion abnormality in the inferior wall of the left ventricle secondary to diaphragm attenuation.    The gated perfusion images showed an ejection fraction of 67% at rest.    There is normal wall motion at rest and post stress.    The ECG portion of the study is negative for ischemia.    The patient reported chest pain during the stress test.    There were no arrhythmias during stress.    Cath: 9/2020  Dominance: Right  LM: normal  LAD: MLI, mid stent patent (2013)  Ramus: MLI, prox 30%  LCx: MLI, ost  40%  RCA: MLI, dist eccentric 40-50%              RPDA: ost 50%, iFR 0.97              RPLB mid stent patent (9/20/19 2.5x18 Resolute Yon SILVER)  Assessment and Plan:     * Chest pain  EKG without any new ST-T wave change  Troponin I negative x3  Doubt acute coronary syndrome    Outpatient stress test is recommended    He takes Imdur 240 mg daily.  Decrease the dose of Imdur to 120 mg daily  Consider Ranexa 500 mg b.i.d. if has anginal symptoms    Essential hypertension  Labile blood pressure  Check orthostatic vital signs  At home he takes olmesartan 40 mg/amlodipine 5 mg/Toprol-XL 25 mg/Imdur 240 mg    Decrease Imdur to 120 mg daily  Goal is to keep systolic blood pressure around 130      Coronary artery disease of native artery of native heart with stable angina pectoris; 2021 plan is long term DAPT  Continue with dual antiplatelet therapy and high-intensity statin    Mixed hyperlipidemia long term DAPT    Continue with statin        VTE Risk Mitigation (From admission, onward)           Ordered     IP VTE HIGH RISK PATIENT  Once         05/06/25 1313     Place sequential compression device  Until discontinued         05/06/25 1313     Place ROCAEL hose  Until discontinued         05/06/25 1313                    Thank you for your consult. I will sign off. Please contact us if you have any additional questions.    Linda Schumacher MD  Cardiology   [facility]

## 2025-05-07 NOTE — SUBJECTIVE & OBJECTIVE
Past Medical History:   Diagnosis Date    Allergy     Angina pectoris     Anxiety     Cancer     skin rwemoved from head    Chronic midline low back pain without sciatica 10/26/2022    Coronary artery disease     Depression     Eye injury as a teen    stuck object in os    GERD (gastroesophageal reflux disease)     Hyperlipidemia     Hypertension     Hypothyroidism     Memory loss     12/3/2014 MRI brain: 1. No evidence for acute infarct 2. unremarkable MRI of the brain; 12/3/2014 carotid US normal    Myocardial infarction     Nuclear sclerosis of both eyes 05/20/2021    Obesity     Peripheral vascular disease 06/20/2017    Retrocalcaneal bursitis 11/24/2014    Sleep apnea     Type 2 diabetes mellitus with other specified complication 04/27/2023    Type 2 diabetes mellitus with other specified complication 04/27/2023       Past Surgical History:   Procedure Laterality Date    APPENDECTOMY  1986    ARTHROSCOPY,SHOULDER,WITH BICEPS TENODESIS Left 4/10/2025    Procedure: ARTHROSCOPY, SHOULDER, W/ BICEPS TENODESIS;  Surgeon: Eugenio Reese MD;  Location: [facility] OR;  Service: Orthopedics;  Laterality: Left;    bone spur Right     COLONOSCOPY      COLONOSCOPY N/A 10/26/2023    Procedure: COLONOSCOPY;  Surgeon: Argelia Altamirano MD;  Location: [facility] ENDO;  Service: Endoscopy;  Laterality: N/A;  Referred by: Dr. Michael Gonzalez  BT/Clearance: ok to hold Plavix 5 days per Dr Dawn  Prep: golytely  Route instructions sent: [id number]  8/3 proc jessica to 10/26, pt has prep and instr  10/19- pre call complete.  DBM    ESOPHAGOGASTRODUODENOSCOPY N/A 8/2/2024    Procedure: EGD (ESOPHAGOGASTRODUODENOSCOPY);  Surgeon: Argelia Altamirano MD;  Location: [facility] ENDO;  Service: Endoscopy;  Laterality: N/A;  Dr. Porter, Urgent EGD, abdominal pain, standard prep, Instr to portal. Plavix hold pending.  ok to hold Plavix 5 days per Dr Dawn  7/26/24- lvm/portal for pc. DBM  7/30 pt confirmed. has been holding plavix since 7/27  cf  7/30/24- pc complete. DBM    hand trauma Right     pencil     heart stent      LEFT HEART CATHETERIZATION Left 9/20/2019    Procedure: Left heart cath 12 pm start, R rad access;  Surgeon: Brad Bahena MD;  Location: [facility] CATH LAB;  Service: Cardiology;  Laterality: Left;  RN PREOP 9/13/2019  NEEDS IODINE PREMED    LEFT HEART CATHETERIZATION Left 9/21/2020    Procedure: Left heart cath 730am start, R rad access;  Surgeon: Brad Bahena MD;  Location: [facility] CATH LAB;  Service: Cardiology;  Laterality: Left;  RN PREOP 9/14/2020, COVID NEGATIVE---9/18    REVERSE TOTAL SHOULDER ARTHROPLASTY Left 4/10/2025    Procedure: ARTHROPLASTY, SHOULDER, TOTAL, REVERSE;  Surgeon: Euegnio Reese MD;  Location: [facility] OR;  Service: Orthopedics;  Laterality: Left;  regional w/ catheter (interscalene), ASHLYN 30cc       Review of patient's allergies indicates:   Allergen Reactions    Iodine and iodide containing products Anaphylaxis    Naproxen Other (See Comments)     hallucinations  Hallucinations^  Hallucinations^    Hydrocodone-acetaminophen      Rash (skin)^    Iodine      Anaphylaxis^       No current facility-administered medications on file prior to encounter.     Current Outpatient Medications on File Prior to Encounter   Medication Sig    amLODIPine (NORVASC) 5 MG tablet TAKE 1 TABLET ONE TIME DAILY    aspirin (ECOTRIN) 81 MG EC tablet Take 1 tablet (81 mg total) by mouth once daily.    atorvastatin (LIPITOR) 80 MG tablet Take 1 tablet (80 mg total) by mouth every evening.    ciclopirox (LOPROX) 0.77 % Crea Apply topically 2 (two) times daily.    clopidogreL (PLAVIX) 75 mg tablet Take 1 tablet (75 mg total) by mouth once daily.    clotrimazole-betamethasone 1-0.05% (LOTRISONE) cream Apply topically 2 (two) times daily.    doxycycline (MONODOX) 100 MG capsule Take 1 capsule (100 mg total) by mouth 2 (two) times daily.    furosemide (LASIX) 20 MG tablet Take 1 tablet (20 mg total) by mouth once daily.    gabapentin  (NEURONTIN) 300 MG capsule Take 1 capsule (300 mg total) by mouth every evening.    isosorbide mononitrate (IMDUR) 120 MG 24 hr tablet Take 2 tablets (240 mg total) by mouth once daily.    ketoconazole (NIZORAL) 2 % cream Apply topically 2 (two) times daily. for 14 days    KRILL/OM3/DHA/EPA/OM6/LIP/ASTX (KRILL OIL, OMEGA 3 AND 6, ORAL) Take by mouth. (Patient not taking: Reported on 4/23/2025)    levothyroxine (SYNTHROID) 75 MCG tablet TAKE 1 TABLET BEFORE BREAKFAST    metFORMIN (GLUCOPHAGE-XR) 500 MG ER 24hr tablet Take 1 tablet (500 mg total) by mouth once daily.    methocarbamoL (ROBAXIN) 500 MG Tab Take 1 tablet (500 mg total) by mouth 3 (three) times daily as needed (muscle spasms).    metoprolol succinate (TOPROL-XL) 25 MG 24 hr tablet TAKE 1 TABLET ONE TIME DAILY    nitroGLYCERIN (NITROSTAT) 0.4 MG SL tablet PLACE 1 TABLET (0.4 MG TOTAL) UNDER THE TONGUE EVERY 5 (FIVE) MINUTES AS NEEDED FOR CHEST PAIN.    nystatin (MYCOSTATIN) powder Apply topically 4 (four) times daily as needed (to keep penis/scrotum dry).    olmesartan (BENICAR) 40 MG tablet Take 1 tablet (40 mg total) by mouth once daily.    oxyCODONE (ROXICODONE) 5 MG immediate release tablet Take 1-2 tablets every 6 hours as needed for pain.    pantoprazole (PROTONIX) 40 MG tablet Take 1 tablet (40 mg total) by mouth once daily.    pregabalin (LYRICA) 75 MG capsule Take 1 capsule (75 mg total) by mouth 2 (two) times daily.    promethazine (PHENERGAN) 25 MG tablet Take 1 tablet (25 mg total) by mouth every 4 (four) hours as needed for Nausea.    semaglutide (OZEMPIC) 0.25 mg or 0.5 mg (2 mg/3 mL) pen injector Inject 0.5 mg into the skin every 7 days.    tamsulosin (FLOMAX) 0.4 mg Cap Take 1 capsule (0.4 mg total) by mouth once daily.    VASCEPA 1 gram Cap Take 2 capsules (2,000 mg total) by mouth 2 (two) times a day.    vitamin D 1000 units Tab Take 1 tablet (1,000 Units total) by mouth once daily.     Family History       Problem Relation (Age of Onset)     Alcohol abuse Father    Arthritis Mother, Sister, Brother, Brother    Cancer Mother, Brother    Depression Sister    Diabetes Mother, Father, Brother, Brother    Early death Mother, Father, Brother    Heart disease Mother, Father, Sister, Brother, Brother, Brother, Brother, Brother    Hypertension Mother, Father, Sister, Brother, Brother    Migraines Mother    No Known Problems Maternal Aunt, Maternal Uncle, Paternal Aunt, Paternal Uncle, Maternal Grandmother, Maternal Grandfather, Paternal Grandmother, Paternal Grandfather    Pneumonia Brother    Seizures Mother    Stroke Father    Tremor Mother          Tobacco Use    Smoking status: Never     Passive exposure: Never    Smokeless tobacco: Never   Substance and Sexual Activity    Alcohol use: Not Currently     Comment: rarely    Drug use: No    Sexual activity: Not Currently     Review of Systems   Cardiovascular:  Positive for chest pain. Negative for claudication, dyspnea on exertion, irregular heartbeat, leg swelling, near-syncope, orthopnea, palpitations, paroxysmal nocturnal dyspnea and syncope.   Respiratory:  Negative for cough, shortness of breath, snoring and sputum production.    Gastrointestinal:  Negative for abdominal pain, dysphagia, heartburn, nausea and vomiting.   Neurological:  Positive for dizziness. Negative for headaches, loss of balance and weakness.     Objective:     Vital Signs (Most Recent):  Temp: 97.8 °F (36.6 °C) (05/07/25 0703)  Pulse: 72 (05/07/25 1004)  Resp: 18 (05/07/25 0703)  BP: (!) 141/80 (05/07/25 1004)  SpO2: 98 % (05/07/25 0353) Vital Signs (24h Range):  Temp:  [97.7 °F (36.5 °C)-98.6 °F (37 °C)] 97.8 °F (36.6 °C)  Pulse:  [58-85] 72  Resp:  [17-20] 18  SpO2:  [95 %-100 %] 98 %  BP: ()/(53-98) 141/80     Weight: 103.1 kg (227 lb 4.7 oz)  Body mass index is 32.61 kg/m².    SpO2: 98 %         Intake/Output Summary (Last 24 hours) at 5/7/2025 1017  Last data filed at 5/7/2025 0814  Gross per 24 hour   Intake 216.12 ml    Output 300 ml   Net -83.88 ml       Lines/Drains/Airways       Peripheral Intravenous Line  Duration                  Peripheral IV - Single Lumen 05/06/25 2215 22 G Posterior;Right Hand <1 day                     Physical Exam  HENT:      Head: Normocephalic and atraumatic.      Mouth/Throat:      Mouth: Mucous membranes are moist.   Eyes:      Extraocular Movements: Extraocular movements intact.      Pupils: Pupils are equal, round, and reactive to light.   Cardiovascular:      Rate and Rhythm: Normal rate and regular rhythm.      Pulses: Normal pulses.      Heart sounds: Normal heart sounds.   Pulmonary:      Effort: Pulmonary effort is normal.      Breath sounds: Normal breath sounds.   Abdominal:      General: Bowel sounds are normal.      Palpations: Abdomen is soft.   Musculoskeletal:      Left lower leg: No edema.   Skin:     General: Skin is warm.   Neurological:      General: No focal deficit present.      Mental Status: He is alert.   Psychiatric:         Mood and Affect: Mood normal.         Behavior: Behavior normal.          Current Medications:   aspirin  81 mg Oral Daily    atorvastatin  80 mg Oral QHS    clopidogreL  75 mg Oral Daily    levothyroxine  75 mcg Oral Before breakfast      0.9% NaCl   Intravenous Continuous 100 mL/hr at 05/07/25 0135 New Bag at 05/07/25 0135       Current Facility-Administered Medications:     benzonatate, 100 mg, Oral, TID PRN    calcium carbonate, 500 mg, Oral, Daily PRN    dextrose 50%, 12.5 g, Intravenous, PRN    dextrose 50%, 25 g, Intravenous, PRN    glucagon (human recombinant), 1 mg, Intramuscular, PRN    glucose, 16 g, Oral, PRN    glucose, 24 g, Oral, PRN    insulin aspart U-100, 0-5 Units, Subcutaneous, QID (AC + HS) PRN    melatonin, 6 mg, Oral, Nightly PRN    naloxone, 0.02 mg, Intravenous, PRN    ondansetron, 4 mg, Oral, Q6H PRN    senna-docusate, 1 tablet, Oral, Daily PRN    sodium chloride 0.9%, 10 mL, Intravenous, PRN    Laboratory (all labs  reviewed):  CBC:  Recent Labs   Lab 11/03/23  0740 05/06/24  0715 04/02/25  1543 05/06/25  0743 05/07/25  0136   WBC 8.82 7.21 9.81 8.41 7.82   Hemoglobin 15.0 15.1  --   --   --    HGB  --   --  15.0 14.3 13.7 L   Hematocrit 44.4 45.1  --   --   --    HCT  --   --  44.7 43.6 39.8 L   Platelet Count  --   --  216 226 178   Platelets 199 202  --   --   --        CHEMISTRIES:  Recent Labs   Lab 05/06/24  0715 11/04/24  0655 04/02/25  1543 05/06/25  0743 05/07/25  0136   Glucose 145 H 155 H 157 H 160 H 103   Sodium 140 142 139 142 140   Potassium 4.2 4.9 4.3 4.4 4.2   BUN 16 15 17 14 19   Creatinine 1.1 1.2 1.3 1.4 1.3   eGFR >60.0 >60.0 58 L 53 L 58 L   Calcium 9.5 9.5 9.6 9.2 8.7       CARDIAC BIOMARKERS:  Recent Labs   Lab 05/06/25  1903 05/07/25  0136   Troponin-I <0.006 <0.006       COAGS:  Recent Labs   Lab 08/01/23  1156   INR 1.0       LIPIDS/LFTS:  Recent Labs   Lab 04/12/23  0809 11/03/23  0740 05/06/24  0715 11/04/24  0655 04/02/25  1543 05/06/25  0743 05/07/25  0136   Cholesterol Total  --   --   --   --   --  99 L  --    Cholesterol 128  --  135 119 L  --   --   --    Triglycerides 208 H  --  285 H 257 H  --   --   --    Triglyceride  --   --   --   --   --  158 H  --    HDL 37 L  --  36 L 37 L  --   --   --    HDL Cholesterol  --   --   --   --   --  32 L  --    LDL Cholesterol 49.4 L  --  42.0 L 30.6 L  --  35.4 L  --    Non-HDL Cholesterol 91  --  99 82  --   --   --    Non HDL Cholesterol  --   --   --   --   --  67  --    AST 18   < > 19 17 16 11 12   ALT 21   < > 17 22 20 11 10    < > = values in this interval not displayed.       BNP:        TSH:  Recent Labs   Lab 11/03/23  0740 05/06/24  0715 11/04/24  0655 04/02/25  1543 05/06/25  0743   TSH 3.204 2.559 3.703 2.075 3.237       Free T4:        Diagnostic Results:  ECG (personally reviewed and interpreted tracing(s)):  5/6/2025  Sinus rhythm, low-voltage QRS complex, age-indeterminate inferior infarct and poor R-wave progression (similar to  prior)    Chest X-Ray (personally reviewed and interpreted image(s)):  No increased interstitial prominence    Echo: 4/2024      Left Ventricle: The left ventricle is normal in size. Mildly increased wall thickness. There is mild concentric hypertrophy. There is normal systolic function with a visually estimated ejection fraction of 55 - 60%. Grade I diastolic dysfunction.    Right Ventricle: Normal right ventricular cavity size. Systolic function is normal.    Aorta: Aortic root is mildly dilated measuring 4.05 cm. Ascending aorta is mildly dilated measuring 4.16 cm.    Pulmonary Artery: The estimated pulmonary artery systolic pressure is 25 mmHg.    Stress Test: 3/2023    Normal myocardial perfusion scan. There is no evidence of myocardial ischemia or infarction.    There is a  mild intensity fixed perfusion abnormality in the inferior wall of the left ventricle secondary to diaphragm attenuation.    The gated perfusion images showed an ejection fraction of 67% at rest.    There is normal wall motion at rest and post stress.    The ECG portion of the study is negative for ischemia.    The patient reported chest pain during the stress test.    There were no arrhythmias during stress.    Cath: 9/2020  Dominance: Right  LM: normal  LAD: MLI, mid stent patent (2013)  Ramus: MLI, prox 30%  LCx: MLI, ost 40%  RCA: MLI, dist eccentric 40-50%              RPDA: ost 50%, iFR 0.97              RPLB mid stent patent (9/20/19 2.5x18 [employer] SILVER)

## 2025-05-07 NOTE — PLAN OF CARE
Problem: Adult Inpatient Plan of Care  Goal: Plan of Care Review  Outcome: Progressing  Goal: Patient-Specific Goal (Individualized)  Outcome: Progressing  Goal: Absence of Hospital-Acquired Illness or Injury  Outcome: Progressing  Goal: Optimal Comfort and Wellbeing  Outcome: Progressing  Goal: Readiness for Transition of Care  Outcome: Progressing     Problem: Diabetes Comorbidity  Goal: Blood Glucose Level Within Targeted Range  Outcome: Progressing     Problem: Acute Kidney Injury/Impairment  Goal: Fluid and Electrolyte Balance  Outcome: Progressing  Goal: Improved Oral Intake  Outcome: Progressing  Goal: Effective Renal Function  Outcome: Progressing     Problem: Wound  Goal: Optimal Coping  Outcome: Progressing  Goal: Optimal Functional Ability  Outcome: Progressing  Goal: Absence of Infection Signs and Symptoms  Outcome: Progressing  Goal: Improved Oral Intake  Outcome: Progressing  Goal: Optimal Pain Control and Function  Outcome: Progressing  Goal: Skin Health and Integrity  Outcome: Progressing  Goal: Optimal Wound Healing  Outcome: Progressing

## 2025-05-07 NOTE — NURSING
New IV placed, 22g right thumb; IVF restarted. Pt complaint of nausea prn zofran given. Safety measures maintained, will cont to monitor.

## 2025-05-07 NOTE — ASSESSMENT & PLAN NOTE
Patient's FSGs are controlled on current medication regimen.  Last A1c reviewed-   Lab Results   Component Value Date    HGBA1C 5.8 (H) 05/06/2025     Most recent fingerstick glucose reviewed-   Recent Labs   Lab 05/06/25  1652 05/06/25 2014 05/07/25  0702 05/07/25  1141   POCTGLUCOSE 113* 126* 112* 147*     Current correctional scale  Low  Maintain anti-hyperglycemic dose as follows-   Antihyperglycemics (From admission, onward)      Start     Stop Route Frequency Ordered    05/06/25 1412  insulin aspart U-100 pen 0-5 Units         -- SubQ Before meals & nightly PRN 05/06/25 1312          Hold Oral hypoglycemics while patient is in the hospital.

## 2025-05-07 NOTE — DISCHARGE SUMMARY
[facility] Medicine  Discharge Summary      Patient Name: Clinton Rosenberg  MRN: [id number]  SUSAN: [id number]  Patient Class: OP- Observation  Admission Date: 5/6/2025  Hospital Length of Stay: 0 days  Discharge Date and Time: 05/07/2025 1:43 PM  Attending Physician: Adonis Alicia MD   Discharging Provider: Marilyn Granados PA-C  Primary Care Provider: Michael Gonzalez MD    Primary Care Team: Networked reference to record PCT     HPI:   Clinton Rosenberg 74 y.o. male with CAD, DM, hypothyroidism, HTN, HLD presents to the hospital chief complaint of lightheadedness near syncopal episodes chest pain.  He has had near syncopal episodes with associated lightheadedness that has been ongoing for multiple months.  He describes as lightheadedness has worsening with standing and improvement with sitting.  Finds his blood pressure has been on the lower limits of normal outpatient since the symptoms began.  He has also had 2 days of intermittent left-sided chest heaviness.  He is unsure if this feels similar to his previous angina.  He can not remember when he last took Ozempic.  He had surgery on his left rotator cuff in March and since that time he has had a significant weight loss.  He denies fever leg swelling melena hematuria hematemesis and syncope.    In the ED, afebrile without leukocytosis initial creatinine 1.8 repeat 1.4 troponin negative TSH within normal limits ultrasound abdomen without findings to suggest acute cholecystitis chest x-ray without acute cardiopulmonary process.    * No surgery found *      Hospital Course:   Mr. Clinton Rosenberg is a 74-year-old male with past medical history of CAD status post PCI to LAD and PL branch, hypertension, hyperlipidemia and type 2 diabetes mellitus who was admitted to the hospital for evaluation of chest pain and postural dizziness. Upon arrival to hospital, EKG did not show any new ST-T wave changes.  Troponin I negative x3. Echocardiogram showed normal  cw lyrica , neurontin, appears to be tolerating well   systolic function with a visually estimated ejection fraction of 55 - 60% Remained chest pain free throughout hospitalization and postural dizziness improved with IVFs.  Cardiology with recommendations of intensification of antianginal therapy with and consideration of outpatient stress test. Patient is hemodynamically stable for discharge.      Goals of Care Treatment Preferences:  Code Status: Full Code      SDOH Screening:  The patient was screened for utility difficulties, food insecurity, transport difficulties, housing insecurity, and interpersonal safety and there were no concerns identified this admission.     Consults:   Consults (From admission, onward)          Status Ordering Provider     Case Management/  Once        Provider:  (Not yet assigned)    Completed DAXA DEGROOT     Inpatient consult to Cardiology  Once        Provider:  Linda Schumacher MD    Completed DAXA DEGROOT            Assessment & Plan  Mixed hyperlipidemia long term DAPT  Continue statin  Essential hypertension  Patient's blood pressure range in the last 24 hours was: BP  Min: 110/68  Max: 165/98.The patient's inpatient anti-hypertensive regimen is listed below:  Current Antihypertensives  isosorbide mononitrate (IMDUR) 24 hr tablet, Daily, Oral  isosorbide mononitrate (IMDUR) 24 hr tablet, Daily, Oral    Plan  - BP is controlled, no changes needed to their regimen  - BP has been on lower limits of normal. Will hold home amlodipine, imdur, and olmesartan  Benign non-nodular prostatic hyperplasia with lower urinary tract symptoms  Home flomax held for BP on lower limits of normal  Coronary artery disease of native artery of native heart with stable angina pectoris; 2021 plan is long term DAPT  Patient with known CAD s/p stent placement, which is controlled Will continue ASA and Statin and monitor for S/Sx of angina/ACS. Continue to monitor on telemetry.     As above    9/21/2020 [facility]  Dominance: Right  LM:  normal  LAD: MLI, mid stent patent (2013)  Ramus: MLI, prox 30%  LCx: MLI, ost 40%  RCA: MLI, dist eccentric 40-50%              RPDA: ost 50%, iFR 0.97              RPLB mid stent patent (9/20/19 2.5x18 [employer] [place] SILVER)  Hypothyroidism  Lab Results   Component Value Date    TSH 3.237 05/06/2025     Continue home synthroid  Class 1 obesity with body mass index (BMI) of 32.0 to 32.9 in adult  Body mass index is 32.61 kg/m². Morbid obesity complicates all aspects of disease management from diagnostic modalities to treatment. Weight loss encouraged and health benefits explained to patient.  Diabetes mellitus type 2 without retinopathy  Patient's FSGs are controlled on current medication regimen.  Last A1c reviewed-   Lab Results   Component Value Date    HGBA1C 5.8 (H) 05/06/2025     Most recent fingerstick glucose reviewed-   Recent Labs   Lab 05/06/25  1652 05/06/25 2014 05/07/25  0702 05/07/25  1141   POCTGLUCOSE 113* 126* 112* 147*     Current correctional scale  Low  Maintain anti-hyperglycemic dose as follows-   Antihyperglycemics (From admission, onward)      Start     Stop Route Frequency Ordered    05/06/25 1412  insulin aspart U-100 pen 0-5 Units         -- SubQ Before meals & nightly PRN 05/06/25 1312          Hold Oral hypoglycemics while patient is in the hospital.  Final Active Diagnoses:    Diagnosis Date Noted POA    Diabetes mellitus type 2 without retinopathy [E11.9] 06/13/2024 Yes    Class 1 obesity with body mass index (BMI) of 32.0 to 32.9 in adult [E66.811, Z68.32] 06/01/2016 Not Applicable    Hypothyroidism [E03.9] 06/01/2016 Yes     Chronic    Coronary artery disease of native artery of native heart with stable angina pectoris; 2021 plan is long term DAPT [I25.118] 11/19/2014 Yes    Benign non-nodular prostatic hyperplasia with lower urinary tract symptoms [N40.1] 07/09/2014 Yes     Chronic    Essential hypertension [I10] 07/09/2014 Yes     Chronic    Mixed hyperlipidemia long term DAPT  [E78.2] 07/09/2014 Yes     Chronic      Problems Resolved During this Admission:    Diagnosis Date Noted Date Resolved POA    PRINCIPAL PROBLEM:  Chest pain [R07.9] 05/16/2018 05/07/2025 Yes    DANIELE (acute kidney injury) [N17.9] 05/06/2025 05/07/2025 Yes       Discharged Condition: good    Disposition: Home or Self Care    Follow Up:    Patient Instructions:      Notify your health care provider if you experience any of the following:  temperature >100.4     Notify your health care provider if you experience any of the following:  persistent nausea and vomiting or diarrhea     Notify your health care provider if you experience any of the following:  severe uncontrolled pain     Notify your health care provider if you experience any of the following:  difficulty breathing or increased cough     Notify your health care provider if you experience any of the following:  severe persistent headache     Notify your health care provider if you experience any of the following:  persistent dizziness, light-headedness, or visual disturbances     Notify your health care provider if you experience any of the following:  increased confusion or weakness     Activity as tolerated       Significant Diagnostic Studies: N/A    Pending Diagnostic Studies:       None           Medications:  Reconciled Home Medications:      Medication List        CHANGE how you take these medications      * isosorbide mononitrate 120 MG 24 hr tablet  Commonly known as: IMDUR  Take 1 tablet (120 mg total) by mouth once daily.  What changed: how much to take  Notes to patient: For chest pain     * isosorbide mononitrate 120 MG 24 hr tablet  Commonly known as: IMDUR  Take 1 tablet (120 mg total) by mouth once daily.  Start taking on: June 7, 2025  What changed: You were already taking a medication with the same name, and this prescription was added. Make sure you understand how and when to take each.           * This list has 2 medication(s) that are the  same as other medications prescribed for you. Read the directions carefully, and ask your doctor or other care provider to review them with you.                CONTINUE taking these medications      amLODIPine 5 MG tablet  Commonly known as: NORVASC  TAKE 1 TABLET ONE TIME DAILY     aspirin 81 MG EC tablet  Commonly known as: ECOTRIN  Take 1 tablet (81 mg total) by mouth once daily.     atorvastatin 80 MG tablet  Commonly known as: LIPITOR  Take 1 tablet (80 mg total) by mouth every evening.     ciclopirox 0.77 % Crea  Commonly known as: LOPROX  Apply topically 2 (two) times daily.     clopidogreL 75 mg tablet  Commonly known as: PLAVIX  Take 1 tablet (75 mg total) by mouth once daily.     clotrimazole-betamethasone 1-0.05% cream  Commonly known as: LOTRISONE  Apply topically 2 (two) times daily.     doxycycline 100 MG capsule  Commonly known as: MONODOX  Take 1 capsule (100 mg total) by mouth 2 (two) times daily.     furosemide 20 MG tablet  Commonly known as: LASIX  Take 1 tablet (20 mg total) by mouth once daily.     gabapentin 300 MG capsule  Commonly known as: NEURONTIN  Take 1 capsule (300 mg total) by mouth every evening.     ketoconazole 2 % cream  Commonly known as: NIZORAL  Apply topically 2 (two) times daily. for 14 days     KRILL OIL (OMEGA 3 AND 6) ORAL  Take by mouth.     levothyroxine 75 MCG tablet  Commonly known as: SYNTHROID  TAKE 1 TABLET BEFORE BREAKFAST     metFORMIN 500 MG ER 24hr tablet  Commonly known as: GLUCOPHAGE-XR  Take 1 tablet (500 mg total) by mouth once daily.     methocarbamoL 500 MG Tab  Commonly known as: Robaxin  Take 1 tablet (500 mg total) by mouth 3 (three) times daily as needed (muscle spasms).     metoprolol succinate 25 MG 24 hr tablet  Commonly known as: TOPROL-XL  TAKE 1 TABLET ONE TIME DAILY     nitroGLYCERIN 0.4 MG SL tablet  Commonly known as: NITROSTAT  PLACE 1 TABLET (0.4 MG TOTAL) UNDER THE TONGUE EVERY 5 (FIVE) MINUTES AS NEEDED FOR CHEST PAIN.     nystatin  powder  Commonly known as: MYCOSTATIN  Apply topically 4 (four) times daily as needed (to keep penis/scrotum dry).     olmesartan 40 MG tablet  Commonly known as: BENICAR  Take 1 tablet (40 mg total) by mouth once daily.     oxyCODONE 5 MG immediate release tablet  Commonly known as: ROXICODONE  Take 1-2 tablets every 6 hours as needed for pain.     pantoprazole 40 MG tablet  Commonly known as: PROTONIX  Take 1 tablet (40 mg total) by mouth once daily.     pregabalin 75 MG capsule  Commonly known as: LYRICA  Take 1 capsule (75 mg total) by mouth 2 (two) times daily.     promethazine 25 MG tablet  Commonly known as: PHENERGAN  Take 1 tablet (25 mg total) by mouth every 4 (four) hours as needed for Nausea.     semaglutide 0.25 mg or 0.5 mg (2 mg/3 mL) pen injector  Commonly known as: OZEMPIC  Inject 0.5 mg into the skin every 7 days.     tamsulosin 0.4 mg Cap  Commonly known as: FLOMAX  Take 1 capsule (0.4 mg total) by mouth once daily.     VASCEPA 1 gram Cap  Generic drug: icosapent ethyL  Take 2 capsules (2,000 mg total) by mouth 2 (two) times a day.     vitamin D 1000 units Tab  Commonly known as: VITAMIN D3  Take 1 tablet (1,000 Units total) by mouth once daily.              Indwelling Lines/Drains at time of discharge:   Lines/Drains/Airways       None                   Time spent on the discharge of patient: 35 minutes         Marilyn Granados PA-C  Department of Hospital Medicine  [facility]

## 2025-05-07 NOTE — NURSING
Pt discharged per MD order. IV removed. Catheter tip intact. No distress noted. VSS. Afebrile. No complaints of pain, N/V, diarrhea, or SOB. Pt left with belongings to Main Entrance via wheelchair per transport. Family with patient.

## 2025-05-07 NOTE — HPI
Clinton Rosenberg 74 y.o. male with CAD, DM, hypothyroidism, HTN, HLD presents to the hospital chief complaint of lightheadedness near syncopal episodes chest pain.  He has had near syncopal episodes with associated lightheadedness that has been ongoing for multiple months.  He describes as lightheadedness has worsening with standing and improvement with sitting.  Finds his blood pressure has been on the lower limits of normal outpatient since the symptoms began.  He has also had 2 days of intermittent left-sided chest heaviness.  He is unsure if this feels similar to his previous angina.  He can not remember when he last took Ozempic.  He had surgery on his left rotator cuff in March and since that time he has had a significant weight loss.  He denies fever leg swelling melena hematuria hematemesis and syncope. In the ED, afebrile without leukocytosis initial creatinine 1.8 repeat 1.4 troponin negative TSH within normal limits ultrasound abdomen without findings to suggest acute cholecystitis chest x-ray without acute cardiopulmonary process.    Cardiology is consulted for evaluation of chest pain.    Patient follows up with Dr. Bahena.    Mr. Rosenberg is a 74-year-old male with past medical history of CAD status post PCI to LAD and PL branch, hypertension, hyperlipidemia and type 2 diabetes mellitus who was admitted to the hospital for evaluation of chest pain and dizziness.  Dizziness is more when he abruptly stents up from sitting position.  Chest pain is also more on exertion.  He can not recall whether it is similar chest pain to his prior cardiac events.  He has been taking his medications as prescribed.  Upon arrival to ED, EKG did not show any new ST-T wave change.  Troponin I negative x3.  He has a normal stress test almost 2 years ago.  He has been maintained on dual antiplatelet therapy due to extensive coronary artery disease.  He takes Imdur as well.  Would recommend intensification of antianginal  therapy and consideration of outpatient stress test.

## 2025-05-07 NOTE — PLAN OF CARE
05/07/25 1315   Final Note   Assessment Type Final Discharge Note   Anticipated Discharge Disposition Home  (PCP follow up 5/13/25 Cardio follow up 5/12/25, listed on AVS.)   Hospital Resources/Appts/Education Provided Appointments scheduled and added to AVS   Post-Acute Status   Discharge Delays None known at this time

## 2025-05-07 NOTE — NURSING
Pt resting in bed, no complaints of chest pain/dizziness. Cont IVF. Pt ambulated to bathroom with standby assist; remained free from fall/injury. Wife remains st bedside. Scheduled medications given. No complaints of pain/discomfort. Left arm remains in cling. Pt on tele monitor #[id number]. SCD/Teds on. Accu check with no insulin coverage. Safety measures maintained. Will cont to monitor

## 2025-05-07 NOTE — ASSESSMENT & PLAN NOTE
Labile blood pressure  Check orthostatic vital signs  At home he takes olmesartan 40 mg/amlodipine 5 mg/Toprol-XL 25 mg/Imdur 240 mg    Decrease Imdur to 120 mg daily  Goal is to keep systolic blood pressure around 130

## 2025-05-07 NOTE — ASSESSMENT & PLAN NOTE
EKG without any new ST-T wave change  Troponin I negative x3  Doubt acute coronary syndrome    Outpatient stress test is recommended    He takes Imdur 240 mg daily.  Decrease the dose of Imdur to 120 mg daily  Consider Ranexa 500 mg b.i.d. if has anginal symptoms

## 2025-05-08 ENCOUNTER — OFFICE VISIT (OUTPATIENT)
Dept: NEUROLOGY | Facility: CLINIC | Age: 75
End: 2025-05-08
Payer: MEDICARE

## 2025-05-08 ENCOUNTER — CLINICAL SUPPORT (OUTPATIENT)
Dept: REHABILITATION | Facility: HOSPITAL | Age: 75
End: 2025-05-08
Payer: MEDICARE

## 2025-05-08 ENCOUNTER — PATIENT OUTREACH (OUTPATIENT)
Dept: ADMINISTRATIVE | Facility: CLINIC | Age: 75
End: 2025-05-08
Payer: MEDICARE

## 2025-05-08 VITALS
DIASTOLIC BLOOD PRESSURE: 64 MMHG | WEIGHT: 222.44 LBS | HEART RATE: 79 BPM | SYSTOLIC BLOOD PRESSURE: 99 MMHG | BODY MASS INDEX: 31.92 KG/M2

## 2025-05-08 DIAGNOSIS — E78.2 MIXED HYPERLIPIDEMIA: Chronic | ICD-10-CM

## 2025-05-08 DIAGNOSIS — G44.309 POST-TRAUMATIC HEADACHE, NOT INTRACTABLE, UNSPECIFIED CHRONICITY PATTERN: ICD-10-CM

## 2025-05-08 DIAGNOSIS — G47.33 OSA (OBSTRUCTIVE SLEEP APNEA): Chronic | ICD-10-CM

## 2025-05-08 DIAGNOSIS — G89.29 CHRONIC LEFT SHOULDER PAIN: Primary | ICD-10-CM

## 2025-05-08 DIAGNOSIS — M25.512 CHRONIC LEFT SHOULDER PAIN: Primary | ICD-10-CM

## 2025-05-08 DIAGNOSIS — E66.811 CLASS 1 OBESITY WITH BODY MASS INDEX (BMI) OF 32.0 TO 32.9 IN ADULT, UNSPECIFIED OBESITY TYPE, UNSPECIFIED WHETHER SERIOUS COMORBIDITY PRESENT: Primary | ICD-10-CM

## 2025-05-08 DIAGNOSIS — I10 ESSENTIAL HYPERTENSION: Chronic | ICD-10-CM

## 2025-05-08 LAB
OHS QRS DURATION: 74 MS
OHS QTC CALCULATION: 414 MS

## 2025-05-08 PROCEDURE — 4010F ACE/ARB THERAPY RXD/TAKEN: CPT | Mod: CPTII,HCNC,S$GLB,

## 2025-05-08 PROCEDURE — 1125F AMNT PAIN NOTED PAIN PRSNT: CPT | Mod: CPTII,HCNC,S$GLB,

## 2025-05-08 PROCEDURE — 99999 PR PBB SHADOW E&M-EST. PATIENT-LVL V: CPT | Mod: PBBFAC,HCNC,,

## 2025-05-08 PROCEDURE — 97110 THERAPEUTIC EXERCISES: CPT | Mod: HCNC,PN

## 2025-05-08 PROCEDURE — 1101F PT FALLS ASSESS-DOCD LE1/YR: CPT | Mod: CPTII,HCNC,S$GLB,

## 2025-05-08 PROCEDURE — 1160F RVW MEDS BY RX/DR IN RCRD: CPT | Mod: CPTII,HCNC,S$GLB,

## 2025-05-08 PROCEDURE — 3008F BODY MASS INDEX DOCD: CPT | Mod: CPTII,HCNC,S$GLB,

## 2025-05-08 PROCEDURE — 3074F SYST BP LT 130 MM HG: CPT | Mod: CPTII,HCNC,S$GLB,

## 2025-05-08 PROCEDURE — 99215 OFFICE O/P EST HI 40 MIN: CPT | Mod: HCNC,S$GLB,,

## 2025-05-08 PROCEDURE — 97112 NEUROMUSCULAR REEDUCATION: CPT | Mod: HCNC,PN

## 2025-05-08 PROCEDURE — G2211 COMPLEX E/M VISIT ADD ON: HCPCS | Mod: HCNC,S$GLB,,

## 2025-05-08 PROCEDURE — 3288F FALL RISK ASSESSMENT DOCD: CPT | Mod: CPTII,HCNC,S$GLB,

## 2025-05-08 PROCEDURE — 3078F DIAST BP <80 MM HG: CPT | Mod: CPTII,HCNC,S$GLB,

## 2025-05-08 PROCEDURE — 1159F MED LIST DOCD IN RCRD: CPT | Mod: CPTII,HCNC,S$GLB,

## 2025-05-08 PROCEDURE — 3044F HG A1C LEVEL LT 7.0%: CPT | Mod: CPTII,HCNC,S$GLB,

## 2025-05-08 NOTE — PROGRESS NOTES
[facility] VISIT    Chief Complaint and Duration     Chief Complaint   Patient presents with    Consult     Referred by NANCY Arnold     Headache    for 7 years.    History of Present Illness     Clinton Rosenberg is a 74 y.o. right handed male with a history of multiple medical diagnoses as listed below that presents for headaches.     Referral received from primary care    Significant past medical history of concussion 2018, CAD, DM, hypothyroidism, anxiety depression with memory loss, hyperlipidemia, hypertension, PVD, venous insufficiency, bradycardia, obesity, GERD, JAYLEN    Presents to clinic visit with son in law    History of Present Illness:    PMHx negative for TBI, Meningitis, Aneurysms, Kidney Stones, asthma, GI bleed, osteoporosis, CAD/MI, CVA/TIA, cancer, pregnancy   Family Hx none for Migraines     This patient experienced a mechanical fall early March in which she was diagnosed with closed head injury suffered a laceration to left face and a complete tear left shoulder status post repair.  Patient has sought ED care due to fall CT head unremarkable no acute abnormalities or fractures.  Patient states prior to fall he had history of concussion 2018 with the headaches.  Headaches presenting status post fall with a head strike in March is different from previous headaches.    Headache History:  Onset - 7 years ago after concussion reports increased episode over last 2 months after fall  Previous Hx of HA - intermittent managed with over-the-counter medications  Location/Radiation - current headache episode frontalis retro-orbital presentation  Nature of Pain/Quality - pressure sharp throbbing   Duration - hours   Intensity (range) - 5/10, hours time to peak intensity   Frequency - 5-7/30 ha days per 2 months, 1-2/30 are debilitating  Triggers - dehydration, bright or flickering lights, exertion, heat, not eating   Aggravating Factors - light  Alleviating Factors -  sleep, eating   Recent Changes - no  Prodrome/Aura - no  HA today - no  Time of day of most headaches- anytime  Sleep - unsure of snoring, does not wake feeling refreshed, can get resolution of headache with sleep.  Has history of JAYLEN not on CPAP, fragmented sleep since left shoulder surgery    Associated symptoms with the headache:   Meningeal symptoms - photophobia, with reports of flashing light and corner of left 2/3 weeks after head strike eye lasted for 3-4 days self-limiting has not returned  Nausea/vomiting  Visual blurriness   Dizziness   Impaired concentration   Pain worsened with physical activity   Neck pain     Treatments Tried   Metoprolol  Oxycodone   Advil  Tylenol  Pregabalin  Gabapentin    Social History  Alcohol - denies  Smoke - denies  Recreational Drug Use- denies      Review of patient's allergies indicates:   Allergen Reactions    Iodine and iodide containing products Anaphylaxis    Naproxen Other (See Comments)     hallucinations  Hallucinations^  Hallucinations^    Hydrocodone-acetaminophen      Rash (skin)^    Iodine      Anaphylaxis^     Current Medications[1]    Medical History     Past Medical History:   Diagnosis Date    Allergy     Angina pectoris     Anxiety     Cancer     skin rwemoved from head    Chronic midline low back pain without sciatica 10/26/2022    Coronary artery disease     Depression     Eye injury as a teen    stuck object in os    GERD (gastroesophageal reflux disease)     Hyperlipidemia     Hypertension     Hypothyroidism     Memory loss     12/3/2014 MRI brain: 1. No evidence for acute infarct 2. unremarkable MRI of the brain; 12/3/2014 carotid US normal    Myocardial infarction     Nuclear sclerosis of both eyes 05/20/2021    Obesity     Peripheral vascular disease 06/20/2017    Retrocalcaneal bursitis 11/24/2014    Sleep apnea     Type 2 diabetes mellitus with other specified complication 04/27/2023    Type 2 diabetes mellitus with other specified complication  04/27/2023     Past Surgical History:   Procedure Laterality Date    APPENDECTOMY  1986    ARTHROSCOPY,SHOULDER,WITH BICEPS TENODESIS Left 4/10/2025    Procedure: ARTHROSCOPY, SHOULDER, W/ BICEPS TENODESIS;  Surgeon: Eugenio Reese MD;  Location: [facility] OR;  Service: Orthopedics;  Laterality: Left;    bone spur Right     COLONOSCOPY      COLONOSCOPY N/A 10/26/2023    Procedure: COLONOSCOPY;  Surgeon: Argelia Altamirano MD;  Location: [facility] ENDO;  Service: Endoscopy;  Laterality: N/A;  Referred by: Dr. Michael Gonzalez  BT/Clearance: ok to hold Plavix 5 days per Dr Dawn  Prep: golytely  Route instructions sent: [id number]  8/3 proc jessica to 10/26, pt has prep and instr  10/19- pre call complete.  DBM    ESOPHAGOGASTRODUODENOSCOPY N/A 8/2/2024    Procedure: EGD (ESOPHAGOGASTRODUODENOSCOPY);  Surgeon: Argelia Altamirano MD;  Location: [facility] ENDO;  Service: Endoscopy;  Laterality: N/A;  Dr. Porter, Urgent EGD, abdominal pain, standard prep, Instr to portal. Plavix hold pending.  ok to hold Plavix 5 days per Dr Dawn  7/26/24- lvm/portal for pc. DBM  7/30 pt confirmed. has been holding plavix since 7/27 cf  7/30/24- pc complete. DBM    hand trauma Right     pencil     heart stent      LEFT HEART CATHETERIZATION Left 9/20/2019    Procedure: Left heart cath 12 pm start, R rad access;  Surgeon: Brad Bahena MD;  Location: [facility] CATH LAB;  Service: Cardiology;  Laterality: Left;  RN PREOP 9/13/2019  NEEDS IODINE PREMED    LEFT HEART CATHETERIZATION Left 9/21/2020    Procedure: Left heart cath 730am start, R rad access;  Surgeon: Brad Bahena MD;  Location: [facility] CATH LAB;  Service: Cardiology;  Laterality: Left;  RN PREOP 9/14/2020, COVID NEGATIVE---9/18    REVERSE TOTAL SHOULDER ARTHROPLASTY Left 4/10/2025    Procedure: ARTHROPLASTY, SHOULDER, TOTAL, REVERSE;  Surgeon: Eugenio Reese MD;  Location: [facility] OR;  Service: Orthopedics;  Laterality: Left;  regional w/ catheter (interscalene), ASHLYN 30cc      Family History   Problem Relation Name Age of Onset    Arthritis Mother      Early death Mother      Heart disease Mother      Hypertension Mother      Migraines Mother      Seizures Mother      Tremor Mother      Diabetes Mother      Cancer Mother      Early death Father      Heart disease Father      Hypertension Father      Stroke Father      Diabetes Father      Alcohol abuse Father      Heart disease Sister Otilia     Hypertension Sister Otilia     Arthritis Sister Otilia     Depression Sister Otilia     Arthritis Brother Maximino     Cancer Brother Maximino     Diabetes Brother Maximino     Early death Brother Maximino     Heart disease Brother Maximino     Hypertension Brother Maximino     Heart disease Brother Robert     Arthritis Brother Robert     Heart disease Brother Azael     Pneumonia Brother Azael     Heart disease Brother Martin     Hypertension Brother Martin     Diabetes Brother Martin     Heart disease Brother Dario     No Known Problems Maternal Aunt      No Known Problems Maternal Uncle      No Known Problems Paternal Aunt      No Known Problems Paternal Uncle      No Known Problems Maternal Grandmother      No Known Problems Maternal Grandfather      No Known Problems Paternal Grandmother      No Known Problems Paternal Grandfather      Amblyopia Neg Hx      Blindness Neg Hx      Cataracts Neg Hx      Glaucoma Neg Hx      Macular degeneration Neg Hx      Retinal detachment Neg Hx      Strabismus Neg Hx      Thyroid disease Neg Hx       Social History[2]    Exam     Vitals:    05/08/25 1446   BP: 99/64   Pulse: 79      Physical Exam:  General: Not in acute distress. Not ill-appearing.   HENT: Normocephalic and atraumatic. Moist mucous membranes.  Eyes: Conjunctivae normal.   Pulmonary: Pulmonary effort is normal.   Skin: Skin is warm and dry. No rashes.   Psychiatric: Mood normal.        Neurologic Exam   Mental status: oriented to person, place, and time  Attention: Normal. Concentration: normal.  Speech:  speech is normal.  Cranial Nerves: EOMI intact, V1-V3 Facial sensation intact. Symmetric facies. Hearing grossly intact. Palate and uvula midline, symmetric. No tongue deviation. Trapezius strength intact.     Motor exam: bulk and tone normal. Strength 5/5 in bilateral upper extremities: deltoids, biceps, triceps, wrist flexion/extension, finger abduction/adduction. Strength 5/5 in bilateral lower extremities: hip flexion/extension, thigh adduction/abduction, knee flexion/extension, dorsiflexion/plantarflexion, foot eversion/inversion.    Reflexes: 2+ in bilateral upper extremities: biceps and brachiaradialis, 2+ in bilateral lower extremities: patellar and achilles  Plantar reflex: normal  Jade's/Clonus: negative    Sensory exam: light touch intact    Gait exam: normal  Romberg: negative  Coordination: normal    Tremor: none  Cogwheel rigidity: none    Labs and Imaging     Labs: reviewed  No results found for this or any previous visit (from the past 24 hours).    Thyroid normal  HgA1C%:  5.8  LDL:  35.4    Imaging:   I have personally reviewed the images performed.     HEAD CT W/O contrast 3/6/2025:   No acute intracranial findings to include fractures or hemorrhage    Other procedures: reviewed    Assessment and Plan     Problem List Items Addressed This Visit          Cardiac/Vascular    Mixed hyperlipidemia long term DAPT (Chronic)    Essential hypertension (Chronic)    Overview   7/30/20  TTE (Ao root 4.2cm at annulus, 3.8cm at sinuses) normal LV systolic function LVEF 65%.  Mild concentric LVH.  Grade 1 diastolic dysfunction.  Normal RV systolic function.  Normal CVP.  PA pressure 20.    03/09/2023 TTE LV normal size and systolic function LVEF 65%.  Grade 1 diastolic dysfunction.              Endocrine    Class 1 obesity with body mass index (BMI) of 32.0 to 32.9 in adult - Primary       Other    JAYLEN (obstructive sleep apnea) (Chronic)     Other Visit Diagnoses         Post-traumatic headache, not  intractable, unspecified chronicity pattern              Mr. Rosenberg is a 74-year-old male new to me who presents for headache status post mechanical fall.  He does have previous headache history due to concussion 2018. ER visit with closed head injury suffered a laceration to left face and a complete tear left shoulder status post repair. Patient has sought ED care due to fall CT head unremarkable no acute abnormalities or fractures.  Current headaches episodic managed with over-the-counter Advil presenting frontalis, retro-orbital pressure sharp throbbing lasting hours 5 on 0-10 scale with hours to peak to intensity 2 headaches this month 0 being debilitating triggers dehydration not eating bright lights alleviating factors over-the-counter medication sleep no headache on today denies prodrome or aura.  Headache can occur anytime of the day can get resolution of headache with sleep has history of JAYLEN not on CPAP.  Positive associated symptom of photophobia nausea, visual blurriness, dizzy, pain worsened with activity impaired concentration and neck pain.  Patient is currently on oxycodone, pregabalin gabapentin prescribed for post surgical management of pain.  Physical examination on today unremarkable left arm in immobilizing brace appropriate sensation and movement.  Reviewed CT scan with the patient and caregiver findings unremarkable.    PLAN:    My approach to every patient is multimodal.I focused our discussion on adressing modifiable risk factors and trying to decrease them.  - Discussed symptoms appear to be consistent with post trauma headache, with a history of tension type discussed treatment options and patient agreed with the following plan:    Preventive medications; these medications have to be taken every single day to decrease headache frequency and severity; it takes at least minimum of few weeks to see any results; and have to be taken for at least 1 to 2 years. The medication should be started  at a small dose, and increased if no significant side effects. Patient compliance is key for effectiveness of the preventive medications. (we discussed the options of beta-blockers, calcium channel blockers, SSRIs, SNRIs, neuron modulating like topiramate options)     Failed:   Beta-blockers    Patient has tried and failed 2 or more first line mediation management options for preventation of migrane; therefore it is medically necessary tat they take CGRP antagonist for preention of migrane.    START:   Natural approaches to increasing brain energy and serotonin:   Magnesium 400 mg daily  Vitamin B2 400 mg daily   Vitamin B12 1000 mcg daily     Given patient's current polypharmacy recommended natural approaches for management.  Patient's headache presentation diminished with the use of nerve agents.    Acute (abortive) medications- these medications are to be taken only at the onset of severe headache and please limit a total of any combination of these medications to less than 6 days per month on average because they can cause rebound (medication overuse) headaches.     Failed:   Ibuprofen    If needed patient has tried and failed multiple triptans for acute relief of migrine and therefore it is medically necessary that they be on CGRP antagonist for acute relief.     START:   Continue over-the-counter Advil, Tylenol.    DIET: I recommend a diet that heavily favors fruits and vegetables while reducing carbs. More information is available upon request.     EXERCISE: Gentle movement exercises, concentrate on combination of muscle building and aerobic. I also recommend adding gentle Yoga therapy. The goal is 150 minutes per week of moderate to rigorous exercise, but you should start at your own pace and progress slowly and safely as discussed today.    ANXIETY/STRESS- Control stressors with yoga, meditation, counseling, or other methods of mindfulness.     SLEEP- aim for 7-8 hrs of restful sleep per night.  Recommended  patient returned to sleep medicine Clinic for proper evaluation and recommendations of JAYLEN    FUN- Increase fun time spend with friends and family     #Health Maintenance/Lifestyle Advice  We have discussed the value in aggressively controlling vascular risk factors like hypertension, hyperlipidemia, obesity and diabetes SBP<130, LDL<100, A1C<7.0.  We discussed the need to optimize lifestyle choices including a heart-healthy diet (e.g., Mediterranean or DASH), increased cardiovascular exercise (goal 150 minutes of moderate-intensity per week), and stay cognitively and socially active.  We recommend the MIND diet, a combination of two healthy diets: the Mediterranean diet and the DASH (Dietary Approaches to Stop Hypertension) diet and includes a variety of brain-friendly foods to optimize cognitive health and longevity.    Benefits, side effects, and alternatives were discussed extensively with the patient.?     Patient verbally endorsed understanding of all the recommendations.?     - risks, benefits, and potential side effects of prescribed antihypertensive and nerve agents discussed   - alternative treatment options offered   - Educated at length on importance of healthy diet, regular exercise and sleep hygiene in the treatment of headaches    - Start tracking headaches via Migraine Stiven yuko on phone     I have discussed realistic goals of care with patient at length as well as medication options, and need for lifestyle adjustment. I have explained that treatment will take time. We have agreed that the goal will be to reduce frequency/intensity/quantity of HA, not to be completely HA free. I have explained my non narcotic policy regarding headache treatment.    Patient agreeable to work on lifestyle adjustments.    Questions and concerns were sought and answered to the patient's stated verbal satisfaction. The patient verbalizes understanding and agreement with the above stated treatment plan.      Thank you for  allowing me to assist in Mr. Clinton Rosenberg 's care. If you have any questions, please contact clinic @ [phone] or use of portal messaging services via electronic medical record.    Visit today included increased complexity associated with the care of the episodic problem headaches addressed and managing the longitudinal care of the patient due to the serious and/or complex managed problem(s) neurological/vascular risk factor.    KAYLEEN Oro  [facility]  Department of Neurology- [facility]     [phone]    Follow-up: Follow up in about 6 months (around 11/8/2025).    Time spent on this encounter: 50 minutes. This includes face to face time and non-face to face time preparing to see the patient (eg, review of tests), obtaining and/or reviewing separately obtained history, documenting clinical information in the electronic or other health record, independently interpreting results and communicating results to the patient/family/caregiver, or care coordinator.     This note was created by combination of typed  and M-Modal dictation. Transcription and phonetic errors may be present.  If there are any questions, please contact me.         [1]   Current Outpatient Medications   Medication Sig Dispense Refill    amLODIPine (NORVASC) 5 MG tablet TAKE 1 TABLET ONE TIME DAILY 100 tablet 3    aspirin (ECOTRIN) 81 MG EC tablet Take 1 tablet (81 mg total) by mouth once daily. 90 tablet 3    atorvastatin (LIPITOR) 80 MG tablet Take 1 tablet (80 mg total) by mouth every evening. 90 tablet 3    ciclopirox (LOPROX) 0.77 % Crea Apply topically 2 (two) times daily. 90 g 4    clopidogreL (PLAVIX) 75 mg tablet Take 1 tablet (75 mg total) by mouth once daily. 90 tablet 3    clotrimazole-betamethasone 1-0.05% (LOTRISONE) cream Apply topically 2 (two) times daily. 45 g 0    doxycycline (MONODOX) 100 MG capsule Take 1 capsule (100 mg total) by mouth 2 (two) times daily. 14 capsule 0     furosemide (LASIX) 20 MG tablet Take 1 tablet (20 mg total) by mouth once daily. 90 tablet 3    gabapentin (NEURONTIN) 300 MG capsule Take 1 capsule (300 mg total) by mouth every evening. 90 capsule 3    [START ON 6/7/2025] isosorbide mononitrate (IMDUR) 120 MG 24 hr tablet Take 1 tablet (120 mg total) by mouth once daily. 30 tablet 3    isosorbide mononitrate (IMDUR) 120 MG 24 hr tablet Take 1 tablet (120 mg total) by mouth once daily. 30 tablet 2    KRILL/OM3/DHA/EPA/OM6/LIP/ASTX (KRILL OIL, OMEGA 3 AND 6, ORAL) Take by mouth.      levothyroxine (SYNTHROID) 75 MCG tablet TAKE 1 TABLET BEFORE BREAKFAST 90 tablet 3    metFORMIN (GLUCOPHAGE-XR) 500 MG ER 24hr tablet Take 1 tablet (500 mg total) by mouth once daily. 90 tablet 3    methocarbamoL (ROBAXIN) 500 MG Tab Take 1 tablet (500 mg total) by mouth 3 (three) times daily as needed (muscle spasms). 40 tablet 0    metoprolol succinate (TOPROL-XL) 25 MG 24 hr tablet TAKE 1 TABLET ONE TIME DAILY 100 tablet 3    nitroGLYCERIN (NITROSTAT) 0.4 MG SL tablet PLACE 1 TABLET (0.4 MG TOTAL) UNDER THE TONGUE EVERY 5 (FIVE) MINUTES AS NEEDED FOR CHEST PAIN. 50 tablet 3    olmesartan (BENICAR) 40 MG tablet Take 1 tablet (40 mg total) by mouth once daily. 90 tablet 3    oxyCODONE (ROXICODONE) 5 MG immediate release tablet Take 1-2 tablets every 6 hours as needed for pain. 28 tablet 0    pantoprazole (PROTONIX) 40 MG tablet Take 1 tablet (40 mg total) by mouth once daily. 90 tablet 3    pregabalin (LYRICA) 75 MG capsule Take 1 capsule (75 mg total) by mouth 2 (two) times daily. (Patient taking differently: Take 75 mg by mouth 2 (two) times daily. Once daily in the evening) 60 capsule 0    promethazine (PHENERGAN) 25 MG tablet Take 1 tablet (25 mg total) by mouth every 4 (four) hours as needed for Nausea. 30 tablet 0    semaglutide (OZEMPIC) 0.25 mg or 0.5 mg (2 mg/3 mL) pen injector Inject 0.5 mg into the skin every 7 days. 9 mL 1    tamsulosin (FLOMAX) 0.4 mg Cap Take 1 capsule  (0.4 mg total) by mouth once daily. 90 capsule 3    VASCEPA 1 gram Cap Take 2 capsules (2,000 mg total) by mouth 2 (two) times a day. 360 capsule 3    vitamin D 1000 units Tab Take 1 tablet (1,000 Units total) by mouth once daily. 90 tablet 3    ketoconazole (NIZORAL) 2 % cream Apply topically 2 (two) times daily. for 14 days 60 g 0    nystatin (MYCOSTATIN) powder Apply topically 4 (four) times daily as needed (to keep penis/scrotum dry). 15 g 1     No current facility-administered medications for this visit.   [2]   Social History  Socioeconomic History    Marital status:      Spouse name: Opal    Number of children: 4   Tobacco Use    Smoking status: Never     Passive exposure: Never    Smokeless tobacco: Never   Substance and Sexual Activity    Alcohol use: Not Currently     Comment: rarely    Drug use: No    Sexual activity: Not Currently     Social Drivers of Health     Financial Resource Strain: Low Risk  (5/6/2025)    Overall Financial Resource Strain (CARDIA)     Difficulty of Paying Living Expenses: Not hard at all   Food Insecurity: No Food Insecurity (5/6/2025)    Hunger Vital Sign     Worried About Running Out of Food in the Last Year: Never true     Ran Out of Food in the Last Year: Never true   Transportation Needs: No Transportation Needs (5/6/2025)    PRAPARE - Transportation     Lack of Transportation (Medical): No     Lack of Transportation (Non-Medical): No   Physical Activity: Sufficiently Active (3/10/2025)    Exercise Vital Sign     Days of Exercise per Week: 3 days     Minutes of Exercise per Session: 80 min   Stress: No Stress Concern Present (5/6/2025)    Citizen of [place] of [employer] - Occupational Stress Questionnaire     Feeling of Stress : Not at all   Recent Concern: Stress - Stress Concern Present (3/10/2025)    Citizen of [place] [employer] - Occupational Stress Questionnaire     Feeling of Stress : To some extent   Housing Stability: Low Risk  (5/6/2025)     Housing Stability Vital Sign     Unable to Pay for Housing in the Last Year: No     Number of Times Moved in the Last Year: 0     Homeless in the Last Year: No

## 2025-05-08 NOTE — PROGRESS NOTES
Outpatient Rehab    Physical Therapy Progress Note    Patient Name: Clinton Rosenberg  MRN: [id number]  YOB: 1950  Encounter Date: 5/8/2025    Therapy Diagnosis:   Encounter Diagnosis   Name Primary?    Chronic left shoulder pain Yes     Physician: Nadir Gloria, *    Physician Orders: Eval and Treat  Medical Diagnosis: Traumatic complete tear of left rotator cuff, subsequent encounter  Biceps tendinitis of left upper extremity    Visit # / Visits Authorized:  6 / 20  Insurance Authorization Period: 4/15/2025 to 6/17/2025  Date of Evaluation: 4/15/25  Plan of Care Certification: 4/15/2025 to 7/2/2025     PT/PTA: PT   Number of PTA visits since last PT visit:   Time In: 1700   Time Out: 1800  Total Time (in minutes): 60   Total Billable Time (in minutes): 30    FOTO:  Intake Score: 24%  Survey Score 2:  %  Survey Score 3:  %    Precautions     Passive Range of Motion:limited to 30 degrees ER side; F. Flexion 90 degrees Abduction for axillary care as tolerated      Subjective   states he is feeling okay for just getting out of the hospital. States he was having shortness of breath and dizziness. They think his mixtures of medications may have been off and he has been dealing with that since last visit..  Pain reported as 4/10. Left shoulder  4/10/2025, POW 4 weeks and 0 days    Objective      Shoulder Range of Motion  Left Shoulder   Active (deg) Passive (deg) Pain   Flexion   100 Yes   Extension         Scaption         ABduction         ADduction         Horizontal ABduction         Horizontal ADduction         External Rotation (Shoulder ABducted 0 degrees)   5 Yes   External Rotation (Shoulder ABducted 45 degrees)         External Rotation (Shoulder ABducted 90 degrees)         Internal Rotation (Shoulder ABducted 0 degrees)         Internal Rotation (Shoulder ABducted 45 degrees)         Internal Rotation (Shoulder ABducted 90 degrees)                          Treatment:  Therapeutic  "Exercise  TE 1: shoulder assessment  TE 2: error  TE 3: Tables slides scaption 3 x 15 x 3 " holds  TE 6: Supine Er with dowel 5" holds x 3 min  TE 7: Francine Scaption 3 min.  TE 8: Pulley Flexion 3 min,  Manual Therapy  MT 1: PROM shoulder flexion, abduction, and ER within protocols  Balance/Neuromuscular Re-Education  NMR 1: scapular retractions 3 x 20 reps  NMR 3: wrist extension/flexion and RD/UD 4# DB 3 x 10 reps  Therapeutic Activity  TA 1: error  TA 2: error  TA 3: error  TA 4: error    Time Entry(in minutes):  Manual Therapy Time Entry: 20  Neuromuscular Re-Education Time Entry: 10  Therapeutic Exercise Time Entry: 30    Assessment & Plan   Assessment: Clinton completed 6 sessions of PT and continues to improve ROM. His ER was measured today and was less than normal likely due to hospital stay and not able to do his HEP. He was given  ER stretch to do at home to improve ROM. He continues to have pain, but not as much lately. He is progressing toward his goals and is still limited due to his port-op precautions and healing timelines. He will continue to benefit from skilled physical therapy to address his impairments and improve function of his LUE.  Evaluation/Treatment Tolerance: Patient tolerated treatment well    Patient will continue to benefit from skilled outpatient physical therapy to address the deficits listed in the problem list box on initial evaluation, provide pt/family education and to maximize pt's level of independence in the home and community environment.     Patient's spiritual, cultural, and educational needs considered and patient agreeable to plan of care and goals.           Plan: continue with POC    Goals:   Active       LTGs       Increase ROM to allow improved joint biomechanics during functional activities.    (Progressing)       Start:  04/09/25    Expected End:  07/02/25            2. Independent with home exercise program.   (Progressing)       Start:  04/09/25    Expected End:  " 07/02/25            3. Full return to functional activities with manageable complaints.   (Progressing)       Start:  04/09/25    Expected End:  07/02/25            4. Patient to demonstrate improved posture and body mechanics.   (Progressing)       Start:  04/09/25    Expected End:  07/02/25            5.Decrease pain by 75% during functional activities.   (Progressing)       Start:  04/09/25    Expected End:  07/02/25               STGs       Pt to increase strength by a 1/2 grade of muscles test to allow for improvement in functional activities such as performing chores.    (Progressing)       Start:  04/09/25    Expected End:  07/02/25            2. Pt to improve range of motion by 25% to allow for improved functional mobility to allow for improvement in IADL's.   (Progressing)       Start:  04/09/25    Expected End:  07/02/25            3. Pt to report compliance with HEP and demonstrate proper exercise technique to PT to show competence with self management of condition.   (Progressing)       Start:  04/09/25    Expected End:  07/02/25            4. Decrease pain by 25% during functional activities.   (Progressing)       Start:  04/09/25    Expected End:  07/02/25                Donis Palafox, PT

## 2025-05-08 NOTE — PROGRESS NOTES
C3 nurse spoke with Clinton Rosenberg's wife Opal for a TCC post hospital discharge follow up call. The patient has a scheduled [facility]OS[facility] appointment with Michael Gonzalez MD  on 05/13/25 @ 1020. Will route message to PCP staff to include hospital follow up with this visit if possible.

## 2025-05-12 ENCOUNTER — HOSPITAL ENCOUNTER (OUTPATIENT)
Dept: CARDIOLOGY | Facility: HOSPITAL | Age: 75
Discharge: HOME | End: 2025-05-12
Attending: INTERNAL MEDICINE
Payer: MEDICARE

## 2025-05-12 VITALS — WEIGHT: 222 LBS | BODY MASS INDEX: 31.78 KG/M2 | HEIGHT: 70 IN

## 2025-05-12 DIAGNOSIS — I77.810 AORTIC ROOT DILATATION: ICD-10-CM

## 2025-05-12 PROBLEM — Z96.612 HISTORY OF LEFT SHOULDER REPLACEMENT: Status: ACTIVE | Noted: 2025-04-10

## 2025-05-12 LAB
AORTIC SIZE INDEX: 1.8 CM/M2
ASCENDING AORTA: 3.9 CM
AV INDEX (PROSTH): 0.75
AV MEAN GRADIENT: 3 MMHG
AV PEAK GRADIENT: 4 MMHG
AV VALVE AREA BY VELOCITY RATIO: 3.7 CM²
AV VALVE AREA: 4 CM²
AV VELOCITY RATIO: 0.7
BSA FOR ECHO PROCEDURE: 2.23 M2
CV ECHO LV RWT: 0.62 CM
DOP CALC AO PEAK VEL: 1 M/S
DOP CALC AO VTI: 20.8 CM
DOP CALC LVOT AREA: 5.3 CM2
DOP CALC LVOT DIAMETER: 2.6 CM
DOP CALC LVOT PEAK VEL: 0.7 M/S
DOP CALC LVOT STROKE VOLUME: 82.3 CM3
DOP CALCLVOT PEAK VEL VTI: 15.5 CM
E WAVE DECELERATION TIME: 177 MSEC
E/A RATIO: 1.26
ECHO LV POSTERIOR WALL: 1.2 CM (ref 0.6–1.1)
FRACTIONAL SHORTENING: 30.8 % (ref 28–44)
INTERVENTRICULAR SEPTUM: 1.2 CM (ref 0.6–1.1)
IVC DIAMETER: 1.93 CM
IVRT: 114 MSEC
LEFT INTERNAL DIMENSION IN SYSTOLE: 2.7 CM (ref 2.1–4)
LEFT VENTRICLE DIASTOLIC VOLUME INDEX: 29.82 ML/M2
LEFT VENTRICLE DIASTOLIC VOLUME: 65 ML
LEFT VENTRICLE MASS INDEX: 73.1 G/M2
LEFT VENTRICLE SYSTOLIC VOLUME INDEX: 12.4 ML/M2
LEFT VENTRICLE SYSTOLIC VOLUME: 27 ML
LEFT VENTRICULAR INTERNAL DIMENSION IN DIASTOLE: 3.9 CM (ref 3.5–6)
LEFT VENTRICULAR MASS: 159.3 G
LVED V (TEICH): 64.58 ML
LVES V (TEICH): 26.83 ML
LVOT MG: 1.36 MMHG
LVOT MV: 0.55 CM/S
MV PEAK A VEL: 0.34 M/S
MV PEAK E VEL: 0.43 M/S
MV STENOSIS PRESSURE HALF TIME: 51.36 MS
MV VALVE AREA P 1/2 METHOD: 4.28 CM2
PISA TR MAX VEL: 1 M/S
PV PEAK GRADIENT: 2 MMHG
PV PEAK VELOCITY: 0.68 M/S
RA PRESSURE ESTIMATED: 3 MMHG
RV TB RVSP: 4 MMHG
SINUS: 4.19 CM
STJ: 3.7 CM
TR MAX PG: 4 MMHG
TRICUSPID ANNULAR PLANE SYSTOLIC EXCURSION: 1.2 CM
TV REST PULMONARY ARTERY PRESSURE: 7 MMHG
Z-SCORE OF LEFT VENTRICULAR DIMENSION IN END DIASTOLE: -6.25
Z-SCORE OF LEFT VENTRICULAR DIMENSION IN END SYSTOLE: -3.92

## 2025-05-12 PROCEDURE — 93306 TTE W/DOPPLER COMPLETE: CPT | Mod: HCNC

## 2025-05-12 PROCEDURE — 93306 TTE W/DOPPLER COMPLETE: CPT | Mod: 26,HCNC,, | Performed by: INTERNAL MEDICINE

## 2025-05-12 NOTE — ASSESSMENT & PLAN NOTE
05/13/2025: Followed by Cardiology.  Recent repeat echo, will defer to Cardiology to interpret regarding aortic root

## 2025-05-12 NOTE — PROGRESS NOTES
This note was created by combination of typed  and M-Modal dictation.  Transcription errors may be present.   This note was also generated with the assistance of ambient listening technology. Verbal consent was obtained by the patient and accompanying visitor(s) for the recording of patient appointment to facilitate this note. I attest to having reviewed and edited the generated note for accuracy, though some syntax or spelling errors may persist. Please contact the author of this note for any clarification.    Assessment and Plan:   Assessment and Plan   Assessment & Plan  Diabetes mellitus type 2 without retinopathy  Class 1 obesity with body mass index (BMI) of 32.0 to 32.9 in adult, unspecified obesity type, unspecified whether serious comorbidity present  05/13/2025: Pre visit labs A1c good.  On low-dose metformin, was previously on Ozempic up to 1 mg but had to stop it for the surgery and so restarted on low-dose and he is currently on 0.25 with plans to increase it to 0.5.    However he is complaining of abdominal pain and I wonder if this is from the Ozempic.  Had recent overnight jobs, right upper quadrant ultrasound showed no gallstones, renal ultrasound no obvious stones.  Given good A1c, would just stay on the Ozempic 0.25 for now  Orders:    Comprehensive Metabolic Panel; Future    Lipid Panel; Future    Hemoglobin A1C; Future    Microalbumin/Creatinine Ratio, Urine; Future    CBC Without Differential; Future    Abdominal pain, unspecified abdominal location  05/13/2025:  Ongoing for the past week.  Reports that he has regular bowel movements alternating with diarrhea.  Not currently on narcotics but is on Lyrica.  And also on Ozempic.  Could be side effect of the Ozempic.  Recent overnight jobs with negative evaluation including right upper quadrant ultrasound, renal ultrasound   Check KUB looking for stool burden.  If high burden, stool softener.  If regular or low, would monitor and just  stay on the low-dose Ozempic 0.25 and see if he developed tolerance to it.  If it is intolerable may need to stop the Ozempic  Orders:    X-Ray Abdomen AP 1 View; Future    Essential hypertension  05/13/2025: Notes episodes of lightheadedness, wife self decrease his amlodipine to 2.5, blood pressure low today, stop the amlodipine.  Has lost modest weight in the past 6 months and that may be a factor in his blood pressure.    Stay on Toprol-XL and stay on olmesartan.  If blood pressure continues to remain low would half the olmesartan       Mixed hyperlipidemia long term DAPT  Coronary artery disease of native artery of native heart with stable angina pectoris; 2021 plan is long term DAPT  Peripheral vascular disease with LE US 6/2017 and carotid US   05/13/2025:  On long-term DAPT per cardiology.  On high-dose statin and Vascepa.  Pre visit labs triglycerides not ideal but overall it has been better than it has been in quite some time for him.  No changes  Orders:    Comprehensive Metabolic Panel; Future    Lipid Panel; Future    CBC Without Differential; Future    Aortic root dilatation on TTE 8/2019  Aortic ectasia  05/13/2025: Followed by Cardiology.  Recent repeat echo, will defer to Cardiology to interpret regarding aortic root       Hypothyroidism, unspecified type  05/13/2025: Pre visit labs TSH normal on current dose 75.  Check future surveillance  Orders:    TSH; Future    History of left shoulder replacement  Traumatic complete tear of left rotator cuff, subsequent encounter  Biceps tendinitis of left upper extremity  05/13/2025: Followed by Orthopedics.  Still with significant discomfort.  The fights sleep.    Was previously on gabapentin but was change to Lyrica and currently taking 75 at night.  Having issues with sleep.    Finished the oxycodone.  But having significant pain still.    Will update prescription for oxycodone do not intend for this to be long-term.    If it is still not completely  controlling, he may increase his Lyrica to 150 at night as well.  But start with the oxycodone 1st before increasing Lyrica to allow his body to adjust  Orders:    oxyCODONE (ROXICODONE) 5 MG immediate release tablet; Take 1-2 tablets every 6 hours as needed for pain.    LIDOcaine (LIDODERM) 5 %; Place 1 patch onto the skin once daily. Remove & Discard patch within 12 hours or as directed by MD    Tubular adenoma of colon 2/2009; 10/26/23 colonscopy 3 mm sigmoid HP  05/13/2025:  Stable repeat 2028       JAYLEN (obstructive sleep apnea)  05/13/2025: Stable         Medications Discontinued During This Encounter   Medication Reason    doxycycline (MONODOX) 100 MG capsule Therapy completed    amLODIPine (NORVASC) 5 MG tablet Therapy completed    oxyCODONE (ROXICODONE) 5 MG immediate release tablet        meds sent this encounter:     Medications Ordered This Encounter   Medications    LIDOcaine (LIDODERM) 5 %     Sig: Place 1 patch onto the skin once daily. Remove & Discard patch within 12 hours or as directed by MD     Dispense:  30 patch     Refill:  0    oxyCODONE (ROXICODONE) 5 MG immediate release tablet     Sig: Take 1-2 tablets every 6 hours as needed for pain.     Dispense:  28 tablet     Refill:  0     Quantity prescribed more than 7 day supply? Yes, medically necessary     I have reviewed the Prescription Drug Monitoring Program (PDMP) database for this patient prior to prescribing the above opioid medication:   Yes        Follow Up:  Follow-up 6 months with labs.  Sooner if issues arise  Future Appointments   Date Time Provider Department Center   5/13/2025  3:00 PM Dnois Palafox PT [facility]   5/15/2025  3:00 PM Robert Meneses PTA [facility]   5/20/2025  3:00 PM Robert Meneses PTA [facility]   5/22/2025  3:00 PM Donis Palafox PT [facility]   5/28/2025  3:00 PM Eugenio Reese MD [facility] SPORTS [place]   6/10/2025  7:00 AM [address]    6/10/2025  9:00 AM NUCLEAR TREADMILL, [facility] [place]   6/10/2025 11:00 AM [facility] NUCLEAR [facility] [place]   6/10/2025 11:30 AM ECHO, [facility] [place]   6/25/2025  1:00 PM Brad Bahena MD [facility] CARDIO [place]   11/6/2025  7:00 AM LAB, [facility] [place]   11/13/2025  8:00 AM Michael Gonzalez MD [facility] FAM MED [place]          Subjective:   Subjective   Chief Complaint   Patient presents with    Follow-up    Abdominal Pain       HPI  Clinton is a 74 y.o. male.     Social History     Socioeconomic History    Marital status:      Spouse name: Opal    Number of children: 4   Tobacco Use    Smoking status: Never     Passive exposure: Never    Smokeless tobacco: Never   Substance and Sexual Activity    Alcohol use: Not Currently     Comment: rarely    Drug use: No    Sexual activity: Not Currently     Social Drivers of Health     Financial Resource Strain: Low Risk  (5/6/2025)    Overall Financial Resource Strain (CARDIA)     Difficulty of Paying Living Expenses: Not hard at all   Food Insecurity: No Food Insecurity (5/6/2025)    Hunger Vital Sign     Worried About Running Out of Food in the Last Year: Never true     Ran Out of Food in the Last Year: Never true   Transportation Needs: No Transportation Needs (5/6/2025)    PRAPARE - Transportation     Lack of Transportation (Medical): No     Lack of Transportation (Non-Medical): No   Physical Activity: Sufficiently Active (3/10/2025)    Exercise Vital Sign     Days of Exercise per Week: 3 days     Minutes of Exercise per Session: 80 min   Stress: No Stress Concern Present (5/6/2025)    [employer] [employer] - Occupational Stress Questionnaire     Feeling of Stress : Not at all   Recent Concern: Stress - Stress Concern Present (3/10/2025)    [place] [employer] - Occupational Stress Questionnaire     Feeling of Stress : To some extent   Housing Stability: Low Risk  (5/6/2025)    Housing  Stability Vital Sign     Unable to Pay for Housing in the Last Year: No     Number of Times Moved in the Last Year: 0     Homeless in the Last Year: No       Last appointment with this clinic was 3/11/2025. Last visit with me 3/11/2025   To summarize last visit and events leading up to today:  Diabetes on metformin  Hypertension not practicing proper home technique; 6/2/23 incr losartan  2/2023 cards added amlodipine  3/2023 TTE grade 1 DD  3/2023 nu stress test neg  4/22/24 TTE LV normal size, mildly increased wall thickness.  Mild concentric hypertrophy.  Normal systolic function LVEF 55-60%.  Grade 1 diastolic dysfunction.  Aortic root mildly dilated 4.05 cm.  Ascending aorta mildly dilated 4.16 cm.  Aortic root dilatation incidental on TTE 04/2024   Saw cards 4/26/24. Aortic root dilitation.  Follow up 1 year with echo  Peripheral edema  3/2023 LE venous US with GSV reflux bilateral  Coronary artery disease followed by Cardiology.  On statin.  05/2024 add Vascepa  GERD   trial lower dose 5/10/23   Saw GI 7/18/24 for GERD. Increase protonix 40 BID. Schedule EGD  8/2/24 EGD normal, bx's neg for EE, H pylori  Back to once daily  10/26/23 colonscopy 3 mm sigmoid HP  Ventral hernia  UC 3/27/24.  Epigastric pain.   CT abd/pelvis negative. Done for UC visit for abd pain. GI referral was submitted at that time.  Incidental abd aortic atherosclerosis. On high dose statin   Hypothyroid, labs good on higher dose 75  LUTS, Urology; 5/2023 pt opted for med mgmt rather than surgery.   Renal ultrasound showing prostatomegaly  Simple renal cyst; seen by urology - no surveillance needed.  Neurogenic claudication vs peripheral neuropathy  10/26/22 MRI Degenerative disc, facet joint arthropathy, spondylosis most prominent at L3-L4 and L4-5 levels       Last visit me 11/13/2024  Plantar fasciitis declined referral to Podiatry   Diabetes stable on metformin XR half tablet b.i.d..  Change to 1 whole tablet once daily.  Hypertension  peripheral edema no edema today.  No changes   Coronary artery disease peripheral artery disease triglycerides high HDL low on Vascepa.  No changes.  Also on Plavix and statin.  On isosorbide and metoprolol  Aortic ectasia followed by Cardiology plan is repeat echo 04/2025   Hypothyroid/levothyroxine  GERD GI had increase his PPI to b.i.d..  Subsequent EGD was unremarkable.  Revert back to once daily.  Neurogenic claudication gabapentin   LUTS stable on Flomax     Followed by digital monitoring, 02/2025 started on Ozempic     ED 03/06/2025 after fall and hit his left face arm and knees.  CT head, maxillofacial, C-spine no acute findings no fractures.  Mild-to-moderate central spinal canal stenosis     Has follow up with labs 05/2025  Needs cardiology follow up with echo    Last visit me 03/11/2025  Ground level fall with head sutures, sutures removed  Left shoulder pain after fall can not rule out tear referral to Orthopedics  Aortic root dilatation needs cardiology follow-up   Diabetes recent started on Ozempic.  Currently 5 mg.  On metformin.  Future labs     03/17/2025 saw orthopedics, left shoulder rotator cuff syndrome and impingement, left knee pain.  Imaging     03/17/2025 x-ray knee subcutaneous edema no fracture    03/23/2025 MRI left shoulder  Internal derangement as above including rotator cuff tear and other changes. This could be acute tear because there is edema within the supraspinatus and infraspinatus muscles.     04/03/2025 saw cardiology for preop.  May proceed.  Aspirin indefinitely, Plavix/DAPT long-term.  Check surveillance echo    4/10/25 L shoulder replacement, biceps tenodesis    5/6/25 admit obs CP, trop neg, TTE WNL    05/12/2025 TTE LVEF 60-65%.    05/07/2025 CMP creatinine 1.3 from 1.4 CKD 3A    Labs 05/06/2025   PSA normal   TSH normal   CBC normal  A1c 5.8 from previous 5.8  CMP triglycerides mildly elevated though improved compared to previous  CMP creatinine 1.4 CKD 3A    Filling  ozempic 1 mg.       Today's visit:  He is here accompanied by his daughter.  History from the patient supplemented by his daughter.    History of Present Illness    HPI:  Clinton has been having abdominal pain for over a week, leading to an emergency room visit last week. The pain is constant, localized to the abdominal area, severe, and persistent.    Clinton has episodes of dizziness when standing up and walking a few steps, becoming so severe that he is unable to maintain balance.    During the emergency room visit, patient was admitted overnight for evaluation. An echocardiogram was performed, which appeared normal according to the doctor's recollection. Blood tests ruled out a heart attack, with normal results. Another echocardiogram was performed yesterday to assess the aortic root.    Clinton's bowel movements have been affected, with three movements yesterday and two the day before, though the volume of stool is minimal. He reports having diarrhea yesterday and was noted to be dehydrated when going to the hospital. Clinton denies feeling constipated or seeing blood in his stool.    Clinton's appetite and food intake have decreased significantly, seemingly related to the current symptoms. Sleep has been affected, with difficulty sleeping due to discomfort.    Clinton had recent shoulder surgery, limiting his range of motion and ability to lift heavy objects. He can now only lift up to 25 lbs with the affected arm.    Clinton reports feeling well before an accident.    MEDICATIONS:  - Plavix  - Isosorbide, helps dilate blood vessels  - Amlodipine, for blood pressure  - Metoprolol, low dose, for blood pressure  - Olmesartan, for blood pressure  - Pantoprazole, for stomach  - Levothyroxine  - Flomax, for prostate  - Vascepa, for cholesterol  - Lasix (furosemide), daily, for swelling  - Ozempic, 0.25 mg, restarted 2 weeks ago after surgery  - Pregabalin (Lyrica), daily at bedtime, for nerve pain  - Metformin  - Discontinued oxycodone  -  Discontinued doxycycline (antibiotic)  - Discontinued Robaxin (muscle relaxer)  - Amlodipine dose cut in half 2 days ago  - Ozempic restarted 2 weeks ago after being off for 3 weeks due to surgery  - Switched from gabapentin to pregabalin (Lyrica) after surgery    ROS:  Cardiovascular: no lower extremity edema  Gastrointestinal: reports abdominal pain, reports diarrhea, no constipation, no blood in stool, reports loss of appetite, reports change in bowel habits  Neurological: reports dizziness, reports nearno syncope, reports sleep disturbances  Psychiatric: reports anxiety         Patient Care Team:  Michael Gonzalez MD as PCP - General (Internal Medicine)  [place] Hospital[facility]re[facility] montgomery MD as Consulting Physician (Dermatology)  Brad Bahena MD as Consulting Physician (Cardiology)  Michael Gonzalez MD as Hyperlipidemia Digital Medicine Responsible Provider (Internal Medicine)  Shiloh Polk, Zully as Hypertension Digital Medicine Clinician  Shiloh Polk PharmD as Hyperlipidemia Digital Medicine Clinician  Michael Gonzalez MD as Hypertension Digital Medicine Responsible Provider (Internal Medicine)  [employer] as Hypertension Digital Medicine Contract  Michael Gonzalez MD as Diabetes Digital Medicine Responsible Provider (Internal Medicine)  Shiloh Polk PharmD as Diabetes Digital Medicine Clinician  [employer] as Diabetes Digital Medicine Contract    Patient Active Problem List    Diagnosis Date Noted    Traumatic complete tear of left rotator cuff 04/10/2025    Chronic left shoulder pain 04/09/2025    Renal insufficiency 04/04/2025    Knee injury, initial encounter 03/06/2025    Diabetes mellitus type 2 without retinopathy 06/13/2024    Posterior vitreous detachment, right 06/13/2024    Vitreomacular adhesion, left 06/13/2024    Age-related nuclear cataract of both eyes 06/13/2024    Bradycardia 04/27/2023    Refractive error  03/31/2023    Chronic midline low back pain without sciatica 10/26/2022     10/26/2022 MRI L spine: Degenerative disc, facet joint arthropathy, spondylosis most prominent at L3-L4 and L4-5 levels      History of COVID-19 02/25/2022     Symptoms resolved. Persistent residual finding on cxr, recommend repeating test in 1 month to ensure resolution.       Nuclear sclerosis of both eyes 05/20/2021    Allergy to iodine 08/27/2019    Tubular adenoma of colon 2/2009; 10/26/23 colonscopy 3 mm sigmoid HP 10/11/2018     2/28/2009 colonoscopy tubular adenoma  3/15/2013 colonoscopy thickened haustral folds indicative of prediverticular state. internal hemorrhoids. repeat 10 years.  biopsies negative (no colitis)  10/26/23 colonscopy 3 mm sigmoid HP      History of shingles 5/2018 05/20/2018    Venous insufficiency 05/10/2018     03/09/2023 lower extremity venous ultrasound GSV reflux right leg below knee  GSV reflux left leg mid, distal thigh      History of concussion 1/2018 head CT negative 01/31/2018    Aortic root dilatation on TTE 8/2019 11/14/2017 08/08/2019 Lexiscan nuclear stress test probably normal study.  LVEF 66%.  Normal wall motion.  08/08/2019 normal LV systolic function LVEF 65%.  Concentric LV remodeling.  No wall motion abnormalities.  Aortic root diameter mildly increased verses report 03/2018.  7/30/20  TTE (Ao root 4.2cm at annulus, 3.8cm at sinuses) normal LV systolic function LVEF 65%.  Mild concentric LVH.  Grade 1 diastolic dysfunction.  Normal RV systolic function.  Normal CVP.  PA pressure 20.  4/22/24 TTE LV normal size, mildly increased wall thickness.  Mild concentric hypertrophy.  Normal systolic function LVEF 55-60%.  Grade 1 diastolic dysfunction.  Aortic root mildly dilated 4.05 cm.  Ascending aorta mildly dilated 4.16 cm.      Peripheral vascular disease with LE US 6/2017 and carotid US  06/20/2017     LE art US/TANA 6/8/17 1.  Mild/atherosclerotic plaquing. 2.  No stenosis about 30 percent  "femoral popliteal arteries. 3.  Normal bilateral ABIs.  Carotid US 12/3/14 1. Less than 50% stenosis of the right internal carotid artery. 2. Less than 50% stenosis of the left internal carotid artery.      Aortic ectasia 04/28/2017     "The aorta is elongated" - Xray Chest 5-      JAYLEN (obstructive sleep apnea) 04/28/2017    Hypothyroidism 06/01/2016    Class 1 obesity with body mass index (BMI) of 32.0 to 32.9 in adult 06/01/2016    Anxiety and depression with assoc memory loss; seen by neuro 2015, negative workup 11/24/2014     12/3/2014 MRI brain: 1. No evidence for acute infarct 2. unremarkable MRI of the brain  12/3/2014 carotid US normal      Coronary artery disease of native artery of native heart with stable angina pectoris; 2021 plan is long term DAPT 11/19/2014     Stent LAD 2012 9/27/2013 [facility] prox LAD 30% stenosis; RCA 20% stenosis. patent LAD stent.    3/9/2015 nu med stress test negative. 3/9/2015 TTE normal LVEF 55-60; concentric remodeling.  L MPI 6/8/17 (images pers rev) Nuclear Quantitative Functional Analysis:  LVEF: 68 % Impression: NORMAL MYOCARDIAL PERFUSION  Echo 3/15/18 LVEF 60-65%; upper limit of normal aortic root 3.7 cm  08/08/2019 Lexiscan nuclear stress test probably normal study.  LVEF 66%.  Normal wall motion.  08/08/2019 normal LV systolic function LVEF 65%.  Concentric LV remodeling.  No wall motion abnormalities.  Aortic root diameter mildly increased verses report 03/2018.  9/21/2019 [facility]: Dominance: Right  LM: normal  LAD: mid 50% followed by patent stent, distal/transapical 80% (small caliber)  Ramus: prox 30%  LCx: prox 50%  RCA: prox 30%, dist 40%              RPDA ost 50%              RPLB mid 90%    Stent 2.5x18 Resolute [place] SILVER 16 abi    9/21/2020 [facility]  Dominance: Right  LM: normal  LAD: MLI, mid stent patent (2013)  Ramus: MLI, prox 30%  LCx: MLI, ost 40%  RCA: MLI, dist eccentric 40-50%              RPDA: ost 50%, iFR 0.97              RPLB mid stent patent (9/20/19 " 2.5x18 Resolute [place] SILVER)     Impression:  2V CAD, normal LV fxn  Patent mid LAD and mid RPLB stents  iFR dist RCA/ost RPDA neg     Plan:  Cont med rx  Cont ASA/Plavix/Statin/BBL/Imdur  Plan long term DAPT given diffuse CAD and prior MV PCI    03/09/2023 nuclear stress test negative for ischemia   03/09/2023 TTE LV normal size and systolic function LVEF 65%.  Grade 1 diastolic dysfunction.  03/09/2023 lower extremity venous ultrasound GSV reflux right leg below knee  GSV reflux left leg mid, distal thigh      Mixed hyperlipidemia long term DAPT 07/09/2014    Essential hypertension 07/09/2014 7/30/20  TTE (Ao root 4.2cm at annulus, 3.8cm at sinuses) normal LV systolic function LVEF 65%.  Mild concentric LVH.  Grade 1 diastolic dysfunction.  Normal RV systolic function.  Normal CVP.  PA pressure 20.  03/09/2023 TTE LV normal size and systolic function LVEF 65%.  Grade 1 diastolic dysfunction.  05/07/2025 TTE TDS LVEF 55-60%.  Unable to assess diastolic function due to poor image quality        Gastroesophageal reflux disease without esophagitis 07/09/2014 8/2/24 EGD normal, bx's neg for EE, H pylori      Benign non-nodular prostatic hyperplasia with lower urinary tract symptoms 07/09/2014    Vitamin D deficiency 07/09/2014    Hernia of abdominal wall 07/09/2014       PAST MEDICAL PROBLEMS, PAST SURGICAL HISTORY: please see relevant portions of the electronic medical record    ALLERGIES AND MEDICATIONS: updated and reviewed.  Medication List with Changes/Refills   Current Medications    AMLODIPINE (NORVASC) 5 MG TABLET    TAKE 1 TABLET ONE TIME DAILY    ASPIRIN (ECOTRIN) 81 MG EC TABLET    Take 1 tablet (81 mg total) by mouth once daily.    ATORVASTATIN (LIPITOR) 80 MG TABLET    Take 1 tablet (80 mg total) by mouth every evening.    CICLOPIROX (LOPROX) 0.77 % CREA    Apply topically 2 (two) times daily.    CLOPIDOGREL (PLAVIX) 75 MG TABLET    Take 1 tablet (75 mg total) by mouth once daily.     CLOTRIMAZOLE-BETAMETHASONE 1-0.05% (LOTRISONE) CREAM    Apply topically 2 (two) times daily.    DOXYCYCLINE (MONODOX) 100 MG CAPSULE    Take 1 capsule (100 mg total) by mouth 2 (two) times daily.    FUROSEMIDE (LASIX) 20 MG TABLET    Take 1 tablet (20 mg total) by mouth once daily.    GABAPENTIN (NEURONTIN) 300 MG CAPSULE    Take 1 capsule (300 mg total) by mouth every evening.    ISOSORBIDE MONONITRATE (IMDUR) 120 MG 24 HR TABLET    Take 1 tablet (120 mg total) by mouth once daily.    ISOSORBIDE MONONITRATE (IMDUR) 120 MG 24 HR TABLET    Take 1 tablet (120 mg total) by mouth once daily.    KETOCONAZOLE (NIZORAL) 2 % CREAM    Apply topically 2 (two) times daily. for 14 days    KRILL/OM3/DHA/EPA/OM6/LIP/ASTX (KRILL OIL, OMEGA 3 AND 6, ORAL)    Take by mouth.    LEVOTHYROXINE (SYNTHROID) 75 MCG TABLET    TAKE 1 TABLET BEFORE BREAKFAST    METFORMIN (GLUCOPHAGE-XR) 500 MG ER 24HR TABLET    Take 1 tablet (500 mg total) by mouth once daily.    METHOCARBAMOL (ROBAXIN) 500 MG TAB    Take 1 tablet (500 mg total) by mouth 3 (three) times daily as needed (muscle spasms).    METOPROLOL SUCCINATE (TOPROL-XL) 25 MG 24 HR TABLET    TAKE 1 TABLET ONE TIME DAILY    NITROGLYCERIN (NITROSTAT) 0.4 MG SL TABLET    PLACE 1 TABLET (0.4 MG TOTAL) UNDER THE TONGUE EVERY 5 (FIVE) MINUTES AS NEEDED FOR CHEST PAIN.    NYSTATIN (MYCOSTATIN) POWDER    Apply topically 4 (four) times daily as needed (to keep penis/scrotum dry).    OLMESARTAN (BENICAR) 40 MG TABLET    Take 1 tablet (40 mg total) by mouth once daily.    OXYCODONE (ROXICODONE) 5 MG IMMEDIATE RELEASE TABLET    Take 1-2 tablets every 6 hours as needed for pain.    PANTOPRAZOLE (PROTONIX) 40 MG TABLET    Take 1 tablet (40 mg total) by mouth once daily.    PREGABALIN (LYRICA) 75 MG CAPSULE    Take 1 capsule (75 mg total) by mouth 2 (two) times daily.    PROMETHAZINE (PHENERGAN) 25 MG TABLET    Take 1 tablet (25 mg total) by mouth every 4 (four) hours as needed for Nausea.    SEMAGLUTIDE  "(OZEMPIC) 0.25 MG OR 0.5 MG (2 MG/3 ML) PEN INJECTOR    Inject 0.5 mg into the skin every 7 days.    TAMSULOSIN (FLOMAX) 0.4 MG CAP    Take 1 capsule (0.4 mg total) by mouth once daily.    VASCEPA 1 GRAM CAP    Take 2 capsules (2,000 mg total) by mouth 2 (two) times a day.    VITAMIN D 1000 UNITS TAB    Take 1 tablet (1,000 Units total) by mouth once daily.         Objective:   Objective   Physical Exam   Vitals:    05/13/25 1022   BP: 100/62   BP Location: Right arm   Patient Position: Sitting   Pulse: 74   Temp: 98.1 °F (36.7 °C)   TempSrc: Oral   SpO2: 96%   Weight: 101.1 kg (222 lb 15.9 oz)   Height: 5' 10" (1.778 m)    Body mass index is 32 kg/m².            Physical Exam  Constitutional:       General: He is not in acute distress.     Appearance: He is well-developed.   Eyes:      Extraocular Movements: Extraocular movements intact.   Cardiovascular:      Rate and Rhythm: Normal rate and regular rhythm.      Heart sounds: Normal heart sounds. No murmur heard.  Pulmonary:      Effort: Pulmonary effort is normal.      Breath sounds: Normal breath sounds.   Abdominal:      General: Bowel sounds are normal.      Tenderness: There is abdominal tenderness. There is no guarding.      Comments: Diffuse tender especially in the upper abdomen, without mass, guarding.  Bowel sounds normal   Musculoskeletal:      Right lower leg: Edema present.      Left lower leg: Edema present.      Comments: Left arm in sling I did not challenge range of motion in the left arm  Slight ankle edema bilaterally   Skin:     General: Skin is warm and dry.   Neurological:      Mental Status: He is alert and oriented to person, place, and time.      Coordination: Coordination normal.   Psychiatric:         Behavior: Behavior normal.         Thought Content: Thought content normal.         Component      Latest Ref Rng 11/4/2024 4/2/2025 5/6/2025 5/7/2025   WBC      3.90 - 12.70 K/uL  9.81  8.41  7.82    RBC      4.60 - 6.20 M/uL  4.92  4.76  " 4.47 (L)    Hemoglobin      14.0 - 18.0 gm/dL  15.0  14.3  13.7 (L)    Hematocrit      40.0 - 54.0 %  44.7  43.6  39.8 (L)    MCV      82 - 98 fL  91  92  89    MCH      27.0 - 31.0 pg  30.5  30.0  30.6    MCHC      32.0 - 36.0 g/dL  33.6  32.8  34.4    RDW      11.5 - 14.5 %  13.3  13.2  13.0    Platelet Count      150 - 450 K/uL  216  226  178    MPV      9.2 - 12.9 fL  9.9  10.5  9.9    nRBC      <=0 /100 WBC  0   0    Neut %      38 - 73 %  61.2   49.1    Lymph %      18 - 48 %  27.6   36.3    Mono %      4 - 15 %  7.5   10.5    Eos %      <=8 %  2.7   2.9    Basophil %      <=1.9 %  0.7   0.8    Immature Granulocytes      0.0 - 0.5 %  0.3   0.4    Gran # (ANC)      1.8 - 7.7 K/uL  6.00   3.84    Lymph #      1 - 4.8 K/uL  2.71   2.84    Mono #      0.3 - 1 K/uL  0.74   0.82    Eos #      <=0.5 K/uL  0.26   0.23    Baso #      <=0.2 K/uL  0.07   0.06    Immature Grans (Abs)      0.00 - 0.04 K/uL  0.03   0.03    Sodium      136 - 145 mmol/L  139  142  140    Potassium      3.5 - 5.1 mmol/L  4.3  4.4  4.2    Chloride      95 - 110 mmol/L  103  104  108    CO2      23 - 29 mmol/L  25  25  23    Glucose      70 - 110 mg/dL  157 (H)  160 (H)  103    BUN      8 - 23 mg/dL  17  14  19    Creatinine      0.5 - 1.4 mg/dL  1.3  1.4  1.3    Calcium      8.7 - 10.5 mg/dL  9.6  9.2  8.7    PROTEIN TOTAL      6.0 - 8.4 gm/dL  7.3  6.6  6.4    Albumin      3.5 - 5.2 g/dL  4.3  3.7  3.5    BILIRUBIN TOTAL      0.1 - 1.0 mg/dL  0.7  0.8  0.7    ALP      40 - 150 unit/L  53  64  57    AST      11 - 45 unit/L  16  11  12    ALT      10 - 44 unit/L  20  11  10    Anion Gap      8 - 16 mmol/L  11  13  9    eGFR      >60 mL/min/1.73/m2  58 (L)  53 (L)  58 (L)    Cholesterol Total      120 - 199 mg/dL 119 (L)   99 (L)     Triglycerides      30 - 150 mg/dL 257 (H)   158 (H)     HDL      40 - 75 mg/dL 37 (L)   32 (L)     LDL Cholesterol      63.0 - 159.0 mg/dL 30.6 (L)   35.4 (L)     HDL/Cholesterol Ratio      20.0 - 50.0 % 31.1   32.3      Total Cholesterol/HDL Ratio      2.0 - 5.0  3.2   3.1     Non-HDL Cholesterol      mg/dL 82   67     Hemoglobin A1C External      4.0 - 5.6 %  5.8 (H)  5.8 (H)     Estimated Avg Glucose      68 - 131 mg/dL  120  120     TSH      0.400 - 4.000 uIU/mL  2.075  3.237     Prostate Specific Antigen      <=4.00 ng/mL   1.54        Legend:  (L) Low  (H) High

## 2025-05-12 NOTE — ASSESSMENT & PLAN NOTE
05/13/2025: Pre visit labs TSH normal on current dose 75.  Check future surveillance  Orders:    TSH; Future

## 2025-05-12 NOTE — ASSESSMENT & PLAN NOTE
05/13/2025: Followed by Orthopedics.  Still with significant discomfort.  The fights sleep.    Was previously on gabapentin but was change to Lyrica and currently taking 75 at night.  Having issues with sleep.    Finished the oxycodone.  But having significant pain still.    Will update prescription for oxycodone do not intend for this to be long-term.    If it is still not completely controlling, he may increase his Lyrica to 150 at night as well.  But start with the oxycodone 1st before increasing Lyrica to allow his body to adjust  Orders:    oxyCODONE (ROXICODONE) 5 MG immediate release tablet; Take 1-2 tablets every 6 hours as needed for pain.    LIDOcaine (LIDODERM) 5 %; Place 1 patch onto the skin once daily. Remove & Discard patch within 12 hours or as directed by MD

## 2025-05-12 NOTE — ASSESSMENT & PLAN NOTE
05/13/2025: Notes episodes of lightheadedness, wife self decrease his amlodipine to 2.5, blood pressure low today, stop the amlodipine.  Has lost modest weight in the past 6 months and that may be a factor in his blood pressure.    Stay on Toprol-XL and stay on olmesartan.  If blood pressure continues to remain low would half the olmesartan

## 2025-05-12 NOTE — ASSESSMENT & PLAN NOTE
05/13/2025: Pre visit labs A1c good.  On low-dose metformin, was previously on Ozempic up to 1 mg but had to stop it for the surgery and so restarted on low-dose and he is currently on 0.25 with plans to increase it to 0.5.    However he is complaining of abdominal pain and I wonder if this is from the Ozempic.  Had recent overnight jobs, right upper quadrant ultrasound showed no gallstones, renal ultrasound no obvious stones.  Given good A1c, would just stay on the Ozempic 0.25 for now  Orders:    Comprehensive Metabolic Panel; Future    Lipid Panel; Future    Hemoglobin A1C; Future    Microalbumin/Creatinine Ratio, Urine; Future    CBC Without Differential; Future

## 2025-05-12 NOTE — ASSESSMENT & PLAN NOTE
05/13/2025:  On long-term DAPT per cardiology.  On high-dose statin and Vascepa.  Pre visit labs triglycerides not ideal but overall it has been better than it has been in quite some time for him.  No changes  Orders:    Comprehensive Metabolic Panel; Future    Lipid Panel; Future    CBC Without Differential; Future

## 2025-05-13 ENCOUNTER — HOSPITAL ENCOUNTER (OUTPATIENT)
Dept: RADIOLOGY | Facility: HOSPITAL | Age: 75
Discharge: HOME | End: 2025-05-13
Attending: INTERNAL MEDICINE
Payer: MEDICARE

## 2025-05-13 ENCOUNTER — RESULTS FOLLOW-UP (OUTPATIENT)
Dept: CARDIOLOGY | Facility: CLINIC | Age: 75
End: 2025-05-13

## 2025-05-13 ENCOUNTER — TELEPHONE (OUTPATIENT)
Dept: CARDIOLOGY | Facility: CLINIC | Age: 75
End: 2025-05-13
Payer: MEDICARE

## 2025-05-13 ENCOUNTER — CLINICAL SUPPORT (OUTPATIENT)
Dept: REHABILITATION | Facility: HOSPITAL | Age: 75
End: 2025-05-13
Payer: MEDICARE

## 2025-05-13 ENCOUNTER — OFFICE VISIT (OUTPATIENT)
Dept: FAMILY MEDICINE | Facility: CLINIC | Age: 75
End: 2025-05-13
Payer: MEDICARE

## 2025-05-13 VITALS
WEIGHT: 223 LBS | OXYGEN SATURATION: 96 % | TEMPERATURE: 98 F | SYSTOLIC BLOOD PRESSURE: 100 MMHG | HEART RATE: 74 BPM | HEIGHT: 70 IN | DIASTOLIC BLOOD PRESSURE: 62 MMHG | BODY MASS INDEX: 31.92 KG/M2

## 2025-05-13 DIAGNOSIS — E03.9 HYPOTHYROIDISM, UNSPECIFIED TYPE: Chronic | ICD-10-CM

## 2025-05-13 DIAGNOSIS — R10.9 ABDOMINAL PAIN, UNSPECIFIED ABDOMINAL LOCATION: ICD-10-CM

## 2025-05-13 DIAGNOSIS — I25.118 CORONARY ARTERY DISEASE OF NATIVE ARTERY OF NATIVE HEART WITH STABLE ANGINA PECTORIS: ICD-10-CM

## 2025-05-13 DIAGNOSIS — S46.012D TRAUMATIC COMPLETE TEAR OF LEFT ROTATOR CUFF, SUBSEQUENT ENCOUNTER: ICD-10-CM

## 2025-05-13 DIAGNOSIS — I10 ESSENTIAL HYPERTENSION: Chronic | ICD-10-CM

## 2025-05-13 DIAGNOSIS — I73.9 PERIPHERAL VASCULAR DISEASE: ICD-10-CM

## 2025-05-13 DIAGNOSIS — E78.2 MIXED HYPERLIPIDEMIA: Chronic | ICD-10-CM

## 2025-05-13 DIAGNOSIS — Z96.612 HISTORY OF LEFT SHOULDER REPLACEMENT: ICD-10-CM

## 2025-05-13 DIAGNOSIS — E11.9 DIABETES MELLITUS TYPE 2 WITHOUT RETINOPATHY: Primary | ICD-10-CM

## 2025-05-13 DIAGNOSIS — D12.6 TUBULAR ADENOMA OF COLON: ICD-10-CM

## 2025-05-13 DIAGNOSIS — E66.811 CLASS 1 OBESITY WITH BODY MASS INDEX (BMI) OF 32.0 TO 32.9 IN ADULT, UNSPECIFIED OBESITY TYPE, UNSPECIFIED WHETHER SERIOUS COMORBIDITY PRESENT: ICD-10-CM

## 2025-05-13 DIAGNOSIS — M25.512 CHRONIC LEFT SHOULDER PAIN: Primary | ICD-10-CM

## 2025-05-13 DIAGNOSIS — G47.33 OSA (OBSTRUCTIVE SLEEP APNEA): Chronic | ICD-10-CM

## 2025-05-13 DIAGNOSIS — G89.29 CHRONIC LEFT SHOULDER PAIN: Primary | ICD-10-CM

## 2025-05-13 DIAGNOSIS — M75.22 BICEPS TENDINITIS OF LEFT UPPER EXTREMITY: ICD-10-CM

## 2025-05-13 DIAGNOSIS — I77.810 AORTIC ROOT DILATATION: ICD-10-CM

## 2025-05-13 DIAGNOSIS — I77.819 AORTIC ECTASIA: ICD-10-CM

## 2025-05-13 PROCEDURE — 3044F HG A1C LEVEL LT 7.0%: CPT | Mod: CPTII,HCNC,S$GLB, | Performed by: INTERNAL MEDICINE

## 2025-05-13 PROCEDURE — 97110 THERAPEUTIC EXERCISES: CPT | Mod: HCNC,PN

## 2025-05-13 PROCEDURE — 1100F PTFALLS ASSESS-DOCD GE2>/YR: CPT | Mod: CPTII,HCNC,S$GLB, | Performed by: INTERNAL MEDICINE

## 2025-05-13 PROCEDURE — 1125F AMNT PAIN NOTED PAIN PRSNT: CPT | Mod: CPTII,HCNC,S$GLB, | Performed by: INTERNAL MEDICINE

## 2025-05-13 PROCEDURE — 3008F BODY MASS INDEX DOCD: CPT | Mod: CPTII,HCNC,S$GLB, | Performed by: INTERNAL MEDICINE

## 2025-05-13 PROCEDURE — 3074F SYST BP LT 130 MM HG: CPT | Mod: CPTII,HCNC,S$GLB, | Performed by: INTERNAL MEDICINE

## 2025-05-13 PROCEDURE — 74018 RADEX ABDOMEN 1 VIEW: CPT | Mod: TC,HCNC,FY,PO

## 2025-05-13 PROCEDURE — 4010F ACE/ARB THERAPY RXD/TAKEN: CPT | Mod: CPTII,HCNC,S$GLB, | Performed by: INTERNAL MEDICINE

## 2025-05-13 PROCEDURE — 99214 OFFICE O/P EST MOD 30 MIN: CPT | Mod: HCNC,S$GLB,, | Performed by: INTERNAL MEDICINE

## 2025-05-13 PROCEDURE — 1160F RVW MEDS BY RX/DR IN RCRD: CPT | Mod: CPTII,HCNC,S$GLB, | Performed by: INTERNAL MEDICINE

## 2025-05-13 PROCEDURE — 1159F MED LIST DOCD IN RCRD: CPT | Mod: CPTII,HCNC,S$GLB, | Performed by: INTERNAL MEDICINE

## 2025-05-13 PROCEDURE — 97140 MANUAL THERAPY 1/> REGIONS: CPT | Mod: HCNC,PN

## 2025-05-13 PROCEDURE — 3078F DIAST BP <80 MM HG: CPT | Mod: CPTII,HCNC,S$GLB, | Performed by: INTERNAL MEDICINE

## 2025-05-13 PROCEDURE — 74018 RADEX ABDOMEN 1 VIEW: CPT | Mod: 26,HCNC,, | Performed by: RADIOLOGY

## 2025-05-13 PROCEDURE — 3288F FALL RISK ASSESSMENT DOCD: CPT | Mod: CPTII,HCNC,S$GLB, | Performed by: INTERNAL MEDICINE

## 2025-05-13 PROCEDURE — 97112 NEUROMUSCULAR REEDUCATION: CPT | Mod: HCNC,PN

## 2025-05-13 PROCEDURE — 99999 PR PBB SHADOW E&M-EST. PATIENT-LVL V: CPT | Mod: PBBFAC,HCNC,, | Performed by: INTERNAL MEDICINE

## 2025-05-13 RX ORDER — SEMAGLUTIDE 1.34 MG/ML
INJECTION, SOLUTION SUBCUTANEOUS
COMMUNITY
Start: 2025-05-10 | End: 2025-05-15 | Stop reason: DRUGHIGH

## 2025-05-13 RX ORDER — LIDOCAINE 50 MG/G
1 PATCH TOPICAL DAILY
Qty: 30 PATCH | Refills: 0 | Status: SHIPPED | OUTPATIENT
Start: 2025-05-13

## 2025-05-13 RX ORDER — OXYCODONE HYDROCHLORIDE 5 MG/1
TABLET ORAL
Qty: 28 TABLET | Refills: 0 | Status: SHIPPED | OUTPATIENT
Start: 2025-05-13

## 2025-05-13 NOTE — TELEPHONE ENCOUNTER
Spoke with patient and scheduled him for a nuclear stress test along with a follow up appointment with Josef thereafter per Josef.

## 2025-05-13 NOTE — TELEPHONE ENCOUNTER
----- Message from Brad Bahena MD sent at 5/13/2025  9:11 AM CDT -----  Please schedule nuclear stress test ASAP and have the patient follow up with me after that is completed.

## 2025-05-13 NOTE — PATIENT INSTRUCTIONS
Stop the amlodipine  If BP still low/lightheaded, cut the olmesartan in half  Stay on the metoprolol  Stay on the isosorbide

## 2025-05-15 ENCOUNTER — CLINICAL SUPPORT (OUTPATIENT)
Dept: REHABILITATION | Facility: HOSPITAL | Age: 75
End: 2025-05-15
Payer: MEDICARE

## 2025-05-15 ENCOUNTER — PATIENT MESSAGE (OUTPATIENT)
Dept: OTHER | Facility: OTHER | Age: 75
End: 2025-05-15
Payer: MEDICARE

## 2025-05-15 ENCOUNTER — RESULTS FOLLOW-UP (OUTPATIENT)
Dept: FAMILY MEDICINE | Facility: CLINIC | Age: 75
End: 2025-05-15

## 2025-05-15 DIAGNOSIS — G89.29 CHRONIC LEFT SHOULDER PAIN: Primary | ICD-10-CM

## 2025-05-15 DIAGNOSIS — S46.012D TRAUMATIC COMPLETE TEAR OF LEFT ROTATOR CUFF, SUBSEQUENT ENCOUNTER: ICD-10-CM

## 2025-05-15 DIAGNOSIS — M25.512 CHRONIC LEFT SHOULDER PAIN: Primary | ICD-10-CM

## 2025-05-15 DIAGNOSIS — M75.102 ROTATOR CUFF SYNDROME OF LEFT SHOULDER: ICD-10-CM

## 2025-05-15 DIAGNOSIS — M75.22 BICEPS TENDINITIS OF LEFT UPPER EXTREMITY: ICD-10-CM

## 2025-05-15 DIAGNOSIS — M75.42 IMPINGEMENT SYNDROME OF LEFT SHOULDER: ICD-10-CM

## 2025-05-15 PROCEDURE — 97112 NEUROMUSCULAR REEDUCATION: CPT | Mod: HCNC,PN,CQ

## 2025-05-15 PROCEDURE — 97530 THERAPEUTIC ACTIVITIES: CPT | Mod: HCNC,PN,CQ

## 2025-05-15 NOTE — PROGRESS NOTES
Extra KUB no constipation.  I suspect this is possible side effects of Ozempic.  Plan is stay on low-dose Ozempic and develop tolerance hopefully.

## 2025-05-19 NOTE — PROGRESS NOTES
"  Outpatient Rehab    Physical Therapy Visit    Patient Name: Clinton Rosenberg  MRN: [id number]  YOB: 1950  Encounter Date: 5/15/2025    Therapy Diagnosis:   Encounter Diagnoses   Name Primary?    Chronic left shoulder pain Yes    Traumatic complete tear of left rotator cuff, subsequent encounter     Biceps tendinitis of left upper extremity     Rotator cuff syndrome of left shoulder     Impingement syndrome of left shoulder      Physician: Nadir Gloria, *    Physician Orders: Eval and Treat  Medical Diagnosis: Traumatic complete tear of left rotator cuff, subsequent encounter  Biceps tendinitis of left upper extremity    Visit # / Visits Authorized:  8 / 20  Insurance Authorization Period: 4/15/2025 to 6/17/2025  Date of Evaluation: 4/9/2025 4/9/2025  Plan of Care Certification: 4/9/2025 to 7/2/2025      PT/PTA: PTA   Number of PTA visits since last PT visit:1  Time In: 1500   Time Out: 1555  Total Time (in minutes): 55   Total Billable Time (in minutes): 55    FOTO:  Intake Score:  %  Survey Score 2:  %  Survey Score 3:  %    Precautions:     Passive Range of Motion:limited to 30 degrees ER side; F. Flexion 90 degrees Abduction for axillary care as tolerated      Subjective   Patient reports he is feeling ok, still feeling dizzy but better..  Pain reported as 3/10. Left shoulder  4/10/2025, POW 5 weeks and 0 days    Objective            Treatment:  Therapeutic Exercise  TE 3: Tables slides scaption 3 x 15 x 3 " holds  TE 5: table slides flexion 3 x 15 with 3" holds  TE 6: Supine Er with dowel 5" holds x 3 min  TE 7: Francine Scaption 3 min.  TE 8: Pulley Flexion 3 min,  Manual Therapy  MT 1: PROM shoulder flexion, abduction, and ER within protocols  MT 2: Passive ROM elbow extension  Balance/Neuromuscular Re-Education  NMR 1: seated gentle shoulder submax isometrics into towel flx/abd/+ext 3" x 3 x10 (pt was able to perform in standing today)    Time Entry(in minutes):  Manual Therapy Time " Entry: 5  Therapeutic Activity Time Entry: 25  Therapeutic Exercise Time Entry: 25    Assessment & Plan   Assessment: Clinton continues to tolerate interventions with minimal complaints of pain, mainly with PROM. He continues to have muscle guarding and difficulty relaxing limiting shoulder flexion. Cueing required to prevent shoulder elevation during intervention. Gentle isometrics were initiated today with good toleration. Patient will continue to be progressed as tolerated per post-op protocols.  Evaluation/Treatment Tolerance: Patient tolerated treatment well    Patient will continue to benefit from skilled outpatient physical therapy to address the deficits listed in the problem list box on initial evaluation, provide pt/family education and to maximize pt's level of independence in the home and community environment.     Patient's spiritual, cultural, and educational needs considered and patient agreeable to plan of care and goals.           Plan: continue with POC    Goals:   Active       LTGs       Increase ROM to allow improved joint biomechanics during functional activities.    (Progressing)       Start:  04/09/25    Expected End:  07/02/25            2. Independent with home exercise program.   (Progressing)       Start:  04/09/25    Expected End:  07/02/25            3. Full return to functional activities with manageable complaints.   (Progressing)       Start:  04/09/25    Expected End:  07/02/25            4. Patient to demonstrate improved posture and body mechanics.   (Progressing)       Start:  04/09/25    Expected End:  07/02/25            5.Decrease pain by 75% during functional activities.   (Progressing)       Start:  04/09/25    Expected End:  07/02/25               STGs       Pt to increase strength by a 1/2 grade of muscles test to allow for improvement in functional activities such as performing chores.    (Progressing)       Start:  04/09/25    Expected End:  07/02/25            2. Pt to  improve range of motion by 25% to allow for improved functional mobility to allow for improvement in IADL's.   (Progressing)       Start:  04/09/25    Expected End:  07/02/25            3. Pt to report compliance with HEP and demonstrate proper exercise technique to PT to show competence with self management of condition.   (Progressing)       Start:  04/09/25    Expected End:  07/02/25            4. Decrease pain by 25% during functional activities.   (Progressing)       Start:  04/09/25    Expected End:  07/02/25                Robert To, PTA

## 2025-05-20 ENCOUNTER — CLINICAL SUPPORT (OUTPATIENT)
Dept: REHABILITATION | Facility: HOSPITAL | Age: 75
End: 2025-05-20
Payer: MEDICARE

## 2025-05-20 DIAGNOSIS — M75.42 IMPINGEMENT SYNDROME OF LEFT SHOULDER: ICD-10-CM

## 2025-05-20 DIAGNOSIS — M25.512 CHRONIC LEFT SHOULDER PAIN: Primary | ICD-10-CM

## 2025-05-20 DIAGNOSIS — G89.29 CHRONIC LEFT SHOULDER PAIN: Primary | ICD-10-CM

## 2025-05-20 DIAGNOSIS — M75.22 BICEPS TENDINITIS OF LEFT UPPER EXTREMITY: ICD-10-CM

## 2025-05-20 DIAGNOSIS — M75.102 ROTATOR CUFF SYNDROME OF LEFT SHOULDER: ICD-10-CM

## 2025-05-20 DIAGNOSIS — S46.012D TRAUMATIC COMPLETE TEAR OF LEFT ROTATOR CUFF, SUBSEQUENT ENCOUNTER: ICD-10-CM

## 2025-05-20 PROCEDURE — 97112 NEUROMUSCULAR REEDUCATION: CPT | Mod: HCNC,PN,CQ

## 2025-05-20 PROCEDURE — 97110 THERAPEUTIC EXERCISES: CPT | Mod: HCNC,PN,CQ

## 2025-05-20 NOTE — PROGRESS NOTES
"  Outpatient Rehab    Physical Therapy Visit    Patient Name: Clinton Rosenberg  MRN: [id number]  YOB: 1950  Encounter Date: 5/20/2025    Therapy Diagnosis:   Encounter Diagnoses   Name Primary?    Chronic left shoulder pain Yes    Traumatic complete tear of left rotator cuff, subsequent encounter     Biceps tendinitis of left upper extremity     Rotator cuff syndrome of left shoulder     Impingement syndrome of left shoulder      Physician: Nadir Gloria, *    Physician Orders: Eval and Treat  Medical Diagnosis: Traumatic complete tear of left rotator cuff, subsequent encounter  Biceps tendinitis of left upper extremity    Visit # / Visits Authorized:  9 / 20  Insurance Authorization Period: 4/15/2025 to 6/17/2025  Date of Evaluation: 4/9/2025 4/9/2025  Plan of Care Certification: 4/9/2025 to 7/2/2025      PT/PTA: PTA   Number of PTA visits since last PT visit:2  Time In: 1455   Time Out: 1550  Total Time (in minutes): 55   Total Billable Time (in minutes): 38    FOTO:  Intake Score:  %  Survey Score 2:  %  Survey Score 3:  %    Precautions:     Passive Range of Motion:limited to 30 degrees ER side; F. Flexion 90 degrees Abduction for axillary care as tolerated      Subjective   Patient reports less stiff today. He says he has been trying to work on HEP.  Pain reported as 3/10. Left shoulder  4/10/2025, POW 5 weeks and 5 days    Objective            Treatment:  Therapeutic Exercise  TE 3: Tables slides scaption 3 x 15 x 3 " holds  TE 5: table slides flexion 3 x 15 with 3" holds  TE 6: Supine Er with dowel 5" holds x 3 min  TE 7: Francine Scaption 3 min.  TE 8: Pulley Flexion 3 min,  Manual Therapy  MT 1: PROM shoulder flexion, abduction, and ER within protocols  MT 2: Passive ROM elbow extension  Balance/Neuromuscular Re-Education  NMR 1: seated gentle shoulder submax isometrics into towel flx/abd/+ext 3" x 3 x10 (pt was able to perform in standing today)  NMR 3: wrist extension/flexion and " RD/UD 4# DB 3 x 10 reps    Time Entry(in minutes):  Manual Therapy Time Entry: 7  Neuromuscular Re-Education Time Entry: 23  Therapeutic Exercise Time Entry: 25    Assessment & Plan   Assessment: Clinton reports less stiff today. He says he has been trying to work on HEP. He is now 5 weeks and 5 days s/p Left shoulder on 4/10/2025. He continues to tolerate interventions with minimal complaints of pain, mainly with PROM. He continues to have muscle guarding and difficulty relaxing limiting shoulder flexion. Cueing required to prevent shoulder elevation during intervention. Patient will continue to be progressed as tolerated per post-op protocols.  Evaluation/Treatment Tolerance: Patient tolerated treatment well    Patient will continue to benefit from skilled outpatient physical therapy to address the deficits listed in the problem list box on initial evaluation, provide pt/family education and to maximize pt's level of independence in the home and community environment.     Patient's spiritual, cultural, and educational needs considered and patient agreeable to plan of care and goals.           Plan: continue with POC    Goals:   Active       LTGs       Increase ROM to allow improved joint biomechanics during functional activities.    (Progressing)       Start:  04/09/25    Expected End:  07/02/25            2. Independent with home exercise program.   (Progressing)       Start:  04/09/25    Expected End:  07/02/25            3. Full return to functional activities with manageable complaints.   (Progressing)       Start:  04/09/25    Expected End:  07/02/25            4. Patient to demonstrate improved posture and body mechanics.   (Progressing)       Start:  04/09/25    Expected End:  07/02/25            5.Decrease pain by 75% during functional activities.   (Progressing)       Start:  04/09/25    Expected End:  07/02/25               STGs       Pt to increase strength by a 1/2 grade of muscles test to allow for  improvement in functional activities such as performing chores.    (Progressing)       Start:  04/09/25    Expected End:  07/02/25            2. Pt to improve range of motion by 25% to allow for improved functional mobility to allow for improvement in IADL's.   (Progressing)       Start:  04/09/25    Expected End:  07/02/25            3. Pt to report compliance with HEP and demonstrate proper exercise technique to PT to show competence with self management of condition.   (Progressing)       Start:  04/09/25    Expected End:  07/02/25            4. Decrease pain by 25% during functional activities.   (Progressing)       Start:  04/09/25    Expected End:  07/02/25                Robert To, PTA

## 2025-05-22 ENCOUNTER — CLINICAL SUPPORT (OUTPATIENT)
Dept: REHABILITATION | Facility: HOSPITAL | Age: 75
End: 2025-05-22
Payer: MEDICARE

## 2025-05-22 DIAGNOSIS — M25.512 CHRONIC LEFT SHOULDER PAIN: Primary | ICD-10-CM

## 2025-05-22 DIAGNOSIS — G89.29 CHRONIC LEFT SHOULDER PAIN: Primary | ICD-10-CM

## 2025-05-22 PROCEDURE — 97110 THERAPEUTIC EXERCISES: CPT | Mod: HCNC,PN

## 2025-05-22 PROCEDURE — 97112 NEUROMUSCULAR REEDUCATION: CPT | Mod: HCNC,PN

## 2025-05-22 PROCEDURE — 97140 MANUAL THERAPY 1/> REGIONS: CPT | Mod: HCNC,PN

## 2025-05-22 NOTE — PROGRESS NOTES
"  Outpatient Rehab    Physical Therapy Visit    Patient Name: Clinton Rosenberg  MRN: [id number]  YOB: 1950  Encounter Date: 5/22/2025    Therapy Diagnosis:   Encounter Diagnosis   Name Primary?    Chronic left shoulder pain Yes       Physician: Nadir Gloria, *    Physician Orders: Eval and Treat  Medical Diagnosis: Traumatic complete tear of left rotator cuff, subsequent encounter  Biceps tendinitis of left upper extremity    Visit # / Visits Authorized:  10 / 20  Insurance Authorization Period: 4/15/2025 to 6/17/2025  Date of Evaluation: 4/9/2025 4/9/2025  Plan of Care Certification: 4/9/2025 to 7/2/2025      PT/PTA: PT   Number of PTA visits since last PT visit:   Time In: 1447   Time Out: 1600  Total Time (in minutes): 73   Total Billable Time (in minutes): 40    FOTO:  Intake Score: 24%  Survey Score 2:  %  Survey Score 3:  %    Precautions:       Subjective   He is doing good today..  Pain reported as 2/10.      Objective      Shoulder Range of Motion  Left Shoulder   Active (deg) Passive (deg) Pain   Flexion   110     Extension         Scaption         ABduction         ADduction         Horizontal ABduction         Horizontal ADduction         External Rotation (Shoulder ABducted 0 degrees)   15 Yes   External Rotation (Shoulder ABducted 45 degrees)         External Rotation (Shoulder ABducted 90 degrees)         Internal Rotation (Shoulder ABducted 0 degrees)         Internal Rotation (Shoulder ABducted 45 degrees)         Internal Rotation (Shoulder ABducted 90 degrees)                          Treatment:  Therapeutic Exercise  TE 1: shoulder assessment  TE 2: supine shoulder ER stretch with dowel 5" holds x 5'  TE 3: Pt education on HEP modifications for elbow flexion  TE 7: Francine Scaption 3 min.  TE 8: Pulley Flexion 3 min,  TE 9: Supine Flexion with dowel 5 min  TE 10: Moist heat pack 10 min (not billed for)  Manual Therapy  MT 1: PROM shoulder flexion, abduction, and ER within " "protocols  Balance/Neuromuscular Re-Education  NMR 1: Standing shoulder submax isometrics against ball on wall ABD/ER/IR 3x 10 x 5'' holds  NMR 2: Sidelying AAROM shoulder flexion with "UE ranger" x 20      Time Entry(in minutes):  Manual Therapy Time Entry: 15  Neuromuscular Re-Education Time Entry: 18  Therapeutic Exercise Time Entry: 40    Assessment & Plan   Assessment: Clinton continues to have stiffness with increased muscle tone in the anterior shoulder and proximal humerus likely due to increased muscle guarding with shoulder movmement. He lossens up with PROM, but tenses up again with AAROM which is limiting his range of motion. Patient was educated to manually provide gentle scar mobilizations and circular movements with gentle pressure over tight areas to improve relaxation. He tolerated today's interventions with slight increase of pain due to pain symptoms into flexion. AAROM shoulder flexion with UE was ceased due to patient having increased symptoms.  Evaluation/Treatment Tolerance: Patient tolerated treatment well    Patient will continue to benefit from skilled outpatient physical therapy to address the deficits listed in the problem list box on initial evaluation, provide pt/family education and to maximize pt's level of independence in the home and community environment.     Patient's spiritual, cultural, and educational needs considered and patient agreeable to plan of care and goals.           Plan: continue with POC and relaxation interventions    Goals:   Active       LTGs       Increase ROM to allow improved joint biomechanics during functional activities.    (Progressing)       Start:  04/09/25    Expected End:  07/02/25            2. Independent with home exercise program.   (Progressing)       Start:  04/09/25    Expected End:  07/02/25            3. Full return to functional activities with manageable complaints.   (Progressing)       Start:  04/09/25    Expected End:  07/02/25            4. " Patient to demonstrate improved posture and body mechanics.   (Progressing)       Start:  04/09/25    Expected End:  07/02/25            5.Decrease pain by 75% during functional activities.   (Progressing)       Start:  04/09/25    Expected End:  07/02/25               STGs       Pt to increase strength by a 1/2 grade of muscles test to allow for improvement in functional activities such as performing chores.    (Progressing)       Start:  04/09/25    Expected End:  07/02/25            2. Pt to improve range of motion by 25% to allow for improved functional mobility to allow for improvement in IADL's.   (Progressing)       Start:  04/09/25    Expected End:  07/02/25            3. Pt to report compliance with HEP and demonstrate proper exercise technique to PT to show competence with self management of condition.   (Progressing)       Start:  04/09/25    Expected End:  07/02/25            4. Decrease pain by 25% during functional activities.   (Progressing)       Start:  04/09/25    Expected End:  07/02/25                Donis Palafox, PT

## 2025-05-28 ENCOUNTER — HOSPITAL ENCOUNTER (OUTPATIENT)
Dept: RADIOLOGY | Facility: HOSPITAL | Age: 75
Discharge: HOME | End: 2025-05-28
Attending: ORTHOPAEDIC SURGERY
Payer: MEDICARE

## 2025-05-28 ENCOUNTER — OFFICE VISIT (OUTPATIENT)
Dept: SPORTS MEDICINE | Facility: CLINIC | Age: 75
End: 2025-05-28
Payer: MEDICARE

## 2025-05-28 VITALS
DIASTOLIC BLOOD PRESSURE: 65 MMHG | HEART RATE: 72 BPM | WEIGHT: 223.06 LBS | SYSTOLIC BLOOD PRESSURE: 109 MMHG | HEIGHT: 70 IN | BODY MASS INDEX: 31.93 KG/M2

## 2025-05-28 DIAGNOSIS — G89.29 CHRONIC LEFT SHOULDER PAIN: ICD-10-CM

## 2025-05-28 DIAGNOSIS — M25.512 LEFT SHOULDER PAIN, UNSPECIFIED CHRONICITY: ICD-10-CM

## 2025-05-28 DIAGNOSIS — M75.22 BICEPS TENDINITIS OF LEFT UPPER EXTREMITY: ICD-10-CM

## 2025-05-28 DIAGNOSIS — M25.512 LEFT SHOULDER PAIN, UNSPECIFIED CHRONICITY: Primary | ICD-10-CM

## 2025-05-28 DIAGNOSIS — Z96.612 STATUS POST REVERSE TOTAL REPLACEMENT OF LEFT SHOULDER: ICD-10-CM

## 2025-05-28 DIAGNOSIS — M25.512 CHRONIC LEFT SHOULDER PAIN: ICD-10-CM

## 2025-05-28 DIAGNOSIS — S46.012D TRAUMATIC COMPLETE TEAR OF LEFT ROTATOR CUFF, SUBSEQUENT ENCOUNTER: ICD-10-CM

## 2025-05-28 PROCEDURE — 73030 X-RAY EXAM OF SHOULDER: CPT | Mod: TC,HCNC,LT

## 2025-05-28 PROCEDURE — 99999 PR PBB SHADOW E&M-EST. PATIENT-LVL V: CPT | Mod: PBBFAC,HCNC,, | Performed by: ORTHOPAEDIC SURGERY

## 2025-05-28 PROCEDURE — 73030 X-RAY EXAM OF SHOULDER: CPT | Mod: 26,HCNC,LT, | Performed by: RADIOLOGY

## 2025-05-28 RX ORDER — METHOCARBAMOL 500 MG/1
500 TABLET, FILM COATED ORAL 3 TIMES DAILY PRN
Qty: 40 TABLET | Refills: 0 | Status: SHIPPED | OUTPATIENT
Start: 2025-05-28

## 2025-05-28 NOTE — PROGRESS NOTES
Subjective:     Chief Complaint: Clinton Rosenberg is a 74 y.o. male who had concerns including Post-op Evaluation of the Left Shoulder and Post-op Evaluation.    History of Present Illness    CHIEF COMPLAINT:  - Left shoulder surgery follow-up    HPI:  Clinton presents for follow-up six weeks after shoulder surgery. He has been using a sling and pillow post-operatively. He attends PT with Donis at the [place] location, focusing on arm motion exercises including up and down movements, rope exercises, and pendulum exercises. He uses heat on the affected area.    He reports some dizziness episodes, which may be related to his blood pressure medications. He expresses interest in driving again but has not yet attempted it due to his condition.    PREVIOUS TREATMENTS:  - Clinton has been using a sling and pillow for approximately 6 weeks post-surgery  - PT with Donis, focusing on arm motion exercises and pendulum exercises  - Clinton has been doing wall walks as part of therapy           Pain Related Questions  Over the past 3 days, what was your average pain during activity? (I.e. running, jogging, walking, climbing stairs, getting dressed, ect.): 4  Over the past 3 days, what was your highest pain level?: 6  Over the past 3 days, what was your lowest pain level? : 2    Other  How many nights a week are you awakened by your affected body part?: 7  Was the patient's HEIGHT measured or patient reported?: Patient Reported  Was the patient's WEIGHT measured or patient reported?: Measured    Past Medical History[1]     Past Surgical History:   Procedure Laterality Date    APPENDECTOMY  1986    ARTHROSCOPY,SHOULDER,WITH BICEPS TENODESIS Left 4/10/2025    Procedure: ARTHROSCOPY, SHOULDER, W/ BICEPS TENODESIS;  Surgeon: Eugenio Reese MD;  Location: [facility];  Service: Orthopedics;  Laterality: Left;    bone spur Right     COLONOSCOPY      COLONOSCOPY N/A 10/26/2023    Procedure: COLONOSCOPY;  Surgeon: Argelia Altamirano MD;   Location: [facility] ENDO;  Service: Endoscopy;  Laterality: N/A;  Referred by: Dr. Michael Gonzalez  BT/Clearance: ok to hold Plavix 5 days per Dr Dawn  Prep: golytely  Route instructions sent: [id number]  8/3 proc jessica to 10/26, pt has prep and instr  10/19- pre call complete.  DBM    ESOPHAGOGASTRODUODENOSCOPY N/A 8/2/2024    Procedure: EGD (ESOPHAGOGASTRODUODENOSCOPY);  Surgeon: Argelia Altamirano MD;  Location: [facility] ENDO;  Service: Endoscopy;  Laterality: N/A;  Dr. Porter, Urgent EGD, abdominal pain, standard prep, Instr to portal. Plavix hold pending.  ok to hold Plavix 5 days per Dr Dawn  7/26/24- lvm/portal for pc. DBM  7/30 pt confirmed. has been holding plavix since 7/27 cf  7/30/24- pc complete. DBM    hand trauma Right     pencil     heart stent      LEFT HEART CATHETERIZATION Left 9/20/2019    Procedure: Left heart cath 12 pm start, R rad access;  Surgeon: Brad Bahena MD;  Location: [facility] CATH LAB;  Service: Cardiology;  Laterality: Left;  RN PREOP 9/13/2019  NEEDS IODINE PREMED    LEFT HEART CATHETERIZATION Left 9/21/2020    Procedure: Left heart cath 730am start, R rad access;  Surgeon: Brad Bahena MD;  Location: [facility] CATH LAB;  Service: Cardiology;  Laterality: Left;  RN PREOP 9/14/2020, COVID NEGATIVE---9/18    REVERSE TOTAL SHOULDER ARTHROPLASTY Left 4/10/2025    Procedure: ARTHROPLASTY, SHOULDER, TOTAL, REVERSE;  Surgeon: Eugenio Reese MD;  Location: [facility] OR;  Service: Orthopedics;  Laterality: Left;  regional w/ catheter (interscalene), ASHLYN 30cc       Objective:     General: Clinton is well-developed, well-nourished, appears stated age, in no acute distress, alert and oriented to time, place and person.     General    Vitals reviewed.  Constitutional: He is oriented to person, place, and time. He appears well-developed and well-nourished. No distress.   HENT:   Nose: Nose normal. Mouth/Throat: No oropharyngeal exudate.   Eyes: Pupils are equal, round, and reactive to  light. Right eye exhibits no discharge. Left eye exhibits no discharge.   Cardiovascular:  Normal rate and intact distal pulses.            Pulmonary/Chest: Effort normal and breath sounds normal. No respiratory distress.   Neurological: He is alert and oriented to person, place, and time. He has normal reflexes. He displays normal reflexes. No cranial nerve deficit. Coordination normal.   Psychiatric: He has a normal mood and affect. His behavior is normal. Judgment and thought content normal.     General Musculoskeletal Exam   Gait: normal       Right Knee Exam     Inspection   Erythema: absent  Scars: absent  Swelling: absent  Effusion: absent  Deformity: absent  Bruising: absent    Tenderness   The patient is experiencing no tenderness.     Range of Motion   Extension:  0   Flexion:  140     Tests   Meniscus   Renetta:  Medial - negative Lateral - negative  Ligament Examination   Lachman: normal (-1 to 2mm)   PCL-Posterior Drawer: normal (0 to 2mm)     MCL - Valgus: normal (0 to 2mm)  LCL - Varus: normal  Pivot Shift: normal (Equal)  Reverse Pivot Shift: normal (Equal)  Dial Test at 30 degrees: normal (< 5 degrees)  Dial Test at 90 degrees: normal (< 5 degrees)  Posterior Sag Test: negative  Posterolateral Corner: stable  Patella   Patellar apprehension: negative  Passive Patellar Tilt: neutral  Patellar Tracking: normal  Patellar Glide (quadrants): Lateral - 1   Medial - 2  Q-Angle at 90 degrees: normal  Patellar Grind: negative  J-Sign: none    Other   Meniscal Cyst: absent  Popliteal (Baker's) Cyst: absent  Sensation: normal    Left Knee Exam     Inspection   Erythema: absent  Scars: absent  Swelling: absent  Effusion: absent  Deformity: absent  Bruising: absent    Tenderness   The patient is experiencing no tenderness.     Range of Motion   Extension:  0   Flexion:  140     Tests   Meniscus   Renetta:  Medial - negative Lateral - negative  Stability   Lachman: normal (-1 to 2mm)   PCL-Posterior Drawer:  normal (0 to 2mm)  MCL - Valgus: normal (0 to 2mm)  LCL - Varus: normal (0 to 2mm)  Pivot Shift: normal (Equal)  Reverse Pivot Shift: normal (Equal)  Dial Test at 30 degrees: normal (< 5 degrees)  Dial Test at 90 degrees: normal (< 5 degrees)  Posterior Sag Test: negative  Posterolateral Corner: stable  Patella   Patellar apprehension: negative  Passive Patellar Tilt: neutral  Patellar Tracking: normal  Patellar Glide (Quadrants): Lateral - 1 Medial - 2  Q-Angle at 90 degrees: normal  Patellar Grind: negative  J-Sign: J sign absent    Other   Meniscal Cyst: absent  Popliteal (Baker's) Cyst: absent  Sensation: normal    Right Hip Exam     Tests   Ekta: negative  Left Hip Exam     Tests   Ekta: negative      Right Shoulder Exam     Inspection/Observation   Swelling: absent  Bruising: absent  Scars: absent  Deformity: absent  Scapular Winging: absent  Scapular Dyskinesia: negative  Atrophy: absent    Tenderness   The patient is experiencing no tenderness.    Range of Motion   Active abduction:  90 normal   Passive abduction:  100 normal   Extension:  0 normal   Forward Flexion:  180 normal   Forward Elevation: 180 normal  Adduction: 40 normal  External Rotation 0 degrees:  50 normal   External Rotation 90 degrees: 90 normal  Internal rotation 0 degrees:  T8 normal   Internal rotation 90 degrees:  30 normal     Tests & Signs   Apprehension: negative  Cross arm: negative  Drop arm: negative  Michelle test: negative  Impingement: negative  Sulcus: absent  Anterosuperior Escape: negative  Lag Sign 0 degrees: negative  Lag Sign 90 degrees: negative  Lift Off Sign: negative  Belly Press: negative  Active Compression Test (Trimble's Sign): negative  Yergason's Test: negative  Speed's Test: negative  Anterior Drawer Test: 1+   Posterior Drawer Test: 1+  Relocation 90 degrees: negative  Relocation > 90 degrees: negative  Bear Hug: negative  Moving Valgus: negative  Jerk Test: negative    Other   Sensation: normal    Left  Shoulder Exam     Inspection/Observation   Swelling: absent  Bruising: absent  Scars: present  Deformity: absent  Scapular Winging: absent  Scapular Dyskinesia: negative  Atrophy: absent    Tenderness   Left shoulder tenderness location: mild tenderness along anterior incision.    Range of Motion   Active abduction:  90 normal   Passive abduction:  100 normal   Extension:  0 normal   Forward Flexion:  90 abnormal   Forward Elevation: 90 abnormal  Adduction: 40 normal  External Rotation 0 degrees:  50 normal   External Rotation 90 degrees: 90 normal  Internal rotation 0 degrees:  T8 normal   Internal rotation 90 degrees:  30 normal     Tests & Signs   Apprehension: negative  Cross arm: negative  Drop arm: negative  Michelle test: negative  Impingement: negative  Sulcus: absent  Rotator Cuff Painful Arc/Range: mild  Anterosuperior Escape: negative  Lag Sign 0 degrees: negative  Lag Sign 90 degrees: negative  Lift Off Sign: negative  Belly Press: negative  Active Compression test (Carteret's Sign): negative  Yergasons's Test: negative  Speed's Test: negative  Anterior Drawer Test: 1+  Posterior Drawer Test: 1+  Relocation 90 degrees: negative  Relocation > 90 degrees: negative  Bear Hug: negative  Moving Valgus: negative  Jerk Test: negative    Other   Sensation: normal       Muscle Strength   Right Upper Extremity   Shoulder Abduction: 5/5   Shoulder Internal Rotation: 5/5   Shoulder External Rotation: 5/5   Supraspinatus: 5/5   Subscapularis: 5/5   Biceps: 5/5   Left Upper Extremity  Shoulder Abduction: 5/5   Shoulder Internal Rotation: 5/5   Shoulder External Rotation: 5/5   Supraspinatus: 5/5   Subscapularis: 5/5   Biceps: 5/5     Reflexes     Left Side  Biceps:  2+  Triceps:  2+  Brachioradialis:  2+  Achilles:  2+  Quadriceps:  2+    Right Side   Biceps:  2+  Triceps:  2+  Brachioradialis:  2+  Achilles:  2+  Quadriceps:  2+    Vascular Exam     Right Pulses  Dorsalis Pedis:      2+  Posterior Tibial:       2+  Radial:                    2+      Left Pulses  Dorsalis Pedis:      2+  Posterior Tibial:      2+  Radial:                    2+      Capillary Refill  Right Hand: normal capillary refill  Left Hand: normal capillary refill            Radiographic findings:    X-Ray Shoulder 2 or More Views Left  Narrative: EXAMINATION:  XR SHOULDER COMPLETE 2 OR MORE VIEWS LEFT    CLINICAL HISTORY:  Pain in left shoulder    TECHNIQUE:  Two or three views of the left shoulder were performed.    COMPARISON:  04/23/2025    FINDINGS:  Reverse shoulder prosthesis is seen in place on the a left in good position and alignment.  Impression: See above    Electronically signed by: Brad Whitaker MD  Date:    05/28/2025  Time:    14:59            These findings were discussed and reviewed with the patient.     Assessment:     Encounter Diagnoses   Name Primary?    Left shoulder pain, unspecified chronicity Yes    Chronic left shoulder pain     Status post reverse total replacement of left shoulder     Traumatic complete tear of left rotator cuff, subsequent encounter     Biceps tendinitis of left upper extremity         Plan:     Assessment & Plan    MEDICATIONS:  - Refill Robaxin.    FOLLOW UP:  - Follow up with Ludin Gloria PA-C in 6 weeks.    PATIENT INSTRUCTIONS:  - Stop using sling and pillow while sleeping.  - Remove sling when at home, watching TV, and eating.  - Continue sling when outside the house for a couple more weeks.  - Perform pendulum exercises as demonstrated.  - Practice wall walks for shoulder mobility.  - Use tennis ball to roll on a table for upper extremity exercises.  - Stretch upper extremity out on a table at ~waist height.           All of the patient's questions were answered and the patient will contact us if they have any questions or concerns in the interim.    This note was generated with the assistance of ambient listening technology. Verbal consent was obtained by the patient and accompanying  visitor(s) for the recording of patient appointment to facilitate this note. I attest to having reviewed and edited the generated note for accuracy, though some syntax or spelling errors may persist. Please contact the author of this note for any clarification.               [1]  Past Medical History:  Diagnosis Date    Allergy     Angina pectoris     Anxiety     Cancer     skin rwemoved from head    Chronic midline low back pain without sciatica 10/26/2022    Coronary artery disease     Depression     Eye injury as a teen    stuck object in os    GERD (gastroesophageal reflux disease)     Hyperlipidemia     Hypertension     Hypothyroidism     Memory loss     12/3/2014 MRI brain: 1. No evidence for acute infarct 2. unremarkable MRI of the brain; 12/3/2014 carotid US normal    Myocardial infarction     Nuclear sclerosis of both eyes 05/20/2021    Obesity     Peripheral vascular disease 06/20/2017    Retrocalcaneal bursitis 11/24/2014    Sleep apnea     Type 2 diabetes mellitus with other specified complication 04/27/2023    Type 2 diabetes mellitus with other specified complication 04/27/2023

## 2025-06-10 ENCOUNTER — RESULTS FOLLOW-UP (OUTPATIENT)
Dept: CARDIOLOGY | Facility: HOSPITAL | Age: 75
End: 2025-06-10

## 2025-06-10 ENCOUNTER — HOSPITAL ENCOUNTER (OUTPATIENT)
Dept: RADIOLOGY | Facility: HOSPITAL | Age: 75
Discharge: HOME | End: 2025-06-10
Attending: INTERNAL MEDICINE
Payer: MEDICARE

## 2025-06-10 ENCOUNTER — HOSPITAL ENCOUNTER (OUTPATIENT)
Dept: CARDIOLOGY | Facility: HOSPITAL | Age: 75
Discharge: HOME | End: 2025-06-10
Attending: INTERNAL MEDICINE
Payer: MEDICARE

## 2025-06-10 VITALS — WEIGHT: 223 LBS | BODY MASS INDEX: 31.92 KG/M2 | HEIGHT: 70 IN

## 2025-06-10 DIAGNOSIS — R94.31 ABNORMAL EKG: ICD-10-CM

## 2025-06-10 DIAGNOSIS — Z01.810 PREOP CARDIOVASCULAR EXAM: ICD-10-CM

## 2025-06-10 DIAGNOSIS — I77.810 AORTIC ROOT DILATATION: ICD-10-CM

## 2025-06-10 DIAGNOSIS — Z98.61 HISTORY OF PERCUTANEOUS CORONARY INTERVENTION: ICD-10-CM

## 2025-06-10 DIAGNOSIS — R07.2 PRECORDIAL PAIN: ICD-10-CM

## 2025-06-10 DIAGNOSIS — I25.10 CORONARY ARTERY DISEASE INVOLVING NATIVE CORONARY ARTERY OF NATIVE HEART WITHOUT ANGINA PECTORIS: ICD-10-CM

## 2025-06-10 LAB
AORTIC SIZE INDEX (SOV): 1.8 CM/M2
AORTIC SIZE INDEX: 1.7 CM/M2
ASCENDING AORTA: 3.8 CM
AV INDEX (PROSTH): 0.78
AV MEAN GRADIENT: 2 MMHG
AV PEAK GRADIENT: 3 MMHG
AV VALVE AREA BY VELOCITY RATIO: 2.8 CM²
AV VALVE AREA: 3.2 CM²
AV VELOCITY RATIO: 0.67
BSA FOR ECHO PROCEDURE: 2.23 M2
CV ECHO LV RWT: 0.57 CM
CV STRESS BASE HR: 66 BPM
DIASTOLIC BLOOD PRESSURE: 69 MMHG
DOP CALC AO PEAK VEL: 0.9 M/S
DOP CALC AO VTI: 18.9 CM
DOP CALC LVOT AREA: 4.2 CM2
DOP CALC LVOT DIAMETER: 2.3 CM
DOP CALC LVOT PEAK VEL: 0.6 M/S
DOP CALC LVOT STROKE VOLUME: 61 CM3
DOP CALCLVOT PEAK VEL VTI: 14.7 CM
E WAVE DECELERATION TIME: 255 MSEC
E/A RATIO: 0.73
E/E' RATIO: 8 M/S
ECHO LV POSTERIOR WALL: 1 CM (ref 0.6–1.1)
FRACTIONAL SHORTENING: 31.4 % (ref 28–44)
INTERVENTRICULAR SEPTUM: 1 CM (ref 0.6–1.1)
IVC DIAMETER: 1.77 CM
IVRT: 123 MSEC
LA MAJOR: 4.8 CM
LEFT ATRIUM SIZE: 4 CM
LEFT INTERNAL DIMENSION IN SYSTOLE: 2.4 CM (ref 2.1–4)
LEFT VENTRICLE DIASTOLIC VOLUME INDEX: 23.74 ML/M2
LEFT VENTRICLE DIASTOLIC VOLUME: 52 ML
LEFT VENTRICLE MASS INDEX: 47.2 G/M2
LEFT VENTRICLE SYSTOLIC VOLUME INDEX: 9.6 ML/M2
LEFT VENTRICLE SYSTOLIC VOLUME: 21 ML
LEFT VENTRICULAR INTERNAL DIMENSION IN DIASTOLE: 3.5 CM (ref 3.5–6)
LEFT VENTRICULAR MASS: 103.4 G
LV LATERAL E/E' RATIO: 6.3 M/S
LV SEPTAL E/E' RATIO: 11 M/S
LVED V (TEICH): 52.21 ML
LVES V (TEICH): 21.06 ML
LVOT MG: 1.07 MMHG
LVOT MV: 0.5 CM/S
MV PEAK A VEL: 0.6 M/S
MV PEAK E VEL: 0.44 M/S
MV STENOSIS PRESSURE HALF TIME: 74.03 MS
MV VALVE AREA P 1/2 METHOD: 2.97 CM2
NUC STRESS EJECTION FRACTION: 77 %
OHS CV CPX 85 PERCENT MAX PREDICTED HEART RATE MALE: 126
OHS CV CPX MAX PREDICTED HEART RATE: 148
OHS CV CPX PATIENT IS FEMALE: 0
OHS CV CPX PATIENT IS MALE: 1
OHS CV CPX PEAK DIASTOLIC BLOOD PRESSURE: 60 MMHG
OHS CV CPX PEAK HEAR RATE: 88 BPM
OHS CV CPX PEAK RATE PRESSURE PRODUCT: 8272
OHS CV CPX PEAK SYSTOLIC BLOOD PRESSURE: 94 MMHG
OHS CV CPX PERCENT MAX PREDICTED HEART RATE ACHIEVED: 59
OHS CV CPX RATE PRESSURE PRODUCT PRESENTING: 7062
OHS CV INITIAL DOSE: 10.3 MCG/KG/MIN
OHS CV PEAK DOSE: 30.7 MCG/KG/MIN
OHS CV RV/LV RATIO: 1.31 CM
PISA TR MAX VEL: 0.9 M/S
PULM VEIN S/D RATIO: 1.32
PV PEAK D VEL: 0.19 M/S
PV PEAK GRADIENT: 3 MMHG
PV PEAK S VEL: 0.25 M/S
PV PEAK VELOCITY: 0.83 M/S
RA MAJOR: 4.71 CM
RA PRESSURE ESTIMATED: 3 MMHG
RIGHT VENTRICLE DIASTOLIC BASEL DIMENSION: 4.6 CM
RIGHT VENTRICULAR END-DIASTOLIC DIMENSION: 4.63 CM
RV TB RVSP: 4 MMHG
RV TISSUE DOPPLER FREE WALL SYSTOLIC VELOCITY 1 (APICAL 4 CHAMBER VIEW): 12.58 CM/S
SINUS: 4.01 CM
STJ: 3.4 CM
SYSTOLIC BLOOD PRESSURE: 107 MMHG
TDI LATERAL: 0.07 M/S
TDI SEPTAL: 0.04 M/S
TDI: 0.06 M/S
TR MAX PG: 3 MMHG
TRICUSPID ANNULAR PLANE SYSTOLIC EXCURSION: 2.5 CM
TV REST PULMONARY ARTERY PRESSURE: 6 MMHG
Z-SCORE OF LEFT VENTRICULAR DIMENSION IN END DIASTOLE: -7.48
Z-SCORE OF LEFT VENTRICULAR DIMENSION IN END SYSTOLE: -4.96

## 2025-06-10 PROCEDURE — 63600175 PHARM REV CODE 636 W HCPCS: Mod: HCNC | Performed by: INTERNAL MEDICINE

## 2025-06-10 PROCEDURE — 93017 CV STRESS TEST TRACING ONLY: CPT | Mod: HCNC

## 2025-06-10 PROCEDURE — 93306 TTE W/DOPPLER COMPLETE: CPT | Mod: 26,HCNC,, | Performed by: INTERNAL MEDICINE

## 2025-06-10 PROCEDURE — 93306 TTE W/DOPPLER COMPLETE: CPT | Mod: HCNC

## 2025-06-10 PROCEDURE — 93018 CV STRESS TEST I&R ONLY: CPT | Mod: HCNC,,, | Performed by: INTERNAL MEDICINE

## 2025-06-10 PROCEDURE — 78452 HT MUSCLE IMAGE SPECT MULT: CPT | Mod: 26,HCNC,, | Performed by: INTERNAL MEDICINE

## 2025-06-10 PROCEDURE — 93016 CV STRESS TEST SUPVJ ONLY: CPT | Mod: HCNC,,, | Performed by: INTERNAL MEDICINE

## 2025-06-10 PROCEDURE — 78452 HT MUSCLE IMAGE SPECT MULT: CPT | Mod: HCNC

## 2025-06-10 PROCEDURE — A9502 TC99M TETROFOSMIN: HCPCS | Mod: HCNC | Performed by: INTERNAL MEDICINE

## 2025-06-10 RX ORDER — REGADENOSON 0.08 MG/ML
0.4 INJECTION, SOLUTION INTRAVENOUS ONCE
Status: COMPLETED | OUTPATIENT
Start: 2025-06-10 | End: 2025-06-10

## 2025-06-10 RX ADMIN — REGADENOSON 0.4 MG: 0.08 INJECTION, SOLUTION INTRAVENOUS at 08:06

## 2025-06-10 RX ADMIN — TETROFOSMIN 30.7 MILLICURIE: 1.38 INJECTION, POWDER, LYOPHILIZED, FOR SOLUTION INTRAVENOUS at 08:06

## 2025-06-10 RX ADMIN — TETROFOSMIN 10.3 MILLICURIE: 1.38 INJECTION, POWDER, LYOPHILIZED, FOR SOLUTION INTRAVENOUS at 06:06

## 2025-06-10 NOTE — NURSING
Pt presents wearing left arm sling from recent surgery after a syncope episode with injury.pt changed to concepcion per order

## 2025-06-12 ENCOUNTER — CLINICAL SUPPORT (OUTPATIENT)
Dept: REHABILITATION | Facility: HOSPITAL | Age: 75
End: 2025-06-12
Payer: MEDICARE

## 2025-06-12 DIAGNOSIS — Z96.612 STATUS POST REVERSE TOTAL REPLACEMENT OF LEFT SHOULDER: ICD-10-CM

## 2025-06-12 PROCEDURE — 97140 MANUAL THERAPY 1/> REGIONS: CPT | Mod: HCNC,PN

## 2025-06-12 PROCEDURE — 97112 NEUROMUSCULAR REEDUCATION: CPT | Mod: HCNC,PN

## 2025-06-12 NOTE — PROGRESS NOTES
"  Outpatient Rehab    Physical Therapy Progress Note    Patient Name: Clinton Rosenberg  MRN: [id number]  YOB: 1950  Encounter Date: 6/12/2025    Therapy Diagnosis:   Encounter Diagnosis   Name Primary?    Status post reverse total replacement of left shoulder      Physician: Nadir Gloria, *    Physician Orders: Eval and Treat  Medical Diagnosis: Status post reverse total replacement of left shoulder  Surgical Diagnosis: ARTHROPLASTY, SHOULDER, TOTAL, REVERSE (Left)  ARTHROSCOPY, SHOULDER, W/ BICEPS TENODESIS (Left)   Surgical Date: Not applicable for this Episode    Visit # / Visits Authorized:  1 / 10  Insurance Authorization Period: 5/28/2025 to 12/31/2025  Date of Evaluation: 4/9/2025  Plan of Care Certification:       PT/PTA: PT   Number of PTA visits since last PT visit:   Time In: 1500   Time Out: 1600  Total Time (in minutes): 60   Total Billable Time (in minutes): 30    FOTO:  Intake Score: 24%  Survey Score 2:  %  Survey Score 3:  %    Precautions:       Subjective   he is feeling good and been working it at home.  Pain reported as 2/10. Left shoulder  4/10/2025, POW 9 weeks and 0 days    Objective      Shoulder Range of Motion  Left Shoulder   Active (deg) Passive (deg) Pain   Flexion   110     Extension         Scaption         ABduction   105     ADduction         Horizontal ABduction         Horizontal ADduction         External Rotation (Shoulder ABducted 0 degrees)         External Rotation (Shoulder ABducted 45 degrees)         External Rotation (Shoulder ABducted 90 degrees)         Internal Rotation (Shoulder ABducted 0 degrees)         Internal Rotation (Shoulder ABducted 45 degrees)         Internal Rotation (Shoulder ABducted 90 degrees)                          Treatment:  Therapeutic Exercise  TE 2: supine shoulder ER stretch with dowel 5" holds x 5'  TE 6: Supine Er with dowel 5" holds x 3 min  TE 7: Francine Scaption 3 min.  TE 8: Pulley Flexion 3 min,  TE 9: Supine " "Flexion with dowel 5 min  Manual Therapy  MT 1: PROM shoulder flexion, abduction, and ER within protocols  Balance/Neuromuscular Re-Education  NMR 1: Standing shoulder submax isometrics against ball on wall ABD/ER/IR 3x 10 x 5'' holds  NMR 2: Sidelying AAROM shoulder flexion with "UE ranger" x 30  NMR 4: supine shoulder er/ir elbow flx/ext submax isometrics 30" x 4    Time Entry(in minutes):  Manual Therapy Time Entry: 15  Neuromuscular Re-Education Time Entry: 20  Therapeutic Exercise Time Entry: 25    Assessment & Plan   Assessment: Clinton presents to PT 9 weeks s/p R RTSA, meeting 2/9 goals and is making good progress toward the rest of his goals. So far, he has made good progress with shoulder range of motion, subjective pain levels, and we are now progressing into the AAROM and AROM phases of rehab. He is still having difficulties with AAROM , AROM, decreased upper extremity strength, and difficulty with functional activities like reaching into cabinents due to his post-op status. He will continue to benefit from skilled PT to continue progressing his range of motion and strength, in order to maximize his functional ability potential.  Evaluation/Treatment Tolerance: Patient tolerated treatment well    The patient will continue to benefit from skilled outpatient physical therapy in order to address the deficits listed in the problem list on the initial evaluation, provide patient and family education, and maximize the patients level of independence in the home and community environments.     The patient's spiritual, cultural, and educational needs were considered, and the patient is agreeable to the plan of care and goals.           Plan: continue with POC    Goals:   Active         LTGs         Increase ROM to allow improved joint biomechanics during functional activities.    (Progressing)         Start:  04/09/25    Expected End:  07/02/25              2. Independent with home exercise program.   (Progressing)  "        Start:  04/09/25    Expected End:  07/02/25              3. Full return to functional activities with manageable complaints.   (Progressing)         Start:  04/09/25    Expected End:  07/02/25              4. Patient to demonstrate improved posture and body mechanics.   (Progressing)         Start:  04/09/25    Expected End:  07/02/25              5.Decrease pain by 75% during functional activities.   (Progressing)         Start:  04/09/25    Expected End:  07/02/25                 STGs         Pt to increase strength by a 1/2 grade of muscles test to allow for improvement in functional activities such as performing chores.    (Progressing)         Start:  04/09/25    Expected End:  07/02/25              2. Pt to improve range of motion by 25% to allow for improved functional mobility to allow for improvement in IADL's.   (Progressing)         Start:  04/09/25    Expected End:  07/02/25              3. Pt to report compliance with HEP and demonstrate proper exercise technique to PT to show competence with self management of condition.   (MET)         Start:  04/09/25    Expected End:  07/02/25              4. Decrease pain by 25% during functional activities.   (MET)         Start:  04/09/25    Expected End:  07/02/25               Donis Palafox, PT

## 2025-06-17 ENCOUNTER — CLINICAL SUPPORT (OUTPATIENT)
Dept: REHABILITATION | Facility: HOSPITAL | Age: 75
End: 2025-06-17
Payer: MEDICARE

## 2025-06-17 DIAGNOSIS — G89.29 CHRONIC LEFT SHOULDER PAIN: Primary | ICD-10-CM

## 2025-06-17 DIAGNOSIS — M25.512 CHRONIC LEFT SHOULDER PAIN: Primary | ICD-10-CM

## 2025-06-17 PROCEDURE — 97110 THERAPEUTIC EXERCISES: CPT | Mod: HCNC,PN

## 2025-06-17 PROCEDURE — 97140 MANUAL THERAPY 1/> REGIONS: CPT | Mod: HCNC,PN

## 2025-06-17 PROCEDURE — 97112 NEUROMUSCULAR REEDUCATION: CPT | Mod: HCNC,PN

## 2025-06-17 NOTE — PROGRESS NOTES
Outpatient Rehab    Physical Therapy Visit    Patient Name: Clinton Rosenberg  MRN: [id number]  YOB: 1950  Encounter Date: 6/17/2025    Therapy Diagnosis:   Encounter Diagnosis   Name Primary?    Chronic left shoulder pain Yes         Physician: Nadir Gloria, *    Physician Orders: Eval and Treat  Medical Diagnosis: Status post reverse total replacement of left shoulder    Visit # / Visits Authorized:  2 / 10  Insurance Authorization Period: 5/28/2025 to 12/31/2025  Date of Evaluation: 4/9/2025 4/9/2025  Plan of Care Certification: 4/9/2025 to 7/2/2025      PT/PTA: PT   Number of PTA visits since last PT visit:   Time In: 1548   Time Out: 1645  Total Time (in minutes): 57   Total Billable Time (in minutes): 57 1:1 time with PT     FOTO:  Intake Score: 24%  Survey Score 2:  %  Survey Score 3:  %    Precautions:       Subjective   he is feeling better, but still feeling limited..  Pain reported as 2/10. Left shoulder  4/10/2025, POW 9 weeks and 5 days    Objective      Shoulder Range of Motion  Left Shoulder   Active (deg) Passive (deg) Pain   Flexion 105 120 Yes   Extension         Scaption         ABduction 90 115 Yes   ADduction         Horizontal ABduction         Horizontal ADduction         External Rotation (Shoulder ABducted 0 degrees)         External Rotation (Shoulder ABducted 45 degrees)         External Rotation (Shoulder ABducted 90 degrees)         Internal Rotation (Shoulder ABducted 0 degrees)         Internal Rotation (Shoulder ABducted 45 degrees)         Internal Rotation (Shoulder ABducted 90 degrees)                          Treatment:  Therapeutic Exercise  TE 1: shoulder assessment  TE 4: standing shoulder flexion against half roll with eccentric control focus 3 x10  TE 9: Supine Flexion with dowel 5s holds x 5 min  Manual Therapy  MT 1: PROM shoulder flexion, abduction, and ER  Balance/Neuromuscular Re-Education  NMR 1: Standing shoulder isometrics against ball on wall  "ABD, FLX, EXT 5s holds 3 x 10  NMR 2: Sidelying AAROM shoulder flexion with "UE ranger" x 30  NMR 3: sidelying shoulder abduction with focus on eccentric control 3 x 10  NMR 4: supine shoulder er/ir elbow flx/ext submax isometrics x 1'  NMR 5: Slot machines with SA punch 3 x 10      Time Entry(in minutes):  Manual Therapy Time Entry: 10  Neuromuscular Re-Education Time Entry: 32  Therapeutic Exercise Time Entry: 15    Assessment & Plan   Assessment: Clinton tolerated interventions to improve shoulder range of motion and upper extremity strength with minor complaints of posterior shoulder pain that was resolved with cueing neutral shoulder positioning as he was getting increased anterior GHJ translation with shoulder mobility interventions. He will continue to be progressed as tolerated.        Patient will continue to benefit from skilled outpatient physical therapy to address the deficits listed in the problem list box on initial evaluation, provide pt/family education and to maximize pt's level of independence in the home and community environment.     Patient's spiritual, cultural, and educational needs considered and patient agreeable to plan of care and goals.           Plan: continue with POC    Goals:   Active       LTGs       Increase ROM to allow improved joint biomechanics during functional activities.    (Progressing)       Start:  04/09/25    Expected End:  07/02/25            2. Independent with home exercise program.   (Progressing)       Start:  04/09/25    Expected End:  07/02/25            3. Full return to functional activities with manageable complaints.   (Progressing)       Start:  04/09/25    Expected End:  07/02/25            4. Patient to demonstrate improved posture and body mechanics.   (Progressing)       Start:  04/09/25    Expected End:  07/02/25            5.Decrease pain by 75% during functional activities.   (Progressing)       Start:  04/09/25    Expected End:  07/02/25               " STGs       Pt to increase strength by a 1/2 grade of muscles test to allow for improvement in functional activities such as performing chores.    (Progressing)       Start:  04/09/25    Expected End:  07/02/25            2. Pt to improve range of motion by 25% to allow for improved functional mobility to allow for improvement in IADL's.   (Progressing)       Start:  04/09/25    Expected End:  07/02/25            3. Pt to report compliance with HEP and demonstrate proper exercise technique to PT to show competence with self management of condition.   (Progressing)       Start:  04/09/25    Expected End:  07/02/25            4. Decrease pain by 25% during functional activities.   (Progressing)       Start:  04/09/25    Expected End:  07/02/25                  Donis Palafox, PT

## 2025-06-19 ENCOUNTER — CLINICAL SUPPORT (OUTPATIENT)
Dept: REHABILITATION | Facility: HOSPITAL | Age: 75
End: 2025-06-19
Payer: MEDICARE

## 2025-06-19 DIAGNOSIS — M25.512 CHRONIC LEFT SHOULDER PAIN: Primary | ICD-10-CM

## 2025-06-19 DIAGNOSIS — G89.29 CHRONIC LEFT SHOULDER PAIN: Primary | ICD-10-CM

## 2025-06-19 PROCEDURE — 97140 MANUAL THERAPY 1/> REGIONS: CPT | Mod: HCNC,PN

## 2025-06-19 PROCEDURE — 97112 NEUROMUSCULAR REEDUCATION: CPT | Mod: HCNC,PN

## 2025-06-19 NOTE — PROGRESS NOTES
"  Outpatient Rehab    Physical Therapy Visit    Patient Name: Clinton Rosenberg  MRN: [id number]  YOB: 1950  Encounter Date: 6/19/2025    Therapy Diagnosis:   Encounter Diagnosis   Name Primary?    Chronic left shoulder pain Yes           Physician: Nadir Gloria, *    Physician Orders: Eval and Treat  Medical Diagnosis: Status post reverse total replacement of left shoulder    Visit # / Visits Authorized:  3 / 10  Insurance Authorization Period: 5/28/2025 to 12/31/2025  Date of Evaluation: 4/9/2025 4/9/2025  Plan of Care Certification: 4/9/2025 to 7/2/2025      PT/PTA:     Number of PTA visits since last PT visit:   Time In: 1500   Time Out: 1600  Total Time (in minutes): 60   Total Billable Time (in minutes): 30 1:1 time with PT     FOTO:  Intake Score: 24%  Survey Score 2:  %  Survey Score 3:  %    Precautions:       Subjective   he is doing good today. Nothing new to report.  Pain reported as 1/10. Left shoulder  4/10/2025, POW 10 weeks and 0 days    Objective      Shoulder Range of Motion  Left Shoulder   Active (deg) Passive (deg) Pain   Flexion 108       Extension         Scaption         ABduction 100       ADduction         Horizontal ABduction         Horizontal ADduction         External Rotation (Shoulder ABducted 0 degrees)         External Rotation (Shoulder ABducted 45 degrees)         External Rotation (Shoulder ABducted 90 degrees)         Internal Rotation (Shoulder ABducted 0 degrees)         Internal Rotation (Shoulder ABducted 45 degrees)         Internal Rotation (Shoulder ABducted 90 degrees)                          Treatment:  Therapeutic Exercise  TE 1: UBE fwd/bwd 4min/4min lvl 1  TE 2: supine shoulder ER stretch with dowel 5" holds x 5'  TE 4: standing shoulder flexion against half roll with eccentric control focus 2 x15  TE 6: Supine Er with dowel 5" holds x 5 min  Manual Therapy  MT 1: PROM shoulder flexion, abduction, and ER  Balance/Neuromuscular " Re-Education  NMR 1: Standing shoulder isometrics against ball on wall ABD, FLX, EXT,ER 5s holds 3 x 10  NMR 5: Slot machines with SA punch 3 x 10        Time Entry(in minutes):  Manual Therapy Time Entry: 12  Neuromuscular Re-Education Time Entry: 14  Therapeutic Exercise Time Entry: 34    Assessment & Plan   Assessment: Clinton continues to tolerate interventions with no complaints of pain and demonstration of increased difficulty with AROM and eccentric lowering of arm. He will continue to be progressed as tolerated.   Evaluation/Treatment Tolerance: Patient tolerated treatment well    Patient will continue to benefit from skilled outpatient physical therapy to address the deficits listed in the problem list box on initial evaluation, provide pt/family education and to maximize pt's level of independence in the home and community environment.     Patient's spiritual, cultural, and educational needs considered and patient agreeable to plan of care and goals.           Plan: continue with POC    Goals:   Active       LTGs       Increase ROM to allow improved joint biomechanics during functional activities.    (Progressing)       Start:  04/09/25    Expected End:  07/02/25            2. Independent with home exercise program.   (Progressing)       Start:  04/09/25    Expected End:  07/02/25            3. Full return to functional activities with manageable complaints.   (Progressing)       Start:  04/09/25    Expected End:  07/02/25            4. Patient to demonstrate improved posture and body mechanics.   (Progressing)       Start:  04/09/25    Expected End:  07/02/25            5.Decrease pain by 75% during functional activities.   (Progressing)       Start:  04/09/25    Expected End:  07/02/25               STGs       Pt to increase strength by a 1/2 grade of muscles test to allow for improvement in functional activities such as performing chores.    (Progressing)       Start:  04/09/25    Expected End:  07/02/25             2. Pt to improve range of motion by 25% to allow for improved functional mobility to allow for improvement in IADL's.   (Progressing)       Start:  04/09/25    Expected End:  07/02/25            3. Pt to report compliance with HEP and demonstrate proper exercise technique to PT to show competence with self management of condition.   (Progressing)       Start:  04/09/25    Expected End:  07/02/25            4. Decrease pain by 25% during functional activities.   (Progressing)       Start:  04/09/25    Expected End:  07/02/25                    Donis Palafox, PT

## 2025-07-02 ENCOUNTER — HOSPITAL ENCOUNTER (OUTPATIENT)
Dept: RADIOLOGY | Facility: HOSPITAL | Age: 75
Discharge: HOME | End: 2025-07-02
Attending: ORTHOPAEDIC SURGERY
Payer: MEDICARE

## 2025-07-02 ENCOUNTER — OFFICE VISIT (OUTPATIENT)
Dept: SPORTS MEDICINE | Facility: CLINIC | Age: 75
End: 2025-07-02
Payer: MEDICARE

## 2025-07-02 VITALS
HEART RATE: 81 BPM | HEIGHT: 70 IN | DIASTOLIC BLOOD PRESSURE: 62 MMHG | SYSTOLIC BLOOD PRESSURE: 95 MMHG | BODY MASS INDEX: 31.94 KG/M2 | WEIGHT: 223.13 LBS

## 2025-07-02 DIAGNOSIS — Z96.612 STATUS POST REVERSE TOTAL REPLACEMENT OF LEFT SHOULDER: ICD-10-CM

## 2025-07-02 DIAGNOSIS — M25.512 LEFT SHOULDER PAIN, UNSPECIFIED CHRONICITY: Primary | ICD-10-CM

## 2025-07-02 DIAGNOSIS — M25.512 LEFT SHOULDER PAIN, UNSPECIFIED CHRONICITY: ICD-10-CM

## 2025-07-02 PROCEDURE — 3288F FALL RISK ASSESSMENT DOCD: CPT | Mod: CPTII,HCNC,S$GLB, | Performed by: ORTHOPAEDIC SURGERY

## 2025-07-02 PROCEDURE — 99999 PR PBB SHADOW E&M-EST. PATIENT-LVL III: CPT | Mod: PBBFAC,HCNC,, | Performed by: ORTHOPAEDIC SURGERY

## 2025-07-02 PROCEDURE — 73030 X-RAY EXAM OF SHOULDER: CPT | Mod: 26,HCNC,LT, | Performed by: RADIOLOGY

## 2025-07-02 PROCEDURE — 3078F DIAST BP <80 MM HG: CPT | Mod: CPTII,HCNC,S$GLB, | Performed by: ORTHOPAEDIC SURGERY

## 2025-07-02 PROCEDURE — 3044F HG A1C LEVEL LT 7.0%: CPT | Mod: CPTII,HCNC,S$GLB, | Performed by: ORTHOPAEDIC SURGERY

## 2025-07-02 PROCEDURE — 73030 X-RAY EXAM OF SHOULDER: CPT | Mod: TC,HCNC,LT

## 2025-07-02 PROCEDURE — 3074F SYST BP LT 130 MM HG: CPT | Mod: CPTII,HCNC,S$GLB, | Performed by: ORTHOPAEDIC SURGERY

## 2025-07-02 PROCEDURE — 1125F AMNT PAIN NOTED PAIN PRSNT: CPT | Mod: CPTII,HCNC,S$GLB, | Performed by: ORTHOPAEDIC SURGERY

## 2025-07-02 PROCEDURE — 99024 POSTOP FOLLOW-UP VISIT: CPT | Mod: HCNC,S$GLB,, | Performed by: ORTHOPAEDIC SURGERY

## 2025-07-02 PROCEDURE — 4010F ACE/ARB THERAPY RXD/TAKEN: CPT | Mod: CPTII,HCNC,S$GLB, | Performed by: ORTHOPAEDIC SURGERY

## 2025-07-02 PROCEDURE — 1101F PT FALLS ASSESS-DOCD LE1/YR: CPT | Mod: CPTII,HCNC,S$GLB, | Performed by: ORTHOPAEDIC SURGERY

## 2025-07-02 PROCEDURE — 1159F MED LIST DOCD IN RCRD: CPT | Mod: CPTII,HCNC,S$GLB, | Performed by: ORTHOPAEDIC SURGERY

## 2025-07-02 PROCEDURE — 1160F RVW MEDS BY RX/DR IN RCRD: CPT | Mod: CPTII,HCNC,S$GLB, | Performed by: ORTHOPAEDIC SURGERY

## 2025-07-02 RX ORDER — METHOCARBAMOL 500 MG/1
500 TABLET, FILM COATED ORAL 3 TIMES DAILY
Qty: 90 TABLET | Refills: 3 | Status: SHIPPED | OUTPATIENT
Start: 2025-07-02 | End: 2025-10-30

## 2025-07-02 RX ORDER — PREGABALIN 75 MG/1
75 CAPSULE ORAL 2 TIMES DAILY
Qty: 60 CAPSULE | Refills: 5 | Status: SHIPPED | OUTPATIENT
Start: 2025-07-02 | End: 2026-01-28

## 2025-07-02 NOTE — PROGRESS NOTES
Subjective:     Chief Complaint: Clinton Rosenberg is a 74 y.o. male who had concerns including Post-op Evaluation of the Left Shoulder and Post-op Evaluation.    History of Present Illness    CHIEF COMPLAINT:  - Clinton presents for 12-week post-op follow-up after shoulder surgery.    HPI:  Clinton reports his recovery is not progressing as expected. He has attended therapy only twice, possibly 3 times, since his last appointment, despite instructions to attend twice weekly. He denies taking any pain medication, including the previously prescribed Celebrex.    He has engaged in some physical activities, including using a self-propelled , which he found to be strenuous. His daughter mentions he has been considering home improvement tasks such as removing textured ceiling in the bathroom. He reports doing some exercises at home, including pendulums, but appears to have not been consistently following the recommended therapy regimen.    He denies any allergies to medications.    PREVIOUS TREATMENTS:  - PT: Attended twice, possibly 3 times since last visit, instead of the recommended twice weekly    SURGICAL HISTORY:  - Shoulder surgery: 12 weeks ago    WORK STATUS:  - Engaged in yard work, including using a self-propelled   - Considering painting ceilings  - Advised by this medical assistant to wait and work with PT for about four weeks before engaging in such activities           Pain Related Questions  Over the past 3 days, what was your average pain during activity? (I.e. running, jogging, walking, climbing stairs, getting dressed, ect.): 4  Over the past 3 days, what was your highest pain level?: 6  Over the past 3 days, what was your lowest pain level? : 2    Other  How many nights a week are you awakened by your affected body part?: 3  Was the patient's HEIGHT measured or patient reported?: Patient Reported  Was the patient's WEIGHT measured or patient reported?: Measured    Past Medical History[1]      Past Surgical History:   Procedure Laterality Date    APPENDECTOMY  1986    ARTHROSCOPY,SHOULDER,WITH BICEPS TENODESIS Left 4/10/2025    Procedure: ARTHROSCOPY, SHOULDER, W/ BICEPS TENODESIS;  Surgeon: Eugenio Reese MD;  Location: [facility] OR;  Service: Orthopedics;  Laterality: Left;    bone spur Right     COLONOSCOPY      COLONOSCOPY N/A 10/26/2023    Procedure: COLONOSCOPY;  Surgeon: Argelia Altamirano MD;  Location: [facility] ENDO;  Service: Endoscopy;  Laterality: N/A;  Referred by: Dr. Michael Gonzalez  BT/Clearance: ok to hold Plavix 5 days per Dr Dawn  Prep: golytely  Route instructions sent: [id number]  8/3 proc jessica to 10/26, pt has prep and instr  10/19- pre call complete.  DBM    ESOPHAGOGASTRODUODENOSCOPY N/A 8/2/2024    Procedure: EGD (ESOPHAGOGASTRODUODENOSCOPY);  Surgeon: Argelia Altamirano MD;  Location: [facility] ENDO;  Service: Endoscopy;  Laterality: N/A;  Dr. Porter, Urgent EGD, abdominal pain, standard prep, Instr to portal. Plavix hold pending.  ok to hold Plavix 5 days per Dr Dawn  7/26/24- lvm/portal for pc. DBM  7/30 pt confirmed. has been holding plavix since 7/27 cf  7/30/24- pc complete. DBM    hand trauma Right     pencil     heart stent      LEFT HEART CATHETERIZATION Left 9/20/2019    Procedure: Left heart cath 12 pm start, R rad access;  Surgeon: Brad Bahena MD;  Location: [facility] CATH LAB;  Service: Cardiology;  Laterality: Left;  RN PREOP 9/13/2019  NEEDS IODINE PREMED    LEFT HEART CATHETERIZATION Left 9/21/2020    Procedure: Left heart cath 730am start, R rad access;  Surgeon: Brad Bahena MD;  Location: [facility] CATH LAB;  Service: Cardiology;  Laterality: Left;  RN PREOP 9/14/2020, COVID NEGATIVE---9/18    REVERSE TOTAL SHOULDER ARTHROPLASTY Left 4/10/2025    Procedure: ARTHROPLASTY, SHOULDER, TOTAL, REVERSE;  Surgeon: Eugenio Reese MD;  Location: [facility] OR;  Service: Orthopedics;  Laterality: Left;  regional w/ catheter (interscalene), ASHLYN 30cc       Objective:      General: Clinton is well-developed, well-nourished, appears stated age, in no acute distress, alert and oriented to time, place and person.     General    Vitals reviewed.  Constitutional: He is oriented to person, place, and time. He appears well-developed and well-nourished. No distress.   HENT:   Nose: Nose normal. Mouth/Throat: No oropharyngeal exudate.   Eyes: Pupils are equal, round, and reactive to light. Right eye exhibits no discharge. Left eye exhibits no discharge.   Cardiovascular:  Normal rate and intact distal pulses.            Pulmonary/Chest: Effort normal and breath sounds normal. No respiratory distress.   Neurological: He is alert and oriented to person, place, and time. He has normal reflexes. He displays normal reflexes. No cranial nerve deficit. Coordination normal.   Psychiatric: He has a normal mood and affect. His behavior is normal. Judgment and thought content normal.         Right Shoulder Exam     Inspection/Observation   Swelling: absent  Bruising: absent  Scars: absent  Deformity: absent  Scapular Winging: absent  Scapular Dyskinesia: negative  Atrophy: absent    Tenderness   The patient is experiencing no tenderness.    Range of Motion   Active abduction:  90 normal   Passive abduction:  100 normal   Extension:  0 normal   Forward Flexion:  170 normal   Forward Elevation: 170 normal  Adduction: 40 normal  External Rotation 0 degrees:  50 normal   External Rotation 90 degrees: 80 normal  Internal rotation 0 degrees:  L5 normal   Internal rotation 90 degrees:  10 normal     Tests & Signs   Apprehension: negative  Cross arm: negative  Drop arm: negative  Michelle test: negative  Impingement: negative  Sulcus: absent  Anterosuperior Escape: negative  Lag Sign 0 degrees: negative  Lag Sign 90 degrees: negative  Lift Off Sign: negative  Belly Press: negative  Active Compression Test (Dale's Sign): negative  Yergason's Test: negative  Speed's Test: negative  Anterior Drawer Test: 1+    Posterior Drawer Test: 1+  Relocation 90 degrees: negative  Relocation > 90 degrees: negative  Bear Hug: negative  Moving Valgus: negative  Jerk Test: negative    Other   Sensation: normal    Left Shoulder Exam     Inspection/Observation   Swelling: absent  Bruising: absent  Scars: present  Deformity: absent  Scapular Winging: absent  Scapular Dyskinesia: positive  Atrophy: absent    Tenderness   The patient is tender to palpation of the greater tuberosity.    Range of Motion   Active abduction:  70 abnormal   Passive abduction:  70 abnormal   Extension:  0 normal   Forward Flexion:  100 abnormal   Forward Elevation: 100 abnormal  Adduction: 40 normal  External Rotation 0 degrees:  30 normal   External Rotation 90 degrees: 90 normal  Internal rotation 0 degrees:  Sacrum normal   Internal rotation 90 degrees:  0 normal     Tests & Signs   Apprehension: negative  Cross arm: negative  Drop arm: negative  Michelle test: negative  Impingement: negative  Sulcus: absent  Rotator Cuff Painful Arc/Range: moderate  Anterosuperior Escape: negative  Lag Sign 0 degrees: negative  Lag Sign 90 degrees: negative  Lift Off Sign: negative  Belly Press: negative  Active Compression test (Shannon's Sign): negative  Yergasons's Test: negative  Speed's Test: negative  Anterior Drawer Test: 1+  Posterior Drawer Test: 1+  Relocation 90 degrees: negative  Relocation > 90 degrees: negative  Bear Hug: negative  Moving Valgus: negative  Jerk Test: negative    Other   Sensation: normal       Muscle Strength   Right Upper Extremity   Shoulder Abduction: 5/5   Shoulder Internal Rotation: 5/5   Shoulder External Rotation: 5/5   Supraspinatus: 5/5   Subscapularis: 5/5   Biceps: 5/5   Left Upper Extremity  Shoulder Abduction: 4/5   Shoulder Internal Rotation: 5/5   Shoulder External Rotation: 5/5   Supraspinatus: 5/5   Subscapularis: 5/5   Biceps: 5/5     Reflexes     Left Side  Biceps:  2+  Triceps:  2+  Brachioradialis:  2+    Right Side    Biceps:  2+  Triceps:  2+  Brachioradialis:  2+    Vascular Exam     Right Pulses      Radial:                    2+      Left Pulses      Radial:                    2+      Capillary Refill  Right Hand: normal capillary refill  Left Hand: normal capillary refill              Radiographic findings:    These findings were discussed and reviewed with the patient.     Assessment:     Encounter Diagnoses   Name Primary?    Left shoulder pain, unspecified chronicity Yes    Status post reverse total replacement of left shoulder         Plan:     Assessment & Plan    MEDICATIONS:  - Robaxin, 3 times daily.  - Lyrica, twice daily.    REFERRALS:  - Referred to Dameon Patel for PT, twice weekly.    FOLLOW UP:  - Follow up in 3 months.    PATIENT INSTRUCTIONS:  - Perform wall walks for stretching.  - Do pendulum exercises.  - Use pole or broomstick for stretching exercises while lying on back.  - Avoid painting ceilings for at least 4 weeks.         HEP [id number] - Eugenio Reese MD, instructed and demonstrated a AAROM/PROM HEP. The patient then demonstrated understanding of exercises and proper technique. This program was performed for 15 minutes.    All of the patient's questions were answered and the patient will contact us if they have any questions or concerns in the interim.    This note was generated with the assistance of ambient listening technology. Verbal consent was obtained by the patient and accompanying visitor(s) for the recording of patient appointment to facilitate this note. I attest to having reviewed and edited the generated note for accuracy, though some syntax or spelling errors may persist. Please contact the author of this note for any clarification.             [1]   Past Medical History:  Diagnosis Date    Allergy     Angina pectoris     Anxiety     Cancer     skin rwemoved from head    Chronic midline low back pain without sciatica 10/26/2022    Coronary artery disease     Depression     Eye injury as a  teen    stuck object in os    GERD (gastroesophageal reflux disease)     Hyperlipidemia     Hypertension     Hypothyroidism     Memory loss     12/3/2014 MRI brain: 1. No evidence for acute infarct 2. unremarkable MRI of the brain; 12/3/2014 carotid US normal    Myocardial infarction     Nuclear sclerosis of both eyes 05/20/2021    Obesity     Peripheral vascular disease 06/20/2017    Retrocalcaneal bursitis 11/24/2014    Sleep apnea     Type 2 diabetes mellitus with other specified complication 04/27/2023    Type 2 diabetes mellitus with other specified complication 04/27/2023

## 2025-07-03 ENCOUNTER — CLINICAL SUPPORT (OUTPATIENT)
Dept: REHABILITATION | Facility: HOSPITAL | Age: 75
End: 2025-07-03
Payer: MEDICARE

## 2025-07-03 DIAGNOSIS — G89.29 CHRONIC LEFT SHOULDER PAIN: Primary | ICD-10-CM

## 2025-07-03 DIAGNOSIS — M25.512 CHRONIC LEFT SHOULDER PAIN: Primary | ICD-10-CM

## 2025-07-03 PROCEDURE — 97140 MANUAL THERAPY 1/> REGIONS: CPT | Mod: HCNC,PN

## 2025-07-03 PROCEDURE — 97530 THERAPEUTIC ACTIVITIES: CPT | Mod: HCNC,PN

## 2025-07-03 NOTE — PROGRESS NOTES
Outpatient Rehab    Physical Therapy Progress Note : Updated Plan of Care    Patient Name: Clinton Rosenberg  MRN: [id number]  YOB: 1950  Encounter Date: 7/3/2025    Therapy Diagnosis:   Encounter Diagnosis   Name Primary?    Chronic left shoulder pain Yes     Physician: Eugenio Reese MD    Physician Orders: Eval and Treat  Medical Diagnosis: Status post reverse total replacement of left shoulder  Surgical Diagnosis: ARTHROPLASTY, SHOULDER, TOTAL, REVERSE (Left)  ARTHROSCOPY, SHOULDER, W/ BICEPS TENODESIS (Left)   Surgical Date: Not applicable for this Episode  Days Since Last Surgery: Not applicable for this Episode    Visit # / Visits Authorized:  4 / 10  Insurance Authorization Period: 5/28/2025 to 12/31/2025  Date of Evaluation: 4/9/2025   Plan of Care Certification:       PT/PTA: PT   Number of PTA visits since last PT visit:   Time In: 1000   Time Out: 1100  Total Time (in minutes): 60   Total Billable Time (in minutes): 34    FOTO:  Intake Score: 24%  Survey Score 2:  %  Survey Score 3:  %    Precautions:       Subjective   he is feeling good and the docotr said he needs more therapy..  Pain reported as 0/10. Left shoulder  4/10/2025, POW 12 weeks and 0 days as of 7/3/25    Objective      Shoulder Range of Motion  Left Shoulder   Active (deg) Passive (deg) Pain   Flexion 102 120 Yes   Extension         Scaption         ABduction 98 115 Yes   ADduction         Horizontal ABduction         Horizontal ADduction         External Rotation (Shoulder ABducted 0 degrees)         External Rotation (Shoulder ABducted 45 degrees)         External Rotation (Shoulder ABducted 90 degrees)         Internal Rotation (Shoulder ABducted 0 degrees)         Internal Rotation (Shoulder ABducted 45 degrees)         Internal Rotation (Shoulder ABducted 90 degrees)                          Treatment:  Therapeutic Exercise  TE 1: UBE fwd/bwd 4min/4min lvl 2  TE 7: scap retractions GTB 3 x 10-12 x 3s holds  TE 8:  "standing against half roll on wall  shoulder flexion 2# 4 x 6  TE 9: Supine Flexion with dowel 5s holds x 5 min  TE 10: Standing Shoulder abduction 2# with eccentric focus 3 x 10  Manual Therapy  MT 1: PROM shoulder flexion, abduction, and ER  MT 4: passive pec stretching  Balance/Neuromuscular Re-Education  NMR 5: Slot machines with SA punch 2 # AW  3 x 10  NMR 6: Shoulder abduction flexion perturbations 4 x 30" ea    Time Entry(in minutes):  Manual Therapy Time Entry: 14  Therapeutic Activity Time Entry: 15  Therapeutic Exercise Time Entry: 31    Assessment & Plan   Assessment  Clinton returns to therapy after a 2 week hiatus due to wanting to wait to see his surgeon before scheduling more PT appointments. He demonstrates slightly decreased AROM compared to previous visit. He tolerated interventions to improve shoulder strength and AROM with no complaints of pain. Clinton will continue to benefit from skilled PT services to improve the functional use of his upper extremity during functional activities by continuing to strengthen his shoulder and improve his AROM.   Evaluation/Treatment Tolerance: Patient tolerated treatment well  Plan  From a physical therapy perspective, the patient would benefit from: Skilled Rehab Services    Planned therapy interventions include: Therapeutic exercise, Therapeutic activities, Neuromuscular re-education, Manual therapy, and Gait training.    Planned modalities to include: Biofeedback, Cryotherapy (cold pack), Electrical stimulation - attended, Electrical stimulation - passive/unattended, Thermotherapy (hot pack), Vasopneumatic pump, and Other (Comment). Dry needling       Visit Frequency: 2 times Per Week for 16 Weeks.       This plan was discussed with Patient.   Discussion participants: Agreed Upon Plan of Care             The patient will continue to benefit from skilled outpatient physical therapy in order to address the deficits listed in the problem list on the initial evaluation, " provide patient and family education, and maximize the patients level of independence in the home and community environments.     The patient's spiritual, cultural, and educational needs were considered, and the patient is agreeable to the plan of care and goals.           Goals:   Active         LTGs         Increase ROM to allow improved joint biomechanics during functional activities.    (Progressing)         Start:  04/09/25    Expected End:  10/31/25              2. Independent with home exercise program.   (Progressing)         Start:  04/09/25    Expected End:  10/31/25              3. Full return to functional activities with manageable complaints.   (Progressing)         Start:  04/09/25    Expected End:  10/31/25              4. Patient to demonstrate improved posture and body mechanics.   (Progressing)         Start:  04/09/25    Expected End:  10/31/25              5.Decrease pain by 75% during functional activities.   (Progressing)         Start:  04/09/25    Expected End:  10/31/25                 STGs         Pt to increase strength by a 1/2 grade of muscles test to allow for improvement in functional activities such as performing chores.    (Progressing)         Start:  04/09/25    Expected End:  07/02/25              2. Pt to improve range of motion by 25% to allow for improved functional mobility to allow for improvement in IADL's.  (MET) 7/7/25        Start:  04/09/25    Expected End:  07/02/25              3. Pt to report compliance with HEP and demonstrate proper exercise technique to PT to show competence with self management of condition.   (MET) 7/7/25        Start:  04/09/25    Expected End:  07/02/25               4. Decrease pain by 25% during functional activities.  (MET) 7/7/25        Start:  04/09/25    Expected End:  07/02/25                 Donis Palafox, PT

## 2025-07-03 NOTE — PROGRESS NOTES
Outpatient Rehab    Physical Therapy Visit    Patient Name: Clinton Rosenberg  MRN: [id number]  YOB: 1950  Encounter Date: 7/3/2025    Therapy Diagnosis:   Encounter Diagnosis   Name Primary?    Chronic left shoulder pain Yes           Physician: Eugenio Reese MD    Physician Orders: Eval and Treat  Medical Diagnosis: Status post reverse total replacement of left shoulder    Visit # / Visits Authorized:  4 / 10  Insurance Authorization Period: 5/28/2025 to 12/31/2025  Date of Evaluation: 4/9/2025  Plan of Care Certification:       PT/PTA:     Number of PTA visits since last PT visit:   Time In:     Time Out:    Total Time (in minutes):     Total Billable Time (in minutes):   1:1 time with PT     FOTO:  Intake Score:  %  Survey Score 2:  %  Survey Score 3:  %    Precautions:       Subjective             Objective            Treatment:           Time Entry(in minutes):       Assessment & Plan   Assessment:         Patient will continue to benefit from skilled outpatient physical therapy to address the deficits listed in the problem list box on initial evaluation, provide pt/family education and to maximize pt's level of independence in the home and community environment.     Patient's spiritual, cultural, and educational needs considered and patient agreeable to plan of care and goals.           Plan:      Goals:             Donis Palafox, PT

## 2025-07-17 ENCOUNTER — CLINICAL SUPPORT (OUTPATIENT)
Dept: REHABILITATION | Facility: HOSPITAL | Age: 75
End: 2025-07-17
Payer: MEDICARE

## 2025-07-17 DIAGNOSIS — G89.29 CHRONIC LEFT SHOULDER PAIN: Primary | ICD-10-CM

## 2025-07-17 DIAGNOSIS — M25.512 CHRONIC LEFT SHOULDER PAIN: Primary | ICD-10-CM

## 2025-07-17 PROCEDURE — 97140 MANUAL THERAPY 1/> REGIONS: CPT | Mod: HCNC,PN,CQ

## 2025-07-17 PROCEDURE — 97110 THERAPEUTIC EXERCISES: CPT | Mod: HCNC,PN,CQ

## 2025-07-17 NOTE — PROGRESS NOTES
Outpatient Rehab    Physical Therapy Progress Note : Updated Plan of Care    Patient Name: Clinton Rosenberg  MRN: [id number]  YOB: 1950  Encounter Date: 7/17/2025    Therapy Diagnosis:   Encounter Diagnosis   Name Primary?    Chronic left shoulder pain Yes       Physician: Eugenio Reese MD    Physician Orders: Eval and Treat  Medical Diagnosis: Status post reverse total replacement of left shoulder  Surgical Diagnosis: ARTHROPLASTY, SHOULDER, TOTAL, REVERSE (Left)  ARTHROSCOPY, SHOULDER, W/ BICEPS TENODESIS (Left)   Surgical Date: Not applicable for this Episode  Days Since Last Surgery: Not applicable for this Episode    Visit # / Visits Authorized:  5 / 10  Insurance Authorization Period: 5/28/2025 to 12/31/2025  Date of Evaluation: 4/9/2025   Plan of Care Certification: 7/3/2025 to 10/31/2025      PT/PTA: PTA   Number of PTA visits since last PT visit:1  Time In: 0300   Time Out: 0357  Total Time (in minutes): 57   Total Billable Time (in minutes):      FOTO:  Intake Score:  %  Survey Score 2:  %  Survey Score 3:  %    Precautions:       Subjective   He still has some of that pain on the top of his shoulder when he raises his arm but he can tell he's improving.  He might have to miss his next appointment next week because he needs to go out of town for his brothers wifes surgery, but he's going to make more appointments when he gets back..         Objective            Treatment:  Therapeutic Exercise  TE 1: UBE fwd/bwd 4min/4min lvl 2  TE 6: Standing shoulder ext GTB 3x10  TE 7: scap retractions GTB 3 x 10-12 x 3s holds  TE 8: standing against half roll on wall  shoulder flexion 2# 4 x 6  TE 9: Supine Flexion with dowel 5s holds x 5 min  TE 10: Standing Shoulder abduction 2# with eccentric focus 3 x 10  Manual Therapy  MT 1: PROM shoulder flexion, abduction, and ER  Balance/Neuromuscular Re-Education  NMR 5: Slot machines with SA punch 2 # AW  3 x 10      Time Entry(in minutes):        Assessment & Plan   Assessment  Mr. Alonzo presented to PT today reporting continued left shoulder pain specific the superior GHJ.  Continued with focus on improved left shoulder functional mobility and strength.  Provided verbal cues for Mr. Alonzo to avoid increased anterior shoulder shearing during standing shoulder rows, which he was able to carryover fairly well and reported decreased left shoulder pain following.  He completed today's session reporting left shoulder mm fatigue.  Will continue to progress as tolerated.          The patient will continue to benefit from skilled outpatient physical therapy in order to address the deficits listed in the problem list on the initial evaluation, provide patient and family education, and maximize the patients level of independence in the home and community environments.     The patient's spiritual, cultural, and educational needs were considered, and the patient is agreeable to the plan of care and goals.           Goals:   Active         LTGs         Increase ROM to allow improved joint biomechanics during functional activities.    (Progressing)         Start:  04/09/25    Expected End:  10/31/25              2. Independent with home exercise program.   (Progressing)         Start:  04/09/25    Expected End:  10/31/25              3. Full return to functional activities with manageable complaints.   (Progressing)         Start:  04/09/25    Expected End:  10/31/25              4. Patient to demonstrate improved posture and body mechanics.   (Progressing)         Start:  04/09/25    Expected End:  10/31/25              5.Decrease pain by 75% during functional activities.   (Progressing)         Start:  04/09/25    Expected End:  10/31/25                 STGs         Pt to increase strength by a 1/2 grade of muscles test to allow for improvement in functional activities such as performing chores.    (Progressing)         Start:  04/09/25    Expected End:  07/02/25               2. Pt to improve range of motion by 25% to allow for improved functional mobility to allow for improvement in IADL's.  (MET) 7/7/25        Start:  04/09/25    Expected End:  07/02/25              3. Pt to report compliance with HEP and demonstrate proper exercise technique to PT to show competence with self management of condition.   (MET) 7/7/25        Start:  04/09/25    Expected End:  07/02/25               4. Decrease pain by 25% during functional activities.  (MET) 7/7/25        Start:  04/09/25    Expected End:  07/02/25                 Mari Bahena PTA

## 2025-07-25 ENCOUNTER — PATIENT OUTREACH (OUTPATIENT)
Dept: ADMINISTRATIVE | Facility: HOSPITAL | Age: 75
End: 2025-07-25
Payer: MEDICARE

## (undated) DEVICE — KIT HAND CONTROL HIGH PRESSUR

## (undated) DEVICE — GOWN ECLIPSE REINF L4 XLNG XXL

## (undated) DEVICE — OMNIPAQUE 350 500ML

## (undated) DEVICE — PACK CATH LAB

## (undated) DEVICE — DRAPE THREE-QTR REINF 53X77IN

## (undated) DEVICE — KIT GLIDESHEATH SLEND 6FR 10CM

## (undated) DEVICE — SUT VICRYL+ 1 CT1 18IN

## (undated) DEVICE — GUIDE LAUNCHER 5FR JR 4.0

## (undated) DEVICE — DRAPE STERI U-SHAPED 47X51IN

## (undated) DEVICE — HEMOSTAT VASC BAND LONG 27CM

## (undated) DEVICE — PAD DEFIB CADENCE ADULT R2

## (undated) DEVICE — HEMOSTAT VASC BAND REG 24CM

## (undated) DEVICE — BLADE SAG 18.0X1.27X100

## (undated) DEVICE — HUMERAL CUP TRIAL ADAPTOR
Type: IMPLANTABLE DEVICE | Site: SHOULDER | Status: NON-FUNCTIONAL
Brand: REUNION
Removed: 2025-04-10

## (undated) DEVICE — SOL NACL 0.9% IV INJ 1000ML

## (undated) DEVICE — OMNIPAQUE 350MG 150ML VIAL

## (undated) DEVICE — DRESSING AQUACEL AG RBBN 2X45

## (undated) DEVICE — SLING SHOT II X-LARGE

## (undated) DEVICE — GLOVE BIOGEL SKINSENSE PI 7.0

## (undated) DEVICE — DRAPE U SPLIT SHEET 54X76IN

## (undated) DEVICE — SYR IRRIGATION BULB STER 60ML

## (undated) DEVICE — DRAPE STERI INSTRUMENT 1018

## (undated) DEVICE — CATH JACKY RADIAL 3.5 110CM

## (undated) DEVICE — BIT DRILL 3.1MM

## (undated) DEVICE — YANKAUER OPEN TIP W/O VENT

## (undated) DEVICE — ELECTRODE BLADE TEFLON 6

## (undated) DEVICE — COVER LIGHT HANDLE 80/CA

## (undated) DEVICE — CATH PIGTAIL FL3.5 5FR

## (undated) DEVICE — DRESSING AQUACEL FOAM 5 X 5

## (undated) DEVICE — ELECTRODE REM PLYHSV RETURN 9

## (undated) DEVICE — NITINOL PILOT WIRE
Type: IMPLANTABLE DEVICE | Site: SHOULDER | Status: NON-FUNCTIONAL
Brand: REUNION
Removed: 2025-04-10

## (undated) DEVICE — GUIDEWIRE VERRATA + JTIP 185CM

## (undated) DEVICE — ADHESIVE DERMABOND ADVANCED

## (undated) DEVICE — TAPE SURG MEDIPORE 6X72IN

## (undated) DEVICE — UNDERGLOVES BIOGEL PI SIZE 8.5

## (undated) DEVICE — DRAPE STERI-DRAPE 1000 17X11IN

## (undated) DEVICE — SUT MCRYL PLUS 4-0 PS2 27IN

## (undated) DEVICE — UNDERGLOVES BIOGEL PI SIZE 8

## (undated) DEVICE — VALVE CONTROL COPILOT

## (undated) DEVICE — GUIDEWIRE PROWATER .014X180CM

## (undated) DEVICE — GLOVE BIOGEL SKINSENSE PI 8.0

## (undated) DEVICE — BNDG COFLEX FOAM LF2 ST 4X5YD

## (undated) DEVICE — KIT ESSENTIALS W/ Y ADAPTER

## (undated) DEVICE — Device

## (undated) DEVICE — ORTHOCORD W/OS-6

## (undated) DEVICE — COVER PROXIMA MAYO STAND

## (undated) DEVICE — KIT MANIFOLD LOW PRESS TUBING

## (undated) DEVICE — PAD ABDOMINAL STERILE 8X10IN

## (undated) DEVICE — KIT TRIMANO CHIN

## (undated) DEVICE — NDL MAYO 2

## (undated) DEVICE — WIRE GUIDE SAFE-T-J .035 260CM

## (undated) DEVICE — COVER CAMERA OPERATING ROOM

## (undated) DEVICE — GLOVE SENSICARE PI MICRO 7.5

## (undated) DEVICE — KIT SYR REUSABLE

## (undated) DEVICE — CATH TREK RX 2.50MM X 12MM

## (undated) DEVICE — HOOD T7 W/ PEEL AWAY LENS

## (undated) DEVICE — DRAPE SHOULDER BEACH CHAIR

## (undated) DEVICE — GUIDE LAUNCHER 6FR AR 1.0

## (undated) DEVICE — PAD RADI FEMORAL

## (undated) DEVICE — KIT IRR SUCTION HND PIECE

## (undated) DEVICE — GLOVE BIOGEL PI MICRO SZ 7.5

## (undated) DEVICE — PENCIL SMK EVAC CONNECTOR 10FT

## (undated) DEVICE — INFLATOR ADVANTAGE ENCORE 26

## (undated) DEVICE — SUT VICRYL PLUS 3-0 SH 18IN

## (undated) DEVICE — BLADE SAGITTAL 18 X 1.27 X 90M